# Patient Record
Sex: MALE | Race: WHITE | NOT HISPANIC OR LATINO | Employment: OTHER | URBAN - METROPOLITAN AREA
[De-identification: names, ages, dates, MRNs, and addresses within clinical notes are randomized per-mention and may not be internally consistent; named-entity substitution may affect disease eponyms.]

---

## 2017-10-17 ENCOUNTER — HOSPITAL ENCOUNTER (INPATIENT)
Facility: MEDICAL CENTER | Age: 67
LOS: 11 days | DRG: 854 | End: 2017-10-28
Attending: HOSPITALIST | Admitting: HOSPITALIST
Payer: MEDICARE

## 2017-10-17 ENCOUNTER — RESOLUTE PROFESSIONAL BILLING HOSPITAL PROF FEE (OUTPATIENT)
Dept: HOSPITALIST | Facility: MEDICAL CENTER | Age: 67
End: 2017-10-17
Payer: MEDICARE

## 2017-10-17 ENCOUNTER — HOSPITAL ENCOUNTER (OUTPATIENT)
Facility: MEDICAL CENTER | Age: 67
DRG: 854 | End: 2017-10-17
Payer: MEDICARE

## 2017-10-17 DIAGNOSIS — E11.621 DIABETIC ULCER OF TOE OF RIGHT FOOT ASSOCIATED WITH TYPE 2 DIABETES MELLITUS, WITH BONE INVOLVEMENT WITHOUT EVIDENCE OF NECROSIS (HCC): ICD-10-CM

## 2017-10-17 DIAGNOSIS — L97.516 DIABETIC ULCER OF TOE OF RIGHT FOOT ASSOCIATED WITH TYPE 2 DIABETES MELLITUS, WITH BONE INVOLVEMENT WITHOUT EVIDENCE OF NECROSIS (HCC): ICD-10-CM

## 2017-10-17 PROBLEM — J44.9 COPD (CHRONIC OBSTRUCTIVE PULMONARY DISEASE) (HCC): Status: ACTIVE | Noted: 2017-10-17

## 2017-10-17 PROBLEM — K50.90 CROHN'S DISEASE (HCC): Status: ACTIVE | Noted: 2017-10-17

## 2017-10-17 PROBLEM — N18.9 ACUTE ON CHRONIC KIDNEY FAILURE (HCC): Status: ACTIVE | Noted: 2017-10-17

## 2017-10-17 PROBLEM — E11.9 DIABETES (HCC): Status: ACTIVE | Noted: 2017-10-17

## 2017-10-17 PROBLEM — M19.90 OSTEOARTHRITIS: Status: ACTIVE | Noted: 2017-10-17

## 2017-10-17 PROBLEM — N17.9 ACUTE ON CHRONIC KIDNEY FAILURE (HCC): Status: ACTIVE | Noted: 2017-10-17

## 2017-10-17 PROBLEM — N13.9 OBSTRUCTIVE UROPATHY: Status: ACTIVE | Noted: 2017-10-17

## 2017-10-17 PROBLEM — N40.0 BPH (BENIGN PROSTATIC HYPERPLASIA): Status: ACTIVE | Noted: 2017-10-17

## 2017-10-17 PROBLEM — N19 RENAL FAILURE: Status: ACTIVE | Noted: 2017-10-17

## 2017-10-17 PROBLEM — L97.509 DIABETIC FOOT ULCER (HCC): Status: ACTIVE | Noted: 2017-10-17

## 2017-10-17 LAB
ANION GAP SERPL CALC-SCNC: 12 MMOL/L (ref 0–11.9)
BASOPHILS # BLD AUTO: 0.3 % (ref 0–1.8)
BASOPHILS # BLD: 0.04 K/UL (ref 0–0.12)
BUN SERPL-MCNC: 47 MG/DL (ref 8–22)
CALCIUM SERPL-MCNC: 9.1 MG/DL (ref 8.5–10.5)
CHLORIDE SERPL-SCNC: 108 MMOL/L (ref 96–112)
CO2 SERPL-SCNC: 14 MMOL/L (ref 20–33)
CREAT SERPL-MCNC: 3.41 MG/DL (ref 0.5–1.4)
EOSINOPHIL # BLD AUTO: 0.17 K/UL (ref 0–0.51)
EOSINOPHIL NFR BLD: 1.4 % (ref 0–6.9)
ERYTHROCYTE [DISTWIDTH] IN BLOOD BY AUTOMATED COUNT: 45.8 FL (ref 35.9–50)
EST. AVERAGE GLUCOSE BLD GHB EST-MCNC: 166 MG/DL
GFR SERPL CREATININE-BSD FRML MDRD: 18 ML/MIN/1.73 M 2
GLUCOSE SERPL-MCNC: 128 MG/DL (ref 65–99)
HBA1C MFR BLD: 7.4 % (ref 0–5.6)
HCT VFR BLD AUTO: 35.2 % (ref 42–52)
HGB BLD-MCNC: 11.7 G/DL (ref 14–18)
IMM GRANULOCYTES # BLD AUTO: 0.07 K/UL (ref 0–0.11)
IMM GRANULOCYTES NFR BLD AUTO: 0.6 % (ref 0–0.9)
LYMPHOCYTES # BLD AUTO: 2.4 K/UL (ref 1–4.8)
LYMPHOCYTES NFR BLD: 19.2 % (ref 22–41)
MCH RBC QN AUTO: 30.5 PG (ref 27–33)
MCHC RBC AUTO-ENTMCNC: 33.2 G/DL (ref 33.7–35.3)
MCV RBC AUTO: 91.7 FL (ref 81.4–97.8)
MONOCYTES # BLD AUTO: 0.79 K/UL (ref 0–0.85)
MONOCYTES NFR BLD AUTO: 6.3 % (ref 0–13.4)
NEUTROPHILS # BLD AUTO: 9.02 K/UL (ref 1.82–7.42)
NEUTROPHILS NFR BLD: 72.2 % (ref 44–72)
NRBC # BLD AUTO: 0 K/UL
NRBC BLD AUTO-RTO: 0 /100 WBC
PLATELET # BLD AUTO: 441 K/UL (ref 164–446)
PMV BLD AUTO: 9.5 FL (ref 9–12.9)
POTASSIUM SERPL-SCNC: 3.6 MMOL/L (ref 3.6–5.5)
RBC # BLD AUTO: 3.84 M/UL (ref 4.7–6.1)
SODIUM SERPL-SCNC: 134 MMOL/L (ref 135–145)
WBC # BLD AUTO: 12.5 K/UL (ref 4.8–10.8)

## 2017-10-17 PROCEDURE — 80048 BASIC METABOLIC PNL TOTAL CA: CPT

## 2017-10-17 PROCEDURE — 83036 HEMOGLOBIN GLYCOSYLATED A1C: CPT

## 2017-10-17 PROCEDURE — 36415 COLL VENOUS BLD VENIPUNCTURE: CPT

## 2017-10-17 PROCEDURE — 770006 HCHG ROOM/CARE - MED/SURG/GYN SEMI*

## 2017-10-17 PROCEDURE — 99223 1ST HOSP IP/OBS HIGH 75: CPT | Mod: AI | Performed by: HOSPITALIST

## 2017-10-17 PROCEDURE — 85025 COMPLETE CBC W/AUTO DIFF WBC: CPT

## 2017-10-17 PROCEDURE — 51798 US URINE CAPACITY MEASURE: CPT

## 2017-10-17 PROCEDURE — A9270 NON-COVERED ITEM OR SERVICE: HCPCS | Performed by: HOSPITALIST

## 2017-10-17 PROCEDURE — 94760 N-INVAS EAR/PLS OXIMETRY 1: CPT

## 2017-10-17 PROCEDURE — 700102 HCHG RX REV CODE 250 W/ 637 OVERRIDE(OP): Performed by: HOSPITALIST

## 2017-10-17 RX ORDER — POLYETHYLENE GLYCOL 3350 17 G/17G
1 POWDER, FOR SOLUTION ORAL
Status: DISCONTINUED | OUTPATIENT
Start: 2017-10-17 | End: 2017-10-28 | Stop reason: HOSPADM

## 2017-10-17 RX ORDER — BISACODYL 10 MG
10 SUPPOSITORY, RECTAL RECTAL
Status: DISCONTINUED | OUTPATIENT
Start: 2017-10-17 | End: 2017-10-28 | Stop reason: HOSPADM

## 2017-10-17 RX ORDER — BUDESONIDE AND FORMOTEROL FUMARATE DIHYDRATE 160; 4.5 UG/1; UG/1
2 AEROSOL RESPIRATORY (INHALATION)
Status: DISCONTINUED | OUTPATIENT
Start: 2017-10-17 | End: 2017-10-17

## 2017-10-17 RX ORDER — ONDANSETRON 4 MG/1
4 TABLET, ORALLY DISINTEGRATING ORAL EVERY 4 HOURS PRN
Status: DISCONTINUED | OUTPATIENT
Start: 2017-10-17 | End: 2017-10-28 | Stop reason: HOSPADM

## 2017-10-17 RX ORDER — BUDESONIDE AND FORMOTEROL FUMARATE DIHYDRATE 160; 4.5 UG/1; UG/1
2 AEROSOL RESPIRATORY (INHALATION) 2 TIMES DAILY
Status: DISCONTINUED | OUTPATIENT
Start: 2017-10-18 | End: 2017-10-28 | Stop reason: HOSPADM

## 2017-10-17 RX ORDER — HEPARIN SODIUM 5000 [USP'U]/ML
5000 INJECTION, SOLUTION INTRAVENOUS; SUBCUTANEOUS EVERY 8 HOURS
Status: DISCONTINUED | OUTPATIENT
Start: 2017-10-17 | End: 2017-10-17

## 2017-10-17 RX ORDER — ALBUTEROL SULFATE 90 UG/1
2 AEROSOL, METERED RESPIRATORY (INHALATION) EVERY 4 HOURS PRN
COMMUNITY
End: 2019-04-08

## 2017-10-17 RX ORDER — DEXTROSE MONOHYDRATE 25 G/50ML
25 INJECTION, SOLUTION INTRAVENOUS
Status: DISCONTINUED | OUTPATIENT
Start: 2017-10-17 | End: 2017-10-28 | Stop reason: HOSPADM

## 2017-10-17 RX ORDER — FUROSEMIDE 20 MG/1
20 TABLET ORAL DAILY
Status: ON HOLD | COMMUNITY
End: 2017-10-28

## 2017-10-17 RX ORDER — TAMSULOSIN HYDROCHLORIDE 0.4 MG/1
0.4 CAPSULE ORAL
COMMUNITY
End: 2019-04-08

## 2017-10-17 RX ORDER — ONDANSETRON 2 MG/ML
4 INJECTION INTRAMUSCULAR; INTRAVENOUS EVERY 4 HOURS PRN
Status: DISCONTINUED | OUTPATIENT
Start: 2017-10-17 | End: 2017-10-28 | Stop reason: HOSPADM

## 2017-10-17 RX ORDER — ALBUTEROL SULFATE 90 UG/1
2 AEROSOL, METERED RESPIRATORY (INHALATION) EVERY 4 HOURS PRN
Status: DISCONTINUED | OUTPATIENT
Start: 2017-10-17 | End: 2017-10-28 | Stop reason: HOSPADM

## 2017-10-17 RX ORDER — BUDESONIDE AND FORMOTEROL FUMARATE DIHYDRATE 160; 4.5 UG/1; UG/1
2 AEROSOL RESPIRATORY (INHALATION) 2 TIMES DAILY
COMMUNITY
End: 2019-04-08

## 2017-10-17 RX ORDER — AMOXICILLIN 250 MG
2 CAPSULE ORAL 2 TIMES DAILY
Status: DISCONTINUED | OUTPATIENT
Start: 2017-10-17 | End: 2017-10-28 | Stop reason: HOSPADM

## 2017-10-17 RX ORDER — MORPHINE SULFATE 15 MG/1
15 TABLET, FILM COATED, EXTENDED RELEASE ORAL EVERY 12 HOURS
Status: ON HOLD | COMMUNITY
End: 2019-04-03

## 2017-10-17 RX ORDER — LABETALOL HYDROCHLORIDE 5 MG/ML
10 INJECTION, SOLUTION INTRAVENOUS EVERY 4 HOURS PRN
Status: DISCONTINUED | OUTPATIENT
Start: 2017-10-17 | End: 2017-10-28 | Stop reason: HOSPADM

## 2017-10-17 RX ORDER — MORPHINE SULFATE 100 MG/5ML
15 SOLUTION ORAL EVERY 8 HOURS PRN
Status: DISCONTINUED | OUTPATIENT
Start: 2017-10-17 | End: 2017-10-26

## 2017-10-17 ASSESSMENT — ENCOUNTER SYMPTOMS
NAUSEA: 1
HEMOPTYSIS: 0
HEADACHES: 0
WHEEZING: 0
BLURRED VISION: 0
DEPRESSION: 0
PALPITATIONS: 0
BLOOD IN STOOL: 0
SPEECH CHANGE: 0
CHILLS: 0
FEVER: 0
FLANK PAIN: 1
DIZZINESS: 0
BRUISES/BLEEDS EASILY: 0
SENSORY CHANGE: 0
MYALGIAS: 0
COUGH: 0
ABDOMINAL PAIN: 1

## 2017-10-17 ASSESSMENT — PATIENT HEALTH QUESTIONNAIRE - PHQ9
1. LITTLE INTEREST OR PLEASURE IN DOING THINGS: NOT AT ALL
SUM OF ALL RESPONSES TO PHQ QUESTIONS 1-9: 0
SUM OF ALL RESPONSES TO PHQ9 QUESTIONS 1 AND 2: 0
2. FEELING DOWN, DEPRESSED, IRRITABLE, OR HOPELESS: NOT AT ALL

## 2017-10-17 ASSESSMENT — COPD QUESTIONNAIRES
DO YOU EVER COUGH UP ANY MUCUS OR PHLEGM?: YES, A FEW DAYS A WEEK OR MONTH
COPD SCREENING SCORE: 5
HAVE YOU SMOKED AT LEAST 100 CIGARETTES IN YOUR ENTIRE LIFE: NO/DON'T KNOW
DURING THE PAST 4 WEEKS HOW MUCH DID YOU FEEL SHORT OF BREATH: SOME OF THE TIME

## 2017-10-17 ASSESSMENT — LIFESTYLE VARIABLES
EVER_SMOKED: NEVER
EVER_SMOKED: NEVER
ALCOHOL_USE: NO

## 2017-10-17 ASSESSMENT — PAIN SCALES - GENERAL: PAINLEVEL_OUTOF10: 2

## 2017-10-18 ENCOUNTER — APPOINTMENT (OUTPATIENT)
Dept: RADIOLOGY | Facility: MEDICAL CENTER | Age: 67
DRG: 854 | End: 2017-10-18
Attending: HOSPITALIST
Payer: MEDICARE

## 2017-10-18 PROBLEM — R65.20 SEVERE SEPSIS (HCC): Status: ACTIVE | Noted: 2017-10-18

## 2017-10-18 PROBLEM — D64.9 ANEMIA: Status: ACTIVE | Noted: 2017-10-18

## 2017-10-18 PROBLEM — A41.9 SEVERE SEPSIS (HCC): Status: ACTIVE | Noted: 2017-10-18

## 2017-10-18 LAB
ANION GAP SERPL CALC-SCNC: 11 MMOL/L (ref 0–11.9)
APPEARANCE UR: ABNORMAL
APTT PPP: 33.3 SEC (ref 24.7–36)
BACTERIA #/AREA URNS HPF: NEGATIVE /HPF
BASOPHILS # BLD AUTO: 0.5 % (ref 0–1.8)
BASOPHILS # BLD: 0.06 K/UL (ref 0–0.12)
BILIRUB UR QL STRIP.AUTO: NEGATIVE
BUN SERPL-MCNC: 50 MG/DL (ref 8–22)
CALCIUM SERPL-MCNC: 9 MG/DL (ref 8.5–10.5)
CHLORIDE SERPL-SCNC: 108 MMOL/L (ref 96–112)
CO2 SERPL-SCNC: 16 MMOL/L (ref 20–33)
COLOR UR: YELLOW
CREAT SERPL-MCNC: 3.51 MG/DL (ref 0.5–1.4)
EOSINOPHIL # BLD AUTO: 0.15 K/UL (ref 0–0.51)
EOSINOPHIL NFR BLD: 1.2 % (ref 0–6.9)
EPI CELLS #/AREA URNS HPF: ABNORMAL /HPF
ERYTHROCYTE [DISTWIDTH] IN BLOOD BY AUTOMATED COUNT: 46.1 FL (ref 35.9–50)
GFR SERPL CREATININE-BSD FRML MDRD: 18 ML/MIN/1.73 M 2
GLUCOSE BLD-MCNC: 120 MG/DL (ref 65–99)
GLUCOSE BLD-MCNC: 134 MG/DL (ref 65–99)
GLUCOSE BLD-MCNC: 142 MG/DL (ref 65–99)
GLUCOSE SERPL-MCNC: 142 MG/DL (ref 65–99)
GLUCOSE UR STRIP.AUTO-MCNC: NEGATIVE MG/DL
GRAM STN SPEC: NORMAL
HCT VFR BLD AUTO: 34 % (ref 42–52)
HGB BLD-MCNC: 11.2 G/DL (ref 14–18)
HYALINE CASTS #/AREA URNS LPF: ABNORMAL /LPF
IMM GRANULOCYTES # BLD AUTO: 0.05 K/UL (ref 0–0.11)
IMM GRANULOCYTES NFR BLD AUTO: 0.4 % (ref 0–0.9)
INR PPP: 1.14 (ref 0.87–1.13)
INR PPP: 1.15 (ref 0.87–1.13)
KETONES UR STRIP.AUTO-MCNC: NEGATIVE MG/DL
LACTATE BLD-SCNC: 1.1 MMOL/L (ref 0.5–2)
LACTATE BLD-SCNC: 1.3 MMOL/L (ref 0.5–2)
LEUKOCYTE ESTERASE UR QL STRIP.AUTO: ABNORMAL
LYMPHOCYTES # BLD AUTO: 3.59 K/UL (ref 1–4.8)
LYMPHOCYTES NFR BLD: 29.5 % (ref 22–41)
MCH RBC QN AUTO: 30.4 PG (ref 27–33)
MCHC RBC AUTO-ENTMCNC: 32.9 G/DL (ref 33.7–35.3)
MCV RBC AUTO: 92.1 FL (ref 81.4–97.8)
MICRO URNS: ABNORMAL
MONOCYTES # BLD AUTO: 0.8 K/UL (ref 0–0.85)
MONOCYTES NFR BLD AUTO: 6.6 % (ref 0–13.4)
NEUTROPHILS # BLD AUTO: 7.54 K/UL (ref 1.82–7.42)
NEUTROPHILS NFR BLD: 61.8 % (ref 44–72)
NITRITE UR QL STRIP.AUTO: NEGATIVE
NRBC # BLD AUTO: 0 K/UL
NRBC BLD AUTO-RTO: 0 /100 WBC
PH UR STRIP.AUTO: 5 [PH]
PLATELET # BLD AUTO: 447 K/UL (ref 164–446)
PMV BLD AUTO: 9.6 FL (ref 9–12.9)
POTASSIUM SERPL-SCNC: 3.8 MMOL/L (ref 3.6–5.5)
PROT UR QL STRIP: 100 MG/DL
PROTHROMBIN TIME: 15 SEC (ref 12–14.6)
PROTHROMBIN TIME: 15.1 SEC (ref 12–14.6)
RBC # BLD AUTO: 3.69 M/UL (ref 4.7–6.1)
RBC # URNS HPF: >150 /HPF
RBC UR QL AUTO: ABNORMAL
SIGNIFICANT IND 70042: NORMAL
SITE SITE: NORMAL
SODIUM SERPL-SCNC: 135 MMOL/L (ref 135–145)
SOURCE SOURCE: NORMAL
SP GR UR STRIP.AUTO: 1.01
UROBILINOGEN UR STRIP.AUTO-MCNC: 0.2 MG/DL
WBC # BLD AUTO: 12.2 K/UL (ref 4.8–10.8)
WBC #/AREA URNS HPF: ABNORMAL /HPF

## 2017-10-18 PROCEDURE — 74176 CT ABD & PELVIS W/O CONTRAST: CPT

## 2017-10-18 PROCEDURE — 36415 COLL VENOUS BLD VENIPUNCTURE: CPT

## 2017-10-18 PROCEDURE — 700102 HCHG RX REV CODE 250 W/ 637 OVERRIDE(OP): Performed by: NURSE PRACTITIONER

## 2017-10-18 PROCEDURE — 770020 HCHG ROOM/CARE - TELE (206)

## 2017-10-18 PROCEDURE — 99233 SBSQ HOSP IP/OBS HIGH 50: CPT | Mod: 25 | Performed by: HOSPITALIST

## 2017-10-18 PROCEDURE — 87205 SMEAR GRAM STAIN: CPT

## 2017-10-18 PROCEDURE — 85025 COMPLETE CBC W/AUTO DIFF WBC: CPT

## 2017-10-18 PROCEDURE — 36556 INSERT NON-TUNNEL CV CATH: CPT | Performed by: HOSPITALIST

## 2017-10-18 PROCEDURE — 85730 THROMBOPLASTIN TIME PARTIAL: CPT

## 2017-10-18 PROCEDURE — 700102 HCHG RX REV CODE 250 W/ 637 OVERRIDE(OP): Performed by: HOSPITALIST

## 2017-10-18 PROCEDURE — 700105 HCHG RX REV CODE 258

## 2017-10-18 PROCEDURE — 85610 PROTHROMBIN TIME: CPT | Mod: 91

## 2017-10-18 PROCEDURE — 11055 PARING/CUTG B9 HYPRKER LES 1: CPT

## 2017-10-18 PROCEDURE — 306015 LOCK STAT FOLEY: Performed by: HOSPITALIST

## 2017-10-18 PROCEDURE — 87070 CULTURE OTHR SPECIMN AEROBIC: CPT

## 2017-10-18 PROCEDURE — A9270 NON-COVERED ITEM OR SERVICE: HCPCS | Performed by: HOSPITALIST

## 2017-10-18 PROCEDURE — 700105 HCHG RX REV CODE 258: Performed by: HOSPITALIST

## 2017-10-18 PROCEDURE — 80048 BASIC METABOLIC PNL TOTAL CA: CPT

## 2017-10-18 PROCEDURE — B543ZZA ULTRASONOGRAPHY OF RIGHT JUGULAR VEINS, GUIDANCE: ICD-10-PCS | Performed by: HOSPITALIST

## 2017-10-18 PROCEDURE — 71010 DX-CHEST-PORTABLE (1 VIEW): CPT

## 2017-10-18 PROCEDURE — 83605 ASSAY OF LACTIC ACID: CPT | Mod: 91

## 2017-10-18 PROCEDURE — 82962 GLUCOSE BLOOD TEST: CPT

## 2017-10-18 PROCEDURE — 81001 URINALYSIS AUTO W/SCOPE: CPT

## 2017-10-18 PROCEDURE — 700111 HCHG RX REV CODE 636 W/ 250 OVERRIDE (IP): Performed by: HOSPITALIST

## 2017-10-18 PROCEDURE — 02HV33Z INSERTION OF INFUSION DEVICE INTO SUPERIOR VENA CAVA, PERCUTANEOUS APPROACH: ICD-10-PCS | Performed by: HOSPITALIST

## 2017-10-18 PROCEDURE — A9270 NON-COVERED ITEM OR SERVICE: HCPCS | Performed by: NURSE PRACTITIONER

## 2017-10-18 RX ORDER — FINASTERIDE 5 MG/1
5 TABLET, FILM COATED ORAL DAILY
Status: DISCONTINUED | OUTPATIENT
Start: 2017-10-18 | End: 2017-10-28 | Stop reason: HOSPADM

## 2017-10-18 RX ORDER — SODIUM CHLORIDE 9 MG/ML
30 INJECTION, SOLUTION INTRAVENOUS ONCE
Status: COMPLETED | OUTPATIENT
Start: 2017-10-18 | End: 2017-10-18

## 2017-10-18 RX ORDER — TAMSULOSIN HYDROCHLORIDE 0.4 MG/1
0.4 CAPSULE ORAL
Status: DISCONTINUED | OUTPATIENT
Start: 2017-10-18 | End: 2017-10-28 | Stop reason: HOSPADM

## 2017-10-18 RX ORDER — SODIUM CHLORIDE 9 MG/ML
INJECTION, SOLUTION INTRAVENOUS
Status: COMPLETED
Start: 2017-10-18 | End: 2017-10-18

## 2017-10-18 RX ORDER — SODIUM CHLORIDE 9 MG/ML
30 INJECTION, SOLUTION INTRAVENOUS
Status: DISCONTINUED | OUTPATIENT
Start: 2017-10-18 | End: 2017-10-18

## 2017-10-18 RX ORDER — SODIUM CHLORIDE 9 MG/ML
500 INJECTION, SOLUTION INTRAVENOUS
Status: DISCONTINUED | OUTPATIENT
Start: 2017-10-18 | End: 2017-10-28 | Stop reason: HOSPADM

## 2017-10-18 RX ADMIN — PIPERACILLIN AND TAZOBACTAM 2.25 G: 2; .25 INJECTION, POWDER, LYOPHILIZED, FOR SOLUTION INTRAVENOUS; PARENTERAL at 22:24

## 2017-10-18 RX ADMIN — SODIUM CHLORIDE 3600 ML: 9 INJECTION, SOLUTION INTRAVENOUS at 15:10

## 2017-10-18 RX ADMIN — MORPHINE SULFATE 15 MG: 100 SOLUTION ORAL at 23:54

## 2017-10-18 RX ADMIN — BUDESONIDE AND FORMOTEROL FUMARATE DIHYDRATE 2 PUFF: 160; 4.5 AEROSOL RESPIRATORY (INHALATION) at 22:24

## 2017-10-18 RX ADMIN — TAMSULOSIN HYDROCHLORIDE 0.4 MG: 0.4 CAPSULE ORAL at 19:33

## 2017-10-18 RX ADMIN — MORPHINE SULFATE 15 MG: 100 SOLUTION ORAL at 15:41

## 2017-10-18 RX ADMIN — BUDESONIDE AND FORMOTEROL FUMARATE DIHYDRATE 2 PUFF: 160; 4.5 AEROSOL RESPIRATORY (INHALATION) at 09:21

## 2017-10-18 RX ADMIN — STANDARDIZED SENNA CONCENTRATE AND DOCUSATE SODIUM 2 TABLET: 8.6; 5 TABLET, FILM COATED ORAL at 09:32

## 2017-10-18 RX ADMIN — PIPERACILLIN AND TAZOBACTAM 2.25 G: 2; .25 INJECTION, POWDER, LYOPHILIZED, FOR SOLUTION INTRAVENOUS; PARENTERAL at 15:11

## 2017-10-18 RX ADMIN — FINASTERIDE 5 MG: 5 TABLET, FILM COATED ORAL at 15:40

## 2017-10-18 ASSESSMENT — PATIENT HEALTH QUESTIONNAIRE - PHQ9
1. LITTLE INTEREST OR PLEASURE IN DOING THINGS: NOT AT ALL
SUM OF ALL RESPONSES TO PHQ9 QUESTIONS 1 AND 2: 0
SUM OF ALL RESPONSES TO PHQ QUESTIONS 1-9: 0
2. FEELING DOWN, DEPRESSED, IRRITABLE, OR HOPELESS: NOT AT ALL

## 2017-10-18 ASSESSMENT — ENCOUNTER SYMPTOMS
SHORTNESS OF BREATH: 0
NECK PAIN: 0
FEVER: 0
BLURRED VISION: 0
HEADACHES: 0
BACK PAIN: 0
CHILLS: 0
DIZZINESS: 0
EYE PAIN: 0
PALPITATIONS: 0
COUGH: 0
TINGLING: 0
ABDOMINAL PAIN: 1
NAUSEA: 1
INSOMNIA: 0
SORE THROAT: 0
VOMITING: 0
DEPRESSION: 0

## 2017-10-18 ASSESSMENT — PAIN SCALES - GENERAL
PAINLEVEL_OUTOF10: 3
PAINLEVEL_OUTOF10: 5
PAINLEVEL_OUTOF10: 10
PAINLEVEL_OUTOF10: 2
PAINLEVEL_OUTOF10: 9

## 2017-10-18 ASSESSMENT — LIFESTYLE VARIABLES
DO YOU DRINK ALCOHOL: NO
DO YOU DRINK ALCOHOL: NO

## 2017-10-18 NOTE — H&P
Hospital Medicine History and Physical    Date of Service  10/17/2017    Chief Complaint  Patient admitted to outside hospital with abd pain, foot ulcer and oliguria noted to have obstructive uropathy and acute on chronic kidney injury.     History of Presenting Illness  67 y.o. male who presented 10/17/2017 with obstructive uropathy, acute on chronic kidney injury. Patient states he presented to outside hospital with worsening foot ulcer and abd pain. He was found to have left first toe ulcer w/ slight erythema. Evaluvated for osteomylitis, had XR and MRI done w/ out signs of osteo, some soft tissue swelling. Also noted to have abd pain and oliguria. Patient was then evaulated w/ ultrasound found to have bilateral hydronephrosis suggest lower bladder outlet obstruction. Pain is unchanged after harrington insertion.     Primary Care Physician  Nicolette Yeager M.D. (Inactive)    Consultants  None    Code Status  Full    Review of Systems  Review of Systems   Constitutional: Negative for chills and fever.   HENT: Negative for congestion.    Eyes: Negative for blurred vision.   Respiratory: Negative for cough, hemoptysis and wheezing.    Cardiovascular: Negative for chest pain and palpitations.   Gastrointestinal: Positive for abdominal pain and nausea. Negative for blood in stool.   Genitourinary: Positive for dysuria, flank pain and hematuria.   Musculoskeletal: Negative for myalgias.   Skin: Negative for rash.   Neurological: Negative for dizziness, sensory change, speech change and headaches.   Endo/Heme/Allergies: Does not bruise/bleed easily.   Psychiatric/Behavioral: Negative for depression and suicidal ideas.        Past Medical History  Osteoarthrits, chrons, renal insuf, BPH, Type 2 Dm, COPD  Surgical History  Colostomy   Medications  Symbicort  Morphine  Albuterol  Humaria   Lasix  FLomax    Family History  No family hx per patient    Social History  Smoking: denies currently previous smoker quit 4 years  ago  Denies alcohol use   Denies drug use    Allergies  Allergies   Allergen Reactions   • Ceftriaxone    • Ezetimibe    • Fentanyl    • Flagyl [Metronidazole]    • Infliximab    • Lovastatin    • Simvastatin    • Vancomycin         Physical Exam  Laboratory   Hemodynamics  No data recorded.      No Data Recorded           Respiratory                    Physical Exam   Constitutional: He is oriented to person, place, and time. He appears well-developed and well-nourished.   HENT:   Head: Normocephalic and atraumatic.   Eyes: EOM are normal. Pupils are equal, round, and reactive to light.   Neck: Normal range of motion. Neck supple. No JVD present. No tracheal deviation present. No thyromegaly present.   Cardiovascular: Normal rate, regular rhythm, normal heart sounds and intact distal pulses.    No murmur heard.  Pulmonary/Chest: Effort normal. No respiratory distress. He has wheezes.   Abdominal: Soft. Bowel sounds are normal. There is tenderness. There is no rebound and no guarding.   Genitourinary: Penis normal.   Musculoskeletal: Normal range of motion.   Left first toe with ulcer 1 cm, depth unknown.   Neurological: He is alert and oriented to person, place, and time. No cranial nerve deficit.   Skin: Skin is warm and dry.   Psychiatric: He has a normal mood and affect. His behavior is normal. Judgment and thought content normal.               No results for input(s): ALTSGPT, ASTSGOT, ALKPHOSPHAT, TBILIRUBIN, DBILIRUBIN, GAMMAGT, AMYLASE, LIPASE, ALB, PREALBUMIN, GLUCOSE in the last 72 hours.              No results found for: TROPONINI  Urinalysis:  No results found for: SPECGRAVITY, GLUCOSEUR, KETONES, NITRITE, WBCURINE, RBCURINE, BACTERIA, EPITHELCELL     Imaging  Please see outside reccords for imaging   Assessment/Plan     I anticipate this patient will require at least two midnights for appropriate medical management, necessitating inpatient admission.    * Acute on chronic kidney failure (CMS-HCC)    Assessment & Plan    Acute on chronic Likely 2/2 to obstructive uropathy  Ultrasound w/ bilateral hydronephrosis noted on outside hospital records likely 2/2 to distal bladder outlet obstruction  nephro consulted appreciate recs           Obstructive uropathy   Assessment & Plan    Humphrey in place, bladder scan as needed   Outside ultrasound w/ bilateral hydronephrosis likely 2/2 to distal bladder outlet obstruction  Will need uro consult in am         Diabetic foot ulcer (CMS-HCC)   Assessment & Plan    No signs of acute cellulitis  MRI done outside hospital w/ out signs of osteo, some soft tissue swelling noted  Will get cx   Wound care consult   Can consider abx         COPD (chronic obstructive pulmonary disease) (CMS-HCC)   Assessment & Plan    No exacerbation at the moment  Known copd will resume symbicort  Respiratory care while inpatient         BPH (benign prostatic hyperplasia)   Assessment & Plan    chronic        Osteoarthritis   Assessment & Plan    Chronic, on morphine at home will continue        Crohn's disease (CMS-HCC)   Assessment & Plan    On patrick, injection bimonthly last injection 1 week ago        Diabetes (CMS-HCC)   Assessment & Plan    SSI  Recheck A1c            VTE prophylaxis: scd's given hematuria.

## 2017-10-18 NOTE — ASSESSMENT & PLAN NOTE
Acute on chronic Likely 2/2 to obstructive uropathy  Ultrasound w/ bilateral hydronephrosis noted on outside hospital records likely 2/2 to distal bladder outlet obstruction  -uop remains ok, harrington remains in place and functioning, down-trending Cr, continue to follow  -continue current BPH meds, tolerating well at this time, no changes planned today, monitor Cr level very closely

## 2017-10-18 NOTE — ASSESSMENT & PLAN NOTE
This is severe sepsis with the following associated acute organ dysfunction(s): acute kidney failure.   -is improving, see above

## 2017-10-18 NOTE — ASSESSMENT & PLAN NOTE
-well tolerated, continue below medications without changes at this time:  ON FINASTRIDE  ON FLOMAX

## 2017-10-18 NOTE — PROGRESS NOTES
Patient peripheral IV infiltrated and removed at 1015. Primary RN attempted peripheral IV insertion x2 but unsuccessful. Charge RN notified. Charge RN attempted insertion of ultrasound guided peripheral IV but unsuccessful. Dr. Alexander notified.

## 2017-10-18 NOTE — RESPIRATORY CARE
COPD EDUCATION by COPD CLINICAL EDUCATOR  10/18/2017 at 4:15 PM by Zaira Gaines     Patient interviewed by COPD education team. Patient refused COPD program at this time.

## 2017-10-18 NOTE — ASSESSMENT & PLAN NOTE
-MRI of the foot done here shows osteomyelitis, LPS following  -ID following, s/p amputation POD#2, pain is much better controlled today, changed to PO zyvox, 3 more days until finished 14 day course  -Remains afebrile, WBC has  Down-trended and nearly normalized now  -Skin culture, shows mixed skin radha, as expected

## 2017-10-18 NOTE — ASSESSMENT & PLAN NOTE
No exacerbation at the moment  Known copd will resume symbicort, continue BD's PRN, no changes planned currently  Rt protocol.

## 2017-10-18 NOTE — PROGRESS NOTES
Report given to PREET Jensen, at 1435. Patient transferred at 1455 to [place] [id number]. All belongings and medications sent with patient.

## 2017-10-18 NOTE — CONSULTS
UROLOGY Consult Note:    Nathaly VILLA/ Dr Jay Jay Rodriguez  Date & Time note created:    10/18/2017   10:03 AM     Referring MD:  Dr. Alexander    Patient ID:   Name:             Shola Hoyt   YOB: 1950  Age:                 67 y.o.  male   MRN:               [id number]                                                             Reason for Consult:      Bilateral hydronephrosis   History of Present Illness:    68 yo male admitted from outside hospital with bilateral hydronephrosis and abdominal pain. Harrington catheter was placed before transfer to [facility]. Nocturia 4 times per evening for the last 4 months. Stream has been slowing. Daytime frequency. Some nausea. Hematuria after harrington catheter was placed that has since started to clear. No SOB.    Review of Systems:      All other systems were reviewed and are negative (AMA/CMS criteria)                Past Medical History:   Past Medical History:   Diagnosis Date   • Arthritis    • Benign prostate hyperplasia    • Colostomy in place (CMS-HCC)    • COPD (chronic obstructive pulmonary disease) (CMS-HCC)    • Diabetes (CMS-HCC)    • Renal disorder      Active Hospital Problems    Diagnosis   • COPD (chronic obstructive pulmonary disease) (CMS-[facility]) [J44.9]     Priority: High   • Diabetic foot ulcer (CMS-HCC) [E11.621, L97.509]     Priority: High   • Obstructive uropathy [N13.9]     Priority: High   • Severe sepsis (CMS-HCC) [A41.9, R65.20]   • Anemia [D64.9]   • Acute on chronic kidney failure (CMS-HCC) [N17.9, N18.9]   • Diabetes (CMS-HCC) [E11.9]   • Crohn's disease (CMS-HCC) [K50.90]   • Osteoarthritis [M19.90]   • BPH (benign prostatic hyperplasia) [N40.0]   • Renal failure [N19]       Past Surgical History:  No past surgical history on file.    Hospital Medications:    Current Facility-Administered Medications:   •  NS (BOLUS) infusion 500 mL, 500 mL, Intravenous, Once PRN, Gunnar Alexander M.D.  •  NS infusion 3,600 mL, 30 mL/kg,  Intravenous, Once, Gunnar Alexander M.D.  •  SODIUM CHLORIDE 0.9 % IV SOLN, , , ,   •  piperacillin-tazobactam (ZOSYN) 2.25 g in  mL IVPB, 2.25 g, Intravenous, Q6HRS, Gunnar Alexander M.D.  •  senna-docusate (PERICOLACE or SENOKOT S) 8.6-50 MG per tablet 2 Tab, 2 Tab, Oral, BID, 2 Tab at 10/18/17 0932 **AND** polyethylene glycol/lytes (MIRALAX) PACKET 1 Packet, 1 Packet, Oral, QDAY PRN **AND** magnesium hydroxide (MILK OF MAGNESIA) suspension 30 mL, 30 mL, Oral, QDAY PRN **AND** bisacodyl (DULCOLAX) suppository 10 mg, 10 mg, Rectal, QDAY PRN, Mao Dunn M.D.  •  Respiratory Care per Protocol, , Nebulization, Continuous RT, Mao Dunn M.D.  •  labetalol (NORMODYNE,TRANDATE) injection 10 mg, 10 mg, Intravenous, Q4HRS PRN, Mao Dunn M.D.  •  ondansetron (ZOFRAN) syringe/vial injection 4 mg, 4 mg, Intravenous, Q4HRS PRN, Mao Dunn M.D.  •  ondansetron (ZOFRAN ODT) dispertab 4 mg, 4 mg, Oral, Q4HRS PRN, Mao Dunn M.D.  •  insulin lispro (HUMALOG) injection 2-9 Units, 2-9 Units, Subcutaneous, 4X/DAY ACHS, Mao Dunn M.D., Stopped at 10/17/17 2033  •  Action is required: Protocol 1073 Hypoglycemia has been implemented, , , Once **AND** Protocol 1073 Inclusion Criteria, , , CONTINUOUS **AND** Protocol 1073 NOTIFY, , , Once **AND** Protocol 1073 Initiate protocol immediately if FSBG is less than or equal to 70 mg/dL, , , CONTINUOUS **AND** glucose 4 g chewable tablet 16 g, 16 g, Oral, Q15 MIN PRN **AND** dextrose 50% (D50W) injection 25 mL, 25 mL, Intravenous, Q15 MIN PRN, Mao Dunn M.D.  •  morphine (ROXANOL) 20 MG/ML oral conc 15 mg, 15 mg, Oral, Q8HRS PRN, Mao Dunn M.D.  •  Influenza Vaccine High-Dose pf injection 0.5 mL, 0.5 mL, Intramuscular, Once PRN, Mao Dunn M.D.  •  budesonide-formoterol (SYMBICORT) 160-4.5 MCG/ACT inhaler 2 Puff, 2 Puff, Inhalation, BID, Mao Dunn M.D., 2 Puff at 10/18/17 0921  •  albuterol inhaler 2 Puff, 2 Puff,  Inhalation, Q4HRS POPEYEN, Luis Eduardo Lake M.D.    Current Outpatient Medications:  Prescriptions Prior to Admission   Medication Sig Dispense Refill Last Dose   • insulin regular (HUMULIN R) 100 Unit/mL Solution Inject 30 Units as instructed 2 Times a Day.      • furosemide (LASIX) 20 MG Tab Take 20 mg by mouth every day.      • MORPHINE SULFATE, CONCENTRATE, PO Take 15 mg by mouth every 8 hours as needed.      • budesonide-formoterol (SYMBICORT) 160-4.5 MCG/ACT Aerosol Inhale 2 Puffs by mouth 2 Times a Day.      • tamsulosin (FLOMAX) 0.4 MG capsule Take 0.4 mg by mouth ONE-HALF HOUR AFTER BREAKFAST.      • albuterol 108 (90 Base) MCG/ACT Aero Soln inhalation aerosol Inhale 2 Puffs by mouth every four hours as needed for Shortness of Breath.      • morphine ER (MS CONTIN) 15 MG Tab CR tablet Take 15 mg by mouth every 12 hours.   10/16/2017       Medication Allergy:  Allergies   Allergen Reactions   • Bloodless    • Ceftriaxone    • Ezetimibe    • Fentanyl    • Flagyl [Metronidazole]    • Infliximab    • Lovastatin    • Simvastatin    • Vancomycin        Family History:  No family history on file.    Social History:  Social History     Social History   • Marital status:      Spouse name: N/A   • Number of children: N/A   • Years of education: N/A     Occupational History   • Not on file.     Social History Main Topics   • Smoking status: Not on file   • Smokeless tobacco: Not on file   • Alcohol use Not on file   • Drug use: Unknown   • Sexual activity: Not on file     Other Topics Concern   • Not on file     Social History Narrative   • No narrative on file         Physical Exam:  Vitals/ General Appearance:   Weight/BMI: There is no height or weight on file to calculate BMI.  Blood pressure 140/76, pulse (!) 103, temperature 36.8 °C (98.3 °F), resp. rate 18, weight 120 kg (264 lb 8.8 oz), SpO2 98 %.  Vitals:    10/17/17 2050 10/17/17 2119 10/18/17 0500 10/18/17 0737   BP: 150/73  160/71 140/76   Pulse: 85 88 87  (!) 103   Resp: 20 18 18 18   Temp: 36.7 °C (98.1 °F)  36.7 °C (98.1 °F) 36.8 °C (98.3 °F)   SpO2: 97% 96% 97% 98%   Weight:         Oxygen Therapy:  Pulse Oximetry: 98 %, O2 (LPM): 0, O2 Delivery: None (Room Air)    Constitutional:   Well developed, Well nourished, No acute distress  HENMT:  Normocephalic, Atraumatic, Oropharynx moist mucous membranes, No oral exudates, Nose normal.  No thyromegaly.  Eyes:  EOMI, Conjunctiva normal, No discharge.  Neck:  Normal range of motion, No cervical tenderness,  no JVD.  Cardiovascular:  Normal heart rate, Normal rhythm, No murmurs, No rubs, No gallops.   Extremitites with intact distal pulses, no cyanosis, or edema.  Lungs:  some wheezing  Abdomen: Bowel sounds normal. Minimal supra pubic tenderness.  : harrington catheter patent  Neurologic: Alert & oriented x 3, No focal deficits noted, cranial nerves II through X are grossly intact.  Psychiatric: Affect normal, Judgment normal, Mood normal.      MDM (Data Review):     Records reviewed and summarized in current documentation    Lab Data Review:  Recent Results (from the past 24 hour(s))   CBC with Differential    Collection Time: 10/17/17  7:18 PM   Result Value Ref Range    WBC 12.5 (H) 4.8 - 10.8 K/uL    RBC 3.84 (L) 4.70 - 6.10 M/uL    Hemoglobin 11.7 (L) 14.0 - 18.0 g/dL    Hematocrit 35.2 (L) 42.0 - 52.0 %    MCV 91.7 81.4 - 97.8 fL    MCH 30.5 27.0 - 33.0 pg    MCHC 33.2 (L) 33.7 - 35.3 g/dL    RDW 45.8 35.9 - 50.0 fL    Platelet Count 441 164 - 446 K/uL    MPV 9.5 9.0 - 12.9 fL    Neutrophils-Polys 72.20 (H) 44.00 - 72.00 %    Lymphocytes 19.20 (L) 22.00 - 41.00 %    Monocytes 6.30 0.00 - 13.40 %    Eosinophils 1.40 0.00 - 6.90 %    Basophils 0.30 0.00 - 1.80 %    Immature Granulocytes 0.60 0.00 - 0.90 %    Nucleated RBC 0.00 /100 WBC    Neutrophils (Absolute) 9.02 (H) 1.82 - 7.42 K/uL    Lymphs (Absolute) 2.40 1.00 - 4.80 K/uL    Monos (Absolute) 0.79 0.00 - 0.85 K/uL    Eos (Absolute) 0.17 0.00 - 0.51 K/uL    Baso  (Absolute) 0.04 0.00 - 0.12 K/uL    Immature Granulocytes (abs) 0.07 0.00 - 0.11 K/uL    NRBC (Absolute) 0.00 K/uL   BASIC METABOLIC PANEL    Collection Time: 10/17/17  7:18 PM   Result Value Ref Range    Sodium 134 (L) 135 - 145 mmol/L    Potassium 3.6 3.6 - 5.5 mmol/L    Chloride 108 96 - 112 mmol/L    Co2 14 (L) 20 - 33 mmol/L    Glucose 128 (H) 65 - 99 mg/dL    Bun 47 (H) 8 - 22 mg/dL    Creatinine 3.41 (H) 0.50 - 1.40 mg/dL    Calcium 9.1 8.5 - 10.5 mg/dL    Anion Gap 12.0 (H) 0.0 - 11.9   Hemoglobin A1c    Collection Time: 10/17/17  7:18 PM   Result Value Ref Range    Glycohemoglobin 7.4 (H) 0.0 - 5.6 %    Est Avg Glucose 166 mg/dL   ESTIMATED GFR    Collection Time: 10/17/17  7:18 PM   Result Value Ref Range    GFR If  22 (A) >60 mL/min/1.73 m 2    GFR If Non  18 (A) >60 mL/min/1.73 m 2   CBC with Differential    Collection Time: 10/18/17  5:05 AM   Result Value Ref Range    WBC 12.2 (H) 4.8 - 10.8 K/uL    RBC 3.69 (L) 4.70 - 6.10 M/uL    Hemoglobin 11.2 (L) 14.0 - 18.0 g/dL    Hematocrit 34.0 (L) 42.0 - 52.0 %    MCV 92.1 81.4 - 97.8 fL    MCH 30.4 27.0 - 33.0 pg    MCHC 32.9 (L) 33.7 - 35.3 g/dL    RDW 46.1 35.9 - 50.0 fL    Platelet Count 447 (H) 164 - 446 K/uL    MPV 9.6 9.0 - 12.9 fL    Neutrophils-Polys 61.80 44.00 - 72.00 %    Lymphocytes 29.50 22.00 - 41.00 %    Monocytes 6.60 0.00 - 13.40 %    Eosinophils 1.20 0.00 - 6.90 %    Basophils 0.50 0.00 - 1.80 %    Immature Granulocytes 0.40 0.00 - 0.90 %    Nucleated RBC 0.00 /100 WBC    Neutrophils (Absolute) 7.54 (H) 1.82 - 7.42 K/uL    Lymphs (Absolute) 3.59 1.00 - 4.80 K/uL    Monos (Absolute) 0.80 0.00 - 0.85 K/uL    Eos (Absolute) 0.15 0.00 - 0.51 K/uL    Baso (Absolute) 0.06 0.00 - 0.12 K/uL    Immature Granulocytes (abs) 0.05 0.00 - 0.11 K/uL    NRBC (Absolute) 0.00 K/uL   Basic Metabolic Panel (BMP)    Collection Time: 10/18/17  5:05 AM   Result Value Ref Range    Sodium 135 135 - 145 mmol/L    Potassium 3.8 3.6 -  5.5 mmol/L    Chloride 108 96 - 112 mmol/L    Co2 16 (L) 20 - 33 mmol/L    Glucose 142 (H) 65 - 99 mg/dL    Bun 50 (H) 8 - 22 mg/dL    Creatinine 3.51 (H) 0.50 - 1.40 mg/dL    Calcium 9.0 8.5 - 10.5 mg/dL    Anion Gap 11.0 0.0 - 11.9   PROTHROMBIN TIME    Collection Time: 10/18/17  5:05 AM   Result Value Ref Range    PT 15.1 (H) 12.0 - 14.6 sec    INR 1.15 (H) 0.87 - 1.13   ESTIMATED GFR    Collection Time: 10/18/17  5:05 AM   Result Value Ref Range    GFR If  21 (A) >60 mL/min/1.73 m 2    GFR If Non  18 (A) >60 mL/min/1.73 m 2   Prothrombin time (INR)    Collection Time: 10/18/17  9:14 AM   Result Value Ref Range    PT 15.0 (H) 12.0 - 14.6 sec    INR 1.14 (H) 0.87 - 1.13   APTT    Collection Time: 10/18/17  9:14 AM   Result Value Ref Range    APTT 33.3 24.7 - 36.0 sec   Lactic Acid -STAT Once    Collection Time: 10/18/17  9:14 AM   Result Value Ref Range    Lactic Acid 1.1 0.5 - 2.0 mmol/L       Imaging/Procedures Review:    Reviewed    MDM (Assessment and Plan):     Active Hospital Problems    Diagnosis   • COPD (chronic obstructive pulmonary disease) (CMS-HCC) [J44.9]     Priority: High   • Diabetic foot ulcer (CMS-HCC) [E11.621, L97.509]     Priority: High   • Obstructive uropathy [N13.9]     Priority: High   • Severe sepsis (CMS-HCC) [A41.9, R65.20]   • Anemia [D64.9]   • Acute on chronic kidney failure (CMS-HCC) [N17.9, N18.9]   • Diabetes (CMS-HCC) [E11.9]   • Crohn's disease (CMS-HCC) [K50.90]   • Osteoarthritis [M19.90]   • BPH (benign prostatic hyperplasia) [N40.0]   • Renal failure [N19]     Bilateral hydronephrosis with possible bladder outlet obstruction  CT scan is pending  Continue harrington catheter  Monitor creatinine level  Continue IV antibiotics  Cultures pending  Urology will follow

## 2017-10-18 NOTE — PROGRESS NOTES
[facility] Hospitalist Progress Note    Date of Service: 10/18/2017    Chief Complaint  67 y.o. male admitted 10/17/2017 with admitted to outside hospital with abd pain, foot ulcer and oliguria noted to have obstructive uropathy and acute on chronic kidney injury. left first toe ulcer, found to have urosepsis.     Interval Problem Update  Feeling tired. abd pain persists.     Stat ct  Stat sepsis protocol  Stat urology consult    Consultants/Specialty  urology    Disposition  Guarded.         Review of Systems   Constitutional: Negative for chills and fever.   HENT: Negative for sore throat.    Eyes: Negative for blurred vision and pain.   Respiratory: Negative for cough and shortness of breath.    Cardiovascular: Negative for chest pain and palpitations.   Gastrointestinal: Positive for abdominal pain and nausea. Negative for vomiting.   Genitourinary: Negative for dysuria and urgency.   Musculoskeletal: Negative for back pain and neck pain.   Skin: Negative for itching and rash.   Neurological: Negative for dizziness, tingling and headaches.   Psychiatric/Behavioral: Negative for depression. The patient does not have insomnia.    All other systems reviewed and are negative.     Physical Exam  Laboratory/Imaging   Hemodynamics  Temp (24hrs), Av.8 °C (98.2 °F), Min:36.7 °C (98.1 °F), Max:36.8 °C (98.3 °F)   Temperature: 36.8 °C (98.3 °F)  Pulse  Av.8  Min: 85  Max: 103    Blood Pressure : 140/76      Respiratory      Respiration: 18, Pulse Oximetry: 98 %, O2 Daily Delivery Respiratory : Room Air with O2 Available             Fluids  No intake or output data in the 24 hours ending 10/18/17 0816    Nutrition  Orders Placed This Encounter   Procedures   • Diet Order     Standing Status:   Standing     Number of Occurrences:   1     Order Specific Question:   Diet:     Answer:   Diabetic [3]     Physical Exam   Constitutional: He is oriented to person, place, and time. He appears well-developed and well-nourished.  No distress.   HENT:   Right Ear: External ear normal.   Left Ear: External ear normal.   Nose: Nose normal.   Eyes: Right eye exhibits no discharge. Left eye exhibits no discharge. No scleral icterus.   Neck: No JVD present. No tracheal deviation present.   Cardiovascular: Normal rate, normal heart sounds and intact distal pulses.    No murmur heard.  Pulmonary/Chest: Effort normal and breath sounds normal. No respiratory distress. He has no wheezes. He has no rales.   Abdominal: Soft. Bowel sounds are normal. He exhibits no distension. There is tenderness. There is no guarding.   Suprapubic ttp   Musculoskeletal: He exhibits no edema or tenderness.   Right great toe ulceration.    Neurological: He is alert and oriented to person, place, and time.   Skin: Skin is warm and dry. He is not diaphoretic. No erythema.   Psychiatric: He has a normal mood and affect. His behavior is normal.   Nursing note and vitals reviewed.      Recent Labs      10/17/17   1918  10/18/17   0505   WBC  12.5*  12.2*   RBC  3.84*  3.69*   HEMOGLOBIN  11.7*  11.2*   HEMATOCRIT  35.2*  34.0*   MCV  91.7  92.1   MCH  30.5  30.4   MCHC  33.2*  32.9*   RDW  45.8  46.1   PLATELETCT  441  447*   MPV  9.5  9.6     Recent Labs      10/17/17   1918  10/18/17   0505   SODIUM  134*  135   POTASSIUM  3.6  3.8   CHLORIDE  108  108   CO2  14*  16*   GLUCOSE  128*  142*   BUN  47*  50*   CREATININE  3.41*  3.51*   CALCIUM  9.1  9.0     Recent Labs      10/18/17   0505   INR  1.15*                  Assessment/Plan     * Acute on chronic kidney failure (CMS-[facility])   Assessment & Plan    Acute on chronic Likely 2/2 to obstructive uropathy  Ultrasound w/ bilateral hydronephrosis noted on outside hospital records likely 2/2 to distal bladder outlet obstruction  Humphrey and urology consult  Trend  Add iv fluids  Stat ct to eval ureters here.           Obstructive uropathy   Assessment & Plan    Humphrey in place- clotting and not working. Change to 3 way today and  start CBI          Diabetic foot ulcer (CMS-HCC)   Assessment & Plan    No signs of acute cellulitis  MRI done outside hospital w/ out signs of osteo, some soft tissue swelling noted  culture pending   Wound care consult   Start abx empirically but avoid vanco for now given renal issues. I    ID consult        COPD (chronic obstructive pulmonary disease) (CMS-HCC)   Assessment & Plan    No exacerbation at the moment  Known copd will resume symbicort  Rt protocol.         Anemia   Assessment & Plan    Check fe studies.         Severe sepsis (CMS-HCC)   Assessment & Plan    This is severe sepsis with the following associated acute organ dysfunction(s): acute kidney failure. start stat sepsis protocol, transfer to [facility], iv fluids started, antibiotics started. Trend lytes and correct. F.u on cultures. Send statu UA, trend lactic acid            BPH (benign prostatic hyperplasia)   Assessment & Plan    Chronic  Consider flomax and proscar given obstructive uropathy.         Osteoarthritis   Assessment & Plan    Chronic, on morphine at home will continue        Crohn's disease (CMS-HCC)   Assessment & Plan    On patrick, injection bimonthly last injection 1 week ago  Hold this now given infection.           Diabetes (CMS-HCC)   Assessment & Plan    SSI  Recheck A1c        Renal failure   Assessment & Plan    acute- add iv fluids. Trend. Consider nephrology consult without improvement. Urology consult for obstruction. Humphrey.           Quality-Core Measures

## 2017-10-18 NOTE — PROGRESS NOTES
Pt arrived via [facility] from [facility]. AA&O x4. Direct admitting hospitalist and ed admitting notified of pt's arrival.

## 2017-10-18 NOTE — PROGRESS NOTES
Consent to treat signed Mona Chavez assessed pt at bedside. Home medication inputed as well as medical history and allergies. Pt is bloodless. All needs are met. Safety measures in place. VSS, call light and personal belongings in reach. Hourly rounding in place.    2 RN skin check complete with Regla OVIEDO, pt's right great toe has ulcer, picture taken and uploaded into Epic, Scar horizontally on anterior abdomen, Colostomy on RLQ, Humphrey in place due to retention. Skin is intact otherwise.

## 2017-10-19 LAB
GLUCOSE BLD-MCNC: 141 MG/DL (ref 65–99)
GLUCOSE BLD-MCNC: 152 MG/DL (ref 65–99)
GLUCOSE BLD-MCNC: 162 MG/DL (ref 65–99)

## 2017-10-19 PROCEDURE — 82962 GLUCOSE BLOOD TEST: CPT

## 2017-10-19 PROCEDURE — 306591 TRAY SUTURE REMOVAL DISP: Performed by: HOSPITALIST

## 2017-10-19 PROCEDURE — A9270 NON-COVERED ITEM OR SERVICE: HCPCS | Performed by: HOSPITALIST

## 2017-10-19 PROCEDURE — 83540 ASSAY OF IRON: CPT

## 2017-10-19 PROCEDURE — 700102 HCHG RX REV CODE 250 W/ 637 OVERRIDE(OP): Performed by: NURSE PRACTITIONER

## 2017-10-19 PROCEDURE — 700111 HCHG RX REV CODE 636 W/ 250 OVERRIDE (IP): Performed by: FAMILY MEDICINE

## 2017-10-19 PROCEDURE — 94760 N-INVAS EAR/PLS OXIMETRY 1: CPT

## 2017-10-19 PROCEDURE — 83550 IRON BINDING TEST: CPT

## 2017-10-19 PROCEDURE — A9270 NON-COVERED ITEM OR SERVICE: HCPCS | Performed by: NURSE PRACTITIONER

## 2017-10-19 PROCEDURE — 83605 ASSAY OF LACTIC ACID: CPT

## 2017-10-19 PROCEDURE — 87040 BLOOD CULTURE FOR BACTERIA: CPT

## 2017-10-19 PROCEDURE — 36415 COLL VENOUS BLD VENIPUNCTURE: CPT

## 2017-10-19 PROCEDURE — 770020 HCHG ROOM/CARE - TELE (206)

## 2017-10-19 PROCEDURE — 700102 HCHG RX REV CODE 250 W/ 637 OVERRIDE(OP): Performed by: HOSPITALIST

## 2017-10-19 PROCEDURE — 700105 HCHG RX REV CODE 258: Performed by: HOSPITALIST

## 2017-10-19 PROCEDURE — 83735 ASSAY OF MAGNESIUM: CPT

## 2017-10-19 PROCEDURE — 80053 COMPREHEN METABOLIC PANEL: CPT

## 2017-10-19 PROCEDURE — 84100 ASSAY OF PHOSPHORUS: CPT

## 2017-10-19 PROCEDURE — 99232 SBSQ HOSP IP/OBS MODERATE 35: CPT | Performed by: FAMILY MEDICINE

## 2017-10-19 PROCEDURE — 11055 PARING/CUTG B9 HYPRKER LES 1: CPT

## 2017-10-19 PROCEDURE — 700111 HCHG RX REV CODE 636 W/ 250 OVERRIDE (IP): Performed by: HOSPITALIST

## 2017-10-19 RX ORDER — HEPARIN SODIUM 5000 [USP'U]/ML
5000 INJECTION, SOLUTION INTRAVENOUS; SUBCUTANEOUS EVERY 12 HOURS
Status: DISCONTINUED | OUTPATIENT
Start: 2017-10-19 | End: 2017-10-28 | Stop reason: HOSPADM

## 2017-10-19 RX ADMIN — PIPERACILLIN AND TAZOBACTAM 2.25 G: 2; .25 INJECTION, POWDER, LYOPHILIZED, FOR SOLUTION INTRAVENOUS; PARENTERAL at 04:27

## 2017-10-19 RX ADMIN — INSULIN LISPRO 2 UNITS: 100 INJECTION, SOLUTION INTRAVENOUS; SUBCUTANEOUS at 21:13

## 2017-10-19 RX ADMIN — PIPERACILLIN AND TAZOBACTAM 2.25 G: 2; .25 INJECTION, POWDER, LYOPHILIZED, FOR SOLUTION INTRAVENOUS; PARENTERAL at 09:37

## 2017-10-19 RX ADMIN — PIPERACILLIN AND TAZOBACTAM 2.25 G: 2; .25 INJECTION, POWDER, LYOPHILIZED, FOR SOLUTION INTRAVENOUS; PARENTERAL at 16:40

## 2017-10-19 RX ADMIN — BUDESONIDE AND FORMOTEROL FUMARATE DIHYDRATE 2 PUFF: 160; 4.5 AEROSOL RESPIRATORY (INHALATION) at 21:13

## 2017-10-19 RX ADMIN — BUDESONIDE AND FORMOTEROL FUMARATE DIHYDRATE 2 PUFF: 160; 4.5 AEROSOL RESPIRATORY (INHALATION) at 09:37

## 2017-10-19 RX ADMIN — TAMSULOSIN HYDROCHLORIDE 0.4 MG: 0.4 CAPSULE ORAL at 21:14

## 2017-10-19 RX ADMIN — HEPARIN SODIUM 5000 UNITS: 5000 INJECTION, SOLUTION INTRAVENOUS; SUBCUTANEOUS at 21:13

## 2017-10-19 RX ADMIN — PIPERACILLIN AND TAZOBACTAM 2.25 G: 2; .25 INJECTION, POWDER, LYOPHILIZED, FOR SOLUTION INTRAVENOUS; PARENTERAL at 21:12

## 2017-10-19 RX ADMIN — FINASTERIDE 5 MG: 5 TABLET, FILM COATED ORAL at 09:37

## 2017-10-19 RX ADMIN — MORPHINE SULFATE 15 MG: 100 SOLUTION ORAL at 19:20

## 2017-10-19 RX ADMIN — HEPARIN SODIUM 5000 UNITS: 5000 INJECTION, SOLUTION INTRAVENOUS; SUBCUTANEOUS at 13:37

## 2017-10-19 RX ADMIN — INSULIN LISPRO 2 UNITS: 100 INJECTION, SOLUTION INTRAVENOUS; SUBCUTANEOUS at 06:36

## 2017-10-19 RX ADMIN — INSULIN LISPRO 2 UNITS: 100 INJECTION, SOLUTION INTRAVENOUS; SUBCUTANEOUS at 13:38

## 2017-10-19 ASSESSMENT — PAIN SCALES - GENERAL
PAINLEVEL_OUTOF10: 4
PAINLEVEL_OUTOF10: 0
PAINLEVEL_OUTOF10: 6
PAINLEVEL_OUTOF10: 8
PAINLEVEL_OUTOF10: 0
PAINLEVEL_OUTOF10: 0

## 2017-10-19 ASSESSMENT — ENCOUNTER SYMPTOMS
BACK PAIN: 0
DEPRESSION: 0
NAUSEA: 0
TINGLING: 0
DIZZINESS: 0
CHILLS: 0
SHORTNESS OF BREATH: 0
VOMITING: 0
EYE PAIN: 0
ABDOMINAL PAIN: 0
HEADACHES: 0
COUGH: 0
BLURRED VISION: 0
NECK PAIN: 0
SORE THROAT: 0
PALPITATIONS: 0
INSOMNIA: 0
FEVER: 0

## 2017-10-19 ASSESSMENT — PATIENT HEALTH QUESTIONNAIRE - PHQ9
SUM OF ALL RESPONSES TO PHQ QUESTIONS 1-9: 0
2. FEELING DOWN, DEPRESSED, IRRITABLE, OR HOPELESS: NOT AT ALL
SUM OF ALL RESPONSES TO PHQ9 QUESTIONS 1 AND 2: 0
1. LITTLE INTEREST OR PLEASURE IN DOING THINGS: NOT AT ALL

## 2017-10-19 ASSESSMENT — LIFESTYLE VARIABLES: DO YOU DRINK ALCOHOL: NO

## 2017-10-19 NOTE — PROCEDURES
Procedure: central venous catheter placement  Location: right IJ  indication- sepsis- no access  Patient consented prior to procedure, prepped and draped in sterile fashion.  A triple lumen catheter was placed via ultrasound guidence and seldinger technique  Estimated blood loss less than 2cc  Patient tolerated procedure well  No complications noted  Stat cxr pending.

## 2017-10-19 NOTE — WOUND TEAM
"[facility]   Inpatient Services   Established Ostomy Management/ troubleshooting  HPI: Reviewed  PMH: Reviewed   SH: Reviewed   Reason for Ostomy nurse consult:  assessement of established colostomy    Subjective: \"I have all my supplies\"    Objective:   Ostomy type:  Transverse colostomy  Stoma location:  RUQ  Stoma assessment:    Appearance:  Red per pouch   Size: unable to assess   Protrusion:  See above   Output:  Moderate loose brown   MC jxn:  Appliance intact   Peristomal skin:  \"     \"    Ostomy Appliance (type and size):  1 3/4\" two piece     Interventions and Education:  Patient reports independent with care and brought own supplies.  Two large ostomy belts given at patients request.       Evaluation: established ostomy with no needs    Plan: assist PRN patient request    Anticipated discharge needs:  None    Changed patient's right toe dressing due to drainage.  Increased frequency of dressing change due to drainage.  Using scalpel pared down callous right medial foot without incident.    "

## 2017-10-19 NOTE — CARE PLAN
Problem: Communication  Goal: The ability to communicate needs accurately and effectively will improve  Outcome: PROGRESSING AS EXPECTED    Intervention: [place] patient and significant other/support system to call light to alert staff of needs  Patient oriented to surroundings and unit policies/routines.  Educated and understands the use of the call button.  Patient calls appropriately.      Problem: Infection  Goal: Will remain free from infection  Outcome: PROGRESSING AS EXPECTED    Intervention: Implement standard precautions and perform hand washing before and after patient contact  Patient educated and understands the importance of proper hand hygiene for himself, his visitors, and his care team.

## 2017-10-19 NOTE — PROGRESS NOTES
Urology Progress Note      Overnight Events: None    S: No fevers, chills, nausea or vomiting.  No further abdominal pain. Imaging shows possible bladder outlet obstruction. Making good urine. No new labs for review.    O:   Blood pressure 160/74, pulse 74, temperature 36.7 °C (98.1 °F), resp. rate 18, weight 114.2 kg (251 lb 12.3 oz), SpO2 95 %.  Recent Labs      10/17/17   1918  10/18/17   0505   SODIUM  134*  135   POTASSIUM  3.6  3.8   CHLORIDE  108  108   CO2  14*  16*   GLUCOSE  128*  142*   BUN  47*  50*   CREATININE  3.41*  3.51*   CALCIUM  9.1  9.0     Recent Labs      10/17/17   1918  10/18/17   0505   WBC  12.5*  12.2*   RBC  3.84*  3.69*   HEMOGLOBIN  11.7*  11.2*   HEMATOCRIT  35.2*  34.0*   MCV  91.7  92.1   MCH  30.5  30.4   MCHC  33.2*  32.9*   RDW  45.8  46.1   PLATELETCT  441  447*   MPV  9.5  9.6         Intake/Output Summary (Last 24 hours) at 10/19/17 1359  Last data filed at 10/19/17 1327   Gross per 24 hour   Intake             4620 ml   Output            67306 ml   Net            -6130 ml       Exam:  Abdomen soft, benign.     Urine: clear      A/P:    Active Hospital Problems    Diagnosis   • COPD (chronic obstructive pulmonary disease) (CMS-HCC) [J44.9]     Priority: High   • Diabetic foot ulcer (CMS-HCC) [E11.621, L97.509]     Priority: High   • Obstructive uropathy [N13.9]     Priority: High   • Severe sepsis (CMS-HCC) [A41.9, R65.20]   • Anemia [D64.9]   • Acute on chronic kidney failure (CMS-HCC) [N17.9, N18.9]   • Diabetes (CMS-HCC) [E11.9]   • Crohn's disease (CMS-HCC) [K50.90]   • Osteoarthritis [M19.90]   • BPH (benign prostatic hyperplasia) [N40.0]   • Renal failure [N19]       Stable.   Check creatinine in AM  Renal US tomorrow to reassess hydronephrosis

## 2017-10-19 NOTE — PROGRESS NOTES
Bedside report completed.  Assumed pt care. Patient sitting at edge of bed, in no apparent distress.  Safety precautions in place. Call light & personal belongings within reach.  Plan of care discussed.  Patient ambulated to restroom.  Reports 9/10 pain at this time, declines intervention other than rest.  Patient is on room air.  IV is TKO @ 10 mL/hour.  Milagro with CBI in place, will monitor output closely.  No needs expressed at this time.  Will continue to monitor.

## 2017-10-19 NOTE — WOUND TEAM
"[facility]ound [facility] Ostomy [facility]  Inpatient Services  Initial Wound and Skin Care Evaluation    Admission Date:  10/17/17  HPI, PMH, SH: Reviewed  Unit where seen by Wound Team: [id number]    WOUND CONSULT RELATED TO: evaluation right plantar toe wound/callous    SUBJECTIVE:  \"[facility] a [facility]\"    Self Report / Pain Level: denies    OBJECTIVE:   Patient verbalized understanding of obtaining new off loading shoes annually and hasn't seen podiatrist \"since he nicked me\"     WOUND TYPE, LOCATION, CHARACTERISTICS (Pressure ulcers: location, stage, POA or date identified)    Wound Type/Location: DM wound lateral/plantar right great toe   Periwound:  callous    Drainage:   Minimal bloody post debridement    Tissue Type and %:   Red 100%   Wound Edges:   attached   Odor:   none    Exposed structure(s):  none   S&S of Infection:  none   Measurements: (taken 10/18/17)    Length:  1.2cm   Width:  1cm   Depth:  1cm   Tracts/undermining: none      INTERVENTIONS BY WOUND TEAM:  Using scalpel and forceps, removed 100% julia wound callous and remaining slough in wound bed revealing red wound bed.  Measurements and photo taken.  Filled wound with honey alginate and covered with piece of non adhesive foam dressing securing with tape.  Unable to debride left foot calluses which are not severe.    Interdisciplinary consultation: staff RN, patient    EVALUATION:   Clean DM ulceration due to callous formation    Factors affecting wound healing: DM, pressure from callous  Goals: wound to decrease in size by 2% weekly    NURSING PLAN OF CARE ORDERS (X):    Dressing changes: See Dressing Maintenance orders:  X  Skin care: See Skin Care orders:   Rectal tube care: See Rectal Tube Care orders:    Other orders:    RSKIN: CURRENT (X) ORDERED (O)  Q shift Malik:  X  Q shift pressure point assessments:  X  Atmosair      X   FARNAZ       Bariatric FARNAZ       Bariatric foam         Heel float boots        Heels floated on " pillows   moves independently  Barrier wipes       Barrier Cream       Barrier paste    Sacral silicone dressing      Padded O2 tubing       Anchorfast       Trach with Optifoam split foam        Waffle cushion       Rectal tube or BMS       Antifungal tx    Turn q 2 hours  see above  Up to chair      Ambulate    PT/OT      Dietician      PO X    TF   TPN     PVN    NPO   # days    Other       WOUND TEAM PLAN OF CARE (X):    NPWT change 3 x week:         Dressing changes by wound team:       Follow up as needed:    X    Other (explain):    Anticipated discharge plans (X):  SNF:           Home Care:           Outpatient Wound Center:            Self Care:            Other:    TBD

## 2017-10-19 NOTE — CARE PLAN
Problem: Safety  Goal: Will remain free from injury  Outcome: PROGRESSING AS EXPECTED  Pt free from falls or injuries this shift. Calls appropriately with call light for assistance. Demonstrates safety awareness. Fall education given.    Problem: Discharge Barriers/Planning  Goal: Patient's continuum of care needs will be met  Outcome: PROGRESSING AS EXPECTED  POC reviewed with patient and pt verbalizes understanding.    Problem: Pain Management  Goal: Pain level will decrease to patient's comfort goal  Outcome: PROGRESSING AS EXPECTED  Pt medicated with prn morphine for complaints of chronic pain- per home regimen. Pain managed effectively with medication.

## 2017-10-19 NOTE — PROGRESS NOTES
[facility] Hospitalist Progress Note    Date of Service: 10/19/2017    Chief Complaint  67 y.o. male admitted 10/17/2017 with admitted to outside hospital with abd pain, foot ulcer and oliguria noted to have obstructive uropathy and acute on chronic kidney injury. left first toe ulcer, found to have urosepsis.     Interval Problem Update  Feeling tired. abd pain persists.     Stat ct  Stat sepsis protocol  Stat urology consult    Consultants/Specialty  urology    Disposition  Guarded.         Review of Systems   Constitutional: Negative for chills and fever.   HENT: Negative for sore throat.    Eyes: Negative for blurred vision and pain.   Respiratory: Negative for cough and shortness of breath.    Cardiovascular: Negative for chest pain and palpitations.   Gastrointestinal: Negative for abdominal pain, nausea and vomiting.   Genitourinary: Negative for dysuria and urgency.   Musculoskeletal: Negative for back pain and neck pain.   Skin: Negative for itching and rash.   Neurological: Negative for dizziness, tingling and headaches.   Psychiatric/Behavioral: Negative for depression. The patient does not have insomnia.    All other systems reviewed and are negative.     Physical Exam  Laboratory/Imaging   Hemodynamics  Temp (24hrs), Av.7 °C (98 °F), Min:36.3 °C (97.3 °F), Max:37 °C (98.6 °F)   Temperature: 36.6 °C (97.8 °F)  Pulse  Av.3  Min: 67  Max: 103    Blood Pressure : 158/69      Respiratory      Respiration: 17, Pulse Oximetry: 96 %, O2 Daily Delivery Respiratory : Silicone Nasal Cannula        RUL Breath Sounds: Clear;Diminished, RML Breath Sounds: Clear;Diminished, RLL Breath Sounds: Clear;Diminished, KITTY Breath Sounds: Clear;Diminished, LLL Breath Sounds: Clear;Diminished    Fluids    Intake/Output Summary (Last 24 hours) at 10/19/17 1039  Last data filed at 10/19/17 0943   Gross per 24 hour   Intake             4520 ml   Output             9450 ml   Net            -4930 ml       Nutrition  Orders  Placed This Encounter   Procedures   • Diet Order     Standing Status:   Standing     Number of Occurrences:   1     Order Specific Question:   Diet:     Answer:   Diabetic [3]     Physical Exam   Constitutional: He is oriented to person, place, and time. He appears well-developed and well-nourished. No distress.   HENT:   Right Ear: External ear normal.   Left Ear: External ear normal.   Nose: Nose normal.   Eyes: Right eye exhibits no discharge. Left eye exhibits no discharge. No scleral icterus.   Neck: No JVD present. No tracheal deviation present.   Cardiovascular: Normal rate, normal heart sounds and intact distal pulses.    No murmur heard.  Pulmonary/Chest: Effort normal and breath sounds normal. No respiratory distress. He has no wheezes. He has no rales.   Abdominal: Soft. Bowel sounds are normal. He exhibits no distension. There is no tenderness. There is no guarding.   Suprapubic ttp   Musculoskeletal: He exhibits no edema or tenderness.   Right great toe SUPERFICIAL ulceration.    Neurological: He is alert and oriented to person, place, and time.   Skin: Skin is warm and dry. He is not diaphoretic. No erythema.   Psychiatric: He has a normal mood and affect. His behavior is normal.   Nursing note and vitals reviewed.      Recent Labs      10/17/17   1918  10/18/17   0505   WBC  12.5*  12.2*   RBC  3.84*  3.69*   HEMOGLOBIN  11.7*  11.2*   HEMATOCRIT  35.2*  34.0*   MCV  91.7  92.1   MCH  30.5  30.4   MCHC  33.2*  32.9*   RDW  45.8  46.1   PLATELETCT  441  447*   MPV  9.5  9.6     Recent Labs      10/17/17   1918  10/18/17   0505   SODIUM  134*  135   POTASSIUM  3.6  3.8   CHLORIDE  108  108   CO2  14*  16*   GLUCOSE  128*  142*   BUN  47*  50*   CREATININE  3.41*  3.51*   CALCIUM  9.1  9.0     Recent Labs      10/18/17   0505  10/18/17   0914   APTT   --   33.3   INR  1.15*  1.14*                  Assessment/Plan     * Acute on chronic kidney failure (CMS-HCC)   Assessment & Plan    Acute on chronic  Likely 2/2 to obstructive uropathy  Ultrasound w/ bilateral hydronephrosis noted on outside hospital records likely 2/2 to distal bladder outlet obstruction  Humphrey   UROLOGY INPUT IS NOTED   iv fluids            Obstructive uropathy   Assessment & Plan    Humphrey in place  UROLOGY INPUT IS NOTED        Diabetic foot ulcer (CMS-HCC)   Assessment & Plan    No signs of acute cellulitis  MRI done outside hospital w/ out signs of osteo, some soft tissue swelling noted  IT APPEARS TO BE SUPERFICIAL  CONSULTED LIMB PRESERVATION SERVICES        COPD (chronic obstructive pulmonary disease) (CMS-HCC)   Assessment & Plan    No exacerbation at the moment  Known copd will resume symbicort  Rt protocol.         Anemia   Assessment & Plan    Check fe studies.         Severe sepsis (CMS-HCC)   Assessment & Plan    This is severe sepsis with the following associated acute organ dysfunction(s): acute kidney failure.   ON ZOSYN  DOING BETTER          BPH (benign prostatic hyperplasia)   Assessment & Plan    Chronic  AS PER UROLOGY        Osteoarthritis   Assessment & Plan    Chronic, on morphine at home will continue        Crohn's disease (CMS-HCC)   Assessment & Plan    On patrick, injection bimonthly last injection 1 week ago  Hold this now given infection.           Diabetes (CMS-HCC)   Assessment & Plan    SSI  HEM A1c OF 7.4        Renal failure   Assessment & Plan    IV FLUID  WILL CHECK BMP  MOST LIKLEY 2ND TO OBSTRUCTIVE UROPATHY  IF KIDNEY FUNCTION NOT BETTER TOMARROW THEN WILL CONSULT NEPHROLOGY            DVT prophylaxis pharmacological::  Heparin

## 2017-10-20 ENCOUNTER — APPOINTMENT (OUTPATIENT)
Dept: RADIOLOGY | Facility: MEDICAL CENTER | Age: 67
DRG: 854 | End: 2017-10-20
Attending: NURSE PRACTITIONER
Payer: MEDICARE

## 2017-10-20 PROBLEM — I10 HTN (HYPERTENSION): Status: ACTIVE | Noted: 2017-10-20

## 2017-10-20 LAB
ALBUMIN SERPL BCP-MCNC: 3.4 G/DL (ref 3.2–4.9)
ALBUMIN/GLOB SERPL: 0.8 G/DL
ALP SERPL-CCNC: 85 U/L (ref 30–99)
ALT SERPL-CCNC: 15 U/L (ref 2–50)
ANION GAP SERPL CALC-SCNC: 11 MMOL/L (ref 0–11.9)
AST SERPL-CCNC: 17 U/L (ref 12–45)
BACTERIA WND AEROBE CULT: ABNORMAL
BACTERIA WND AEROBE CULT: ABNORMAL
BASOPHILS # BLD AUTO: 0.4 % (ref 0–1.8)
BASOPHILS # BLD: 0.06 K/UL (ref 0–0.12)
BILIRUB SERPL-MCNC: 0.3 MG/DL (ref 0.1–1.5)
BUN SERPL-MCNC: 34 MG/DL (ref 8–22)
CALCIUM SERPL-MCNC: 8.5 MG/DL (ref 8.5–10.5)
CHLORIDE SERPL-SCNC: 109 MMOL/L (ref 96–112)
CO2 SERPL-SCNC: 17 MMOL/L (ref 20–33)
CREAT SERPL-MCNC: 2.32 MG/DL (ref 0.5–1.4)
EOSINOPHIL # BLD AUTO: 0.66 K/UL (ref 0–0.51)
EOSINOPHIL NFR BLD: 4.9 % (ref 0–6.9)
ERYTHROCYTE [DISTWIDTH] IN BLOOD BY AUTOMATED COUNT: 44.1 FL (ref 35.9–50)
GFR SERPL CREATININE-BSD FRML MDRD: 28 ML/MIN/1.73 M 2
GLOBULIN SER CALC-MCNC: 4.3 G/DL (ref 1.9–3.5)
GLUCOSE BLD-MCNC: 127 MG/DL (ref 65–99)
GLUCOSE BLD-MCNC: 145 MG/DL (ref 65–99)
GLUCOSE BLD-MCNC: 194 MG/DL (ref 65–99)
GLUCOSE BLD-MCNC: 209 MG/DL (ref 65–99)
GLUCOSE BLD-MCNC: 239 MG/DL (ref 65–99)
GLUCOSE SERPL-MCNC: 116 MG/DL (ref 65–99)
GRAM STN SPEC: ABNORMAL
HCT VFR BLD AUTO: 30.8 % (ref 42–52)
HGB BLD-MCNC: 10.2 G/DL (ref 14–18)
IMM GRANULOCYTES # BLD AUTO: 0.05 K/UL (ref 0–0.11)
IMM GRANULOCYTES NFR BLD AUTO: 0.4 % (ref 0–0.9)
IRON SATN MFR SERPL: 19 % (ref 15–55)
IRON SERPL-MCNC: 50 UG/DL (ref 50–180)
LACTATE BLD-SCNC: 0.8 MMOL/L (ref 0.5–2)
LYMPHOCYTES # BLD AUTO: 4.36 K/UL (ref 1–4.8)
LYMPHOCYTES NFR BLD: 32.6 % (ref 22–41)
MAGNESIUM SERPL-MCNC: 1.5 MG/DL (ref 1.5–2.5)
MCH RBC QN AUTO: 29.7 PG (ref 27–33)
MCHC RBC AUTO-ENTMCNC: 33.1 G/DL (ref 33.7–35.3)
MCV RBC AUTO: 89.8 FL (ref 81.4–97.8)
MONOCYTES # BLD AUTO: 0.85 K/UL (ref 0–0.85)
MONOCYTES NFR BLD AUTO: 6.4 % (ref 0–13.4)
NEUTROPHILS # BLD AUTO: 7.4 K/UL (ref 1.82–7.42)
NEUTROPHILS NFR BLD: 55.3 % (ref 44–72)
NRBC # BLD AUTO: 0 K/UL
NRBC BLD AUTO-RTO: 0 /100 WBC
PHOSPHATE SERPL-MCNC: 2.6 MG/DL (ref 2.5–4.5)
PLATELET # BLD AUTO: 419 K/UL (ref 164–446)
PMV BLD AUTO: 9.4 FL (ref 9–12.9)
POTASSIUM SERPL-SCNC: 2.9 MMOL/L (ref 3.6–5.5)
PROT SERPL-MCNC: 7.7 G/DL (ref 6–8.2)
RBC # BLD AUTO: 3.43 M/UL (ref 4.7–6.1)
SIGNIFICANT IND 70042: ABNORMAL
SITE SITE: ABNORMAL
SODIUM SERPL-SCNC: 137 MMOL/L (ref 135–145)
SOURCE SOURCE: ABNORMAL
TIBC SERPL-MCNC: 259 UG/DL (ref 250–450)
WBC # BLD AUTO: 13.4 K/UL (ref 4.8–10.8)

## 2017-10-20 PROCEDURE — 770020 HCHG ROOM/CARE - TELE (206)

## 2017-10-20 PROCEDURE — 700105 HCHG RX REV CODE 258: Performed by: HOSPITALIST

## 2017-10-20 PROCEDURE — A9270 NON-COVERED ITEM OR SERVICE: HCPCS | Performed by: HOSPITALIST

## 2017-10-20 PROCEDURE — 82962 GLUCOSE BLOOD TEST: CPT | Mod: 91

## 2017-10-20 PROCEDURE — A9270 NON-COVERED ITEM OR SERVICE: HCPCS | Performed by: NURSE PRACTITIONER

## 2017-10-20 PROCEDURE — 99232 SBSQ HOSP IP/OBS MODERATE 35: CPT | Performed by: FAMILY MEDICINE

## 2017-10-20 PROCEDURE — 700102 HCHG RX REV CODE 250 W/ 637 OVERRIDE(OP): Performed by: NURSE PRACTITIONER

## 2017-10-20 PROCEDURE — 76775 US EXAM ABDO BACK WALL LIM: CPT

## 2017-10-20 PROCEDURE — 700111 HCHG RX REV CODE 636 W/ 250 OVERRIDE (IP): Performed by: FAMILY MEDICINE

## 2017-10-20 PROCEDURE — 700102 HCHG RX REV CODE 250 W/ 637 OVERRIDE(OP): Performed by: FAMILY MEDICINE

## 2017-10-20 PROCEDURE — 700111 HCHG RX REV CODE 636 W/ 250 OVERRIDE (IP): Performed by: HOSPITALIST

## 2017-10-20 PROCEDURE — 700102 HCHG RX REV CODE 250 W/ 637 OVERRIDE(OP): Performed by: HOSPITALIST

## 2017-10-20 PROCEDURE — A9270 NON-COVERED ITEM OR SERVICE: HCPCS | Performed by: FAMILY MEDICINE

## 2017-10-20 PROCEDURE — 85025 COMPLETE CBC W/AUTO DIFF WBC: CPT

## 2017-10-20 RX ORDER — HYDRALAZINE HYDROCHLORIDE 25 MG/1
25 TABLET, FILM COATED ORAL EVERY 8 HOURS
Status: DISCONTINUED | OUTPATIENT
Start: 2017-10-20 | End: 2017-10-23

## 2017-10-20 RX ADMIN — FINASTERIDE 5 MG: 5 TABLET, FILM COATED ORAL at 08:30

## 2017-10-20 RX ADMIN — MORPHINE SULFATE 15 MG: 100 SOLUTION ORAL at 17:44

## 2017-10-20 RX ADMIN — BUDESONIDE AND FORMOTEROL FUMARATE DIHYDRATE 2 PUFF: 160; 4.5 AEROSOL RESPIRATORY (INHALATION) at 08:29

## 2017-10-20 RX ADMIN — TAMSULOSIN HYDROCHLORIDE 0.4 MG: 0.4 CAPSULE ORAL at 17:44

## 2017-10-20 RX ADMIN — PIPERACILLIN AND TAZOBACTAM 2.25 G: 2; .25 INJECTION, POWDER, LYOPHILIZED, FOR SOLUTION INTRAVENOUS; PARENTERAL at 17:06

## 2017-10-20 RX ADMIN — HEPARIN SODIUM 5000 UNITS: 5000 INJECTION, SOLUTION INTRAVENOUS; SUBCUTANEOUS at 21:09

## 2017-10-20 RX ADMIN — PIPERACILLIN AND TAZOBACTAM 2.25 G: 2; .25 INJECTION, POWDER, LYOPHILIZED, FOR SOLUTION INTRAVENOUS; PARENTERAL at 21:09

## 2017-10-20 RX ADMIN — HYDRALAZINE HYDROCHLORIDE 25 MG: 25 TABLET, FILM COATED ORAL at 13:54

## 2017-10-20 RX ADMIN — PIPERACILLIN AND TAZOBACTAM 2.25 G: 2; .25 INJECTION, POWDER, LYOPHILIZED, FOR SOLUTION INTRAVENOUS; PARENTERAL at 03:34

## 2017-10-20 RX ADMIN — HYDRALAZINE HYDROCHLORIDE 25 MG: 25 TABLET, FILM COATED ORAL at 21:09

## 2017-10-20 RX ADMIN — HEPARIN SODIUM 5000 UNITS: 5000 INJECTION, SOLUTION INTRAVENOUS; SUBCUTANEOUS at 08:31

## 2017-10-20 RX ADMIN — MORPHINE SULFATE 15 MG: 100 SOLUTION ORAL at 03:40

## 2017-10-20 RX ADMIN — BUDESONIDE AND FORMOTEROL FUMARATE DIHYDRATE 2 PUFF: 160; 4.5 AEROSOL RESPIRATORY (INHALATION) at 21:09

## 2017-10-20 RX ADMIN — INSULIN LISPRO 3 UNITS: 100 INJECTION, SOLUTION INTRAVENOUS; SUBCUTANEOUS at 12:10

## 2017-10-20 RX ADMIN — PIPERACILLIN AND TAZOBACTAM 2.25 G: 2; .25 INJECTION, POWDER, LYOPHILIZED, FOR SOLUTION INTRAVENOUS; PARENTERAL at 10:27

## 2017-10-20 RX ADMIN — INSULIN LISPRO 3 UNITS: 100 INJECTION, SOLUTION INTRAVENOUS; SUBCUTANEOUS at 21:17

## 2017-10-20 ASSESSMENT — ENCOUNTER SYMPTOMS
CHILLS: 0
SHORTNESS OF BREATH: 0
INSOMNIA: 0
DEPRESSION: 0
BLURRED VISION: 0
NECK PAIN: 0
SORE THROAT: 0
EYE PAIN: 0
VOMITING: 0
HEADACHES: 0
FEVER: 0
TINGLING: 0
NAUSEA: 0
ABDOMINAL PAIN: 0
PALPITATIONS: 0
DIZZINESS: 0
COUGH: 0
BACK PAIN: 0

## 2017-10-20 ASSESSMENT — PAIN SCALES - GENERAL
PAINLEVEL_OUTOF10: 9
PAINLEVEL_OUTOF10: 0
PAINLEVEL_OUTOF10: 7
PAINLEVEL_OUTOF10: 0
PAINLEVEL_OUTOF10: 6
PAINLEVEL_OUTOF10: 0

## 2017-10-20 NOTE — CARE PLAN
Problem: Safety  Goal: Will remain free from injury  Outcome: PROGRESSING AS EXPECTED  Call light within reach, hourly rounding in practice.    Problem: Knowledge Deficit  Goal: Knowledge of disease process/condition, treatment plan, diagnostic tests, and medications will improve  Outcome: PROGRESSING AS EXPECTED  Pt educated on POC, all questions answered in regards to disease process, treatment and diet. Pt verbalize understanding and voice no further questions at this time.

## 2017-10-20 NOTE — CARE PLAN
Problem: Safety  Goal: Will remain free from falls  Outcome: PROGRESSING AS EXPECTED  Pt remained free from falls this shift. Pt uses call light for assist.

## 2017-10-20 NOTE — PROGRESS NOTES
Bedside report completed.  Assumed pt care. Patient sitting at edge of bed, eating dinner, in no apparent distress.  Safety precautions in place. Call light & personal belongings within reach.  Plan of care discussed.  Patient reports 8/10 pain, will medicate per MAR.  Patient is on room air, IV saline locked.  Patient is in good spirits and joking despite his pain. No needs expressed.  Will continue to monitor.

## 2017-10-20 NOTE — CARE PLAN
Problem: Safety  Goal: Will remain free from falls  Outcome: PROGRESSING AS EXPECTED    Intervention: Implement fall precautions  Patient educated and understands the fall precautions in place to prevent falls.  Refusing alarm, IV pole closest to bathroom, treaded slipper socks on, and bedrails closest to bathroom down.  Patient also educated and understands the use of the call button for any assistance with mobility.      Problem: Venous Thromboembolism (VTW)/Deep Vein Thrombosis (DVT) Prevention:  Goal: Patient will participate in Venous Thrombosis (VTE)/Deep Vein Thrombosis (DVT)Prevention Measures  Outcome: PROGRESSING AS EXPECTED  Patient is wearing SCDs.  Started receiving unfractionated heparin 10/19 as pharmacological prophylaxis.

## 2017-10-20 NOTE — PROGRESS NOTES
"Urology Progress Note      Overnight Events: None    S: No fevers, chills, nausea or vomiting.  Creatinine improving. Does not want to go home with harrington catheter. Making good urine. Clear.     O:   Blood pressure 154/84, pulse 68, temperature 36.3 °C (97.4 °F), resp. rate 17, height 1.803 m (5' 11\"), weight 113.9 kg (251 lb 1.7 oz), SpO2 94 %.  Recent Labs      10/17/17   1918  10/18/17   0505  10/19/17   0320   SODIUM  134*  135  137   POTASSIUM  3.6  3.8  2.9*   CHLORIDE  108  108  109   CO2  14*  16*  17*   GLUCOSE  128*  142*  116*   BUN  47*  50*  34*   CREATININE  3.41*  3.51*  2.32*   CALCIUM  9.1  9.0  8.5     Recent Labs      10/17/17   1918  10/18/17   0505  10/20/17   0310   WBC  12.5*  12.2*  13.4*   RBC  3.84*  3.69*  3.43*   HEMOGLOBIN  11.7*  11.2*  10.2*   HEMATOCRIT  35.2*  34.0*  30.8*   MCV  91.7  92.1  89.8   MCH  30.5  30.4  29.7   MCHC  33.2*  32.9*  33.1*   RDW  45.8  46.1  44.1   PLATELETCT  441  447*  419   MPV  9.5  9.6  9.4         Intake/Output Summary (Last 24 hours) at 10/20/17 1416  Last data filed at 10/20/17 1300   Gross per 24 hour   Intake              560 ml   Output            91837 ml   Net           -43574 ml       Exam:  Abdomen soft, benign.    Urine: clear      A/P:    Active Hospital Problems    Diagnosis   • COPD (chronic obstructive pulmonary disease) (CMS-HCC) [J44.9]     Priority: High   • Diabetic foot ulcer (CMS-HCC) [E11.621, L97.509]     Priority: High   • Obstructive uropathy [N13.9]     Priority: High   • HTN (hypertension) [I10]     STARTED HYDRALAZINE  NOT WELL CONTROLLED     • Severe sepsis (CMS-HCC) [A41.9, R65.20]   • Anemia [D64.9]   • Acute on chronic kidney failure (CMS-HCC) [N17.9, N18.9]   • Diabetes (CMS-HCC) [E11.9]   • Crohn's disease (CMS-HCC) [K50.90]   • Osteoarthritis [M19.90]   • BPH (benign prostatic hyperplasia) [N40.0]   • Renal failure [N19]       Prefer that harrington catheter stays at discharge and f/u in office however patient is insisting he " does not want to leave with catheter  Voiding trial tomorrow morning. If he is unable to urinate will need to discharge with harrington catheter  Stay on flomax and finasteride

## 2017-10-20 NOTE — PROGRESS NOTES
[facility] Hospitalist Progress Note    Date of Service: 10/20/2017    Chief Complaint  67 y.o. male admitted 10/17/2017 with admitted to outside hospital with abd pain, foot ulcer and oliguria noted to have obstructive uropathy and acute on chronic kidney injury. left first toe ulcer, found to have urosepsis.     Interval Problem Update  Feeling tired. abd pain persists.     Stat ct  Stat sepsis protocol  Stat urology consult    Consultants/Specialty  urology    Disposition  Guarded.         Review of Systems   Constitutional: Negative for chills and fever.   HENT: Negative for sore throat.    Eyes: Negative for blurred vision and pain.   Respiratory: Negative for cough and shortness of breath.    Cardiovascular: Negative for chest pain and palpitations.   Gastrointestinal: Negative for abdominal pain, nausea and vomiting.   Genitourinary: Negative for dysuria and urgency.   Musculoskeletal: Negative for back pain and neck pain.   Skin: Negative for itching and rash.   Neurological: Negative for dizziness, tingling and headaches.   Psychiatric/Behavioral: Negative for depression. The patient does not have insomnia.    All other systems reviewed and are negative.     Physical Exam  Laboratory/Imaging   Hemodynamics  Temp (24hrs), Av.3 °C (97.4 °F), Min:35.9 °C (96.6 °F), Max:36.6 °C (97.9 °F)   Temperature: 35.9 °C (96.6 °F)  Pulse  Av.2  Min: 65  Max: 103    Blood Pressure : (!) 167/73 (Rn notified)      Respiratory      Respiration: 19, Pulse Oximetry: 94 %        RUL Breath Sounds: Diminished, RML Breath Sounds: Diminished, RLL Breath Sounds: Diminished, KITTY Breath Sounds: Diminished, LLL Breath Sounds: Diminished    Fluids    Intake/Output Summary (Last 24 hours) at 10/20/17 1217  Last data filed at 10/20/17 0800   Gross per 24 hour   Intake              560 ml   Output            17190 ml   Net           -68771 ml       Nutrition  Orders Placed This Encounter   Procedures   • Diet Order     Standing  Status:   Standing     Number of Occurrences:   1     Order Specific Question:   Diet:     Answer:   Diabetic [3]     Physical Exam   Constitutional: He is oriented to person, place, and time. He appears well-developed and well-nourished. No distress.   HENT:   Right Ear: External ear normal.   Left Ear: External ear normal.   Nose: Nose normal.   Eyes: Right eye exhibits no discharge. Left eye exhibits no discharge. No scleral icterus.   Neck: No JVD present. No tracheal deviation present.   Cardiovascular: Normal rate, normal heart sounds and intact distal pulses.    No murmur heard.  Pulmonary/Chest: Effort normal and breath sounds normal. No respiratory distress. He has no wheezes. He has no rales.   Abdominal: Soft. Bowel sounds are normal. He exhibits no distension. There is no tenderness. There is no guarding.   Suprapubic ttp   Musculoskeletal: He exhibits no edema or tenderness.   Right great toe SUPERFICIAL ulceration.    Neurological: He is alert and oriented to person, place, and time.   Skin: Skin is warm and dry. He is not diaphoretic. No erythema.   Psychiatric: He has a normal mood and affect. His behavior is normal.   Nursing note and vitals reviewed.      Recent Labs      10/17/17   1918  10/18/17   0505  10/20/17   0310   WBC  12.5*  12.2*  13.4*   RBC  3.84*  3.69*  3.43*   HEMOGLOBIN  11.7*  11.2*  10.2*   HEMATOCRIT  35.2*  34.0*  30.8*   MCV  91.7  92.1  89.8   MCH  30.5  30.4  29.7   MCHC  33.2*  32.9*  33.1*   RDW  45.8  46.1  44.1   PLATELETCT  441  447*  419   MPV  9.5  9.6  9.4     Recent Labs      10/17/17   1918  10/18/17   0505  10/19/17   0320   SODIUM  134*  135  137   POTASSIUM  3.6  3.8  2.9*   CHLORIDE  108  108  109   CO2  14*  16*  17*   GLUCOSE  128*  142*  116*   BUN  47*  50*  34*   CREATININE  3.41*  3.51*  2.32*   CALCIUM  9.1  9.0  8.5     Recent Labs      10/18/17   0505  10/18/17   0914   APTT   --   33.3   INR  1.15*  1.14*                  Assessment/Plan     * Acute  on chronic kidney failure (CMS-HCC)   Assessment & Plan    Acute on chronic Likely 2/2 to obstructive uropathy  Ultrasound w/ bilateral hydronephrosis noted on outside hospital records likely 2/2 to distal bladder outlet obstruction  Humphrey   UROLOGY INPUT IS NOTED   iv fluids            Obstructive uropathy   Assessment & Plan    Humphrey in place  UROLOGY INPUT IS NOTED        Diabetic foot ulcer (CMS-HCC)   Assessment & Plan    No signs of acute cellulitis  MRI done outside hospital w/ out signs of osteo, some soft tissue swelling noted  IT APPEARS TO BE SUPERFICIAL  CONSULTED LIMB PRESERVATION SERVICES        COPD (chronic obstructive pulmonary disease) (CMS-HCC)   Assessment & Plan    No exacerbation at the moment  Known copd will resume symbicort  Rt protocol.         HTN (hypertension)   Assessment & Plan    STARTED HYDRALAZINE  NOT WELL CONTROLLED        Anemia   Assessment & Plan    Check fe studies.         Severe sepsis (CMS-HCC)   Assessment & Plan    This is severe sepsis with the following associated acute organ dysfunction(s): acute kidney failure.   ON ZOSYN  WILL OBSERVE          BPH (benign prostatic hyperplasia)   Assessment & Plan    Chronic  AS PER UROLOGY        Osteoarthritis   Assessment & Plan    Chronic, on morphine at home will continue        Crohn's disease (CMS-HCC)   Assessment & Plan    On patrick, injection bimonthly last injection 1 week ago  Hold this now given infection.           Diabetes (CMS-HCC)   Assessment & Plan    SSI  HEM A1c OF 7.4        Renal failure   Assessment & Plan    IV FLUID  WILL CHECK BMP  MOST LIKLEY 2ND TO OBSTRUCTIVE UROPATHY  WILL CHECK BMP IN AM  UROLOGY INPUT IS NOTED            DVT prophylaxis pharmacological::  Heparin

## 2017-10-21 ENCOUNTER — APPOINTMENT (OUTPATIENT)
Dept: RADIOLOGY | Facility: MEDICAL CENTER | Age: 67
DRG: 854 | End: 2017-10-21
Attending: FAMILY MEDICINE
Payer: MEDICARE

## 2017-10-21 LAB
ANION GAP SERPL CALC-SCNC: 7 MMOL/L (ref 0–11.9)
BASOPHILS # BLD AUTO: 0.5 % (ref 0–1.8)
BASOPHILS # BLD: 0.08 K/UL (ref 0–0.12)
BUN SERPL-MCNC: 30 MG/DL (ref 8–22)
CALCIUM SERPL-MCNC: 8.6 MG/DL (ref 8.5–10.5)
CHLORIDE SERPL-SCNC: 108 MMOL/L (ref 96–112)
CO2 SERPL-SCNC: 20 MMOL/L (ref 20–33)
CREAT SERPL-MCNC: 2.44 MG/DL (ref 0.5–1.4)
EOSINOPHIL # BLD AUTO: 0.83 K/UL (ref 0–0.51)
EOSINOPHIL NFR BLD: 5.4 % (ref 0–6.9)
ERYTHROCYTE [DISTWIDTH] IN BLOOD BY AUTOMATED COUNT: 43.9 FL (ref 35.9–50)
GFR SERPL CREATININE-BSD FRML MDRD: 27 ML/MIN/1.73 M 2
GLUCOSE BLD-MCNC: 156 MG/DL (ref 65–99)
GLUCOSE BLD-MCNC: 193 MG/DL (ref 65–99)
GLUCOSE BLD-MCNC: 207 MG/DL (ref 65–99)
GLUCOSE SERPL-MCNC: 125 MG/DL (ref 65–99)
HCT VFR BLD AUTO: 31.3 % (ref 42–52)
HGB BLD-MCNC: 10.6 G/DL (ref 14–18)
IMM GRANULOCYTES # BLD AUTO: 0.07 K/UL (ref 0–0.11)
IMM GRANULOCYTES NFR BLD AUTO: 0.5 % (ref 0–0.9)
LYMPHOCYTES # BLD AUTO: 4.91 K/UL (ref 1–4.8)
LYMPHOCYTES NFR BLD: 31.7 % (ref 22–41)
MCH RBC QN AUTO: 30.1 PG (ref 27–33)
MCHC RBC AUTO-ENTMCNC: 33.9 G/DL (ref 33.7–35.3)
MCV RBC AUTO: 88.9 FL (ref 81.4–97.8)
MONOCYTES # BLD AUTO: 0.95 K/UL (ref 0–0.85)
MONOCYTES NFR BLD AUTO: 6.1 % (ref 0–13.4)
NEUTROPHILS # BLD AUTO: 8.67 K/UL (ref 1.82–7.42)
NEUTROPHILS NFR BLD: 55.8 % (ref 44–72)
NRBC # BLD AUTO: 0 K/UL
NRBC BLD AUTO-RTO: 0 /100 WBC
PLATELET # BLD AUTO: 457 K/UL (ref 164–446)
PMV BLD AUTO: 9.4 FL (ref 9–12.9)
POTASSIUM SERPL-SCNC: 3.1 MMOL/L (ref 3.6–5.5)
RBC # BLD AUTO: 3.52 M/UL (ref 4.7–6.1)
SODIUM SERPL-SCNC: 135 MMOL/L (ref 135–145)
WBC # BLD AUTO: 15.5 K/UL (ref 4.8–10.8)

## 2017-10-21 PROCEDURE — 700105 HCHG RX REV CODE 258: Performed by: HOSPITALIST

## 2017-10-21 PROCEDURE — 73620 X-RAY EXAM OF FOOT: CPT | Mod: RT

## 2017-10-21 PROCEDURE — 99232 SBSQ HOSP IP/OBS MODERATE 35: CPT | Performed by: FAMILY MEDICINE

## 2017-10-21 PROCEDURE — 302108 TAPE SECURITY OSTOMY (PINK): Performed by: FAMILY MEDICINE

## 2017-10-21 PROCEDURE — 700102 HCHG RX REV CODE 250 W/ 637 OVERRIDE(OP): Performed by: HOSPITALIST

## 2017-10-21 PROCEDURE — A9270 NON-COVERED ITEM OR SERVICE: HCPCS | Performed by: FAMILY MEDICINE

## 2017-10-21 PROCEDURE — A9270 NON-COVERED ITEM OR SERVICE: HCPCS | Performed by: NURSE PRACTITIONER

## 2017-10-21 PROCEDURE — 700102 HCHG RX REV CODE 250 W/ 637 OVERRIDE(OP): Performed by: FAMILY MEDICINE

## 2017-10-21 PROCEDURE — 82962 GLUCOSE BLOOD TEST: CPT

## 2017-10-21 PROCEDURE — A9270 NON-COVERED ITEM OR SERVICE: HCPCS | Performed by: HOSPITALIST

## 2017-10-21 PROCEDURE — 51798 US URINE CAPACITY MEASURE: CPT

## 2017-10-21 PROCEDURE — 700102 HCHG RX REV CODE 250 W/ 637 OVERRIDE(OP): Performed by: NURSE PRACTITIONER

## 2017-10-21 PROCEDURE — 80048 BASIC METABOLIC PNL TOTAL CA: CPT

## 2017-10-21 PROCEDURE — 700111 HCHG RX REV CODE 636 W/ 250 OVERRIDE (IP): Performed by: HOSPITALIST

## 2017-10-21 PROCEDURE — 700105 HCHG RX REV CODE 258

## 2017-10-21 PROCEDURE — 700111 HCHG RX REV CODE 636 W/ 250 OVERRIDE (IP): Performed by: FAMILY MEDICINE

## 2017-10-21 PROCEDURE — 85025 COMPLETE CBC W/AUTO DIFF WBC: CPT

## 2017-10-21 PROCEDURE — 770020 HCHG ROOM/CARE - TELE (206)

## 2017-10-21 RX ORDER — SODIUM CHLORIDE 9 MG/ML
INJECTION, SOLUTION INTRAVENOUS
Status: COMPLETED
Start: 2017-10-21 | End: 2017-10-21

## 2017-10-21 RX ORDER — POTASSIUM CHLORIDE 20 MEQ/1
40 TABLET, EXTENDED RELEASE ORAL ONCE
Status: COMPLETED | OUTPATIENT
Start: 2017-10-21 | End: 2017-10-21

## 2017-10-21 RX ADMIN — BUDESONIDE AND FORMOTEROL FUMARATE DIHYDRATE 2 PUFF: 160; 4.5 AEROSOL RESPIRATORY (INHALATION) at 09:03

## 2017-10-21 RX ADMIN — HYDRALAZINE HYDROCHLORIDE 25 MG: 25 TABLET, FILM COATED ORAL at 06:03

## 2017-10-21 RX ADMIN — MORPHINE SULFATE 15 MG: 100 SOLUTION ORAL at 04:47

## 2017-10-21 RX ADMIN — PIPERACILLIN AND TAZOBACTAM 2.25 G: 2; .25 INJECTION, POWDER, LYOPHILIZED, FOR SOLUTION INTRAVENOUS; PARENTERAL at 18:47

## 2017-10-21 RX ADMIN — MORPHINE SULFATE 15 MG: 100 SOLUTION ORAL at 20:52

## 2017-10-21 RX ADMIN — INSULIN LISPRO 3 UNITS: 100 INJECTION, SOLUTION INTRAVENOUS; SUBCUTANEOUS at 06:03

## 2017-10-21 RX ADMIN — HEPARIN SODIUM 5000 UNITS: 5000 INJECTION, SOLUTION INTRAVENOUS; SUBCUTANEOUS at 08:59

## 2017-10-21 RX ADMIN — SODIUM CHLORIDE 20 ML: 9 INJECTION, SOLUTION INTRAVENOUS at 20:56

## 2017-10-21 RX ADMIN — INSULIN LISPRO 2 UNITS: 100 INJECTION, SOLUTION INTRAVENOUS; SUBCUTANEOUS at 20:41

## 2017-10-21 RX ADMIN — HYDRALAZINE HYDROCHLORIDE 25 MG: 25 TABLET, FILM COATED ORAL at 13:09

## 2017-10-21 RX ADMIN — HYDRALAZINE HYDROCHLORIDE 25 MG: 25 TABLET, FILM COATED ORAL at 20:45

## 2017-10-21 RX ADMIN — POTASSIUM CHLORIDE 40 MEQ: 1500 TABLET, EXTENDED RELEASE ORAL at 09:00

## 2017-10-21 RX ADMIN — BUDESONIDE AND FORMOTEROL FUMARATE DIHYDRATE 2 PUFF: 160; 4.5 AEROSOL RESPIRATORY (INHALATION) at 20:45

## 2017-10-21 RX ADMIN — TAMSULOSIN HYDROCHLORIDE 0.4 MG: 0.4 CAPSULE ORAL at 18:52

## 2017-10-21 RX ADMIN — HEPARIN SODIUM 5000 UNITS: 5000 INJECTION, SOLUTION INTRAVENOUS; SUBCUTANEOUS at 20:45

## 2017-10-21 RX ADMIN — PIPERACILLIN AND TAZOBACTAM 2.25 G: 2; .25 INJECTION, POWDER, LYOPHILIZED, FOR SOLUTION INTRAVENOUS; PARENTERAL at 09:05

## 2017-10-21 RX ADMIN — FINASTERIDE 5 MG: 5 TABLET, FILM COATED ORAL at 08:59

## 2017-10-21 RX ADMIN — PIPERACILLIN AND TAZOBACTAM 2.25 G: 2; .25 INJECTION, POWDER, LYOPHILIZED, FOR SOLUTION INTRAVENOUS; PARENTERAL at 03:20

## 2017-10-21 RX ADMIN — INSULIN LISPRO 2 UNITS: 100 INJECTION, SOLUTION INTRAVENOUS; SUBCUTANEOUS at 13:11

## 2017-10-21 ASSESSMENT — COPD QUESTIONNAIRES
COPD SCREENING SCORE: 5
HAVE YOU SMOKED AT LEAST 100 CIGARETTES IN YOUR ENTIRE LIFE: NO/DON'T KNOW
DURING THE PAST 4 WEEKS HOW MUCH DID YOU FEEL SHORT OF BREATH: SOME OF THE TIME
DO YOU EVER COUGH UP ANY MUCUS OR PHLEGM?: YES, A FEW DAYS A WEEK OR MONTH

## 2017-10-21 ASSESSMENT — ENCOUNTER SYMPTOMS
CHILLS: 0
SORE THROAT: 0
BACK PAIN: 0
HEADACHES: 0
COUGH: 0
NAUSEA: 0
SHORTNESS OF BREATH: 0
DEPRESSION: 0
VOMITING: 0
DIZZINESS: 0
FEVER: 0
NECK PAIN: 0
INSOMNIA: 0
ABDOMINAL PAIN: 0
PALPITATIONS: 0
EYE PAIN: 0
BLURRED VISION: 0
TINGLING: 0

## 2017-10-21 ASSESSMENT — PAIN SCALES - GENERAL
PAINLEVEL_OUTOF10: 8
PAINLEVEL_OUTOF10: 7
PAINLEVEL_OUTOF10: 0
PAINLEVEL_OUTOF10: 6
PAINLEVEL_OUTOF10: 0

## 2017-10-21 ASSESSMENT — LIFESTYLE VARIABLES: DO YOU DRINK ALCOHOL: NO

## 2017-10-21 NOTE — CARE PLAN
Problem: Communication  Goal: The ability to communicate needs accurately and effectively will improve    Intervention: Educate patient and significant other/support system about the plan of care, procedures, treatments, medications and allow for questions  Pt educated on scheduled medications as well as plan of care, treatment plan r/t bladder obstruction. Understanding voiced.      Problem: Safety  Goal: Will remain free from falls    Intervention: Implement fall precautions  Side rails up x2, bed in lowest/locked position and call light within reach. Non-skids in place to BLE. Pt compliant with calling nursing staff for assistance.

## 2017-10-21 NOTE — PROGRESS NOTES
Bedside report completed.  Assumed pt care. Patient sitting up at edge of bed, eating dinner, sleeping, in no apparent distress.  Safety precautions in place. Call light & personal belongings within reach.  Plan of care discussed.  Patient is on room air, IV saline locked.  Reports 6/10 pain at this time, rest/repositioning provided.  No other needs expressed.  Will continue to monitor.

## 2017-10-21 NOTE — CARE PLAN
Problem: Knowledge Deficit  Goal: Knowledge of disease process/condition, treatment plan, diagnostic tests, and medications will improve  Outcome: PROGRESSING AS EXPECTED    Intervention: Assess knowledge level of disease process/condition, treatment plan, diagnostic tests, and medications  Patient educated and understands his disease process, treatments, and medications up to this point in his care.

## 2017-10-21 NOTE — PROGRESS NOTES
(6309) Assumed care of pt at this time. Rec'd lying in bed with HOB elevated. AAO X4. Resp even and nonlabored on room air. Pt denies any SOB or chest pain at this time. Skin w/d to touch. Tele box intact. RT triple lumen IJ intact and WNL. Lung sounds decreased TAB. Abdomen soft, round and obese. RLE ostomy intact with moderate amount of green, loose fecal matter contents. BS present x4. Edema noted to BLE. RT toe gauze dressing c/d/i. Nonskid socks in place to BLE. Pt denies any needs or complaints at this time. Side rails up x2, bed in lowest/locked position and call light within reach. Will continue to monitor this shift.     (1150) Pt urinated a total of 300 ml arnold, odorless urine via urinal. PVR results 256 ml. Dr. Prince phoned and informed. MD states that pt to be straight cath'd if q shift bladder scan is greater than or equal to 350 ml. No intervention required at this time.

## 2017-10-21 NOTE — PROGRESS NOTES
Started urinary trial.  Filled patient bladder with 250mL, pulled harrington.  Patient tolerated well.  Feels as though he needs to void, has not yet.  Will pass on info to dayshift to monitor to void, and call for post void bladder scan.

## 2017-10-21 NOTE — PROGRESS NOTES
[facility] Hospitalist Progress Note    Date of Service: 10/21/2017    Chief Complaint  67 y.o. male admitted 10/17/2017 with admitted to outside hospital with abd pain, foot ulcer and oliguria noted to have obstructive uropathy and acute on chronic kidney injury. left first toe ulcer, found to have urosepsis.     Interval Problem Update  Feeling tired. abd pain persists.     Stat ct  Stat sepsis protocol  Stat urology consult    Consultants/Specialty  urology    Disposition  Guarded.         Review of Systems   Constitutional: Negative for chills and fever.   HENT: Negative for sore throat.    Eyes: Negative for blurred vision and pain.   Respiratory: Negative for cough and shortness of breath.    Cardiovascular: Negative for chest pain and palpitations.   Gastrointestinal: Negative for abdominal pain, nausea and vomiting.   Genitourinary: Negative for dysuria and urgency.   Musculoskeletal: Negative for back pain and neck pain.   Skin: Negative for itching and rash.   Neurological: Negative for dizziness, tingling and headaches.   Psychiatric/Behavioral: Negative for depression. The patient does not have insomnia.    All other systems reviewed and are negative.     Physical Exam  Laboratory/Imaging   Hemodynamics  Temp (24hrs), Av.4 °C (97.5 °F), Min:35.9 °C (96.6 °F), Max:36.6 °C (97.9 °F)   Temperature: 36.5 °C (97.7 °F)  Pulse  Av.5  Min: 65  Max: 103    Blood Pressure : 121/55      Respiratory      Respiration: 18, Pulse Oximetry: 95 %        RUL Breath Sounds: Diminished, RML Breath Sounds: Diminished, RLL Breath Sounds: Diminished, KITTY Breath Sounds: Diminished, LLL Breath Sounds: Diminished    Fluids    Intake/Output Summary (Last 24 hours) at 10/21/17 0834  Last data filed at 10/21/17 0626   Gross per 24 hour   Intake              840 ml   Output             8950 ml   Net            -8110 ml       Nutrition  Orders Placed This Encounter   Procedures   • Diet Order     Standing Status:   Standing      Number of Occurrences:   1     Order Specific Question:   Diet:     Answer:   Diabetic [3]     Physical Exam   Constitutional: He is oriented to person, place, and time. He appears well-developed and well-nourished. No distress.   HENT:   Right Ear: External ear normal.   Left Ear: External ear normal.   Nose: Nose normal.   Eyes: Right eye exhibits no discharge. Left eye exhibits no discharge. No scleral icterus.   Neck: No JVD present. No tracheal deviation present.   Cardiovascular: Normal rate, normal heart sounds and intact distal pulses.    No murmur heard.  Pulmonary/Chest: Effort normal and breath sounds normal. No respiratory distress. He has no wheezes. He has no rales.   Abdominal: Soft. Bowel sounds are normal. He exhibits no distension. There is no tenderness. There is no guarding.   OSTOMY BAG IS IN PLACE   Musculoskeletal: He exhibits no edema or tenderness.   Right great toe SUPERFICIAL ulceration.    Neurological: He is alert and oriented to person, place, and time.   Skin: Skin is warm and dry. He is not diaphoretic. No erythema.   Psychiatric: He has a normal mood and affect. His behavior is normal.   Nursing note and vitals reviewed.      Recent Labs      10/20/17   0310  10/21/17   0022   WBC  13.4*  15.5*   RBC  3.43*  3.52*   HEMOGLOBIN  10.2*  10.6*   HEMATOCRIT  30.8*  31.3*   MCV  89.8  88.9   MCH  29.7  30.1   MCHC  33.1*  33.9   RDW  44.1  43.9   PLATELETCT  419  457*   MPV  9.4  9.4     Recent Labs      10/19/17   0320  10/21/17   0022   SODIUM  137  135   POTASSIUM  2.9*  3.1*   CHLORIDE  109  108   CO2  17*  20   GLUCOSE  116*  125*   BUN  34*  30*   CREATININE  2.32*  2.44*   CALCIUM  8.5  8.6     Recent Labs      10/18/17   0914   APTT  33.3   INR  1.14*                  Assessment/Plan     * Acute on chronic kidney failure (CMS-HCC)   Assessment & Plan    Acute on chronic Likely 2/2 to obstructive uropathy  Ultrasound w/ bilateral hydronephrosis noted on outside hospital records  likely 2/2 to distal bladder outlet obstruction  REFUSED Rubalcava   UROLOGY INPUT IS NOTED   iv fluids            Obstructive uropathy   Assessment & Plan    Rubalcava in place  UROLOGY INPUT IS NOTED        Diabetic foot ulcer (CMS-HCC)   Assessment & Plan    No signs of acute cellulitis  MRI done outside hospital w/ out signs of osteo, some soft tissue swelling noted  IT APPEARS TO BE SUPERFICIAL  WOUND CARE INPUT IS NOTED        COPD (chronic obstructive pulmonary disease) (CMS-HCC)   Assessment & Plan    No exacerbation at the moment  Known copd will resume symbicort  Rt protocol.         HTN (hypertension)   Assessment & Plan    STARTED HYDRALAZINE  NOT WELL CONTROLLED        Anemia   Assessment & Plan    Check fe studies.         Severe sepsis (CMS-HCC)   Assessment & Plan    This is severe sepsis with the following associated acute organ dysfunction(s): acute kidney failure.   ON ZOSYN  WILL OBSERVE          BPH (benign prostatic hyperplasia)   Assessment & Plan    Chronic  AS PER UROLOGY  ON FINASTRIDE  ON FLOMAX        Osteoarthritis   Assessment & Plan    Chronic, on morphine at home will continue        Crohn's disease (CMS-HCC)   Assessment & Plan    On patrick, injection bimonthly last injection 1 week ago  Hold this now given infection.           Diabetes (CMS-HCC)   Assessment & Plan    SSI  HEM A1c OF 7.4        Renal failure   Assessment & Plan    MOST LIKELY ACUTE ON CHRONICIV FLUID  WILL CHECK BMP  MOST LIKLEY 2ND TO OBSTRUCTIVE UROPATHY  WILL CHECK BMP IN AM  UROLOGY INPUT IS NOTED  RENAL ULTRASOUND SHOWS BILATERAL HYDRONEPHROSIS  PT IS REFUSING RUBALCAVA  BLADDER SCAN QSHIFT            Rubalcava catheter::  No Rubalcava  DVT prophylaxis pharmacological::  Heparin

## 2017-10-22 ENCOUNTER — APPOINTMENT (OUTPATIENT)
Dept: RADIOLOGY | Facility: MEDICAL CENTER | Age: 67
DRG: 854 | End: 2017-10-22
Attending: FAMILY MEDICINE
Payer: MEDICARE

## 2017-10-22 LAB
ANION GAP SERPL CALC-SCNC: 9 MMOL/L (ref 0–11.9)
BASOPHILS # BLD AUTO: 0.4 % (ref 0–1.8)
BASOPHILS # BLD: 0.07 K/UL (ref 0–0.12)
BUN SERPL-MCNC: 30 MG/DL (ref 8–22)
CALCIUM SERPL-MCNC: 9.1 MG/DL (ref 8.5–10.5)
CHLORIDE SERPL-SCNC: 107 MMOL/L (ref 96–112)
CO2 SERPL-SCNC: 18 MMOL/L (ref 20–33)
CREAT SERPL-MCNC: 2.65 MG/DL (ref 0.5–1.4)
EOSINOPHIL # BLD AUTO: 0.94 K/UL (ref 0–0.51)
EOSINOPHIL NFR BLD: 5.8 % (ref 0–6.9)
ERYTHROCYTE [DISTWIDTH] IN BLOOD BY AUTOMATED COUNT: 44.5 FL (ref 35.9–50)
GFR SERPL CREATININE-BSD FRML MDRD: 24 ML/MIN/1.73 M 2
GLUCOSE BLD-MCNC: 133 MG/DL (ref 65–99)
GLUCOSE BLD-MCNC: 157 MG/DL (ref 65–99)
GLUCOSE BLD-MCNC: 157 MG/DL (ref 65–99)
GLUCOSE BLD-MCNC: 205 MG/DL (ref 65–99)
GLUCOSE SERPL-MCNC: 127 MG/DL (ref 65–99)
HCT VFR BLD AUTO: 33.7 % (ref 42–52)
HGB BLD-MCNC: 11.1 G/DL (ref 14–18)
IMM GRANULOCYTES # BLD AUTO: 0.07 K/UL (ref 0–0.11)
IMM GRANULOCYTES NFR BLD AUTO: 0.4 % (ref 0–0.9)
LYMPHOCYTES # BLD AUTO: 4.84 K/UL (ref 1–4.8)
LYMPHOCYTES NFR BLD: 30.1 % (ref 22–41)
MCH RBC QN AUTO: 29.7 PG (ref 27–33)
MCHC RBC AUTO-ENTMCNC: 32.9 G/DL (ref 33.7–35.3)
MCV RBC AUTO: 90.1 FL (ref 81.4–97.8)
MONOCYTES # BLD AUTO: 0.97 K/UL (ref 0–0.85)
MONOCYTES NFR BLD AUTO: 6 % (ref 0–13.4)
NEUTROPHILS # BLD AUTO: 9.2 K/UL (ref 1.82–7.42)
NEUTROPHILS NFR BLD: 57.3 % (ref 44–72)
NRBC # BLD AUTO: 0 K/UL
NRBC BLD AUTO-RTO: 0 /100 WBC
PLATELET # BLD AUTO: 424 K/UL (ref 164–446)
PMV BLD AUTO: 9.4 FL (ref 9–12.9)
POTASSIUM SERPL-SCNC: 3.1 MMOL/L (ref 3.6–5.5)
RBC # BLD AUTO: 3.74 M/UL (ref 4.7–6.1)
SODIUM SERPL-SCNC: 134 MMOL/L (ref 135–145)
WBC # BLD AUTO: 16.1 K/UL (ref 4.8–10.8)

## 2017-10-22 PROCEDURE — 700105 HCHG RX REV CODE 258: Performed by: FAMILY MEDICINE

## 2017-10-22 PROCEDURE — 99232 SBSQ HOSP IP/OBS MODERATE 35: CPT | Performed by: FAMILY MEDICINE

## 2017-10-22 PROCEDURE — A9270 NON-COVERED ITEM OR SERVICE: HCPCS | Performed by: FAMILY MEDICINE

## 2017-10-22 PROCEDURE — 700102 HCHG RX REV CODE 250 W/ 637 OVERRIDE(OP): Performed by: NURSE PRACTITIONER

## 2017-10-22 PROCEDURE — 700102 HCHG RX REV CODE 250 W/ 637 OVERRIDE(OP): Performed by: FAMILY MEDICINE

## 2017-10-22 PROCEDURE — 85025 COMPLETE CBC W/AUTO DIFF WBC: CPT

## 2017-10-22 PROCEDURE — 82962 GLUCOSE BLOOD TEST: CPT | Mod: 91

## 2017-10-22 PROCEDURE — 700102 HCHG RX REV CODE 250 W/ 637 OVERRIDE(OP): Performed by: HOSPITALIST

## 2017-10-22 PROCEDURE — 700105 HCHG RX REV CODE 258: Performed by: HOSPITALIST

## 2017-10-22 PROCEDURE — 700111 HCHG RX REV CODE 636 W/ 250 OVERRIDE (IP): Performed by: HOSPITALIST

## 2017-10-22 PROCEDURE — A9270 NON-COVERED ITEM OR SERVICE: HCPCS | Performed by: HOSPITALIST

## 2017-10-22 PROCEDURE — A9270 NON-COVERED ITEM OR SERVICE: HCPCS | Performed by: NURSE PRACTITIONER

## 2017-10-22 PROCEDURE — 73718 MRI LOWER EXTREMITY W/O DYE: CPT | Mod: RT

## 2017-10-22 PROCEDURE — 80048 BASIC METABOLIC PNL TOTAL CA: CPT

## 2017-10-22 PROCEDURE — 770020 HCHG ROOM/CARE - TELE (206)

## 2017-10-22 PROCEDURE — 700111 HCHG RX REV CODE 636 W/ 250 OVERRIDE (IP): Performed by: FAMILY MEDICINE

## 2017-10-22 RX ORDER — POTASSIUM CHLORIDE 20 MEQ/1
40 TABLET, EXTENDED RELEASE ORAL ONCE
Status: COMPLETED | OUTPATIENT
Start: 2017-10-22 | End: 2017-10-22

## 2017-10-22 RX ADMIN — PIPERACILLIN AND TAZOBACTAM 2.25 G: 2; .25 INJECTION, POWDER, LYOPHILIZED, FOR SOLUTION INTRAVENOUS; PARENTERAL at 00:14

## 2017-10-22 RX ADMIN — MORPHINE SULFATE 15 MG: 100 SOLUTION ORAL at 19:38

## 2017-10-22 RX ADMIN — POTASSIUM CHLORIDE 40 MEQ: 1500 TABLET, EXTENDED RELEASE ORAL at 12:03

## 2017-10-22 RX ADMIN — PIPERACILLIN AND TAZOBACTAM 2.25 G: 2; .25 INJECTION, POWDER, LYOPHILIZED, FOR SOLUTION INTRAVENOUS; PARENTERAL at 05:58

## 2017-10-22 RX ADMIN — MORPHINE SULFATE 15 MG: 100 SOLUTION ORAL at 05:57

## 2017-10-22 RX ADMIN — HYDRALAZINE HYDROCHLORIDE 25 MG: 25 TABLET, FILM COATED ORAL at 16:06

## 2017-10-22 RX ADMIN — FINASTERIDE 5 MG: 5 TABLET, FILM COATED ORAL at 08:26

## 2017-10-22 RX ADMIN — AMPICILLIN SODIUM AND SULBACTAM SODIUM 3 G: 2; 1 INJECTION, POWDER, FOR SOLUTION INTRAMUSCULAR; INTRAVENOUS at 19:20

## 2017-10-22 RX ADMIN — INSULIN LISPRO 3 UNITS: 100 INJECTION, SOLUTION INTRAVENOUS; SUBCUTANEOUS at 16:33

## 2017-10-22 RX ADMIN — HYDRALAZINE HYDROCHLORIDE 25 MG: 25 TABLET, FILM COATED ORAL at 05:58

## 2017-10-22 RX ADMIN — INSULIN LISPRO 2 UNITS: 100 INJECTION, SOLUTION INTRAVENOUS; SUBCUTANEOUS at 12:10

## 2017-10-22 RX ADMIN — AMPICILLIN SODIUM AND SULBACTAM SODIUM 3 G: 2; 1 INJECTION, POWDER, FOR SOLUTION INTRAMUSCULAR; INTRAVENOUS at 23:55

## 2017-10-22 RX ADMIN — HYDRALAZINE HYDROCHLORIDE 25 MG: 25 TABLET, FILM COATED ORAL at 21:39

## 2017-10-22 RX ADMIN — HEPARIN SODIUM 5000 UNITS: 5000 INJECTION, SOLUTION INTRAVENOUS; SUBCUTANEOUS at 08:26

## 2017-10-22 RX ADMIN — BUDESONIDE AND FORMOTEROL FUMARATE DIHYDRATE 2 PUFF: 160; 4.5 AEROSOL RESPIRATORY (INHALATION) at 21:39

## 2017-10-22 RX ADMIN — AMPICILLIN SODIUM AND SULBACTAM SODIUM 3 G: 2; 1 INJECTION, POWDER, FOR SOLUTION INTRAMUSCULAR; INTRAVENOUS at 12:13

## 2017-10-22 RX ADMIN — BUDESONIDE AND FORMOTEROL FUMARATE DIHYDRATE 2 PUFF: 160; 4.5 AEROSOL RESPIRATORY (INHALATION) at 08:26

## 2017-10-22 RX ADMIN — TAMSULOSIN HYDROCHLORIDE 0.4 MG: 0.4 CAPSULE ORAL at 19:26

## 2017-10-22 RX ADMIN — HEPARIN SODIUM 5000 UNITS: 5000 INJECTION, SOLUTION INTRAVENOUS; SUBCUTANEOUS at 21:38

## 2017-10-22 ASSESSMENT — LIFESTYLE VARIABLES: DO YOU DRINK ALCOHOL: NO

## 2017-10-22 ASSESSMENT — ENCOUNTER SYMPTOMS
EYE PAIN: 0
VOMITING: 0
DEPRESSION: 0
NAUSEA: 0
SHORTNESS OF BREATH: 0
PALPITATIONS: 0
SORE THROAT: 0
CHILLS: 0
INSOMNIA: 0
HEADACHES: 0
DIZZINESS: 0
COUGH: 0
NECK PAIN: 0
ABDOMINAL PAIN: 0
TINGLING: 0
FEVER: 0
BACK PAIN: 0
BLURRED VISION: 0

## 2017-10-22 ASSESSMENT — COPD QUESTIONNAIRES
COPD SCREENING SCORE: 5
DO YOU EVER COUGH UP ANY MUCUS OR PHLEGM?: YES, A FEW DAYS A WEEK OR MONTH
DURING THE PAST 4 WEEKS HOW MUCH DID YOU FEEL SHORT OF BREATH: SOME OF THE TIME
HAVE YOU SMOKED AT LEAST 100 CIGARETTES IN YOUR ENTIRE LIFE: NO/DON'T KNOW

## 2017-10-22 ASSESSMENT — PATIENT HEALTH QUESTIONNAIRE - PHQ9
SUM OF ALL RESPONSES TO PHQ QUESTIONS 1-9: 0
1. LITTLE INTEREST OR PLEASURE IN DOING THINGS: NOT AT ALL
2. FEELING DOWN, DEPRESSED, IRRITABLE, OR HOPELESS: NOT AT ALL
SUM OF ALL RESPONSES TO PHQ9 QUESTIONS 1 AND 2: 0

## 2017-10-22 ASSESSMENT — PAIN SCALES - GENERAL
PAINLEVEL_OUTOF10: 8
PAINLEVEL_OUTOF10: 0
PAINLEVEL_OUTOF10: 8

## 2017-10-22 NOTE — PROGRESS NOTES
LIMB PRESERVATION SERVICE     Recd consult for R great toe     68 y/o male with history of DM2, colostomy, OA, BPH, renal insufficiency, COPD. Transfer from outside hopsital for obstructive uropathy and acute on chronic renal injury. Also presents with R great toe ulcer. Xray and MRI completed at outside facility, negative for OM. Wound team has seen R great toe ulcer and wound care orders in place.  R great toe ulcer started about Jan 2017 after seeing podiatrist at [facility]. Pt stated he would clean his foot with soap and water daily, apply vaseline to wound bed,and cover it with gauze dressing and tape. He denies seeing any purulent drainage. Denies fevers or chills. Pt walks barefoot in home or wears slippers. Wears diabetic shoes when outside less than year old.   DM for 40 years. a1c 7.4% this admission. He stopped checking his blood sugars about 10 months ago for no reason. He takes oral diabetic medication. He c/o neuropathy to both feet relating it to needing bilateral knee replacements.      Xray R foot: -OM  MRI R foot:   First distal phalanx subarticular fatty marrow replacement is at the deep margin of skin ulceration indicating this likely represents osteomyelitis  No septic arthropathy is detected  Great toe cellulitis and ulceration  Mcrae's foot configuration with second metatarsal head flattening and mild edema, compatible with Freiberg's infraction  Moderate first metatarsal-phalangeal osteoarthritis with probable mild hallux valgus deformity. Metatarsal head erosions could reflect gout            Pulses: +2 pedal pulses bilaterally  Sensation: insensate in stocking fashion to BLE    R great toe ulcer:   Thick periwound callus, rolled wound edges, wound bed pale, red, chronic appearing, unable to probe to bone  No erythema or edema noted    Using scalpel and forceps, debrided periwound callus and non viable tissue total 4cm2. Scant bleeding encountered, subsided with pressure. Wound measures ~  1.2x1x0.6cm.   Wound cleaned with NS, applied aquacel ag to wound bed, non adhesive foam, hypafix tape  Dimethicone lotion applied to both feet. Pt has callus to 1st MTH bilaterally and L planter IP joint area of great toe.      On unasyn per IM  Wound cx diptheroids      PLAN  wound care orders updated  DM educator to see pt  Consult ID  offloading shoe ordered  RLE HWB with offloading shoe on while OOB  Will discuss case with foot and ankle ortho MD     D/C plan: recommend HH if going home with LPS f/u appt at wound care clinic in about 3-4 wk from discharge

## 2017-10-22 NOTE — PROGRESS NOTES
[facility] Hospitalist Progress Note    Date of Service: 10/22/2017    Chief Complaint  67 y.o. male admitted 10/17/2017 with admitted to outside hospital with abd pain, foot ulcer and oliguria noted to have obstructive uropathy and acute on chronic kidney injury. left first toe ulcer, found to have urosepsis.     Interval Problem Update  Feeling tired. abd pain persists.     Stat ct  Stat sepsis protocol  Stat urology consult    Consultants/Specialty  urology    Disposition  Guarded.         Review of Systems   Constitutional: Negative for chills and fever.   HENT: Negative for sore throat.    Eyes: Negative for blurred vision and pain.   Respiratory: Negative for cough and shortness of breath.    Cardiovascular: Negative for chest pain and palpitations.   Gastrointestinal: Negative for abdominal pain, nausea and vomiting.   Genitourinary: Negative for dysuria and urgency.   Musculoskeletal: Negative for back pain and neck pain.   Skin: Negative for itching and rash.   Neurological: Negative for dizziness, tingling and headaches.   Psychiatric/Behavioral: Negative for depression. The patient does not have insomnia.    All other systems reviewed and are negative.     Physical Exam  Laboratory/Imaging   Hemodynamics  Temp (24hrs), Av.4 °C (97.5 °F), Min:36.1 °C (97 °F), Max:36.6 °C (97.8 °F)   Temperature: 36.4 °C (97.5 °F)  Pulse  Av.8  Min: 63  Max: 103    Blood Pressure : 133/78      Respiratory      Respiration: 18, Pulse Oximetry: 97 %        RUL Breath Sounds: Clear, RML Breath Sounds: Diminished, RLL Breath Sounds: Diminished, KITTY Breath Sounds: Clear, LLL Breath Sounds: Diminished    Fluids    Intake/Output Summary (Last 24 hours) at 10/22/17 1055  Last data filed at 10/22/17 0800   Gross per 24 hour   Intake              840 ml   Output             2595 ml   Net            -1755 ml       Nutrition  Orders Placed This Encounter   Procedures   • Diet Order     Standing Status:   Standing     Number of  Occurrences:   1     Order Specific Question:   Diet:     Answer:   Diabetic [3]     Physical Exam   Constitutional: He is oriented to person, place, and time. He appears well-developed and well-nourished. No distress.   HENT:   Right Ear: External ear normal.   Left Ear: External ear normal.   Nose: Nose normal.   Eyes: Right eye exhibits no discharge. Left eye exhibits no discharge. No scleral icterus.   Neck: No JVD present. No tracheal deviation present.   Cardiovascular: Normal rate, normal heart sounds and intact distal pulses.    No murmur heard.  Pulmonary/Chest: Effort normal and breath sounds normal. No respiratory distress. He has no wheezes. He has no rales.   Abdominal: Soft. Bowel sounds are normal. He exhibits no distension. There is no tenderness. There is no guarding.   OSTOMY BAG IS IN PLACE   Musculoskeletal: He exhibits no edema or tenderness.   Right great toe ULCERATION IS NOTED   Neurological: He is alert and oriented to person, place, and time.   Skin: Skin is warm and dry. He is not diaphoretic. No erythema.   Psychiatric: He has a normal mood and affect. His behavior is normal.   Nursing note and vitals reviewed.      Recent Labs      10/20/17   0310  10/21/17   0022  10/22/17   0100   WBC  13.4*  15.5*  16.1*   RBC  3.43*  3.52*  3.74*   HEMOGLOBIN  10.2*  10.6*  11.1*   HEMATOCRIT  30.8*  31.3*  33.7*   MCV  89.8  88.9  90.1   MCH  29.7  30.1  29.7   MCHC  33.1*  33.9  32.9*   RDW  44.1  43.9  44.5   PLATELETCT  419  457*  424   MPV  9.4  9.4  9.4     Recent Labs      10/21/17   0022  10/22/17   0100   SODIUM  135  134*   POTASSIUM  3.1*  3.1*   CHLORIDE  108  107   CO2  20  18*   GLUCOSE  125*  127*   BUN  30*  30*   CREATININE  2.44*  2.65*   CALCIUM  8.6  9.1                      Assessment/Plan     * Acute on chronic kidney failure (CMS-HCC)   Assessment & Plan    Acute on chronic Likely 2/2 to obstructive uropathy  Ultrasound w/ bilateral hydronephrosis noted on outside hospital  records likely 2/2 to distal bladder outlet obstruction  REFUSED Rubalcava   UROLOGY INPUT IS NOTED   iv fluids  BLADDER SCAN Q SHIFT  AS PER NURSE'S REPORT HE IS PASSING URINE            Obstructive uropathy   Assessment & Plan    Rubalcava in place  UROLOGY INPUT IS NOTED        Diabetic foot ulcer (CMS-HCC)   Assessment & Plan    No signs of acute cellulitis  MRI done outside hospital w/ out signs of osteo, some soft tissue swelling noted  IT APPEARS TO BE SUPERFICIAL  WOUND CARE INPUT IS NOTED        COPD (chronic obstructive pulmonary disease) (CMS-HCC)   Assessment & Plan    No exacerbation at the moment  Known copd will resume symbicort  Rt protocol.         HTN (hypertension)   Assessment & Plan    STARTED HYDRALAZINE   CONTROLLED        Anemia   Assessment & Plan    Check fe studies.         Severe sepsis (CMS-HCC)   Assessment & Plan    This is severe sepsis with the following associated acute organ dysfunction(s): acute kidney failure.   WBC IS ELEVATED  1ST TOE WOUND CULTURE RESULT IS NOTED  UNASYN  DC  ZOSYN  WILL OBSERVE          BPH (benign prostatic hyperplasia)   Assessment & Plan    Chronic  AS PER UROLOGY  ON FINASTRIDE  ON FLOMAX        Osteoarthritis   Assessment & Plan    Chronic, on morphine at home will continue        Crohn's disease (CMS-HCC)   Assessment & Plan    On patrick, injection bimonthly last injection 1 week ago  Hold this now given infection.           Diabetes (CMS-HCC)   Assessment & Plan    SSI  HEM A1c OF 7.4        Renal failure   Assessment & Plan    MOST LIKELY ACUTE ON CHRONICIV FLUID  WILL CHECK BMP  MOST JAVY 2ND TO OBSTRUCTIVE UROPATHY  WILL CHECK BMP IN AM  UROLOGY INPUT IS NOTED  RENAL ULTRASOUND SHOWS BILATERAL HYDRONEPHROSIS  PT IS REFUSING RUBALCAVA  BLADDER SCAN QSHIFT            Rubalcava catheter::  No Rubalcava  DVT prophylaxis pharmacological::  Heparin

## 2017-10-22 NOTE — PROGRESS NOTES
(0700) Assumed care of pt at this time. Rec'd sitting up on side of bed. AAO X4. Ambulatory. Resp even and nonlabored on room air. Pt denies any SOB or chest pain at this time. Skin w/d to touch. Tele box intact. RT triple lumen IJ intact and remains WNL. Lung sound CTAB. Abdomen soft and obese. RLE colostomy intact; stoma pink and moist. BS present x4. Edema noted to BLE. RT toe gauze dressing c/d/i; changed last on yesterday per this RN. Nonskid socks in place to BLE. Pt denies any needs or complaints at this time. Side rails up x2, bed in lowest/locked position and call light within reach. Will continue to monitor this shift.      (1700) Dr. Prince phoned and informed that MRI-foot has been cancelled. MD states the he did not cancel MRI and would still like for imaging to be done. Order replaced per this RN. MRI screening completed and faxed.

## 2017-10-22 NOTE — PROGRESS NOTES
"Bedside report completed, assumed pt care. Assessment complete. Pt resting in bed, A&O x 4. Pt had a \"blow out\" from colostomy, bag changed. CHG and linen complete. Pt medicated for generalized pain, see MAR. Pt up to void in bathroom, bladder scan ordered to check post void residual. Discussed POC with pt. Call light within reach, pt ambulates independently with a steady gait. All needs met at this time.   "

## 2017-10-23 LAB
ANION GAP SERPL CALC-SCNC: 9 MMOL/L (ref 0–11.9)
BASOPHILS # BLD AUTO: 0.5 % (ref 0–1.8)
BASOPHILS # BLD: 0.07 K/UL (ref 0–0.12)
BUN SERPL-MCNC: 29 MG/DL (ref 8–22)
CALCIUM SERPL-MCNC: 9.3 MG/DL (ref 8.5–10.5)
CHLORIDE SERPL-SCNC: 108 MMOL/L (ref 96–112)
CO2 SERPL-SCNC: 19 MMOL/L (ref 20–33)
CREAT SERPL-MCNC: 2.37 MG/DL (ref 0.5–1.4)
EOSINOPHIL # BLD AUTO: 0.93 K/UL (ref 0–0.51)
EOSINOPHIL NFR BLD: 6 % (ref 0–6.9)
ERYTHROCYTE [DISTWIDTH] IN BLOOD BY AUTOMATED COUNT: 45.6 FL (ref 35.9–50)
GFR SERPL CREATININE-BSD FRML MDRD: 28 ML/MIN/1.73 M 2
GLUCOSE BLD-MCNC: 130 MG/DL (ref 65–99)
GLUCOSE BLD-MCNC: 132 MG/DL (ref 65–99)
GLUCOSE BLD-MCNC: 141 MG/DL (ref 65–99)
GLUCOSE BLD-MCNC: 187 MG/DL (ref 65–99)
GLUCOSE SERPL-MCNC: 119 MG/DL (ref 65–99)
HCT VFR BLD AUTO: 33.9 % (ref 42–52)
HGB BLD-MCNC: 11.1 G/DL (ref 14–18)
IMM GRANULOCYTES # BLD AUTO: 0.11 K/UL (ref 0–0.11)
IMM GRANULOCYTES NFR BLD AUTO: 0.7 % (ref 0–0.9)
LYMPHOCYTES # BLD AUTO: 4.84 K/UL (ref 1–4.8)
LYMPHOCYTES NFR BLD: 31.2 % (ref 22–41)
MCH RBC QN AUTO: 30 PG (ref 27–33)
MCHC RBC AUTO-ENTMCNC: 32.7 G/DL (ref 33.7–35.3)
MCV RBC AUTO: 91.6 FL (ref 81.4–97.8)
MONOCYTES # BLD AUTO: 0.97 K/UL (ref 0–0.85)
MONOCYTES NFR BLD AUTO: 6.2 % (ref 0–13.4)
NEUTROPHILS # BLD AUTO: 8.61 K/UL (ref 1.82–7.42)
NEUTROPHILS NFR BLD: 55.4 % (ref 44–72)
NRBC # BLD AUTO: 0 K/UL
NRBC BLD AUTO-RTO: 0 /100 WBC
PLATELET # BLD AUTO: 452 K/UL (ref 164–446)
PMV BLD AUTO: 9.9 FL (ref 9–12.9)
POTASSIUM SERPL-SCNC: 3.5 MMOL/L (ref 3.6–5.5)
RBC # BLD AUTO: 3.7 M/UL (ref 4.7–6.1)
SODIUM SERPL-SCNC: 136 MMOL/L (ref 135–145)
WBC # BLD AUTO: 15.5 K/UL (ref 4.8–10.8)

## 2017-10-23 PROCEDURE — 80048 BASIC METABOLIC PNL TOTAL CA: CPT

## 2017-10-23 PROCEDURE — 99232 SBSQ HOSP IP/OBS MODERATE 35: CPT | Performed by: FAMILY MEDICINE

## 2017-10-23 PROCEDURE — 770020 HCHG ROOM/CARE - TELE (206)

## 2017-10-23 PROCEDURE — A9270 NON-COVERED ITEM OR SERVICE: HCPCS | Performed by: HOSPITALIST

## 2017-10-23 PROCEDURE — 700102 HCHG RX REV CODE 250 W/ 637 OVERRIDE(OP): Performed by: NURSE PRACTITIONER

## 2017-10-23 PROCEDURE — A9270 NON-COVERED ITEM OR SERVICE: HCPCS | Performed by: NURSE PRACTITIONER

## 2017-10-23 PROCEDURE — 700111 HCHG RX REV CODE 636 W/ 250 OVERRIDE (IP): Performed by: FAMILY MEDICINE

## 2017-10-23 PROCEDURE — A9270 NON-COVERED ITEM OR SERVICE: HCPCS | Performed by: FAMILY MEDICINE

## 2017-10-23 PROCEDURE — 97165 OT EVAL LOW COMPLEX 30 MIN: CPT

## 2017-10-23 PROCEDURE — 700105 HCHG RX REV CODE 258: Performed by: FAMILY MEDICINE

## 2017-10-23 PROCEDURE — 700102 HCHG RX REV CODE 250 W/ 637 OVERRIDE(OP): Performed by: HOSPITALIST

## 2017-10-23 PROCEDURE — 700102 HCHG RX REV CODE 250 W/ 637 OVERRIDE(OP): Performed by: FAMILY MEDICINE

## 2017-10-23 PROCEDURE — 82962 GLUCOSE BLOOD TEST: CPT | Mod: 91

## 2017-10-23 PROCEDURE — 85025 COMPLETE CBC W/AUTO DIFF WBC: CPT

## 2017-10-23 RX ORDER — HYDRALAZINE HYDROCHLORIDE 50 MG/1
50 TABLET, FILM COATED ORAL 2 TIMES DAILY
Status: DISCONTINUED | OUTPATIENT
Start: 2017-10-23 | End: 2017-10-28 | Stop reason: HOSPADM

## 2017-10-23 RX ADMIN — MORPHINE SULFATE 15 MG: 100 SOLUTION ORAL at 21:20

## 2017-10-23 RX ADMIN — AMPICILLIN SODIUM AND SULBACTAM SODIUM 3 G: 2; 1 INJECTION, POWDER, FOR SOLUTION INTRAMUSCULAR; INTRAVENOUS at 11:24

## 2017-10-23 RX ADMIN — AMPICILLIN SODIUM AND SULBACTAM SODIUM 3 G: 2; 1 INJECTION, POWDER, FOR SOLUTION INTRAMUSCULAR; INTRAVENOUS at 06:55

## 2017-10-23 RX ADMIN — TAMSULOSIN HYDROCHLORIDE 0.4 MG: 0.4 CAPSULE ORAL at 21:20

## 2017-10-23 RX ADMIN — AMPICILLIN SODIUM AND SULBACTAM SODIUM 3 G: 2; 1 INJECTION, POWDER, FOR SOLUTION INTRAMUSCULAR; INTRAVENOUS at 17:37

## 2017-10-23 RX ADMIN — FINASTERIDE 5 MG: 5 TABLET, FILM COATED ORAL at 08:43

## 2017-10-23 RX ADMIN — HYDRALAZINE HYDROCHLORIDE 25 MG: 25 TABLET, FILM COATED ORAL at 06:49

## 2017-10-23 RX ADMIN — ALBUTEROL SULFATE 2 PUFF: 90 AEROSOL, METERED RESPIRATORY (INHALATION) at 16:00

## 2017-10-23 RX ADMIN — INSULIN LISPRO 2 UNITS: 100 INJECTION, SOLUTION INTRAVENOUS; SUBCUTANEOUS at 11:42

## 2017-10-23 RX ADMIN — BUDESONIDE AND FORMOTEROL FUMARATE DIHYDRATE 2 PUFF: 160; 4.5 AEROSOL RESPIRATORY (INHALATION) at 21:21

## 2017-10-23 RX ADMIN — HEPARIN SODIUM 5000 UNITS: 5000 INJECTION, SOLUTION INTRAVENOUS; SUBCUTANEOUS at 21:21

## 2017-10-23 RX ADMIN — BUDESONIDE AND FORMOTEROL FUMARATE DIHYDRATE 2 PUFF: 160; 4.5 AEROSOL RESPIRATORY (INHALATION) at 08:44

## 2017-10-23 RX ADMIN — HEPARIN SODIUM 5000 UNITS: 5000 INJECTION, SOLUTION INTRAVENOUS; SUBCUTANEOUS at 08:43

## 2017-10-23 RX ADMIN — HYDRALAZINE HYDROCHLORIDE 50 MG: 50 TABLET, FILM COATED ORAL at 21:21

## 2017-10-23 ASSESSMENT — ENCOUNTER SYMPTOMS
ABDOMINAL PAIN: 0
DIZZINESS: 0
DEPRESSION: 0
COUGH: 0
PALPITATIONS: 0
CHILLS: 0
HEADACHES: 0
TINGLING: 0
BACK PAIN: 0
INSOMNIA: 0
BLURRED VISION: 0
EYE PAIN: 0
SHORTNESS OF BREATH: 0
NECK PAIN: 0
FEVER: 0
NAUSEA: 0
VOMITING: 0
SORE THROAT: 0

## 2017-10-23 ASSESSMENT — COGNITIVE AND FUNCTIONAL STATUS - GENERAL
DAILY ACTIVITIY SCORE: 24
SUGGESTED CMS G CODE MODIFIER DAILY ACTIVITY: CH

## 2017-10-23 ASSESSMENT — PAIN SCALES - GENERAL
PAINLEVEL_OUTOF10: 9
PAINLEVEL_OUTOF10: 8
PAINLEVEL_OUTOF10: 0

## 2017-10-23 ASSESSMENT — ACTIVITIES OF DAILY LIVING (ADL): TOILETING: INDEPENDENT

## 2017-10-23 NOTE — PROGRESS NOTES
[facility] Hospitalist Progress Note    Date of Service: 10/23/2017    Chief Complaint  67 y.o. male admitted 10/17/2017 with admitted to outside hospital with abd pain, foot ulcer and oliguria noted to have obstructive uropathy and acute on chronic kidney injury. left first toe ulcer, found to have urosepsis.     Interval Problem Update  Feeling tired. abd pain persists.     Stat ct  Stat sepsis protocol  Stat urology consult    Consultants/Specialty  urology    Disposition  Guarded.         Review of Systems   Constitutional: Negative for chills and fever.   HENT: Negative for sore throat.    Eyes: Negative for blurred vision and pain.   Respiratory: Negative for cough and shortness of breath.    Cardiovascular: Negative for chest pain and palpitations.   Gastrointestinal: Negative for abdominal pain, nausea and vomiting.   Genitourinary: Negative for dysuria and urgency.   Musculoskeletal: Negative for back pain and neck pain.   Skin: Negative for itching and rash.   Neurological: Negative for dizziness, tingling and headaches.   Psychiatric/Behavioral: Negative for depression. The patient does not have insomnia.    All other systems reviewed and are negative.     Physical Exam  Laboratory/Imaging   Hemodynamics  Temp (24hrs), Av.6 °C (97.8 °F), Min:36.2 °C (97.2 °F), Max:36.7 °C (98.1 °F)   Temperature: 36.7 °C (98.1 °F)  Pulse  Av.5  Min: 63  Max: 103    Blood Pressure : 144/81      Respiratory      Respiration: 16, Pulse Oximetry: 93 %        RUL Breath Sounds: Clear, RML Breath Sounds: Diminished, RLL Breath Sounds: Diminished, KITTY Breath Sounds: Clear, LLL Breath Sounds: Diminished    Fluids    Intake/Output Summary (Last 24 hours) at 10/23/17 0919  Last data filed at 10/23/17 0000   Gross per 24 hour   Intake              120 ml   Output              900 ml   Net             -780 ml       Nutrition  Orders Placed This Encounter   Procedures   • Diet Order     Standing Status:   Standing     Number of  Occurrences:   1     Order Specific Question:   Diet:     Answer:   Diabetic [3]     Physical Exam   Constitutional: He is oriented to person, place, and time. He appears well-developed and well-nourished. No distress.   HENT:   Right Ear: External ear normal.   Left Ear: External ear normal.   Nose: Nose normal.   Eyes: Right eye exhibits no discharge. Left eye exhibits no discharge. No scleral icterus.   Neck: No JVD present. No tracheal deviation present.   Cardiovascular: Normal rate, normal heart sounds and intact distal pulses.    No murmur heard.  Pulmonary/Chest: Effort normal and breath sounds normal. No respiratory distress. He has no wheezes. He has no rales.   Abdominal: Soft. Bowel sounds are normal. He exhibits no distension. There is no tenderness. There is no guarding.   OSTOMY BAG IS IN PLACE   Musculoskeletal: He exhibits no edema or tenderness.   Right great toe ULCERATION IS NOTED   Neurological: He is alert and oriented to person, place, and time.   Skin: Skin is warm and dry. He is not diaphoretic. No erythema.   ULCER ON THE TIP OF THE 1ST TOE IS NOTED   Psychiatric: He has a normal mood and affect. His behavior is normal.   Nursing note and vitals reviewed.      Recent Labs      10/21/17   0022  10/22/17   0100  10/23/17   0441   WBC  15.5*  16.1*  15.5*   RBC  3.52*  3.74*  3.70*   HEMOGLOBIN  10.6*  11.1*  11.1*   HEMATOCRIT  31.3*  33.7*  33.9*   MCV  88.9  90.1  91.6   MCH  30.1  29.7  30.0   MCHC  33.9  32.9*  32.7*   RDW  43.9  44.5  45.6   PLATELETCT  457*  424  452*   MPV  9.4  9.4  9.9     Recent Labs      10/21/17   0022  10/22/17   0100  10/23/17   0441   SODIUM  135  134*  136   POTASSIUM  3.1*  3.1*  3.5*   CHLORIDE  108  107  108   CO2  20  18*  19*   GLUCOSE  125*  127*  119*   BUN  30*  30*  29*   CREATININE  2.44*  2.65*  2.37*   CALCIUM  8.6  9.1  9.3                      Assessment/Plan     * Acute on chronic kidney failure (CMS-HCC)   Assessment & Plan    Acute on chronic  Likely 2/2 to obstructive uropathy  Ultrasound w/ bilateral hydronephrosis noted on outside hospital records likely 2/2 to distal bladder outlet obstruction  REFUSED Rubalcava   UROLOGY INPUT IS NOTED   iv fluids  BLADDER SCAN Q SHIFT  AS PER NURSE'S REPORT HE IS PASSING URINE            Obstructive uropathy   Assessment & Plan    Rubalcava in place  UROLOGY INPUT IS NOTED        Diabetic foot ulcer (CMS-HCC)   Assessment & Plan    AS PER H&P MRI done outside hospital w/ out signs of osteo, some soft tissue swelling noted  IT APPEARS TO BE SUPERFICIAL  WOUND CARE INPUT IS NOTED  WILL CONTINUE WITH UNASYN  IT IS IMPROVING        COPD (chronic obstructive pulmonary disease) (CMS-HCC)   Assessment & Plan    No exacerbation at the moment  Known copd will resume symbicort  Rt protocol.         HTN (hypertension)   Assessment & Plan    STARTED HYDRALAZINE   CONTROLLED        Anemia   Assessment & Plan    Check fe studies.         Severe sepsis (CMS-HCC)   Assessment & Plan    This is severe sepsis with the following associated acute organ dysfunction(s): acute kidney failure.   WBC IS ELEVATED  1ST TOE WOUND CULTURE RESULT IS NOTED  UNASYN  Has improved  WILL OBSERVE          BPH (benign prostatic hyperplasia)   Assessment & Plan    Chronic  AS PER UROLOGY  ON FINASTRIDE  ON FLOMAX        Osteoarthritis   Assessment & Plan    Chronic, on morphine at home will continue        Crohn's disease (CMS-HCC)   Assessment & Plan    On patrick, injection bimonthly last injection 1 week ago  Hold this now given infection.           Diabetes (CMS-HCC)   Assessment & Plan    SSI  HEM A1c OF 7.4        Renal failure   Assessment & Plan    MOST LIKELY ACUTE ON CHRONICIV FLUID  WILL CHECK BMP  MOST LIKLEY 2ND TO OBSTRUCTIVE UROPATHY  WILL CHECK BMP IN AM  UROLOGY INPUT IS NOTED  RENAL ULTRASOUND SHOWS BILATERAL HYDRONEPHROSIS  PT IS REFUSING RUBALCAVA  BLADDER SCAN QSHIFT          SEVERE SEPSIS WITH UTI    Rubalcava catheter::  No Rubalcava  DVT prophylaxis  pharmacological::  Heparin      CRF WITH STAGE III KIDNEY DISEASE

## 2017-10-23 NOTE — PROGRESS NOTES
Monitor Summary    LA 0.18  QRS 0.10  QT 0.38    HR 55-84  Sinus Michele - Sinus Rhyhtm  With Occasional sinus pauses

## 2017-10-23 NOTE — DOCUMENTATION QUERY
DOCUMENTATION QUERY    PROVIDERS: Please select “Cosign w/ note”to reply to query.    To better represent the severity of illness of your patient, please review the following information and exercise your independent professional judgment in responding to this query.     Urosepsis and severe sepsis are documented in the Progress Notes. Based upon the clinical findings, risk factors, and treatment, can these diagnoses be further specified?    • Severe sepsis with UTI   • Severe sepsis with Cystitis  • Severe sepsis with Kidney Infection  • Findings of no clinical significance  • Other explanation of Clinical Findings  • Unable to determine    The medical record reflects the following:   Clinical Findings Per Progress Notes:   admitted to outside hospital with abd pain, foot ulcer and oliguria noted to have obstructive uropathy and acute on chronic kidney injury. left first toe ulcer, found to have urosepsis.      Severe sepsis (CMS-HCC)  This is severe sepsis with the following associated acute organ dysfunction(s): acute kidney failure.   ON ZOSYN  WILL OBSERVE    Results Review:   UA: cloudy, Occult Blood-Large, Protein-100, Leukocyte Esterase-Small, WBC 20-50, RBC >150, bacteria-negative    Treatment IV NS bolus, Zosyn, Unasyn, Proscar, Humphrey catheter, & Urology Consultation   Risk Factors Age, bilateral hydronephrosis, obstructive uropathy, BPH, HTN, acute kidney injury, DM2, & severe sepsis   Location within medical record History & Physical, Progress Notes, & Lab Results      Thank you,   Ny Deras RN  Clinical   Phone [phone]

## 2017-10-23 NOTE — DOCUMENTATION QUERY
DOCUMENTATION QUERY    PROVIDERS: Please select “Cosign w/ note”to reply to query.    To better represent the severity of illness of your patient, please review the following information and exercise your independent professional judgment in responding to this query. Please reference the information at the bottom of this query for clinical criteria to support CKD staging per [employer].     Acute on chronic kidney failure is documented in the Progress Notes. Based upon the clinical findings, risk factors, and treatment, can this diagnosis be further specified?     • Chronic Kidney Disease Stage I      • Chronic Kidney Disease Stage II     • Chronic Kidney Disease Stage III    • Chronic Kidney Disease Stage IV    • Chronic Kidney Disease Stage V     • End Stage Renal Disease  • Other explanation of clinical findings  • Unable to determine     The medical record reflects the following:   Clinical Findings Per Progress Notes:   * Acute on chronic kidney failure (CMS-HCC)  Acute on chronic Likely 2/2 to obstructive uropathy  Ultrasound w/ bilateral hydronephrosis noted on outside hospital records likely 2/2 to distal bladder outlet obstruction    Results Review  BUN 47, 50, 34, 30, 30, 28  Cr 3.41, 3.51, 2.32, 2.44, 2.65, 2.37  GFR 18, 18, 28, 27, 24, 28   Treatment IV NS bolus, Zosyn, Unasyn, Urology Consultation,  Humphrey catheter, & monitor labs   Risk Factors Age, bilateral hydronephrosis, obstructive uropathy, BPH, HTN, acute kidney injury, DM2, & severe sepsis   Location within medical record History & Physical, Progress Notes, & Lab Results      Thank you,   Ny Deras RN  Clinical   Phone [phone]

## 2017-10-23 NOTE — PROGRESS NOTES
This RN called to verify that all AM labs due this AM were being processed at this time.  confirmed.

## 2017-10-23 NOTE — DOCUMENTATION QUERY
DOCUMENTATION QUERY    PROVIDERS: Please select “Cosign w/ note”to reply to query.    To better represent the severity of illness of your patient, please review the following information and exercise your independent professional judgment in responding to this query.     Sepsis is documented in the Progress Notes. Based upon the clinical findings, risk factors, and treatment, please specify if this condition was present on admission or developed after admission?    • Sepsis was present on admission  • Sepsis developed after admission  • Unable to determine    The medical record reflects the following:   Clinical Findings Per H&P:   Chief Complaint  Patient admitted to outside hospital with abd pain, foot ulcer and oliguria noted to have obstructive uropathy and acute on chronic kidney injury.   Admit Vitals: T98.1, P88, RR 18, 150/73, 96% on RA     Per Progress Notes 10/18:  Severe sepsis (CMS-[facility])  This is severe sepsis with the following associated acute organ dysfunction(s): acute kidney failure. start stat sepsis protocol, transfer to [facility], iv fluids started, antibiotics started. Trend lytes and correct. F.u on cultures. Send statu UA, trend lactic acid    Results Review:   WBC 12.5, 12.2, 13.4/15.5/16.1/15.5  BUN 47  Cr 3.41  GFR 18  UA: Cloudy, Occult Blood-Large, Protein-100, Leukocyte Esterase-Small, WBC 20-50, RBC >150, bacteria-negative  Right Foot Wound Cx: Diphtheroids Heavy growth   Treatment Sepsis protocol, IV NS bolus, central line placement, transfer to telemetry, & IV Zosyn & Unasyn   Risk Factors Age, diabetic foot ulcer, bilateral hydronephrosis, obstructive uropathy, BPH, HTN, acute kidney injury, DM2, & severe sepsis   Location within medical record History & Physical, Progress Notes, & Lab Results      Thank you,   Ny Deras RN  Clinical   Phone [phone]

## 2017-10-24 PROBLEM — M86.272 SUBACUTE OSTEOMYELITIS OF LEFT FOOT (HCC): Status: ACTIVE | Noted: 2017-10-24

## 2017-10-24 LAB
ANION GAP SERPL CALC-SCNC: 11 MMOL/L (ref 0–11.9)
BACTERIA BLD CULT: NORMAL
BACTERIA BLD CULT: NORMAL
BASOPHILS # BLD AUTO: 0.5 % (ref 0–1.8)
BASOPHILS # BLD: 0.08 K/UL (ref 0–0.12)
BUN SERPL-MCNC: 34 MG/DL (ref 8–22)
CALCIUM SERPL-MCNC: 9.5 MG/DL (ref 8.5–10.5)
CHLORIDE SERPL-SCNC: 107 MMOL/L (ref 96–112)
CO2 SERPL-SCNC: 19 MMOL/L (ref 20–33)
CREAT SERPL-MCNC: 2.45 MG/DL (ref 0.5–1.4)
EOSINOPHIL # BLD AUTO: 0.63 K/UL (ref 0–0.51)
EOSINOPHIL NFR BLD: 3.8 % (ref 0–6.9)
ERYTHROCYTE [DISTWIDTH] IN BLOOD BY AUTOMATED COUNT: 44.8 FL (ref 35.9–50)
GFR SERPL CREATININE-BSD FRML MDRD: 27 ML/MIN/1.73 M 2
GLUCOSE BLD-MCNC: 123 MG/DL (ref 65–99)
GLUCOSE BLD-MCNC: 147 MG/DL (ref 65–99)
GLUCOSE BLD-MCNC: 149 MG/DL (ref 65–99)
GLUCOSE BLD-MCNC: 159 MG/DL (ref 65–99)
GLUCOSE SERPL-MCNC: 120 MG/DL (ref 65–99)
HCT VFR BLD AUTO: 33.7 % (ref 42–52)
HGB BLD-MCNC: 11.4 G/DL (ref 14–18)
IMM GRANULOCYTES # BLD AUTO: 0.09 K/UL (ref 0–0.11)
IMM GRANULOCYTES NFR BLD AUTO: 0.5 % (ref 0–0.9)
LYMPHOCYTES # BLD AUTO: 4.61 K/UL (ref 1–4.8)
LYMPHOCYTES NFR BLD: 27.7 % (ref 22–41)
MCH RBC QN AUTO: 30.8 PG (ref 27–33)
MCHC RBC AUTO-ENTMCNC: 33.8 G/DL (ref 33.7–35.3)
MCV RBC AUTO: 91.1 FL (ref 81.4–97.8)
MONOCYTES # BLD AUTO: 1 K/UL (ref 0–0.85)
MONOCYTES NFR BLD AUTO: 6 % (ref 0–13.4)
NEUTROPHILS # BLD AUTO: 10.25 K/UL (ref 1.82–7.42)
NEUTROPHILS NFR BLD: 61.5 % (ref 44–72)
NRBC # BLD AUTO: 0 K/UL
NRBC BLD AUTO-RTO: 0 /100 WBC
PLATELET # BLD AUTO: 409 K/UL (ref 164–446)
PMV BLD AUTO: 9.6 FL (ref 9–12.9)
POTASSIUM SERPL-SCNC: 3.5 MMOL/L (ref 3.6–5.5)
RBC # BLD AUTO: 3.7 M/UL (ref 4.7–6.1)
SIGNIFICANT IND 70042: NORMAL
SIGNIFICANT IND 70042: NORMAL
SITE SITE: NORMAL
SITE SITE: NORMAL
SODIUM SERPL-SCNC: 137 MMOL/L (ref 135–145)
SOURCE SOURCE: NORMAL
SOURCE SOURCE: NORMAL
WBC # BLD AUTO: 16.7 K/UL (ref 4.8–10.8)

## 2017-10-24 PROCEDURE — 700102 HCHG RX REV CODE 250 W/ 637 OVERRIDE(OP): Performed by: NURSE PRACTITIONER

## 2017-10-24 PROCEDURE — A9270 NON-COVERED ITEM OR SERVICE: HCPCS | Performed by: FAMILY MEDICINE

## 2017-10-24 PROCEDURE — 700105 HCHG RX REV CODE 258: Performed by: FAMILY MEDICINE

## 2017-10-24 PROCEDURE — 85025 COMPLETE CBC W/AUTO DIFF WBC: CPT

## 2017-10-24 PROCEDURE — 80048 BASIC METABOLIC PNL TOTAL CA: CPT

## 2017-10-24 PROCEDURE — G8978 MOBILITY CURRENT STATUS: HCPCS | Mod: CI

## 2017-10-24 PROCEDURE — 99233 SBSQ HOSP IP/OBS HIGH 50: CPT | Performed by: INTERNAL MEDICINE

## 2017-10-24 PROCEDURE — G8980 MOBILITY D/C STATUS: HCPCS | Mod: CI

## 2017-10-24 PROCEDURE — A9270 NON-COVERED ITEM OR SERVICE: HCPCS | Performed by: NURSE PRACTITIONER

## 2017-10-24 PROCEDURE — 700102 HCHG RX REV CODE 250 W/ 637 OVERRIDE(OP): Performed by: FAMILY MEDICINE

## 2017-10-24 PROCEDURE — 700111 HCHG RX REV CODE 636 W/ 250 OVERRIDE (IP): Performed by: FAMILY MEDICINE

## 2017-10-24 PROCEDURE — G8979 MOBILITY GOAL STATUS: HCPCS | Mod: CI

## 2017-10-24 PROCEDURE — 770020 HCHG ROOM/CARE - TELE (206)

## 2017-10-24 PROCEDURE — 82962 GLUCOSE BLOOD TEST: CPT | Mod: 91

## 2017-10-24 PROCEDURE — 97161 PT EVAL LOW COMPLEX 20 MIN: CPT

## 2017-10-24 PROCEDURE — 700102 HCHG RX REV CODE 250 W/ 637 OVERRIDE(OP): Performed by: HOSPITALIST

## 2017-10-24 PROCEDURE — A9270 NON-COVERED ITEM OR SERVICE: HCPCS | Performed by: HOSPITALIST

## 2017-10-24 RX ADMIN — MORPHINE SULFATE 15 MG: 100 SOLUTION ORAL at 17:57

## 2017-10-24 RX ADMIN — HEPARIN SODIUM 5000 UNITS: 5000 INJECTION, SOLUTION INTRAVENOUS; SUBCUTANEOUS at 08:51

## 2017-10-24 RX ADMIN — AMPICILLIN SODIUM AND SULBACTAM SODIUM 3 G: 2; 1 INJECTION, POWDER, FOR SOLUTION INTRAMUSCULAR; INTRAVENOUS at 05:37

## 2017-10-24 RX ADMIN — TAMSULOSIN HYDROCHLORIDE 0.4 MG: 0.4 CAPSULE ORAL at 17:52

## 2017-10-24 RX ADMIN — FINASTERIDE 5 MG: 5 TABLET, FILM COATED ORAL at 08:50

## 2017-10-24 RX ADMIN — HYDRALAZINE HYDROCHLORIDE 50 MG: 50 TABLET, FILM COATED ORAL at 21:35

## 2017-10-24 RX ADMIN — INSULIN LISPRO 2 UNITS: 100 INJECTION, SOLUTION INTRAVENOUS; SUBCUTANEOUS at 17:43

## 2017-10-24 RX ADMIN — AMPICILLIN SODIUM AND SULBACTAM SODIUM 3 G: 2; 1 INJECTION, POWDER, FOR SOLUTION INTRAMUSCULAR; INTRAVENOUS at 00:43

## 2017-10-24 RX ADMIN — BUDESONIDE AND FORMOTEROL FUMARATE DIHYDRATE 2 PUFF: 160; 4.5 AEROSOL RESPIRATORY (INHALATION) at 21:35

## 2017-10-24 RX ADMIN — AMPICILLIN SODIUM AND SULBACTAM SODIUM 3 G: 2; 1 INJECTION, POWDER, FOR SOLUTION INTRAMUSCULAR; INTRAVENOUS at 17:48

## 2017-10-24 RX ADMIN — HYDRALAZINE HYDROCHLORIDE 50 MG: 50 TABLET, FILM COATED ORAL at 08:50

## 2017-10-24 RX ADMIN — MORPHINE SULFATE 15 MG: 100 SOLUTION ORAL at 05:52

## 2017-10-24 RX ADMIN — HEPARIN SODIUM 5000 UNITS: 5000 INJECTION, SOLUTION INTRAVENOUS; SUBCUTANEOUS at 21:35

## 2017-10-24 RX ADMIN — BUDESONIDE AND FORMOTEROL FUMARATE DIHYDRATE 2 PUFF: 160; 4.5 AEROSOL RESPIRATORY (INHALATION) at 08:50

## 2017-10-24 RX ADMIN — AMPICILLIN SODIUM AND SULBACTAM SODIUM 3 G: 2; 1 INJECTION, POWDER, FOR SOLUTION INTRAMUSCULAR; INTRAVENOUS at 13:29

## 2017-10-24 ASSESSMENT — ENCOUNTER SYMPTOMS
TINGLING: 0
BLURRED VISION: 0
DIZZINESS: 0
FEVER: 0
SHORTNESS OF BREATH: 0
NECK PAIN: 0
BACK PAIN: 0
ABDOMINAL PAIN: 0
HEADACHES: 0
WEAKNESS: 1
DEPRESSION: 0

## 2017-10-24 ASSESSMENT — PAIN SCALES - GENERAL
PAINLEVEL_OUTOF10: 9
PAINLEVEL_OUTOF10: 0

## 2017-10-24 ASSESSMENT — COGNITIVE AND FUNCTIONAL STATUS - GENERAL
MOBILITY SCORE: 23
CLIMB 3 TO 5 STEPS WITH RAILING: A LITTLE
SUGGESTED CMS G CODE MODIFIER MOBILITY: CI

## 2017-10-24 ASSESSMENT — GAIT ASSESSMENTS
DEVIATION: ANTALGIC
ASSISTIVE DEVICE: HAND HELD ASSIST;NONE
DISTANCE (FEET): 300
GAIT LEVEL OF ASSIST: STAND BY ASSIST

## 2017-10-24 NOTE — PROGRESS NOTES
"Pt has concerns regarding his care. Patient states, \"Susanne been here three days and havent seen a doctor.\" This RN states that the doctors make rounds on the patients everyday, and we have notes describing their assessment and plan of care which is formulated daily. Pt adamantly refuses that an MD saw him today or days prior and discussed his care.     Pt states Susanne only talked to nurses.This RN asked patient if there was a possibility that he may have mistaken the doctor for a nurse. Patient states that he looks at the badge of everyone that speaks with him and an \"MD\" has not spoken with him.     This RN used bedside computer to relay notes regarding the various elements of his plan of care, although much had already been discussed prior to this conversation. Pt states, \"I just want the MD to tell me whats going on\".     This RN apologizes to the patient for the miscommunication and reassures him that tomorrow he will be made especially aware of the MDs presence at bedside and assure the patient is updated on POC. Patient agreed. This RN to notify day shift RN of this conversation and to pass along to day MD patients requests.   "

## 2017-10-24 NOTE — DISCHARGE PLANNING
Pt provided completed DPOA and AD paperwork and gave copy to bedside RN.     DPOA is Dagoberto Hoyt, pt's son.     SW will provide to ayse Ramires, for scanning.

## 2017-10-24 NOTE — THERAPY
"Occupational Therapy Evaluation completed.   Functional Status:  Pt up in room upon OT's arrival. Pt completed toilet transfer, toileting, and washed his hands at the sink independently.  Pt walked hallway without AD.  Pt able to dress self.  Pt reported SOB at and of session requesting inhaler, O2 sats at 98%, RN notified.  Plan of Care: Patient with no further skilled OT needs in the acute care setting at this time  Discharge Recommendations:  Equipment: No Equipment Needed. Post-acute therapy Currently anticipate no further skilled therapy needs once patient is discharged from the inpatient setting.    See \"Rehab Therapy-Acute\" Patient Summary Report for complete documentation.    "

## 2017-10-24 NOTE — PROGRESS NOTES
Diabetes education: Met with patient this afternoon. Please see consult note.  Plan:  CDE will follow up with patient and pharmacy tomorrow. Please call [phone] if needs change.  Pt states he forgets medications and dosages that is why he has a caretaker at home to help him.

## 2017-10-24 NOTE — PROGRESS NOTES
This RN assumed care of patient. During bedside report from day shift RN, patient is alert, oriented and resting with no signs of distress. Respirations are equal and unlabored. This RN is introduced to patient and patient is notified of the duration of this RN's care. Patient is educated that this RN will be back to assess and medicate patient but to inform us of any needs in the meantime via use of the call bell. Pt verbalized understanding. Patients bell is in reach. Pt has no further needs at this time. Continue to monitor.

## 2017-10-24 NOTE — DISCHARGE PLANNING
Medical Social Work    SW met with pt at bedside and provided IMM. Initialed copy placed in chart. Next IMM due 10/25/17.

## 2017-10-24 NOTE — CONSULTS
Diabetes education: Met with Shola, who has a hx of diabetes on insulin at home. Pt states he was not taking his fast acting insulin for 10 months as he did not know the dosage. He does have unopened insulin in his refrigerator at home. After much discussion ( and not as per note) it was established he was testing his blood sugars once to twice a day and taking his long acting insulin (40 units in am and 30 units in pm). He [facility] [place]. CDE will call the pharmacy tomorrow to clarify doses and names of medications.  Pt is currently on Humalog sliding scale only ac and hs. Current blood sugars are 149, 123, and 130. Reviewed foot care, what effects blood sugars and blood sugar goals.  Plan:  CDE will follow up with patient and pharmacy tomorrow. Please call [phone] if needs change.  Pt states he forgets medications and dosages that is why he has a caretaker at home to help him.

## 2017-10-24 NOTE — CARE PLAN
Problem: Nutritional:  Goal: Patient to verbalize or demonstrate understanding of diet  Outcome: NOT MET  Patient refused diabetic diet education teaching, handouts and further follow-up from dietary staff regarding diet education.  RD is available PRN.

## 2017-10-24 NOTE — THERAPY
"Pt is a 68 y/o male presenting to physical therapy at what appears to be PLOF baseline. Pt reports being a home ambulator w/o AD; however, has SPC and FWW available as needed. Encouarged pt to use SPC to assist with maintaining heel WBing upon D/C home to optimize wound healing. Pt reports using scooter for any community mobility. Has support from friend/caregiver upon D/C home and all necessary DME. No further acute skilled PT needs at this time. Continue to mobilize/ambulate with nursing staff and remind of heel WBing.     Physical Therapy Treatment completed.   Bed Mobility:  Supine to Sit: Independent  Transfers: Sit to Stand: Modified Independent  Gait: Level Of Assist: Stand by Assist with No Equipment Needed, occassional HHA for energy conservation and maintenance of Heel WBing     Plan of Care: Patient with no further skilled PT needs in the acute care setting at this time  Discharge Recommendations: Equipment: No Equipment Needed. Post-acute therapy Currently anticipate no further skilled therapy needs once patient is discharged from the inpatient setting, pt declined any need for HH therapy follow up upon D/C     See \"Rehab Therapy-Acute\" Patient Summary Report for complete documentation.       "

## 2017-10-24 NOTE — PROGRESS NOTES
[facility] Hospitalist Progress Note    Date of Service: 10/24/2017    Chief Complaint  67 y.o. male admitted 10/17/2017 with admitted to outside hospital with abd pain, foot ulcer and oliguria noted to have obstructive uropathy and acute on chronic kidney injury. left first toe ulcer, found to have urosepsis.     Interval Problem Update  Diabetic foot ulcer-doing ok, afebrile, tolerating IV abx's without issue. Worked with PT and did ok.    Urine issues-uop remains ok, and denies any current pain with urination at this moment.    Consultants/Specialty  Urology  ID    Disposition  Guarded.         Review of Systems   Constitutional: Negative for fever.   Eyes: Negative for blurred vision.   Respiratory: Negative for shortness of breath.    Cardiovascular: Negative for chest pain.   Gastrointestinal: Negative for abdominal pain.   Genitourinary: Negative for dysuria.   Musculoskeletal: Positive for joint pain (very mild around the L foot). Negative for back pain and neck pain.   Skin: Negative for itching and rash.   Neurological: Positive for weakness (mild). Negative for dizziness, tingling and headaches.   Psychiatric/Behavioral: Negative for depression.   All other systems reviewed and are negative.     Physical Exam  Laboratory/Imaging   Hemodynamics  Temp (24hrs), Av.6 °C (97.8 °F), Min:36.3 °C (97.3 °F), Max:36.9 °C (98.4 °F)   Temperature: 36.4 °C (97.5 °F)  Pulse  Av.4  Min: 63  Max: 103    Blood Pressure : 141/70      Respiratory      Respiration: 18, Pulse Oximetry: 95 %        RUL Breath Sounds: Clear, RML Breath Sounds: Diminished, RLL Breath Sounds: Diminished, KITTY Breath Sounds: Clear, LLL Breath Sounds: Diminished    Fluids    Intake/Output Summary (Last 24 hours) at 10/24/17 1613  Last data filed at 10/23/17 2200   Gross per 24 hour   Intake                0 ml   Output             1300 ml   Net            -1300 ml       Nutrition  Orders Placed This Encounter   Procedures   • Diet Order      Standing Status:   Standing     Number of Occurrences:   1     Order Specific Question:   Diet:     Answer:   Diabetic [3]     Physical Exam   Constitutional: He is oriented to person, place, and time. He appears well-developed and well-nourished. No distress.   HENT:   Right Ear: External ear normal.   Left Ear: External ear normal.   Nose: Nose normal.   Eyes: Right eye exhibits no discharge. Left eye exhibits no discharge.   Cardiovascular: Normal rate, normal heart sounds and intact distal pulses.    No murmur heard.  Pulmonary/Chest: Effort normal and breath sounds normal. No stridor. No respiratory distress. He has no wheezes. He has no rales.   Abdominal: Soft. Bowel sounds are normal. There is no tenderness.   Ostomy bag in place, normal appearing stool in collecting bag  Obese   Musculoskeletal: He exhibits tenderness. He exhibits no edema.   Right great toe ULCERATION IS NOTED   Neurological: He is alert and oriented to person, place, and time.   Skin: Skin is warm and dry. He is not diaphoretic. No erythema.   Ulcer on the lateral tip/plantar surface of the L big toe, bandages in place, no e/o secretions at this time, mild TTP   Psychiatric: He has a normal mood and affect. His behavior is normal.   Nursing note and vitals reviewed.        Recent Labs      10/22/17   0100  10/23/17   0441  10/24/17   0400   WBC  16.1*  15.5*  16.7*   RBC  3.74*  3.70*  3.70*   HEMOGLOBIN  11.1*  11.1*  11.4*   HEMATOCRIT  33.7*  33.9*  33.7*   MCV  90.1  91.6  91.1   MCH  29.7  30.0  30.8   MCHC  32.9*  32.7*  33.8   RDW  44.5  45.6  44.8   PLATELETCT  424  452*  409   MPV  9.4  9.9  9.6     Recent Labs      10/22/17   0100  10/23/17   0441  10/24/17   0400   SODIUM  134*  136  137   POTASSIUM  3.1*  3.5*  3.5*   CHLORIDE  107  108  107   CO2  18*  19*  19*   GLUCOSE  127*  119*  120*   BUN  30*  29*  34*   CREATININE  2.65*  2.37*  2.45*   CALCIUM  9.1  9.3  9.5                      Assessment/Plan     * Acute on chronic  kidney failure (CMS-HCC)- (present on admission)   Assessment & Plan    Acute on chronic Likely 2/2 to obstructive uropathy  Ultrasound w/ bilateral hydronephrosis noted on outside hospital records likely 2/2 to distal bladder outlet obstruction  -uop remains ok, refuses harrington, urology is following  -continue current BPH meds, tolerating well at this time          Obstructive uropathy- (present on admission)   Assessment & Plan    -refuses harrington, uop remains adequate at this time        Diabetic foot ulcer (CMS-HCC)- (present on admission)   Assessment & Plan    -MRI of the foot done here shows osteomyelitis, LPS following  -consulted ID today  -ON iv unasyn, however, WBC continues to up-trend slowly, afebrile  -Skin culture, shows mixed skin radha, might need an additional agent added        COPD (chronic obstructive pulmonary disease) (CMS-HCC)- (present on admission)   Assessment & Plan    No exacerbation at the moment  Known copd will resume symbicort, continue BD's PRN, no changes planned currently  Rt protocol.         HTN (hypertension)- (present on admission)   Assessment & Plan    -continue hydralazine, well controlled currently        Anemia- (present on admission)   Assessment & Plan    -Fe studies are ok, monitor closely        Severe sepsis (CMS-HCC)   Assessment & Plan    This is severe sepsis with the following associated acute organ dysfunction(s): acute kidney failure.   -is improving, see above          BPH (benign prostatic hyperplasia)- (present on admission)   Assessment & Plan    -well tolerated, continue below medications without changes at this time:  ON FINASTRIDE  ON FLOMAX        Osteoarthritis- (present on admission)   Assessment & Plan    Chronic, on morphine at home will continue        Crohn's disease (CMS-HCC)- (present on admission)   Assessment & Plan    On humira, injection bimonthly last injection 1 week ago  -hold this currently          Diabetes (CMS-HCC)- (present on admission)    Assessment & Plan    SSI, sugars remain well controlled, monitor closely, adjust PRN  HEM A1c OF 7.4        Renal failure   Assessment & Plan    -2/2 obstructive uropathy, as outlined above              Humphrey catheter::  No Humphrey  DVT prophylaxis pharmacological::  Heparin

## 2017-10-24 NOTE — THERAPY
PT orders received, eval attempted. pt declined PT eval at this time, requested to rest. Pt reports mobilizing and ambulating well with nursing. Reports he has used off loading shoe and is able to maintain heel WBing R LE. PT will reattempt eval as able. Thanks    Jacinda Bell, PT, DPT Pager: [phone]

## 2017-10-25 LAB
ANION GAP SERPL CALC-SCNC: 10 MMOL/L (ref 0–11.9)
BASOPHILS # BLD AUTO: 0.4 % (ref 0–1.8)
BASOPHILS # BLD: 0.06 K/UL (ref 0–0.12)
BUN SERPL-MCNC: 35 MG/DL (ref 8–22)
CALCIUM SERPL-MCNC: 9.3 MG/DL (ref 8.5–10.5)
CHLORIDE SERPL-SCNC: 107 MMOL/L (ref 96–112)
CO2 SERPL-SCNC: 18 MMOL/L (ref 20–33)
CREAT SERPL-MCNC: 2.71 MG/DL (ref 0.5–1.4)
EOSINOPHIL # BLD AUTO: 0.65 K/UL (ref 0–0.51)
EOSINOPHIL NFR BLD: 4.1 % (ref 0–6.9)
ERYTHROCYTE [DISTWIDTH] IN BLOOD BY AUTOMATED COUNT: 45.4 FL (ref 35.9–50)
GFR SERPL CREATININE-BSD FRML MDRD: 24 ML/MIN/1.73 M 2
GLUCOSE BLD-MCNC: 114 MG/DL (ref 65–99)
GLUCOSE BLD-MCNC: 124 MG/DL (ref 65–99)
GLUCOSE BLD-MCNC: 137 MG/DL (ref 65–99)
GLUCOSE BLD-MCNC: 175 MG/DL (ref 65–99)
GLUCOSE SERPL-MCNC: 143 MG/DL (ref 65–99)
HCT VFR BLD AUTO: 33.5 % (ref 42–52)
HGB BLD-MCNC: 10.9 G/DL (ref 14–18)
IMM GRANULOCYTES # BLD AUTO: 0.09 K/UL (ref 0–0.11)
IMM GRANULOCYTES NFR BLD AUTO: 0.6 % (ref 0–0.9)
LYMPHOCYTES # BLD AUTO: 4.04 K/UL (ref 1–4.8)
LYMPHOCYTES NFR BLD: 25.4 % (ref 22–41)
MCH RBC QN AUTO: 29.6 PG (ref 27–33)
MCHC RBC AUTO-ENTMCNC: 32.5 G/DL (ref 33.7–35.3)
MCV RBC AUTO: 91 FL (ref 81.4–97.8)
MONOCYTES # BLD AUTO: 1.09 K/UL (ref 0–0.85)
MONOCYTES NFR BLD AUTO: 6.9 % (ref 0–13.4)
NEUTROPHILS # BLD AUTO: 9.97 K/UL (ref 1.82–7.42)
NEUTROPHILS NFR BLD: 62.6 % (ref 44–72)
NRBC # BLD AUTO: 0 K/UL
NRBC BLD AUTO-RTO: 0 /100 WBC
PLATELET # BLD AUTO: 383 K/UL (ref 164–446)
PMV BLD AUTO: 9.6 FL (ref 9–12.9)
POTASSIUM SERPL-SCNC: 3.5 MMOL/L (ref 3.6–5.5)
RBC # BLD AUTO: 3.68 M/UL (ref 4.7–6.1)
SODIUM SERPL-SCNC: 135 MMOL/L (ref 135–145)
WBC # BLD AUTO: 15.9 K/UL (ref 4.8–10.8)

## 2017-10-25 PROCEDURE — 500881 HCHG PACK, EXTREMITY: Performed by: ORTHOPAEDIC SURGERY

## 2017-10-25 PROCEDURE — 87186 SC STD MICRODIL/AGAR DIL: CPT | Mod: 91

## 2017-10-25 PROCEDURE — 87015 SPECIMEN INFECT AGNT CONCNTJ: CPT

## 2017-10-25 PROCEDURE — 160036 HCHG PACU - EA ADDL 30 MINS PHASE I: Performed by: ORTHOPAEDIC SURGERY

## 2017-10-25 PROCEDURE — 160035 HCHG PACU - 1ST 60 MINS PHASE I: Performed by: ORTHOPAEDIC SURGERY

## 2017-10-25 PROCEDURE — 700102 HCHG RX REV CODE 250 W/ 637 OVERRIDE(OP): Performed by: NURSE PRACTITIONER

## 2017-10-25 PROCEDURE — 700102 HCHG RX REV CODE 250 W/ 637 OVERRIDE(OP): Performed by: HOSPITALIST

## 2017-10-25 PROCEDURE — 0Y6P0Z1 DETACHMENT AT RIGHT 1ST TOE, HIGH, OPEN APPROACH: ICD-10-PCS | Performed by: ORTHOPAEDIC SURGERY

## 2017-10-25 PROCEDURE — 80048 BASIC METABOLIC PNL TOTAL CA: CPT

## 2017-10-25 PROCEDURE — A9270 NON-COVERED ITEM OR SERVICE: HCPCS | Performed by: FAMILY MEDICINE

## 2017-10-25 PROCEDURE — A9270 NON-COVERED ITEM OR SERVICE: HCPCS | Performed by: HOSPITALIST

## 2017-10-25 PROCEDURE — 700111 HCHG RX REV CODE 636 W/ 250 OVERRIDE (IP)

## 2017-10-25 PROCEDURE — 700102 HCHG RX REV CODE 250 W/ 637 OVERRIDE(OP): Performed by: FAMILY MEDICINE

## 2017-10-25 PROCEDURE — 700111 HCHG RX REV CODE 636 W/ 250 OVERRIDE (IP): Performed by: INTERNAL MEDICINE

## 2017-10-25 PROCEDURE — 501838 HCHG SUTURE GENERAL: Performed by: ORTHOPAEDIC SURGERY

## 2017-10-25 PROCEDURE — 82962 GLUCOSE BLOOD TEST: CPT

## 2017-10-25 PROCEDURE — 160002 HCHG RECOVERY MINUTES (STAT): Performed by: ORTHOPAEDIC SURGERY

## 2017-10-25 PROCEDURE — 160048 HCHG OR STATISTICAL LEVEL 1-5: Performed by: ORTHOPAEDIC SURGERY

## 2017-10-25 PROCEDURE — 87205 SMEAR GRAM STAIN: CPT

## 2017-10-25 PROCEDURE — 160009 HCHG ANES TIME/MIN: Performed by: ORTHOPAEDIC SURGERY

## 2017-10-25 PROCEDURE — 51798 US URINE CAPACITY MEASURE: CPT

## 2017-10-25 PROCEDURE — A9270 NON-COVERED ITEM OR SERVICE: HCPCS | Performed by: NURSE PRACTITIONER

## 2017-10-25 PROCEDURE — 700105 HCHG RX REV CODE 258: Performed by: INTERNAL MEDICINE

## 2017-10-25 PROCEDURE — 700105 HCHG RX REV CODE 258: Performed by: FAMILY MEDICINE

## 2017-10-25 PROCEDURE — 700101 HCHG RX REV CODE 250: Mod: JW

## 2017-10-25 PROCEDURE — 87075 CULTR BACTERIA EXCEPT BLOOD: CPT

## 2017-10-25 PROCEDURE — 160028 HCHG SURGERY MINUTES - 1ST 30 MINS LEVEL 3: Performed by: ORTHOPAEDIC SURGERY

## 2017-10-25 PROCEDURE — 85025 COMPLETE CBC W/AUTO DIFF WBC: CPT

## 2017-10-25 PROCEDURE — 770020 HCHG ROOM/CARE - TELE (206)

## 2017-10-25 PROCEDURE — 87077 CULTURE AEROBIC IDENTIFY: CPT | Mod: 91

## 2017-10-25 PROCEDURE — 99232 SBSQ HOSP IP/OBS MODERATE 35: CPT | Performed by: INTERNAL MEDICINE

## 2017-10-25 PROCEDURE — 87070 CULTURE OTHR SPECIMN AEROBIC: CPT

## 2017-10-25 PROCEDURE — 700111 HCHG RX REV CODE 636 W/ 250 OVERRIDE (IP): Performed by: FAMILY MEDICINE

## 2017-10-25 RX ORDER — BUPIVACAINE HYDROCHLORIDE 5 MG/ML
INJECTION, SOLUTION EPIDURAL; INTRACAUDAL
Status: DISCONTINUED | OUTPATIENT
Start: 2017-10-25 | End: 2017-10-25 | Stop reason: HOSPADM

## 2017-10-25 RX ORDER — MORPHINE SULFATE 4 MG/ML
2 INJECTION, SOLUTION INTRAMUSCULAR; INTRAVENOUS ONCE
Status: DISCONTINUED | OUTPATIENT
Start: 2017-10-25 | End: 2017-10-25

## 2017-10-25 RX ORDER — MORPHINE SULFATE 4 MG/ML
2 INJECTION, SOLUTION INTRAMUSCULAR; INTRAVENOUS ONCE
Status: COMPLETED | OUTPATIENT
Start: 2017-10-25 | End: 2017-10-25

## 2017-10-25 RX ORDER — SODIUM CHLORIDE 9 MG/ML
INJECTION, SOLUTION INTRAVENOUS
Status: ACTIVE
Start: 2017-10-25 | End: 2017-10-25

## 2017-10-25 RX ORDER — NYSTATIN 100000 [USP'U]/G
POWDER TOPICAL 3 TIMES DAILY
Status: DISCONTINUED | OUTPATIENT
Start: 2017-10-25 | End: 2017-10-28 | Stop reason: HOSPADM

## 2017-10-25 RX ADMIN — MORPHINE SULFATE 2 MG: 4 INJECTION INTRAVENOUS at 22:53

## 2017-10-25 RX ADMIN — TAMSULOSIN HYDROCHLORIDE 0.4 MG: 0.4 CAPSULE ORAL at 18:04

## 2017-10-25 RX ADMIN — MORPHINE SULFATE 15 MG: 100 SOLUTION ORAL at 02:03

## 2017-10-25 RX ADMIN — AMPICILLIN SODIUM AND SULBACTAM SODIUM 1.5 G: 1; .5 INJECTION, POWDER, FOR SOLUTION INTRAMUSCULAR; INTRAVENOUS at 18:04

## 2017-10-25 RX ADMIN — AMPICILLIN SODIUM AND SULBACTAM SODIUM 3 G: 2; 1 INJECTION, POWDER, FOR SOLUTION INTRAMUSCULAR; INTRAVENOUS at 06:48

## 2017-10-25 RX ADMIN — BUDESONIDE AND FORMOTEROL FUMARATE DIHYDRATE 2 PUFF: 160; 4.5 AEROSOL RESPIRATORY (INHALATION) at 21:24

## 2017-10-25 RX ADMIN — MORPHINE SULFATE 15 MG: 100 SOLUTION ORAL at 21:08

## 2017-10-25 RX ADMIN — FINASTERIDE 5 MG: 5 TABLET, FILM COATED ORAL at 09:59

## 2017-10-25 RX ADMIN — AMPICILLIN SODIUM AND SULBACTAM SODIUM 1.5 G: 1; .5 INJECTION, POWDER, FOR SOLUTION INTRAMUSCULAR; INTRAVENOUS at 23:00

## 2017-10-25 RX ADMIN — AMPICILLIN SODIUM AND SULBACTAM SODIUM 3 G: 2; 1 INJECTION, POWDER, FOR SOLUTION INTRAMUSCULAR; INTRAVENOUS at 00:35

## 2017-10-25 RX ADMIN — HEPARIN SODIUM 5000 UNITS: 5000 INJECTION, SOLUTION INTRAVENOUS; SUBCUTANEOUS at 21:09

## 2017-10-25 RX ADMIN — HYDRALAZINE HYDROCHLORIDE 50 MG: 50 TABLET, FILM COATED ORAL at 09:59

## 2017-10-25 RX ADMIN — HYDRALAZINE HYDROCHLORIDE 50 MG: 50 TABLET, FILM COATED ORAL at 21:20

## 2017-10-25 RX ADMIN — BUDESONIDE AND FORMOTEROL FUMARATE DIHYDRATE 2 PUFF: 160; 4.5 AEROSOL RESPIRATORY (INHALATION) at 09:59

## 2017-10-25 RX ADMIN — INSULIN LISPRO 2 UNITS: 100 INJECTION, SOLUTION INTRAVENOUS; SUBCUTANEOUS at 18:04

## 2017-10-25 ASSESSMENT — PAIN SCALES - GENERAL
PAINLEVEL_OUTOF10: 4
PAINLEVEL_OUTOF10: 6
PAINLEVEL_OUTOF10: 0
PAINLEVEL_OUTOF10: 0
PAINLEVEL_OUTOF10: 10
PAINLEVEL_OUTOF10: 9

## 2017-10-25 ASSESSMENT — ENCOUNTER SYMPTOMS
FEVER: 0
SHORTNESS OF BREATH: 0
PALPITATIONS: 0
HEADACHES: 0
BACK PAIN: 0
DEPRESSION: 0
VOMITING: 0
BLURRED VISION: 0
DIZZINESS: 0
NECK PAIN: 0
NAUSEA: 0
WEAKNESS: 1

## 2017-10-25 ASSESSMENT — PAIN SCALES - WONG BAKER: WONGBAKER_NUMERICALRESPONSE: DOESN'T HURT AT ALL

## 2017-10-25 NOTE — PROGRESS NOTES
Patient ambulated to restroom, steady gait. Pain 4/10 knee pain, declined intervention. No further needs at this time.

## 2017-10-25 NOTE — OP REPORT
DATE OF SERVICE:  10/25/2017    PREOPERATIVE DIAGNOSIS:  Right great toe osteomyelitis.    POSTOPERATIVE DIAGNOSIS:  Right great toe osteomyelitis.    PROCEDURE:  Right great toe amputation.    SURGEON:  Bobby Dunlap MD    ASSISTANT:  Gavino Kimble PA-C    ESTIMATED BLOOD LOSS:  Minimal.    INDICATIONS:  This is a 67-year-old gentleman who is presented with a necrotic   right great toe and radiographs consistent with osteomyelitis.  Risks and   benefits of great toe amputation were discussed, which include but not limited   to bleeding, infection, neurovascular damage, pain, stiffness, and need for   further surgery.  He understands all these risks and wished to proceed.    DESCRIPTION OF PROCEDURE:  Patient was sedated with LMA anesthesia,   administered preoperative antibiotics.  Right lower extremity was prepped and   draped in usual sterile fashion and his right great toe was amputated through   the proximal phalanx.  Good margins were achieved.  Deep tissue was sent for   culture and sensitivity.  Wounds were irrigated, closed with nylon suture.    Sterile dressings were applied.  Patient tolerated the procedure well.    POSTOPERATIVE PLAN:  The patient to be admitted under medicine service for   perioperative antibiotics, DVT prophylaxis, and pain control.       ____________________________________     BOBBY DUNLAP MD    JENNIFER / NTS    DD:  10/25/2017 11:06:32  DT:  10/25/2017 11:30:57    D#:  [id number]  Job#:  [id number]

## 2017-10-25 NOTE — PROGRESS NOTES
Report received. Patient oriented x4. Pain level 0 out of 10. Denies SOB. Central line patent, patient states that it is in place because they were unable to place an iv multiple times and with ultrasound without success. Educated on importance of trying to take out because of risk for infection. Asked to try and place iv, pt refusing.  Fall risk interventions in place, bed in low position, and pt up self. Colostomy patent. Assessment completed.

## 2017-10-25 NOTE — CONSULTS
"DATE OF SERVICE:  10/25/2017    INFECTIOUS DISEASE CONSULT    REASON FOR CONSULT:  Right great toe osteomyelitis associated with diabetic   foot ulceration.    CONSULTING PHYSICIAN:  Dr. Alejo and Dr. Cook.    HISTORY OF PRESENT ILLNESS:  This is a 67-year-old white male, who was   originally admitted to hospital on 10/17/2017 due to obstructive uropathy and   acute-on-chronic renal failure.  He was ultimately found also to have a right   great toe plantar ulceration.  He is somewhat vague historian but does state   the ulcer has been there for \"months\" but was unable to give any additional   details about care or prior antimicrobial therapy.  He is currently having no   fever, chills, nausea, vomiting, or diarrhea.  He is receiving Unasyn and   infectious disease is consulted for antibiotic recommendations and management.    He is planned for surgery today.  He continues to have acute kidney injury   with creatinines running about 2.3-2.7.  He denies any current fever, chills,   nausea, vomiting, diarrhea, or other complaints.    REVIEW OF SYSTEMS:  Negative except for stated in the HPI.    ALLERGIES:  HE HAS ALLERGY TO CEFTRIAXONE, FLAGYL, AND VANCOMYCIN WITH UNKNOWN   REACTIONS.  HE IS INTOLERANT TO MULTIPLE OTHER MEDS.    PAST MEDICAL HISTORY:  Osteoarthritis, Crohn's disease, chronic kidney injury,   BPH, diabetes, COPD.    FAMILY HISTORY:  Denied.    SOCIAL HISTORY:  He is a smoker, but he quit 4 years ago.  Denies alcohol or   illicit drug use.  HEENT:  Normocephalic, atraumatic.  Pupils are equal, round, and reactive to   light.  Extraocular movements intact.  Oropharynx clear.  NECK:  Supple.  CARDIOVASCULAR:  Regular rate and rhythm.  CHEST:  Grossly clear to auscultation bilaterally, unlabored.  ABDOMEN:  Obese, soft, nontender.  There is an ostomy in place.  EXTREMITIES:  Show no cyanosis or clubbing.  He does have bilateral lower   extremity edema.  Limbs are warm.  He has a plantar ulceration " of the right   great toe.  There is minimal surrounding erythema.  There is small amount of   serous drainage.  NEUROLOGIC:  He is alert, oriented.  Speech is fluent without dysarthria.    Cranial nerves are intact.    LABORATORY DATA:  Current labs show white blood cell count of 15.9, H and H of   10.9 and 33.5, platelets of 383.  Sodium 135, potassium 3.5, chloride 107,   bicarbonate 18, glucose 143, BUN 35, creatinine 2.71.  Blood cultures x2 were   negative.  Culture from the foot wound is growing diphtheroids and skin radha.    MRI showed osteomyelitis of the first distal phalanx with associated   cellulitis and ulceration from the 22nd.    ASSESSMENT AND PLAN:  A 67-year-old male with history of Crohn's disease,   originally admitted for acute-on-chronic renal failure and continues to have   decreased renal function.  Because of his poor renal function, we will need to   dose adjust Unasyn.  He does have obstructive uropathy.  He has been refusing   Humphrey catheter.  Urology is following.  He is getting BPH meds.  His diabetic   foot ulcer does have associated osteomyelitis.  He is planned for amputation   today.  Depending on extent of surgery, we will determine duration of   antibiotics.  We will continue the Unasyn for now.  Bone culture has been   requested.  His cellulitis has improved significantly.  He does have Crohn's   disease.  He is receiving immunomodulatory therapy, so he is immunocompromised   and is at risk for delayed wound healing as well as wound infection.  Further   recommendations per culture results and clinical course.  Discussed with limb   preservation service and internal medicine.    Thank you and we will follow with you.       ____________________________________     MD SINCERE JOHNSON / OLIMPIA    DD:  10/25/2017 13:09:07  DT:  10/25/2017 16:08:54    D#:  [id number]  Job#:  [id number]

## 2017-10-25 NOTE — CARE PLAN
Problem: Communication  Goal: The ability to communicate needs accurately and effectively will improve    Intervention: Educate patient and significant other/support system about the plan of care, procedures, treatments, medications and allow for questions   10/24/17 8447   OTHER   Pt & Family Have Been Educated on Methods Available to Report Concerns Related to Care, Treatment, Services, and Patient Safety Issues Yes   Toe amputaion in AM       Problem: Pain Management  Goal: Pain level will decrease to patient's comfort goal  Prn medication provided as needed

## 2017-10-25 NOTE — PROGRESS NOTES
LIMB PRESERVATION SERVICE     68 y/o male with history of DM2, colostomy, OA, BPH, renal insufficiency, COPD. Transfer from outside hopsital for obstructive uropathy and acute on chronic renal injury. Also presented with R great toe ulcer    Xray R foot: -OM  MRI R foot:   First distal phalanx subarticular fatty marrow replacement is at the deep margin of skin ulceration indicating this likely represents osteomyelitis    MRI results discussed with Dr. Rangel.  Options include wound care + abx, or amputation of toe.    Discussed options with patient.  He would like to proceed with amputation    Will try to arrange for surgery tomorrow    NPO at MN

## 2017-10-25 NOTE — CONSULTS
"10/25/2017    Shola Hoyt is a 67 y.o. male who presents with great toe infection and necrosis and is here for operative management. Patient denies numbness, parasthesias, loss of concousness or other trauma    Past Medical History:   Diagnosis Date   • Arthritis    • Benign prostate hyperplasia    • Colostomy in place (CMS-HCC)    • COPD (chronic obstructive pulmonary disease) (CMS-HCC)    • Diabetes (CMS-HCC)    • Renal disorder        No past surgical history on file.    Medications  No current facility-administered medications on file prior to encounter.      No current outpatient prescriptions on file prior to encounter.       Allergies  Ceftriaxone; Ezetimibe; Fentanyl; Flagyl [metronidazole]; Infliximab; Lovastatin; Simvastatin; and Vancomycin    ROS  Right great to infection. All other systems were reviewed and found to be negative    No family history on file.    Social History     Social History   • Marital status:      Spouse name: N/A   • Number of children: N/A   • Years of education: N/A     Social History Main Topics   • Smoking status: Not on file   • Smokeless tobacco: Not on file   • Alcohol use Not on file   • Drug use: Unknown   • Sexual activity: Not on file     Other Topics Concern   • Not on file     Social History Narrative   • No narrative on file       Physical Exam  Vitals  Blood pressure 150/77, pulse 63, temperature 36.1 °C (97 °F), resp. rate 17, height 1.803 m (5' 11\"), weight 113.8 kg (250 lb 14.1 oz), SpO2 97 %.  General: Well Developed, Well Nourished, no acute distress  HEENT: Normocephalic, atraumatic  Eyes: Anicteric, PERRLA, EOMI  Neck: Supple, nontender, no masses  Lungs: CTA, no wheezes or crackles  Heart: RRR, no murmurs, rubs or gallops  Abdomen: Soft, NT, ND  Pelvis: Stable to AP and Lateral Compression  Skin: Intact, no open wounds  Extremities: Right great toe infection and necrosis  Neuro: NVI  Vascular: 2+DP/PT, Capillary refill <2 " seconds    Radiographs:  MR-FOOT-W/O RIGHT   Final Result      First distal phalanx subarticular fatty marrow replacement is at the deep margin of skin ulceration indicating this likely represents osteomyelitis      No septic arthropathy is detected      Great toe cellulitis and ulceration      Mcrae's foot configuration with second metatarsal head flattening and mild edema, compatible with Freiberg's infraction      Moderate first metatarsal-phalangeal osteoarthritis with probable mild hallux valgus deformity. Metatarsal head erosions could reflect gout      DX-FOOT-2- RIGHT   Final Result      1.  No evidence of fracture or dislocation.      2.  Soft tissue swelling could indicate cellulitis.      3.  No bone erosions identified.      US-RENAL   Final Result      Nonspecific bilateral hydronephrosis, similar in degree to that seen on CT of 10/18.      DX-CHEST-PORTABLE (1 VIEW)   Final Result         Right IJ catheter with tip in the right brachiocephalic vein.      CT-ABDOMEN-PELVIS W/O   Final Result         1. Moderate dilatation of the bilateral renal collecting systems. No urinary tract calculus..      2. Diffuse thickening of the urinary bladder wall. The urinary bladder is decompressed by Humphrey catheter.      3. Cholelithiasis.          Laboratory Values  Recent Labs      10/23/17   0441  10/24/17   0400  10/25/17   0043   WBC  15.5*  16.7*  15.9*   RBC  3.70*  3.70*  3.68*   HEMOGLOBIN  11.1*  11.4*  10.9*   HEMATOCRIT  33.9*  33.7*  33.5*   MCV  91.6  91.1  91.0   MCH  30.0  30.8  29.6   MCHC  32.7*  33.8  32.5*   RDW  45.6  44.8  45.4   PLATELETCT  452*  409  383   MPV  9.9  9.6  9.6     Recent Labs      10/23/17   0441  10/24/17   0400  10/25/17   0043   SODIUM  136  137  135   POTASSIUM  3.5*  3.5*  3.5*   CHLORIDE  108  107  107   CO2  19*  19*  18*   GLUCOSE  119*  120*  143*   BUN  29*  34*  35*             Impression: Necrotic right great toe    Plan:Operative intervention. Risks and benefits of  surgery were discussed which include but are not limited to bleeding, infection, neurovascular damage, malunion, nonunion, instability, limb length discrepancy, DVT, PE, MI, Stroke and death. They understand these risks and wish to proceed.

## 2017-10-25 NOTE — PROGRESS NOTES
LIMB PRESERVATION SERVICE       Surgery later today with Dr. Alejo for a right great toe amputation    Pt has been NPO since MN

## 2017-10-25 NOTE — PROGRESS NOTES
[facility] Hospitalist Progress Note    Date of Service: 10/25/2017    Chief Complaint  67 y.o. male admitted 10/17/2017 with admitted to outside hospital with abd pain, foot ulcer and oliguria noted to have obstructive uropathy and acute on chronic kidney injury. left first toe ulcer, found to have urosepsis.     Interval Problem Update  Diabetic foot ulcer-going for amputation today. Remains afebrile and tolerating IV abx's without issue. Denies any new pain or secretions.    Urine issues-uop remains ok, and denies any current pain with urination at this moment.No changes noted again.     Consultants/Specialty  Urology  ID    Disposition  Guarded.         Review of Systems   Constitutional: Negative for fever.   Eyes: Negative for blurred vision.   Respiratory: Negative for shortness of breath.    Cardiovascular: Negative for palpitations.   Gastrointestinal: Negative for nausea and vomiting.   Genitourinary: Negative for dysuria, frequency and urgency.   Musculoskeletal: Positive for joint pain (very mild around the L foot). Negative for back pain and neck pain.        No apparent change     Skin: Negative for rash.   Neurological: Positive for weakness (mild, about the same today). Negative for dizziness and headaches.   Psychiatric/Behavioral: Negative for depression.   All other systems reviewed and are negative.     Physical Exam  Laboratory/Imaging   Hemodynamics  Temp (24hrs), Av.6 °C (97.8 °F), Min:36.1 °C (97 °F), Max:36.8 °C (98.3 °F)   Temperature: 36.4 °C (97.5 °F)  Pulse  Av.8  Min: 63  Max: 103 Heart Rate (Monitored): 65  Blood Pressure : 150/70, NIBP: 122/60      Respiratory      Respiration: 13, Pulse Oximetry: 97 %        RUL Breath Sounds: Clear, RML Breath Sounds: Diminished, RLL Breath Sounds: Diminished, KITTY Breath Sounds: Clear, LLL Breath Sounds: Diminished    Fluids    Intake/Output Summary (Last 24 hours) at 10/25/17 1616  Last data filed at 10/25/17 1110   Gross per 24 hour   Intake               500 ml   Output             1400 ml   Net             -900 ml       Nutrition  Orders Placed This Encounter   Procedures   • DIET NPO     Standing Status:   Standing     Number of Occurrences:   8     Order Specific Question:   Restrict to:     Answer:   Sips with Medications [3]     Physical Exam   Constitutional: He is oriented to person, place, and time. He appears well-developed and well-nourished. No distress.   HENT:   Right Ear: External ear normal.   Left Ear: External ear normal.   Nose: Nose normal.   Eyes: No scleral icterus.   Cardiovascular: Normal rate, normal heart sounds and intact distal pulses.    No murmur heard.  Pulmonary/Chest: Effort normal and breath sounds normal. No stridor. No respiratory distress. He has no wheezes.   Abdominal: Soft. Bowel sounds are normal. He exhibits no distension.   Ostomy bag in place, normal appearing stool in collecting bag  Obese   Musculoskeletal: He exhibits tenderness. He exhibits no edema.   Right great toe ulceration is noted, wrapped in bandage right now, no appreciation of moving proximally   Neurological: He is alert and oriented to person, place, and time.   Skin: Skin is warm and dry. He is not diaphoretic. No erythema.   Ulcer on the lateral tip/plantar surface of the L big toe, bandages in place, no e/o secretions at this time, mild TTP, unchanged again today   Psychiatric: He has a normal mood and affect. His behavior is normal.   Nursing note and vitals reviewed.        Recent Labs      10/23/17   0441  10/24/17   0400  10/25/17   0043   WBC  15.5*  16.7*  15.9*   RBC  3.70*  3.70*  3.68*   HEMOGLOBIN  11.1*  11.4*  10.9*   HEMATOCRIT  33.9*  33.7*  33.5*   MCV  91.6  91.1  91.0   MCH  30.0  30.8  29.6   MCHC  32.7*  33.8  32.5*   RDW  45.6  44.8  45.4   PLATELETCT  452*  409  383   MPV  9.9  9.6  9.6     Recent Labs      10/23/17   0441  10/24/17   0400  10/25/17   0043   SODIUM  136  137  135   POTASSIUM  3.5*  3.5*  3.5*   CHLORIDE   108  107  107   CO2  19*  19*  18*   GLUCOSE  119*  120*  143*   BUN  29*  34*  35*   CREATININE  2.37*  2.45*  2.71*   CALCIUM  9.3  9.5  9.3                      Assessment/Plan     * Acute on chronic kidney failure (CMS-HCC)- (present on admission)   Assessment & Plan    Acute on chronic Likely 2/2 to obstructive uropathy  Ultrasound w/ bilateral hydronephrosis noted on outside hospital records likely 2/2 to distal bladder outlet obstruction  -uop remains ok, refuses harrington, urology is following  -continue current BPH meds, tolerating well at this time, no changes planned today, monitor Cr level very closely          Obstructive uropathy- (present on admission)   Assessment & Plan    -refuses harrington, uop remains adequate at this time        Diabetic foot ulcer (CMS-HCC)- (present on admission)   Assessment & Plan    -MRI of the foot done here shows osteomyelitis, LPS following  -ID following, going for amputation today  -ON iv unasyn, however, WBC continues to up-trend slowly, afebrile  -Skin culture, shows mixed skin radha, as expected        COPD (chronic obstructive pulmonary disease) (CMS-HCC)- (present on admission)   Assessment & Plan    No exacerbation at the moment  Known copd will resume symbicort, continue BD's PRN, no changes planned currently  Rt protocol.         Subacute osteomyelitis of left foot (CMS-HCC)- (present on admission)   Assessment & Plan    -undergoing amputation today, ID consulted, continue current IV abx's as outlined above  -depending on surgical margins will determine course of abx's        HTN (hypertension)- (present on admission)   Assessment & Plan    -continue hydralazine, well controlled currently        Anemia- (present on admission)   Assessment & Plan    -Fe studies are ok, monitor closely        Severe sepsis (CMS-HCC)- (present on admission)   Assessment & Plan    This is severe sepsis with the following associated acute organ dysfunction(s): acute kidney failure.   -is  improving, see above          BPH (benign prostatic hyperplasia)- (present on admission)   Assessment & Plan    -well tolerated, continue below medications without changes at this time:  ON FINASTRIDE  ON FLOMAX        Osteoarthritis- (present on admission)   Assessment & Plan    Chronic, on morphine at home will continue        Crohn's disease (CMS-HCC)- (present on admission)   Assessment & Plan    On humira, injection bimonthly last injection 1 week ago  -hold this currently          Diabetes (CMS-HCC)- (present on admission)   Assessment & Plan    SSI, sugars remain well controlled, monitor closely, adjust PRN  HEM A1c OF 7.4        Renal failure- (present on admission)   Assessment & Plan    -2/2 obstructive uropathy, as outlined above              Humphrey catheter::  No Humphrey  DVT prophylaxis pharmacological::  Heparin

## 2017-10-25 NOTE — DOCUMENTATION QUERY
DOCUMENTATION QUERY    PROVIDERS: Please select “Cosign w/ note”to reply to query.    Dear Dr. Dunn,     Please review this documentation query and reply with an addendum/note. You previously only co-signed the query, which does not address the needed clarification.     To better represent the severity of illness of your patient, please review the following information and exercise your independent professional judgment in responding to this query.     Sepsis is documented in the Progress Notes. Based upon the clinical findings, risk factors, and treatment, please specify if this condition was present on admission or developed after admission?    • Sepsis was present on admission  • Sepsis developed after admission  • Unable to determine    The medical record reflects the following:   Clinical Findings Per H&P:   Chief Complaint  Patient admitted to outside hospital with abd pain, foot ulcer and oliguria noted to have obstructive uropathy and acute on chronic kidney injury.   Admit Vitals: T98.1, P88, RR 18, 150/73, 96% on RA     Per Progress Notes 10/18:  Severe sepsis (CMS-[facility])  This is severe sepsis with the following associated acute organ dysfunction(s): acute kidney failure. start stat sepsis protocol, transfer to [facility], iv fluids started, antibiotics started. Trend lytes and correct. F.u on cultures. Send statu UA, trend lactic acid    Results Review:   WBC 12.5, 12.2, 13.4/15.5/16.1/15.5  BUN 47  Cr 3.41  GFR 18  UA: Cloudy, Occult Blood-Large, Protein-100, Leukocyte Esterase-Small, WBC 20-50, RBC >150, bacteria-negative  Right Foot Wound Cx: Diphtheroids Heavy growth   Treatment Sepsis protocol, IV NS bolus, central line placement, transfer to telemetry, & IV Zosyn & Unasyn   Risk Factors Age, diabetic foot ulcer, bilateral hydronephrosis, obstructive uropathy, BPH, HTN, acute kidney injury, DM2, & severe sepsis   Location within medical record History & Physical, Progress Notes, & Lab Results      Thank  you,   Ny Deras RN  Clinical   Phone [phone]

## 2017-10-25 NOTE — CARE PLAN
Problem: Safety  Goal: Will remain free from injury  Outcome: PROGRESSING AS EXPECTED  Patient remained free from falls or injury during shift. Universal fall precautions maintained.    Problem: Infection  Goal: Will remain free from infection  Outcome: PROGRESSING AS EXPECTED  Patient remained free of new signs or symptoms of infection during shift. ABX administered per MD order.

## 2017-10-26 ENCOUNTER — PATIENT OUTREACH (OUTPATIENT)
Dept: HEALTH INFORMATION MANAGEMENT | Facility: OTHER | Age: 67
End: 2017-10-26

## 2017-10-26 LAB
BASOPHILS # BLD AUTO: 0.3 % (ref 0–1.8)
BASOPHILS # BLD: 0.06 K/UL (ref 0–0.12)
EOSINOPHIL # BLD AUTO: 0.42 K/UL (ref 0–0.51)
EOSINOPHIL NFR BLD: 2.3 % (ref 0–6.9)
ERYTHROCYTE [DISTWIDTH] IN BLOOD BY AUTOMATED COUNT: 46 FL (ref 35.9–50)
GLUCOSE BLD-MCNC: 116 MG/DL (ref 65–99)
GLUCOSE BLD-MCNC: 143 MG/DL (ref 65–99)
GRAM STN SPEC: ABNORMAL
HCT VFR BLD AUTO: 33.2 % (ref 42–52)
HGB BLD-MCNC: 11 G/DL (ref 14–18)
IMM GRANULOCYTES # BLD AUTO: 0.09 K/UL (ref 0–0.11)
IMM GRANULOCYTES NFR BLD AUTO: 0.5 % (ref 0–0.9)
LYMPHOCYTES # BLD AUTO: 3.31 K/UL (ref 1–4.8)
LYMPHOCYTES NFR BLD: 18.4 % (ref 22–41)
MCH RBC QN AUTO: 30.1 PG (ref 27–33)
MCHC RBC AUTO-ENTMCNC: 33.1 G/DL (ref 33.7–35.3)
MCV RBC AUTO: 91 FL (ref 81.4–97.8)
MONOCYTES # BLD AUTO: 1.07 K/UL (ref 0–0.85)
MONOCYTES NFR BLD AUTO: 5.9 % (ref 0–13.4)
NEUTROPHILS # BLD AUTO: 13.05 K/UL (ref 1.82–7.42)
NEUTROPHILS NFR BLD: 72.6 % (ref 44–72)
NRBC # BLD AUTO: 0 K/UL
NRBC BLD AUTO-RTO: 0 /100 WBC
PLATELET # BLD AUTO: 353 K/UL (ref 164–446)
PMV BLD AUTO: 9.7 FL (ref 9–12.9)
RBC # BLD AUTO: 3.65 M/UL (ref 4.7–6.1)
SIGNIFICANT IND 70042: ABNORMAL
SITE SITE: ABNORMAL
SOURCE SOURCE: ABNORMAL
WBC # BLD AUTO: 18 K/UL (ref 4.8–10.8)

## 2017-10-26 PROCEDURE — A9270 NON-COVERED ITEM OR SERVICE: HCPCS | Performed by: INTERNAL MEDICINE

## 2017-10-26 PROCEDURE — 700102 HCHG RX REV CODE 250 W/ 637 OVERRIDE(OP): Performed by: NURSE PRACTITIONER

## 2017-10-26 PROCEDURE — 700105 HCHG RX REV CODE 258: Performed by: INTERNAL MEDICINE

## 2017-10-26 PROCEDURE — 85025 COMPLETE CBC W/AUTO DIFF WBC: CPT

## 2017-10-26 PROCEDURE — 700111 HCHG RX REV CODE 636 W/ 250 OVERRIDE (IP): Performed by: INTERNAL MEDICINE

## 2017-10-26 PROCEDURE — 700111 HCHG RX REV CODE 636 W/ 250 OVERRIDE (IP): Performed by: FAMILY MEDICINE

## 2017-10-26 PROCEDURE — 51798 US URINE CAPACITY MEASURE: CPT

## 2017-10-26 PROCEDURE — 770006 HCHG ROOM/CARE - MED/SURG/GYN SEMI*

## 2017-10-26 PROCEDURE — A9270 NON-COVERED ITEM OR SERVICE: HCPCS | Performed by: NURSE PRACTITIONER

## 2017-10-26 PROCEDURE — A9270 NON-COVERED ITEM OR SERVICE: HCPCS | Performed by: FAMILY MEDICINE

## 2017-10-26 PROCEDURE — 82962 GLUCOSE BLOOD TEST: CPT | Mod: 91

## 2017-10-26 PROCEDURE — 700102 HCHG RX REV CODE 250 W/ 637 OVERRIDE(OP): Performed by: FAMILY MEDICINE

## 2017-10-26 PROCEDURE — 700102 HCHG RX REV CODE 250 W/ 637 OVERRIDE(OP): Performed by: INTERNAL MEDICINE

## 2017-10-26 PROCEDURE — 99232 SBSQ HOSP IP/OBS MODERATE 35: CPT | Performed by: INTERNAL MEDICINE

## 2017-10-26 PROCEDURE — 302255 BARRIER CREAM MOISTURE BAZA PROTECT (ZINC) 5OZ: Performed by: INTERNAL MEDICINE

## 2017-10-26 RX ORDER — OXYCODONE HYDROCHLORIDE 5 MG/1
5 TABLET ORAL EVERY 4 HOURS PRN
Status: DISCONTINUED | OUTPATIENT
Start: 2017-10-26 | End: 2017-10-28 | Stop reason: HOSPADM

## 2017-10-26 RX ORDER — ACETAMINOPHEN 500 MG
500 TABLET ORAL EVERY 6 HOURS
Status: DISCONTINUED | OUTPATIENT
Start: 2017-10-26 | End: 2017-10-28 | Stop reason: HOSPADM

## 2017-10-26 RX ORDER — MORPHINE SULFATE 100 MG/5ML
15 SOLUTION ORAL EVERY 6 HOURS PRN
Status: DISCONTINUED | OUTPATIENT
Start: 2017-10-26 | End: 2017-10-28 | Stop reason: HOSPADM

## 2017-10-26 RX ORDER — GABAPENTIN 100 MG/1
200 CAPSULE ORAL EVERY EVENING
Status: DISCONTINUED | OUTPATIENT
Start: 2017-10-26 | End: 2017-10-28 | Stop reason: HOSPADM

## 2017-10-26 RX ADMIN — MORPHINE SULFATE 15 MG: 100 SOLUTION ORAL at 21:28

## 2017-10-26 RX ADMIN — GABAPENTIN 200 MG: 100 CAPSULE ORAL at 21:28

## 2017-10-26 RX ADMIN — MORPHINE SULFATE 15 MG: 100 SOLUTION ORAL at 02:01

## 2017-10-26 RX ADMIN — MORPHINE SULFATE 15 MG: 100 SOLUTION ORAL at 09:08

## 2017-10-26 RX ADMIN — HEPARIN SODIUM 5000 UNITS: 5000 INJECTION, SOLUTION INTRAVENOUS; SUBCUTANEOUS at 09:07

## 2017-10-26 RX ADMIN — INSULIN LISPRO 2 UNITS: 100 INJECTION, SOLUTION INTRAVENOUS; SUBCUTANEOUS at 21:32

## 2017-10-26 RX ADMIN — BUDESONIDE AND FORMOTEROL FUMARATE DIHYDRATE 2 PUFF: 160; 4.5 AEROSOL RESPIRATORY (INHALATION) at 09:08

## 2017-10-26 RX ADMIN — HYDRALAZINE HYDROCHLORIDE 50 MG: 50 TABLET, FILM COATED ORAL at 09:06

## 2017-10-26 RX ADMIN — BUDESONIDE AND FORMOTEROL FUMARATE DIHYDRATE 2 PUFF: 160; 4.5 AEROSOL RESPIRATORY (INHALATION) at 21:28

## 2017-10-26 RX ADMIN — MORPHINE SULFATE 15 MG: 100 SOLUTION ORAL at 15:09

## 2017-10-26 RX ADMIN — NYSTATIN: 100000 POWDER TOPICAL at 15:00

## 2017-10-26 RX ADMIN — NYSTATIN 1500000 UNITS: 100000 POWDER TOPICAL at 21:28

## 2017-10-26 RX ADMIN — OXYCODONE HYDROCHLORIDE 5 MG: 5 TABLET ORAL at 12:29

## 2017-10-26 RX ADMIN — AMPICILLIN SODIUM AND SULBACTAM SODIUM 3 G: 2; 1 INJECTION, POWDER, FOR SOLUTION INTRAMUSCULAR; INTRAVENOUS at 15:07

## 2017-10-26 RX ADMIN — HEPARIN SODIUM 5000 UNITS: 5000 INJECTION, SOLUTION INTRAVENOUS; SUBCUTANEOUS at 21:28

## 2017-10-26 RX ADMIN — TAMSULOSIN HYDROCHLORIDE 0.4 MG: 0.4 CAPSULE ORAL at 19:14

## 2017-10-26 RX ADMIN — ACETAMINOPHEN 500 MG: 500 TABLET ORAL at 12:29

## 2017-10-26 RX ADMIN — HYDRALAZINE HYDROCHLORIDE 50 MG: 50 TABLET, FILM COATED ORAL at 21:28

## 2017-10-26 RX ADMIN — AMPICILLIN SODIUM AND SULBACTAM SODIUM 1.5 G: 1; .5 INJECTION, POWDER, FOR SOLUTION INTRAMUSCULAR; INTRAVENOUS at 12:22

## 2017-10-26 RX ADMIN — FINASTERIDE 5 MG: 5 TABLET, FILM COATED ORAL at 09:10

## 2017-10-26 RX ADMIN — OXYCODONE HYDROCHLORIDE 5 MG: 5 TABLET ORAL at 19:14

## 2017-10-26 RX ADMIN — AMPICILLIN SODIUM AND SULBACTAM SODIUM 1.5 G: 1; .5 INJECTION, POWDER, FOR SOLUTION INTRAMUSCULAR; INTRAVENOUS at 05:50

## 2017-10-26 RX ADMIN — NYSTATIN 1500000 UNITS: 100000 POWDER TOPICAL at 09:07

## 2017-10-26 RX ADMIN — NYSTATIN 1500000 UNITS: 100000 POWDER TOPICAL at 01:30

## 2017-10-26 RX ADMIN — ACETAMINOPHEN 500 MG: 500 TABLET ORAL at 19:14

## 2017-10-26 ASSESSMENT — ENCOUNTER SYMPTOMS
SHORTNESS OF BREATH: 0
DIARRHEA: 0
DEPRESSION: 0
NAUSEA: 0
VOMITING: 0
FEVER: 0
WEAKNESS: 1
ABDOMINAL PAIN: 0
BACK PAIN: 0
NECK PAIN: 0
HEADACHES: 0
BLURRED VISION: 0
DIZZINESS: 0
CHILLS: 0

## 2017-10-26 ASSESSMENT — PAIN SCALES - GENERAL
PAINLEVEL_OUTOF10: 7
PAINLEVEL_OUTOF10: 7
PAINLEVEL_OUTOF10: 4
PAINLEVEL_OUTOF10: 7
PAINLEVEL_OUTOF10: 10
PAINLEVEL_OUTOF10: 9
PAINLEVEL_OUTOF10: 7
PAINLEVEL_OUTOF10: 7

## 2017-10-26 NOTE — CARE PLAN
Problem: Safety  Goal: Will remain free from injury  Outcome: PROGRESSING AS EXPECTED  Bed is in lowest position and locked. Pt educated on fall risk, but still refuses bed alarm. Call bell within reach. Pt encouraged to call for assistance.    Problem: Venous Thromboembolism (VTW)/Deep Vein Thrombosis (DVT) Prevention:  Goal: Patient will participate in Venous Thrombosis (VTE)/Deep Vein Thrombosis (DVT)Prevention Measures    Intervention: Assess and monitor for anticoagulation complications  Pt is receiving subq heparin injections as VTE prophylaxis

## 2017-10-26 NOTE — PROGRESS NOTES
"Patient seen and examined    Blood pressure 120/74, pulse 75, temperature 36.6 °C (97.9 °F), resp. rate 20, height 1.803 m (5' 11\"), weight 113.8 kg (250 lb 14.1 oz), SpO2 98 %.    Recent Labs      10/24/17   0400  10/25/17   0043  10/26/17   0350   WBC  16.7*  15.9*  18.0*   RBC  3.70*  3.68*  3.65*   HEMOGLOBIN  11.4*  10.9*  11.0*   HEMATOCRIT  33.7*  33.5*  33.2*   MCV  91.1  91.0  91.0   MCH  30.8  29.6  30.1   MCHC  33.8  32.5*  33.1*   RDW  44.8  45.4  46.0   PLATELETCT  409  383  353   MPV  9.6  9.6  9.7       No acute distress  Dressing clean dry and intact  Neurovascularly intact    POD#1    Plan:  DVT Prophylaxis- TEDS/SCDs  Weight Bearing Status-wbat  PT/OT  Antibiotics: 24 hrs post op  Case Coordination          "

## 2017-10-26 NOTE — PROGRESS NOTES
Dr Prince notified regarding bladder scan amount. Pt has also agreed (after previously refusing) to have harrington catheter inserted. Order to insert harrington catheter was received from Dr Prince and verified.

## 2017-10-26 NOTE — PROGRESS NOTES
Pt was educated regarding fall risk and need for bed alarm . Pt understands and continues to refuse bed alarm and also refuses to call for assistance when he stands to use the urinal.

## 2017-10-26 NOTE — PROGRESS NOTES
Infectious Disease Progress Note    Author: Rose Barriga M.D. Date & Time of service: 10/26/2017  4:00 PM    Chief Complaint:  DFU/osteomyelitis    Interval History:  67 y.o. male admitted 10/17/2017 with abd pain, foot ulcer and oliguria noted to have obstructive uropathy, acute on chronic kidney injury, and osteomyelitis  10/26 AF WBC 18 s/p amp somnolent  Labs Reviewed, Medications Reviewed, Radiology Reviewed and Wound Reviewed.    Review of Systems:  Review of Systems   Constitutional: Negative for chills and fever.   Gastrointestinal: Negative for abdominal pain, diarrhea, nausea and vomiting.   All other systems reviewed and are negative.      Hemodynamics:  Temp (24hrs), Av.6 °C (97.9 °F), Min:36.2 °C (97.2 °F), Max:37 °C (98.6 °F)  Temperature: 36.3 °C (97.4 °F)  Pulse  Av.3  Min: 61  Max: 103   Blood Pressure : 143/70       Physical Exam:  Physical Exam   Constitutional: He appears well-developed. No distress.   HENT:   Head: Atraumatic.   Neck: Neck supple.   Cardiovascular: Normal rate.    Pulmonary/Chest: Effort normal. No respiratory distress.   Abdominal: Soft. He exhibits no distension. There is no tenderness.   Musculoskeletal:   Right foot dressed   Neurological:   somnolent   Skin: No rash noted.   Nursing note and vitals reviewed.      Meds:    Current Facility-Administered Medications:   •  morphine  •  Influenza Vaccine High-Dose pf  •  acetaminophen  •  oxycodone immediate-release  •  gabapentin  •  ampicillin-sulbactam (UNASYN) IV  •  nystatin  •  hydrALAZINE  •  heparin  •  NS  •  tamsulosin  •  finasteride  •  senna-docusate **AND** polyethylene glycol/lytes **AND** magnesium hydroxide **AND** bisacodyl  •  Respiratory Care per Protocol  •  labetalol  •  ondansetron  •  ondansetron  •  insulin lispro  •  Action is required: Protocol 1073 Hypoglycemia has been implemented **AND** Protocol 1073 Inclusion Criteria **AND** Protocol 1073 NOTIFY **AND** Protocol 1073 Initiate  protocol immediately if FSBG is less than or equal to 70 mg/dL **AND** glucose 4 g **AND** dextrose 50%  •  budesonide-formoterol  •  albuterol    Labs:  Recent Labs      10/24/17   0400  10/25/17   0043  10/26/17   0350   WBC  16.7*  15.9*  18.0*   RBC  3.70*  3.68*  3.65*   HEMOGLOBIN  11.4*  10.9*  11.0*   HEMATOCRIT  33.7*  33.5*  33.2*   MCV  91.1  91.0  91.0   MCH  30.8  29.6  30.1   RDW  44.8  45.4  46.0   PLATELETCT  409  383  353   MPV  9.6  9.6  9.7   NEUTSPOLYS  61.50  62.60  72.60*   LYMPHOCYTES  27.70  25.40  18.40*   MONOCYTES  6.00  6.90  5.90   EOSINOPHILS  3.80  4.10  2.30   BASOPHILS  0.50  0.40  0.30     Recent Labs      10/24/17   0400  10/25/17   0043   SODIUM  137  135   POTASSIUM  3.5*  3.5*   CHLORIDE  107  107   CO2  19*  18*   GLUCOSE  120*  143*   BUN  34*  35*     Recent Labs      10/24/17   0400  10/25/17   0043   CREATININE  2.45*  2.71*       Imaging:  Ct-abdomen-pelvis W/o    Result Date: 10/18/2017  10/18/2017 9:45 AM HISTORY/REASON FOR EXAM:  Nausea and vomiting. TECHNIQUE/EXAM DESCRIPTION: CT scan of the abdomen and pelvis without contrast. Noncontrast helical scanning was obtained from the diaphragmatic domes through the pubic symphysis. Low dose optimization technique was utilized for this CT exam including automated exposure control and adjustment of the mA and/or kV according to patient size. COMPARISON: None. FINDINGS: Lung Bases: No pulmonary nodules at the lung bases. No pleural or pericardial fluid. Abdomen: Within the limits of noncontrast technique, the liver, spleen, pancreas, and adrenal glands are unremarkable in appearance. Moderate dilatation of the bilateral renal collecting systems. Atrophic bilateral kidneys. There is small calculus in the gallbladder.. There is no bowel wall thickening or abnormal dilatation. Right lower quadrant ostomy. Postsurgical change from total colectomy. Moderate atherosclerotic disease of the abdominal aorta and its branches. Pelvis:  Decompressed urinary bladder via Humphrey catheter. Diffuse wall thickening of the urinary bladder. Enlarged prostate. No lymph node enlargement. No free fluid, or free air in the abdomen or pelvis. No aggressive bone lesions are seen. ________________________________    1. Moderate dilatation of the bilateral renal collecting systems. No urinary tract calculus.. 2. Diffuse thickening of the urinary bladder wall. The urinary bladder is decompressed by Humphrey catheter. 3. Cholelithiasis.    Dx-chest-portable (1 View)    Result Date: 10/18/2017  10/18/2017 1:54 PM HISTORY/REASON FOR EXAM:  Right IJ central line placement TECHNIQUE/EXAM DESCRIPTION AND NUMBER OF VIEWS: Single portable view of the chest. COMPARISON: None FINDINGS: Right IJ catheter with tip in the right brachiocephalic vein. The bilateral lung bases are outside the field-of-view. No pulmonary infiltrates or consolidations are noted. No pleural effusion. No pneumothorax. Normal cardiopericardial silhouette.     Right IJ catheter with tip in the right brachiocephalic vein.    Dx-foot-2- Right    Result Date: 10/21/2017  10/21/2017 9:43 AM HISTORY/REASON FOR EXAM:  Swelling of first digit TECHNIQUE/EXAM DESCRIPTION AND NUMBER OF VIEWS: 2 nonweightbearing views of the RIGHT foot. COMPARISON:  No comparison available FINDINGS:  Bone mineralization is normal.  There is no evidence of fracture or dislocation. No focal bone erosion or periosteal reaction is identified. There is mild joint space narrowing and periarticular sclerosis. Soft tissue swelling is noted inferiorly in the first digit.     1.  No evidence of fracture or dislocation. 2.  Soft tissue swelling could indicate cellulitis. 3.  No bone erosions identified.    Mr-foot-w/o Right    Result Date: 10/23/2017  10/22/2017 7:21 PM HISTORY/REASON FOR EXAM:  Great toe infection, GFR 24 TECHNIQUE/EXAM DESCRIPTION: MRI of the RIGHT foot without contrast. Using a Acquaintable 1.5 Rach MRI scanner, T1 axial,  sagittal, and coronal, fast spin-echo T2 fat-suppressed axial and coronal, and fast inversion recovery sagittal images were obtained. COMPARISON: Radiographs October 21 FINDINGS: Due to low GFR, contrast is not used to help with the study Over the plantar medial great toe there is moderate depth skin ulceration with adjacent subcutaneous fatty replacement. The underlying distal phalanx displays decreased T1 and increased T2 signal over a 10 mm region. There is no abnormal joint effusion. There is no marginal erosion. The lateral aspect of the interphalangeal joints subarticular margins are normal in appearance. No other marrow edema to suggest septic arthropathy. The proximal phalanx has normal signal and morphology. There is mild first metatarsal-phalangeal centered spurring and there is a large medial first metatarsal head erosion proximal to the articular surface. There are is also some dorsal erosive change Review of the second metatarsal head shows slight flattening and edema along the plantar distal margin. Mcrae's foot configuration Otherwise normal marrow signal Ligaments/tendons: No evidence of ligament or tendon injury.     First distal phalanx subarticular fatty marrow replacement is at the deep margin of skin ulceration indicating this likely represents osteomyelitis No septic arthropathy is detected Great toe cellulitis and ulceration Mcrae's foot configuration with second metatarsal head flattening and mild edema, compatible with Freiberg's infraction Moderate first metatarsal-phalangeal osteoarthritis with probable mild hallux valgus deformity. Metatarsal head erosions could reflect gout    Us-renal    Result Date: 10/20/2017  10/20/2017 4:18 AM HISTORY/REASON FOR EXAM:  Follow-up hydronephrosis noted on CT of 10/18/2017 TECHNIQUE/EXAM DESCRIPTION: Renal ultrasound. COMPARISON:  As above. FINDINGS: The right kidney measures 10.78 cm. The left kidney measures 11.13 cm. There is moderate bilateral  "hydronephrosis, consistent with the CT appearance. There are no intrarenal calcifications. The bladder is decompressed around a Humphrey catheter.     Nonspecific bilateral hydronephrosis, similar in degree to that seen on CT of 10/18.      Micro:  Results     Procedure Component Value Units Date/Time    CULTURE TISSUE W/ GRM STAIN [[id number]]  (Abnormal) Collected:  10/25/17 1100    Order Status:  Completed Specimen:  Tissue Updated:  10/26/17 1459     Significant Indicator NEG     Source TISS     Site Right Great Toe     Tissue Culture Culture in progress.     Gram Stain Result Rare Gram positive cocci. (A)    ANAEROBIC CULTURE [[id number]] Collected:  10/25/17 1100    Order Status:  Completed Specimen:  Tissue Updated:  10/26/17 1459     Significant Indicator NEG     Source TISS     Site Right Great Toe     Anaerobic Culture, Culture Res Culture in progress.    GRAM STAIN [[id number]] Collected:  10/25/17 1100    Order Status:  Completed Specimen:  Tissue Updated:  10/26/17 0826     Significant Indicator .     Source TISS     Site Right Great Toe     Gram Stain Result Rare Gram positive cocci.    Blood Culture [[id number]] Collected:  10/19/17 0204    Order Status:  Completed Specimen:  Blood from Peripheral Updated:  10/24/17 0300     Significant Indicator NEG     Source BLD     Site PERIPHERAL     Blood Culture No growth after 5 days of incubation.    Narrative:       Per Hospital Policy: Only change Specimen Src: to \"Line\" if  specified by physician order.    Blood Culture [[id number]] Collected:  10/19/17 0204    Order Status:  Completed Specimen:  Blood from Peripheral Updated:  10/24/17 0300     Significant Indicator NEG     Source BLD     Site PERIPHERAL     Blood Culture No growth after 5 days of incubation.    Narrative:       Per Hospital Policy: Only change Specimen Src: to \"Line\" if  specified by physician order.    CULTURE WOUND W/ GRAM STAIN [[id number]]  (Abnormal) Collected:  10/18/17 1300    Order " Status:  Completed Specimen:  Wound from Abscess Updated:  10/20/17 1036     Gram Stain Result --     Rare WBCs.  Rare Gram positive cocci.       Significant Indicator POS (POS)     Source WND     Site Foot     Culture Result Wound Rare growth Mixed skin radha. (A)     Culture Result Wound -- (A)     Diphtheroids  Heavy growth      Narrative:       Collected By:[id number] ADAN BROOKS  Foot          Assessment:  Active Hospital Problems    Diagnosis   • *Acute on chronic kidney failure (CMS-HCC) [N17.9, N18.9]   • COPD (chronic obstructive pulmonary disease) (CMS-HCC) [J44.9]   • Diabetic foot ulcer (CMS-HCC) [E11.621, L97.509]   • Obstructive uropathy [N13.9]   • Subacute osteomyelitis of left foot (CMS-HCC) [M86.272]   • HTN (hypertension) [I10]   • Severe sepsis (CMS-HCC) [A41.9, R65.20]   • Anemia [D64.9]   • Diabetes (CMS-HCC) [E11.9]   • Crohn's disease (CMS-HCC) [K50.90]   • Osteoarthritis [M19.90]   • BPH (benign prostatic hyperplasia) [N40.0]   • Renal failure [N19]       Plan:  Osteomyelitis  Afebrile  +leukocytosis  MRI of the foot done here showed osteomyelitis  S/p amp at prox  Likely curative  FU bone/margins  Continue abx for now     Diabetic foot ulcer (CMS-HCC)- (present on admission)  As above    Acute on chronic kidney failure (CMS-HCC)- (present on admission)  2/2 to obstructive uropathy  Ultrasound w/ bilateral hydronephrosis noted on outside hospital records likely 2/2 to distal bladder outlet obstruction  Refused Humphrey  Dose adjust abx as needed     Crohn's disease (CMS-HCC)- (present on admission)  On humira, injection bimonthly last injection 1 week ago  On hold  May be slow to heal    DUANE IM DR Cook

## 2017-10-26 NOTE — PROGRESS NOTES
[facility] Hospitalist Progress Note    Date of Service: 10/26/2017    Chief Complaint  67 y.o. male admitted 10/17/2017 with admitted to outside hospital with abd pain, foot ulcer and oliguria noted to have obstructive uropathy and acute on chronic kidney injury. left first toe ulcer, found to have urosepsis.     Interval Problem Update  Diabetic foot ulcer-s/p amputation, doing well, mild throbbing pain around the surgical site.    Urine issues-harrington replaced last night, very painful, but good uop, no blood in the urine.     Consultants/Specialty  Urology  ID    Disposition  Guarded.         Review of Systems   Constitutional: Negative for fever.   HENT: Negative for hearing loss.    Eyes: Negative for blurred vision.   Respiratory: Negative for shortness of breath.    Cardiovascular: Negative for chest pain.   Gastrointestinal: Negative for abdominal pain.   Genitourinary: Negative for dysuria and hematuria.        Penile pain with harrington catheter   Musculoskeletal: Positive for joint pain (very mild around the L foot). Negative for back pain and neck pain.        Increased throbbing pain today     Skin: Negative for itching.   Neurological: Positive for weakness (mild, a little better today). Negative for dizziness and headaches.   Psychiatric/Behavioral: Negative for depression.   All other systems reviewed and are negative.     Physical Exam  Laboratory/Imaging   Hemodynamics  Temp (24hrs), Av.6 °C (97.9 °F), Min:36.2 °C (97.2 °F), Max:37 °C (98.6 °F)   Temperature: 36.3 °C (97.4 °F)  Pulse  Av.3  Min: 61  Max: 103    Blood Pressure : 143/70      Respiratory      Respiration: 19, Pulse Oximetry: 95 %        RUL Breath Sounds: Clear, RML Breath Sounds: Crackles, RLL Breath Sounds: Diminished, KITTY Breath Sounds: Clear, LLL Breath Sounds: Diminished    Fluids    Intake/Output Summary (Last 24 hours) at 10/26/17 1450  Last data filed at 10/26/17 1200   Gross per 24 hour   Intake              440 ml   Output              3000 ml   Net            -2560 ml       Nutrition  Orders Placed This Encounter   Procedures   • DIET ORDER     Standing Status:   Standing     Number of Occurrences:   1     Order Specific Question:   Diet:     Answer:   Diabetic [3]     Physical Exam   Constitutional: He is oriented to person, place, and time. He appears well-developed and well-nourished. No distress.   HENT:   Right Ear: External ear normal.   Left Ear: External ear normal.   Nose: Nose normal.   Eyes: Right eye exhibits no discharge. Left eye exhibits no discharge.   Cardiovascular: Normal rate, normal heart sounds and intact distal pulses.  Exam reveals no gallop.    Pulmonary/Chest: Effort normal and breath sounds normal. No stridor. No respiratory distress.   Abdominal: Soft. Bowel sounds are normal. There is no tenderness.   Ostomy bag in place, normal appearing stool in collecting bag  Obese, no changes today   Genitourinary:   Genitourinary Comments: Humphrey catheter in place, yellow urine in bag, 0.5 cm separation of the urethral meatus on the ventral shaft of the penis, chronic   Musculoskeletal: He exhibits tenderness. He exhibits no edema.   Right great toe ulceration is noted, wrapped in bandage right now, no appreciation of moving proximally, moderate TTP   Neurological: He is alert and oriented to person, place, and time.   Skin: Skin is warm and dry. Rash (candida rash inguinal/scrotal area) noted. He is not diaphoretic. There is erythema.   Ulcer on the lateral tip/plantar surface of the L big toe, bandages in place, no e/o secretions at this time, mild TTP, unchanged again today   Psychiatric: He has a normal mood and affect. His behavior is normal.   Nursing note and vitals reviewed.        Recent Labs      10/24/17   0400  10/25/17   0043  10/26/17   0350   WBC  16.7*  15.9*  18.0*   RBC  3.70*  3.68*  3.65*   HEMOGLOBIN  11.4*  10.9*  11.0*   HEMATOCRIT  33.7*  33.5*  33.2*   MCV  91.1  91.0  91.0   MCH  30.8  29.6   30.1   MCHC  33.8  32.5*  33.1*   RDW  44.8  45.4  46.0   PLATELETCT  409  383  353   MPV  9.6  9.6  9.7     Recent Labs      10/24/17   0400  10/25/17   0043   SODIUM  137  135   POTASSIUM  3.5*  3.5*   CHLORIDE  107  107   CO2  19*  18*   GLUCOSE  120*  143*   BUN  34*  35*   CREATININE  2.45*  2.71*   CALCIUM  9.5  9.3                      Assessment/Plan     * Acute on chronic kidney failure (CMS-HCC)- (present on admission)   Assessment & Plan    Acute on chronic Likely 2/2 to obstructive uropathy  Ultrasound w/ bilateral hydronephrosis noted on outside hospital records likely 2/2 to distal bladder outlet obstruction  -uop remains ok, harrington is back in now, mild pain, but appears to be functioning well, told patient this needs to remain until f/u with urology outpatient, repeat Cr tomorrow AM  -continue current BPH meds, tolerating well at this time, no changes planned today, monitor Cr level very closely          Obstructive uropathy- (present on admission)   Assessment & Plan    -refuses harrington, uop remains adequate at this time        Diabetic foot ulcer (CMS-HCC)- (present on admission)   Assessment & Plan    -MRI of the foot done here shows osteomyelitis, LPS following  -ID following, s/p amputation POD#1, mild pain, as expected, will check for clear surgical margins  -ON iv unasyn, however, WBC continues to up-trend slowly, afebrile  -Skin culture, shows mixed skin radha, as expected        COPD (chronic obstructive pulmonary disease) (CMS-HCC)- (present on admission)   Assessment & Plan    No exacerbation at the moment  Known copd will resume symbicort, continue BD's PRN, no changes planned currently  Rt protocol.         Subacute osteomyelitis of left foot (CMS-HCC)- (present on admission)   Assessment & Plan    -undergoing amputation POD#1, ID consulted, continue current IV abx's as outlined above  -depending on surgical margins will determine course of abx's        HTN (hypertension)- (present on  admission)   Assessment & Plan    -continue hydralazine, well controlled currently        Anemia- (present on admission)   Assessment & Plan    -Fe studies are ok, monitor closely        Severe sepsis (CMS-HCC)- (present on admission)   Assessment & Plan    This is severe sepsis with the following associated acute organ dysfunction(s): acute kidney failure.   -is improving, see above          BPH (benign prostatic hyperplasia)- (present on admission)   Assessment & Plan    -well tolerated, continue below medications without changes at this time:  ON FINASTRIDE  ON FLOMAX        Osteoarthritis- (present on admission)   Assessment & Plan    Chronic, on morphine at home will continue        Crohn's disease (CMS-HCC)- (present on admission)   Assessment & Plan    On humira, injection bimonthly last injection 1 week ago  -hold this currently          Diabetes (CMS-HCC)- (present on admission)   Assessment & Plan    SSI, sugars remain well controlled, monitor closely, adjust PRN  HEM A1c OF 7.4        Renal failure- (present on admission)   Assessment & Plan    -2/2 obstructive uropathy, as outlined above              Humphrey catheter::  No Humphrey  DVT prophylaxis pharmacological::  Heparin

## 2017-10-26 NOTE — PROGRESS NOTES
"LIMB PRESERVATION SERVICE      66 y/o male with history of DM2, colostomy, OA, BPH, renal insufficiency, COPD. Transfer from outside [facility]""tal for obstructive uropathy and acute on chronic renal injury. Also presented with R great toe ulcer     Xray R foot: -OM  MRI R foot:   First distal phalanx subarticular fatty marrow replacement is at the deep margin of skin ulceration indicating this likely represents osteomyelitis    POD #1 s/p R great toe amputation  /70   Pulse 72   Temp 36.3 °C (97.4 °F)   Resp 19   Ht 1.803 m (5' 11\") Comment: Per pt  Wt 116.9 kg (257 lb 11.5 oz)   SpO2 95%   BMI 35.94 kg/m²  Blood glucose 143  Pt denies fevers, chills, n/v    Recent Labs      10/24/17   0400  10/25/17   0043  10/26/17   0350   WBC  16.7*  15.9*  18.0*   RBC  3.70*  3.68*  3.65*   HEMOGLOBIN  11.4*  10.9*  11.0*   HEMATOCRIT  33.7*  33.5*  33.2*   MCV  91.1  91.0  91.0   MCH  30.8  29.6  30.1   RDW  44.8  45.4  46.0   PLATELETCT  409  383  353   MPV  9.6  9.6  9.7   NEUTSPOLYS  61.50  62.60  72.60*   LYMPHOCYTES  27.70  25.40  18.40*   MONOCYTES  6.00  6.90  5.90   EOSINOPHILS  3.80  4.10  2.30   BASOPHILS  0.50  0.40  0.30       Surgical dressing changed.  Sutures intact, incision well approximated.  Dried blood along suture line.  Erythema radiating ~ 2.5 cm from incision.    Infectious diseases managing abx  Op cultures pending    Offloading boot vs offloading shoe discussed.  Pt has chronic back and knee pain which would likely be exacerbated by boot.  Will use shoe    PLAN  -daily dressing changes by nursing  - Offloading shoe when ambulating.  HWB  -abx per ID recs  -discharge planning  -pt to f/u in LPS rounds at outpatient wound clinic in 3 weeks  "

## 2017-10-26 NOTE — PROGRESS NOTES
Dr Prince notified of patient's 10/10 pain level in his right foot and redness and burning in patient's groin area. Telephone orders received and read back.

## 2017-10-27 LAB
ANION GAP SERPL CALC-SCNC: 8 MMOL/L (ref 0–11.9)
BASOPHILS # BLD AUTO: 0.5 % (ref 0–1.8)
BASOPHILS # BLD: 0.06 K/UL (ref 0–0.12)
BUN SERPL-MCNC: 27 MG/DL (ref 8–22)
CALCIUM SERPL-MCNC: 8.8 MG/DL (ref 8.5–10.5)
CHLORIDE SERPL-SCNC: 105 MMOL/L (ref 96–112)
CO2 SERPL-SCNC: 21 MMOL/L (ref 20–33)
CREAT SERPL-MCNC: 2.22 MG/DL (ref 0.5–1.4)
EOSINOPHIL # BLD AUTO: 0.55 K/UL (ref 0–0.51)
EOSINOPHIL NFR BLD: 4.1 % (ref 0–6.9)
ERYTHROCYTE [DISTWIDTH] IN BLOOD BY AUTOMATED COUNT: 47.1 FL (ref 35.9–50)
GFR SERPL CREATININE-BSD FRML MDRD: 30 ML/MIN/1.73 M 2
GLUCOSE BLD-MCNC: 134 MG/DL (ref 65–99)
GLUCOSE BLD-MCNC: 149 MG/DL (ref 65–99)
GLUCOSE BLD-MCNC: 167 MG/DL (ref 65–99)
GLUCOSE BLD-MCNC: 180 MG/DL (ref 65–99)
GLUCOSE BLD-MCNC: 194 MG/DL (ref 65–99)
GLUCOSE SERPL-MCNC: 153 MG/DL (ref 65–99)
HCT VFR BLD AUTO: 32.1 % (ref 42–52)
HGB BLD-MCNC: 10.8 G/DL (ref 14–18)
IMM GRANULOCYTES # BLD AUTO: 0.07 K/UL (ref 0–0.11)
IMM GRANULOCYTES NFR BLD AUTO: 0.5 % (ref 0–0.9)
LYMPHOCYTES # BLD AUTO: 3.37 K/UL (ref 1–4.8)
LYMPHOCYTES NFR BLD: 25.3 % (ref 22–41)
MAGNESIUM SERPL-MCNC: 1.7 MG/DL (ref 1.5–2.5)
MCH RBC QN AUTO: 31 PG (ref 27–33)
MCHC RBC AUTO-ENTMCNC: 33.6 G/DL (ref 33.7–35.3)
MCV RBC AUTO: 92.2 FL (ref 81.4–97.8)
MONOCYTES # BLD AUTO: 0.92 K/UL (ref 0–0.85)
MONOCYTES NFR BLD AUTO: 6.9 % (ref 0–13.4)
NEUTROPHILS # BLD AUTO: 8.34 K/UL (ref 1.82–7.42)
NEUTROPHILS NFR BLD: 62.7 % (ref 44–72)
NRBC # BLD AUTO: 0 K/UL
NRBC BLD AUTO-RTO: 0 /100 WBC
PLATELET # BLD AUTO: 312 K/UL (ref 164–446)
PMV BLD AUTO: 9.6 FL (ref 9–12.9)
POTASSIUM SERPL-SCNC: 3.6 MMOL/L (ref 3.6–5.5)
RBC # BLD AUTO: 3.48 M/UL (ref 4.7–6.1)
SODIUM SERPL-SCNC: 134 MMOL/L (ref 135–145)
WBC # BLD AUTO: 13.3 K/UL (ref 4.8–10.8)

## 2017-10-27 PROCEDURE — G8980 MOBILITY D/C STATUS: HCPCS | Mod: CI

## 2017-10-27 PROCEDURE — 97164 PT RE-EVAL EST PLAN CARE: CPT

## 2017-10-27 PROCEDURE — 97530 THERAPEUTIC ACTIVITIES: CPT

## 2017-10-27 PROCEDURE — 82962 GLUCOSE BLOOD TEST: CPT

## 2017-10-27 PROCEDURE — 770006 HCHG ROOM/CARE - MED/SURG/GYN SEMI*

## 2017-10-27 PROCEDURE — 80048 BASIC METABOLIC PNL TOTAL CA: CPT

## 2017-10-27 PROCEDURE — A9270 NON-COVERED ITEM OR SERVICE: HCPCS | Performed by: HOSPITALIST

## 2017-10-27 PROCEDURE — G8979 MOBILITY GOAL STATUS: HCPCS | Mod: CI

## 2017-10-27 PROCEDURE — 700102 HCHG RX REV CODE 250 W/ 637 OVERRIDE(OP): Performed by: FAMILY MEDICINE

## 2017-10-27 PROCEDURE — G8988 SELF CARE GOAL STATUS: HCPCS | Mod: CI

## 2017-10-27 PROCEDURE — 85025 COMPLETE CBC W/AUTO DIFF WBC: CPT

## 2017-10-27 PROCEDURE — 99232 SBSQ HOSP IP/OBS MODERATE 35: CPT | Performed by: INTERNAL MEDICINE

## 2017-10-27 PROCEDURE — 700111 HCHG RX REV CODE 636 W/ 250 OVERRIDE (IP): Performed by: INTERNAL MEDICINE

## 2017-10-27 PROCEDURE — 700102 HCHG RX REV CODE 250 W/ 637 OVERRIDE(OP): Performed by: NURSE PRACTITIONER

## 2017-10-27 PROCEDURE — 700102 HCHG RX REV CODE 250 W/ 637 OVERRIDE(OP): Performed by: INTERNAL MEDICINE

## 2017-10-27 PROCEDURE — A9270 NON-COVERED ITEM OR SERVICE: HCPCS | Performed by: FAMILY MEDICINE

## 2017-10-27 PROCEDURE — 97535 SELF CARE MNGMENT TRAINING: CPT

## 2017-10-27 PROCEDURE — G8978 MOBILITY CURRENT STATUS: HCPCS | Mod: CI

## 2017-10-27 PROCEDURE — G8987 SELF CARE CURRENT STATUS: HCPCS | Mod: CI

## 2017-10-27 PROCEDURE — 83735 ASSAY OF MAGNESIUM: CPT

## 2017-10-27 PROCEDURE — 700102 HCHG RX REV CODE 250 W/ 637 OVERRIDE(OP): Performed by: HOSPITALIST

## 2017-10-27 PROCEDURE — A9270 NON-COVERED ITEM OR SERVICE: HCPCS | Performed by: NURSE PRACTITIONER

## 2017-10-27 PROCEDURE — 700105 HCHG RX REV CODE 258: Performed by: INTERNAL MEDICINE

## 2017-10-27 PROCEDURE — A9270 NON-COVERED ITEM OR SERVICE: HCPCS | Performed by: INTERNAL MEDICINE

## 2017-10-27 PROCEDURE — G8989 SELF CARE D/C STATUS: HCPCS | Mod: CI

## 2017-10-27 PROCEDURE — 700111 HCHG RX REV CODE 636 W/ 250 OVERRIDE (IP): Performed by: FAMILY MEDICINE

## 2017-10-27 RX ORDER — LINEZOLID 600 MG/1
600 TABLET, FILM COATED ORAL EVERY 12 HOURS
Status: DISCONTINUED | OUTPATIENT
Start: 2017-10-27 | End: 2017-10-28

## 2017-10-27 RX ADMIN — HYDRALAZINE HYDROCHLORIDE 50 MG: 50 TABLET, FILM COATED ORAL at 09:13

## 2017-10-27 RX ADMIN — NYSTATIN 1500000 UNITS: 100000 POWDER TOPICAL at 14:18

## 2017-10-27 RX ADMIN — MORPHINE SULFATE 15 MG: 100 SOLUTION ORAL at 12:25

## 2017-10-27 RX ADMIN — MORPHINE SULFATE 15 MG: 100 SOLUTION ORAL at 05:40

## 2017-10-27 RX ADMIN — OXYCODONE HYDROCHLORIDE 5 MG: 5 TABLET ORAL at 02:41

## 2017-10-27 RX ADMIN — GABAPENTIN 200 MG: 100 CAPSULE ORAL at 20:07

## 2017-10-27 RX ADMIN — TAMSULOSIN HYDROCHLORIDE 0.4 MG: 0.4 CAPSULE ORAL at 17:47

## 2017-10-27 RX ADMIN — LINEZOLID 600 MG: 600 TABLET, FILM COATED ORAL at 14:18

## 2017-10-27 RX ADMIN — ACETAMINOPHEN 500 MG: 500 TABLET ORAL at 01:09

## 2017-10-27 RX ADMIN — NYSTATIN 1500000 UNITS: 100000 POWDER TOPICAL at 20:07

## 2017-10-27 RX ADMIN — BUDESONIDE AND FORMOTEROL FUMARATE DIHYDRATE 2 PUFF: 160; 4.5 AEROSOL RESPIRATORY (INHALATION) at 09:12

## 2017-10-27 RX ADMIN — HEPARIN SODIUM 5000 UNITS: 5000 INJECTION, SOLUTION INTRAVENOUS; SUBCUTANEOUS at 20:05

## 2017-10-27 RX ADMIN — MORPHINE SULFATE 15 MG: 100 SOLUTION ORAL at 18:30

## 2017-10-27 RX ADMIN — LINEZOLID 600 MG: 600 TABLET, FILM COATED ORAL at 23:18

## 2017-10-27 RX ADMIN — ACETAMINOPHEN 500 MG: 500 TABLET ORAL at 17:47

## 2017-10-27 RX ADMIN — INSULIN LISPRO 2 UNITS: 100 INJECTION, SOLUTION INTRAVENOUS; SUBCUTANEOUS at 12:28

## 2017-10-27 RX ADMIN — OXYCODONE HYDROCHLORIDE 5 MG: 5 TABLET ORAL at 09:12

## 2017-10-27 RX ADMIN — AMPICILLIN SODIUM AND SULBACTAM SODIUM 3 G: 2; 1 INJECTION, POWDER, FOR SOLUTION INTRAMUSCULAR; INTRAVENOUS at 01:09

## 2017-10-27 RX ADMIN — FINASTERIDE 5 MG: 5 TABLET, FILM COATED ORAL at 09:12

## 2017-10-27 RX ADMIN — OXYCODONE HYDROCHLORIDE 5 MG: 5 TABLET ORAL at 20:20

## 2017-10-27 RX ADMIN — BUDESONIDE AND FORMOTEROL FUMARATE DIHYDRATE 2 PUFF: 160; 4.5 AEROSOL RESPIRATORY (INHALATION) at 20:07

## 2017-10-27 RX ADMIN — HEPARIN SODIUM 5000 UNITS: 5000 INJECTION, SOLUTION INTRAVENOUS; SUBCUTANEOUS at 09:12

## 2017-10-27 RX ADMIN — ACETAMINOPHEN 500 MG: 500 TABLET ORAL at 12:25

## 2017-10-27 RX ADMIN — NYSTATIN 1500000 UNITS: 100000 POWDER TOPICAL at 09:12

## 2017-10-27 RX ADMIN — INSULIN LISPRO 2 UNITS: 100 INJECTION, SOLUTION INTRAVENOUS; SUBCUTANEOUS at 20:02

## 2017-10-27 RX ADMIN — HYDRALAZINE HYDROCHLORIDE 50 MG: 50 TABLET, FILM COATED ORAL at 20:07

## 2017-10-27 RX ADMIN — OXYCODONE HYDROCHLORIDE 5 MG: 5 TABLET ORAL at 14:18

## 2017-10-27 RX ADMIN — ACETAMINOPHEN 500 MG: 500 TABLET ORAL at 05:39

## 2017-10-27 ASSESSMENT — COGNITIVE AND FUNCTIONAL STATUS - GENERAL
DAILY ACTIVITIY SCORE: 23
MOBILITY SCORE: 24
SUGGESTED CMS G CODE MODIFIER MOBILITY: CH
SUGGESTED CMS G CODE MODIFIER DAILY ACTIVITY: CI
HELP NEEDED FOR BATHING: A LITTLE

## 2017-10-27 ASSESSMENT — ENCOUNTER SYMPTOMS
NECK PAIN: 0
HEADACHES: 0
NAUSEA: 0
DIZZINESS: 0
DIARRHEA: 0
DEPRESSION: 0
WEAKNESS: 1
FEVER: 0
BACK PAIN: 0
SHORTNESS OF BREATH: 0
ABDOMINAL PAIN: 0
VOMITING: 0
CHILLS: 0
PHOTOPHOBIA: 0
DOUBLE VISION: 0

## 2017-10-27 ASSESSMENT — PAIN SCALES - GENERAL
PAINLEVEL_OUTOF10: 7
PAINLEVEL_OUTOF10: 8
PAINLEVEL_OUTOF10: 7
PAINLEVEL_OUTOF10: 8
PAINLEVEL_OUTOF10: 7

## 2017-10-27 ASSESSMENT — PATIENT HEALTH QUESTIONNAIRE - PHQ9
SUM OF ALL RESPONSES TO PHQ9 QUESTIONS 1 AND 2: 0
2. FEELING DOWN, DEPRESSED, IRRITABLE, OR HOPELESS: NOT AT ALL
SUM OF ALL RESPONSES TO PHQ QUESTIONS 1-9: 0
1. LITTLE INTEREST OR PLEASURE IN DOING THINGS: NOT AT ALL

## 2017-10-27 ASSESSMENT — GAIT ASSESSMENTS
ASSISTIVE DEVICE: FRONT WHEEL WALKER
DISTANCE (FEET): 150
GAIT LEVEL OF ASSIST: SUPERVISED

## 2017-10-27 NOTE — PROGRESS NOTES
Monitor summary before pt was turned to medical status:  SR-ST    Blocked PACs, rare PVCs  .20/.10/.34

## 2017-10-27 NOTE — FACE TO FACE
Face to Face Note  -  Durable Medical Equipment    Abhilash Cook M.D. - NPI: [id number]  I certify that this patient is under my care and that they had a durable medical equipment(DME)face to face encounter by myself that meets the physician DME face-to-face encounter requirements with this patient on:    Date of encounter:   Patient:                    MRN:                       YOB: 2016  Shola Betts [place]  [id number]  1950     The encounter with the patient was in whole, or in part, for the following medical condition, which is the primary reason for durable medical equipment:  Post-Op Surgery    I certify that, based on my findings, the following durable medical equipment is medically necessary:  Walkers.    HOME O2 Saturation Measurements:(Values must be present for Home Oxygen orders)         ,     ,         My Clinical findings support the need for the above equipment due to:  Abnormal Gait    Supporting Symptoms: weakness

## 2017-10-27 NOTE — PROGRESS NOTES
Infectious Disease Progress Note    Author: Rose Barriga M.D. Date & Time of service: 10/27/2017  12:46 PM    Chief Complaint:  DFU/osteomyelitis    Interval History:  67 y.o. male admitted 10/17/2017 with abd pain, foot ulcer and oliguria noted to have obstructive uropathy, acute on chronic kidney injury, and osteomyelitis  10/26 AF WBC 18 s/p amp somnolent  10/27 AF WBC 13.3 alert today-denies pain or SE abx  Labs Reviewed, Medications Reviewed, Radiology Reviewed and Wound Reviewed.    Review of Systems:  Review of Systems   Constitutional: Negative for chills and fever.   Gastrointestinal: Negative for abdominal pain, diarrhea, nausea and vomiting.   All other systems reviewed and are negative.      Hemodynamics:  Temp (24hrs), Av.3 °C (97.3 °F), Min:35.9 °C (96.7 °F), Max:36.4 °C (97.6 °F)  Temperature: 36.2 °C (97.1 °F)  Pulse  Av.9  Min: 61  Max: 103   Blood Pressure : 135/71       Physical Exam:  Physical Exam   Constitutional: He is oriented to person, place, and time. He appears well-developed. No distress.   Obese   HENT:   Head: Normocephalic and atraumatic.   Eyes: EOM are normal. Pupils are equal, round, and reactive to light.   Neck: Neck supple.   Cardiovascular: Normal rate.    Pulmonary/Chest: Effort normal. No respiratory distress.   Abdominal: Soft. He exhibits no distension. There is no tenderness.   Musculoskeletal: He exhibits no edema.   Right foot dressed-surgical site well=approx with sutures, dried blood  No erythema or swelling   Neurological: He is alert and oriented to person, place, and time.   Skin: No rash noted.   Nursing note and vitals reviewed.      Meds:    Current Facility-Administered Medications:   •  morphine  •  Influenza Vaccine High-Dose pf  •  acetaminophen  •  oxycodone immediate-release  •  gabapentin  •  ampicillin-sulbactam (UNASYN) IV  •  nystatin  •  hydrALAZINE  •  heparin  •  NS  •  tamsulosin  •  finasteride  •  senna-docusate **AND**  polyethylene glycol/lytes **AND** magnesium hydroxide **AND** bisacodyl  •  Respiratory Care per Protocol  •  labetalol  •  ondansetron  •  ondansetron  •  insulin lispro  •  Action is required: Protocol 1073 Hypoglycemia has been implemented **AND** Protocol 1073 Inclusion Criteria **AND** Protocol 1073 NOTIFY **AND** Protocol 1073 Initiate protocol immediately if FSBG is less than or equal to 70 mg/dL **AND** glucose 4 g **AND** dextrose 50%  •  budesonide-formoterol  •  albuterol    Labs:  Recent Labs      10/25/17   0043  10/26/17   0350  10/27/17   0310   WBC  15.9*  18.0*  13.3*   RBC  3.68*  3.65*  3.48*   HEMOGLOBIN  10.9*  11.0*  10.8*   HEMATOCRIT  33.5*  33.2*  32.1*   MCV  91.0  91.0  92.2   MCH  29.6  30.1  31.0   RDW  45.4  46.0  47.1   PLATELETCT  383  353  312   MPV  9.6  9.7  9.6   NEUTSPOLYS  62.60  72.60*  62.70   LYMPHOCYTES  25.40  18.40*  25.30   MONOCYTES  6.90  5.90  6.90   EOSINOPHILS  4.10  2.30  4.10   BASOPHILS  0.40  0.30  0.50     Recent Labs      10/25/17   0043  10/27/17   0310   SODIUM  135  134*   POTASSIUM  3.5*  3.6   CHLORIDE  107  105   CO2  18*  21   GLUCOSE  143*  153*   BUN  35*  27*     Recent Labs      10/25/17   0043  10/27/17   0310   CREATININE  2.71*  2.22*       Imaging:  Ct-abdomen-pelvis W/o    Result Date: 10/18/2017  10/18/2017 9:45 AM HISTORY/REASON FOR EXAM:  Nausea and vomiting. TECHNIQUE/EXAM DESCRIPTION: CT scan of the abdomen and pelvis without contrast. Noncontrast helical scanning was obtained from the diaphragmatic domes through the pubic symphysis. Low dose optimization technique was utilized for this CT exam including automated exposure control and adjustment of the mA and/or kV according to patient size. COMPARISON: None. FINDINGS: Lung Bases: No pulmonary nodules at the lung bases. No pleural or pericardial fluid. Abdomen: Within the limits of noncontrast technique, the liver, spleen, pancreas, and adrenal glands are unremarkable in appearance.  Moderate dilatation of the bilateral renal collecting systems. Atrophic bilateral kidneys. There is small calculus in the gallbladder.. There is no bowel wall thickening or abnormal dilatation. Right lower quadrant ostomy. Postsurgical change from total colectomy. Moderate atherosclerotic disease of the abdominal aorta and its branches. Pelvis: Decompressed urinary bladder via Humphrey catheter. Diffuse wall thickening of the urinary bladder. Enlarged prostate. No lymph node enlargement. No free fluid, or free air in the abdomen or pelvis. No aggressive bone lesions are seen. ________________________________    1. Moderate dilatation of the bilateral renal collecting systems. No urinary tract calculus.. 2. Diffuse thickening of the urinary bladder wall. The urinary bladder is decompressed by Humphrey catheter. 3. Cholelithiasis.    Dx-chest-portable (1 View)    Result Date: 10/18/2017  10/18/2017 1:54 PM HISTORY/REASON FOR EXAM:  Right IJ central line placement TECHNIQUE/EXAM DESCRIPTION AND NUMBER OF VIEWS: Single portable view of the chest. COMPARISON: None FINDINGS: Right IJ catheter with tip in the right brachiocephalic vein. The bilateral lung bases are outside the field-of-view. No pulmonary infiltrates or consolidations are noted. No pleural effusion. No pneumothorax. Normal cardiopericardial silhouette.     Right IJ catheter with tip in the right brachiocephalic vein.    Dx-foot-2- Right    Result Date: 10/21/2017  10/21/2017 9:43 AM HISTORY/REASON FOR EXAM:  Swelling of first digit TECHNIQUE/EXAM DESCRIPTION AND NUMBER OF VIEWS: 2 nonweightbearing views of the RIGHT foot. COMPARISON:  No comparison available FINDINGS:  Bone mineralization is normal.  There is no evidence of fracture or dislocation. No focal bone erosion or periosteal reaction is identified. There is mild joint space narrowing and periarticular sclerosis. Soft tissue swelling is noted inferiorly in the first digit.     1.  No evidence of fracture  or dislocation. 2.  Soft tissue swelling could indicate cellulitis. 3.  No bone erosions identified.    Mr-foot-w/o Right    Result Date: 10/23/2017  10/22/2017 7:21 PM HISTORY/REASON FOR EXAM:  Great toe infection, GFR 24 TECHNIQUE/EXAM DESCRIPTION: MRI of the RIGHT foot without contrast. Using a [employer] Signa 1.5 Rach MRI scanner, T1 axial, sagittal, and coronal, fast spin-echo T2 fat-suppressed axial and coronal, and fast inversion recovery sagittal images were obtained. COMPARISON: Radiographs October 21 FINDINGS: Due to low GFR, contrast is not used to help with the study Over the plantar medial great toe there is moderate depth skin ulceration with adjacent subcutaneous fatty replacement. The underlying distal phalanx displays decreased T1 and increased T2 signal over a 10 mm region. There is no abnormal joint effusion. There is no marginal erosion. The lateral aspect of the interphalangeal joints subarticular margins are normal in appearance. No other marrow edema to suggest septic arthropathy. The proximal phalanx has normal signal and morphology. There is mild first metatarsal-phalangeal centered spurring and there is a large medial first metatarsal head erosion proximal to the articular surface. There are is also some dorsal erosive change Review of the second metatarsal head shows slight flattening and edema along the plantar distal margin. Mcrae's foot configuration Otherwise normal marrow signal Ligaments/tendons: No evidence of ligament or tendon injury.     First distal phalanx subarticular fatty marrow replacement is at the deep margin of skin ulceration indicating this likely represents osteomyelitis No septic arthropathy is detected Great toe cellulitis and ulceration Mcrae's foot configuration with second metatarsal head flattening and mild edema, compatible with Freiberg's infraction Moderate first metatarsal-phalangeal osteoarthritis with probable mild hallux valgus deformity. Metatarsal head  "erosions could reflect gout    Us-renal    Result Date: 10/20/2017  10/20/2017 4:18 AM HISTORY/REASON FOR EXAM:  Follow-up hydronephrosis noted on CT of 10/18/2017 TECHNIQUE/EXAM DESCRIPTION: Renal ultrasound. COMPARISON:  As above. FINDINGS: The right kidney measures 10.78 cm. The left kidney measures 11.13 cm. There is moderate bilateral hydronephrosis, consistent with the CT appearance. There are no intrarenal calcifications. The bladder is decompressed around a Humphrey catheter.     Nonspecific bilateral hydronephrosis, similar in degree to that seen on CT of 10/18.      Micro:  Results     Procedure Component Value Units Date/Time    CULTURE TISSUE W/ GRM STAIN [[id number]]  (Abnormal) Collected:  10/25/17 1100    Order Status:  Completed Specimen:  Tissue Updated:  10/27/17 1035     Significant Indicator POS (POS)     Source TISS     Site Right Great Toe     Tissue Culture -- (A)     Growth noted after further incubation,see below for  organism identification.       Gram Stain Result Rare Gram positive cocci.     Tissue Culture -- (A)     Coagulase-negative Staphylococcus species  Heavy growth      ANAEROBIC CULTURE [[id number]] Collected:  10/25/17 1100    Order Status:  Completed Specimen:  Tissue Updated:  10/27/17 1035     Significant Indicator NEG     Source TISS     Site Right Great Toe     Anaerobic Culture, Culture Res Culture in progress.    GRAM STAIN [[id number]] Collected:  10/25/17 1100    Order Status:  Completed Specimen:  Tissue Updated:  10/26/17 0826     Significant Indicator .     Source TISS     Site Right Great Toe     Gram Stain Result Rare Gram positive cocci.    Blood Culture [[id number]] Collected:  10/19/17 0204    Order Status:  Completed Specimen:  Blood from Peripheral Updated:  10/24/17 0300     Significant Indicator NEG     Source BLD     Site PERIPHERAL     Blood Culture No growth after 5 days of incubation.    Narrative:       Per Hospital Policy: Only change Specimen Src: to \"Line\" " "if  specified by physician order.    Blood Culture [[id number]] Collected:  10/19/17 0204    Order Status:  Completed Specimen:  Blood from Peripheral Updated:  10/24/17 0300     Significant Indicator NEG     Source BLD     Site PERIPHERAL     Blood Culture No growth after 5 days of incubation.    Narrative:       Per Hospital Policy: Only change Specimen Src: to \"Line\" if  specified by physician order.          Assessment:  Active Hospital Problems    Diagnosis   • *Acute on chronic kidney failure (CMS-HCC) [N17.9, N18.9]   • COPD (chronic obstructive pulmonary disease) (CMS-HCC) [J44.9]   • Diabetic foot ulcer (CMS-HCC) [E11.621, L97.509]   • Obstructive uropathy [N13.9]   • Subacute osteomyelitis of left foot (CMS-HCC) [M86.272]   • HTN (hypertension) [I10]   • Severe sepsis (CMS-HCC) [A41.9, R65.20]   • Anemia [D64.9]   • Diabetes (CMS-HCC) [E11.9]   • Crohn's disease (CMS-HCC) [K50.90]   • Osteoarthritis [M19.90]   • BPH (benign prostatic hyperplasia) [N40.0]   • Renal failure [N19]       Plan:  Osteomyelitis  Afebrile  Decreased leukocytosis  MRI of the foot done here showed osteomyelitis  S/p amp at prox  Likely curative  FU bone/margins-CNS from tissue  DC IV abx  Start oral Zyvox for 3 days     Diabetic foot ulcer (CMS-HCC)- (present on admission)  As above    Acute on chronic kidney failure (CMS-HCC)- (present on admission)  2/2 to obstructive uropathy  Ultrasound w/ bilateral hydronephrosis noted on outside hospital records likely 2/2 to distal bladder outlet obstruction  Refused Humphrey  Dose adjust abx as needed  -no dose adjustment needed for Zyvox    Crohn's disease (CMS-HCC)- (present on admission)  On humira, injection bimonthly last injection 1 week ago  On hold  May be slow to heal    DW LPS    "

## 2017-10-27 NOTE — THERAPY
"Occupational Therapy Treatment completed with focus on ADLs, ADL transfers and patient education.  Functional Status: Mod I supine > < EOB, supv transfers with FWW, supv standing grooming, supv LB dressing   Plan of Care: Patient with no further skilled OT needs in the acute care setting at this time  Discharge Recommendations:  Equipment Front-Wheel Walker. Post-acute therapy Discharge to home with outpatient or home health for additional skilled therapy services.    See \"Rehab Therapy-Acute\" Patient Summary Report for complete documentation.     Pt now s/p R great toe amputation on 10/25. Seen for OT re-assessment. Pt mobilized with FWW, stood at sink for grooming, completed LB dressing. Requires no more than supv. Recommend use of shower chair at home when cleared to bathe (pt states he has shower chair in place). Educated on compensatory strategies for carrying items (such as laundry to/from laundry room) using backpack. Pt reports he has a friend who can assist with transportation until he is cleared to drive. Pt has no further acute OT needs at this time, but may benefit from  OT to address I-ADL, compensatory techniques.   "

## 2017-10-27 NOTE — CARE PLAN
Problem: Safety  Goal: Will remain free from injury  Outcome: PROGRESSING AS EXPECTED  Call bell within reach. Bed is in lowest position and locked.     Problem: Venous Thromboembolism (VTW)/Deep Vein Thrombosis (DVT) Prevention:  Goal: Patient will participate in Venous Thrombosis (VTE)/Deep Vein Thrombosis (DVT)Prevention Measures    Intervention: Assess and monitor for anticoagulation complications  Pt is receiving heparin injection subcut for DVT prophylaxis.

## 2017-10-27 NOTE — PROGRESS NOTES
Report received by NOC RN. Assumed care of pt. Assessment complete. Personal items at bedside. Pt A&O x 4. Pt has harrington with clear yellow urine in collection bag, has dressing on right foot-CDI, and has colostomy present R. Pt in no apparent signs of distress. Plan of care discussed. Call light in reach,  bed in lowest position, and pt has no further questions at this time.

## 2017-10-27 NOTE — CARE PLAN
Problem: Infection  Goal: Will remain free from infection    Intervention: Assess for removal of potential routes of infection, such as IV, central line, intra-arterial or urinary catheters  Worked with interdisciplinary team today on potential removal for central line. Unsure of length of the antibiotic therapy, team reassessing tomorrow.   Assessing closely for s/s of worsening infection. Vitals q4, and additionally as needed. Encouraged pt on using alcohol hand wipes before eating meals, and after using urinal.   Used scrupulous hand hygiene, and sterile technique when administering IV medications and accessing central line. Scrubbed the hub for 30 seconds each access per policy.         Problem: Skin Integrity  Goal: Risk for impaired skin integrity will decrease  Outcome: NOT MET  Cleaned skin, applied antifungal therapy, assessed skin and educated pt on need to keep dry and which cremes to use and which not to use. Pt turned a lot in bed on his own and was up to edge of bed x3day, refused to walk the unit but will retry tomorrow.education provided.

## 2017-10-27 NOTE — DISCHARGE PLANNING
Medical Social Worker    Pt stated that he has a caretaker named Nathaly that is his friend but does not remember her phone number. He stated that Nathaly was here when his Advanced Directive was signed.     SW reviewed pt's chart, [facility] only [facility] with no MPOA listed. There is not a nathaly listed in pt's chart, and all 3 contact phone number's, including pt's own home phone do not work.

## 2017-10-27 NOTE — PROGRESS NOTES
Bedside report completed with noc RN. Pt resting, c/o throbbing toe pain 9/10. Bed locked in low position, call light within reach. Bed alarm off-refusing. Education provided, pt understands risks verbally and says he will call to get up. Reviewed plan of care with noc RN and pt. Patient has no questions at this time.

## 2017-10-27 NOTE — DISCHARGE PLANNING
Medical Social Worker    MADY received a Zyvox Rx. MADY spoke with pt, he stated that he [facility] and will not be open again until Monday 10/30/17.     MADY faxed Rx to [facility] located in [facility] for prior auth and copay.     Add:    MADY provided pt with coupon that will allow him to have his Zyvox filled at any pharmacy for $1.00.

## 2017-10-27 NOTE — PROGRESS NOTES
[facility] Hospitalist Progress Note    Date of Service: 10/27/2017    Chief Complaint  67 y.o. male admitted 10/17/2017 with admitted to outside hospital with abd pain, foot ulcer and oliguria noted to have obstructive uropathy and acute on chronic kidney injury. left first toe ulcer, found to have urosepsis.     Interval Problem Update  Diabetic foot ulcer-s/p amputation, doing well, mild throbbing pain around the surgical site. Changed to zyvox today as surgical margins appear to be clean.     Urine issues-harrington remains in place, mild discomfort, excellent uop, Cr has down-trended.    Consultants/Specialty  Urology  ID    Disposition  Guarded.         Review of Systems   Constitutional: Negative for fever.   Eyes: Negative for double vision and photophobia.   Respiratory: Negative for shortness of breath.    Cardiovascular: Negative for chest pain.   Gastrointestinal: Negative for nausea and vomiting.   Genitourinary: Negative for dysuria and urgency.        Penile pain with harrington catheter   Musculoskeletal: Positive for joint pain (very mild around the L foot). Negative for back pain and neck pain.        Much less throbbing today around the toe  -mild discomfort of the scrotal skin     Skin: Negative for rash.   Neurological: Positive for weakness (very little improvement noted today). Negative for dizziness and headaches.   Psychiatric/Behavioral: Negative for depression.   All other systems reviewed and are negative.     Physical Exam  Laboratory/Imaging   Hemodynamics  Temp (24hrs), Av.3 °C (97.3 °F), Min:35.9 °C (96.7 °F), Max:36.4 °C (97.6 °F)   Temperature: 36.2 °C (97.1 °F)  Pulse  Av.9  Min: 61  Max: 103    Blood Pressure : 135/71      Respiratory      Respiration: 18, Pulse Oximetry: 91 %        RUL Breath Sounds: Diminished, RML Breath Sounds: Diminished, RLL Breath Sounds: Diminished, KITTY Breath Sounds: Diminished, LLL Breath Sounds: Diminished    Fluids    Intake/Output Summary (Last 24 hours)  at 10/27/17 1552  Last data filed at 10/27/17 0500   Gross per 24 hour   Intake              540 ml   Output             3100 ml   Net            -2560 ml       Nutrition  Orders Placed This Encounter   Procedures   • DIET ORDER     Standing Status:   Standing     Number of Occurrences:   1     Order Specific Question:   Diet:     Answer:   Diabetic [3]     Physical Exam   Constitutional: He is oriented to person, place, and time. He appears well-developed and well-nourished. No distress.   HENT:   Right Ear: External ear normal.   Left Ear: External ear normal.   Nose: Nose normal.   Eyes: No scleral icterus.   Neck: Normal range of motion. Neck supple.   Cardiovascular: Normal rate, normal heart sounds and intact distal pulses.    No murmur heard.  Pulmonary/Chest: Effort normal and breath sounds normal. He has no wheezes. He has no rales.   Abdominal: Soft. Bowel sounds are normal. He exhibits no distension.   Ostomy bag in place, normal appearing stool in collecting bag, no changes today appreciated  Obese   Genitourinary:   Genitourinary Comments: Humphrey catheter in place, yellow urine in bag, 0.5 cm separation of the urethral meatus on the ventral shaft of the penis, chronic   Musculoskeletal: He exhibits tenderness. He exhibits no edema.   Neurological: He is alert and oriented to person, place, and time.   Skin: Skin is warm and dry. No rash (candida rash inguinal/scrotal area) noted. He is not diaphoretic. There is erythema.   Amputation of the R big toe, surgical incision site appears C/D/I, sutures in place, very mild rim erythema, moderately tender   Psychiatric: He has a normal mood and affect. His behavior is normal.   Nursing note and vitals reviewed.        Recent Labs      10/25/17   0043  10/26/17   0350  10/27/17   0310   WBC  15.9*  18.0*  13.3*   RBC  3.68*  3.65*  3.48*   HEMOGLOBIN  10.9*  11.0*  10.8*   HEMATOCRIT  33.5*  33.2*  32.1*   MCV  91.0  91.0  92.2   MCH  29.6  30.1  31.0   MCHC   32.5*  33.1*  33.6*   RDW  45.4  46.0  47.1   PLATELETCT  383  353  312   MPV  9.6  9.7  9.6     Recent Labs      10/25/17   0043  10/27/17   0310   SODIUM  135  134*   POTASSIUM  3.5*  3.6   CHLORIDE  107  105   CO2  18*  21   GLUCOSE  143*  153*   BUN  35*  27*   CREATININE  2.71*  2.22*   CALCIUM  9.3  8.8                      Assessment/Plan     * Acute on chronic kidney failure (CMS-HCC)- (present on admission)   Assessment & Plan    Acute on chronic Likely 2/2 to obstructive uropathy  Ultrasound w/ bilateral hydronephrosis noted on outside hospital records likely 2/2 to distal bladder outlet obstruction  -uop remains ok, harrington remains in place and functioning, down-trending Cr, continue to follow  -continue current BPH meds, tolerating well at this time, no changes planned today, monitor Cr level very closely          Obstructive uropathy- (present on admission)   Assessment & Plan    -refuses harrington, uop remains adequate at this time        Diabetic foot ulcer (CMS-HCC)- (present on admission)   Assessment & Plan    -MRI of the foot done here shows osteomyelitis, LPS following  -ID following, s/p amputation POD#2, pain is much better controlled today, changed to PO zyvox, 3 more days until finished 14 day course  -Remains afebrile, WBC has  Down-trended and nearly normalized now  -Skin culture, shows mixed skin radha, as expected        COPD (chronic obstructive pulmonary disease) (CMS-HCC)- (present on admission)   Assessment & Plan    No exacerbation at the moment  Known copd will resume symbicort, continue BD's PRN, no changes planned currently  Rt protocol.         Subacute osteomyelitis of left foot (CMS-HCC)- (present on admission)   Assessment & Plan    -undergoing amputation POD#2, ID consulted, see above        HTN (hypertension)- (present on admission)   Assessment & Plan    -continue hydralazine, well controlled currently        Anemia- (present on admission)   Assessment & Plan    -Fe studies are  ok, monitor closely        Severe sepsis (CMS-HCC)- (present on admission)   Assessment & Plan    This is severe sepsis with the following associated acute organ dysfunction(s): acute kidney failure.   -is improving, see above          BPH (benign prostatic hyperplasia)- (present on admission)   Assessment & Plan    -well tolerated, continue below medications without changes at this time:  ON FINASTRIDE  ON FLOMAX        Osteoarthritis- (present on admission)   Assessment & Plan    Chronic, on morphine at home will continue        Crohn's disease (CMS-HCC)- (present on admission)   Assessment & Plan    On humira, injection bimonthly last injection 1 week ago  -hold this currently          Diabetes (CMS-HCC)- (present on admission)   Assessment & Plan    SSI, sugars remain well controlled, monitor closely, adjust PRN  HEM A1c OF 7.4        Renal failure- (present on admission)   Assessment & Plan    -2/2 obstructive uropathy, as outlined above              Humphrey catheter::  No Humphrey  DVT prophylaxis pharmacological::  Heparin

## 2017-10-27 NOTE — THERAPY
"Physical Therapy Evaluation completed.   Bed Mobility:  Supine to Sit: Supervised  Transfers: Sit to Stand: Supervised  Gait: Level Of Assist: Supervised with Front-Wheel Walker   Heel WB    Plan of Care: Patient with no further skilled PT needs in the acute care setting at this time  Discharge Recommendations: Equipment: No Equipment Needed.    See \"Rehab Therapy-Acute\" Patient Summary Report for complete documentation.     "

## 2017-10-27 NOTE — CARE PLAN
Problem: Infection  Goal: Will remain free from infection    Intervention: Assess signs and symptoms of infection  Pt lab values monitored, VS monitored. No new s/s infection.         Problem: Pain Management  Goal: Pain level will decrease to patient's comfort goal    Intervention: Follow pain managment plan developed in collaboration with patient and Interdisciplinary Team  Passed roxicodone for pain prn

## 2017-10-27 NOTE — PROGRESS NOTES
Report called to Irena OVIEDO as pt will be transferred to  [facility].  Pt updated on move and POC. All personal belongings gathered with pt, chart and meds, and ready for transfer.

## 2017-10-28 VITALS
BODY MASS INDEX: 36.08 KG/M2 | RESPIRATION RATE: 17 BRPM | TEMPERATURE: 97.7 F | HEIGHT: 71 IN | OXYGEN SATURATION: 95 % | HEART RATE: 71 BPM | DIASTOLIC BLOOD PRESSURE: 63 MMHG | SYSTOLIC BLOOD PRESSURE: 127 MMHG | WEIGHT: 257.72 LBS

## 2017-10-28 LAB
ANION GAP SERPL CALC-SCNC: 10 MMOL/L (ref 0–11.9)
BACTERIA SPEC ANAEROBE CULT: NORMAL
BASOPHILS # BLD AUTO: 0.7 % (ref 0–1.8)
BASOPHILS # BLD: 0.1 K/UL (ref 0–0.12)
BUN SERPL-MCNC: 30 MG/DL (ref 8–22)
CALCIUM SERPL-MCNC: 9.1 MG/DL (ref 8.5–10.5)
CHLORIDE SERPL-SCNC: 106 MMOL/L (ref 96–112)
CO2 SERPL-SCNC: 19 MMOL/L (ref 20–33)
CREAT SERPL-MCNC: 2.57 MG/DL (ref 0.5–1.4)
EOSINOPHIL # BLD AUTO: 0.54 K/UL (ref 0–0.51)
EOSINOPHIL NFR BLD: 3.9 % (ref 0–6.9)
ERYTHROCYTE [DISTWIDTH] IN BLOOD BY AUTOMATED COUNT: 46.5 FL (ref 35.9–50)
GFR SERPL CREATININE-BSD FRML MDRD: 25 ML/MIN/1.73 M 2
GLUCOSE BLD-MCNC: 131 MG/DL (ref 65–99)
GLUCOSE BLD-MCNC: 209 MG/DL (ref 65–99)
GLUCOSE SERPL-MCNC: 137 MG/DL (ref 65–99)
HCT VFR BLD AUTO: 33.6 % (ref 42–52)
HGB BLD-MCNC: 11 G/DL (ref 14–18)
IMM GRANULOCYTES # BLD AUTO: 0.07 K/UL (ref 0–0.11)
IMM GRANULOCYTES NFR BLD AUTO: 0.5 % (ref 0–0.9)
LYMPHOCYTES # BLD AUTO: 4.07 K/UL (ref 1–4.8)
LYMPHOCYTES NFR BLD: 29.6 % (ref 22–41)
MAGNESIUM SERPL-MCNC: 1.6 MG/DL (ref 1.5–2.5)
MCH RBC QN AUTO: 29.9 PG (ref 27–33)
MCHC RBC AUTO-ENTMCNC: 32.7 G/DL (ref 33.7–35.3)
MCV RBC AUTO: 91.3 FL (ref 81.4–97.8)
MONOCYTES # BLD AUTO: 0.79 K/UL (ref 0–0.85)
MONOCYTES NFR BLD AUTO: 5.7 % (ref 0–13.4)
NEUTROPHILS # BLD AUTO: 8.18 K/UL (ref 1.82–7.42)
NEUTROPHILS NFR BLD: 59.6 % (ref 44–72)
NRBC # BLD AUTO: 0 K/UL
NRBC BLD AUTO-RTO: 0 /100 WBC
PLATELET # BLD AUTO: 339 K/UL (ref 164–446)
PMV BLD AUTO: 9.8 FL (ref 9–12.9)
POTASSIUM SERPL-SCNC: 3.4 MMOL/L (ref 3.6–5.5)
RBC # BLD AUTO: 3.68 M/UL (ref 4.7–6.1)
SIGNIFICANT IND 70042: NORMAL
SITE SITE: NORMAL
SODIUM SERPL-SCNC: 135 MMOL/L (ref 135–145)
SOURCE SOURCE: NORMAL
WBC # BLD AUTO: 13.8 K/UL (ref 4.8–10.8)

## 2017-10-28 PROCEDURE — 82962 GLUCOSE BLOOD TEST: CPT | Mod: 91

## 2017-10-28 PROCEDURE — 85025 COMPLETE CBC W/AUTO DIFF WBC: CPT

## 2017-10-28 PROCEDURE — A9270 NON-COVERED ITEM OR SERVICE: HCPCS | Performed by: NURSE PRACTITIONER

## 2017-10-28 PROCEDURE — 700111 HCHG RX REV CODE 636 W/ 250 OVERRIDE (IP): Performed by: FAMILY MEDICINE

## 2017-10-28 PROCEDURE — 99239 HOSP IP/OBS DSCHRG MGMT >30: CPT | Performed by: HOSPITALIST

## 2017-10-28 PROCEDURE — 700102 HCHG RX REV CODE 250 W/ 637 OVERRIDE(OP): Performed by: FAMILY MEDICINE

## 2017-10-28 PROCEDURE — 80048 BASIC METABOLIC PNL TOTAL CA: CPT

## 2017-10-28 PROCEDURE — 700102 HCHG RX REV CODE 250 W/ 637 OVERRIDE(OP): Performed by: HOSPITALIST

## 2017-10-28 PROCEDURE — 700111 HCHG RX REV CODE 636 W/ 250 OVERRIDE (IP): Performed by: HOSPITALIST

## 2017-10-28 PROCEDURE — 83735 ASSAY OF MAGNESIUM: CPT

## 2017-10-28 PROCEDURE — A9270 NON-COVERED ITEM OR SERVICE: HCPCS | Performed by: HOSPITALIST

## 2017-10-28 PROCEDURE — A9270 NON-COVERED ITEM OR SERVICE: HCPCS | Performed by: FAMILY MEDICINE

## 2017-10-28 PROCEDURE — A9270 NON-COVERED ITEM OR SERVICE: HCPCS | Performed by: INTERNAL MEDICINE

## 2017-10-28 PROCEDURE — 700102 HCHG RX REV CODE 250 W/ 637 OVERRIDE(OP): Performed by: INTERNAL MEDICINE

## 2017-10-28 PROCEDURE — 700102 HCHG RX REV CODE 250 W/ 637 OVERRIDE(OP): Performed by: NURSE PRACTITIONER

## 2017-10-28 RX ORDER — POLYETHYLENE GLYCOL 3350 17 G/17G
17 POWDER, FOR SOLUTION ORAL
Qty: 30 EACH | Refills: 3 | Status: SHIPPED | OUTPATIENT
Start: 2017-10-28 | End: 2018-02-23

## 2017-10-28 RX ORDER — LINEZOLID 600 MG/1
600 TABLET, FILM COATED ORAL EVERY 12 HOURS
Status: DISCONTINUED | OUTPATIENT
Start: 2017-10-28 | End: 2017-10-28

## 2017-10-28 RX ORDER — GABAPENTIN 100 MG/1
200 CAPSULE ORAL EVERY EVENING
Qty: 60 CAP | Refills: 0 | Status: SHIPPED | OUTPATIENT
Start: 2017-10-28 | End: 2017-10-28

## 2017-10-28 RX ORDER — OXYCODONE HYDROCHLORIDE 5 MG/1
5 TABLET ORAL EVERY 4 HOURS PRN
Qty: 30 TAB | Refills: 0 | Status: SHIPPED | OUTPATIENT
Start: 2017-10-28 | End: 2018-02-23

## 2017-10-28 RX ORDER — POLYETHYLENE GLYCOL 3350 17 G/17G
17 POWDER, FOR SOLUTION ORAL
Qty: 30 EACH | Refills: 3 | Status: SHIPPED | OUTPATIENT
Start: 2017-10-28 | End: 2017-10-28

## 2017-10-28 RX ORDER — NYSTATIN 100000 [USP'U]/G
1 POWDER TOPICAL 4 TIMES DAILY
Qty: 40 G | Refills: 0 | Status: SHIPPED | OUTPATIENT
Start: 2017-10-28 | End: 2017-10-28

## 2017-10-28 RX ORDER — DIPHENHYDRAMINE HCL 25 MG
25 TABLET ORAL ONCE
Status: COMPLETED | OUTPATIENT
Start: 2017-10-28 | End: 2017-10-28

## 2017-10-28 RX ORDER — HYDRALAZINE HYDROCHLORIDE 50 MG/1
50 TABLET, FILM COATED ORAL 2 TIMES DAILY
Qty: 90 TAB | Refills: 3 | Status: SHIPPED | OUTPATIENT
Start: 2017-10-28 | End: 2017-10-28

## 2017-10-28 RX ORDER — HYDRALAZINE HYDROCHLORIDE 50 MG/1
50 TABLET, FILM COATED ORAL 2 TIMES DAILY
Qty: 90 TAB | Refills: 3 | Status: SHIPPED | OUTPATIENT
Start: 2017-10-28 | End: 2018-02-23

## 2017-10-28 RX ORDER — MAGNESIUM SULFATE HEPTAHYDRATE 40 MG/ML
2 INJECTION, SOLUTION INTRAVENOUS ONCE
Status: COMPLETED | OUTPATIENT
Start: 2017-10-28 | End: 2017-10-28

## 2017-10-28 RX ORDER — FINASTERIDE 5 MG/1
5 TABLET, FILM COATED ORAL DAILY
Qty: 30 TAB | Refills: 3 | Status: SHIPPED | OUTPATIENT
Start: 2017-10-28 | End: 2017-10-28

## 2017-10-28 RX ORDER — FINASTERIDE 5 MG/1
5 TABLET, FILM COATED ORAL DAILY
Qty: 30 TAB | Refills: 3 | Status: SHIPPED | OUTPATIENT
Start: 2017-10-28 | End: 2018-02-23

## 2017-10-28 RX ORDER — NYSTATIN 100000 [USP'U]/G
1 POWDER TOPICAL 4 TIMES DAILY
Qty: 40 G | Refills: 0 | Status: SHIPPED | OUTPATIENT
Start: 2017-10-28 | End: 2018-02-23

## 2017-10-28 RX ORDER — GABAPENTIN 100 MG/1
200 CAPSULE ORAL EVERY EVENING
Qty: 60 CAP | Refills: 0 | Status: SHIPPED | OUTPATIENT
Start: 2017-10-28 | End: 2017-11-27

## 2017-10-28 RX ADMIN — HEPARIN SODIUM 5000 UNITS: 5000 INJECTION, SOLUTION INTRAVENOUS; SUBCUTANEOUS at 09:03

## 2017-10-28 RX ADMIN — HYDRALAZINE HYDROCHLORIDE 50 MG: 50 TABLET, FILM COATED ORAL at 09:03

## 2017-10-28 RX ADMIN — MORPHINE SULFATE 15 MG: 100 SOLUTION ORAL at 06:09

## 2017-10-28 RX ADMIN — MORPHINE SULFATE 15 MG: 100 SOLUTION ORAL at 00:15

## 2017-10-28 RX ADMIN — BUDESONIDE AND FORMOTEROL FUMARATE DIHYDRATE 2 PUFF: 160; 4.5 AEROSOL RESPIRATORY (INHALATION) at 09:04

## 2017-10-28 RX ADMIN — MAGNESIUM SULFATE IN WATER 2 G: 40 INJECTION, SOLUTION INTRAVENOUS at 10:13

## 2017-10-28 RX ADMIN — MORPHINE SULFATE 15 MG: 100 SOLUTION ORAL at 12:47

## 2017-10-28 RX ADMIN — NYSTATIN: 100000 POWDER TOPICAL at 09:04

## 2017-10-28 RX ADMIN — DIPHENHYDRAMINE HCL 25 MG: 25 TABLET ORAL at 03:05

## 2017-10-28 RX ADMIN — STANDARDIZED SENNA CONCENTRATE AND DOCUSATE SODIUM 2 TABLET: 8.6; 5 TABLET, FILM COATED ORAL at 09:03

## 2017-10-28 RX ADMIN — OXYCODONE HYDROCHLORIDE 5 MG: 5 TABLET ORAL at 00:55

## 2017-10-28 RX ADMIN — FINASTERIDE 5 MG: 5 TABLET, FILM COATED ORAL at 09:03

## 2017-10-28 RX ADMIN — INSULIN LISPRO 3 UNITS: 100 INJECTION, SOLUTION INTRAVENOUS; SUBCUTANEOUS at 11:58

## 2017-10-28 ASSESSMENT — ENCOUNTER SYMPTOMS
CHILLS: 0
FEVER: 0
VOMITING: 0
DIARRHEA: 0
NAUSEA: 0
ABDOMINAL PAIN: 0

## 2017-10-28 ASSESSMENT — PAIN SCALES - GENERAL: PAINLEVEL_OUTOF10: 4

## 2017-10-28 NOTE — DISCHARGE INSTRUCTIONS
Discharge Instructions    Discharged to home by car with friend. Discharged via wheelchair, hospital escort: Yes.  Special equipment needed: Catheter Leg Bag    Be sure to schedule a follow-up appointment with your primary care doctor or any specialists as instructed.     Discharge Plan:   Influenza Vaccine Indication: Indicated: 65 years and older    I understand that a diet low in cholesterol, fat, and sodium is recommended for good health. Unless I have been given specific instructions below for another diet, I accept this instruction as my diet prescription.   Other diet: Diabetic    Special Instructions: None    · Is patient discharged on Warfarin / Coumadin?   No     · Is patient Post Blood Transfusion?  No    Depression / Suicide Risk    As you are discharged from this [facility] facility, it is important to learn how to keep safe from harming yourself.    Recognize the warning signs:  · Abrupt changes in personality, positive or negative- including increase in energy   · Giving away possessions  · Change in eating patterns- significant weight changes-  positive or negative  · Change in sleeping patterns- unable to sleep or sleeping all the time   · Unwillingness or inability to communicate  · Depression  · Unusual sadness, discouragement and loneliness  · Talk of wanting to die  · Neglect of personal appearance   · Rebelliousness- reckless behavior  · Withdrawal from people/activities they love  · Confusion- inability to concentrate     If you or a loved one observes any of these behaviors or has concerns about self-harm, here's what you can do:  · Talk about it- your feelings and reasons for harming yourself  · Remove any means that you might use to hurt yourself (examples: pills, rope, extension cords, firearm)  · Get professional help from the community (Mental Health, Substance Abuse, psychological counseling)  · Do not be alone:Call your Safe Contact- someone whom you trust who will be there for  you.  · Call your local CRISIS HOTLINE 665-2396 or 454-376-7845  · Call your local Children's Mobile Crisis Response Team [place] (077) 686-0633 or www.[employer]  · Call the toll free National Suicide Prevention Hotlines   · National Suicide Prevention Lifeline 591-856-EVVC (5203)  · National Hope Line Network 800-SUICIDE (317-8148)    Nystatin topical powder  What is this medicine?  NYSTATIN (flor STAT in) is an antifungal medicine. It is used to treat certain kinds of fungal or yeast infections of the skin.  This medicine may be used for other purposes; ask your health care provider or pharmacist if you have questions.  COMMON BRAND NAME(S): Mycostatin, Nyamyc, Nystop, Pedi-Dri  What should I tell my health care provider before I take this medicine?  They need to know if you have any of these conditions:  -an unusual or allergic reaction to nystatin, other foods, dyes or preservatives  -pregnant or trying to get pregnant  -breast-feeding  How should I use this medicine?  This medicine is for external use on the skin only. Follow the directions on the prescription label. Dust the powder on the affected area (or into socks and shoes). If you are treating diaper rash, do not use tight-fitting diapers or plastic pants. Do not get the medicine in your eyes. If you do, rinse out with plenty of cool tap water. Do not breathe in the powder. Do not use your medicine more often than directed. Use your doses at regular intervals. Finish the full course prescribed by your doctor or health care professional even if you think your condition is better. Do not stop using except on the advice of your doctor or health care professional.  Talk to your pediatrician regarding the use of this medicine in children. Special care may be needed.  Overdosage: If you think you have taken too much of this medicine contact a poison control center or emergency room at once.  NOTE: This medicine is only for you. Do not share this  medicine with others.  What if I miss a dose?  If you miss a dose, use it as soon as you can. If it is almost time for your next dose, use only that dose. Do not use double or extra doses.  What may interact with this medicine?  Interactions are not expected. Do not use any other skin products on the affected area without telling your doctor or health care professional.  This list may not describe all possible interactions. Give your health care provider a list of all the medicines, herbs, non-prescription drugs, or dietary supplements you use. Also tell them if you smoke, drink alcohol, or use illegal drugs. Some items may interact with your medicine.  What should I watch for while using this medicine?  Tell your doctor or health care professional if your symptoms do not improve after 3 days.  After bathing make sure that your skin is very dry. Fungal infections like moist conditions. Do not walk around barefoot.  To help prevent reinfection, wear freshly washed cotton, not synthetic, clothing.  What side effects may I notice from receiving this medicine?  Side effects that usually do not require medical attention (report to your doctor or health care professional if they continue or are bothersome):  -skin irritation  This list may not describe all possible side effects. Call your doctor for medical advice about side effects. You may report side effects to FDA at 6-267-FDA-7075.  Where should I keep my medicine?  Keep out of the reach of children.  Store at room temperature between 15 and 30 degrees C (59 and 86 degrees F). Throw away any unused medicine after the expiration date.  NOTE: This sheet is a summary. It may not cover all possible information. If you have questions about this medicine, talk to your doctor, pharmacist, or health care provider.  © 2014, Elsevier/Gold Standard. (7/13/2009 2:28:12 PM)  Oxycodone tablets or capsules  What is this medicine?  OXYCODONE (ox i KOE done) is a pain reliever. It is  used to treat moderate to severe pain.  This medicine may be used for other purposes; ask your health care provider or pharmacist if you have questions.  COMMON BRAND NAME(S): Dazidox , Endocodone , OXECTA, OxyIR, Percolone, Roxicodone  What should I tell my health care provider before I take this medicine?  They need to know if you have any of these conditions:  -[place]'s disease  -brain tumor  -drug abuse or addiction  -head injury  -heart disease  -if you frequently drink alcohol containing drinks  -kidney disease or problems going to the bathroom  -liver disease  -lung disease, asthma, or breathing problems  -mental problems  -an unusual or allergic reaction to oxycodone, codeine, hydrocodone, morphine, other medicines, foods, dyes, or preservatives  -pregnant or trying to get pregnant  -breast-feeding  How should I use this medicine?  Take this medicine by mouth with a glass of water. Follow the directions on the prescription label. You can take it with or without food. If it upsets your stomach, take it with food. Take your medicine at regular intervals. Do not take it more often than directed. Do not stop taking except on your doctor's advice.  Some brands of this medicine, like Oxecta, have special instructions. Ask your doctor or pharmacist if these directions are for you: Do not cut, crush or chew this medicine. Swallow only one tablet at a time. Do not wet, soak, or lick the tablet before you take it.  Talk to your pediatrician regarding the use of this medicine in children. Special care may be needed.  Overdosage: If you think you have taken too much of this medicine contact a poison control center or emergency room at once.  NOTE: This medicine is only for you. Do not share this medicine with others.  What if I miss a dose?  If you miss a dose, take it as soon as you can. If it is almost time for your next dose, take only that dose. Do not take double or extra doses.  What may interact with this  medicine?  -alcohol  -antihistamines  -certain medicines used for nausea like chlorpromazine, droperidol  -erythromycin  -ketoconazole  -medicines for depression, anxiety, or psychotic disturbances  -medicines for sleep  -muscle relaxants  -naloxone  -naltrexone  -narcotic medicines (opiates) for pain  -nilotinib  -phenobarbital  -phenytoin  -rifampin  -ritonavir  -voriconazole  This list may not describe all possible interactions. Give your health care provider a list of all the medicines, herbs, non-prescription drugs, or dietary supplements you use. Also tell them if you smoke, drink alcohol, or use illegal drugs. Some items may interact with your medicine.  What should I watch for while using this medicine?  Tell your doctor or health care professional if your pain does not go away, if it gets worse, or if you have new or a different type of pain. You may develop tolerance to the medicine. Tolerance means that you will need a higher dose of the medicine for pain relief. Tolerance is normal and is expected if you take this medicine for a long time.  Do not suddenly stop taking your medicine because you may develop a severe reaction. Your body becomes used to the medicine. This does NOT mean you are addicted. Addiction is a behavior related to getting and using a drug for a non-medical reason. If you have pain, you have a medical reason to take pain medicine. Your doctor will tell you how much medicine to take. If your doctor wants you to stop the medicine, the dose will be slowly lowered over time to avoid any side effects.  You may get drowsy or dizzy when you first start taking this medicine or change doses. Do not drive, use machinery, or do anything that may be dangerous until you know how the medicine affects you. Stand or sit up slowly.  There are different types of narcotic medicines (opiates) for pain. If you take more than one type at the same time, you may have more side effects. Give your health care  provider a list of all medicines you use. Your doctor will tell you how much medicine to take. Do not take more medicine than directed. Call emergency for help if you have problems breathing.  This medicine will cause constipation. Try to have a bowel movement at least every 2 to 3 days. If you do not have a bowel movement for 3 days, call your doctor or health care professional.  Your mouth may get dry. Drinking water, chewing sugarless gum, or sucking on hard candy may help. See your dentist every 6 months.  What side effects may I notice from receiving this medicine?  Side effects that you should report to your doctor or health care professional as soon as possible:  -allergic reactions like skin rash, itching or hives, swelling of the face, lips, or tongue  -breathing problems  -confusion  -feeling faint or lightheaded, falls  -trouble passing urine or change in the amount of urine  -unusually weak or tired  Side effects that usually do not require medical attention (report to your doctor or health care professional if they continue or are bothersome):  -constipation  -dry mouth  -itching  -nausea, vomiting  -upset stomach  This list may not describe all possible side effects. Call your doctor for medical advice about side effects. You may report side effects to FDA at 0-491-FDA-2369.  Where should I keep my medicine?  Keep out of the reach of children. This medicine can be abused. Keep your medicine in a safe place to protect it from theft. Do not share this medicine with anyone. Selling or giving away this medicine is dangerous and against the law.  Store at room temperature between 15 and 30 degrees C (59 and 86 degrees F). Protect from light. Keep container tightly closed.   Discard unused medicine and used packaging carefully. Pets and children can be harmed if they find used or lost packages. Flush any unused medicines down the toilet. Do not use the medicine after the expiration date.  NOTE: This sheet is  a summary. It may not cover all possible information. If you have questions about this medicine, talk to your doctor, pharmacist, or health care provider.  © 2014, Elsevier/Gold Standard. (11/15/2012 8:33:37 AM)  Gabapentin capsules or tablets  What is this medicine?  GABAPENTIN (GA ba pen tin) is used to control partial seizures in adults with epilepsy. It is also used to treat certain types of nerve pain.  This medicine may be used for other purposes; ask your health care provider or pharmacist if you have questions.  COMMON BRAND NAME(S): Gabarone , Neurontin  What should I tell my health care provider before I take this medicine?  They need to know if you have any of these conditions:  -kidney disease  -suicidal thoughts, plans, or attempt; a previous suicide attempt by you or a family member  -an unusual or allergic reaction to gabapentin, other medicines, foods, dyes, or preservatives  -pregnant or trying to get pregnant  -breast-feeding  How should I use this medicine?  Take this medicine by mouth. Swallow it with a drink of water. Follow the directions on the prescription label. If this medicine upsets your stomach, take it with food or milk. Take your medicine at regular intervals. Do not take it more often than directed.  If you are directed to break the 600 or 800 mg tablets in half as part of your dose, the extra half tablet should be used for the next dose. If you have not used the extra half tablet within 3 days, it should be thrown away.  A special MedGuide will be given to you by the pharmacist with each prescription and refill. Be sure to read this information carefully each time.  Talk to your pediatrician regarding the use of this medicine in children. Special care may be needed.  Overdosage: If you think you have taken too much of this medicine contact a poison control center or emergency room at once.  NOTE: This medicine is only for you. Do not share this medicine with others.  What if I miss a  dose?  If you miss a dose, take it as soon as you can. If it is almost time for your next dose, take only that dose. Do not take double or extra doses.  What may interact with this medicine?  Do not take this medicine with any of the following medications:  -other gabapentin products  This medicine may also interact with the following medications:  -alcohol  -antacids  -antihistamines for allergy, cough and cold  -certain medicines for anxiety or sleep  -certain medicines for depression or psychotic disturbances  -homatropine; hydrocodone  -naproxen  -narcotic medicines (opiates) for pain  -phenothiazines like chlorpromazine, mesoridazine, prochlorperazine, thioridazine  This list may not describe all possible interactions. Give your health care provider a list of all the medicines, herbs, non-prescription drugs, or dietary supplements you use. Also tell them if you smoke, drink alcohol, or use illegal drugs. Some items may interact with your medicine.  What should I watch for while using this medicine?  Visit your doctor or health care professional for regular checks on your progress. You may want to keep a record at home of how you feel your condition is responding to treatment. You may want to share this information with your doctor or health care professional at each visit. You should contact your doctor or health care professional if your seizures get worse or if you have any new types of seizures. Do not stop taking this medicine or any of your seizure medicines unless instructed by your doctor or health care professional. Stopping your medicine suddenly can increase your seizures or their severity.  Wear a medical identification bracelet or chain if you are taking this medicine for seizures, and carry a card that lists all your medications.  You may get drowsy, dizzy, or have blurred vision. Do not drive, use machinery, or do anything that needs mental alertness until you know how this medicine affects you.  To reduce dizzy or fainting spells, do not sit or stand up quickly, especially if you are an older patient. Alcohol can increase drowsiness and dizziness. Avoid alcoholic drinks.  Your mouth may get dry. Chewing sugarless gum or sucking hard candy, and drinking plenty of water will help.  The use of this medicine may increase the chance of suicidal thoughts or actions. Pay special attention to how you are responding while on this medicine. Any worsening of mood, or thoughts of suicide or dying should be reported to your health care professional right away.  Women who become pregnant while using this medicine may enroll in the [place] Antiepileptic Drug Pregnancy Registry by calling 1-799.864.9760. This registry collects information about the safety of antiepileptic drug use during pregnancy.  What side effects may I notice from receiving this medicine?  Side effects that you should report to your doctor or health care professional as soon as possible:  -allergic reactions like skin rash, itching or hives, swelling of the face, lips, or tongue  -worsening of mood, thoughts or actions of suicide or dying  Side effects that usually do not require medical attention (report to your doctor or health care professional if they continue or are bothersome):  -constipation  -difficulty walking or controlling muscle movements  -dizziness  -nausea  -slurred speech  -tiredness  -tremors  -weight gain  This list may not describe all possible side effects. Call your doctor for medical advice about side effects. You may report side effects to FDA at 9-796-FDA-2200.  Where should I keep my medicine?  Keep out of reach of children.  Store at room temperature between 15 and 30 degrees C (59 and 86 degrees F). Throw away any unused medicine after the expiration date.  NOTE: This sheet is a summary. It may not cover all possible information. If you have questions about this medicine, talk to your doctor, pharmacist, or health care  provider.  © 2014, Elsevier/Gold Standard. (1/9/2013 11:43:23 AM)  Acute Kidney Injury  Acute kidney injury is any condition in which there is sudden (acute) damage to the kidneys. Acute kidney injury was previously known as acute kidney failure or acute renal failure. The kidneys are two organs that lie on either side of the spine between the middle of the back and the front of the abdomen. The kidneys:  · Remove wastes and extra water from the blood.    · Produce important hormones. These help keep bones strong, regulate blood pressure, and help create red blood cells.    · Balance the fluids and chemicals in the blood and tissues.  A small amount of kidney damage may not cause problems, but a large amount of damage may make it difficult or impossible for the kidneys to work the way they should. Acute kidney injury may develop into long-lasting (chronic) kidney disease. It may also develop into a life-threatening disease called end-stage kidney disease. Acute kidney injury can get worse very quickly, so it should be treated right away. Early treatment may prevent other kidney diseases from developing.  CAUSES   · A problem with blood flow to the kidneys. This may be caused by:    ¨ Blood loss.    ¨ Heart disease.    ¨ Severe burns.    ¨ Liver disease.  · Direct damage to the kidneys. This may be caused by:  ¨ Some medicines.    ¨ A kidney infection.    ¨ Poisoning or consuming toxic substances.    ¨ A surgical wound.    ¨ A blow to the kidney area.    · A problem with urine flow. This may be caused by:    ¨ Cancer.    ¨ Kidney stones.    ¨ An enlarged prostate.  SIGNS AND SYMPTOMS   · Swelling (edema) of the legs, ankles, or feet.    · Tiredness (lethargy).    · Nausea or vomiting.    · Confusion.    · Problems with urination, such as:    ¨ Painful or burning feeling during urination.    ¨ Decreased urine production.    ¨ Frequent accidents in children who are potty trained.    ¨ Bloody urine.    · Muscle twitches  and cramps.    · Shortness of breath.    · Seizures.    · Chest pain or pressure.  Sometimes, no symptoms are present.   DIAGNOSIS  Acute kidney injury may be detected and diagnosed by tests, including blood, urine, imaging, or kidney biopsy tests.   TREATMENT  Treatment of acute kidney injury varies depending on the cause and severity of the kidney damage. In mild cases, no treatment may be needed. The kidneys may heal on their own. If acute kidney injury is more severe, your health care provider will treat the cause of the kidney damage, help the kidneys heal, and prevent complications from occurring. Severe cases may require a procedure to remove toxic wastes from the body (dialysis) or surgery to repair kidney damage. Surgery may involve:   · Repair of a torn kidney.    · Removal of an obstruction.  HOME CARE INSTRUCTIONS  · Follow your prescribed diet.  · Take medicines only as directed by your health care provider.   · Do not take any new medicines (prescription, over-the-counter, or nutritional supplements) unless approved by your health care provider. Many medicines can worsen your kidney damage or may need to have the dose adjusted.    · Keep all follow-up visits as directed by your health care provider. This is important.  · Observe your condition to make sure you are healing as expected.  SEEK IMMEDIATE MEDICAL CARE IF:  · You are feeling ill or have severe pain in the back or side.    · Your symptoms return or you have new symptoms.  · You have any symptoms of end-stage kidney disease. These include:    ¨ Persistent itchiness.    ¨ Loss of appetite.    ¨ Headaches.    ¨ Abnormally dark or light skin.  ¨ Numbness in the hands or feet.    ¨ Easy bruising.    ¨ Frequent hiccups.    ¨ Menstruation stops.    · You have a fever.  · You have increased urine production.  · You have pain or bleeding when urinating.  MAKE SURE YOU:   · Understand these instructions.  · Will watch your condition.  · Will get help  right away if you are not doing well or get worse.     This information is not intended to replace advice given to you by your health care provider. Make sure you discuss any questions you have with your health care provider.     Document Released: 07/02/2012 Document Revised: 01/08/2016 Document Reviewed: 08/16/2013  Elsevier Interactive Patient Education ©2016 [employer]

## 2017-10-28 NOTE — PROGRESS NOTES
2330  Patient reports itching to BUE. Dry, flakey skin, redness, and rash noted to BUE.  First dose of Zyvox given today at 1430 and patient just noticed rash to BUE at 2030. [facility] pharmacist notified and does not think correlation between rash and Zyvox since rash formed 6 hours later. Zyvox next dose can be given at 2330. Zyvox dose given. Lotion applied to BUE and patient relieved from itching and feels better.   0240  Patient reports rash and itching to BLE. Rash and redness noted to BL thighs.  Dr. Prince notified and Benadryl 25 mg one time dose ordered to be given now. Dr. Prince ordered for Zyvox to be held until hospital medicine and ID see patient and determine if new antibiotic needed.

## 2017-10-28 NOTE — PROGRESS NOTES
Infectious Disease Progress Note    Author: Rose Barriga M.D. Date & Time of service: 10/28/2017  4:05 PM    Chief Complaint:  DFU/osteomyelitis    Interval History:  67 y.o. male admitted 10/17/2017 with abd pain, foot ulcer and oliguria noted to have obstructive uropathy, acute on chronic kidney injury, and osteomyelitis  10/26 AF WBC 18 s/p amp somnolent  10/27 AF WBC 13.3 alert today-denies pain or SE abx  10/28 AF WBC 13.8 has rash on BUE  Labs Reviewed, Medications Reviewed, Radiology Reviewed and Wound Reviewed.    Review of Systems:  Review of Systems   Constitutional: Negative for chills and fever.   Gastrointestinal: Negative for abdominal pain, diarrhea, nausea and vomiting.   Skin: Positive for rash. Negative for itching.   All other systems reviewed and are negative.      Hemodynamics:  Temp (24hrs), Av.4 °C (97.5 °F), Min:35.9 °C (96.7 °F), Max:36.6 °C (97.9 °F)  Temperature: 36.5 °C (97.7 °F)  Pulse  Av.7  Min: 61  Max: 103   Blood Pressure : 127/63       Physical Exam:  Physical Exam   Constitutional: He is oriented to person, place, and time. He appears well-developed. No distress.   Obese   HENT:   Head: Normocephalic and atraumatic.   Eyes: EOM are normal. Pupils are equal, round, and reactive to light.   Neck: Neck supple.   Cardiovascular: Normal rate.    Pulmonary/Chest: Effort normal. No respiratory distress.   Abdominal: Soft. He exhibits no distension. There is no tenderness.   Musculoskeletal: He exhibits no edema.   Right foot dressed-surgical site well=approx with sutures, dried blood  No erythema or swelling   Neurological: He is alert and oriented to person, place, and time.   Skin: Rash noted.   Erythematous macular rash BUE   Nursing note and vitals reviewed.      Meds:    Current Facility-Administered Medications:   •  morphine  •  Influenza Vaccine High-Dose pf  •  acetaminophen  •  oxycodone immediate-release  •  gabapentin  •  nystatin  •  hydrALAZINE  •   heparin  •  NS  •  tamsulosin  •  finasteride  •  senna-docusate **AND** polyethylene glycol/lytes **AND** magnesium hydroxide **AND** bisacodyl  •  Respiratory Care per Protocol  •  labetalol  •  ondansetron  •  ondansetron  •  insulin lispro  •  Action is required: Protocol 1073 Hypoglycemia has been implemented **AND** Protocol 1073 Inclusion Criteria **AND** Protocol 1073 NOTIFY **AND** Protocol 1073 Initiate protocol immediately if FSBG is less than or equal to 70 mg/dL **AND** glucose 4 g **AND** dextrose 50%  •  budesonide-formoterol  •  albuterol    Labs:  Recent Labs      10/26/17   0350  10/27/17   0310  10/28/17   0310   WBC  18.0*  13.3*  13.8*   RBC  3.65*  3.48*  3.68*   HEMOGLOBIN  11.0*  10.8*  11.0*   HEMATOCRIT  33.2*  32.1*  33.6*   MCV  91.0  92.2  91.3   MCH  30.1  31.0  29.9   RDW  46.0  47.1  46.5   PLATELETCT  353  312  339   MPV  9.7  9.6  9.8   NEUTSPOLYS  72.60*  62.70  59.60   LYMPHOCYTES  18.40*  25.30  29.60   MONOCYTES  5.90  6.90  5.70   EOSINOPHILS  2.30  4.10  3.90   BASOPHILS  0.30  0.50  0.70     Recent Labs      10/27/17   0310  10/28/17   0310   SODIUM  134*  135   POTASSIUM  3.6  3.4*   CHLORIDE  105  106   CO2  21  19*   GLUCOSE  153*  137*   BUN  27*  30*     Recent Labs      10/27/17   0310  10/28/17   0310   CREATININE  2.22*  2.57*       Imaging:  Ct-abdomen-pelvis W/o    Result Date: 10/18/2017  10/18/2017 9:45 AM HISTORY/REASON FOR EXAM:  Nausea and vomiting. TECHNIQUE/EXAM DESCRIPTION: CT scan of the abdomen and pelvis without contrast. Noncontrast helical scanning was obtained from the diaphragmatic domes through the pubic symphysis. Low dose optimization technique was utilized for this CT exam including automated exposure control and adjustment of the mA and/or kV according to patient size. COMPARISON: None. FINDINGS: Lung Bases: No pulmonary nodules at the lung bases. No pleural or pericardial fluid. Abdomen: Within the limits of noncontrast technique, the liver,  spleen, pancreas, and adrenal glands are unremarkable in appearance. Moderate dilatation of the bilateral renal collecting systems. Atrophic bilateral kidneys. There is small calculus in the gallbladder.. There is no bowel wall thickening or abnormal dilatation. Right lower quadrant ostomy. Postsurgical change from total colectomy. Moderate atherosclerotic disease of the abdominal aorta and its branches. Pelvis: Decompressed urinary bladder via Humphrey catheter. Diffuse wall thickening of the urinary bladder. Enlarged prostate. No lymph node enlargement. No free fluid, or free air in the abdomen or pelvis. No aggressive bone lesions are seen. ________________________________    1. Moderate dilatation of the bilateral renal collecting systems. No urinary tract calculus.. 2. Diffuse thickening of the urinary bladder wall. The urinary bladder is decompressed by Humphrey catheter. 3. Cholelithiasis.    Dx-chest-portable (1 View)    Result Date: 10/18/2017  10/18/2017 1:54 PM HISTORY/REASON FOR EXAM:  Right IJ central line placement TECHNIQUE/EXAM DESCRIPTION AND NUMBER OF VIEWS: Single portable view of the chest. COMPARISON: None FINDINGS: Right IJ catheter with tip in the right brachiocephalic vein. The bilateral lung bases are outside the field-of-view. No pulmonary infiltrates or consolidations are noted. No pleural effusion. No pneumothorax. Normal cardiopericardial silhouette.     Right IJ catheter with tip in the right brachiocephalic vein.    Dx-foot-2- Right    Result Date: 10/21/2017  10/21/2017 9:43 AM HISTORY/REASON FOR EXAM:  Swelling of first digit TECHNIQUE/EXAM DESCRIPTION AND NUMBER OF VIEWS: 2 nonweightbearing views of the RIGHT foot. COMPARISON:  No comparison available FINDINGS:  Bone mineralization is normal.  There is no evidence of fracture or dislocation. No focal bone erosion or periosteal reaction is identified. There is mild joint space narrowing and periarticular sclerosis. Soft tissue swelling is  noted inferiorly in the first digit.     1.  No evidence of fracture or dislocation. 2.  Soft tissue swelling could indicate cellulitis. 3.  No bone erosions identified.    Mr-foot-w/o Right    Result Date: 10/23/2017  10/22/2017 7:21 PM HISTORY/REASON FOR EXAM:  Great toe infection, GFR 24 TECHNIQUE/EXAM DESCRIPTION: MRI of the RIGHT foot without contrast. Using a [employer] Signa 1.5 Rach MRI scanner, T1 axial, sagittal, and coronal, fast spin-echo T2 fat-suppressed axial and coronal, and fast inversion recovery sagittal images were obtained. COMPARISON: Radiographs October 21 FINDINGS: Due to low GFR, contrast is not used to help with the study Over the plantar medial great toe there is moderate depth skin ulceration with adjacent subcutaneous fatty replacement. The underlying distal phalanx displays decreased T1 and increased T2 signal over a 10 mm region. There is no abnormal joint effusion. There is no marginal erosion. The lateral aspect of the interphalangeal joints subarticular margins are normal in appearance. No other marrow edema to suggest septic arthropathy. The proximal phalanx has normal signal and morphology. There is mild first metatarsal-phalangeal centered spurring and there is a large medial first metatarsal head erosion proximal to the articular surface. There are is also some dorsal erosive change Review of the second metatarsal head shows slight flattening and edema along the plantar distal margin. Mcrae's foot configuration Otherwise normal marrow signal Ligaments/tendons: No evidence of ligament or tendon injury.     First distal phalanx subarticular fatty marrow replacement is at the deep margin of skin ulceration indicating this likely represents osteomyelitis No septic arthropathy is detected Great toe cellulitis and ulceration Mcrae's foot configuration with second metatarsal head flattening and mild edema, compatible with Freiberg's infraction Moderate first metatarsal-phalangeal  osteoarthritis with probable mild hallux valgus deformity. Metatarsal head erosions could reflect gout    Us-renal    Result Date: 10/20/2017  10/20/2017 4:18 AM HISTORY/REASON FOR EXAM:  Follow-up hydronephrosis noted on CT of 10/18/2017 TECHNIQUE/EXAM DESCRIPTION: Renal ultrasound. COMPARISON:  As above. FINDINGS: The right kidney measures 10.78 cm. The left kidney measures 11.13 cm. There is moderate bilateral hydronephrosis, consistent with the CT appearance. There are no intrarenal calcifications. The bladder is decompressed around a Humphrey catheter.     Nonspecific bilateral hydronephrosis, similar in degree to that seen on CT of 10/18.      Micro:  Results     Procedure Component Value Units Date/Time    CULTURE TISSUE W/ GRM STAIN [[id number]]  (Abnormal)  (Susceptibility) Collected:  10/25/17 1100    Order Status:  Completed Specimen:  Tissue Updated:  10/28/17 5826     Significant Indicator POS (POS)     Source TISS     Site Right Great Toe     Tissue Culture -- (A)     Growth noted after further incubation,see below for  organism identification.       Gram Stain Result Rare Gram positive cocci.     Tissue Culture -- (A)     Staphylococcus epidermidis  Heavy growth      Culture & Susceptibility     STAPHYLOCOCCUS EPIDERMIDIS     Antibiotic Sensitivity Microscan Unit Status    Ampicillin/sulbactam Resistant <=8/4 mcg/mL Preliminary    Clindamycin Sensitive <=0.5 mcg/mL Preliminary    Daptomycin Sensitive <=0.5 mcg/mL Preliminary    Erythromycin Resistant >4 mcg/mL Preliminary    Moxifloxacin Resistant >4 mcg/mL Preliminary    Oxacillin Resistant >2 mcg/mL Preliminary    Penicillin Resistant >8 mcg/mL Preliminary    Tetracycline Sensitive <=4 mcg/mL Preliminary    Trimeth/Sulfa Resistant >2/38 mcg/mL Preliminary    Vancomycin Sensitive 2 mcg/mL Preliminary                       ANAEROBIC CULTURE [[id number]] Collected:  10/25/17 1100    Order Status:  Completed Specimen:  Tissue Updated:  10/28/17 5876      "Significant Indicator NEG     Source TISS     Site Right Great Toe     Anaerobic Culture, Culture Res No Anaerobes isolated.    GRAM STAIN [[id number]] Collected:  10/25/17 1100    Order Status:  Completed Specimen:  Tissue Updated:  10/26/17 0826     Significant Indicator .     Source TISS     Site Right Great Toe     Gram Stain Result Rare Gram positive cocci.    Blood Culture [[id number]] Collected:  10/19/17 0204    Order Status:  Completed Specimen:  Blood from Peripheral Updated:  10/24/17 0300     Significant Indicator NEG     Source BLD     Site PERIPHERAL     Blood Culture No growth after 5 days of incubation.    Narrative:       Per Hospital Policy: Only change Specimen Src: to \"Line\" if  specified by physician order.    Blood Culture [[id number]] Collected:  10/19/17 0204    Order Status:  Completed Specimen:  Blood from Peripheral Updated:  10/24/17 0300     Significant Indicator NEG     Source BLD     Site PERIPHERAL     Blood Culture No growth after 5 days of incubation.    Narrative:       Per Hospital Policy: Only change Specimen Src: to \"Line\" if  specified by physician order.          Assessment:  Active Hospital Problems    Diagnosis   • *Acute on chronic kidney failure (CMS-[facility]) [N17.9, N18.9]   • COPD (chronic obstructive pulmonary disease) (CMS-HCC) [J44.9]   • Diabetic foot ulcer (CMS-HCC) [E11.621, L97.509]   • Obstructive uropathy [N13.9]   • Subacute osteomyelitis of left foot (CMS-HCC) [M86.272]   • HTN (hypertension) [I10]   • Severe sepsis (CMS-HCC) [A41.9, R65.20]   • Anemia [D64.9]   • Diabetes (CMS-HCC) [E11.9]   • Crohn's disease (CMS-HCC) [K50.90]   • Osteoarthritis [M19.90]   • BPH (benign prostatic hyperplasia) [N40.0]   • Renal failure [N19]       Plan:  Osteomyelitis  Afebrile  Decreased leukocytosis  MRI of the foot done here showed osteomyelitis  S/p amp at prox  Likely curative  FU bone/margins-CNS from tissue     Diabetic foot ulcer (CMS-HCC)- (present on admission)  As " above    Rash, new  ?secondary to Zyvox  DC Zyvox  Local care    Acute on chronic kidney failure (CMS-HCC)- (present on admission)  2/2 to obstructive uropathy  Ultrasound w/ bilateral hydronephrosis noted on outside hospital records likely 2/2 to distal bladder outlet obstruction  Refused Humphrey  Dose adjust abx as needed  -no dose adjustment needed for Zyvox    Crohn's disease (CMS-HCC)- (present on admission)  On humira, injection bimonthly last injection 1 week ago  On hold  May be slow to heal    DUANE Kruse

## 2017-10-28 NOTE — DISCHARGE SUMMARY
CHIEF COMPLAINT ON ADMISSION  No chief complaint on file.      CODE STATUS  Full Code    HPI & HOSPITAL COURSE  This is a 67 y.o. male here with a combination of symptoms #1 the patient had acute renal failure with lower extremity swelling. #2 the patient had acute osteomyelitis of the right great toe. The patient completed 12 days of antibiotics IV for this. Over the past 24 hours C which were switched over to Zyvox for antibiotic management however he developed a rash. After discussing with ID the patient at this point does not need antibiotics any longer. Cultures initially grew out diphtheroids and then also skin staff. Not at the toe was resected the infection is completely resolved. His renal failure at this point is improving the patient's renal failure was secondary to postobstructive uropathy a Humphrey catheter was placed. At this point the patient will be discharged home with a Humphrey catheter. Patient's pain control at this point has been achieved he is on a combination of gabapentin and oxycodone. Patient does have a primary caretaker at home and the patient at this point will need home health as well. Patient at this point will need to be on a diabetic diet he will need to control his diabetes adequately patient is otherwise stable for discharge she is ambulating independently.    Therefore, he is discharged in good and stable condition with close outpatient follow-up.    SPECIFIC OUTPATIENT FOLLOW-UP  Patient is to follow-up with the primary care physician in 7-10 days    DISCHARGE PROBLEM LIST  Principal Problem:    Acute on chronic kidney failure (CMS-HCC) POA: Yes  Active Problems:    COPD (chronic obstructive pulmonary disease) (CMS-HCC) POA: Yes    Diabetic foot ulcer (CMS-HCC) POA: Yes    Obstructive uropathy POA: Yes    Renal failure POA: Yes    Diabetes (CMS-[facility]) POA: Yes    Crohn's disease (CMS-HCC) POA: Yes    Osteoarthritis POA: Yes    BPH (benign prostatic hyperplasia) POA: Yes    Severe  sepsis (CMS-HCC) POA: Yes    Anemia POA: Yes    HTN (hypertension) POA: Yes      Overview: STARTED HYDRALAZINE      NOT WELL CONTROLLED    Subacute osteomyelitis of left foot (CMS-HCC) POA: Yes  Resolved Problems:    * No resolved hospital problems. *      FOLLOW UP  Future Appointments  Date Time Provider Department Center   11/10/2017 9:00 AM Deepak Grant M.D. [address]     Nicolette Yeager M.D.  [address]  [address]  [facility] [place] [address]  [phone]    Schedule an appointment as soon as possible for a visit in 1 week  Follow up appointment      MEDICATIONS ON DISCHARGE   Shola Hoyt   Home Medication Instructions BEBE:[id number]    Printed on:10/28/17 6122   Medication Information                      albuterol 108 (90 Base) MCG/ACT Aero Soln inhalation aerosol  Inhale 2 Puffs by mouth every four hours as needed for Shortness of Breath.             budesonide-formoterol (SYMBICORT) 160-4.5 MCG/ACT Aerosol  Inhale 2 Puffs by mouth 2 Times a Day.             finasteride (PROSCAR) 5 MG Tab  Take 1 Tab by mouth every day.             gabapentin (NEURONTIN) 100 MG Cap  Take 2 Caps by mouth every evening for 30 days.             hydrALAZINE (APRESOLINE) 50 MG Tab  Take 1 Tab by mouth 2 Times a Day.             insulin regular (HUMULIN R) 100 Unit/mL Solution  Inject 30 Units as instructed 2 Times a Day.             morphine ER (MS CONTIN) 15 MG Tab CR tablet  Take 15 mg by mouth every 12 hours.             nystatin (MYCOSTATIN) powder  Apply 1 g to affected area(s) 4 times a day. Leg area             oxycodone immediate-release (ROXICODONE) 5 MG Tab  Take 1 Tab by mouth every four hours as needed.             polyethylene glycol/lytes (MIRALAX) Pack  Take 1 Packet by mouth 1 time daily as needed (if sennosides and docusate ineffective after 24 hours).             tamsulosin (FLOMAX) 0.4 MG capsule  Take 0.4 mg by mouth ONE-HALF HOUR AFTER BREAKFAST.                 DIET  Orders Placed This Encounter    Procedures   • DIET ORDER     Standing Status:   Standing     Number of Occurrences:   1     Order Specific Question:   Diet:     Answer:   Diabetic [3]       ACTIVITY  As tolerated.  Weight bearing as tolerated      CONSULTATIONS  Orthopedics    PROCEDURES  Amputation of the right great toe for osteomyelitis performed on 10/25/2017    LABORATORY  Lab Results   Component Value Date/Time    SODIUM 135 10/28/2017 03:10 AM    POTASSIUM 3.4 (L) 10/28/2017 03:10 AM    CHLORIDE 106 10/28/2017 03:10 AM    CO2 19 (L) 10/28/2017 03:10 AM    GLUCOSE 137 (H) 10/28/2017 03:10 AM    BUN 30 (H) 10/28/2017 03:10 AM    CREATININE 2.57 (H) 10/28/2017 03:10 AM        Lab Results   Component Value Date/Time    WBC 13.8 (H) 10/28/2017 03:10 AM    HEMOGLOBIN 11.0 (L) 10/28/2017 03:10 AM    HEMATOCRIT 33.6 (L) 10/28/2017 03:10 AM    PLATELETCT 339 10/28/2017 03:10 AM        Total time of the discharge process exceeds 45 minutes

## 2017-10-28 NOTE — CARE PLAN
Problem: Safety  Goal: Will remain free from injury  Outcome: PROGRESSING AS EXPECTED  Fall precautions maintained. Patient educated on importance of calling for assistance when OOB. Patient verbalized understanding and will call when OOB. Call light and bedside table within reach of patient.

## 2017-10-28 NOTE — CARE PLAN
Problem: Skin Integrity  Goal: Risk for impaired skin integrity will decrease  Outcome: PROGRESSING AS EXPECTED  Patient has dry, flaky, and red skin- lotion applied. Patient has rash to BUE and BL thighs; Dr. Prince notified and Zyvox will be held until hospital medicine and ID evaluate if cause of rash. Redness and exoration to groin- area cleaned with soap and water and nystatin powder applied.

## 2017-10-28 NOTE — PROGRESS NOTES
"LIMB PRESERVATION SERVICE      66 y/o male with history of DM2, colostomy, OA, BPH, renal insufficiency, COPD. [facility] and acute on chronic renal injury. Also presented with R great toe ulcer     Xray R foot: -OM  MRI R foot:   First distal phalanx subarticular fatty marrow replacement is at the deep margin of skin ulceration indicating this likely represents osteomyelitis    POD #2 s/p R great toe amputation  /62   Pulse 76   Temp 36.6 °C (97.9 °F)   Resp 16   Ht 1.803 m (5' 11\") Comment: Per pt  Wt 116.9 kg (257 lb 11.5 oz)   SpO2 92%   BMI 35.94 kg/m²    Blood glucose 1180  Pt denies fevers, chills, n/v    Recent Labs      10/25/17   0043  10/26/17   0350  10/27/17   0310   WBC  15.9*  18.0*  13.3*   RBC  3.68*  3.65*  3.48*   HEMOGLOBIN  10.9*  11.0*  10.8*   HEMATOCRIT  33.5*  33.2*  32.1*   MCV  91.0  91.0  92.2   MCH  29.6  30.1  31.0   RDW  45.4  46.0  47.1   PLATELETCT  383  353  312   MPV  9.6  9.7  9.6   NEUTSPOLYS  62.60  72.60*  62.70   LYMPHOCYTES  25.40  18.40*  25.30   MONOCYTES  6.90  5.90  6.90   EOSINOPHILS  4.10  2.30  4.10   BASOPHILS  0.40  0.30  0.50       Surgical dressing changed.  Sutures intact, incision well approximated.  Dried blood along suture line.  Erythema radiating ~ 5 cm from incision.    Infectious diseases managing abx  IV abx dc'd  Oral Zyvox    offloading shoe at bedside, pt to wear when ambulating      PLAN  -daily dressing changes by nursing  - Offloading shoe when ambulating.  HWB  -abx per ID recs  -discharge planning  -Ok for discharge per LPS standpoint  -pt to f/u in LPS rounds at outpatient wound clinic in 3 weeks  "

## 2017-10-28 NOTE — PROGRESS NOTES
2 RN skin check done with PREET Steen . Perineum excoriated with cream on it. Scrotum dry and flaky. Right great toe surgical wound sutured shut with periwound redness. WOCN at bedside to change dressing to right foot.

## 2017-10-28 NOTE — PROGRESS NOTES
Pt discharged home per MD orders, instructions given on follow up apts, medications, wound care/monitoring, and leg bag, pt verbalizes understanding. Social work will follow up on Monday to get home health approved and set up. Pt caregiver here now to drive pt home.

## 2017-10-28 NOTE — PROGRESS NOTES
Pt being transported off floor with Transport.  All personal possessions with pt as well as chart and meds. No signs of distress.

## 2017-10-29 LAB
BACTERIA TISS AEROBE CULT: ABNORMAL
GRAM STN SPEC: ABNORMAL
SIGNIFICANT IND 70042: ABNORMAL
SITE SITE: ABNORMAL
SOURCE SOURCE: ABNORMAL

## 2017-11-09 NOTE — DOCUMENTATION QUERY
"DOCUMENTATION QUERY    PROVIDERS: Please select “Cosign w/ note” to reply to query.    To better represent the severity of illness of your patient, please review the following information and exercise your independent professional judgment in responding to this query.     Conflicting documentation for Sepsis POA status: Severe Sepsis POA is documented in the Discharge Summary. On a progress note dated 10/18/17, it is noted \"This is severe sepsis with the following associated acute organ dysfunction(s): acute kidney failure\".  Also, Acute on chronic kidney failure is documented as POA. \"Sepsis not present on admission\" is noted as well.      Based upon the clinical findings, risk factors, and treatment, Please provide further clarification by selecting one of the following options:    Please select all that apply:   • Sepsis present on admission   • Sepsis developed after admission  • Other explanation of clinical findings (please document)  • Unable to determine        The medical record reflects the following:   Clinical Findings  BRI   Treatment  Sepsis protocol   IV Fluids   Antiobiotics   Risk Factors     Location within medical record  Progress Notes   H&P   DC Summary     Thank you,   Elizabeth Sawallisch        "

## 2017-11-09 NOTE — ADDENDUM NOTE
Encounter addended by: Elizabeth L. Sawallisch on: 11/9/2017  2:50 PM<BR>    Actions taken: Pend clinical note

## 2017-11-10 NOTE — ADDENDUM NOTE
Encounter addended by: Elizabeth L. Sawallisch on: 11/10/2017  7:38 AM<BR>    Actions taken: Sign clinical note

## 2018-02-23 ENCOUNTER — RESOLUTE PROFESSIONAL BILLING HOSPITAL PROF FEE (OUTPATIENT)
Dept: HOSPITALIST | Facility: MEDICAL CENTER | Age: 68
End: 2018-02-23
Payer: MEDICARE

## 2018-02-23 ENCOUNTER — HOSPITAL ENCOUNTER (INPATIENT)
Facility: MEDICAL CENTER | Age: 68
LOS: 4 days | DRG: 683 | End: 2018-02-27
Attending: EMERGENCY MEDICINE | Admitting: INTERNAL MEDICINE
Payer: MEDICARE

## 2018-02-23 ENCOUNTER — APPOINTMENT (OUTPATIENT)
Dept: RADIOLOGY | Facility: MEDICAL CENTER | Age: 68
DRG: 683 | End: 2018-02-23
Attending: INTERNAL MEDICINE
Payer: MEDICARE

## 2018-02-23 DIAGNOSIS — N17.9 ACUTE RENAL FAILURE, UNSPECIFIED ACUTE RENAL FAILURE TYPE (HCC): ICD-10-CM

## 2018-02-23 DIAGNOSIS — R33.9 URINARY RETENTION: ICD-10-CM

## 2018-02-23 LAB
ALBUMIN SERPL BCP-MCNC: 3.5 G/DL (ref 3.2–4.9)
ALBUMIN/GLOB SERPL: 0.7 G/DL
ALP SERPL-CCNC: 79 U/L (ref 30–99)
ALT SERPL-CCNC: 14 U/L (ref 2–50)
ANION GAP SERPL CALC-SCNC: 9 MMOL/L (ref 0–11.9)
APPEARANCE UR: ABNORMAL
APTT PPP: 38.1 SEC (ref 24.7–36)
AST SERPL-CCNC: 13 U/L (ref 12–45)
BACTERIA #/AREA URNS HPF: ABNORMAL /HPF
BASOPHILS # BLD AUTO: 0.3 % (ref 0–1.8)
BASOPHILS # BLD: 0.04 K/UL (ref 0–0.12)
BILIRUB SERPL-MCNC: 0.3 MG/DL (ref 0.1–1.5)
BILIRUB UR QL STRIP.AUTO: NEGATIVE
BUN SERPL-MCNC: 41 MG/DL (ref 8–22)
CALCIUM SERPL-MCNC: 8.8 MG/DL (ref 8.5–10.5)
CHLORIDE SERPL-SCNC: 109 MMOL/L (ref 96–112)
CO2 SERPL-SCNC: 17 MMOL/L (ref 20–33)
COLOR UR: YELLOW
CREAT SERPL-MCNC: 3.74 MG/DL (ref 0.5–1.4)
CULTURE IF INDICATED INDCX: YES UA CULTURE
EOSINOPHIL # BLD AUTO: 0.09 K/UL (ref 0–0.51)
EOSINOPHIL NFR BLD: 0.8 % (ref 0–6.9)
EPI CELLS #/AREA URNS HPF: NEGATIVE /HPF
ERYTHROCYTE [DISTWIDTH] IN BLOOD BY AUTOMATED COUNT: 48.2 FL (ref 35.9–50)
EST. AVERAGE GLUCOSE BLD GHB EST-MCNC: 137 MG/DL
GLOBULIN SER CALC-MCNC: 4.8 G/DL (ref 1.9–3.5)
GLUCOSE SERPL-MCNC: 102 MG/DL (ref 65–99)
GLUCOSE UR STRIP.AUTO-MCNC: NEGATIVE MG/DL
HBA1C MFR BLD: 6.4 % (ref 0–5.6)
HCT VFR BLD AUTO: 29.1 % (ref 42–52)
HGB BLD-MCNC: 9.1 G/DL (ref 14–18)
HYALINE CASTS #/AREA URNS LPF: ABNORMAL /LPF
IMM GRANULOCYTES # BLD AUTO: 0.04 K/UL (ref 0–0.11)
IMM GRANULOCYTES NFR BLD AUTO: 0.3 % (ref 0–0.9)
INR PPP: 1.32 (ref 0.87–1.13)
KETONES UR STRIP.AUTO-MCNC: NEGATIVE MG/DL
LEUKOCYTE ESTERASE UR QL STRIP.AUTO: ABNORMAL
LYMPHOCYTES # BLD AUTO: 3.23 K/UL (ref 1–4.8)
LYMPHOCYTES NFR BLD: 28.1 % (ref 22–41)
MAGNESIUM SERPL-MCNC: 1.7 MG/DL (ref 1.5–2.5)
MCH RBC QN AUTO: 27.2 PG (ref 27–33)
MCHC RBC AUTO-ENTMCNC: 31.3 G/DL (ref 33.7–35.3)
MCV RBC AUTO: 86.9 FL (ref 81.4–97.8)
MICRO URNS: ABNORMAL
MONOCYTES # BLD AUTO: 0.91 K/UL (ref 0–0.85)
MONOCYTES NFR BLD AUTO: 7.9 % (ref 0–13.4)
NEUTROPHILS # BLD AUTO: 7.17 K/UL (ref 1.82–7.42)
NEUTROPHILS NFR BLD: 62.6 % (ref 44–72)
NITRITE UR QL STRIP.AUTO: NEGATIVE
NRBC # BLD AUTO: 0 K/UL
NRBC BLD-RTO: 0 /100 WBC
PH UR STRIP.AUTO: 5.5 [PH]
PHOSPHATE SERPL-MCNC: 4.1 MG/DL (ref 2.5–4.5)
PLATELET # BLD AUTO: 425 K/UL (ref 164–446)
PMV BLD AUTO: 9.2 FL (ref 9–12.9)
POTASSIUM SERPL-SCNC: 4.5 MMOL/L (ref 3.6–5.5)
PROT SERPL-MCNC: 8.3 G/DL (ref 6–8.2)
PROT UR QL STRIP: 30 MG/DL
PROTHROMBIN TIME: 16.1 SEC (ref 12–14.6)
RBC # BLD AUTO: 3.35 M/UL (ref 4.7–6.1)
RBC # URNS HPF: ABNORMAL /HPF
RBC UR QL AUTO: ABNORMAL
SODIUM SERPL-SCNC: 135 MMOL/L (ref 135–145)
SP GR UR STRIP.AUTO: 1.01
TSH SERPL DL<=0.005 MIU/L-ACNC: 0.94 UIU/ML (ref 0.38–5.33)
UROBILINOGEN UR STRIP.AUTO-MCNC: 0.2 MG/DL
WBC # BLD AUTO: 11.5 K/UL (ref 4.8–10.8)
WBC #/AREA URNS HPF: ABNORMAL /HPF

## 2018-02-23 PROCEDURE — 99223 1ST HOSP IP/OBS HIGH 75: CPT | Mod: AI | Performed by: INTERNAL MEDICINE

## 2018-02-23 PROCEDURE — 90662 IIV NO PRSV INCREASED AG IM: CPT | Performed by: INTERNAL MEDICINE

## 2018-02-23 PROCEDURE — 99285 EMERGENCY DEPT VISIT HI MDM: CPT

## 2018-02-23 PROCEDURE — 83036 HEMOGLOBIN GLYCOSYLATED A1C: CPT

## 2018-02-23 PROCEDURE — 85025 COMPLETE CBC W/AUTO DIFF WBC: CPT

## 2018-02-23 PROCEDURE — 770006 HCHG ROOM/CARE - MED/SURG/GYN SEMI*

## 2018-02-23 PROCEDURE — 96374 THER/PROPH/DIAG INJ IV PUSH: CPT

## 2018-02-23 PROCEDURE — 82962 GLUCOSE BLOOD TEST: CPT

## 2018-02-23 PROCEDURE — 84100 ASSAY OF PHOSPHORUS: CPT

## 2018-02-23 PROCEDURE — 83735 ASSAY OF MAGNESIUM: CPT

## 2018-02-23 PROCEDURE — 85730 THROMBOPLASTIN TIME PARTIAL: CPT

## 2018-02-23 PROCEDURE — 700102 HCHG RX REV CODE 250 W/ 637 OVERRIDE(OP): Performed by: INTERNAL MEDICINE

## 2018-02-23 PROCEDURE — 700105 HCHG RX REV CODE 258: Performed by: EMERGENCY MEDICINE

## 2018-02-23 PROCEDURE — 81001 URINALYSIS AUTO W/SCOPE: CPT

## 2018-02-23 PROCEDURE — A9270 NON-COVERED ITEM OR SERVICE: HCPCS | Performed by: EMERGENCY MEDICINE

## 2018-02-23 PROCEDURE — 700111 HCHG RX REV CODE 636 W/ 250 OVERRIDE (IP): Performed by: EMERGENCY MEDICINE

## 2018-02-23 PROCEDURE — 3E0234Z INTRODUCTION OF SERUM, TOXOID AND VACCINE INTO MUSCLE, PERCUTANEOUS APPROACH: ICD-10-PCS | Performed by: INTERNAL MEDICINE

## 2018-02-23 PROCEDURE — 51702 INSERT TEMP BLADDER CATH: CPT

## 2018-02-23 PROCEDURE — 87086 URINE CULTURE/COLONY COUNT: CPT

## 2018-02-23 PROCEDURE — 700101 HCHG RX REV CODE 250: Performed by: EMERGENCY MEDICINE

## 2018-02-23 PROCEDURE — 71045 X-RAY EXAM CHEST 1 VIEW: CPT

## 2018-02-23 PROCEDURE — A9270 NON-COVERED ITEM OR SERVICE: HCPCS | Performed by: INTERNAL MEDICINE

## 2018-02-23 PROCEDURE — 303105 HCHG CATHETER EXTRA

## 2018-02-23 PROCEDURE — 85610 PROTHROMBIN TIME: CPT

## 2018-02-23 PROCEDURE — 96361 HYDRATE IV INFUSION ADD-ON: CPT

## 2018-02-23 PROCEDURE — 84443 ASSAY THYROID STIM HORMONE: CPT

## 2018-02-23 PROCEDURE — 90471 IMMUNIZATION ADMIN: CPT

## 2018-02-23 PROCEDURE — 700102 HCHG RX REV CODE 250 W/ 637 OVERRIDE(OP): Performed by: EMERGENCY MEDICINE

## 2018-02-23 PROCEDURE — 700111 HCHG RX REV CODE 636 W/ 250 OVERRIDE (IP): Performed by: INTERNAL MEDICINE

## 2018-02-23 PROCEDURE — 80053 COMPREHEN METABOLIC PANEL: CPT

## 2018-02-23 PROCEDURE — 700105 HCHG RX REV CODE 258: Performed by: INTERNAL MEDICINE

## 2018-02-23 RX ORDER — LORATADINE 10 MG/1
10 TABLET ORAL DAILY
COMMUNITY
End: 2019-04-08

## 2018-02-23 RX ORDER — FERROUS GLUCONATE 324(38)MG
324 TABLET ORAL
COMMUNITY
End: 2019-04-08

## 2018-02-23 RX ORDER — TAMSULOSIN HYDROCHLORIDE 0.4 MG/1
0.8 CAPSULE ORAL
Status: DISCONTINUED | OUTPATIENT
Start: 2018-02-23 | End: 2018-02-27 | Stop reason: HOSPADM

## 2018-02-23 RX ORDER — ONDANSETRON 4 MG/1
4 TABLET, ORALLY DISINTEGRATING ORAL EVERY 4 HOURS PRN
Status: DISCONTINUED | OUTPATIENT
Start: 2018-02-23 | End: 2018-02-27 | Stop reason: HOSPADM

## 2018-02-23 RX ORDER — FINASTERIDE 5 MG/1
5 TABLET, FILM COATED ORAL DAILY
Status: DISCONTINUED | OUTPATIENT
Start: 2018-02-24 | End: 2018-02-27 | Stop reason: HOSPADM

## 2018-02-23 RX ORDER — ALBUTEROL SULFATE 90 UG/1
2 AEROSOL, METERED RESPIRATORY (INHALATION) EVERY 4 HOURS PRN
Status: DISCONTINUED | OUTPATIENT
Start: 2018-02-23 | End: 2018-02-27 | Stop reason: HOSPADM

## 2018-02-23 RX ORDER — LORATADINE 10 MG/1
10 TABLET ORAL DAILY
Status: DISCONTINUED | OUTPATIENT
Start: 2018-02-24 | End: 2018-02-27 | Stop reason: HOSPADM

## 2018-02-23 RX ORDER — LORAZEPAM 2 MG/ML
0.5 INJECTION INTRAMUSCULAR EVERY 6 HOURS PRN
Status: DISCONTINUED | OUTPATIENT
Start: 2018-02-23 | End: 2018-02-27 | Stop reason: HOSPADM

## 2018-02-23 RX ORDER — GRANULES FOR ORAL 3 G/1
3 POWDER ORAL
Status: ON HOLD | COMMUNITY
Start: 2018-02-21 | End: 2018-02-27

## 2018-02-23 RX ORDER — ONDANSETRON 2 MG/ML
4 INJECTION INTRAMUSCULAR; INTRAVENOUS EVERY 4 HOURS PRN
Status: DISCONTINUED | OUTPATIENT
Start: 2018-02-23 | End: 2018-02-27 | Stop reason: HOSPADM

## 2018-02-23 RX ORDER — ALBUTEROL SULFATE 2.5 MG/3ML
2.5 SOLUTION RESPIRATORY (INHALATION) EVERY 4 HOURS PRN
COMMUNITY
End: 2019-04-08

## 2018-02-23 RX ORDER — HEPARIN SODIUM 5000 [USP'U]/ML
5000 INJECTION, SOLUTION INTRAVENOUS; SUBCUTANEOUS EVERY 8 HOURS
Status: DISCONTINUED | OUTPATIENT
Start: 2018-02-23 | End: 2018-02-24

## 2018-02-23 RX ORDER — FERROUS GLUCONATE 324(38)MG
324 TABLET ORAL
Status: DISCONTINUED | OUTPATIENT
Start: 2018-02-24 | End: 2018-02-27 | Stop reason: HOSPADM

## 2018-02-23 RX ORDER — LORAZEPAM 0.5 MG/1
0.5 TABLET ORAL EVERY 6 HOURS PRN
Status: DISCONTINUED | OUTPATIENT
Start: 2018-02-23 | End: 2018-02-27 | Stop reason: HOSPADM

## 2018-02-23 RX ORDER — MORPHINE SULFATE 15 MG/1
15 TABLET ORAL 3 TIMES DAILY PRN
COMMUNITY
End: 2019-04-08

## 2018-02-23 RX ORDER — DEXTROSE MONOHYDRATE 25 G/50ML
25 INJECTION, SOLUTION INTRAVENOUS
Status: DISCONTINUED | OUTPATIENT
Start: 2018-02-23 | End: 2018-02-27 | Stop reason: HOSPADM

## 2018-02-23 RX ORDER — LORAZEPAM 2 MG/ML
0.5 INJECTION INTRAMUSCULAR ONCE
Status: COMPLETED | OUTPATIENT
Start: 2018-02-23 | End: 2018-02-23

## 2018-02-23 RX ORDER — MORPHINE SULFATE 15 MG/1
15 TABLET, FILM COATED, EXTENDED RELEASE ORAL EVERY 12 HOURS
Status: DISCONTINUED | OUTPATIENT
Start: 2018-02-23 | End: 2018-02-24

## 2018-02-23 RX ORDER — SODIUM CHLORIDE 9 MG/ML
INJECTION, SOLUTION INTRAVENOUS CONTINUOUS
Status: DISCONTINUED | OUTPATIENT
Start: 2018-02-23 | End: 2018-02-23

## 2018-02-23 RX ORDER — LABETALOL HYDROCHLORIDE 5 MG/ML
10 INJECTION, SOLUTION INTRAVENOUS EVERY 4 HOURS PRN
Status: DISCONTINUED | OUTPATIENT
Start: 2018-02-23 | End: 2018-02-27 | Stop reason: HOSPADM

## 2018-02-23 RX ORDER — ACETAMINOPHEN 325 MG/1
650 TABLET ORAL EVERY 6 HOURS PRN
Status: DISCONTINUED | OUTPATIENT
Start: 2018-02-23 | End: 2018-02-27 | Stop reason: HOSPADM

## 2018-02-23 RX ORDER — CIPROFLOXACIN 2 MG/ML
400 INJECTION, SOLUTION INTRAVENOUS ONCE
Status: COMPLETED | OUTPATIENT
Start: 2018-02-23 | End: 2018-02-23

## 2018-02-23 RX ORDER — PHENAZOPYRIDINE HYDROCHLORIDE 200 MG/1
200 TABLET, FILM COATED ORAL ONCE
Status: COMPLETED | OUTPATIENT
Start: 2018-02-23 | End: 2018-02-23

## 2018-02-23 RX ORDER — SODIUM CHLORIDE 9 MG/ML
INJECTION, SOLUTION INTRAVENOUS CONTINUOUS
Status: DISCONTINUED | OUTPATIENT
Start: 2018-02-23 | End: 2018-02-27 | Stop reason: HOSPADM

## 2018-02-23 RX ORDER — MORPHINE SULFATE 15 MG/1
15 TABLET ORAL 2 TIMES DAILY PRN
Status: DISCONTINUED | OUTPATIENT
Start: 2018-02-23 | End: 2018-02-24

## 2018-02-23 RX ORDER — FUROSEMIDE 20 MG/1
20 TABLET ORAL DAILY
COMMUNITY
End: 2019-04-08

## 2018-02-23 RX ORDER — BUDESONIDE AND FORMOTEROL FUMARATE DIHYDRATE 160; 4.5 UG/1; UG/1
2 AEROSOL RESPIRATORY (INHALATION) 2 TIMES DAILY
Status: DISCONTINUED | OUTPATIENT
Start: 2018-02-23 | End: 2018-02-27 | Stop reason: HOSPADM

## 2018-02-23 RX ORDER — PHENAZOPYRIDINE HYDROCHLORIDE 200 MG/1
100 TABLET, FILM COATED ORAL
Status: DISCONTINUED | OUTPATIENT
Start: 2018-02-23 | End: 2018-02-23

## 2018-02-23 RX ADMIN — PHENAZOPYRIDINE HYDROCHLORIDE 200 MG: 200 TABLET ORAL at 17:04

## 2018-02-23 RX ADMIN — MORPHINE SULFATE 15 MG: 15 TABLET, EXTENDED RELEASE ORAL at 19:46

## 2018-02-23 RX ADMIN — INSULIN HUMAN 2 UNITS: 100 INJECTION, SOLUTION PARENTERAL at 21:56

## 2018-02-23 RX ADMIN — LORAZEPAM 0.5 MG: 2 INJECTION INTRAMUSCULAR; INTRAVENOUS at 17:07

## 2018-02-23 RX ADMIN — LIDOCAINE HYDROCHLORIDE 5 ML: 20 JELLY TOPICAL at 16:30

## 2018-02-23 RX ADMIN — INFLUENZA A VIRUSA/MICHIGAN/45/2015 X-275 (H1N1) ANTIGEN (FORMALDEHYDE INACTIVATED), INFLUENZA A VIRUS A/HONG KONG/4801/2014 X-263B (H3N2) ANTIGEN (FORMALDEHYDE INACTIVATED), AND INFLUENZA B VIRUS B/BRISBANE/60/2008 ANTIGEN (FORMALDEHYDE INACTIVATED) 0.5 ML: 60; 60; 60 INJECTION, SUSPENSION INTRAMUSCULAR at 21:59

## 2018-02-23 RX ADMIN — MORPHINE SULFATE 15 MG: 15 TABLET ORAL at 21:17

## 2018-02-23 RX ADMIN — HEPARIN SODIUM 5000 UNITS: 5000 INJECTION, SOLUTION INTRAVENOUS; SUBCUTANEOUS at 21:17

## 2018-02-23 RX ADMIN — INSULIN HUMAN 15 UNITS: 100 INJECTION, SUSPENSION SUBCUTANEOUS at 21:25

## 2018-02-23 RX ADMIN — SODIUM CHLORIDE: 9 INJECTION, SOLUTION INTRAVENOUS at 19:48

## 2018-02-23 RX ADMIN — TAMSULOSIN HYDROCHLORIDE 0.8 MG: 0.4 CAPSULE ORAL at 19:46

## 2018-02-23 RX ADMIN — BUDESONIDE AND FORMOTEROL FUMARATE DIHYDRATE 2 PUFF: 160; 4.5 AEROSOL RESPIRATORY (INHALATION) at 19:53

## 2018-02-23 RX ADMIN — SODIUM CHLORIDE: 9 INJECTION, SOLUTION INTRAVENOUS at 17:05

## 2018-02-23 RX ADMIN — CIPROFLOXACIN 400 MG: 2 INJECTION, SOLUTION INTRAVENOUS at 19:48

## 2018-02-23 ASSESSMENT — PATIENT HEALTH QUESTIONNAIRE - PHQ9
9. THOUGHTS THAT YOU WOULD BE BETTER OFF DEAD, OR OF HURTING YOURSELF: NOT AT ALL
SUM OF ALL RESPONSES TO PHQ9 QUESTIONS 1 AND 2: 5
2. FEELING DOWN, DEPRESSED, IRRITABLE, OR HOPELESS: MORE THAN HALF THE DAYS
8. MOVING OR SPEAKING SO SLOWLY THAT OTHER PEOPLE COULD HAVE NOTICED. OR THE OPPOSITE, BEING SO FIGETY OR RESTLESS THAT YOU HAVE BEEN MOVING AROUND A LOT MORE THAN USUAL: NOT AT ALL
5. POOR APPETITE OR OVEREATING: SEVERAL DAYS
1. LITTLE INTEREST OR PLEASURE IN DOING THINGS: NEARLY EVERY DAY
4. FEELING TIRED OR HAVING LITTLE ENERGY: MORE THAN HALF THE DAYS
3. TROUBLE FALLING OR STAYING ASLEEP OR SLEEPING TOO MUCH: SEVERAL DAYS
6. FEELING BAD ABOUT YOURSELF - OR THAT YOU ARE A FAILURE OR HAVE LET YOURSELF OR YOUR FAMILY DOWN: SEVERAL DAYS
7. TROUBLE CONCENTRATING ON THINGS, SUCH AS READING THE NEWSPAPER OR WATCHING TELEVISION: NOT AT ALL
SUM OF ALL RESPONSES TO PHQ QUESTIONS 1-9: 10

## 2018-02-23 ASSESSMENT — PAIN SCALES - GENERAL
PAINLEVEL_OUTOF10: 10
PAINLEVEL_OUTOF10: 10
PAINLEVEL_OUTOF10: 8

## 2018-02-23 ASSESSMENT — LIFESTYLE VARIABLES
ALCOHOL_USE: NO
EVER_SMOKED: YES

## 2018-02-23 NOTE — ED TRIAGE NOTES
.  Chief Complaint   Patient presents with   • Urinary Retention   • Blood in Urine     with clots x 2 weeks   • Urinary Frequency   transfer from [facility] above c/c. Pt reports urinary catheter placed prior to arrival however requested staff to remove d/t increased pain and discomfort. Pt arrived with 400 ml pink urine with clots. Voided additional 100 ml urine on arrival.

## 2018-02-24 ENCOUNTER — APPOINTMENT (OUTPATIENT)
Dept: RADIOLOGY | Facility: MEDICAL CENTER | Age: 68
DRG: 683 | End: 2018-02-24
Attending: INTERNAL MEDICINE
Payer: MEDICARE

## 2018-02-24 PROBLEM — D72.829 LEUKOCYTOSIS: Status: RESOLVED | Noted: 2018-02-24 | Resolved: 2018-02-24

## 2018-02-24 PROBLEM — D72.829 LEUKOCYTOSIS: Status: ACTIVE | Noted: 2018-02-24

## 2018-02-24 PROBLEM — N39.0 UTI (URINARY TRACT INFECTION): Status: ACTIVE | Noted: 2018-02-24

## 2018-02-24 PROBLEM — G89.29 CHRONIC PAIN: Status: ACTIVE | Noted: 2018-02-24

## 2018-02-24 LAB
ALBUMIN SERPL BCP-MCNC: 3.4 G/DL (ref 3.2–4.9)
ALBUMIN/GLOB SERPL: 0.7 G/DL
ALP SERPL-CCNC: 71 U/L (ref 30–99)
ALT SERPL-CCNC: 13 U/L (ref 2–50)
ANION GAP SERPL CALC-SCNC: 7 MMOL/L (ref 0–11.9)
AST SERPL-CCNC: 15 U/L (ref 12–45)
BASOPHILS # BLD AUTO: 0.4 % (ref 0–1.8)
BASOPHILS # BLD: 0.04 K/UL (ref 0–0.12)
BILIRUB SERPL-MCNC: 0.3 MG/DL (ref 0.1–1.5)
BUN SERPL-MCNC: 40 MG/DL (ref 8–22)
CALCIUM SERPL-MCNC: 9.1 MG/DL (ref 8.5–10.5)
CHLORIDE SERPL-SCNC: 112 MMOL/L (ref 96–112)
CHOLEST SERPL-MCNC: 80 MG/DL (ref 100–199)
CO2 SERPL-SCNC: 18 MMOL/L (ref 20–33)
CREAT SERPL-MCNC: 3.53 MG/DL (ref 0.5–1.4)
EOSINOPHIL # BLD AUTO: 0.23 K/UL (ref 0–0.51)
EOSINOPHIL NFR BLD: 2.5 % (ref 0–6.9)
ERYTHROCYTE [DISTWIDTH] IN BLOOD BY AUTOMATED COUNT: 48.4 FL (ref 35.9–50)
GLOBULIN SER CALC-MCNC: 5 G/DL (ref 1.9–3.5)
GLUCOSE BLD-MCNC: 121 MG/DL (ref 65–99)
GLUCOSE BLD-MCNC: 124 MG/DL (ref 65–99)
GLUCOSE BLD-MCNC: 157 MG/DL (ref 65–99)
GLUCOSE BLD-MCNC: 158 MG/DL (ref 65–99)
GLUCOSE SERPL-MCNC: 104 MG/DL (ref 65–99)
HCT VFR BLD AUTO: 29.8 % (ref 42–52)
HDLC SERPL-MCNC: 23 MG/DL
HGB BLD-MCNC: 9.2 G/DL (ref 14–18)
IMM GRANULOCYTES # BLD AUTO: 0.03 K/UL (ref 0–0.11)
IMM GRANULOCYTES NFR BLD AUTO: 0.3 % (ref 0–0.9)
LDLC SERPL CALC-MCNC: 36 MG/DL
LYMPHOCYTES # BLD AUTO: 3 K/UL (ref 1–4.8)
LYMPHOCYTES NFR BLD: 32.3 % (ref 22–41)
MAGNESIUM SERPL-MCNC: 1.8 MG/DL (ref 1.5–2.5)
MCH RBC QN AUTO: 27 PG (ref 27–33)
MCHC RBC AUTO-ENTMCNC: 30.9 G/DL (ref 33.7–35.3)
MCV RBC AUTO: 87.4 FL (ref 81.4–97.8)
MONOCYTES # BLD AUTO: 0.97 K/UL (ref 0–0.85)
MONOCYTES NFR BLD AUTO: 10.4 % (ref 0–13.4)
NEUTROPHILS # BLD AUTO: 5.02 K/UL (ref 1.82–7.42)
NEUTROPHILS NFR BLD: 54.1 % (ref 44–72)
NRBC # BLD AUTO: 0 K/UL
NRBC BLD-RTO: 0 /100 WBC
PHOSPHATE SERPL-MCNC: 4.1 MG/DL (ref 2.5–4.5)
PLATELET # BLD AUTO: 411 K/UL (ref 164–446)
PMV BLD AUTO: 9.2 FL (ref 9–12.9)
POTASSIUM SERPL-SCNC: 4.7 MMOL/L (ref 3.6–5.5)
PROT SERPL-MCNC: 8.4 G/DL (ref 6–8.2)
RBC # BLD AUTO: 3.41 M/UL (ref 4.7–6.1)
SODIUM SERPL-SCNC: 137 MMOL/L (ref 135–145)
TRIGL SERPL-MCNC: 103 MG/DL (ref 0–149)
WBC # BLD AUTO: 9.3 K/UL (ref 4.8–10.8)

## 2018-02-24 PROCEDURE — 700101 HCHG RX REV CODE 250: Performed by: INTERNAL MEDICINE

## 2018-02-24 PROCEDURE — 700102 HCHG RX REV CODE 250 W/ 637 OVERRIDE(OP): Performed by: HOSPITALIST

## 2018-02-24 PROCEDURE — 700111 HCHG RX REV CODE 636 W/ 250 OVERRIDE (IP): Performed by: INTERNAL MEDICINE

## 2018-02-24 PROCEDURE — 85025 COMPLETE CBC W/AUTO DIFF WBC: CPT

## 2018-02-24 PROCEDURE — 700102 HCHG RX REV CODE 250 W/ 637 OVERRIDE(OP): Performed by: INTERNAL MEDICINE

## 2018-02-24 PROCEDURE — 302094 BELT OSTOMY (HOLLISTER): Performed by: INTERNAL MEDICINE

## 2018-02-24 PROCEDURE — 82962 GLUCOSE BLOOD TEST: CPT | Mod: 91

## 2018-02-24 PROCEDURE — 99232 SBSQ HOSP IP/OBS MODERATE 35: CPT | Performed by: INTERNAL MEDICINE

## 2018-02-24 PROCEDURE — 76775 US EXAM ABDO BACK WALL LIM: CPT

## 2018-02-24 PROCEDURE — 80053 COMPREHEN METABOLIC PANEL: CPT

## 2018-02-24 PROCEDURE — 84100 ASSAY OF PHOSPHORUS: CPT

## 2018-02-24 PROCEDURE — 83735 ASSAY OF MAGNESIUM: CPT

## 2018-02-24 PROCEDURE — 36415 COLL VENOUS BLD VENIPUNCTURE: CPT

## 2018-02-24 PROCEDURE — A9270 NON-COVERED ITEM OR SERVICE: HCPCS | Performed by: HOSPITALIST

## 2018-02-24 PROCEDURE — A9270 NON-COVERED ITEM OR SERVICE: HCPCS | Performed by: INTERNAL MEDICINE

## 2018-02-24 PROCEDURE — 80061 LIPID PANEL: CPT

## 2018-02-24 PROCEDURE — 770006 HCHG ROOM/CARE - MED/SURG/GYN SEMI*

## 2018-02-24 RX ORDER — HYDROMORPHONE HYDROCHLORIDE 2 MG/ML
.25-.5 INJECTION, SOLUTION INTRAMUSCULAR; INTRAVENOUS; SUBCUTANEOUS EVERY 4 HOURS PRN
Status: DISCONTINUED | OUTPATIENT
Start: 2018-02-24 | End: 2018-02-24

## 2018-02-24 RX ORDER — MORPHINE SULFATE 15 MG/1
30 TABLET ORAL 3 TIMES DAILY PRN
Status: DISCONTINUED | OUTPATIENT
Start: 2018-02-24 | End: 2018-02-27 | Stop reason: HOSPADM

## 2018-02-24 RX ORDER — HYDROMORPHONE HYDROCHLORIDE 2 MG/ML
0.5 INJECTION, SOLUTION INTRAMUSCULAR; INTRAVENOUS; SUBCUTANEOUS EVERY 4 HOURS PRN
Status: DISCONTINUED | OUTPATIENT
Start: 2018-02-24 | End: 2018-02-24

## 2018-02-24 RX ORDER — CIPROFLOXACIN 2 MG/ML
400 INJECTION, SOLUTION INTRAVENOUS
Status: DISCONTINUED | OUTPATIENT
Start: 2018-02-24 | End: 2018-02-26

## 2018-02-24 RX ORDER — MORPHINE SULFATE 30 MG/1
30 TABLET, FILM COATED, EXTENDED RELEASE ORAL EVERY 12 HOURS
Status: DISCONTINUED | OUTPATIENT
Start: 2018-02-24 | End: 2018-02-27 | Stop reason: HOSPADM

## 2018-02-24 RX ORDER — HYDROMORPHONE HYDROCHLORIDE 2 MG/ML
0.25 INJECTION, SOLUTION INTRAMUSCULAR; INTRAVENOUS; SUBCUTANEOUS EVERY 4 HOURS PRN
Status: DISCONTINUED | OUTPATIENT
Start: 2018-02-24 | End: 2018-02-24

## 2018-02-24 RX ORDER — HEPARIN SODIUM 5000 [USP'U]/ML
5000 INJECTION, SOLUTION INTRAVENOUS; SUBCUTANEOUS EVERY 12 HOURS
Status: DISCONTINUED | OUTPATIENT
Start: 2018-02-24 | End: 2018-02-27 | Stop reason: HOSPADM

## 2018-02-24 RX ADMIN — CIPROFLOXACIN 400 MG: 2 INJECTION, SOLUTION INTRAVENOUS at 13:43

## 2018-02-24 RX ADMIN — HEPARIN SODIUM 5000 UNITS: 5000 INJECTION, SOLUTION INTRAVENOUS; SUBCUTANEOUS at 06:03

## 2018-02-24 RX ADMIN — MORPHINE SULFATE 15 MG: 15 TABLET, EXTENDED RELEASE ORAL at 08:48

## 2018-02-24 RX ADMIN — LIDOCAINE HYDROCHLORIDE 1 APPLICATION: 20 JELLY TOPICAL at 08:52

## 2018-02-24 RX ADMIN — LIDOCAINE HYDROCHLORIDE 1 APPLICATION: 20 JELLY TOPICAL at 20:16

## 2018-02-24 RX ADMIN — LORATADINE 10 MG: 10 TABLET ORAL at 08:47

## 2018-02-24 RX ADMIN — TAMSULOSIN HYDROCHLORIDE 0.8 MG: 0.4 CAPSULE ORAL at 08:47

## 2018-02-24 RX ADMIN — FERROUS GLUCONATE 324 MG: 324 TABLET ORAL at 08:47

## 2018-02-24 RX ADMIN — INSULIN HUMAN 2 UNITS: 100 INJECTION, SOLUTION PARENTERAL at 13:49

## 2018-02-24 RX ADMIN — HEPARIN SODIUM 5000 UNITS: 5000 INJECTION, SOLUTION INTRAVENOUS; SUBCUTANEOUS at 20:03

## 2018-02-24 RX ADMIN — BUDESONIDE AND FORMOTEROL FUMARATE DIHYDRATE 2 PUFF: 160; 4.5 AEROSOL RESPIRATORY (INHALATION) at 09:00

## 2018-02-24 RX ADMIN — INSULIN HUMAN 15 UNITS: 100 INJECTION, SUSPENSION SUBCUTANEOUS at 08:48

## 2018-02-24 RX ADMIN — HYDROMORPHONE HYDROCHLORIDE 0.5 MG: 2 INJECTION INTRAMUSCULAR; INTRAVENOUS; SUBCUTANEOUS at 09:20

## 2018-02-24 RX ADMIN — BUDESONIDE AND FORMOTEROL FUMARATE DIHYDRATE 2 PUFF: 160; 4.5 AEROSOL RESPIRATORY (INHALATION) at 20:03

## 2018-02-24 RX ADMIN — MORPHINE SULFATE 30 MG: 30 TABLET, EXTENDED RELEASE ORAL at 20:03

## 2018-02-24 RX ADMIN — INSULIN HUMAN 15 UNITS: 100 INJECTION, SUSPENSION SUBCUTANEOUS at 18:01

## 2018-02-24 RX ADMIN — HYDROMORPHONE HYDROCHLORIDE 0.25 MG: 2 INJECTION INTRAMUSCULAR; INTRAVENOUS; SUBCUTANEOUS at 03:50

## 2018-02-24 RX ADMIN — MORPHINE SULFATE 30 MG: 15 TABLET ORAL at 13:52

## 2018-02-24 RX ADMIN — FINASTERIDE 5 MG: 5 TABLET, FILM COATED ORAL at 08:47

## 2018-02-24 RX ADMIN — INSULIN HUMAN 2 UNITS: 100 INJECTION, SOLUTION PARENTERAL at 20:12

## 2018-02-24 ASSESSMENT — ENCOUNTER SYMPTOMS
PND: 0
ABDOMINAL PAIN: 0
FEVER: 1
DIARRHEA: 0
VOMITING: 0
FEVER: 0
NAUSEA: 1
FALLS: 0
PALPITATIONS: 0
COUGH: 0
CONSTIPATION: 0
BLOOD IN STOOL: 0
HEARTBURN: 0
SPUTUM PRODUCTION: 0
DOUBLE VISION: 0
WHEEZING: 0
NECK PAIN: 0
DIZZINESS: 0
HEMOPTYSIS: 0
BACK PAIN: 0
HEADACHES: 0
DEPRESSION: 0
WEAKNESS: 1
CHILLS: 1
DIZZINESS: 1
MYALGIAS: 0
BLURRED VISION: 0
SHORTNESS OF BREATH: 1

## 2018-02-24 ASSESSMENT — PAIN SCALES - GENERAL
PAINLEVEL_OUTOF10: 5
PAINLEVEL_OUTOF10: 5
PAINLEVEL_OUTOF10: 10
PAINLEVEL_OUTOF10: 8
PAINLEVEL_OUTOF10: 10
PAINLEVEL_OUTOF10: 8
PAINLEVEL_OUTOF10: 8

## 2018-02-24 ASSESSMENT — COPD QUESTIONNAIRES
DO YOU EVER COUGH UP ANY MUCUS OR PHLEGM?: NO/ONLY WITH OCCASIONAL COLDS OR INFECTIONS
DURING THE PAST 4 WEEKS HOW MUCH DID YOU FEEL SHORT OF BREATH: SOME OF THE TIME
HAVE YOU SMOKED AT LEAST 100 CIGARETTES IN YOUR ENTIRE LIFE: YES
COPD SCREENING SCORE: 6

## 2018-02-24 ASSESSMENT — LIFESTYLE VARIABLES: EVER_SMOKED: YES

## 2018-02-24 NOTE — H&P
Hospital Medicine History and Physical    Date of Service  02/23/2018    Chief Complaint  Chief Complaint   Patient presents with   • Urinary Retention   • Blood in Urine     with clots x 2 weeks   • Urinary Frequency       History of Presenting Illness  67 y.o. male who presented 2/23/2018 with urinary retention. Patient is established at the [facility]. Underlying multiple medical comorbidities. He has underlying history of known obstructive uropathy. He has been intermittently self cathing himself over the last several weeks. He presented to [facility] with inability to urinate or cath himself with penile pain and was found to have urinary retention with urinary tract infection and renal dysfunction and patient was subsequently transferred to our emergency room for further evaluation. Upon presentation to the outlying facility patient had a Humprhey catheter placed him on he developed severe pain with this and has requested removal of the Humphrey catheter there. Upon arrival here patient had a three-way Humphrey catheter placed. Otherwise patient has been having fevers and chills and nausea at home. No vomiting. Reports intermittent headaches. No chest pain. Reports chronic shortness of breath secondary to underlying COPD. no other complaints per patient at this point in time.      Primary Care Physician  Nicolette Yeager M.D. (Inactive)    Consultants  None    Code Status  FULL CODE    Review of Systems  Review of Systems   Constitutional: Positive for chills, fever and malaise/fatigue.   HENT: Negative for hearing loss and tinnitus.    Eyes: Negative for blurred vision and double vision.   Respiratory: Positive for shortness of breath. Negative for cough, hemoptysis, sputum production and wheezing.    Cardiovascular: Negative for chest pain, palpitations and PND.   Gastrointestinal: Positive for nausea. Negative for abdominal pain, blood in stool, constipation, diarrhea, heartburn, melena and vomiting.    Genitourinary: Positive for dysuria.        Penile pain, urinary retention   Musculoskeletal: Negative for back pain, falls, joint pain, myalgias and neck pain.   Skin: Negative for itching and rash.   Neurological: Positive for dizziness and weakness. Negative for headaches.   Psychiatric/Behavioral: Negative for depression and suicidal ideas.        Past Medical History  Past Medical History:   Diagnosis Date   • Arthritis    • Benign prostate hyperplasia    • Colostomy in place (CMS-HCC)    • COPD (chronic obstructive pulmonary disease) (CMS-HCC)    • Diabetes (CMS-HCC)    • Renal disorder    Reports he was critically ill at the [facility] for 7 months    Surgical History  Past Surgical History:   Procedure Laterality Date   • TOE AMPUTATION Right 10/25/2017    Procedure: TOE AMPUTATION-GREAT TOE;  Surgeon: Bobby Alejo M.D.;  Location: SURGERY [facility];  Service: Orthopedics   Colostomy    Medications  No current facility-administered medications on file prior to encounter.      Current Outpatient Prescriptions on File Prior to Encounter   Medication Sig Dispense Refill   • budesonide-formoterol (SYMBICORT) 160-4.5 MCG/ACT Aerosol Inhale 2 Puffs by mouth 2 Times a Day.     • tamsulosin (FLOMAX) 0.4 MG capsule Take 0.4 mg by mouth ONE-HALF HOUR AFTER BREAKFAST.     • albuterol 108 (90 Base) MCG/ACT Aero Soln inhalation aerosol Inhale 2 Puffs by mouth every four hours as needed for Shortness of Breath.     • morphine ER (MS CONTIN) 15 MG Tab CR tablet Take 15 mg by mouth every 12 hours.         Family History  Reviewed and unremarkable per patient    Social History  Ex-smoker, denies alcohol use or illicit drugs of abuse    Allergies  Allergies   Allergen Reactions   • Ceftriaxone    • Ezetimibe    • Fentanyl    • Flagyl [Metronidazole]    • Infliximab    • Lovastatin    • Simvastatin    • Vancomycin         Physical Exam  Laboratory   Hemodynamics  Temp (24hrs), Av.8 °C (98.3 °F), Min:36.4 °C  (97.5 °F), Max:37.2 °C (99 °F)   Temperature: 37.2 °C (99 °F)  Pulse  Av.7  Min: 66  Max: 78    Blood Pressure : 148/63      Respiratory      Respiration: 17, Pulse Oximetry: 97 %             Physical Exam   Constitutional: He is oriented to person, place, and time. He appears well-developed and well-nourished. No distress.   HENT:   Head: Normocephalic.   Mouth/Throat: Oropharynx is clear and moist. No oropharyngeal exudate.   Eyes: Conjunctivae and EOM are normal. Pupils are equal, round, and reactive to light. No scleral icterus.   Neck: No JVD present.   Cardiovascular: Normal rate, regular rhythm and normal heart sounds.  Exam reveals no gallop and no friction rub.    No murmur heard.  Pulmonary/Chest: Effort normal and breath sounds normal. No stridor. No respiratory distress. He has no wheezes. He has no rales.   Abdominal: Soft. Bowel sounds are normal. He exhibits no distension. There is no tenderness. There is no rebound.   Colostomy in place   Musculoskeletal: He exhibits no edema, tenderness or deformity.   Neurological: He is alert and oriented to person, place, and time. He has normal reflexes. No cranial nerve deficit.   Skin: Skin is dry. He is not diaphoretic.   Psychiatric: He has a normal mood and affect. His behavior is normal. Judgment and thought content normal.       Recent Labs      18   1558   WBC  11.5*   RBC  3.35*   HEMOGLOBIN  9.1*   HEMATOCRIT  29.1*   MCV  86.9   MCH  27.2   MCHC  31.3*   RDW  48.2   PLATELETCT  425   MPV  9.2     Recent Labs      18   1558   SODIUM  135   POTASSIUM  4.5   CHLORIDE  109   CO2  17*   GLUCOSE  102*   BUN  41*   CREATININE  3.74*   CALCIUM  8.8     Recent Labs      18   1558   ALTSGPT  14   ASTSGOT  13   ALKPHOSPHAT  79   TBILIRUBIN  0.3   GLUCOSE  102*     Recent Labs      18   1558   APTT  38.1*   INR  1.32*             No results found for: TROPONINI  Urinalysis:    Lab Results  Component Value Date/Time   SPECGRAVITY 1.006  02/23/2018 1709   GLUCOSEUR Negative 02/23/2018 1709   KETONES Negative 02/23/2018 1709   NITRITE Negative 02/23/2018 1709   WBCURINE Packed (A) 02/23/2018 1709   RBCURINE 10-20 (A) 02/23/2018 1709   BACTERIA Many (A) 02/23/2018 1709   EPITHELCELL Negative 02/23/2018 1709        Imaging  Reviewed   Assessment/Plan     I anticipate this patient will require at least two midnights for appropriate medical management, necessitating inpatient admission.    Obstructive uropathy- (present on admission)   Assessment & Plan    Continue Humphrey catheter at this point in time  Flomax and finasteride  Management of urinary tract infection  Will need close outpatient urology follow-up        Leukocytosis- (present on admission)   Assessment & Plan    Urinary tract infection related  No underlying associated sepsis        UTI (urinary tract infection)- (present on admission)   Assessment & Plan    Related to urinary retention, straight cath at home  Continue IV ciprofloxacin  De-escalate as clinically appropriate        Acute on chronic kidney failure (CMS-HCC)- (present on admission)   Assessment & Plan    Acute kidney injury is related to obstructive uropathy  Underlying known chronic kidney disease stage IV  IV fluid hydration  Monitor renal function / Avoid nephrotoxins and dose medications renally        Chronic pain- (present on admission)   Assessment & Plan    Continue at-home regimen with supplementation for acute issues        HTN (hypertension)- (present on admission)   Assessment & Plan    If persistently hypertensive start regimen        Anemia- (present on admission)   Assessment & Plan    Likely anemia of renal disease  We'll obtain further evaluation with thyroid studies, B12, folate, reticulocyte site count and TSH  Monitor Hb / Restrictive transfusion strategy        Crohn's disease (CMS-HCC)- (present on admission)   Assessment & Plan    History of continue outpatient follow-up        Diabetes (CMS-HCC)- (present  on admission)   Assessment & Plan    Controlled  Manifestation neuropathy, nephropathy  Decrease NPH  Sliding scale insulin therapy  Titrate is currently appropriate        COPD (chronic obstructive pulmonary disease) (CMS-HCC)- (present on admission)   Assessment & Plan    Without acute exacerbation            VTE prophylaxis: SCD and SC heparin.

## 2018-02-24 NOTE — ED NOTES
Urinary catheter placed. Pt having severe pain after placement catheter insertion went with out difficulty. Urine yellow and cloudy.   erp to bedside to evaluate.

## 2018-02-24 NOTE — ASSESSMENT & PLAN NOTE
Continue Humphrey catheter at this point in time; patient will go home with it  Flomax and finasteride  Management of urinary tract infection  Will need close outpatient urology follow-up; [facility]

## 2018-02-24 NOTE — PROGRESS NOTES
[facility] Hospitalist Progress Note    Date of Service: 2018    Chief Complaint  67 y.o. male admitted 2018 with urine retention not able to get good urine return with self cath for 2 weeks    Interval Problem Update  Feels better; harrington in place draining urine; still with mild suprapubic pain; no n/v and eating ok now; denies resp sx's    Consultants/Specialty  none    Disposition  home        Review of Systems   Constitutional: Negative for fever.   HENT: Negative for hearing loss.    Eyes: Negative for blurred vision.   Respiratory: Negative for cough.    Cardiovascular: Negative for chest pain.   Gastrointestinal: Negative for heartburn.   Genitourinary: Negative for dysuria.   Musculoskeletal: Negative for myalgias.   Skin: Negative for rash.   Neurological: Negative for dizziness.   Psychiatric/Behavioral: Negative for depression.      Physical Exam  Laboratory/Imaging   Hemodynamics  Temp (24hrs), Av.8 °C (98.3 °F), Min:36.4 °C (97.5 °F), Max:37.2 °C (99 °F)   Temperature: 37.1 °C (98.7 °F)  Pulse  Av.3  Min: 66  Max: 91 Heart Rate (Monitored): 78  Blood Pressure : 156/65      Respiratory      Respiration: 16, Pulse Oximetry: 95 %     Work Of Breathing / Effort: Mild  RUL Breath Sounds: Diminished, RML Breath Sounds: Diminished, RLL Breath Sounds: Diminished, KITTY Breath Sounds: Diminished, LLL Breath Sounds: Diminished    Fluids    Intake/Output Summary (Last 24 hours) at 18 1234  Last data filed at 18 0600   Gross per 24 hour   Intake              800 ml   Output                0 ml   Net              800 ml       Nutrition  Orders Placed This Encounter   Procedures   • Diet Order     Standing Status:   Standing     Number of Occurrences:   1     Order Specific Question:   Diet:     Answer:   Diabetic [3]     Order Specific Question:   Diet:     Answer:   Renal [8]     Physical Exam   Constitutional: He is oriented to person, place, and time. No distress.   HENT:   Head:  Normocephalic.   Eyes: EOM are normal.   Neck: Neck supple.   Cardiovascular: Normal rate and regular rhythm.    Pulmonary/Chest: Effort normal and breath sounds normal.   Abdominal: Soft. Bowel sounds are normal. There is tenderness.   Mild suprapubic tenderness; harrington inserted   Musculoskeletal: He exhibits no edema.   Neurological: He is alert and oriented to person, place, and time.   Skin: Skin is warm.   Psychiatric: His behavior is normal.       Recent Labs      02/23/18   1558  02/24/18   0240   WBC  11.5*  9.3   RBC  3.35*  3.41*   HEMOGLOBIN  9.1*  9.2*   HEMATOCRIT  29.1*  29.8*   MCV  86.9  87.4   MCH  27.2  27.0   MCHC  31.3*  30.9*   RDW  48.2  48.4   PLATELETCT  425  411   MPV  9.2  9.2     Recent Labs      02/23/18   1558  02/24/18   0240   SODIUM  135  137   POTASSIUM  4.5  4.7   CHLORIDE  109  112   CO2  17*  18*   GLUCOSE  102*  104*   BUN  41*  40*   CREATININE  3.74*  3.53*   CALCIUM  8.8  9.1     Recent Labs      02/23/18   1558   APTT  38.1*   INR  1.32*         Recent Labs      02/24/18   0240   TRIGLYCERIDE  103   HDL  23*   LDL  36          Assessment/Plan     * Obstructive uropathy- (present on admission)   Assessment & Plan    Continue Harrington catheter at this point in time  Flomax and finasteride  Management of urinary tract infection  Will need close outpatient urology follow-up; patient [facility]        UTI (urinary tract infection)- (present on admission)   Assessment & Plan    Related to urinary retention, straight cath at home  Continue IV ciprofloxacin  F/u culture        Acute on chronic kidney failure (CMS-HCC)- (present on admission)   Assessment & Plan    Acute kidney injury is related to obstructive uropathy  Underlying known chronic kidney disease stage IV  IV fluid hydration   Avoid nephrotoxins and dose medications renally  Some improvement from yesterday; f/u bmp        Chronic pain- (present on admission)   Assessment & Plan    Continue  at-home regimen with supplementation for acute issues        HTN (hypertension)- (present on admission)   Assessment & Plan    If persistently hypertensive start regimen        Anemia- (present on admission)   Assessment & Plan    Likely anemia of renal disease  We'll obtain further evaluation with thyroid studies, B12, folate, reticulocyte site count and TSH  Stable; f/u labs        Crohn's disease (CMS-HCC)- (present on admission)   Assessment & Plan    History of continue outpatient follow-up        Diabetes (CMS-HCC)- (present on admission)   Assessment & Plan    Controlled  Manifestation neuropathy, nephropathy  Monitor sugars and adjust meds as indicated        COPD (chronic obstructive pulmonary disease) (CMS-HCC)- (present on admission)   Assessment & Plan    Without acute exacerbation          Quality-Core Measures

## 2018-02-24 NOTE — ED NOTES
Med rec complete per pt and a call to the [facility] @ [phone]  Allergies reviewed  Pt was given 3 doses of Fosfomycin on 2/21/18, to be taken 1 packet Q3 days   Pt states he takes both his morphine IR 15mg and morphine ER 30mg TID. Pt is only prescribed them as BID, per the [facility]

## 2018-02-24 NOTE — ED PROVIDER NOTES
ED Provider Note    CHIEF COMPLAINT  Chief Complaint   Patient presents with   • Urinary Retention   • Blood in Urine     with clots x 2 weeks   • Urinary Frequency       HPI  Shola Hoyt is a 67 y.o. male who presents to the emergency department transferred from another hospital due to urinary retention and renal failure. The patient is a very poor historian and it took a lot of effort and several visits to get the history from him. Apparently he has a history of urinary retention going on for more than a year now she has seen a physician about this and has been doing straight cath at home in order to empty his bladder. Today he became dizzy he notes that he is having minimal urine output when he does the straight cath procedure at home and today he went to his local emergency department. Initially a Humphrey catheter was placed to drain the bladder but reportedly the patient found this extremely uncomfortable and the catheter was discontinued and the patient was transferred here for further evaluation and treatment. The patient says he has an appointment to see a urologist at the [facility] but has not yet seen any urologist     REVIEW OF SYSTEMS no fever no vomiting no chest pain. All other systems negative    PAST MEDICAL HISTORY  Past Medical History:   Diagnosis Date   • Arthritis    • Benign prostate hyperplasia    • Colostomy in place (CMS-[facility])    • COPD (chronic obstructive pulmonary disease) (CMS-[facility])    • Diabetes (CMS-HCC)    • Renal disorder        FAMILY HISTORY  No family history on file.    Social history  Patient denies smoking alcohol or drug abuse    SURGICAL HISTORY  Past Surgical History:   Procedure Laterality Date   • TOE AMPUTATION Right 10/25/2017    Procedure: TOE AMPUTATION-GREAT TOE;  Surgeon: Bobby Alejo M.D.;  Location: SURGERY [facility];  Service: Orthopedics       CURRENT MEDICATIONS  Home Medications     Reviewed by Keyla Malloy (Pharmacy Tech) on  "02/23/18 at 1620  Med List Status: Complete   Medication Last Dose Status   Adalimumab 40 MG/0.8ML Pen-injector Kit >week Active   albuterol (PROVENTIL) 2.5mg/3ml Nebu Soln solution for nebulization 2/23/2018 Active   albuterol 108 (90 Base) MCG/ACT Aero Soln inhalation aerosol >2 days Active   budesonide-formoterol (SYMBICORT) 160-4.5 MCG/ACT Aerosol >2 days Active   ferrous gluconate (FERGON) 324 (38 Fe) MG Tab >2 days Active   fosfomycin (MONUROL) 3 GM Pack 2/21/2018 Active   furosemide (LASIX) 20 MG Tab >2 days Active   insulin NPH (HUMULIN,NOVOLIN) 100 UNIT/ML Suspension >2 days Active   lidocaine (XYLOCAINE) 2 % Gel >2 days Active   loratadine (CLARITIN) 10 MG Tab >2 days Active   morphine (MS IR) 15 MG tablet >2 days Active   morphine ER (MS CONTIN) 15 MG Tab CR tablet >2 days Active   tamsulosin (FLOMAX) 0.4 MG capsule >2 days Active                ALLERGIES  Allergies   Allergen Reactions   • Ceftriaxone    • Ezetimibe    • Fentanyl    • Flagyl [Metronidazole]    • Infliximab    • Lovastatin    • Simvastatin    • Vancomycin        PHYSICAL EXAM  VITAL SIGNS: /46   Pulse 68   Temp 36.4 °C (97.5 °F)   Resp 16   Ht 1.803 m (5' 11\")   Wt 106.1 kg (234 lb)   SpO2 98%   BMI 32.64 kg/m²    Oxygen saturation is interpreted as adequate  Constitutional: Awake verbal nontoxic appearing  HENT: Mucous membranes are dry  Eyes: No erythema or discharge or jaundice  Neck: Trachea midline no JVD  Cardiovascular: Regular rate and rhythm  Lungs: Clear and equal bilaterally  Abdomen/Back: Soft nondistended nontender no peritoneal findings the patient has a hypospadias  Skin: Warm and dry  Musculoskeletal: No acute bony deformity  Neurologic: Awake verbal moving all extremities    CHART REVIEW  I reviewed the chart materials were sent with the patient and this included a CBC with an elevated white blood cell count of 13.1 and hemoglobin of 9 patient metabolic panel showed an elevated BUN of 43 and creatinine of " 3.91 LFTs unremarkable urinalysis showed 2+ blood and many bacteria lactic acid level 0.8    Laboratory  Additional laboratory testing was done CBC shows white blood count of 11.5 hemoglobin 9.1, complete metabolic panel shows an elevated BUN of 41 with creatinine of 3.74 the bicarb is low at 17. INR is 1.32, TSH is 0.94 urinalysis obtained by replacing the catheter showed large leukocyte Estrace positive with a field packed with white blood cells and many bacteria.    Radiology  US-RENAL    (Results Pending)   DX-CHEST-PORTABLE (1 VIEW)    (Results Pending)         MEDICAL DECISION MAKING and DISPOSITION  In the emergency department the patient had the catheter replaced the urine was cloudy he complained that this was very very uncomfortable for him and he was given intravenous Dilaudid and started on oral Pyridium. I've ordered intravenous Cipro. I reviewed the case with the hospitalist and the patient is admitted for further evaluation and treatment    IMPRESSION  1. Urinary retention  2. Acute renal failure  3. Urinary tract infection         Electronically signed by: Ruben Allen, 2/23/2018 6:53 PM

## 2018-02-24 NOTE — ASSESSMENT & PLAN NOTE
Related to urinary retention, straight cath at home  Clinically improved; change to po abx; urine cx negative so far  Can go home tomorrow if stable

## 2018-02-24 NOTE — ASSESSMENT & PLAN NOTE
Acute kidney injury is related to obstructive uropathy  Underlying known chronic kidney disease stage IV  IV fluid hydration   Avoid nephrotoxins and dose medications renally  Gradually improved with current rx; cont to follow

## 2018-02-24 NOTE — PROGRESS NOTES
Report received. Pt to unit from ER Assumed care, assessment complete. Pt is A & O x 4.  Pt medicated for chronic pain per MAR. Fall precautions and appropriate signs in place. Pt oriented to unit routine, call light/phone system and RN extension number provided. Pt educated regarding fall precautions. Bed alarm refused with education. Pt denies any additional needs at this time. Call light within reach.

## 2018-02-24 NOTE — CARE PLAN
Problem: Knowledge Deficit  Goal: Knowledge of disease process/condition, treatment plan, diagnostic tests, and medications will improve    Intervention: Assess knowledge level of disease process/condition, treatment plan, diagnostic tests, and medications  Pt updated on POC, all questions answered       Problem: Pain Management  Goal: Pain level will decrease to patient's comfort goal    Intervention: Follow pain managment plan developed in collaboration with patient and Interdisciplinary Team  Chronic pain managed with scheduled MS contn and prn morphine IR

## 2018-02-25 LAB
ANION GAP SERPL CALC-SCNC: 8 MMOL/L (ref 0–11.9)
ANISOCYTOSIS BLD QL SMEAR: ABNORMAL
BACTERIA UR CULT: NORMAL
BASOPHILS # BLD AUTO: 0 % (ref 0–1.8)
BASOPHILS # BLD: 0 K/UL (ref 0–0.12)
BUN SERPL-MCNC: 36 MG/DL (ref 8–22)
CALCIUM SERPL-MCNC: 9.5 MG/DL (ref 8.5–10.5)
CHLORIDE SERPL-SCNC: 110 MMOL/L (ref 96–112)
CO2 SERPL-SCNC: 19 MMOL/L (ref 20–33)
CREAT SERPL-MCNC: 3.33 MG/DL (ref 0.5–1.4)
EOSINOPHIL # BLD AUTO: 0.1 K/UL (ref 0–0.51)
EOSINOPHIL NFR BLD: 0.9 % (ref 0–6.9)
ERYTHROCYTE [DISTWIDTH] IN BLOOD BY AUTOMATED COUNT: 50.1 FL (ref 35.9–50)
GLUCOSE BLD-MCNC: 104 MG/DL (ref 65–99)
GLUCOSE BLD-MCNC: 107 MG/DL (ref 65–99)
GLUCOSE BLD-MCNC: 139 MG/DL (ref 65–99)
GLUCOSE BLD-MCNC: 159 MG/DL (ref 65–99)
GLUCOSE BLD-MCNC: 173 MG/DL (ref 65–99)
GLUCOSE SERPL-MCNC: 119 MG/DL (ref 65–99)
HCT VFR BLD AUTO: 32.4 % (ref 42–52)
HGB BLD-MCNC: 9.7 G/DL (ref 14–18)
LYMPHOCYTES # BLD AUTO: 2.3 K/UL (ref 1–4.8)
LYMPHOCYTES NFR BLD: 20.9 % (ref 22–41)
MANUAL DIFF BLD: NORMAL
MCH RBC QN AUTO: 26.6 PG (ref 27–33)
MCHC RBC AUTO-ENTMCNC: 29.9 G/DL (ref 33.7–35.3)
MCV RBC AUTO: 89 FL (ref 81.4–97.8)
MICROCYTES BLD QL SMEAR: ABNORMAL
MONOCYTES # BLD AUTO: 0.96 K/UL (ref 0–0.85)
MONOCYTES NFR BLD AUTO: 8.7 % (ref 0–13.4)
MORPHOLOGY BLD-IMP: NORMAL
NEUTROPHILS # BLD AUTO: 7.65 K/UL (ref 1.82–7.42)
NEUTROPHILS NFR BLD: 69.5 % (ref 44–72)
NRBC # BLD AUTO: 0 K/UL
NRBC BLD-RTO: 0 /100 WBC
PLATELET # BLD AUTO: 435 K/UL (ref 164–446)
PLATELET BLD QL SMEAR: NORMAL
PMV BLD AUTO: 9 FL (ref 9–12.9)
POTASSIUM SERPL-SCNC: 5.2 MMOL/L (ref 3.6–5.5)
RBC # BLD AUTO: 3.64 M/UL (ref 4.7–6.1)
RBC BLD AUTO: PRESENT
SIGNIFICANT IND 70042: NORMAL
SITE SITE: NORMAL
SODIUM SERPL-SCNC: 137 MMOL/L (ref 135–145)
SOURCE SOURCE: NORMAL
WBC # BLD AUTO: 11 K/UL (ref 4.8–10.8)

## 2018-02-25 PROCEDURE — 770006 HCHG ROOM/CARE - MED/SURG/GYN SEMI*

## 2018-02-25 PROCEDURE — 700111 HCHG RX REV CODE 636 W/ 250 OVERRIDE (IP): Performed by: INTERNAL MEDICINE

## 2018-02-25 PROCEDURE — 80048 BASIC METABOLIC PNL TOTAL CA: CPT

## 2018-02-25 PROCEDURE — 99232 SBSQ HOSP IP/OBS MODERATE 35: CPT | Performed by: INTERNAL MEDICINE

## 2018-02-25 PROCEDURE — 700105 HCHG RX REV CODE 258: Performed by: INTERNAL MEDICINE

## 2018-02-25 PROCEDURE — 700102 HCHG RX REV CODE 250 W/ 637 OVERRIDE(OP): Performed by: INTERNAL MEDICINE

## 2018-02-25 PROCEDURE — 85027 COMPLETE CBC AUTOMATED: CPT

## 2018-02-25 PROCEDURE — A9270 NON-COVERED ITEM OR SERVICE: HCPCS | Performed by: INTERNAL MEDICINE

## 2018-02-25 PROCEDURE — 82962 GLUCOSE BLOOD TEST: CPT | Mod: 91

## 2018-02-25 PROCEDURE — 700102 HCHG RX REV CODE 250 W/ 637 OVERRIDE(OP): Performed by: HOSPITALIST

## 2018-02-25 PROCEDURE — 85007 BL SMEAR W/DIFF WBC COUNT: CPT

## 2018-02-25 PROCEDURE — 36415 COLL VENOUS BLD VENIPUNCTURE: CPT

## 2018-02-25 PROCEDURE — A9270 NON-COVERED ITEM OR SERVICE: HCPCS | Performed by: HOSPITALIST

## 2018-02-25 RX ADMIN — BUDESONIDE AND FORMOTEROL FUMARATE DIHYDRATE 2 PUFF: 160; 4.5 AEROSOL RESPIRATORY (INHALATION) at 20:19

## 2018-02-25 RX ADMIN — BUDESONIDE AND FORMOTEROL FUMARATE DIHYDRATE 2 PUFF: 160; 4.5 AEROSOL RESPIRATORY (INHALATION) at 08:07

## 2018-02-25 RX ADMIN — INSULIN HUMAN 2 UNITS: 100 INJECTION, SOLUTION PARENTERAL at 20:22

## 2018-02-25 RX ADMIN — MORPHINE SULFATE 30 MG: 30 TABLET, EXTENDED RELEASE ORAL at 08:07

## 2018-02-25 RX ADMIN — FERROUS GLUCONATE 324 MG: 324 TABLET ORAL at 08:06

## 2018-02-25 RX ADMIN — FINASTERIDE 5 MG: 5 TABLET, FILM COATED ORAL at 08:06

## 2018-02-25 RX ADMIN — MORPHINE SULFATE 30 MG: 30 TABLET, EXTENDED RELEASE ORAL at 20:19

## 2018-02-25 RX ADMIN — SODIUM CHLORIDE: 9 INJECTION, SOLUTION INTRAVENOUS at 18:03

## 2018-02-25 RX ADMIN — MORPHINE SULFATE 30 MG: 15 TABLET ORAL at 06:03

## 2018-02-25 RX ADMIN — CIPROFLOXACIN 400 MG: 2 INJECTION, SOLUTION INTRAVENOUS at 08:06

## 2018-02-25 RX ADMIN — INSULIN HUMAN 15 UNITS: 100 INJECTION, SUSPENSION SUBCUTANEOUS at 08:22

## 2018-02-25 RX ADMIN — HEPARIN SODIUM 5000 UNITS: 5000 INJECTION, SOLUTION INTRAVENOUS; SUBCUTANEOUS at 20:19

## 2018-02-25 RX ADMIN — TAMSULOSIN HYDROCHLORIDE 0.8 MG: 0.4 CAPSULE ORAL at 08:07

## 2018-02-25 RX ADMIN — INSULIN HUMAN 15 UNITS: 100 INJECTION, SUSPENSION SUBCUTANEOUS at 17:45

## 2018-02-25 RX ADMIN — LORATADINE 10 MG: 10 TABLET ORAL at 08:07

## 2018-02-25 RX ADMIN — HEPARIN SODIUM 5000 UNITS: 5000 INJECTION, SOLUTION INTRAVENOUS; SUBCUTANEOUS at 08:07

## 2018-02-25 ASSESSMENT — ENCOUNTER SYMPTOMS
FEVER: 0
HEARTBURN: 0
BLURRED VISION: 0
COUGH: 0
DIZZINESS: 0
DEPRESSION: 0
MYALGIAS: 0

## 2018-02-25 ASSESSMENT — PAIN SCALES - GENERAL
PAINLEVEL_OUTOF10: 5
PAINLEVEL_OUTOF10: 6
PAINLEVEL_OUTOF10: 8
PAINLEVEL_OUTOF10: 5
PAINLEVEL_OUTOF10: 6

## 2018-02-25 NOTE — CARE PLAN
Problem: Knowledge Deficit  Goal: Knowledge of disease process/condition, treatment plan, diagnostic tests, and medications will improve  Outcome: PROGRESSING AS EXPECTED  Tolerating diet. Fall precautions/hourly rounding maintained, call light within reach and functioning, all items within reach.  Patient encouraged to call for assistance, poc reviewed with patient, ?'s/concerns answered.

## 2018-02-25 NOTE — CARE PLAN
Problem: Infection  Goal: Will remain free from infection  Outcome: PROGRESSING AS EXPECTED  Iv abx admin a/o, vss, afebrile,drainage noted at tip of penis -    harrington cathter care provided, harrington patent and draining clear yellow urine. Iv fluids a/o.  Intervention: Give CDC handouts for infection prevention (infection prevention/hand washing, disease specific, and device specific) and document in Education  Supplies given to change ostomy to ruq - patient provided self care.

## 2018-02-25 NOTE — RESPIRATORY CARE
COPD EDUCATION by COPD CLINICAL EDUCATOR  2/24/2018 at 3:20 PM by Anuja Osuna     Patient reviewed by COPD education team. Patient does not qualify for COPD program.

## 2018-02-25 NOTE — CARE PLAN
Problem: Bowel/Gastric:  Goal: Normal bowel function is maintained or improved  Outcome: PROGRESSING AS EXPECTED  Patient has an ostomy and provides self care.     Problem: Pain Management  Goal: Pain level will decrease to patient's comfort goal  Outcome: PROGRESSING AS EXPECTED  Patient reports adequate pain relief with oral morphine tablets.

## 2018-02-25 NOTE — CARE PLAN
Problem: Pain Management  Goal: Pain level will decrease to patient's comfort goal  Outcome: PROGRESSING AS EXPECTED  Pt c/o pain 8/10 to bilateral knees- chronic in nature, MS contin and MS IR admin a/o with good effect. Patient also c/o pain at tip of penis, burning in nature 2/2 harrington catheter insertion (per patient) - lidocaine gel applied a/o with good effect.

## 2018-02-25 NOTE — PROGRESS NOTES
[facility] Hospitalist Progress Note    Date of Service: 2018    Chief Complaint  67 y.o. male admitted 2018 with urine retention not able to get good urine return with self cath for 2 weeks    Interval Problem Update  Feels better; harrington in place draining urine; still with mild suprapubic pain; no n/v and eating ok now; denies resp sx's    Consultants/Specialty  none    Disposition  home        Review of Systems   Constitutional: Negative for fever.   HENT: Negative for hearing loss.    Eyes: Negative for blurred vision.   Respiratory: Negative for cough.    Cardiovascular: Negative for chest pain.   Gastrointestinal: Negative for heartburn.   Genitourinary: Negative for dysuria.   Musculoskeletal: Negative for myalgias.   Skin: Negative for rash.   Neurological: Negative for dizziness.   Psychiatric/Behavioral: Negative for depression.      Physical Exam  Laboratory/Imaging   Hemodynamics  Temp (24hrs), Av.1 °C (98.7 °F), Min:36.6 °C (97.8 °F), Max:37.3 °C (99.2 °F)   Temperature: 37.1 °C (98.7 °F)  Pulse  Av.4  Min: 66  Max: 100    Blood Pressure : 155/71      Respiratory      Respiration: 16, Pulse Oximetry: 97 %        RUL Breath Sounds: Diminished, RML Breath Sounds: Diminished, RLL Breath Sounds: Diminished, KITTY Breath Sounds: Diminished, LLL Breath Sounds: Diminished    Fluids    Intake/Output Summary (Last 24 hours) at 18 1132  Last data filed at 18 1600   Gross per 24 hour   Intake                0 ml   Output              250 ml   Net             -250 ml       Nutrition  Orders Placed This Encounter   Procedures   • Diet Order     Standing Status:   Standing     Number of Occurrences:   1     Order Specific Question:   Diet:     Answer:   Diabetic [3]     Order Specific Question:   Diet:     Answer:   Renal [8]     Physical Exam   Constitutional: He is oriented to person, place, and time. No distress.   HENT:   Head: Normocephalic.   Eyes: EOM are normal.   Neck: Neck supple.    Cardiovascular: Normal rate and regular rhythm.    Pulmonary/Chest: Effort normal and breath sounds normal.   Abdominal: Soft. Bowel sounds are normal. There is no tenderness.   Mild suprapubic tenderness; harrington inserted   Musculoskeletal: He exhibits no edema.   Neurological: He is alert and oriented to person, place, and time.   Skin: Skin is warm.   Psychiatric: His behavior is normal.       Recent Labs      02/23/18   1558  02/24/18   0240  02/25/18   0242   WBC  11.5*  9.3  11.0*   RBC  3.35*  3.41*  3.64*   HEMOGLOBIN  9.1*  9.2*  9.7*   HEMATOCRIT  29.1*  29.8*  32.4*   MCV  86.9  87.4  89.0   MCH  27.2  27.0  26.6*   MCHC  31.3*  30.9*  29.9*   RDW  48.2  48.4  50.1*   PLATELETCT  425  411  435   MPV  9.2  9.2  9.0     Recent Labs      02/23/18   1558  02/24/18   0240  02/25/18   0242   SODIUM  135  137  137   POTASSIUM  4.5  4.7  5.2   CHLORIDE  109  112  110   CO2  17*  18*  19*   GLUCOSE  102*  104*  119*   BUN  41*  40*  36*   CREATININE  3.74*  3.53*  3.33*   CALCIUM  8.8  9.1  9.5     Recent Labs      02/23/18   1558   APTT  38.1*   INR  1.32*         Recent Labs      02/24/18   0240   TRIGLYCERIDE  103   HDL  23*   LDL  36          Assessment/Plan     * Obstructive uropathy- (present on admission)   Assessment & Plan    Continue Harrington catheter at this point in time; patient will go home with it  Flomax and finasteride  Management of urinary tract infection  Will need close outpatient urology follow-up; [facility]        UTI (urinary tract infection)- (present on admission)   Assessment & Plan    Related to urinary retention, straight cath at home  Continue IV ciprofloxacin; clinically improved  F/u culture        Acute on chronic kidney failure (CMS-HCC)- (present on admission)   Assessment & Plan    Acute kidney injury is related to obstructive uropathy  Underlying known chronic kidney disease stage IV  IV fluid hydration   Avoid nephrotoxins and dose  medications renally  Gradually improved with current rx; cont to follow        Chronic pain- (present on admission)   Assessment & Plan    Continue at-home regimen with supplementation for acute issues        HTN (hypertension)- (present on admission)   Assessment & Plan    If persistently hypertensive start regimen        Anemia- (present on admission)   Assessment & Plan    Likely anemia of renal disease  We'll obtain further evaluation with thyroid studies, B12, folate, reticulocyte site count and TSH  Stable; f/u labs        Crohn's disease (CMS-HCC)- (present on admission)   Assessment & Plan    History of continue outpatient follow-up        Diabetes (CMS-HCC)- (present on admission)   Assessment & Plan    Controlled  Manifestation neuropathy, nephropathy  Monitor sugars and adjust meds as indicated        COPD (chronic obstructive pulmonary disease) (CMS-HCC)- (present on admission)   Assessment & Plan    Without acute exacerbation          Quality-Core Measures

## 2018-02-25 NOTE — PROGRESS NOTES
Assumed care of patient this AM. Report received from night RN.  Assessment completed. Patient is awake, alert, and oriented x 4.  C/o bilateral knee pain, but declines intervention as he was given PRN medication at 0603 and just received his scheduled morphine tablet (see MAR).  Patient ambulates independently with a steady gait.  All needs met. Bed in lowest, locked position; tread socks on. Call light within reach.

## 2018-02-25 NOTE — CARE PLAN
Problem: Safety  Goal: Will remain free from injury  Outcome: PROGRESSING AS EXPECTED  Fall precautions/hourly rounding maintained, call light within reach and functioning, all items within reach.  Patient encouraged to call for assistance, poc reviewed with patient, ?'s/concerns answered. Min assist oob, steady gait.   Goal: Will remain free from falls  Outcome: PROGRESSING AS EXPECTED

## 2018-02-25 NOTE — PROGRESS NOTES
Assumed care of pt after receiving report from dayshift RN. Bedside rounding completed w/ dayshift RN. Pt resting in bed A&OX4, applied lidocaine on tip of penis per mar, pt has harrington in place, pt ate snacks that this RN offered, no other needs at the moment, Fall measures in place, call light within reach, personal belongings nearby, bed in lowest position, and hourly rounding in place. Will continue to monitor pt.

## 2018-02-26 LAB
ANION GAP SERPL CALC-SCNC: 9 MMOL/L (ref 0–11.9)
BUN SERPL-MCNC: 33 MG/DL (ref 8–22)
CALCIUM SERPL-MCNC: 8.8 MG/DL (ref 8.5–10.5)
CHLORIDE SERPL-SCNC: 107 MMOL/L (ref 96–112)
CO2 SERPL-SCNC: 21 MMOL/L (ref 20–33)
CREAT SERPL-MCNC: 3.18 MG/DL (ref 0.5–1.4)
GLUCOSE BLD-MCNC: 125 MG/DL (ref 65–99)
GLUCOSE BLD-MCNC: 126 MG/DL (ref 65–99)
GLUCOSE BLD-MCNC: 96 MG/DL (ref 65–99)
GLUCOSE SERPL-MCNC: 119 MG/DL (ref 65–99)
POTASSIUM SERPL-SCNC: 4.1 MMOL/L (ref 3.6–5.5)
SODIUM SERPL-SCNC: 137 MMOL/L (ref 135–145)

## 2018-02-26 PROCEDURE — 700102 HCHG RX REV CODE 250 W/ 637 OVERRIDE(OP): Performed by: INTERNAL MEDICINE

## 2018-02-26 PROCEDURE — A9270 NON-COVERED ITEM OR SERVICE: HCPCS | Performed by: INTERNAL MEDICINE

## 2018-02-26 PROCEDURE — 770006 HCHG ROOM/CARE - MED/SURG/GYN SEMI*

## 2018-02-26 PROCEDURE — 99232 SBSQ HOSP IP/OBS MODERATE 35: CPT | Performed by: INTERNAL MEDICINE

## 2018-02-26 PROCEDURE — 700111 HCHG RX REV CODE 636 W/ 250 OVERRIDE (IP): Performed by: INTERNAL MEDICINE

## 2018-02-26 PROCEDURE — 700105 HCHG RX REV CODE 258: Performed by: INTERNAL MEDICINE

## 2018-02-26 PROCEDURE — 80048 BASIC METABOLIC PNL TOTAL CA: CPT

## 2018-02-26 PROCEDURE — A9270 NON-COVERED ITEM OR SERVICE: HCPCS | Performed by: HOSPITALIST

## 2018-02-26 PROCEDURE — 36415 COLL VENOUS BLD VENIPUNCTURE: CPT

## 2018-02-26 PROCEDURE — 700102 HCHG RX REV CODE 250 W/ 637 OVERRIDE(OP): Performed by: HOSPITALIST

## 2018-02-26 PROCEDURE — 82962 GLUCOSE BLOOD TEST: CPT

## 2018-02-26 RX ORDER — CIPROFLOXACIN 2 MG/ML
400 INJECTION, SOLUTION INTRAVENOUS DAILY
Status: DISCONTINUED | OUTPATIENT
Start: 2018-02-27 | End: 2018-02-26

## 2018-02-26 RX ORDER — CIPROFLOXACIN 500 MG/1
500 TABLET, FILM COATED ORAL EVERY 24 HOURS
Status: DISCONTINUED | OUTPATIENT
Start: 2018-02-27 | End: 2018-02-27 | Stop reason: HOSPADM

## 2018-02-26 RX ADMIN — INSULIN HUMAN 15 UNITS: 100 INJECTION, SUSPENSION SUBCUTANEOUS at 17:29

## 2018-02-26 RX ADMIN — MORPHINE SULFATE 30 MG: 15 TABLET ORAL at 03:10

## 2018-02-26 RX ADMIN — LORATADINE 10 MG: 10 TABLET ORAL at 08:04

## 2018-02-26 RX ADMIN — INSULIN HUMAN 15 UNITS: 100 INJECTION, SUSPENSION SUBCUTANEOUS at 08:15

## 2018-02-26 RX ADMIN — MORPHINE SULFATE 30 MG: 15 TABLET ORAL at 12:39

## 2018-02-26 RX ADMIN — BUDESONIDE AND FORMOTEROL FUMARATE DIHYDRATE 2 PUFF: 160; 4.5 AEROSOL RESPIRATORY (INHALATION) at 08:03

## 2018-02-26 RX ADMIN — FERROUS GLUCONATE 324 MG: 324 TABLET ORAL at 08:05

## 2018-02-26 RX ADMIN — MORPHINE SULFATE 30 MG: 30 TABLET, EXTENDED RELEASE ORAL at 20:42

## 2018-02-26 RX ADMIN — MORPHINE SULFATE 30 MG: 30 TABLET, EXTENDED RELEASE ORAL at 08:04

## 2018-02-26 RX ADMIN — HEPARIN SODIUM 5000 UNITS: 5000 INJECTION, SOLUTION INTRAVENOUS; SUBCUTANEOUS at 20:42

## 2018-02-26 RX ADMIN — CIPROFLOXACIN 400 MG: 2 INJECTION, SOLUTION INTRAVENOUS at 03:08

## 2018-02-26 RX ADMIN — SODIUM CHLORIDE: 9 INJECTION, SOLUTION INTRAVENOUS at 05:36

## 2018-02-26 RX ADMIN — FINASTERIDE 5 MG: 5 TABLET, FILM COATED ORAL at 08:04

## 2018-02-26 RX ADMIN — HEPARIN SODIUM 5000 UNITS: 5000 INJECTION, SOLUTION INTRAVENOUS; SUBCUTANEOUS at 08:05

## 2018-02-26 RX ADMIN — TAMSULOSIN HYDROCHLORIDE 0.8 MG: 0.4 CAPSULE ORAL at 08:05

## 2018-02-26 ASSESSMENT — PAIN SCALES - GENERAL
PAINLEVEL_OUTOF10: 6
PAINLEVEL_OUTOF10: 8
PAINLEVEL_OUTOF10: 6
PAINLEVEL_OUTOF10: 7
PAINLEVEL_OUTOF10: 6
PAINLEVEL_OUTOF10: 5
PAINLEVEL_OUTOF10: 7

## 2018-02-26 ASSESSMENT — COPD QUESTIONNAIRES
DO YOU EVER COUGH UP ANY MUCUS OR PHLEGM?: NO/ONLY WITH OCCASIONAL COLDS OR INFECTIONS
DURING THE PAST 4 WEEKS HOW MUCH DID YOU FEEL SHORT OF BREATH: SOME OF THE TIME
COPD SCREENING SCORE: 6
HAVE YOU SMOKED AT LEAST 100 CIGARETTES IN YOUR ENTIRE LIFE: YES

## 2018-02-26 ASSESSMENT — ENCOUNTER SYMPTOMS
BLURRED VISION: 0
DEPRESSION: 0
COUGH: 0
MYALGIAS: 0
DIZZINESS: 0
HEARTBURN: 0
FEVER: 0

## 2018-02-26 ASSESSMENT — LIFESTYLE VARIABLES: DO YOU DRINK ALCOHOL: NO

## 2018-02-26 NOTE — CARE PLAN
Problem: Communication  Goal: The ability to communicate needs accurately and effectively will improve  Outcome: PROGRESSING AS EXPECTED  Pt updated on POC. Pt encouraged to voice feelings. Pt questions answered this AM.     Problem: Discharge Barriers/Planning  Goal: Patient's continuum of care needs will be met  Outcome: PROGRESSING AS EXPECTED  Pt signed off to be discharge, but told MD that he can't get a ride today d/t the weather. MD to hold off discharge until tomorrow.

## 2018-02-26 NOTE — PROGRESS NOTES
[facility] Hospitalist Progress Note    Date of Service: 2018    Chief Complaint  67 y.o. male admitted 2018 with urine retention not able to get good urine return with self cath for 2 weeks    Interval Problem Update  Feels fine; no new c/o;     Consultants/Specialty  none    Disposition  home        Review of Systems   Constitutional: Negative for fever.   HENT: Negative for hearing loss.    Eyes: Negative for blurred vision.   Respiratory: Negative for cough.    Cardiovascular: Negative for chest pain.   Gastrointestinal: Negative for heartburn.   Genitourinary: Negative for dysuria.   Musculoskeletal: Negative for myalgias.   Skin: Negative for rash.   Neurological: Negative for dizziness.   Psychiatric/Behavioral: Negative for depression.      Physical Exam  Laboratory/Imaging   Hemodynamics  Temp (24hrs), Av.9 °C (98.4 °F), Min:36.7 °C (98.1 °F), Max:37.2 °C (99 °F)   Temperature: 36.7 °C (98.1 °F)  Pulse  Av.9  Min: 66  Max: 100    Blood Pressure : 148/69      Respiratory      Respiration: 17, Pulse Oximetry: 91 %     Work Of Breathing / Effort: Mild  RUL Breath Sounds: Diminished, RML Breath Sounds: Diminished, RLL Breath Sounds: Diminished, KITTY Breath Sounds: Diminished, LLL Breath Sounds: Diminished    Fluids    Intake/Output Summary (Last 24 hours) at 18 1108  Last data filed at 18 1000   Gross per 24 hour   Intake              300 ml   Output             2500 ml   Net            -2200 ml       Nutrition  Orders Placed This Encounter   Procedures   • Diet Order     Standing Status:   Standing     Number of Occurrences:   1     Order Specific Question:   Diet:     Answer:   Diabetic [3]     Order Specific Question:   Diet:     Answer:   Renal [8]     Physical Exam   Constitutional: He is oriented to person, place, and time. No distress.   HENT:   Head: Normocephalic.   Eyes: EOM are normal.   Neck: Neck supple.   Cardiovascular: Normal rate and regular rhythm.    Pulmonary/Chest:  Effort normal and breath sounds normal.   Abdominal: Soft. Bowel sounds are normal. There is no tenderness.   Mild suprapubic tenderness; harrington inserted   Musculoskeletal: He exhibits no edema.   Neurological: He is alert and oriented to person, place, and time.   Skin: Skin is warm.   Psychiatric: His behavior is normal.       Recent Labs      02/23/18   1558  02/24/18   0240  02/25/18   0242   WBC  11.5*  9.3  11.0*   RBC  3.35*  3.41*  3.64*   HEMOGLOBIN  9.1*  9.2*  9.7*   HEMATOCRIT  29.1*  29.8*  32.4*   MCV  86.9  87.4  89.0   MCH  27.2  27.0  26.6*   MCHC  31.3*  30.9*  29.9*   RDW  48.2  48.4  50.1*   PLATELETCT  425  411  435   MPV  9.2  9.2  9.0     Recent Labs      02/23/18   1558  02/24/18   0240  02/25/18   0242   SODIUM  135  137  137   POTASSIUM  4.5  4.7  5.2   CHLORIDE  109  112  110   CO2  17*  18*  19*   GLUCOSE  102*  104*  119*   BUN  41*  40*  36*   CREATININE  3.74*  3.53*  3.33*   CALCIUM  8.8  9.1  9.5     Recent Labs      02/23/18   1558   APTT  38.1*   INR  1.32*         Recent Labs      02/24/18   0240   TRIGLYCERIDE  103   HDL  23*   LDL  36          Assessment/Plan     * Obstructive uropathy- (present on admission)   Assessment & Plan    Continue Harrington catheter at this point in time; patient will go home with it  Flomax and finasteride  Management of urinary tract infection  Will need close outpatient urology follow-up; [facility]        UTI (urinary tract infection)- (present on admission)   Assessment & Plan    Related to urinary retention, straight cath at home  Clinically improved; change to po abx; urine cx negative so far  Can go home tomorrow if stable        Acute on chronic kidney failure (CMS-HCC)- (present on admission)   Assessment & Plan    Acute kidney injury is related to obstructive uropathy  Underlying known chronic kidney disease stage IV  IV fluid hydration   Avoid nephrotoxins and dose medications renally  Gradually improved  POD 1 s/p ex lap, lysis of adhesions, small bowel resection, sigmoidectomy with end colostomy. Stable but needs more fluids. Hyponatremia    Change fluids to NS  Additional bolus this morning  Continue lujan and NGT  Bowel rest, candy and ice chips okay  Ambulate and IS  VTE prophylaxis  Cardiac and home meds continues with current rx; cont to follow        Chronic pain- (present on admission)   Assessment & Plan    Continue at-home regimen with supplementation for acute issues        HTN (hypertension)- (present on admission)   Assessment & Plan    If persistently hypertensive start regimen        Anemia- (present on admission)   Assessment & Plan    Likely anemia of renal disease  Stable; f/u labs        Crohn's disease (CMS-HCC)- (present on admission)   Assessment & Plan    History of continue outpatient follow-up        Diabetes (CMS-HCC)- (present on admission)   Assessment & Plan    Controlled  Manifestation neuropathy, nephropathy  Monitor sugars and adjust meds as indicated        COPD (chronic obstructive pulmonary disease) (CMS-HCC)- (present on admission)   Assessment & Plan    Without acute exacerbation          Quality-Core Measures

## 2018-02-26 NOTE — CARE PLAN
Problem: Safety  Goal: Will remain free from falls    Intervention: Assess risk factors for falls  Fall measures in place. Bed alarm is on, call light within reach, personal belongings close-by, bed in lowest position, IV pole on same side of bathroom, upper bed rails up, hourly rounding in place. Will continue to monitor pt for safety.       Problem: Pain Management  Goal: Pain level will decrease to patient's comfort goal    Intervention: Follow pain managment plan developed in collaboration with patient and Interdisciplinary Team  Pt medicated for pain per MAR.

## 2018-02-26 NOTE — PROGRESS NOTES
Assumed care of pt after receiving report from dayshift RN. Bedside rounding completed w/ kenzie RN. Pt resting in bed A&OX4, pt medicated for pain per MAR, pt has a 3-way harrington in place, harrington care provided. Fall measures in place, call light within reach, personal belongings nearby, bed in lowest position, and hourly rounding in place. Will continue to monitor pt.

## 2018-02-26 NOTE — PROGRESS NOTES
Assumed care of pt at 0700. Received report from RN. Pt A&Ox4. Assessment complete. Labs reviewed. Pt c/o of bilateral knee pain, medicated for pain per scheduled Morphine. Pt up self ands stead on feet. Urinary catheter in place, RUQ colostomy in place, pt self care. Pt and RN discussed plan of care. Pt questions answered. Pt needs are met at this time. Bed in lowest and locked position. Call light within reach. Upper bed rails up. Hourly rounding in place.

## 2018-02-27 VITALS
RESPIRATION RATE: 18 BRPM | BODY MASS INDEX: 35.62 KG/M2 | HEART RATE: 95 BPM | DIASTOLIC BLOOD PRESSURE: 70 MMHG | OXYGEN SATURATION: 96 % | TEMPERATURE: 98.6 F | WEIGHT: 254.41 LBS | SYSTOLIC BLOOD PRESSURE: 159 MMHG | HEIGHT: 71 IN

## 2018-02-27 LAB
GLUCOSE BLD-MCNC: 110 MG/DL (ref 65–99)
GLUCOSE BLD-MCNC: 190 MG/DL (ref 65–99)

## 2018-02-27 PROCEDURE — 700102 HCHG RX REV CODE 250 W/ 637 OVERRIDE(OP): Performed by: HOSPITALIST

## 2018-02-27 PROCEDURE — 700102 HCHG RX REV CODE 250 W/ 637 OVERRIDE(OP): Performed by: INTERNAL MEDICINE

## 2018-02-27 PROCEDURE — G8997 SWALLOW GOAL STATUS: HCPCS | Mod: CH

## 2018-02-27 PROCEDURE — A9270 NON-COVERED ITEM OR SERVICE: HCPCS | Performed by: INTERNAL MEDICINE

## 2018-02-27 PROCEDURE — 99239 HOSP IP/OBS DSCHRG MGMT >30: CPT | Performed by: INTERNAL MEDICINE

## 2018-02-27 PROCEDURE — 700111 HCHG RX REV CODE 636 W/ 250 OVERRIDE (IP): Performed by: INTERNAL MEDICINE

## 2018-02-27 PROCEDURE — A9270 NON-COVERED ITEM OR SERVICE: HCPCS | Performed by: HOSPITALIST

## 2018-02-27 PROCEDURE — 92610 EVALUATE SWALLOWING FUNCTION: CPT

## 2018-02-27 PROCEDURE — 82962 GLUCOSE BLOOD TEST: CPT

## 2018-02-27 PROCEDURE — G8996 SWALLOW CURRENT STATUS: HCPCS | Mod: CI

## 2018-02-27 RX ORDER — CIPROFLOXACIN 500 MG/1
500 TABLET, FILM COATED ORAL EVERY 24 HOURS
Qty: 3 TAB | Refills: 0 | Status: ON HOLD | OUTPATIENT
Start: 2018-02-28 | End: 2019-04-03

## 2018-02-27 RX ADMIN — MORPHINE SULFATE 30 MG: 30 TABLET, EXTENDED RELEASE ORAL at 09:16

## 2018-02-27 RX ADMIN — LORATADINE 10 MG: 10 TABLET ORAL at 09:17

## 2018-02-27 RX ADMIN — INSULIN HUMAN 2 UNITS: 100 INJECTION, SOLUTION PARENTERAL at 09:44

## 2018-02-27 RX ADMIN — INSULIN HUMAN 15 UNITS: 100 INJECTION, SUSPENSION SUBCUTANEOUS at 09:44

## 2018-02-27 RX ADMIN — TAMSULOSIN HYDROCHLORIDE 0.8 MG: 0.4 CAPSULE ORAL at 09:17

## 2018-02-27 RX ADMIN — FERROUS GLUCONATE 324 MG: 324 TABLET ORAL at 09:16

## 2018-02-27 RX ADMIN — FINASTERIDE 5 MG: 5 TABLET, FILM COATED ORAL at 09:16

## 2018-02-27 RX ADMIN — CIPROFLOXACIN HYDROCHLORIDE 500 MG: 500 TABLET, FILM COATED ORAL at 09:17

## 2018-02-27 RX ADMIN — HEPARIN SODIUM 5000 UNITS: 5000 INJECTION, SOLUTION INTRAVENOUS; SUBCUTANEOUS at 09:16

## 2018-02-27 RX ADMIN — BUDESONIDE AND FORMOTEROL FUMARATE DIHYDRATE 2 PUFF: 160; 4.5 AEROSOL RESPIRATORY (INHALATION) at 09:00

## 2018-02-27 ASSESSMENT — LIFESTYLE VARIABLES: DO YOU DRINK ALCOHOL: NO

## 2018-02-27 NOTE — THERAPY
"Speech Language Therapy Clinical Swallow Evaluation completed.    Functional Status: Pt was seen for a clinical swallow evaluation on this date. He was AAOx4 and agreeable to CSE. Pt complains of difficulty swallowing meat. He reported that he was trached in 2007, and feels as though he has \"too much tissue left over\" in his throat which is causing dysphagia. Pt reports having to \"push the food over to the right side\" of his throat when swallowing. [facility] with no gross motor deficits noted. Pt consumed PO trials of ice chips, thins, puree, soft solids, and solids with no overt s/sx of aspiration. Single and sequential sips of thins were consumed via side of cup and straw without difficulty. Upon palpation, pt's initiation of swallow was timely and laryngeal elevation was weak, however complete. He exhibited 2-4 swallows to clear each bolus of puree, soft solids, and solids. Pt also took several large bites of fruit, which may have contributed to the pt needing multiple swallows. Pt described globus sensation when consuming these textures, pointing to the area of the UES. Pt instructed to take single bite, followed by single sip, which appeared to have decreased globus sensation. However, question esophageal dysmotility. Recommend that the pt complete an esophagram, as well as follow-up as an outpatient with an ENT once discharged. Recommend that the pt be downgraded to a Dysphagia III w/ Thin Liquids diet to assist pt in utilizing swallow precautions. Pt educated on importance of swallow precautions: small bites/sips, single bite followed by single sip, HOB at 90 degrees with all PO intake, no talking while eating/drinking, and utilizing multiple swallows. Results and recommendations were discussed with the pt, RN, and MD. SLP to f/u x1-2 for pt education on swallow strategies.    Recommendations - Diet: Diet / Liquid Recommendation: Dysphagia III, Thin Liquid                          " "Strategies: Head of Bed at 90 Degrees, Small Bites/Sips, Single Bite Followed by Single Sip                          Medication Administration: Medication Administration : Whole with Liquid Wash    Plan of Care: Will benefit from Speech Therapy 2 times per week  Post-Acute Therapy: Currently anticipate no further skilled SLP needs once patient is discharged from the inpatient setting.    See \"Rehab Therapy-Acute\" Patient Summary Report for complete documentation.   "

## 2018-02-27 NOTE — DISCHARGE INSTRUCTIONS
Discharge Instructions    Discharged to home by car with relative. Discharged via walking, hospital escort: Refused.  Special equipment needed: Not Applicable    Be sure to schedule a follow-up appointment with your primary care doctor or any specialists as instructed.     Discharge Plan:   Influenza Vaccine Indication: Indicated: 9 to 64 years of age  Influenza Vaccine Given - only chart on this line when given: Influenza Vaccine Given (See MAR)    I understand that a diet low in cholesterol, fat, and sodium is recommended for good health. Unless I have been given specific instructions below for another diet, I accept this instruction as my diet prescription.     Special Instructions: None    · Is patient discharged on Warfarin / Coumadin?   No     Depression / Suicide Risk    As you are discharged from this [facility] facility, it is important to learn how to keep safe from harming yourself.    Recognize the warning signs:  · Abrupt changes in personality, positive or negative- including increase in energy   · Giving away possessions  · Change in eating patterns- significant weight changes-  positive or negative  · Change in sleeping patterns- unable to sleep or sleeping all the time   · Unwillingness or inability to communicate  · Depression  · Unusual sadness, discouragement and loneliness  · Talk of wanting to die  · Neglect of personal appearance   · Rebelliousness- reckless behavior  · Withdrawal from people/activities they love  · Confusion- inability to concentrate     If you or a loved one observes any of these behaviors or has concerns about self-harm, here's what you can do:  · Talk about it- your feelings and reasons for harming yourself  · Remove any means that you might use to hurt yourself (examples: pills, rope, extension cords, firearm)  · Get professional help from the community (Mental Health, Substance Abuse, psychological counseling)  · Do not be alone:Call your Safe Contact- someone whom  you trust who will be there for you.  · Call your local CRISIS HOTLINE 562-5175 or 474-091-4665  · Call your local Children's Mobile Crisis Response Team [place] (230) 633-8327 or www.[employer]  · Call the toll free National Suicide Prevention Hotlines   · National Suicide Prevention Lifeline 902-593-HFVY (8613)  · National [employer] Line Network 800-SUICIDE (227-4258)

## 2018-02-27 NOTE — CARE PLAN
Problem: Safety  Goal: Will remain free from injury  Outcome: PROGRESSING AS EXPECTED      Problem: Pain Management  Goal: Pain level will decrease to patient's comfort goal  Outcome: PROGRESSING AS EXPECTED      Problem: Respiratory:  Goal: Respiratory status will improve  Outcome: PROGRESSING AS EXPECTED

## 2018-02-27 NOTE — PROGRESS NOTES
Pt to D/C tomorrow, but this RN felt necessary for speech to see patient prior to discharge to evaluate swallowing. Pt was having a hard time getting a small piece of meat to go down. Pt up talking and no signs of distressed breathing. SLP swallow evaluation placed.

## 2018-02-28 ENCOUNTER — PATIENT OUTREACH (OUTPATIENT)
Dept: HEALTH INFORMATION MANAGEMENT | Facility: OTHER | Age: 68
End: 2018-02-28

## 2018-02-28 NOTE — LETTER
Shola Hoyt  [address]  [place] [address]    February 28, 2018      Dear Shola Hoyt,    [facility] wants to ensure your discharge home is safe and you or your loved ones have had all of your questions answered regarding your care after you leave the hospital.    Our discharge team was unsuccessful in our attempts to contact you telephonically and we wanted to be sure that you had a list of resources and contact information should you have any questions regarding your hospital stay, follow-up instructions, or active medical symptoms.    Questions or Concerns Regarding… Contact   Medical Questions Related to Your Discharge  (7 days a week, 8am-5pm) Contact a Nurse Care Coordinator   [phone]   Medical Questions Not Related to Your Discharge  (24 hours a day / 7 days a week)  Contact the Nurse Health Line   [phone]    Medications or Discharge Instructions Refer to your discharge packet   or contact your -[phone]   Follow-up Appointment(s) Schedule your appointment via [employer]   or contact Scheduling [phone]   Billing Review your statement via [employer]  or contact Billing [phone]   Medical Records Review your records via [employer]   or contact Medical Records [phone]     You can also easily access your medical information, test results and upcoming appointments via the [employer] free online health management tool. You can learn more and sign up at [email or web address]. For assistance setting up your [employer] account, please call [phone].    Once again, we want to ensure your discharge home is safe and that you have a clear understanding of any next steps in your care. If you have any questions or concerns, please do not hesitate to contact us, we are here for you. Thank you for choosing [facility] for your healthcare needs.    Sincerely,      Your [facility] Healthcare Team

## 2019-03-25 ENCOUNTER — HOSPITAL ENCOUNTER (OUTPATIENT)
Facility: MEDICAL CENTER | Age: 69
End: 2019-03-25
Admitting: INTERNAL MEDICINE
Payer: MEDICARE

## 2019-03-25 ENCOUNTER — HOSPITAL ENCOUNTER (INPATIENT)
Facility: MEDICAL CENTER | Age: 69
LOS: 9 days | DRG: 414 | End: 2019-04-03
Attending: INTERNAL MEDICINE | Admitting: INTERNAL MEDICINE
Payer: MEDICARE

## 2019-03-25 DIAGNOSIS — K50.919 CROHN'S DISEASE WITH COMPLICATION, UNSPECIFIED GASTROINTESTINAL TRACT LOCATION (HCC): ICD-10-CM

## 2019-03-25 DIAGNOSIS — K80.50 CHOLEDOCHOLITHIASIS: ICD-10-CM

## 2019-03-25 DIAGNOSIS — N13.9 OBSTRUCTIVE UROPATHY: ICD-10-CM

## 2019-03-25 PROBLEM — E11.65 DIABETES MELLITUS WITH HYPERGLYCEMIA (HCC): Status: ACTIVE | Noted: 2017-10-17

## 2019-03-25 PROBLEM — E87.1 HYPONATREMIA: Status: ACTIVE | Noted: 2019-03-25

## 2019-03-25 PROBLEM — N18.9 CHRONIC KIDNEY DISEASE: Status: ACTIVE | Noted: 2019-03-25

## 2019-03-25 PROCEDURE — 700102 HCHG RX REV CODE 250 W/ 637 OVERRIDE(OP): Performed by: INTERNAL MEDICINE

## 2019-03-25 PROCEDURE — A9270 NON-COVERED ITEM OR SERVICE: HCPCS | Performed by: INTERNAL MEDICINE

## 2019-03-25 PROCEDURE — 770006 HCHG ROOM/CARE - MED/SURG/GYN SEMI*

## 2019-03-25 PROCEDURE — 99222 1ST HOSP IP/OBS MODERATE 55: CPT | Mod: AI | Performed by: INTERNAL MEDICINE

## 2019-03-25 RX ORDER — ACETAMINOPHEN 325 MG/1
650 TABLET ORAL EVERY 6 HOURS PRN
Status: DISCONTINUED | OUTPATIENT
Start: 2019-03-25 | End: 2019-04-03 | Stop reason: HOSPADM

## 2019-03-25 RX ORDER — ONDANSETRON 2 MG/ML
4 INJECTION INTRAMUSCULAR; INTRAVENOUS EVERY 4 HOURS PRN
Status: DISCONTINUED | OUTPATIENT
Start: 2019-03-25 | End: 2019-04-03 | Stop reason: HOSPADM

## 2019-03-25 RX ORDER — MORPHINE SULFATE 15 MG/1
15 TABLET, FILM COATED, EXTENDED RELEASE ORAL EVERY 12 HOURS
Status: DISCONTINUED | OUTPATIENT
Start: 2019-03-25 | End: 2019-04-03 | Stop reason: HOSPADM

## 2019-03-25 RX ORDER — TAMSULOSIN HYDROCHLORIDE 0.4 MG/1
0.4 CAPSULE ORAL
Status: DISCONTINUED | OUTPATIENT
Start: 2019-03-26 | End: 2019-04-03 | Stop reason: HOSPADM

## 2019-03-25 RX ORDER — BUDESONIDE AND FORMOTEROL FUMARATE DIHYDRATE 160; 4.5 UG/1; UG/1
2 AEROSOL RESPIRATORY (INHALATION) 2 TIMES DAILY
Status: DISCONTINUED | OUTPATIENT
Start: 2019-03-25 | End: 2019-04-03 | Stop reason: HOSPADM

## 2019-03-25 RX ORDER — ONDANSETRON 4 MG/1
4 TABLET, ORALLY DISINTEGRATING ORAL EVERY 4 HOURS PRN
Status: DISCONTINUED | OUTPATIENT
Start: 2019-03-25 | End: 2019-04-03 | Stop reason: HOSPADM

## 2019-03-25 RX ORDER — BISACODYL 10 MG
10 SUPPOSITORY, RECTAL RECTAL
Status: DISCONTINUED | OUTPATIENT
Start: 2019-03-25 | End: 2019-04-03 | Stop reason: HOSPADM

## 2019-03-25 RX ORDER — MORPHINE SULFATE 4 MG/ML
1-4 INJECTION, SOLUTION INTRAMUSCULAR; INTRAVENOUS EVERY 4 HOURS PRN
Status: DISCONTINUED | OUTPATIENT
Start: 2019-03-25 | End: 2019-04-03 | Stop reason: HOSPADM

## 2019-03-25 RX ORDER — POLYETHYLENE GLYCOL 3350 17 G/17G
1 POWDER, FOR SOLUTION ORAL
Status: DISCONTINUED | OUTPATIENT
Start: 2019-03-25 | End: 2019-04-03 | Stop reason: HOSPADM

## 2019-03-25 RX ORDER — HYDRALAZINE HYDROCHLORIDE 20 MG/ML
10 INJECTION INTRAMUSCULAR; INTRAVENOUS EVERY 4 HOURS PRN
Status: DISCONTINUED | OUTPATIENT
Start: 2019-03-25 | End: 2019-04-03 | Stop reason: HOSPADM

## 2019-03-25 RX ORDER — AMOXICILLIN 250 MG
2 CAPSULE ORAL 2 TIMES DAILY
Status: DISCONTINUED | OUTPATIENT
Start: 2019-03-25 | End: 2019-04-03 | Stop reason: HOSPADM

## 2019-03-25 RX ORDER — MORPHINE SULFATE 15 MG/1
15 TABLET ORAL 3 TIMES DAILY PRN
Status: DISCONTINUED | OUTPATIENT
Start: 2019-03-25 | End: 2019-04-03 | Stop reason: HOSPADM

## 2019-03-25 RX ADMIN — BUDESONIDE AND FORMOTEROL FUMARATE DIHYDRATE 2 PUFF: 160; 4.5 AEROSOL RESPIRATORY (INHALATION) at 22:29

## 2019-03-25 RX ADMIN — MORPHINE SULFATE 15 MG: 15 TABLET, EXTENDED RELEASE ORAL at 22:28

## 2019-03-25 ASSESSMENT — LIFESTYLE VARIABLES
TOTAL SCORE: 0
EVER HAD A DRINK FIRST THING IN THE MORNING TO STEADY YOUR NERVES TO GET RID OF A HANGOVER: NO
TOTAL SCORE: 0
ON A TYPICAL DAY WHEN YOU DRINK ALCOHOL HOW MANY DRINKS DO YOU HAVE: 1
AVERAGE NUMBER OF DAYS PER WEEK YOU HAVE A DRINK CONTAINING ALCOHOL: 1
TOTAL SCORE: 0
HOW MANY TIMES IN THE PAST YEAR HAVE YOU HAD 5 OR MORE DRINKS IN A DAY: 0
HAVE YOU EVER FELT YOU SHOULD CUT DOWN ON YOUR DRINKING: NO
CONSUMPTION TOTAL: NEGATIVE
EVER_SMOKED: YES
EVER FELT BAD OR GUILTY ABOUT YOUR DRINKING: NO
HAVE PEOPLE ANNOYED YOU BY CRITICIZING YOUR DRINKING: NO
ALCOHOL_USE: YES

## 2019-03-25 ASSESSMENT — PATIENT HEALTH QUESTIONNAIRE - PHQ9
2. FEELING DOWN, DEPRESSED, IRRITABLE, OR HOPELESS: NOT AT ALL
SUM OF ALL RESPONSES TO PHQ9 QUESTIONS 1 AND 2: 0
1. LITTLE INTEREST OR PLEASURE IN DOING THINGS: NOT AT ALL

## 2019-03-25 ASSESSMENT — ENCOUNTER SYMPTOMS
DIZZINESS: 0
CHILLS: 1
ABDOMINAL PAIN: 1
MYALGIAS: 0
TINGLING: 0
SPUTUM PRODUCTION: 0
LOSS OF CONSCIOUSNESS: 0
PALPITATIONS: 0
DIARRHEA: 0
FALLS: 0
STRIDOR: 0
HEADACHES: 0
VOMITING: 0
CONSTIPATION: 0
DEPRESSION: 0
COUGH: 0
WEAKNESS: 0
NAUSEA: 0
FEVER: 0
SHORTNESS OF BREATH: 0

## 2019-03-25 NOTE — PROGRESS NOTES
Direct admit from [facility], referred by Dr. Bustillo. For Coledocolithiasis. Admitting MD is Dr. Zapata. GI has agreed to consult, Dr. Clement spoke to the referring MD, GI consultants. Upon patient's arrival release signed and held orders, page admit hospitalist on call for orders. Notify GI of patient's arrival.

## 2019-03-26 PROBLEM — E08.29: Status: ACTIVE | Noted: 2019-03-26

## 2019-03-26 PROBLEM — F11.20 OPIATE DEPENDENCE (HCC): Status: ACTIVE | Noted: 2018-02-24

## 2019-03-26 LAB
ALBUMIN SERPL BCP-MCNC: 2.9 G/DL (ref 3.2–4.9)
ALBUMIN/GLOB SERPL: 0.7 G/DL
ALP SERPL-CCNC: 65 U/L (ref 30–99)
ALT SERPL-CCNC: 16 U/L (ref 2–50)
ANION GAP SERPL CALC-SCNC: 8 MMOL/L (ref 0–11.9)
AST SERPL-CCNC: 18 U/L (ref 12–45)
BILIRUB SERPL-MCNC: 0.7 MG/DL (ref 0.1–1.5)
BUN SERPL-MCNC: 25 MG/DL (ref 8–22)
CALCIUM SERPL-MCNC: 8.4 MG/DL (ref 8.4–10.2)
CHLORIDE SERPL-SCNC: 99 MMOL/L (ref 96–112)
CO2 SERPL-SCNC: 24 MMOL/L (ref 20–33)
CREAT SERPL-MCNC: 2.67 MG/DL (ref 0.5–1.4)
ERYTHROCYTE [DISTWIDTH] IN BLOOD BY AUTOMATED COUNT: 46.5 FL (ref 35.9–50)
GLOBULIN SER CALC-MCNC: 4.3 G/DL (ref 1.9–3.5)
GLUCOSE BLD-MCNC: 116 MG/DL (ref 65–99)
GLUCOSE BLD-MCNC: 127 MG/DL (ref 65–99)
GLUCOSE SERPL-MCNC: 149 MG/DL (ref 65–99)
HCT VFR BLD AUTO: 39.3 % (ref 42–52)
HGB BLD-MCNC: 12.7 G/DL (ref 14–18)
MCH RBC QN AUTO: 28 PG (ref 27–33)
MCHC RBC AUTO-ENTMCNC: 32.3 G/DL (ref 33.7–35.3)
MCV RBC AUTO: 86.8 FL (ref 81.4–97.8)
PLATELET # BLD AUTO: 392 K/UL (ref 164–446)
PMV BLD AUTO: 8.8 FL (ref 9–12.9)
POTASSIUM SERPL-SCNC: 4.2 MMOL/L (ref 3.6–5.5)
PROT SERPL-MCNC: 7.2 G/DL (ref 6–8.2)
RBC # BLD AUTO: 4.53 M/UL (ref 4.7–6.1)
SODIUM SERPL-SCNC: 131 MMOL/L (ref 135–145)
WBC # BLD AUTO: 9.1 K/UL (ref 4.8–10.8)

## 2019-03-26 PROCEDURE — 80053 COMPREHEN METABOLIC PANEL: CPT

## 2019-03-26 PROCEDURE — 700102 HCHG RX REV CODE 250 W/ 637 OVERRIDE(OP): Performed by: INTERNAL MEDICINE

## 2019-03-26 PROCEDURE — 85027 COMPLETE CBC AUTOMATED: CPT

## 2019-03-26 PROCEDURE — 99232 SBSQ HOSP IP/OBS MODERATE 35: CPT | Performed by: INTERNAL MEDICINE

## 2019-03-26 PROCEDURE — 770006 HCHG ROOM/CARE - MED/SURG/GYN SEMI*

## 2019-03-26 PROCEDURE — A9270 NON-COVERED ITEM OR SERVICE: HCPCS | Performed by: INTERNAL MEDICINE

## 2019-03-26 PROCEDURE — 36415 COLL VENOUS BLD VENIPUNCTURE: CPT

## 2019-03-26 PROCEDURE — 700105 HCHG RX REV CODE 258: Performed by: INTERNAL MEDICINE

## 2019-03-26 PROCEDURE — 82962 GLUCOSE BLOOD TEST: CPT

## 2019-03-26 PROCEDURE — 700111 HCHG RX REV CODE 636 W/ 250 OVERRIDE (IP): Performed by: INTERNAL MEDICINE

## 2019-03-26 PROCEDURE — 94664 DEMO&/EVAL PT USE INHALER: CPT

## 2019-03-26 RX ORDER — DEXTROSE MONOHYDRATE 25 G/50ML
25 INJECTION, SOLUTION INTRAVENOUS
Status: DISCONTINUED | OUTPATIENT
Start: 2019-03-26 | End: 2019-03-31

## 2019-03-26 RX ORDER — INSULIN GLARGINE 100 [IU]/ML
0.2 INJECTION, SOLUTION SUBCUTANEOUS EVERY EVENING
Status: DISCONTINUED | OUTPATIENT
Start: 2019-03-26 | End: 2019-03-31

## 2019-03-26 RX ADMIN — MORPHINE SULFATE 4 MG: 4 INJECTION INTRAVENOUS at 00:39

## 2019-03-26 RX ADMIN — MEROPENEM 500 MG: 500 INJECTION, POWDER, FOR SOLUTION INTRAVENOUS at 06:43

## 2019-03-26 RX ADMIN — STANDARDIZED SENNA CONCENTRATE AND DOCUSATE SODIUM 2 TABLET: 8.6; 5 TABLET, FILM COATED ORAL at 18:49

## 2019-03-26 RX ADMIN — MORPHINE SULFATE 15 MG: 15 TABLET, EXTENDED RELEASE ORAL at 11:21

## 2019-03-26 RX ADMIN — MEROPENEM 500 MG: 500 INJECTION, POWDER, FOR SOLUTION INTRAVENOUS at 12:43

## 2019-03-26 RX ADMIN — MEROPENEM 500 MG: 500 INJECTION, POWDER, FOR SOLUTION INTRAVENOUS at 00:52

## 2019-03-26 RX ADMIN — INSULIN GLARGINE 25 UNITS: 100 INJECTION, SOLUTION SUBCUTANEOUS at 18:51

## 2019-03-26 RX ADMIN — MORPHINE SULFATE 4 MG: 4 INJECTION INTRAVENOUS at 11:21

## 2019-03-26 RX ADMIN — MEROPENEM 500 MG: 500 INJECTION, POWDER, FOR SOLUTION INTRAVENOUS at 19:58

## 2019-03-26 RX ADMIN — MORPHINE SULFATE 15 MG: 15 TABLET, EXTENDED RELEASE ORAL at 18:49

## 2019-03-26 RX ADMIN — BUDESONIDE AND FORMOTEROL FUMARATE DIHYDRATE 2 PUFF: 160; 4.5 AEROSOL RESPIRATORY (INHALATION) at 18:49

## 2019-03-26 RX ADMIN — MORPHINE SULFATE 2 MG: 4 INJECTION INTRAVENOUS at 19:59

## 2019-03-26 ASSESSMENT — ENCOUNTER SYMPTOMS
SORE THROAT: 0
NERVOUS/ANXIOUS: 0
CHILLS: 0
ABDOMINAL PAIN: 1
DIARRHEA: 0
COUGH: 0
FEVER: 0
CONSTIPATION: 0
DIZZINESS: 0
HEADACHES: 0
NAUSEA: 0
EYE REDNESS: 0
SHORTNESS OF BREATH: 0
VOMITING: 0

## 2019-03-26 ASSESSMENT — COGNITIVE AND FUNCTIONAL STATUS - GENERAL
HELP NEEDED FOR BATHING: A LITTLE
DRESSING REGULAR UPPER BODY CLOTHING: A LITTLE
MOBILITY SCORE: 21
SUGGESTED CMS G CODE MODIFIER DAILY ACTIVITY: CK
DRESSING REGULAR LOWER BODY CLOTHING: A LITTLE
SUGGESTED CMS G CODE MODIFIER MOBILITY: CJ
DAILY ACTIVITIY SCORE: 19
TURNING FROM BACK TO SIDE WHILE IN FLAT BAD: A LITTLE
PERSONAL GROOMING: A LITTLE
EATING MEALS: A LITTLE
MOVING FROM LYING ON BACK TO SITTING ON SIDE OF FLAT BED: A LITTLE
CLIMB 3 TO 5 STEPS WITH RAILING: A LITTLE

## 2019-03-26 NOTE — ASSESSMENT & PLAN NOTE
Continue oral ms contin  IV acute pain medication post operatively as needed, had to have open procedure.

## 2019-03-26 NOTE — ASSESSMENT & PLAN NOTE
-Mild hyperglycemia, patient will be n.p.o. at midnight, for now will avoid insulin sliding scale but will likely need this once he is taking p.o. again

## 2019-03-26 NOTE — PROGRESS NOTES
Bedside report received from night RN. Assumed care of patient. Daily plan of care discussed. VSS. Hourly rounding in place.

## 2019-03-26 NOTE — ASSESSMENT & PLAN NOTE
-Significant, has had multiple surgeries and now has an ostomy, on [facility] inf[facility] per [facility]  -No acute flare

## 2019-03-26 NOTE — H&P
Hospital Medicine History & Physical Note    Date of Service  3/25/2019    Primary Care Physician  Nicolette Yeager M.D. (Inactive)    Consultants  Gastroenterology-Dr. Clement    Code Status  Full    Chief Complaint  Abdominal pain    History of Presenting Illness  68 y.o. male who presented 3/25/2019 with abdominal pain.  Patient initially presented to [facility] due to abdominal pain.  He was also noted to be mildly confused at that time.  Because of his complaints, MRCP was obtained and it showed choledocholithiasis.  Because of this, staff wanted to transfer the patient here for ERCP, GI was called and agreed with transfer.  Patient at this time is still little bit confused, his memory is a little off, otherwise does complain of right upper quadrant pain which is present on exam.  He also complains of chills.  Otherwise he has no significant complaints but is requesting to be fed.    Review of Systems  Review of Systems   Constitutional: Positive for chills and malaise/fatigue. Negative for fever.   HENT: Negative for congestion.    Respiratory: Negative for cough, sputum production, shortness of breath and stridor.    Cardiovascular: Negative for chest pain, palpitations and leg swelling.   Gastrointestinal: Positive for abdominal pain. Negative for constipation, diarrhea, nausea and vomiting.   Genitourinary: Negative for dysuria and urgency.   Musculoskeletal: Negative for falls and myalgias.   Neurological: Negative for dizziness, tingling, loss of consciousness, weakness and headaches.   Psychiatric/Behavioral: Negative for depression and suicidal ideas.   All other systems reviewed and are negative.      Past Medical History   has a past medical history of Arthritis; Benign prostate hyperplasia; Colostomy in place (CMS-HCC) (Formerly [facility]); COPD (chronic obstructive pulmonary disease) (CMS-HCC) (Formerly [facility]); Diabetes (CMS-HCC) (Formerly [facility]); and Renal disorder.    Surgical History   has a past surgical history that  includes toe amputation (Right, 10/25/2017).     Family History  Reviewed, noncontributory    Social History   Patient denied any tobacco, alcohol or illicit drug use    Allergies  Allergies   Allergen Reactions   • Ceftriaxone    • Ezetimibe    • Fentanyl    • Flagyl [Metronidazole]    • Infliximab    • Lovastatin    • Simvastatin    • Vancomycin        Medications  Prior to Admission Medications   Prescriptions Last Dose Informant Patient Reported? Taking?   Adalimumab 40 MG/0.8ML Pen-injector Kit  Patient's Home Pharmacy Yes No   Sig: Inject 40 mg as instructed every 14 days.   albuterol (PROVENTIL) 2.5mg/3ml Nebu Soln solution for nebulization  Patient's Home Pharmacy Yes No   Si.5 mg by Nebulization route every four hours as needed for Shortness of Breath.   albuterol 108 (90 Base) MCG/ACT Aero Soln inhalation aerosol  Patient's Home Pharmacy Yes No   Sig: Inhale 2 Puffs by mouth every four hours as needed for Shortness of Breath.   budesonide-formoterol (SYMBICORT) 160-4.5 MCG/ACT Aerosol  Patient's Home Pharmacy Yes No   Sig: Inhale 2 Puffs by mouth 2 Times a Day.   ciprofloxacin (CIPRO) 500 MG Tab   No No   Sig: Take 1 Tab by mouth every 24 hours.   ferrous gluconate (FERGON) 324 (38 Fe) MG Tab  Patient's Home Pharmacy Yes No   Sig: Take 324 mg by mouth every morning with breakfast.   furosemide (LASIX) 20 MG Tab  Patient's Home Pharmacy Yes No   Sig: Take 20 mg by mouth every day.   insulin NPH (HUMULIN,NOVOLIN) 100 UNIT/ML Suspension  Patient's Home Pharmacy Yes No   Sig: Inject 30-40 Units as instructed 2 Times a Day. 30 units in AM, 40 units in PM   lidocaine (XYLOCAINE) 2 % Gel  Patient's Home Pharmacy Yes No   Sig: Apply 1 Application to affected area(s) 2 times a day as needed.   loratadine (CLARITIN) 10 MG Tab  Patient's Home Pharmacy Yes No   Sig: Take 10 mg by mouth every day.   morphine (MS IR) 15 MG tablet  Patient Yes No   Sig: Take 15 mg by mouth 3 times a day as needed for Severe Pain.    morphine ER (MS CONTIN) 15 MG Tab CR tablet  Patient's Home Pharmacy Yes No   Sig: Take 15 mg by mouth every 12 hours.   tamsulosin (FLOMAX) 0.4 MG capsule  Patient's Home Pharmacy Yes No   Sig: Take 0.4 mg by mouth ONE-HALF HOUR AFTER BREAKFAST.      Facility-Administered Medications: None       Physical Exam  Temp:  [36.8 °C (98.2 °F)] 36.8 °C (98.2 °F)  Pulse:  [75] 75  Resp:  [18] 18  BP: (139)/(64) 139/64  SpO2:  [98 %] 98 %    Physical Exam   Constitutional: He is oriented to person, place, and time. He appears well-developed and well-nourished.  Non-toxic appearance. No distress.   HENT:   Head: Normocephalic and atraumatic. Not macrocephalic and not microcephalic. Head is without raccoon's eyes and without Licea's sign.   Right Ear: External ear normal.   Left Ear: External ear normal.   Mouth/Throat: Oropharynx is clear and moist. No oropharyngeal exudate.   Eyes: Conjunctivae are normal. Right eye exhibits no discharge. Left eye exhibits no discharge. No scleral icterus.   Neck: Normal range of motion. Neck supple. No tracheal deviation, no edema and no erythema present.   Cardiovascular: Normal rate, regular rhythm, normal heart sounds and intact distal pulses.  Exam reveals no gallop, no friction rub and no decreased pulses.    No murmur heard.  Pulmonary/Chest: Effort normal and breath sounds normal. No stridor. No respiratory distress. He has no decreased breath sounds. He has no wheezes. He has no rhonchi. He has no rales. He exhibits no tenderness.   Abdominal: Soft. Bowel sounds are normal. He exhibits no distension. There is no splenomegaly or hepatomegaly. There is tenderness in the right upper quadrant. There is no rebound and no guarding.   Musculoskeletal: Normal range of motion. He exhibits no edema, tenderness or deformity.   Lymphadenopathy:     He has no cervical adenopathy.   Neurological: He is alert and oriented to person, place, and time. No cranial nerve deficit. Coordination  normal.   Skin: Skin is warm and dry. No rash noted. He is not diaphoretic. No cyanosis or erythema. No pallor. Nails show no clubbing.   Psychiatric: He has a normal mood and affect. Judgment and thought content normal. His speech is delayed. He is slowed. Cognition and memory are impaired.   Nursing note and vitals reviewed.      Laboratory:    Labs from outside facility show a sodium of 1 potassium 4.4 chloride 99 CO2 23 BUN 21 creatinine 2.22 glucose 164 white blood cell count 8.8 hemoglobin 13.4 hematocrit 41.4 platelets 375      No results for input(s): ALTSGPT, ASTSGOT, ALKPHOSPHAT, TBILIRUBIN, DBILIRUBIN, GAMMAGT, AMYLASE, LIPASE, ALB, PREALBUMIN, GLUCOSE in the last 72 hours.              No results for input(s): TROPONINI in the last 72 hours.    Urinalysis:    No results found     Imaging:  No orders to display         Assessment/Plan:  I anticipate this patient will require at least two midnights for appropriate medical management, necessitating inpatient admission.    Choledocholithiasis- (present on admission)   Assessment & Plan    -Noted on MRCP  -Patient transferred here for ERCP  -I will make him n.p.o. at midnight  -GI did agree to the transfer     UTI (urinary tract infection)- (present on admission)   Assessment & Plan    -Pseudomonas per the sign out I received, I cannot find culture results in the chart  -Continue Merrem for 7 days as long as this can be clarified  -I think this infection is causing an acute metabolic encephalopathy while mild  -He does have a chronic indwelling catheter which has been replaced     Chronic kidney disease- (present on admission)   Assessment & Plan    -Currently seems at baseline  -No additional IV fluids needed  -Repeat BMP in the morning     Hyponatremia   Assessment & Plan    -Mild, repeat BMP in the morning     BPH (benign prostatic hyperplasia)- (present on admission)   Assessment & Plan    -Continue home Flomax     Chronic pain- (present on admission)    Assessment & Plan    -Continue home meds, place IV morphine while he is n.p.o.  -Adjust meds as needed     Crohn's disease (HCC)- (present on admission)   Assessment & Plan    -Significant, has had multiple surgeries and now has an ostomy  -No acute flare     Diabetes mellitus with hyperglycemia (HCC)- (present on admission)   Assessment & Plan    -Mild hyperglycemia, patient will be n.p.o. at midnight, for now will avoid insulin sliding scale but will likely need this once he is taking p.o. again     COPD (chronic obstructive pulmonary disease) (HCC)- (present on admission)   Assessment & Plan    -No acute exacerbation         VTE prophylaxis: SCDs

## 2019-03-26 NOTE — CONSULTS
DATE OF SERVICE:  03/26/2019    REFERRING PHYSICIAN:  Felix Zapata DO    CHIEF COMPLAINT:  Common bile duct stone.    HISTORY OF PRESENT ILLNESS:  This patient is a 68-year-old male who has had   intermittent episodes of bilateral upper quadrant abdominal pain, which can be   cramping in nature.  It is not related to eating and it can last from a few   minutes to up to an hour and then he gets relieved with time.  He denies   associated nausea, vomiting, heartburn, dysphagia.  He has a long history of   Crohn's disease and has been on biologics in the past and has been on Humira   for the past 5 years.  He evidently had some type of bowel surgery for his   Crohn's disease.  The exact type is unknown.  He was hospitalized at an   outlying hospital and to evaluate his abdominal pain, they did an MRCP showing   a 1.2 cm common bile duct stone and a dilated common bile duct with a   diameter of 18 mm.  He was referred in here for further evaluation and   treatment.    ALLERGIES:  1.  CEFTRIAXONE  2.  EZETIMIBE.  3.  FENTANYL.  4.  FLAGYL.  5.  REMICADE.  6.  STATINS, VANCOMYCIN.    PAST MEDICAL HISTORY:  1.  Benign prostatic hypertrophy with obstructive symptoms and recurrent drug   resistant UTI, thought to now have a pseudomonas urinary tract infection.  2.  COPD.  3.  Crohn's disease, currently on Humira for which he has had GI surgeries in   the past, exact type unknown.  4.  Insulin-dependent diabetes mellitus.  5.  Chronic kidney disease.    SOCIAL HISTORY:  The patient does not smoke or drink alcohol.    FAMILY HISTORY:  Noncontributory.    REVIEW OF SYSTEMS:  GENERAL:  Fatigue and malaise.  PULMONARY:  Shortness of breath with exertion.    Otherwise, a 13-point review of systems is negative except for GI symptoms.    PHYSICAL EXAMINATION:  VITAL SIGNS:  Blood pressure 130/82, pulse is 90, respiratory rate is 20,   temperature is afebrile.  SKIN:  No stigmata of chronic liver disease.  HEENT:  Head is  normocephalic.  Eyes are nonicteric.  NECK:  Supple.  LYMPH NODES:  Negative supraclavicular, cervical and axillary.  HEART:  Regular rate and rhythm.  LUNGS:  Moderate air movement, but clear.  ABDOMEN:  Normoactive bowel sounds.  No masses, tenderness, or organomegaly.  EXTREMITIES:  Moderate degenerative changes with prior amputation of his right   toe.    LABORATORY DATA:  An MRCP done at an outlying hospital reportedly shows a 1.2   cm common bile duct stone with a common bile duct diameter of 18 mm.    Hematocrit 39.3, white blood cell count 9.1, platelet count 392,000.  Sodium   131, potassium 4.2, creatinine 2.67.  Liver function tests are normal with the   exception of reduced albumin of 2.9.    IMPRESSION AND RECOMMENDATIONS:  1.  Upper abdominal pain.  This could be compatible with his bile duct stone.    The pain is not classically biliary in origin; however, there are some   components that make a distinct possibility that removing his common bile duct   stone may help alleviate some component of his discomfort.  Other etiology   such as upper gastrointestinal inflammation need to be entertained.  2.  Common bile duct stone.  The patient is agreeable to proceeding with an   ERCP with common bile duct stone removal.  At that time, we will evaluate his   upper gastrointestinal tract to make sure there is no significant upper   gastrointestinal inflammation.  3.  Drug resistant urinary tract infection with pseudomonas, currently on   antibiotics.  4.  Chronic kidney disease.  5.  Benign prostatic hypertrophy with recurrent obstruction, thought to be the   cause of his repeated urinary tract infections.  The patient currently has a   Humphrey in place.  6.  Chronic pain for which he has been on opiates.  7.  Chronic obstructive pulmonary disease.  8.  Insulin-dependent diabetes mellitus.    Overall, an ERCP has been set up for Friday, 03/29 at 11:00.       ___________________________________Cristobal MOTTA  MD KIMBERLY Clement / [place]    DD:  03/26/2019 14:47:00  DT:  03/26/2019 15:39:36    D#:  [id number]  Job#:  [id number]

## 2019-03-26 NOTE — ASSESSMENT & PLAN NOTE
Complicated 2/2 chronic harrington  -Pseudomonas per the sign out admitting MD received- -completed Merrem for a total of 7 days.  He has hypospadias and a chronic harrington. UTi likely related to the harrington.

## 2019-03-26 NOTE — PROGRESS NOTES
2 RN skin assessment done; skin not WDL. See Wound flowsheet.    Pt has dry, flaky skin.  Pt has skin tear to left lower forearm, drsg applied.  Pt has hx of right great big toe amputation.

## 2019-03-26 NOTE — ASSESSMENT & PLAN NOTE
Remains about at baseline 2.5-3 is what he typically runs  Holding fluids at this time, trend cmp

## 2019-03-26 NOTE — RESPIRATORY CARE
COPD Education by COPD Clinical Educator  3/26/2019 at 11:45 AM by Danielle Browne    Patient interviewed by COPD education team.  Patient refused full COPD program at this time, but agreed to short intervention.  A comprehensive packet including information about COPD and treatments, given.  Provided spacer with instruction for use, care, and cleaning.

## 2019-03-27 LAB
GLUCOSE BLD-MCNC: 121 MG/DL (ref 65–99)
GLUCOSE BLD-MCNC: 134 MG/DL (ref 65–99)
GLUCOSE BLD-MCNC: 144 MG/DL (ref 65–99)
GLUCOSE BLD-MCNC: 160 MG/DL (ref 65–99)

## 2019-03-27 PROCEDURE — 700102 HCHG RX REV CODE 250 W/ 637 OVERRIDE(OP): Performed by: INTERNAL MEDICINE

## 2019-03-27 PROCEDURE — 700105 HCHG RX REV CODE 258: Performed by: INTERNAL MEDICINE

## 2019-03-27 PROCEDURE — 99232 SBSQ HOSP IP/OBS MODERATE 35: CPT | Performed by: INTERNAL MEDICINE

## 2019-03-27 PROCEDURE — A9270 NON-COVERED ITEM OR SERVICE: HCPCS | Performed by: INTERNAL MEDICINE

## 2019-03-27 PROCEDURE — 770006 HCHG ROOM/CARE - MED/SURG/GYN SEMI*

## 2019-03-27 PROCEDURE — 82962 GLUCOSE BLOOD TEST: CPT | Mod: 91

## 2019-03-27 PROCEDURE — 700111 HCHG RX REV CODE 636 W/ 250 OVERRIDE (IP): Performed by: INTERNAL MEDICINE

## 2019-03-27 RX ADMIN — MORPHINE SULFATE 15 MG: 15 TABLET, EXTENDED RELEASE ORAL at 05:43

## 2019-03-27 RX ADMIN — MEROPENEM 500 MG: 500 INJECTION, POWDER, FOR SOLUTION INTRAVENOUS at 14:24

## 2019-03-27 RX ADMIN — MORPHINE SULFATE 15 MG: 15 TABLET, EXTENDED RELEASE ORAL at 17:26

## 2019-03-27 RX ADMIN — STANDARDIZED SENNA CONCENTRATE AND DOCUSATE SODIUM 2 TABLET: 8.6; 5 TABLET, FILM COATED ORAL at 05:43

## 2019-03-27 RX ADMIN — INSULIN LISPRO 1 UNITS: 100 INJECTION, SOLUTION INTRAVENOUS; SUBCUTANEOUS at 17:34

## 2019-03-27 RX ADMIN — TAMSULOSIN HYDROCHLORIDE 0.4 MG: 0.4 CAPSULE ORAL at 09:41

## 2019-03-27 RX ADMIN — MEROPENEM 500 MG: 500 INJECTION, POWDER, FOR SOLUTION INTRAVENOUS at 17:29

## 2019-03-27 RX ADMIN — MEROPENEM 500 MG: 500 INJECTION, POWDER, FOR SOLUTION INTRAVENOUS at 02:15

## 2019-03-27 RX ADMIN — BUDESONIDE AND FORMOTEROL FUMARATE DIHYDRATE 2 PUFF: 160; 4.5 AEROSOL RESPIRATORY (INHALATION) at 17:28

## 2019-03-27 RX ADMIN — BUDESONIDE AND FORMOTEROL FUMARATE DIHYDRATE 2 PUFF: 160; 4.5 AEROSOL RESPIRATORY (INHALATION) at 05:44

## 2019-03-27 RX ADMIN — INSULIN GLARGINE 25 UNITS: 100 INJECTION, SOLUTION SUBCUTANEOUS at 17:35

## 2019-03-27 ASSESSMENT — ENCOUNTER SYMPTOMS
HEADACHES: 0
ABDOMINAL PAIN: 1
DIARRHEA: 0
DIZZINESS: 0
SORE THROAT: 0
VOMITING: 0
CONSTIPATION: 0
SHORTNESS OF BREATH: 0
EYE REDNESS: 0
NAUSEA: 0
FEVER: 0
CHILLS: 0
NERVOUS/ANXIOUS: 0
COUGH: 0

## 2019-03-27 NOTE — PROGRESS NOTES
US PIV placed to RFA per request of Will/Nathaly, good blood return, flushed easily, secured, patient tolerated well.  Report to Will and Nathaly.

## 2019-03-27 NOTE — PROGRESS NOTES
Hospital Medicine Daily Progress Note    Date of Service  3/26/2019    Chief Complaint  CBD stone    Hospital Course    *68-year-old male with history of chronic opiate dependence insulin-dependent diabetes with chronic kidney disease Crohn's disease status post ileostomy BPH with chronic Humphrey was being treated at outlying facility for UTI due to Pseudomonas complained of right upper quadrant pain were MRCP apparently showed common bile duct stone transferred here for ERCP*      Interval Problem Update  Discussed with GI  Seen with RN  Reviewed plan of care with patient  Still has abdominal pain    Consultants/Specialty  GI consultants    Code Status  Full    Disposition  Anticipate home    Review of Systems  Review of Systems   Constitutional: Negative for chills, fever and malaise/fatigue.   HENT: Negative for congestion and sore throat.    Eyes: Negative for redness.   Respiratory: Negative for cough and shortness of breath.    Cardiovascular: Negative for chest pain and leg swelling.   Gastrointestinal: Positive for abdominal pain. Negative for constipation, diarrhea, nausea and vomiting.   Genitourinary:        Humphrey   Musculoskeletal:        Chronic pain   Skin: Negative for itching and rash.   Neurological: Negative for dizziness and headaches.   Psychiatric/Behavioral: The patient is not nervous/anxious.         Physical Exam  Temp:  [36.2 °C (97.2 °F)-36.8 °C (98.2 °F)] 36.4 °C (97.5 °F)  Pulse:  [72-89] 89  Resp:  [18] 18  BP: (127-139)/(53-77) 137/77  SpO2:  [91 %-98 %] 95 %    Physical Exam   Constitutional: He is oriented to person, place, and time. He appears well-developed and well-nourished. No distress.   Obese, disheveled non toxic   HENT:   Head: Normocephalic and atraumatic.   Eyes: Pupils are equal, round, and reactive to light. Conjunctivae and EOM are normal. Right eye exhibits no discharge. Left eye exhibits no discharge. No scleral icterus.   Neck: Neck supple.   Cardiovascular: Normal rate  and regular rhythm.    Pulmonary/Chest: Effort normal and breath sounds normal.   Abdominal: Soft. Bowel sounds are normal. He exhibits no distension and no mass. There is tenderness (epig, RUQ). There is no rebound and no guarding.   Solid green stool at ostomy   Genitourinary:   Genitourinary Comments: Harrington clear yellow   Musculoskeletal: He exhibits no edema or tenderness.   Neurological: He is alert and oriented to person, place, and time. No cranial nerve deficit.   Skin: Skin is warm and dry. He is not diaphoretic. No erythema.   Dry and scaley   Psychiatric: He has a normal mood and affect.   Nursing note and vitals reviewed.      Fluids    Intake/Output Summary (Last 24 hours) at 03/26/19 1729  Last data filed at 03/26/19 1600   Gross per 24 hour   Intake              720 ml   Output              900 ml   Net             -180 ml       Laboratory  Recent Labs      03/26/19   0505   WBC  9.1   RBC  4.53*   HEMOGLOBIN  12.7*   HEMATOCRIT  39.3*   MCV  86.8   MCH  28.0   MCHC  32.3*   RDW  46.5   PLATELETCT  392   MPV  8.8*     Recent Labs      03/26/19   0505   SODIUM  131*   POTASSIUM  4.2   CHLORIDE  99   CO2  24   GLUCOSE  149*   BUN  25*   CREATININE  2.67*   CALCIUM  8.4                   Imaging  No orders to display        Assessment/Plan  Choledocholithiasis- (present on admission)   Assessment & Plan    -Noted on MRCP  -Patient transferred here for ERCP  Discussed w/ GI- curiously LFT OK  ERCP tomorrow     UTI (urinary tract infection)- (present on admission)   Assessment & Plan    Complicated 2/2 chronic harrington  -Pseudomonas per the sign out I received- -Continue Merrem for 7 days        Diabetes due to undrl condition w oth diabetic kidney comp (HCC)   Assessment & Plan    Basal and low correctional SSI (minimal PO)     Chronic kidney disease- (present on admission)   Assessment & Plan    -Currently seems at/ better than baseline  -No additional IV fluids needed       Hyponatremia   Assessment & Plan     -Mild, repeat BMP in the morning     BPH (benign prostatic hyperplasia)- (present on admission)   Assessment & Plan    -Continue home Flomax, although of unclear benefit given chronic harrington     Opiate dependence (HCC)- (present on admission)   Assessment & Plan    -Continue home meds,IV morphine while he is n.p.o.  Difficult to assess pain as patient states is 10/10 when in no distress       Crohn's disease ([facility])- (present on admission)   Assessment & Plan    -Significant, has had multiple surgeries and now has an ostomy, [facility]  -No acute flare     Diabetes mellitus with hyperglycemia ([facility])- (present on admission)   Assessment & Plan    -Mild hyperglycemia, patient will be n.p.o. at midnight, for now will avoid insulin sliding scale but will likely need this once he is taking p.o. again     COPD (chronic obstructive pulmonary disease) ([facility])- (present on admission)   Assessment & Plan    -No acute exacerbation          VTE prophylaxis: SCD's

## 2019-03-27 NOTE — PROGRESS NOTES
Bedside report received from night RN. Assumed care of patient. Daily plan of care discussed. Pt emptied colostomy and Humphrey bag. Output recorded. No other concerns to address at this time. Hourly rounding in place.

## 2019-03-27 NOTE — PROGRESS NOTES
GI Progress Note:    Dario Clement  Date & Time note created:    3/27/2019   11:56 AM       Patient ID:   Name:             Shola Hoyt   YOB: 1950  Age:                 68 y.o.  male   MRN:               [id number]                                                             CC: CBD stone    Subjective:   No recurrent abd pain, tolerating liquid diet        Past Medical History:   Past Medical History:   Diagnosis Date   • Arthritis    • Benign prostate hyperplasia    • Colostomy in place ([facility]) ([facility])    • COPD (chronic obstructive pulmonary disease) ([facility]) ([facility])    • Diabetes ([facility]) ([facility])    • Renal disorder        Past Surgical History:  Past Surgical History:   Procedure Laterality Date   • TOE AMPUTATION Right 10/25/2017    Procedure: TOE AMPUTATION-GREAT TOE;  Surgeon: Bobby Alejo M.D.;  Location: SURGERY [facility];  Service: Orthopedics       Hospital Medications:    Current Facility-Administered Medications:   •  insulin glargine (LANTUS) injection 25 Units, 0.2 Units/kg/day, Subcutaneous, Q EVENING, 25 Units at 03/26/19 1851 **AND** insulin lispro (HUMALOG) injection 1-6 Units, 1-6 Units, Subcutaneous, Q6HRS, Stopped at 03/26/19 1800 **AND** Accu-Chek Q6 if NPO, , , Q6H **AND** NOTIFY MD and PharmD, , , Once **AND** glucose 4 g chewable tablet 16 g, 16 g, Oral, Q15 MIN PRN **AND** dextrose 50% (D50W) injection 25 mL, 25 mL, Intravenous, Q15 MIN PRN, Carissa Banda M.D.  •  budesonide-formoterol (SYMBICORT) 160-4.5 MCG/ACT inhaler 2 Puff, 2 Puff, Inhalation, BID, LINDSEY BurrO., 2 Puff at 03/27/19 0544  •  morphine (MS IR) tablet 15 mg, 15 mg, Oral, TID PRN, LINDSEY BurrO.  •  morphine ER (MS CONTIN) tablet 15 mg, 15 mg, Oral, Q12HRS, HUMBLE Burr.O., 15 mg at 03/27/19 0543  •  tamsulosin (FLOMAX) capsule 0.4 mg, 0.4 mg, Oral, AFTER BREAKFAST, Felix Zapata D.O., 0.4 mg at 03/27/19 0941  •  senna-docusate (PERICOLACE or SENOKOT S)  "8.6-50 MG per tablet 2 Tab, 2 Tab, Oral, BID, 2 Tab at 03/27/19 0543 **AND** polyethylene glycol/lytes (MIRALAX) PACKET 1 Packet, 1 Packet, Oral, QDAY PRN **AND** magnesium hydroxide (MILK OF MAGNESIA) suspension 30 mL, 30 mL, Oral, QDAY PRN **AND** bisacodyl (DULCOLAX) suppository 10 mg, 10 mg, Rectal, QDAY PRN, HUMBLE Burr.O.  •  acetaminophen (TYLENOL) tablet 650 mg, 650 mg, Oral, Q6HRS PRN, Felix Zapata D.O.  •  hydrALAZINE (APRESOLINE) injection 10 mg, 10 mg, Intravenous, Q4HRS PRN, HUMBLE Burr.O.  •  ondansetron (ZOFRAN) syringe/vial injection 4 mg, 4 mg, Intravenous, Q4HRS PRN, HUMBLE Burr.O.  •  ondansetron (ZOFRAN ODT) dispertab 4 mg, 4 mg, Oral, Q4HRS PRN, Felix Zapata D.O.  •  morphine (pf) 4 mg/ml injection 1-4 mg, 1-4 mg, Intravenous, Q4HRS PRN, Felix Zapata D.O., 2 mg at 03/26/19 1959  •  meropenem (MERREM) 500 mg in  mL IVPB, 500 mg, Intravenous, Q6HRS, Felix Zapata D.O., Stopped at 03/27/19 0245    Medication Allergy:  Allergies   Allergen Reactions   • Ceftriaxone    • Ezetimibe    • Fentanyl    • Flagyl [Metronidazole]    • Infliximab    • Lovastatin    • Simvastatin    • Vancomycin      ROS: 12 point review unchanged    Physical Exam:  Vitals/ General Appearance:   Weight/BMI: Body mass index is 38.56 kg/m².  Blood pressure 134/66, pulse 93, temperature 36.5 °C (97.7 °F), temperature source Temporal, resp. rate 18, height 1.803 m (5' 11\"), weight (!) 125.4 kg (276 lb 7.3 oz), SpO2 93 %.  Vitals:    03/26/19 1500 03/26/19 2000 03/27/19 0300 03/27/19 0821   BP: 137/77 156/63 145/76 134/66   Pulse: 89 80 79 93   Resp: 18 18 18 18   Temp: 36.4 °C (97.5 °F) 36.6 °C (97.8 °F) 36.6 °C (97.8 °F) 36.5 °C (97.7 °F)   TempSrc: Axillary Axillary Axillary Temporal   SpO2: 95% 89% 100% 93%   Weight:       Height:         HEENT: WNL  Heart: RRR  Lungs: moderate air movement  Abdomen: +BS, non tender      Lab Data Review:  Recent Labs      03/26/19   0505   WBC  9.1 "   RBC  4.53*   HEMOGLOBIN  12.7*   HEMATOCRIT  39.3*   MCV  86.8   MCH  28.0   MCHC  32.3*   RDW  46.5   PLATELETCT  392   MPV  8.8*     Recent Labs      03/26/19   0505   SODIUM  131*   POTASSIUM  4.2   CHLORIDE  99   CO2  24   GLUCOSE  149*   BUN  25*   CREATININE  2.67*   CALCIUM  8.4                 Recent Labs      03/26/19   0505   SODIUM  131*   POTASSIUM  4.2   CHLORIDE  99   CO2  24   GLUCOSE  149*   BUN  25*     Recent Labs      03/26/19   0505   SODIUM  131*   POTASSIUM  4.2   CHLORIDE  99   CO2  24   BUN  25*   CREATININE  2.67*   CALCIUM  8.4              Imaging/Procedures Review:        Assessment and plan:  1. Upper abd pain- in part related to his CBD stone. No recurrent pain and tolerating liquid diet well. ERCP set up for Friday 3-29 at 11:00 AM. Will check coagulation profile  2. CBD stone- noted on MRCP at [facility] hospital  3. [facility]'s disease- [facility] and sx are quiescent, pt on Humira  4. Pseudomonas UTI  5. CKD  6. BPH  7.COPD  8. IDDM  9. Chronic pain- on opiates

## 2019-03-27 NOTE — PROGRESS NOTES
Hospital Medicine Daily Progress Note    Date of Service  3/27/2019    Chief Complaint  CBD stone    Hospital Course    *68-year-old male with history of chronic opiate dependence insulin-dependent diabetes with chronic kidney disease Crohn's disease status post ileostomy BPH with chronic Humphrey was being treated at outlying facility for UTI due to Pseudomonas complained of right upper quadrant pain were MRCP apparently showed common bile duct stone transferred here for ERCP*      Interval Problem Update  Foraging for food, seen ambulating  In ward  Reviewed POC    Consultants/Specialty  GI consultants    Code Status  Full    Disposition  Anticipate home    Review of Systems  Review of Systems   Constitutional: Negative for chills, fever and malaise/fatigue.   HENT: Negative for congestion and sore throat.    Eyes: Negative for redness.   Respiratory: Negative for cough and shortness of breath.    Cardiovascular: Negative for chest pain and leg swelling.   Gastrointestinal: Positive for abdominal pain (same). Negative for constipation, diarrhea, nausea and vomiting.   Genitourinary:        Humphrey   Musculoskeletal:        Chronic pain   Skin: Negative for itching and rash.   Neurological: Negative for dizziness and headaches.   Psychiatric/Behavioral: The patient is not nervous/anxious.         Physical Exam  Temp:  [36.5 °C (97.7 °F)-36.6 °C (97.8 °F)] 36.5 °C (97.7 °F)  Pulse:  [79-93] 93  Resp:  [18] 18  BP: (134-156)/(63-76) 134/66  SpO2:  [89 %-100 %] 93 %    Physical Exam   Constitutional: He is oriented to person, place, and time. He appears well-developed and well-nourished. No distress.   Obese, disheveled non toxic   HENT:   Head: Normocephalic and atraumatic.   Eyes: Pupils are equal, round, and reactive to light. Conjunctivae and EOM are normal. Right eye exhibits no discharge. Left eye exhibits no discharge. No scleral icterus.   Neck: Neck supple.   Cardiovascular: Normal rate and regular rhythm.     Pulmonary/Chest: Effort normal and breath sounds normal.   Abdominal: Soft. Bowel sounds are normal. He exhibits no distension and no mass. There is tenderness (more diffuse over upper abd). There is no rebound and no guarding.   Genitourinary:   Genitourinary Comments: Harrington clear yellow   Musculoskeletal: He exhibits no edema or tenderness.   Neurological: He is alert and oriented to person, place, and time. No cranial nerve deficit.   Skin: Skin is warm and dry. He is not diaphoretic. No erythema.   Psychiatric: He has a normal mood and affect.   Nursing note and vitals reviewed.      Fluids    Intake/Output Summary (Last 24 hours) at 03/27/19 1602  Last data filed at 03/27/19 0800   Gross per 24 hour   Intake              480 ml   Output              800 ml   Net             -320 ml       Laboratory  Recent Labs      03/26/19   0505   WBC  9.1   RBC  4.53*   HEMOGLOBIN  12.7*   HEMATOCRIT  39.3*   MCV  86.8   MCH  28.0   MCHC  32.3*   RDW  46.5   PLATELETCT  392   MPV  8.8*     Recent Labs      03/26/19   0505   SODIUM  131*   POTASSIUM  4.2   CHLORIDE  99   CO2  24   GLUCOSE  149*   BUN  25*   CREATININE  2.67*   CALCIUM  8.4                   Imaging  No orders to display        Assessment/Plan  Choledocholithiasis- (present on admission)   Assessment & Plan    -Noted on MRCP  -Patient transferred here for ERCP  Discussed w/ GI- curiously LFT OK  ERCP 11 AM Friday  Labs tomorrow     UTI (urinary tract infection)- (present on admission)   Assessment & Plan    Complicated 2/2 chronic harrington  -Pseudomonas per the sign out admitting MD received- -Continue Merrem for 7 days        Diabetes due to undrl condition w oth diabetic kidney comp (HCC)   Assessment & Plan    Basal and low correctional SSI - @ goal     Chronic kidney disease- (present on admission)   Assessment & Plan    -Currently seems at/ better than baseline  -No additional IV fluids needed       Hyponatremia   Assessment & Plan    -Mild, refused AM  labs     BPH (benign prostatic hyperplasia)- (present on admission)   Assessment & Plan    -Continue home Flomax, although of unclear benefit given chronic harrington     Opiate dependence (HCC)- (present on admission)   Assessment & Plan    -Continue home meds,IV morphine while he is n.p.o.  Difficult to assess pain as patient states is 10/10 when in no distress       Crohn's disease (HCC)- (present on admission)   Assessment & Plan    -Significant, has had multiple surgeries and now has an ostomy, [facility]  -No acute flare     COPD (chronic obstructive pulmonary disease) (HCC)- (present on admission)   Assessment & Plan    -No acute exacerbation          VTE prophylaxis: SCD's

## 2019-03-27 NOTE — CARE PLAN
Problem: Safety  Goal: Will remain free from injury  Outcome: PROGRESSING AS EXPECTED    Intervention: Provide assistance with mobility  Pt ambulated with standby assist. Steady gait. Tolerated well.      Problem: Knowledge Deficit  Goal: Knowledge of disease process/condition, treatment plan, diagnostic tests, and medications will improve  Outcome: PROGRESSING AS EXPECTED    Intervention: Explain information regarding disease process/condition, treatment plan, diagnostic tests, and medications and document in education  Patient education given re: of IV antibiotics and UTI. Pt verbalized understanding.

## 2019-03-28 PROBLEM — N18.30 CRF (CHRONIC RENAL FAILURE), STAGE 3 (MODERATE): Status: ACTIVE | Noted: 2019-03-25

## 2019-03-28 PROBLEM — G93.40 ENCEPHALOPATHY ACUTE: Status: ACTIVE | Noted: 2019-03-28

## 2019-03-28 LAB
ALBUMIN SERPL BCP-MCNC: 3.6 G/DL (ref 3.2–4.9)
ALBUMIN/GLOB SERPL: 0.8 G/DL
ALP SERPL-CCNC: 96 U/L (ref 30–99)
ALT SERPL-CCNC: 24 U/L (ref 2–50)
ANION GAP SERPL CALC-SCNC: 9 MMOL/L (ref 0–11.9)
APTT PPP: 33.6 SEC (ref 24.7–36)
AST SERPL-CCNC: 23 U/L (ref 12–45)
BASOPHILS # BLD AUTO: 0.7 % (ref 0–1.8)
BASOPHILS # BLD: 0.06 K/UL (ref 0–0.12)
BILIRUB SERPL-MCNC: 0.6 MG/DL (ref 0.1–1.5)
BUN SERPL-MCNC: 19 MG/DL (ref 8–22)
CALCIUM SERPL-MCNC: 8.7 MG/DL (ref 8.4–10.2)
CHLORIDE SERPL-SCNC: 100 MMOL/L (ref 96–112)
CO2 SERPL-SCNC: 24 MMOL/L (ref 20–33)
CREAT SERPL-MCNC: 2.16 MG/DL (ref 0.5–1.4)
EOSINOPHIL # BLD AUTO: 0.33 K/UL (ref 0–0.51)
EOSINOPHIL NFR BLD: 3.7 % (ref 0–6.9)
ERYTHROCYTE [DISTWIDTH] IN BLOOD BY AUTOMATED COUNT: 46.3 FL (ref 35.9–50)
GLOBULIN SER CALC-MCNC: 4.8 G/DL (ref 1.9–3.5)
GLUCOSE BLD-MCNC: 119 MG/DL (ref 65–99)
GLUCOSE BLD-MCNC: 133 MG/DL (ref 65–99)
GLUCOSE SERPL-MCNC: 136 MG/DL (ref 65–99)
HCT VFR BLD AUTO: 41.4 % (ref 42–52)
HGB BLD-MCNC: 13.3 G/DL (ref 14–18)
IMM GRANULOCYTES # BLD AUTO: 0.02 K/UL (ref 0–0.11)
IMM GRANULOCYTES NFR BLD AUTO: 0.2 % (ref 0–0.9)
INR PPP: 1.15 (ref 0.87–1.13)
LYMPHOCYTES # BLD AUTO: 3.68 K/UL (ref 1–4.8)
LYMPHOCYTES NFR BLD: 41.6 % (ref 22–41)
MCH RBC QN AUTO: 27.8 PG (ref 27–33)
MCHC RBC AUTO-ENTMCNC: 32.1 G/DL (ref 33.7–35.3)
MCV RBC AUTO: 86.4 FL (ref 81.4–97.8)
MONOCYTES # BLD AUTO: 0.78 K/UL (ref 0–0.85)
MONOCYTES NFR BLD AUTO: 8.8 % (ref 0–13.4)
NEUTROPHILS # BLD AUTO: 3.97 K/UL (ref 1.82–7.42)
NEUTROPHILS NFR BLD: 45 % (ref 44–72)
NRBC # BLD AUTO: 0 K/UL
NRBC BLD-RTO: 0 /100 WBC
PLATELET # BLD AUTO: 404 K/UL (ref 164–446)
PMV BLD AUTO: 8.6 FL (ref 9–12.9)
POTASSIUM SERPL-SCNC: 4.2 MMOL/L (ref 3.6–5.5)
PROT SERPL-MCNC: 8.4 G/DL (ref 6–8.2)
PROTHROMBIN TIME: 14.6 SEC (ref 12–14.6)
RBC # BLD AUTO: 4.79 M/UL (ref 4.7–6.1)
SODIUM SERPL-SCNC: 133 MMOL/L (ref 135–145)
WBC # BLD AUTO: 8.8 K/UL (ref 4.8–10.8)

## 2019-03-28 PROCEDURE — A9270 NON-COVERED ITEM OR SERVICE: HCPCS | Performed by: INTERNAL MEDICINE

## 2019-03-28 PROCEDURE — 99232 SBSQ HOSP IP/OBS MODERATE 35: CPT | Performed by: INTERNAL MEDICINE

## 2019-03-28 PROCEDURE — 82962 GLUCOSE BLOOD TEST: CPT | Mod: 91

## 2019-03-28 PROCEDURE — 85730 THROMBOPLASTIN TIME PARTIAL: CPT

## 2019-03-28 PROCEDURE — 36415 COLL VENOUS BLD VENIPUNCTURE: CPT

## 2019-03-28 PROCEDURE — 700111 HCHG RX REV CODE 636 W/ 250 OVERRIDE (IP): Performed by: INTERNAL MEDICINE

## 2019-03-28 PROCEDURE — 85610 PROTHROMBIN TIME: CPT

## 2019-03-28 PROCEDURE — 85025 COMPLETE CBC W/AUTO DIFF WBC: CPT

## 2019-03-28 PROCEDURE — 770006 HCHG ROOM/CARE - MED/SURG/GYN SEMI*

## 2019-03-28 PROCEDURE — 700102 HCHG RX REV CODE 250 W/ 637 OVERRIDE(OP): Performed by: INTERNAL MEDICINE

## 2019-03-28 PROCEDURE — 700105 HCHG RX REV CODE 258: Performed by: INTERNAL MEDICINE

## 2019-03-28 PROCEDURE — 80053 COMPREHEN METABOLIC PANEL: CPT

## 2019-03-28 RX ADMIN — MORPHINE SULFATE 15 MG: 15 TABLET ORAL at 14:45

## 2019-03-28 RX ADMIN — TAMSULOSIN HYDROCHLORIDE 0.4 MG: 0.4 CAPSULE ORAL at 14:45

## 2019-03-28 RX ADMIN — MORPHINE SULFATE 15 MG: 15 TABLET, EXTENDED RELEASE ORAL at 05:26

## 2019-03-28 RX ADMIN — MEROPENEM 500 MG: 500 INJECTION, POWDER, FOR SOLUTION INTRAVENOUS at 20:00

## 2019-03-28 RX ADMIN — MEROPENEM 500 MG: 500 INJECTION, POWDER, FOR SOLUTION INTRAVENOUS at 14:43

## 2019-03-28 RX ADMIN — BUDESONIDE AND FORMOTEROL FUMARATE DIHYDRATE 2 PUFF: 160; 4.5 AEROSOL RESPIRATORY (INHALATION) at 17:41

## 2019-03-28 RX ADMIN — BUDESONIDE AND FORMOTEROL FUMARATE DIHYDRATE 2 PUFF: 160; 4.5 AEROSOL RESPIRATORY (INHALATION) at 05:25

## 2019-03-28 RX ADMIN — STANDARDIZED SENNA CONCENTRATE AND DOCUSATE SODIUM 2 TABLET: 8.6; 5 TABLET, FILM COATED ORAL at 05:26

## 2019-03-28 ASSESSMENT — ENCOUNTER SYMPTOMS
COUGH: 0
NAUSEA: 0
HEADACHES: 0
MEMORY LOSS: 1
DIZZINESS: 0
NERVOUS/ANXIOUS: 0
VOMITING: 0
DIARRHEA: 0
FEVER: 0
EYE REDNESS: 0
SORE THROAT: 0
SHORTNESS OF BREATH: 0
CONSTIPATION: 0
ABDOMINAL PAIN: 1
CHILLS: 0

## 2019-03-28 NOTE — PROGRESS NOTES
"Hospital Medicine Daily Progress Note    Date of Service  3/28/2019    Chief Complaint  CBD stone    Hospital Course    *68-year-old male with history of chronic opiate dependence insulin-dependent diabetes with chronic kidney disease Crohn's disease status post ileostomy BPH with chronic Humphrey was being treated at outlying facility for UTI due to Pseudomonas complained of right upper quadrant pain were MRCP apparently showed common bile duct stone transferred here for ERCP*      Interval Problem Update  Patient is more confused today  Asking \"why am I here?\"  Reviewed records that accompanied the patient from [place]  There are no culture reports, no medication to indicate when Meropenem  Started- RN to try and obtain more complete records    Consultants/Specialty  GI consultants    Code Status  Full    Disposition  Anticipate home    Review of Systems  Review of Systems   Constitutional: Negative for chills, fever and malaise/fatigue.   HENT: Negative for congestion and sore throat.    Eyes: Negative for redness.   Respiratory: Negative for cough and shortness of breath.    Cardiovascular: Negative for chest pain and leg swelling.   Gastrointestinal: Positive for abdominal pain (same). Negative for constipation, diarrhea, nausea and vomiting.   Genitourinary:        Humphrey   Musculoskeletal:        Chronic pain   Skin: Negative for itching and rash.   Neurological: Negative for dizziness and headaches.   Psychiatric/Behavioral: Positive for memory loss. The patient is not nervous/anxious.         Physical Exam  Temp:  [36.3 °C (97.3 °F)-36.6 °C (97.9 °F)] 36.4 °C (97.6 °F)  Pulse:  [66-96] 82  Resp:  [18-20] 18  BP: (128-147)/(58-72) 139/69  SpO2:  [90 %-98 %] 90 %    Physical Exam   Constitutional: He appears well-developed and well-nourished. No distress.   Obese, disheveled non toxic   HENT:   Head: Normocephalic and atraumatic.   Eyes: Pupils are equal, round, and reactive to light. Conjunctivae and EOM are " normal. Right eye exhibits no discharge. Left eye exhibits no discharge. No scleral icterus.   Neck: Neck supple.   Cardiovascular: Normal rate and regular rhythm.    Pulmonary/Chest: Effort normal and breath sounds normal.   Abdominal: Soft. Bowel sounds are normal. He exhibits no distension and no mass. There is tenderness (Unchanged). There is no rebound and no guarding.   Dark green liquid stool in ostomy   Genitourinary:   Genitourinary Comments: Harrington clear yellow   Musculoskeletal: He exhibits no edema or tenderness.   Neurological: No cranial nerve deficit.   Slightly drowsy now confused and disoriented but no other focal deficits   Skin: Skin is warm and dry. He is not diaphoretic. No erythema.   Psychiatric:   Unable to determine   Nursing note and vitals reviewed.      Fluids    Intake/Output Summary (Last 24 hours) at 03/28/19 1657  Last data filed at 03/28/19 1600   Gross per 24 hour   Intake             1120 ml   Output              575 ml   Net              545 ml       Laboratory  Recent Labs      03/26/19   0505  03/28/19   0525   WBC  9.1  8.8   RBC  4.53*  4.79   HEMOGLOBIN  12.7*  13.3*   HEMATOCRIT  39.3*  41.4*   MCV  86.8  86.4   MCH  28.0  27.8   MCHC  32.3*  32.1*   RDW  46.5  46.3   PLATELETCT  392  404   MPV  8.8*  8.6*     Recent Labs      03/26/19   0505  03/28/19   0525   SODIUM  131*  133*   POTASSIUM  4.2  4.2   CHLORIDE  99  100   CO2  24  24   GLUCOSE  149*  136*   BUN  25*  19   CREATININE  2.67*  2.16*   CALCIUM  8.4  8.7     Recent Labs      03/28/19   0525   APTT  33.6   INR  1.15*               Imaging  No orders to display        Assessment/Plan  Choledocholithiasis- (present on admission)   Assessment & Plan    -Noted on MRCP  -Patient transferred here for ERCP  Discussed w/ GI- curiously LFT OK  ERCP 11 AM Friday       UTI (urinary tract infection)- (present on admission)   Assessment & Plan    Complicated 2/2 chronic harrington  -Pseudomonas per the sign out admitting MD  received- -Continue Merrem for 7 days   Trying to get records from [place] [facility]       Encephalopathy acute   Assessment & Plan    More confused today  No focal findings  Check Ammonia in AM     Diabetes due to undrl condition w oth diabetic kidney comp (HCC)   Assessment & Plan    Basal and low correctional SSI - @ goal     Chronic kidney disease- (present on admission)   Assessment & Plan    -Currently seems at/ better than baseline  -No additional IV fluids needed       Hyponatremia   Assessment & Plan    -Mild, trending up     BPH (benign prostatic hyperplasia)- (present on admission)   Assessment & Plan    -Continue home Flomax, although of unclear benefit given chronic harrington     Opiate dependence ([facility])- (present on admission)   Assessment & Plan    -Continue home meds,IV morphine while he is n.p.o.  Difficult to assess pain as patient states is 10/10 when in no distress       Crohn's disease ([facility])- (present on admission)   Assessment & Plan    -Significant, has had multiple surgeries and now has an ostomy, [facility]  -No acute flare     COPD (chronic obstructive pulmonary disease) ([facility])- (present on admission)   Assessment & Plan    -No acute exacerbation          VTE prophylaxis: SCD's

## 2019-03-28 NOTE — PROGRESS NOTES
GI Progress Note:    Dario Clement  Date & Time note created:    3/28/2019   11:21 AM       Patient ID:   Name:             Shola Hoyt   YOB: 1950  Age:                 68 y.o.  male   MRN:               [id number]                                                             CC: CBD stone    Subjective:   No recurrent abd pain        Past Medical History:   Past Medical History:   Diagnosis Date   • Arthritis    • Benign prostate hyperplasia    • Colostomy in place ([facility]) ([facility])    • COPD (chronic obstructive pulmonary disease) ([facility]) ([facility])    • Diabetes ([facility]) ([facility])    • Renal disorder        Past Surgical History:  Past Surgical History:   Procedure Laterality Date   • TOE AMPUTATION Right 10/25/2017    Procedure: TOE AMPUTATION-GREAT TOE;  Surgeon: Bobby Alejo M.D.;  Location: SURGERY [facility];  Service: Orthopedics       Hospital Medications:    Current Facility-Administered Medications:   •  insulin glargine (LANTUS) injection 25 Units, 0.2 Units/kg/day, Subcutaneous, Q EVENING, 25 Units at 03/27/19 1735 **AND** insulin lispro (HUMALOG) injection 1-6 Units, 1-6 Units, Subcutaneous, Q6HRS, Stopped at 03/28/19 0600 **AND** Accu-Chek Q6 if NPO, , , Q6H **AND** NOTIFY MD and PharmD, , , Once **AND** glucose 4 g chewable tablet 16 g, 16 g, Oral, Q15 MIN PRN **AND** dextrose 50% (D50W) injection 25 mL, 25 mL, Intravenous, Q15 MIN PRN, Carissa Banda M.D.  •  budesonide-formoterol (SYMBICORT) 160-4.5 MCG/ACT inhaler 2 Puff, 2 Puff, Inhalation, BID, Felix Zapata D.O., 2 Puff at 03/28/19 0525  •  morphine (MS IR) tablet 15 mg, 15 mg, Oral, TID PRN, Felix Zapata D.O.  •  morphine ER (MS CONTIN) tablet 15 mg, 15 mg, Oral, Q12HRS, LINDSEY BurrO., 15 mg at 03/28/19 0526  •  tamsulosin (FLOMAX) capsule 0.4 mg, 0.4 mg, Oral, AFTER BREAKFAST, Felix Zapata D.O., 0.4 mg at 03/27/19 0941  •  senna-docusate (PERICOLACE or SENOKOT S) 8.6-50 MG per tablet 2  "Tab, 2 Tab, Oral, BID, 2 Tab at 03/28/19 0526 **AND** polyethylene glycol/lytes (MIRALAX) PACKET 1 Packet, 1 Packet, Oral, QDAY PRN **AND** magnesium hydroxide (MILK OF MAGNESIA) suspension 30 mL, 30 mL, Oral, QDAY PRN **AND** bisacodyl (DULCOLAX) suppository 10 mg, 10 mg, Rectal, QDAY PRN, Felix Zapata D.O.  •  acetaminophen (TYLENOL) tablet 650 mg, 650 mg, Oral, Q6HRS PRN, HUMBLE Burr.O.  •  hydrALAZINE (APRESOLINE) injection 10 mg, 10 mg, Intravenous, Q4HRS PRN, HUMBLE Burr.O.  •  ondansetron (ZOFRAN) syringe/vial injection 4 mg, 4 mg, Intravenous, Q4HRS PRN, HUMBLE Burr.O.  •  ondansetron (ZOFRAN ODT) dispertab 4 mg, 4 mg, Oral, Q4HRS PRN, Felix Zapata D.O.  •  morphine (pf) 4 mg/ml injection 1-4 mg, 1-4 mg, Intravenous, Q4HRS PRN, Felix Zapata D.O., 2 mg at 03/26/19 1959  •  meropenem (MERREM) 500 mg in  mL IVPB, 500 mg, Intravenous, Q6HRS, Felix Zapata D.O., Stopped at 03/27/19 1759    Medication Allergy:  Allergies   Allergen Reactions   • Ceftriaxone    • Ezetimibe    • Fentanyl    • Flagyl [Metronidazole]    • Infliximab    • Lovastatin    • Simvastatin    • Vancomycin      ROS: 12 point review    Physical Exam:  Vitals/ General Appearance:   Weight/BMI: Body mass index is 38.56 kg/m².  Blood pressure 147/60, pulse 72, temperature 36.3 °C (97.3 °F), temperature source Oral, resp. rate 18, height 1.803 m (5' 11\"), weight (!) 125.4 kg (276 lb 7.3 oz), SpO2 92 %.  Vitals:    03/27/19 2000 03/28/19 0033 03/28/19 0200 03/28/19 0700   BP: 138/72 135/66 128/58 147/60   Pulse: 96 66 70 72   Resp: 20 18 18 18   Temp: 36.6 °C (97.9 °F) 36.4 °C (97.6 °F) 36.4 °C (97.5 °F) 36.3 °C (97.3 °F)   TempSrc: Temporal Temporal Oral Oral   SpO2: 94% 95% 98% 92%   Weight:       Height:         HEENT: normal  Heart: RRR  Lungs: moderate air movement  Abdomen: +BS, non tender, colostomy RUQ      Lab Data Review:  Recent Labs      03/26/19   0505  03/28/19   0525   WBC  9.1  8.8   RBC  " 4.53*  4.79   HEMOGLOBIN  12.7*  13.3*   HEMATOCRIT  39.3*  41.4*   MCV  86.8  86.4   MCH  28.0  27.8   MCHC  32.3*  32.1*   RDW  46.5  46.3   PLATELETCT  392  404   MPV  8.8*  8.6*     Recent Labs      03/26/19   0505  03/28/19   0525   SODIUM  131*  133*   POTASSIUM  4.2  4.2   CHLORIDE  99  100   CO2  24  24   GLUCOSE  149*  136*   BUN  25*  19   CREATININE  2.67*  2.16*   CALCIUM  8.4  8.7     Recent Labs      03/28/19   0525   APTT  33.6   INR  1.15*             Recent Labs      03/26/19   0505  03/28/19   0525   SODIUM  131*  133*   POTASSIUM  4.2  4.2   CHLORIDE  99  100   CO2  24  24   GLUCOSE  149*  136*   BUN  25*  19     Recent Labs      03/26/19   0505  03/28/19   0525   SODIUM  131*  133*   POTASSIUM  4.2  4.2   CHLORIDE  99  100   CO2  24  24   BUN  25*  19   CREATININE  2.67*  2.16*   CALCIUM  8.4  8.7     Recent Labs      03/28/19   0525   APTT  33.6   INR  1.15*          Imaging/Procedures Review:        Assessment and plan:  1. Upper abd pain- in part related to CBD stone. Set up for ERCP in AM. Patient aware of risks and wants to proceed. Coags are essentially normal  2. CBD stone- has 1.2 cm stone noted on MRCP at [facility] hospital  3. Crohn's- stable, [facility] and on Humira  4. Pseudomonas UTI  5. CKD  6. BPH  7. COPD  8. IDDM  9. Chronic pain- on opiates

## 2019-03-28 NOTE — CARE PLAN
Problem: Safety  Goal: Will remain free from falls  Outcome: PROGRESSING AS EXPECTED  Pt uses FWW to ambulate. Steady gait. Able to get out of bed and back with standby assist. Pt instructed to call for assistance before getting up, verbalized understanding.    Problem: Knowledge Deficit  Goal: Knowledge of the prescribed therapeutic regimen will improve  Outcome: PROGRESSING SLOWER THAN EXPECTED  Medication regimen reviewed with pt. Pt is forgetful, occasionally requires reinforcement. Patient education given re: IV antibiotics.

## 2019-03-29 ENCOUNTER — ANESTHESIA (OUTPATIENT)
Dept: SURGERY | Facility: MEDICAL CENTER | Age: 69
DRG: 414 | End: 2019-03-29
Payer: MEDICARE

## 2019-03-29 ENCOUNTER — APPOINTMENT (OUTPATIENT)
Dept: RADIOLOGY | Facility: MEDICAL CENTER | Age: 69
DRG: 414 | End: 2019-03-29
Attending: ANESTHESIOLOGY
Payer: MEDICARE

## 2019-03-29 ENCOUNTER — ANESTHESIA EVENT (OUTPATIENT)
Dept: SURGERY | Facility: MEDICAL CENTER | Age: 69
DRG: 414 | End: 2019-03-29
Payer: MEDICARE

## 2019-03-29 LAB
AMMONIA PLAS-SCNC: <10 UMOL/L (ref 11–45)
GLUCOSE BLD-MCNC: 119 MG/DL (ref 65–99)
GLUCOSE BLD-MCNC: 137 MG/DL (ref 65–99)
PATHOLOGY CONSULT NOTE: NORMAL

## 2019-03-29 PROCEDURE — 74328 X-RAY BILE DUCT ENDOSCOPY: CPT

## 2019-03-29 PROCEDURE — 700102 HCHG RX REV CODE 250 W/ 637 OVERRIDE(OP): Performed by: INTERNAL MEDICINE

## 2019-03-29 PROCEDURE — 160208 HCHG ENDO MINUTES - EA ADDL 1 MIN LEVEL 4: Performed by: INTERNAL MEDICINE

## 2019-03-29 PROCEDURE — 700101 HCHG RX REV CODE 250

## 2019-03-29 PROCEDURE — 700117 HCHG RX CONTRAST REV CODE 255

## 2019-03-29 PROCEDURE — 88305 TISSUE EXAM BY PATHOLOGIST: CPT | Mod: 59

## 2019-03-29 PROCEDURE — 0FC98ZZ EXTIRPATION OF MATTER FROM COMMON BILE DUCT, VIA NATURAL OR ARTIFICIAL OPENING ENDOSCOPIC: ICD-10-PCS | Performed by: INTERNAL MEDICINE

## 2019-03-29 PROCEDURE — 700111 HCHG RX REV CODE 636 W/ 250 OVERRIDE (IP): Performed by: INTERNAL MEDICINE

## 2019-03-29 PROCEDURE — 99232 SBSQ HOSP IP/OBS MODERATE 35: CPT | Performed by: HOSPITALIST

## 2019-03-29 PROCEDURE — 110371 HCHG SHELL REV 272: Performed by: INTERNAL MEDICINE

## 2019-03-29 PROCEDURE — A9270 NON-COVERED ITEM OR SERVICE: HCPCS | Performed by: INTERNAL MEDICINE

## 2019-03-29 PROCEDURE — 82962 GLUCOSE BLOOD TEST: CPT

## 2019-03-29 PROCEDURE — 0DB98ZX EXCISION OF DUODENUM, VIA NATURAL OR ARTIFICIAL OPENING ENDOSCOPIC, DIAGNOSTIC: ICD-10-PCS | Performed by: INTERNAL MEDICINE

## 2019-03-29 PROCEDURE — 502240 HCHG MISC OR SUPPLY RC 0272: Performed by: INTERNAL MEDICINE

## 2019-03-29 PROCEDURE — BF111ZZ FLUOROSCOPY OF BILIARY AND PANCREATIC DUCTS USING LOW OSMOLAR CONTRAST: ICD-10-PCS | Performed by: INTERNAL MEDICINE

## 2019-03-29 PROCEDURE — 0DB68ZX EXCISION OF STOMACH, VIA NATURAL OR ARTIFICIAL OPENING ENDOSCOPIC, DIAGNOSTIC: ICD-10-PCS | Performed by: INTERNAL MEDICINE

## 2019-03-29 PROCEDURE — 160002 HCHG RECOVERY MINUTES (STAT): Performed by: INTERNAL MEDICINE

## 2019-03-29 PROCEDURE — 700101 HCHG RX REV CODE 250: Performed by: ANESTHESIOLOGY

## 2019-03-29 PROCEDURE — 160009 HCHG ANES TIME/MIN: Performed by: INTERNAL MEDICINE

## 2019-03-29 PROCEDURE — 770006 HCHG ROOM/CARE - MED/SURG/GYN SEMI*

## 2019-03-29 PROCEDURE — 82140 ASSAY OF AMMONIA: CPT

## 2019-03-29 PROCEDURE — 700105 HCHG RX REV CODE 258: Performed by: ANESTHESIOLOGY

## 2019-03-29 PROCEDURE — 160035 HCHG PACU - 1ST 60 MINS PHASE I: Performed by: INTERNAL MEDICINE

## 2019-03-29 PROCEDURE — 160048 HCHG OR STATISTICAL LEVEL 1-5: Performed by: INTERNAL MEDICINE

## 2019-03-29 PROCEDURE — 700105 HCHG RX REV CODE 258: Performed by: INTERNAL MEDICINE

## 2019-03-29 PROCEDURE — 700111 HCHG RX REV CODE 636 W/ 250 OVERRIDE (IP): Performed by: ANESTHESIOLOGY

## 2019-03-29 PROCEDURE — 0F7C8ZZ DILATION OF AMPULLA OF VATER, VIA NATURAL OR ARTIFICIAL OPENING ENDOSCOPIC: ICD-10-PCS | Performed by: INTERNAL MEDICINE

## 2019-03-29 PROCEDURE — 503082 HCHG INFLATOR (ENDO): Performed by: INTERNAL MEDICINE

## 2019-03-29 PROCEDURE — C1726 CATH, BAL DIL, NON-VASCULAR: HCPCS | Performed by: INTERNAL MEDICINE

## 2019-03-29 PROCEDURE — 700111 HCHG RX REV CODE 636 W/ 250 OVERRIDE (IP)

## 2019-03-29 PROCEDURE — 88312 SPECIAL STAINS GROUP 1: CPT

## 2019-03-29 PROCEDURE — 36415 COLL VENOUS BLD VENIPUNCTURE: CPT

## 2019-03-29 PROCEDURE — 160203 HCHG ENDO MINUTES - 1ST 30 MINS LEVEL 4: Performed by: INTERNAL MEDICINE

## 2019-03-29 RX ORDER — OXYCODONE HCL 5 MG/5 ML
5 SOLUTION, ORAL ORAL
Status: DISCONTINUED | OUTPATIENT
Start: 2019-03-29 | End: 2019-03-29 | Stop reason: HOSPADM

## 2019-03-29 RX ORDER — SODIUM CHLORIDE, SODIUM LACTATE, POTASSIUM CHLORIDE, CALCIUM CHLORIDE 600; 310; 30; 20 MG/100ML; MG/100ML; MG/100ML; MG/100ML
INJECTION, SOLUTION INTRAVENOUS CONTINUOUS
Status: DISCONTINUED | OUTPATIENT
Start: 2019-03-29 | End: 2019-03-29 | Stop reason: HOSPADM

## 2019-03-29 RX ORDER — OXYCODONE HCL 5 MG/5 ML
10 SOLUTION, ORAL ORAL
Status: DISCONTINUED | OUTPATIENT
Start: 2019-03-29 | End: 2019-03-29 | Stop reason: HOSPADM

## 2019-03-29 RX ORDER — HALOPERIDOL 5 MG/ML
1 INJECTION INTRAMUSCULAR
Status: DISCONTINUED | OUTPATIENT
Start: 2019-03-29 | End: 2019-03-29 | Stop reason: HOSPADM

## 2019-03-29 RX ORDER — DIPHENHYDRAMINE HYDROCHLORIDE 50 MG/ML
12.5 INJECTION INTRAMUSCULAR; INTRAVENOUS
Status: DISCONTINUED | OUTPATIENT
Start: 2019-03-29 | End: 2019-03-29 | Stop reason: HOSPADM

## 2019-03-29 RX ORDER — ONDANSETRON 2 MG/ML
INJECTION INTRAMUSCULAR; INTRAVENOUS PRN
Status: DISCONTINUED | OUTPATIENT
Start: 2019-03-29 | End: 2019-03-29 | Stop reason: SURG

## 2019-03-29 RX ORDER — SODIUM CHLORIDE, SODIUM LACTATE, POTASSIUM CHLORIDE, CALCIUM CHLORIDE 600; 310; 30; 20 MG/100ML; MG/100ML; MG/100ML; MG/100ML
INJECTION, SOLUTION INTRAVENOUS
Status: DISCONTINUED | OUTPATIENT
Start: 2019-03-29 | End: 2019-03-29 | Stop reason: SURG

## 2019-03-29 RX ORDER — ONDANSETRON 2 MG/ML
4 INJECTION INTRAMUSCULAR; INTRAVENOUS
Status: DISCONTINUED | OUTPATIENT
Start: 2019-03-29 | End: 2019-03-29 | Stop reason: HOSPADM

## 2019-03-29 RX ADMIN — STANDARDIZED SENNA CONCENTRATE AND DOCUSATE SODIUM 2 TABLET: 8.6; 5 TABLET, FILM COATED ORAL at 18:07

## 2019-03-29 RX ADMIN — STANDARDIZED SENNA CONCENTRATE AND DOCUSATE SODIUM 2 TABLET: 8.6; 5 TABLET, FILM COATED ORAL at 05:34

## 2019-03-29 RX ADMIN — FENTANYL CITRATE 100 MCG: 50 INJECTION, SOLUTION INTRAMUSCULAR; INTRAVENOUS at 10:55

## 2019-03-29 RX ADMIN — MORPHINE SULFATE 15 MG: 15 TABLET, EXTENDED RELEASE ORAL at 18:07

## 2019-03-29 RX ADMIN — MORPHINE SULFATE 15 MG: 15 TABLET, EXTENDED RELEASE ORAL at 05:34

## 2019-03-29 RX ADMIN — MIDAZOLAM HYDROCHLORIDE 2 MG: 1 INJECTION, SOLUTION INTRAMUSCULAR; INTRAVENOUS at 10:55

## 2019-03-29 RX ADMIN — BUDESONIDE AND FORMOTEROL FUMARATE DIHYDRATE 2 PUFF: 160; 4.5 AEROSOL RESPIRATORY (INHALATION) at 18:13

## 2019-03-29 RX ADMIN — ONDANSETRON 4 MG: 2 INJECTION INTRAMUSCULAR; INTRAVENOUS at 11:13

## 2019-03-29 RX ADMIN — MEROPENEM 500 MG: 500 INJECTION, POWDER, FOR SOLUTION INTRAVENOUS at 06:00

## 2019-03-29 RX ADMIN — MEROPENEM 500 MG: 500 INJECTION, POWDER, FOR SOLUTION INTRAVENOUS at 23:38

## 2019-03-29 RX ADMIN — SODIUM CHLORIDE, POTASSIUM CHLORIDE, SODIUM LACTATE AND CALCIUM CHLORIDE: 600; 310; 30; 20 INJECTION, SOLUTION INTRAVENOUS at 10:40

## 2019-03-29 RX ADMIN — MEROPENEM 500 MG: 500 INJECTION, POWDER, FOR SOLUTION INTRAVENOUS at 18:05

## 2019-03-29 RX ADMIN — EPHEDRINE SULFATE 25 MG: 50 INJECTION INTRAMUSCULAR; INTRAVENOUS; SUBCUTANEOUS at 11:17

## 2019-03-29 RX ADMIN — INSULIN GLARGINE 25 UNITS: 100 INJECTION, SOLUTION SUBCUTANEOUS at 18:10

## 2019-03-29 RX ADMIN — MORPHINE SULFATE 15 MG: 15 TABLET ORAL at 23:41

## 2019-03-29 RX ADMIN — BUDESONIDE AND FORMOTEROL FUMARATE DIHYDRATE 2 PUFF: 160; 4.5 AEROSOL RESPIRATORY (INHALATION) at 05:35

## 2019-03-29 RX ADMIN — ONDANSETRON 4 MG: 2 INJECTION INTRAMUSCULAR; INTRAVENOUS at 10:55

## 2019-03-29 RX ADMIN — MEROPENEM 500 MG: 500 INJECTION, POWDER, FOR SOLUTION INTRAVENOUS at 01:54

## 2019-03-29 RX ADMIN — PROPOFOL 50 MG: 10 INJECTION, EMULSION INTRAVENOUS at 10:58

## 2019-03-29 RX ADMIN — PROPOFOL 120 MG: 10 INJECTION, EMULSION INTRAVENOUS at 10:56

## 2019-03-29 ASSESSMENT — ENCOUNTER SYMPTOMS
NEUROLOGICAL NEGATIVE: 1
DEPRESSION: 0
WEAKNESS: 0
COUGH: 0
BRUISES/BLEEDS EASILY: 0
PSYCHIATRIC NEGATIVE: 1
FLANK PAIN: 0
VOMITING: 0
BACK PAIN: 0
MYALGIAS: 0
DIZZINESS: 0
CARDIOVASCULAR NEGATIVE: 1
GASTROINTESTINAL NEGATIVE: 1
WEIGHT LOSS: 0
NERVOUS/ANXIOUS: 0
CHILLS: 0
NAUSEA: 0
MUSCULOSKELETAL NEGATIVE: 1
SHORTNESS OF BREATH: 0
RESPIRATORY NEGATIVE: 1
ABDOMINAL PAIN: 0
CONSTITUTIONAL NEGATIVE: 1
LOSS OF CONSCIOUSNESS: 0
FEVER: 0

## 2019-03-29 ASSESSMENT — PAIN SCALES - GENERAL: PAIN_LEVEL: 3

## 2019-03-29 NOTE — PROGRESS NOTES
Request from PREET Brewer for patient to place PIV.  US PIV placed in RUE just proximal to ac, good blood return, flushed easily with NS.  Secured well, patient tolerated well, report to RN.  Bed locked, and in low position side rails x 2 up.

## 2019-03-29 NOTE — ANESTHESIA PREPROCEDURE EVALUATION
Relevant Problems   (+) COPD (chronic obstructive pulmonary disease) (HCC)   (+) HTN (hypertension)       Physical Exam    Airway   Mallampati: II  TM distance: >3 FB  Neck ROM: full       Cardiovascular - normal exam  Rhythm: regular  Rate: normal  (-) murmur     Dental - normal exam         Pulmonary - normal exam  Breath sounds clear to auscultation     Abdominal    Neurological - normal exam                 Anesthesia Plan    ASA 3   ASA physical status 3 criteria: diabetes - poorly controlled and COPD - poorly controlled    Plan - general       Airway plan will be ETT        Induction: intravenous    Postoperative Plan: Postoperative administration of opioids is intended.    Pertinent diagnostic labs and testing reviewed    Informed Consent:    Anesthetic plan and risks discussed with patient.    Use of blood products discussed with: patient whom consented to blood products.

## 2019-03-29 NOTE — PROGRESS NOTES
Indication:Bile duct stone.   Risks, benefits, and alternatives were discussed with with consenting person(s). Consenting person(s) were given an opportunity to ask questions and discuss other options. Risks including but not limited to failed or incomplete ERCP, stent migration, ineffective therapy, radiation exposure, pancreatitis (with potential future complications), contrast reaction, perforation, infection, bleeding, missed lesion(s), cardiac and/or pulmonary event, aspiration, stroke, possible need for surgery, hospitalization possibly prolonged, discomfort, unsuccessful and/or incomplete procedure, possible need for repeat procedures and/or additional testings, damage to adjacent organs and/or vascular structures, medication reaction, disability, death, and other adverse events possibly life-threatening. Discussion was undertaken with Layman's terms. Consenting persons stated understanding and acceptance of these risks, and wished to proceed. Consent was given in clear state of mind.

## 2019-03-29 NOTE — PROGRESS NOTES
Received report, assumed pt care. Discussed POC. Reoriented pt to situation. Assessment done. VSS. Will cont to monitor.

## 2019-03-29 NOTE — PROGRESS NOTES
Pt complaining of tenderness around ostomy and requesting to change, skin around ostomy red and tender. MD was made aware durring morning rounds, and wound care consult was placed.  Per blu from wound care, pt will be seen today.

## 2019-03-29 NOTE — ANESTHESIA TIME REPORT
Anesthesia Start and Stop Event Times     Date Time Event    3/29/2019 1053 Anesthesia Start     1212 Anesthesia Stop        Responsible Staff  03/29/19    Name Role Begin End    Vikram Ledezma M.D. Anesth 1053 1212        Preop Diagnosis (Free Text):  Pre-op Diagnosis     choledocholithiasis        Preop Diagnosis (Codes):  Diagnosis Information     Diagnosis Code(s): Choledocholithiasis [K80.50]        Post op Diagnosis  Choledocholithiasis with acute cholecystitis with obstruction      Premium Reason  Non-Premium    Comments:

## 2019-03-29 NOTE — PROCEDURES
Esophagogastroduodenoscopy/ Endoscopic Retrograde Cholangiopancreatography    Date of Procedure:  3/29/2019  Attending Physician: Tao Saab MD  Indications: Choledocholithiasis        Instrument: Olympus Flexible Sideviewing Endoscope  Sedation:   Anesthesiologist/Type of Anesthesia:  Anesthesiologist: Vikram Ledezma M.D./* No anesthesia type entered *    Surgical Staff:  Circulator: Iggy Bautista R.N.  Endoscopy Technician: Lizbet Foster    Specimens removed if any:  ID Type Source Tests Collected by Time Destination   A :  Tissue Duodenum PATHOLOGY SPECIMEN Tao Saab M.D. 3/29/2019 11:09 AM    B :  Tissue Gastric PATHOLOGY SPECIMEN Tao Saab M.D. 3/29/2019 11:10 AM          Pre-Anesthesia Assessment:  Prior to the procedure, a History and Physical was performed, and patient medications and allergies were reviewed. The patient’s tolerance of previous anesthesia was also reviewed. The risks and benefits of the procedure and the sedation options and risks were discussed with the patient including but not limited to infection, bleeding, aspiration, perforation, adverse medication reaction, missed diagnosis, missed lesions, and pancreatitis. The patient verbalized understanding. All questions were answered, and informed consent was obtained    After I obtained informed consent from the patient, the patient was placed in the prone/swimmer position. Appropriate time-out protocol was followed: the correct patient, the correct procedure, and the correct equipment in the room were confirmed. Throughout the procedure, the patient’s blood pressure, pulse, and oxygen saturations were monitored continuously. The Olymus flexible forward viewing endoscope and sideviewing duodenoscope were inserted through the oropharynx, esophagus intubated, then advanced to the gastrointestinal tract to the major papilla. The duct(s) were cannulated and contrast was injected I personally interpreted the  ductal images.  Findings and interventions were performed and documented below. Air was then withdrawn and the duodenoscope was removed. The patient tolerated the procedure well. There were no immediate postoperative complications    Findings:   Esophagogastroduodenoscopy (EGD)  Esophagus normal appearing GE junction at 40 cm slightly irregular Z line noticed findings suggestive of Ibrahim's esophagus    Stomach normal appearing no gross varices noted.  Slight gastric erythema biopsy    Duodenum to second portion including the bulb appears normal    Endoscopic retrograde cholangiopancreatography (ERCP)   film was normal.  Ampulla appears normal.  Cannulation with a 0.025 wire was sphincterotome was pure.  Wire was advanced into the intrahepatic portion of the biliary tree.  Throughout the entire procedure the pancreatic duct was never cannulated or injected.  Cholangiogram demonstrated appropriate anatomy of the biliary system with a very dilated common bile duct.  There was filling defect consistent with sludge and stone in the distal common bile duct.  A traction biliary sphincterotomy was performed to the edge of the transverse fold accommodating a Humphrey bulb sphincterotome.  Utilizing a 12-15 mm extractor balloon multiple sweeps were performed with some sludge removed however stone was impacted in the distal common bile duct immediately above the ampulla.  Still estimated size to be approximately 1.2-1.3 cm in size.  Utilizing a Hurricaine 10 mm dilator balloon the ampulla was dilated and held for 1 minute to decrease risk of pancreatitis.  Despite dilation the stone was still unable to be extracted.  Utilizing a 2 cm basket multiple sweeps were performed with capturing the stone however unable to extract into the duodenum despite prolonged attempt.  Ultimately the the basket was compressed in order to break the stone without success.  The basket broke and it was extracted.  Finally the ampulla was dilated  with a CRE dilator balloon 12mm then 13.5 mm and held for 1 minute.  This is in hopes to decrease risk of pancreatitis.  Then utilizing a 12-15mm balloon stone was successfully extracted multiple additional sweeps were performed with removal of stone debris fragments and sludge.  A 15 mm - 18 mm extractor balloon was used to further dredged the system with occlusion cholangiogram demonstrating a dilated common bile duct at least 18 mm to 2 cm in size.  Additional sweeps without further filling defect or sludge.  The biliary system was then flushed with 20 cc of sterile saline.  All equipments were removed.  There was no excessive bleeding.  Necrotic duct was never cannulated or injected    Bilateral films under diaphragm was negative for free air  Due to the large stone, multiple equipment changes and large dilation technique was required for procedure with a traditional forward-viewing endoscope dilator balloon (CRE).  Code 22 modifier added.        Impressions:   1.  Large bile duct stone and sludge removed with biliary sphincterotomy large biliary orifice dilation (13.5mm CRE balloon) with combination of basket sweep, balloon sweep. Dilated common bile duct of at least 18mm in size.  2.  EGD with gastric erythema biopsy.  Duodenal, normal appearing, biopsied to ensure no inflammatory bowel disease    Recommendations:   1.  Monitor for postprocedure complication including perforation and pancreatitis. Higher risk for pancreatitis due to large balloon dilation.  2.  N.p.o. for 4 hours and if no worsening pain advance diet to clears.   3.  Continue IV hydration  4. Monitor LFT  5. Await pathology.        NOTE: Radiologic interpretation of dynamic and static fluoroscopic imaging by myself.  At no time was/were a Radiologist present.     This note was generated using voice recognition software which has a small chance of producing errors of grammar and possibly content. I have made every reasonable attempt to find and  correct any obvious errors, but expect that some may not be found prior to finalization of this note

## 2019-03-29 NOTE — ANESTHESIA QCDR
2019 Qualified Clinical Data Registry (for Quality Improvement)     Postoperative nausea/vomiting risk protocol (Adult = 18 yrs and Pediatric 3-17 yrs)- (430 and 463)  General inhalation anesthetic (NOT TIVA) with PONV risk factors: No  Provision of anti-emetic therapy with at least 2 different classes of agents: N/A  Patient DID NOT receive anti-emetic therapy and reason is documented in Medical Record: N/A    Multimodal Pain Management- (AQI59)  Patient undergoing Elective Surgery (i.e. Outpatient, or ASC, or Prescheduled Surgery prior to Hospital Admission): Yes  Use of Multimodal Pain Management, two or more drugs and/or interventions, NOT including systemic opioids: No   Exception: Documented allergy to multiple classes of analgesics: No        PACU assessment of acute postoperative pain prior to Anesthesia Care End- Applies to Patients Age = 18- (ABG7)  Initial PACU pain score is which of the following: < 7/10  Patient unable to report pain score: N/A    Post-anesthetic transfer of care checklist/protocol to PACU/ICU- (426 and 427)  Upon conclusion of case, patient transferred to which of the following locations: PACU/Non-ICU  Use of transfer checklist/protocol: Yes  Exclusion: Service Performed in Patient Hospital Room (and thus did not require transfer): N/A    PACU Reintubation- (AQI31)  General anesthesia requiring endotracheal intubation (ETT) along with subsequent extubation in OR or PACU: Yes  Required reintubation in the PACU: No   Extubation was a planned trial documented in the medical record prior to removal of the original airway device:  N/A    Unplanned admission to ICU related to anesthesia service up through end of PACU care- (MD51)  Unplanned admission to ICU (not initially anticipated at anesthesia start time): No

## 2019-03-29 NOTE — OR NURSING
"1205- Patient arrived from [facility]. Respirations spontaneous and unlabored. Drowsy, opening and closing eyes. Responding appropriately to verbal commands. VSS, see flowsheets. Abdomen soft. Denies any pain or nausea.   1212- Patient restless, sitting up in bed stating \"it's time to go.\" Easily redirected. Unable to answer orientation questions.   1226- Dr. Saab at bedside. NPO for 4 hours post procedure. Ice chips and medications OK. Then, RN to advance diet. Nursing communication order placed.   1230- Patient quickly repositioned self to edge of bed. More alert, answering questions. AOx2. Declines any pain or nausea.   1205- VSS, see flowsheets. Meet criteria to transfer to floor. Report to PREET Brewer.   "

## 2019-03-29 NOTE — DOCUMENTATION QUERY
[facility]                                                                         Query Response Note      PATIENT:               HANS MAYERS  ACCT #:                  [id number]  MRN:                       [id number]  :                       1950  ADMIT DATE:       3/25/2019 8:34 PM  DISCH DATE:          RESPONDING  PROVIDER #:        [id number]           RESPONSE TEXT:    Chronic kidney disease Stage 3    QUERY TEXT:    Kidney Disease, Chronic CKD Stage [id number]    Chronic Kidney Disease (CKD) is documented in the Medical Record.  Please specify the disease stage (includes probable or suspected)    Stages are defined by the [employer] as follows:  CKD Stage I  GFR >= 90 ml / min per 1.73 m2 and persistent albuminuria  CKD Stage 2 GFR between 60 and 89 with persistent albuminuria  CKD Stage 3 GFR between 30 and 59   CKD Stage 4 GFR between 15 and 29   CKD Stage 5 GFR between <15 or End Stage Renal Disease    The patient's Clinical Indicators include:  Progress Note 3/26/2019  -Chronic Kidney Disease     -Currently seems at/better than baseline     -No additional IV fluids needed    Labs 3/26/2019  -BUN 25  -Creatinine 2.67  -GFR 24  Query created by: Kamila Velazquez on 3/27/2019 12:59 PM        Electronically signed by:  COMPA PHILIPPE MD 3/28/2019 5:01 PM

## 2019-03-29 NOTE — ANESTHESIA PROCEDURE NOTES
Airway  Date/Time: 3/29/2019 11:02 AM  Performed by: KYLE OAKES  Authorized by: KYLE OAKES     Location:  OR  Urgency:  Elective  Difficult Airway: Yes    Indications for Airway Management:  Anesthesia  Spontaneous Ventilation: absent    Sedation Level:  Deep  Preoxygenated: Yes    Patient Position:  Sniffing  Final Airway Type:  Endotracheal airway  Final Endotracheal Airway:  ETT  Cuffed: Yes    Technique Used for Successful ETT Placement:  Video laryngoscopy  Insertion Site:  Oral  Blade Type:  Satnam  Laryngoscope Blade/Videolaryngoscope Blade Size:  4  ETT Size (mm):  8.5  Measured from:  Lips  ETT to Lips (cm):  26  Placement Verified by: auscultation and capnometry    Cormack-Lehane Classification:  Grade III - view of epiglottis only  Number of Attempts at Approach:  2  Ventilation Between Attempts:  2 hand mask  Number of Other Approaches Attempted:  0

## 2019-03-29 NOTE — PROGRESS NOTES
Hospital Medicine Daily Progress Note    Date of Service  3/29/2019    Chief Complaint  CBD stone    Hospital Course    *68-year-old male with history of chronic opiate dependence insulin-dependent diabetes with chronic kidney disease Crohn's disease status post ileostomy BPH with chronic Humphrey was being treated at outlying facility for UTI due to Pseudomonas complained of right upper quadrant pain were MRCP apparently showed common bile duct stone transferred here for ERCP*      Interval Problem Update  Continues to be confused but per RNs more alert than 3/28, knows he came from [facility] but doesn't remember why.  Denies pain and abdominal pain at this time.    Consultants/Specialty  GI consultants    Code Status  Full    Disposition  Anticipate home when medically cleared.    Review of Systems  Review of Systems   Constitutional: Negative.  Negative for chills, fever, malaise/fatigue and weight loss.   HENT: Negative.    Respiratory: Negative.  Negative for cough and shortness of breath.    Cardiovascular: Negative.  Negative for chest pain and leg swelling.   Gastrointestinal: Negative.  Negative for abdominal pain (denies pain), nausea and vomiting.   Genitourinary: Negative.  Negative for dysuria and flank pain.   Musculoskeletal: Negative.  Negative for back pain and myalgias.   Neurological: Negative.  Negative for dizziness, loss of consciousness and weakness.   Endo/Heme/Allergies: Negative.  Does not bruise/bleed easily.   Psychiatric/Behavioral: Negative.  Negative for depression. The patient is not nervous/anxious.    All other systems reviewed and are negative.       Physical Exam  Temp:  [36.2 °C (97.2 °F)-36.7 °C (98 °F)] 36.7 °C (98 °F)  Pulse:  [76-92] 92  Resp:  [18] 18  BP: (137-157)/(65-79) 151/77  SpO2:  [90 %-96 %] 96 %    Physical Exam   Constitutional: He appears well-developed and well-nourished. No distress.   Plan of care discussed with bedside RN.   HENT:   Nose: Nose normal.    Mouth/Throat: Oropharynx is clear and moist. No oropharyngeal exudate.   Eyes: Conjunctivae are normal. Right eye exhibits no discharge. Left eye exhibits no discharge. No scleral icterus.   Neck: No JVD present. No tracheal deviation present.   Cardiovascular: Normal rate, regular rhythm and normal heart sounds.    Pulmonary/Chest: Effort normal and breath sounds normal. No stridor. No respiratory distress. He has no wheezes. He has no rales. He exhibits no tenderness.   Abdominal: Soft. Bowel sounds are normal. He exhibits no distension and no mass. There is no tenderness. There is no rebound, no guarding and negative Ribeiro's sign.   Ostomy right abdomen draining greenish brown soft stool   Musculoskeletal: He exhibits no edema or tenderness.   Neurological: He is alert. No cranial nerve deficit. He exhibits normal muscle tone.   Oriented x 2   Skin: Skin is warm and dry. He is not diaphoretic. No pallor.   Psychiatric: He has a normal mood and affect. His behavior is normal.   Nursing note and vitals reviewed.      Fluids    Intake/Output Summary (Last 24 hours) at 03/29/19 1101  Last data filed at 03/29/19 0541   Gross per 24 hour   Intake              400 ml   Output              975 ml   Net             -575 ml       Laboratory  Recent Labs      03/28/19   0525   WBC  8.8   RBC  4.79   HEMOGLOBIN  13.3*   HEMATOCRIT  41.4*   MCV  86.4   MCH  27.8   MCHC  32.1*   RDW  46.3   PLATELETCT  404   MPV  8.6*     Recent Labs      03/28/19   0525   SODIUM  133*   POTASSIUM  4.2   CHLORIDE  100   CO2  24   GLUCOSE  136*   BUN  19   CREATININE  2.16*   CALCIUM  8.7     Recent Labs      03/28/19   0525   APTT  33.6   INR  1.15*               Imaging  No orders to display        Assessment/Plan  Choledocholithiasis- (present on admission)   Assessment & Plan    -Noted on MRCP  -Patient transferred here for ERCP  Discussed w/ GI- curiously LFT OK and no pain.  ERCP today 3/29, follow up results.       UTI (urinary tract  infection)- (present on admission)   Assessment & Plan    Complicated 2/2 chronic harrington  -Pseudomonas per the sign out admitting MD received- -Continue Merrem for 7 days   Discussed with charge RNCesia general       Encephalopathy acute   Assessment & Plan    Intermittently confused, baseline is not known  No focal findings  Ammonia less than 10     Diabetes due to undrl condition w oth diabetic kidney comp (HCC)   Assessment & Plan    Basal and low correctional SSI - @ goal     CRF (chronic renal failure), stage 3 (moderate) (HCC)- (present on admission)   Assessment & Plan    -Currently seems at/ better than baseline  -No additional IV fluids needed, repeat cmp tomorrow       Hyponatremia   Assessment & Plan    -Mild, trending up     BPH (benign prostatic hyperplasia)- (present on admission)   Assessment & Plan    -Continue home Flomax, although of unclear benefit given chronic harrington     Opiate dependence (formerly [facility])- (present on admission)   Assessment & Plan    -Continue home meds,IV morphine while he is n.p.o.  Currently denies pain but has claimed 10/10 pain frequently though appears calm and normal vitals.  Continue to monitor       Crohn's disease (HCC)- (present on admission)   Assessment & Plan    -Significant, has had multiple surgeries and now has an ostomy, on [facility]  -No acute flare     COPD (chronic obstructive pulmonary disease) (formerly [facility])- (present on admission)   Assessment & Plan    -No acute exacerbation          VTE prophylaxis: SCD's

## 2019-03-29 NOTE — ANESTHESIA POSTPROCEDURE EVALUATION
Patient: Shola Hoyt    Procedure Summary     Date:  03/29/19 Room / Location:   ENDOSCOPIC ULTRASOUND ROOM / SURGERY [facility]    Anesthesia Start:  1053 Anesthesia Stop:  1212    Procedure:  ERCP, DIAGNOSTIC Diagnosis:       Choledocholithiasis      (choledocholithiasis)    Surgeon:  Tao Saab M.D. Responsible Provider:  Vikram Ledezma M.D.    Anesthesia Type:  general ASA Status:  3          Final Anesthesia Type: general  Last vitals  BP   Blood Pressure : (!) 163/80    Temp   37.4 °C (99.3 °F)    Pulse   Pulse: 92, Heart Rate (Monitored): (!) 113   Resp   18    SpO2   91 %      Anesthesia Post Evaluation    Patient location during evaluation: PACU  Patient participation: complete - patient participated  Level of consciousness: sleepy but conscious  Pain score: 3    Airway patency: patent  Anesthetic complications: no  Cardiovascular status: hemodynamically stable  Respiratory status: acceptable  Hydration status: euvolemic    PONV: none           Nurse Pain Score: 0 (NPRS)

## 2019-03-29 NOTE — PROGRESS NOTES
Pt pleasantly confused throughout the shift requiring frequent reorientation to situation. No other new needs or c/o.

## 2019-03-30 LAB
ALBUMIN SERPL BCP-MCNC: 3.2 G/DL (ref 3.2–4.9)
ALBUMIN SERPL BCP-MCNC: 3.4 G/DL (ref 3.2–4.9)
ALBUMIN/GLOB SERPL: 0.6 G/DL
ALBUMIN/GLOB SERPL: 0.7 G/DL
ALP SERPL-CCNC: 102 U/L (ref 30–99)
ALP SERPL-CCNC: 140 U/L (ref 30–99)
ALT SERPL-CCNC: 22 U/L (ref 2–50)
ALT SERPL-CCNC: 38 U/L (ref 2–50)
ANION GAP SERPL CALC-SCNC: 10 MMOL/L (ref 0–11.9)
ANION GAP SERPL CALC-SCNC: 11 MMOL/L (ref 0–11.9)
AST SERPL-CCNC: 32 U/L (ref 12–45)
AST SERPL-CCNC: 81 U/L (ref 12–45)
BASOPHILS # BLD AUTO: 0.6 % (ref 0–1.8)
BASOPHILS # BLD: 0.07 K/UL (ref 0–0.12)
BILIRUB SERPL-MCNC: 0.9 MG/DL (ref 0.1–1.5)
BILIRUB SERPL-MCNC: 2.3 MG/DL (ref 0.1–1.5)
BUN SERPL-MCNC: 11 MG/DL (ref 8–22)
BUN SERPL-MCNC: 11 MG/DL (ref 8–22)
CALCIUM SERPL-MCNC: 8.8 MG/DL (ref 8.4–10.2)
CALCIUM SERPL-MCNC: 8.9 MG/DL (ref 8.4–10.2)
CHLORIDE SERPL-SCNC: 93 MMOL/L (ref 96–112)
CHLORIDE SERPL-SCNC: 98 MMOL/L (ref 96–112)
CO2 SERPL-SCNC: 24 MMOL/L (ref 20–33)
CO2 SERPL-SCNC: 24 MMOL/L (ref 20–33)
CREAT SERPL-MCNC: 2.21 MG/DL (ref 0.5–1.4)
CREAT SERPL-MCNC: 2.26 MG/DL (ref 0.5–1.4)
EOSINOPHIL # BLD AUTO: 0.34 K/UL (ref 0–0.51)
EOSINOPHIL NFR BLD: 2.9 % (ref 0–6.9)
ERYTHROCYTE [DISTWIDTH] IN BLOOD BY AUTOMATED COUNT: 47.4 FL (ref 35.9–50)
GLOBULIN SER CALC-MCNC: 5 G/DL (ref 1.9–3.5)
GLOBULIN SER CALC-MCNC: 5.1 G/DL (ref 1.9–3.5)
GLUCOSE BLD-MCNC: 109 MG/DL (ref 65–99)
GLUCOSE BLD-MCNC: 121 MG/DL (ref 65–99)
GLUCOSE BLD-MCNC: 145 MG/DL (ref 65–99)
GLUCOSE BLD-MCNC: 151 MG/DL (ref 65–99)
GLUCOSE BLD-MCNC: 170 MG/DL (ref 65–99)
GLUCOSE SERPL-MCNC: 112 MG/DL (ref 65–99)
GLUCOSE SERPL-MCNC: 136 MG/DL (ref 65–99)
HCT VFR BLD AUTO: 42.8 % (ref 42–52)
HGB BLD-MCNC: 13.5 G/DL (ref 14–18)
IMM GRANULOCYTES # BLD AUTO: 0.05 K/UL (ref 0–0.11)
IMM GRANULOCYTES NFR BLD AUTO: 0.4 % (ref 0–0.9)
LYMPHOCYTES # BLD AUTO: 4.22 K/UL (ref 1–4.8)
LYMPHOCYTES NFR BLD: 36.2 % (ref 22–41)
MCH RBC QN AUTO: 28 PG (ref 27–33)
MCHC RBC AUTO-ENTMCNC: 31.5 G/DL (ref 33.7–35.3)
MCV RBC AUTO: 88.6 FL (ref 81.4–97.8)
MONOCYTES # BLD AUTO: 0.84 K/UL (ref 0–0.85)
MONOCYTES NFR BLD AUTO: 7.2 % (ref 0–13.4)
NEUTROPHILS # BLD AUTO: 6.14 K/UL (ref 1.82–7.42)
NEUTROPHILS NFR BLD: 52.7 % (ref 44–72)
NRBC # BLD AUTO: 0 K/UL
NRBC BLD-RTO: 0 /100 WBC
PLATELET # BLD AUTO: 376 K/UL (ref 164–446)
PMV BLD AUTO: 8.6 FL (ref 9–12.9)
POTASSIUM SERPL-SCNC: 3.8 MMOL/L (ref 3.6–5.5)
POTASSIUM SERPL-SCNC: 4.4 MMOL/L (ref 3.6–5.5)
PROT SERPL-MCNC: 8.3 G/DL (ref 6–8.2)
PROT SERPL-MCNC: 8.4 G/DL (ref 6–8.2)
RBC # BLD AUTO: 4.83 M/UL (ref 4.7–6.1)
SODIUM SERPL-SCNC: 128 MMOL/L (ref 135–145)
SODIUM SERPL-SCNC: 132 MMOL/L (ref 135–145)
WBC # BLD AUTO: 11.7 K/UL (ref 4.8–10.8)

## 2019-03-30 PROCEDURE — 700102 HCHG RX REV CODE 250 W/ 637 OVERRIDE(OP): Performed by: INTERNAL MEDICINE

## 2019-03-30 PROCEDURE — 85025 COMPLETE CBC W/AUTO DIFF WBC: CPT

## 2019-03-30 PROCEDURE — 700105 HCHG RX REV CODE 258: Performed by: INTERNAL MEDICINE

## 2019-03-30 PROCEDURE — 770006 HCHG ROOM/CARE - MED/SURG/GYN SEMI*

## 2019-03-30 PROCEDURE — 700111 HCHG RX REV CODE 636 W/ 250 OVERRIDE (IP): Performed by: INTERNAL MEDICINE

## 2019-03-30 PROCEDURE — 82962 GLUCOSE BLOOD TEST: CPT

## 2019-03-30 PROCEDURE — 99233 SBSQ HOSP IP/OBS HIGH 50: CPT | Performed by: HOSPITALIST

## 2019-03-30 PROCEDURE — A9270 NON-COVERED ITEM OR SERVICE: HCPCS | Performed by: INTERNAL MEDICINE

## 2019-03-30 PROCEDURE — 36415 COLL VENOUS BLD VENIPUNCTURE: CPT

## 2019-03-30 PROCEDURE — 80053 COMPREHEN METABOLIC PANEL: CPT

## 2019-03-30 RX ADMIN — MEROPENEM 500 MG: 500 INJECTION, POWDER, FOR SOLUTION INTRAVENOUS at 05:52

## 2019-03-30 RX ADMIN — BUDESONIDE AND FORMOTEROL FUMARATE DIHYDRATE 2 PUFF: 160; 4.5 AEROSOL RESPIRATORY (INHALATION) at 18:00

## 2019-03-30 RX ADMIN — TAMSULOSIN HYDROCHLORIDE 0.4 MG: 0.4 CAPSULE ORAL at 07:39

## 2019-03-30 RX ADMIN — INSULIN GLARGINE 25 UNITS: 100 INJECTION, SOLUTION SUBCUTANEOUS at 18:00

## 2019-03-30 RX ADMIN — MEROPENEM 500 MG: 500 INJECTION, POWDER, FOR SOLUTION INTRAVENOUS at 11:23

## 2019-03-30 RX ADMIN — INSULIN LISPRO 1 UNITS: 100 INJECTION, SOLUTION INTRAVENOUS; SUBCUTANEOUS at 16:25

## 2019-03-30 RX ADMIN — BUDESONIDE AND FORMOTEROL FUMARATE DIHYDRATE 2 PUFF: 160; 4.5 AEROSOL RESPIRATORY (INHALATION) at 05:55

## 2019-03-30 RX ADMIN — MORPHINE SULFATE 15 MG: 15 TABLET, EXTENDED RELEASE ORAL at 17:05

## 2019-03-30 RX ADMIN — MEROPENEM 500 MG: 500 INJECTION, POWDER, FOR SOLUTION INTRAVENOUS at 17:05

## 2019-03-30 RX ADMIN — MEROPENEM 500 MG: 500 INJECTION, POWDER, FOR SOLUTION INTRAVENOUS at 23:35

## 2019-03-30 RX ADMIN — MORPHINE SULFATE 15 MG: 15 TABLET, EXTENDED RELEASE ORAL at 05:51

## 2019-03-30 ASSESSMENT — ENCOUNTER SYMPTOMS
ABDOMINAL PAIN: 0
NAUSEA: 0
VOMITING: 0

## 2019-03-30 NOTE — CARE PLAN
Problem: Pain Management  Goal: Pain level will decrease to patient's comfort goal  Will control pain with scheduled and PRN meds.    Problem: Skin Integrity  Goal: Risk for impaired skin integrity will decrease  Will ensure ostomy is CDI.

## 2019-03-30 NOTE — WOUND TEAM
"[facility]   Inpatient Services   Established Ostomy Management/ troubleshooting  HPI: Reviewed  PMH: Reviewed   SH: Reviewed   Reason for Ostomy nurse consult:  Red skin    Subjective: remains sedated from procedure    Objective:   Ostomy type:  ileostomy  Stoma location:  RLQ  Stoma assessment:    Appearance: red   Size:   1\" slightly oval   Protrusion: Slightly budded   Output: Small green bile   MC jxn:  intact   Peristomal skin:  severely red and denuded    Ostomy Appliance (type and size):  Patient had a 1 1/4\" convex [place] appliance on with a belt. Placed a  2 1/4\" two piece with a flat paste ring.     Interventions and Education:  Removed old appliance a all the tape he had placed around the denuded area. Cleaned skin with cool moist wash cloth. Patted dry. Applied No Sting Skin Prep, then sprinkled with stoma powder, and brushed off excess with dry 4x4. Sealed with No Sting Skin Prep. Made template of stoma, cut new barrier, placed a flat paste ring to back of barrier, placed barrier, the cut small worm of paste ring to interior edge of opening to fill in small space next to stoma. Placed new pouch.     Evaluation: Skin has been denuded for a long time, by the looks, extending far beyond the size of the barrier that he has been using. He may need to have a different appliance, and definitely needs to have better skin care. Photo to be uploaded for reference.    Plan: continue to follow by ostomy RN.    Anticipated discharge needs: recommend follow up at the outpatient ostomy/wound clinic on discharge.    "

## 2019-03-30 NOTE — CARE PLAN
Problem: Safety  Goal: Will remain free from injury  Outcome: PROGRESSING AS EXPECTED  Frequent rounding performed to prevent falls or injury. Walked with patient in hallways to assess mobility. Patient steady when walking.

## 2019-03-30 NOTE — PROGRESS NOTES
Hospital Medicine Daily Progress Note    Date of Service  3/30/2019    Chief Complaint  CBD stone    Hospital Course    *68-year-old male with history of chronic opiate dependence insulin-dependent diabetes with chronic kidney disease Crohn's disease status post ileostomy BPH with chronic Humphrey was being treated at outlying facility for UTI due to Pseudomonas complained of right upper quadrant pain were MRCP apparently showed common bile duct stone transferred here for ERCP*      Interval Problem Update  Alert and oriented, denies abdominal pain.    Consultants/Specialty  GI consultants  General surgery, Dr. Alonzo    Code Status  Full    Disposition  Anticipate home when medically cleared.    Review of Systems  Review of Systems   Gastrointestinal: Negative for abdominal pain, nausea and vomiting.   All other systems reviewed and are negative.       Physical Exam  Temp:  [36.3 °C (97.4 °F)-37 °C (98.6 °F)] 36.6 °C (97.8 °F)  Pulse:  [78-96] 78  Resp:  [18] 18  BP: (135-153)/(60-92) 136/61  SpO2:  [88 %-95 %] 94 %    Physical Exam   Constitutional: He is oriented to person, place, and time. He appears well-developed and well-nourished. No distress.   HENT:   Head: Normocephalic and atraumatic.   Nose: Nose normal.   Eyes: Conjunctivae are normal. Right eye exhibits no discharge. Left eye exhibits no discharge. No scleral icterus.   Neck: No JVD present.   Cardiovascular: Normal rate and regular rhythm.    Pulmonary/Chest: Effort normal and breath sounds normal. No stridor. No respiratory distress.   Abdominal: Soft. He exhibits no distension and no mass. There is no tenderness. There is no rebound and no guarding.   Ostomy bag present right lower quadrant   Genitourinary:   Genitourinary Comments: Humphrey in place     Musculoskeletal: He exhibits no edema or tenderness.   Neurological: He is alert and oriented to person, place, and time.   Skin: Skin is warm and dry. He is not diaphoretic. No pallor.   Psychiatric: He  has a normal mood and affect. His behavior is normal. Judgment and thought content normal.   Nursing note and vitals reviewed.      Fluids    Intake/Output Summary (Last 24 hours) at 03/30/19 1246  Last data filed at 03/30/19 1045   Gross per 24 hour   Intake             1200 ml   Output             1665 ml   Net             -465 ml       Laboratory  Recent Labs      03/28/19   0525  03/30/19   0513   WBC  8.8  11.7*   RBC  4.79  4.83   HEMOGLOBIN  13.3*  13.5*   HEMATOCRIT  41.4*  42.8   MCV  86.4  88.6   MCH  27.8  28.0   MCHC  32.1*  31.5*   RDW  46.3  47.4   PLATELETCT  404  376   MPV  8.6*  8.6*     Recent Labs      03/28/19   0525  03/30/19   0513  03/30/19   0920   SODIUM  133*  132*  128*   POTASSIUM  4.2  3.8  4.4   CHLORIDE  100  98  93*   CO2  24  24  24   GLUCOSE  136*  112*  136*   BUN  19  11  11   CREATININE  2.16*  2.26*  2.21*   CALCIUM  8.7  8.8  8.9     Recent Labs      03/28/19   0525   APTT  33.6   INR  1.15*               Imaging  [id number] STONE REMOVAL   Final Result      Limited intraoperative images showing apparent stone removal from the common bile duct, and placement of biliary stent.      DX-PORTABLE FLUOROSCOPY < 1 HOUR   Final Result         Portable fluoroscopy utilized for 4.4 minutes.              Assessment/Plan  Choledocholithiasis- (present on admission)   Assessment & Plan    -Noted on MRCP  -Patient transferred here for ERCP, stone retrieved from duct 3/29  Consulted with Dr. Alonzo today; bili went up to 2.3, trend bili, when decreasing he will schedule for surgery, most likely in a day or two.       UTI (urinary tract infection)- (present on admission)   Assessment & Plan    Complicated 2/2 chronic harrington  -Pseudomonas per the sign out admitting MD received- -Continue Merrem for 7 days   Discussed with charge [facility] still pending       Encephalopathy acute   Assessment & Plan    Intermittently confused, improving steadily, alert and oriented  today  No focal findings  Ammonia less than 10     Diabetes due to undrl condition w oth diabetic kidney comp (HCC)   Assessment & Plan    Basal and low correctional SSI - @ goal     CRF (chronic renal failure), stage 3 (moderate) (HCC)- (present on admission)   Assessment & Plan    Remains about at baseline 2.5-3 is what he typically runs  Holding fluids at this time, trend cmp       Hyponatremia   Assessment & Plan    -Mild, variable, stop iv fluids and observe, cmp in am     BPH (benign prostatic hyperplasia)- (present on admission)   Assessment & Plan    -Continue home Flomax, although of unclear benefit given chronic harrington     Opiate dependence (HCC)- (present on admission)   Assessment & Plan    Resume oral ms contin  Pain well controlled at this time no abdominal pain       Crohn's disease ([facility])- (present on admission)   Assessment & Plan    -Significant, has had multiple surgeries and now has an ostomy, on [facility]  -No acute flare     COPD (chronic obstructive pulmonary disease) ([facility])- (present on admission)   Assessment & Plan    -No acute exacerbation          VTE prophylaxis: SCD's

## 2019-03-30 NOTE — PROGRESS NOTES
GI Progress Note:    Dario Clement  Date & Time note created:    3/30/2019   6:33 AM       Patient ID:   Name:             Shola Hoyt   YOB: 1950  Age:                 68 y.o.  male   MRN:               [id number]                                                             CC: CBD stone    Subjective:   No abd pain, hungry        Past Medical History:   Past Medical History:   Diagnosis Date   • Arthritis    • Benign prostate hyperplasia    • Colostomy in place (HCC)    • COPD (chronic obstructive pulmonary disease) (HCC)    • Diabetes (HCC)    • Renal disorder        Past Surgical History:  Past Surgical History:   Procedure Laterality Date   • PB ERCP,DIAGNOSTIC  3/29/2019    Procedure: ERCP, DIAGNOSTIC;  Surgeon: Tao Saab M.D.;  Location: SURGERY [facility];  Service: Gastroenterology   • TOE AMPUTATION Right 10/25/2017    Procedure: TOE AMPUTATION-GREAT TOE;  Surgeon: Bobby Alejo M.D.;  Location: SURGERY [facility];  Service: Orthopedics       Hospital Medications:    Current Facility-Administered Medications:   •  insulin glargine (LANTUS) injection 25 Units, 0.2 Units/kg/day, Subcutaneous, Q EVENING, 25 Units at 03/29/19 1810 **AND** insulin lispro (HUMALOG) injection 1-6 Units, 1-6 Units, Subcutaneous, Q6HRS, Stopped at 03/28/19 0600 **AND** Accu-Chek Q6 if NPO, , , Q6H **AND** NOTIFY MD and PharmD, , , Once **AND** glucose 4 g chewable tablet 16 g, 16 g, Oral, Q15 MIN PRN **AND** dextrose 50% (D50W) injection 25 mL, 25 mL, Intravenous, Q15 MIN PRN, Carissa Banda M.D.  •  budesonide-formoterol (SYMBICORT) 160-4.5 MCG/ACT inhaler 2 Puff, 2 Puff, Inhalation, BID, Felix Zapata D.O., 2 Puff at 03/30/19 0555  •  morphine (MS IR) tablet 15 mg, 15 mg, Oral, TID PRN, Felix Zapata D.O., 15 mg at 03/29/19 2341  •  morphine ER (MS CONTIN) tablet 15 mg, 15 mg, Oral, Q12HRS, Felix Zapata D.O., 15 mg at 03/30/19 0551  •  tamsulosin (FLOMAX) capsule 0.4 mg,  "0.4 mg, Oral, AFTER BREAKFAST, LINDSEY BurrO., Stopped at 03/29/19 0830  •  senna-docusate (PERICOLACE or SENOKOT S) 8.6-50 MG per tablet 2 Tab, 2 Tab, Oral, BID, 2 Tab at 03/29/19 1807 **AND** polyethylene glycol/lytes (MIRALAX) PACKET 1 Packet, 1 Packet, Oral, QDAY PRN **AND** magnesium hydroxide (MILK OF MAGNESIA) suspension 30 mL, 30 mL, Oral, QDAY PRN **AND** bisacodyl (DULCOLAX) suppository 10 mg, 10 mg, Rectal, QDAY PRN, HUMBLE Burr.O.  •  acetaminophen (TYLENOL) tablet 650 mg, 650 mg, Oral, Q6HRS PRN, HUMBLE Burr.O.  •  hydrALAZINE (APRESOLINE) injection 10 mg, 10 mg, Intravenous, Q4HRS PRN, HUMBLE Burr.O.  •  ondansetron (ZOFRAN) syringe/vial injection 4 mg, 4 mg, Intravenous, Q4HRS PRN, HUMBLE Burr.O.  •  ondansetron (ZOFRAN ODT) dispertab 4 mg, 4 mg, Oral, Q4HRS PRN, HUMBLE Burr.O.  •  morphine (pf) 4 mg/ml injection 1-4 mg, 1-4 mg, Intravenous, Q4HRS PRN, HUMBLE Burr.O., 2 mg at 03/26/19 1959  •  meropenem (MERREM) 500 mg in  mL IVPB, 500 mg, Intravenous, Q6HRS, HUMBLE Burr.O., Last Rate: 200 mL/hr at 03/30/19 0552, 500 mg at 03/30/19 0552    Medication Allergy:  Allergies   Allergen Reactions   • Ceftriaxone    • Ezetimibe    • Fentanyl    • Flagyl [Metronidazole]    • Infliximab    • Lovastatin    • Simvastatin    • Vancomycin      ROS: 12 point review unchanged    Physical Exam:  Vitals/ General Appearance:   Weight/BMI: Body mass index is 38.56 kg/m².  Blood pressure 149/77, pulse 96, temperature 36.6 °C (97.9 °F), temperature source Oral, resp. rate 18, height 1.803 m (5' 11\"), weight (!) 125.4 kg (276 lb 7.3 oz), SpO2 94 %.  Vitals:    03/29/19 1430 03/29/19 1800 03/29/19 2027 03/30/19 0200   BP: 139/63 143/66 139/92 149/77   Pulse: 80 95 79 96   Resp:   18 18   Temp:  36.8 °C (98.2 °F) 36.4 °C (97.5 °F) 36.6 °C (97.9 °F)   TempSrc:   Oral Oral   SpO2:  90% 92% 94%   Weight:       Height:         HEENT: WNL  Heart: RRR  Lungs: " Clear  Abdomen: +BS, non tender, colostomy RUQ      Lab Data Review:  Recent Labs      03/28/19 0525 03/30/19   0513   WBC  8.8  11.7*   RBC  4.79  4.83   HEMOGLOBIN  13.3*  13.5*   HEMATOCRIT  41.4*  42.8   MCV  86.4  88.6   MCH  27.8  28.0   MCHC  32.1*  31.5*   RDW  46.3  47.4   PLATELETCT  404  376   MPV  8.6*  8.6*     Recent Labs      03/28/19 0525 03/30/19   0513   SODIUM  133*  132*   POTASSIUM  4.2  3.8   CHLORIDE  100  98   CO2  24  24   GLUCOSE  136*  112*   BUN  19  11   CREATININE  2.16*  2.26*   CALCIUM  8.7  8.8     Recent Labs      03/28/19 0525   APTT  33.6   INR  1.15*             Recent Labs      03/28/19 0525 03/30/19   0513   SODIUM  133*  132*   POTASSIUM  4.2  3.8   CHLORIDE  100  98   CO2  24  24   GLUCOSE  136*  112*   BUN  19  11     Recent Labs      03/28/19 0525  03/30/19   0513   SODIUM  133*  132*   POTASSIUM  4.2  3.8   CHLORIDE  100  98   CO2  24  24   BUN  19  11   CREATININE  2.16*  2.26*   CALCIUM  8.7  8.8     Recent Labs      03/28/19   0525   APTT  33.6   INR  1.15*          Imaging/Procedures Review:    ERCP- large CBD stone and biliary sludge removed    Assessment and plan:  Tolerated procedure well without evident complication. Will start diet and advance as tolerated. Consider referral to surgery for elective lap carlos. Other med problems stable and [facility]. Will sign off, call if can help

## 2019-03-30 NOTE — WOUND TEAM
In to see patient to check on ostomy.  Appliance applied last night with belt.  Is intact now with no leaking.  Patient agreed to leave in place for now.  Supplies left at bedside in ostomy bag.  RN updated and ostomy RN to check on patient tomorrow.

## 2019-03-30 NOTE — PROGRESS NOTES
Pt is AAO x4.  Pt reports a 4/10 generalized pain level.  Declines med intervention at this time.  VS WNL.  R PIV patent, saline locked.  RLQ ostomy in place with green output.  Humphrey in place, draining properly.  Pt up in chair for bfast.  POC discussed.  All needs met at this time.  Bed in low position.  Call light within reach.  Rounding in place.

## 2019-03-31 ENCOUNTER — ANESTHESIA (OUTPATIENT)
Dept: SURGERY | Facility: MEDICAL CENTER | Age: 69
DRG: 414 | End: 2019-03-31
Payer: MEDICARE

## 2019-03-31 ENCOUNTER — ANESTHESIA EVENT (OUTPATIENT)
Dept: SURGERY | Facility: MEDICAL CENTER | Age: 69
DRG: 414 | End: 2019-03-31
Payer: MEDICARE

## 2019-03-31 LAB
ALBUMIN SERPL BCP-MCNC: 3.1 G/DL (ref 3.2–4.9)
ALBUMIN/GLOB SERPL: 0.7 G/DL
ALP SERPL-CCNC: 125 U/L (ref 30–99)
ALT SERPL-CCNC: 30 U/L (ref 2–50)
ANION GAP SERPL CALC-SCNC: 5 MMOL/L (ref 0–11.9)
AST SERPL-CCNC: 31 U/L (ref 12–45)
BASOPHILS # BLD AUTO: 0.6 % (ref 0–1.8)
BASOPHILS # BLD: 0.06 K/UL (ref 0–0.12)
BILIRUB SERPL-MCNC: 0.7 MG/DL (ref 0.1–1.5)
BUN SERPL-MCNC: 10 MG/DL (ref 8–22)
CALCIUM SERPL-MCNC: 8.4 MG/DL (ref 8.4–10.2)
CHLORIDE SERPL-SCNC: 99 MMOL/L (ref 96–112)
CO2 SERPL-SCNC: 26 MMOL/L (ref 20–33)
CREAT SERPL-MCNC: 2.32 MG/DL (ref 0.5–1.4)
EOSINOPHIL # BLD AUTO: 0.36 K/UL (ref 0–0.51)
EOSINOPHIL NFR BLD: 3.9 % (ref 0–6.9)
ERYTHROCYTE [DISTWIDTH] IN BLOOD BY AUTOMATED COUNT: 46.9 FL (ref 35.9–50)
GLOBULIN SER CALC-MCNC: 4.6 G/DL (ref 1.9–3.5)
GLUCOSE BLD-MCNC: 143 MG/DL (ref 65–99)
GLUCOSE BLD-MCNC: 146 MG/DL (ref 65–99)
GLUCOSE BLD-MCNC: 186 MG/DL (ref 65–99)
GLUCOSE BLD-MCNC: 198 MG/DL (ref 65–99)
GLUCOSE SERPL-MCNC: 142 MG/DL (ref 65–99)
HCT VFR BLD AUTO: 40.2 % (ref 42–52)
HGB BLD-MCNC: 12.9 G/DL (ref 14–18)
IMM GRANULOCYTES # BLD AUTO: 0.03 K/UL (ref 0–0.11)
IMM GRANULOCYTES NFR BLD AUTO: 0.3 % (ref 0–0.9)
LYMPHOCYTES # BLD AUTO: 3.5 K/UL (ref 1–4.8)
LYMPHOCYTES NFR BLD: 37.8 % (ref 22–41)
MCH RBC QN AUTO: 28 PG (ref 27–33)
MCHC RBC AUTO-ENTMCNC: 32.1 G/DL (ref 33.7–35.3)
MCV RBC AUTO: 87.2 FL (ref 81.4–97.8)
MONOCYTES # BLD AUTO: 0.7 K/UL (ref 0–0.85)
MONOCYTES NFR BLD AUTO: 7.6 % (ref 0–13.4)
NEUTROPHILS # BLD AUTO: 4.62 K/UL (ref 1.82–7.42)
NEUTROPHILS NFR BLD: 49.8 % (ref 44–72)
NRBC # BLD AUTO: 0 K/UL
NRBC BLD-RTO: 0 /100 WBC
PATHOLOGY CONSULT NOTE: NORMAL
PLATELET # BLD AUTO: 356 K/UL (ref 164–446)
PMV BLD AUTO: 8.7 FL (ref 9–12.9)
POTASSIUM SERPL-SCNC: 4 MMOL/L (ref 3.6–5.5)
PROT SERPL-MCNC: 7.7 G/DL (ref 6–8.2)
RBC # BLD AUTO: 4.61 M/UL (ref 4.7–6.1)
SODIUM SERPL-SCNC: 130 MMOL/L (ref 135–145)
WBC # BLD AUTO: 9.3 K/UL (ref 4.8–10.8)

## 2019-03-31 PROCEDURE — 501399 HCHG SPECIMAN BAG, ENDO CATC: Performed by: SURGERY

## 2019-03-31 PROCEDURE — 99232 SBSQ HOSP IP/OBS MODERATE 35: CPT | Performed by: HOSPITALIST

## 2019-03-31 PROCEDURE — 80053 COMPREHEN METABOLIC PANEL: CPT

## 2019-03-31 PROCEDURE — 502240 HCHG MISC OR SUPPLY RC 0272: Performed by: SURGERY

## 2019-03-31 PROCEDURE — 160036 HCHG PACU - EA ADDL 30 MINS PHASE I: Performed by: SURGERY

## 2019-03-31 PROCEDURE — 0FJ44ZZ INSPECTION OF GALLBLADDER, PERCUTANEOUS ENDOSCOPIC APPROACH: ICD-10-PCS | Performed by: SURGERY

## 2019-03-31 PROCEDURE — 500389 HCHG DRAIN, RESERVOIR SUCT JP 100CC: Performed by: SURGERY

## 2019-03-31 PROCEDURE — 770006 HCHG ROOM/CARE - MED/SURG/GYN SEMI*

## 2019-03-31 PROCEDURE — 88304 TISSUE EXAM BY PATHOLOGIST: CPT

## 2019-03-31 PROCEDURE — 700111 HCHG RX REV CODE 636 W/ 250 OVERRIDE (IP): Performed by: INTERNAL MEDICINE

## 2019-03-31 PROCEDURE — 160002 HCHG RECOVERY MINUTES (STAT): Performed by: SURGERY

## 2019-03-31 PROCEDURE — 36415 COLL VENOUS BLD VENIPUNCTURE: CPT

## 2019-03-31 PROCEDURE — 160048 HCHG OR STATISTICAL LEVEL 1-5: Performed by: SURGERY

## 2019-03-31 PROCEDURE — 0FT40ZZ RESECTION OF GALLBLADDER, OPEN APPROACH: ICD-10-PCS | Performed by: SURGERY

## 2019-03-31 PROCEDURE — 160035 HCHG PACU - 1ST 60 MINS PHASE I: Performed by: SURGERY

## 2019-03-31 PROCEDURE — 700102 HCHG RX REV CODE 250 W/ 637 OVERRIDE(OP): Performed by: HOSPITALIST

## 2019-03-31 PROCEDURE — 502571 HCHG PACK, LAP CHOLE: Performed by: SURGERY

## 2019-03-31 PROCEDURE — 160009 HCHG ANES TIME/MIN: Performed by: SURGERY

## 2019-03-31 PROCEDURE — 700101 HCHG RX REV CODE 250

## 2019-03-31 PROCEDURE — 501571 HCHG TROCAR, SEPARATOR 12X100: Performed by: SURGERY

## 2019-03-31 PROCEDURE — 700111 HCHG RX REV CODE 636 W/ 250 OVERRIDE (IP): Performed by: ANESTHESIOLOGY

## 2019-03-31 PROCEDURE — 500371 HCHG DRAIN, BLAKE 10MM: Performed by: SURGERY

## 2019-03-31 PROCEDURE — A9270 NON-COVERED ITEM OR SERVICE: HCPCS | Performed by: SURGERY

## 2019-03-31 PROCEDURE — 160039 HCHG SURGERY MINUTES - EA ADDL 1 MIN LEVEL 3: Performed by: SURGERY

## 2019-03-31 PROCEDURE — 501570 HCHG TROCAR, SEPARATOR: Performed by: SURGERY

## 2019-03-31 PROCEDURE — 501583 HCHG TROCAR, THRD CAN&SEAL 5X100: Performed by: SURGERY

## 2019-03-31 PROCEDURE — 82962 GLUCOSE BLOOD TEST: CPT | Mod: 91

## 2019-03-31 PROCEDURE — 700102 HCHG RX REV CODE 250 W/ 637 OVERRIDE(OP): Performed by: INTERNAL MEDICINE

## 2019-03-31 PROCEDURE — 700105 HCHG RX REV CODE 258: Performed by: ANESTHESIOLOGY

## 2019-03-31 PROCEDURE — 700102 HCHG RX REV CODE 250 W/ 637 OVERRIDE(OP): Performed by: SURGERY

## 2019-03-31 PROCEDURE — A9270 NON-COVERED ITEM OR SERVICE: HCPCS | Performed by: INTERNAL MEDICINE

## 2019-03-31 PROCEDURE — 500854 HCHG NEEDLE, INSUFFLATION FOR STEP: Performed by: SURGERY

## 2019-03-31 PROCEDURE — 85025 COMPLETE CBC W/AUTO DIFF WBC: CPT

## 2019-03-31 PROCEDURE — 160028 HCHG SURGERY MINUTES - 1ST 30 MINS LEVEL 3: Performed by: SURGERY

## 2019-03-31 PROCEDURE — 501434 HCHG STAPLER, LINEAR 30: Performed by: SURGERY

## 2019-03-31 PROCEDURE — 700101 HCHG RX REV CODE 250: Performed by: ANESTHESIOLOGY

## 2019-03-31 PROCEDURE — 700111 HCHG RX REV CODE 636 W/ 250 OVERRIDE (IP)

## 2019-03-31 PROCEDURE — 501838 HCHG SUTURE GENERAL: Performed by: SURGERY

## 2019-03-31 PROCEDURE — 700105 HCHG RX REV CODE 258: Performed by: INTERNAL MEDICINE

## 2019-03-31 RX ORDER — CEFAZOLIN SODIUM 1 G/3ML
INJECTION, POWDER, FOR SOLUTION INTRAMUSCULAR; INTRAVENOUS PRN
Status: DISCONTINUED | OUTPATIENT
Start: 2019-03-31 | End: 2019-03-31 | Stop reason: SURG

## 2019-03-31 RX ORDER — OXYCODONE HYDROCHLORIDE 5 MG/1
5-10 TABLET ORAL
Status: DISCONTINUED | OUTPATIENT
Start: 2019-03-31 | End: 2019-04-03 | Stop reason: HOSPADM

## 2019-03-31 RX ORDER — HYDROMORPHONE HYDROCHLORIDE 1 MG/ML
0.2 INJECTION, SOLUTION INTRAMUSCULAR; INTRAVENOUS; SUBCUTANEOUS
Status: DISCONTINUED | OUTPATIENT
Start: 2019-03-31 | End: 2019-03-31 | Stop reason: HOSPADM

## 2019-03-31 RX ORDER — OXYCODONE HCL 5 MG/5 ML
5 SOLUTION, ORAL ORAL
Status: DISCONTINUED | OUTPATIENT
Start: 2019-03-31 | End: 2019-03-31 | Stop reason: HOSPADM

## 2019-03-31 RX ORDER — MEPERIDINE HYDROCHLORIDE 25 MG/ML
25 INJECTION INTRAMUSCULAR; INTRAVENOUS; SUBCUTANEOUS
Status: DISCONTINUED | OUTPATIENT
Start: 2019-03-31 | End: 2019-03-31 | Stop reason: HOSPADM

## 2019-03-31 RX ORDER — ONDANSETRON 2 MG/ML
4 INJECTION INTRAMUSCULAR; INTRAVENOUS
Status: DISCONTINUED | OUTPATIENT
Start: 2019-03-31 | End: 2019-03-31 | Stop reason: HOSPADM

## 2019-03-31 RX ORDER — DEXTROSE MONOHYDRATE 25 G/50ML
25 INJECTION, SOLUTION INTRAVENOUS
Status: DISCONTINUED | OUTPATIENT
Start: 2019-03-31 | End: 2019-04-03 | Stop reason: HOSPADM

## 2019-03-31 RX ORDER — DEXTROSE MONOHYDRATE 25 G/50ML
25 INJECTION, SOLUTION INTRAVENOUS
Status: DISCONTINUED | OUTPATIENT
Start: 2019-03-31 | End: 2019-03-31

## 2019-03-31 RX ORDER — DEXAMETHASONE SODIUM PHOSPHATE 4 MG/ML
INJECTION, SOLUTION INTRA-ARTICULAR; INTRALESIONAL; INTRAMUSCULAR; INTRAVENOUS; SOFT TISSUE PRN
Status: DISCONTINUED | OUTPATIENT
Start: 2019-03-31 | End: 2019-03-31 | Stop reason: SURG

## 2019-03-31 RX ORDER — SODIUM CHLORIDE, SODIUM LACTATE, POTASSIUM CHLORIDE, CALCIUM CHLORIDE 600; 310; 30; 20 MG/100ML; MG/100ML; MG/100ML; MG/100ML
INJECTION, SOLUTION INTRAVENOUS
Status: DISCONTINUED | OUTPATIENT
Start: 2019-03-31 | End: 2019-03-31 | Stop reason: SURG

## 2019-03-31 RX ORDER — HALOPERIDOL 5 MG/ML
1 INJECTION INTRAMUSCULAR
Status: DISCONTINUED | OUTPATIENT
Start: 2019-03-31 | End: 2019-03-31 | Stop reason: HOSPADM

## 2019-03-31 RX ORDER — OXYCODONE HCL 5 MG/5 ML
10 SOLUTION, ORAL ORAL
Status: DISCONTINUED | OUTPATIENT
Start: 2019-03-31 | End: 2019-03-31 | Stop reason: HOSPADM

## 2019-03-31 RX ORDER — HYDROMORPHONE HYDROCHLORIDE 1 MG/ML
0.1 INJECTION, SOLUTION INTRAMUSCULAR; INTRAVENOUS; SUBCUTANEOUS
Status: DISCONTINUED | OUTPATIENT
Start: 2019-03-31 | End: 2019-03-31 | Stop reason: HOSPADM

## 2019-03-31 RX ORDER — MIDAZOLAM HYDROCHLORIDE 1 MG/ML
0.5 INJECTION INTRAMUSCULAR; INTRAVENOUS
Status: DISCONTINUED | OUTPATIENT
Start: 2019-03-31 | End: 2019-03-31 | Stop reason: HOSPADM

## 2019-03-31 RX ORDER — HYDROMORPHONE HYDROCHLORIDE 1 MG/ML
0.4 INJECTION, SOLUTION INTRAMUSCULAR; INTRAVENOUS; SUBCUTANEOUS
Status: DISCONTINUED | OUTPATIENT
Start: 2019-03-31 | End: 2019-03-31 | Stop reason: HOSPADM

## 2019-03-31 RX ORDER — SODIUM CHLORIDE, SODIUM LACTATE, POTASSIUM CHLORIDE, CALCIUM CHLORIDE 600; 310; 30; 20 MG/100ML; MG/100ML; MG/100ML; MG/100ML
INJECTION, SOLUTION INTRAVENOUS CONTINUOUS
Status: DISCONTINUED | OUTPATIENT
Start: 2019-03-31 | End: 2019-03-31 | Stop reason: HOSPADM

## 2019-03-31 RX ORDER — INSULIN GLARGINE 100 [IU]/ML
0.2 INJECTION, SOLUTION SUBCUTANEOUS EVERY EVENING
Status: DISCONTINUED | OUTPATIENT
Start: 2019-04-01 | End: 2019-03-31

## 2019-03-31 RX ORDER — ACETAMINOPHEN 500 MG
1000 TABLET ORAL EVERY 6 HOURS
Status: DISCONTINUED | OUTPATIENT
Start: 2019-03-31 | End: 2019-04-03 | Stop reason: HOSPADM

## 2019-03-31 RX ORDER — BUPIVACAINE HYDROCHLORIDE AND EPINEPHRINE 5; 5 MG/ML; UG/ML
INJECTION, SOLUTION EPIDURAL; INTRACAUDAL; PERINEURAL
Status: DISCONTINUED | OUTPATIENT
Start: 2019-03-31 | End: 2019-03-31 | Stop reason: HOSPADM

## 2019-03-31 RX ORDER — INSULIN GLARGINE 100 [IU]/ML
0.2 INJECTION, SOLUTION SUBCUTANEOUS EVERY EVENING
Status: DISCONTINUED | OUTPATIENT
Start: 2019-04-01 | End: 2019-04-03 | Stop reason: HOSPADM

## 2019-03-31 RX ADMIN — FENTANYL CITRATE 25 MCG: 50 INJECTION, SOLUTION INTRAMUSCULAR; INTRAVENOUS at 09:25

## 2019-03-31 RX ADMIN — FENTANYL CITRATE 100 MCG: 50 INJECTION, SOLUTION INTRAMUSCULAR; INTRAVENOUS at 09:17

## 2019-03-31 RX ADMIN — MEROPENEM 500 MG: 500 INJECTION, POWDER, FOR SOLUTION INTRAVENOUS at 17:51

## 2019-03-31 RX ADMIN — BUDESONIDE AND FORMOTEROL FUMARATE DIHYDRATE 2 PUFF: 160; 4.5 AEROSOL RESPIRATORY (INHALATION) at 06:27

## 2019-03-31 RX ADMIN — SODIUM CHLORIDE, POTASSIUM CHLORIDE, SODIUM LACTATE AND CALCIUM CHLORIDE: 600; 310; 30; 20 INJECTION, SOLUTION INTRAVENOUS at 09:20

## 2019-03-31 RX ADMIN — PROPOFOL 200 MG: 10 INJECTION, EMULSION INTRAVENOUS at 09:20

## 2019-03-31 RX ADMIN — ROCURONIUM BROMIDE 15 MG: 10 INJECTION INTRAVENOUS at 10:15

## 2019-03-31 RX ADMIN — CEFAZOLIN 2 G: 1 INJECTION, POWDER, FOR SOLUTION INTRAVENOUS at 09:28

## 2019-03-31 RX ADMIN — MORPHINE SULFATE 15 MG: 15 TABLET ORAL at 14:54

## 2019-03-31 RX ADMIN — DEXAMETHASONE SODIUM PHOSPHATE 8 MG: 4 INJECTION, SOLUTION INTRAMUSCULAR; INTRAVENOUS at 09:20

## 2019-03-31 RX ADMIN — LIDOCAINE HYDROCHLORIDE 50 MG: 20 INJECTION, SOLUTION INFILTRATION; PERINEURAL at 09:20

## 2019-03-31 RX ADMIN — INSULIN LISPRO 1 UNITS: 100 INJECTION, SOLUTION INTRAVENOUS; SUBCUTANEOUS at 20:47

## 2019-03-31 RX ADMIN — ROCURONIUM BROMIDE 10 MG: 10 INJECTION INTRAVENOUS at 09:20

## 2019-03-31 RX ADMIN — OXYCODONE HYDROCHLORIDE 10 MG: 5 TABLET ORAL at 17:50

## 2019-03-31 RX ADMIN — INSULIN GLARGINE 25 UNITS: 100 INJECTION, SOLUTION SUBCUTANEOUS at 16:58

## 2019-03-31 RX ADMIN — ACETAMINOPHEN 1000 MG: 500 TABLET, FILM COATED ORAL at 16:49

## 2019-03-31 RX ADMIN — MEROPENEM 500 MG: 500 INJECTION, POWDER, FOR SOLUTION INTRAVENOUS at 06:25

## 2019-03-31 RX ADMIN — FENTANYL CITRATE 75 MCG: 50 INJECTION, SOLUTION INTRAMUSCULAR; INTRAVENOUS at 10:15

## 2019-03-31 RX ADMIN — INSULIN LISPRO 1 UNITS: 100 INJECTION, SOLUTION INTRAVENOUS; SUBCUTANEOUS at 16:57

## 2019-03-31 RX ADMIN — MORPHINE SULFATE 15 MG: 15 TABLET, EXTENDED RELEASE ORAL at 16:49

## 2019-03-31 RX ADMIN — SUCCINYLCHOLINE CHLORIDE 100 MG: 20 INJECTION, SOLUTION INTRAMUSCULAR; INTRAVENOUS at 09:20

## 2019-03-31 RX ADMIN — BUDESONIDE AND FORMOTEROL FUMARATE DIHYDRATE 2 PUFF: 160; 4.5 AEROSOL RESPIRATORY (INHALATION) at 16:49

## 2019-03-31 RX ADMIN — ONDANSETRON 4 MG: 2 INJECTION INTRAMUSCULAR; INTRAVENOUS at 09:20

## 2019-03-31 RX ADMIN — OXYCODONE HYDROCHLORIDE 10 MG: 5 TABLET ORAL at 21:46

## 2019-03-31 ASSESSMENT — ENCOUNTER SYMPTOMS
ABDOMINAL PAIN: 0
NAUSEA: 0
VOMITING: 0

## 2019-03-31 ASSESSMENT — PAIN SCALES - GENERAL: PAIN_LEVEL: 0

## 2019-03-31 NOTE — OR SURGEON
Immediate Post OP Note    PreOp Diagnosis:   1.  Choledocholithiasis  2.  Diabetes  3.  Chronic kidney disease  4.  COPD  5.  Hypertension  6.  Crohn's disease status post ileostomy    PostOp Diagnosis:   1.  Choledocholithiasis  2.  Acute cholecystitis   3.  Diabetes  4.  Chronic kidney disease  5.  COPD  6.  Hypertension  7.  Crohn's disease status post ileostomy    Procedure(s):  1.  Diagnostic Laparoscopy  2.  Open CHOLECYSTECTOMY - Wound Class: Clean Contaminated    Surgeon(s):  Gunnar Alonzo M.D.    Assistant:  Aminata Sue MD    Anesthesiologist/Type of Anesthesia:  Anesthesiologist: Renan Calderon M.D./General    Surgical Staff:  Circulator: Anuja Cowart R.N.  Scrub Person: Marly Kelley    Specimens removed if any:  1.  Gallbladder    Estimated Blood Loss: 100mL    Findings: Significant intraabdominal adhesions requiring open cholecystectomy    Complications: None noted    Outcome: Transferred to PACU in stable condition    3/31/2019 11:48 AM Gunnar Alonzo M.D.

## 2019-03-31 NOTE — OR NURSING
1154- Patient arrived from OR. Respirations spontaneous and unlabored. VSS, see flowsheets. Surgical dressing to abdomen x2. Wound vac to midline incision. GERBER drain to L side incision. Both C/D/I. Abdomen soft.   1305- Patient awake, without drifting back to sleep. Disoriented x4.   1330- No change in surgical assessment. Patient verbally declines or shakes head no to pain or nausea. Remains disoriented x4. VSS, see flowsheets. Report to PREET Koch.

## 2019-03-31 NOTE — ANESTHESIA POSTPROCEDURE EVALUATION
Patient: Shola Hoyt    Procedure Summary     Date:  03/31/19 Room / Location:  [place] [id number] / SURGERY [facility]    Anesthesia Start:  0915 Anesthesia Stop:      Procedure:  CHOLECYSTECTOMY, LAPAROSCOPIC (N/A Abdomen) Diagnosis:  (choledocholithiasis status post ERCP with sphincterotomy and stone removal)    Surgeon:  Gunnar Alonzo M.D. Responsible Provider:  Renan Calderon M.D.    Anesthesia Type:  general ASA Status:  4 - Emergent          Final Anesthesia Type: general  Last vitals  BP   Blood Pressure : 149/72    Temp   37.2 °C (99 °F)    Pulse   Pulse: 85, Heart Rate (Monitored): 86   Resp   18    SpO2   92 %      Anesthesia Post Evaluation    Patient location during evaluation: PACU  Patient participation: complete - patient participated  Level of consciousness: awake and alert  Pain score: 0    Airway patency: patent  Anesthetic complications: no  Cardiovascular status: hemodynamically stable  Respiratory status: acceptable  Hydration status: euvolemic    PONV: none           Nurse Pain Score: 0 (NPRS)

## 2019-03-31 NOTE — CARE PLAN
Problem: Safety  Goal: Will remain free from injury    Intervention: Provide assistance with mobility  Patient encouraged to call before getting out of bed or the chair to prevent falls.

## 2019-03-31 NOTE — CARE PLAN
Problem: Pain Management  Goal: Pain level will decrease to patient's comfort goal  Outcome: PROGRESSING AS EXPECTED  Fresh post op today, moves well and reports minimal pain at present.    Problem: Psychosocial Needs:  Goal: Level of anxiety will decrease  Outcome: PROGRESSING SLOWER THAN EXPECTED  Confused at times and needs reorienting.

## 2019-03-31 NOTE — ANESTHESIA TIME REPORT
Anesthesia Start and Stop Event Times     Date Time Event    3/31/2019 0915 Anesthesia Start        Responsible Staff  03/31/19    Name Role Begin End    Renan Calderon M.D. Anesth 0915         Preop Diagnosis (Free Text):  Pre-op Diagnosis     choledocholithiasis status post ERCP with sphincterotomy and stone removal        Preop Diagnosis (Codes):  Diagnosis Information     Diagnosis Code(s):         Post op Diagnosis  Acute cholecystitis      Premium Reason  E. Weekend    Comments:

## 2019-03-31 NOTE — ANESTHESIA PROCEDURE NOTES
Airway  Date/Time: 3/31/2019 9:21 AM  Performed by: STANLEY HILLIARD  Authorized by: STANLEY HILLIARD     Location:  OR  Urgency:  Elective  Indications for Airway Management:  Anesthesia  Spontaneous Ventilation: absent    Sedation Level:  Deep  Preoxygenated: Yes    Patient Position:  Sniffing  Mask Difficulty Assessment:  0 - not attempted  Final Airway Type:  Endotracheal airway  Final Endotracheal Airway:  ETT  Cuffed: Yes    Technique Used for Successful ETT Placement:  Direct laryngoscopy  Insertion Site:  Oral  Blade Type:  Satnam  Laryngoscope Blade/Videolaryngoscope Blade Size:  4  ETT Size (mm):  7.5  Measured from:  Lips  ETT to Lips (cm):  23  Placement Verified by: auscultation and capnometry    Cormack-Lehane Classification:  Grade IIb - view of arytenoids or posterior of glottis only  Number of Attempts at Approach:  1  Number of Other Approaches Attempted:  0

## 2019-03-31 NOTE — ANESTHESIA QCDR
2019 [facility] Clinical Data Registry (for Quality Improvement)     Postoperative nausea/vomiting risk protocol (Adult = 18 yrs and Pediatric 3-17 yrs)- (430 and 463)  General inhalation anesthetic (NOT TIVA) with PONV risk factors: Yes  Provision of anti-emetic therapy with at least 2 different classes of agents: Yes   Patient DID NOT receive anti-emetic therapy and reason is documented in Medical Record:  N/A    Multimodal Pain Management- (AQI59)  Patient undergoing Elective Surgery (i.e. Outpatient, or ASC, or Prescheduled Surgery prior to Hospital Admission): No  Use of Multimodal Pain Management, two or more drugs and/or interventions, NOT including systemic opioids: N/A  Exception: Documented allergy to multiple classes of analgesics: N/A    PACU assessment of acute postoperative pain prior to Anesthesia Care End- Applies to Patients Age = 18- (ABG7)  Initial PACU pain score is which of the following: < 7/10  Patient unable to report pain score: N/A    Post-anesthetic transfer of care checklist/protocol to PACU/ICU- (426 and 427)  Upon conclusion of case, patient transferred to which of the following locations: PACU/Non-ICU  Use of transfer checklist/protocol: Yes  Exclusion: Service Performed in Patient Hospital Room (and thus did not require transfer): N/A    PACU Reintubation- (AQI31)  General anesthesia requiring endotracheal intubation (ETT) along with subsequent extubation in OR or PACU: Yes  Required reintubation in the PACU: No   Extubation was a planned trial documented in the medical record prior to removal of the original airway device:  N/A    Unplanned admission to ICU related to anesthesia service up through end of PACU care- (MD51)  Unplanned admission to ICU (not initially anticipated at anesthesia start time): No

## 2019-03-31 NOTE — PROGRESS NOTES
Received report from Day RN and assumed care of patient.  He denied pain.  He is alert and oriented x3 but forgetful.  Vital signs stable.  Patient was to have surgery on Monday or Tuesday but at midnight found patient on surgical schedule for this morning.  Patient made NPO at 0010.  Preop check list filled out.  CHG bath given.  Patient made aware of plan.  He is in agreement.  Hourly rounding in place.  Humphrey patient and yellow urine draining.  Ileostomy patient and draining green liquid.

## 2019-03-31 NOTE — ANESTHESIA PREPROCEDURE EVALUATION
Relevant Problems   (+) COPD (chronic obstructive pulmonary disease) (HCC)   (+) HTN (hypertension)       Physical Exam    Airway   Mallampati: III  TM distance: <3 FB  Neck ROM: full       Cardiovascular - normal exam  Rhythm: regular  Rate: normal  (-) murmur     Dental - normal exam         Pulmonary - normal exam  Breath sounds clear to auscultation  (+) decreased breath sounds     Abdominal   (+) obese     Neurological - normal exam                 Anesthesia Plan    ASA 4 - emergent   ASA physical status 4 criteria: ESRD not undergoing regularly scheduled dialysis    Plan - general                       Informed Consent:

## 2019-03-31 NOTE — WOUND TEAM
"[facility]   Inpatient Services   Established Ostomy Management/ troubleshooting  HPI: Reviewed  PMH: Reviewed   SH: Reviewed   Reason for Ostomy nurse consult:  Red skin    Subjective: \"I put the tape on there because it lifts.\"    Objective:   Appliance intact  Ostomy type:  ileostomy  Stoma location:  RLQ  Stoma assessment:    Appearance: Red, moist, slight oval   Size:   1\"   Protrusion: Slightly budded <0.5\"   Output: Scant green    MC jxn:  intact   Peristomal skin:  severely red and denuded    Ostomy Appliance (type and size):  two piece  2 1/4\"  with a flat paste ring and belt     Interventions and Education:  Removed old appliance. Cleaned skin with warm moist wash cloth. Patted dry. Sprinkled with stoma powder, and brushed off excess with dry 4x4, blotted with No Sting Skin Prep repeated fro 2 layers. Traced template to barrier, cut  barrier, applied paste ring to barrier, applied barrier. Attacjed pouch and closed.     Evaluation: Skin is dry and denuded. Pt stated he has has problems with it \"since I got it.\" \"I put the tape on it because it doesn't stick.\"   Educated pt that it needs to be changed when leaking and wear a belt to help it stick. He had emptied pouch just before appliance change.     Plan: continue to follow by ostomy RN.    Anticipated discharge needs: recommend follow up at the outpatient ostomy/wound clinic on discharge.    "

## 2019-03-31 NOTE — CONSULTS
Surgical Consultation    Date: 3/31/2019    Requesting Physician: Dr. Jena Alexander  PCP: Nicolette Yeager M.D. (Inactive)  Attending Physician: Gunnar Alonzo M.D.    CC: Choledocholithiasis    HPI: This is a 68 y.o. male who is presented 3/25/2019 in transfer from a small [facility] hospital for abdominal pain.  He was diagnosed with choledocholithiasis and underwent EGD and ERCP on 3/29.  He had findings suggestive of Ibrahim's esophagus and biopsies were taken.  A large bile duct stone was removed with sphincterotomy and biliary duct sweep.  His bilirubin transiently trended up and then normalized.  I was consulted for cholecystectomy.  I met the patient he was sitting in his bedside.  He was a very poor historian and was unable to give me any details of his past medical history at all.  He has a vague understanding of why he has been admitted.  He currently denies any fever, chills, chest pain, shortness of breath, nausea, vomiting, and says his abdominal pain is significantly improved.  Of note, he has a ileostomy which is chronic related to a long-standing history of Crohn's disease.    Past medical history:  1.  Diabetes  2.  Chronic kidney disease  3.  COPD  4.  Hypertension  5.  Crohn's disease with permanent ileostomy  6.  Morbid obesity    Past Surgical History:   Procedure Laterality Date   • PB ERCP,DIAGNOSTIC  3/29/2019    Procedure: ERCP, DIAGNOSTIC;  Surgeon: Tao Saab M.D.;  Location: SURGERY [facility];  Service: Gastroenterology   • TOE AMPUTATION Right 10/25/2017    Procedure: TOE AMPUTATION-GREAT TOE;  Surgeon: Bobby Alejo M.D.;  Location: SURGERY [facility];  Service: Orthopedics       Current Facility-Administered Medications   Medication Dose Route Frequency Provider Last Rate Last Dose   • insulin glargine (LANTUS) injection 25 Units  0.2 Units/kg/day Subcutaneous Q EVENING Carissa Banda M.D.   25 Units at 03/30/19 1800    And   • insulin lispro (HUMALOG) injection 1-6  Units  1-6 Units Subcutaneous Q6HRS Carissa Banda M.D.   Stopped at 03/31/19 0000    And   • glucose 4 g chewable tablet 16 g  16 g Oral Q15 MIN PRN Carissa Banda M.D.        And   • dextrose 50% (D50W) injection 25 mL  25 mL Intravenous Q15 MIN PRN Carissa Banda M.D.       • budesonide-formoterol (SYMBICORT) 160-4.5 MCG/ACT inhaler 2 Puff  2 Puff Inhalation BID LINDSEY BurrO.   2 Puff at 03/31/19 0627   • morphine (MS IR) tablet 15 mg  15 mg Oral TID PRN LINDSEY BurrO.   15 mg at 03/29/19 2341   • morphine ER (MS CONTIN) tablet 15 mg  15 mg Oral Q12HRS LINDSEY BurrO.   15 mg at 03/30/19 1705   • tamsulosin (FLOMAX) capsule 0.4 mg  0.4 mg Oral AFTER BREAKFAST LINDSEY BurrORachael   Stopped at 03/31/19 0830   • senna-docusate (PERICOLACE or SENOKOT S) 8.6-50 MG per tablet 2 Tab  2 Tab Oral BID LINDSEY BurrORachael   Stopped at 03/30/19 1800    And   • polyethylene glycol/lytes (MIRALAX) PACKET 1 Packet  1 Packet Oral QDAY PRN Felix Zapata D.O.        And   • magnesium hydroxide (MILK OF MAGNESIA) suspension 30 mL  30 mL Oral QDAY PRN Felix Zapata D.O.        And   • bisacodyl (DULCOLAX) suppository 10 mg  10 mg Rectal QDAY PRN LINDSEY BurrO.       • acetaminophen (TYLENOL) tablet 650 mg  650 mg Oral Q6HRS PRN LINDSEY BurrO.       • hydrALAZINE (APRESOLINE) injection 10 mg  10 mg Intravenous Q4HRS PRN Felix Zapata D.O.       • ondansetron (ZOFRAN) syringe/vial injection 4 mg  4 mg Intravenous Q4HRS PRN LINDSEY BurrO.       • ondansetron (ZOFRAN ODT) dispertab 4 mg  4 mg Oral Q4HRS PRN Felix Zapata D.O.       • morphine (pf) 4 mg/ml injection 1-4 mg  1-4 mg Intravenous Q4HRS PRN Felix Zapata D.O.   2 mg at 03/26/19 1959   • meropenem (MERREM) 500 mg in  mL IVPB  500 mg Intravenous Q6HRS LINDSEY BurrO. 200 mL/hr at 03/31/19 0625 500 mg at 03/31/19 0625       Social History     Social History   • Marital status:   "    Spouse name: N/A   • Number of children: N/A   • Years of education: N/A     Occupational History   • Not on file.     Social History Main Topics   • Smoking status: Former Smoker   • Smokeless tobacco: Former User   • Alcohol use Not on file   • Drug use: Unknown   • Sexual activity: Not on file     Other Topics Concern   • Not on file     Social History Narrative   • No narrative on file     Family history: Significant for heart attack per the patient    Allergies:  Ceftriaxone; Ezetimibe; Fentanyl; Flagyl [metronidazole]; Infliximab; Lovastatin; Simvastatin; and Vancomycin    Review of Systems:  Negative except as noted above in the HPI on 10 point review    Physical Exam:  Blood pressure 143/59, pulse 69, temperature 36.3 °C (97.4 °F), temperature source Oral, resp. rate 20, height 1.803 m (5' 11\"), weight (!) 125.4 kg (276 lb 7.3 oz), SpO2 96 %.    Constitutional: he is oriented to person, place, and time.  he appears well-developed but disheveled.    Head: Normocephalic and atraumatic.   Neck: Normal range of motion. Neck supple. No JVD present. No tracheal deviation present.  Cardiovascular: Normal rate, regular rhythm, normal heart sounds and intact distal pulses.  Exam reveals no gallop and no friction rub.  No murmur heard.  Pulmonary/Chest: Breathing is nonlabored on room air. No stridor. No respiratory distress.   Abdominal: Soft, obese, nondistended.  Mild tenderness to palpation in the right upper quadrant without a Ribeiro sign.  Evidence of significant prior intra-abdominal surgery with well-healed midline laparotomy incision and right sided ileostomy.  Significant surrounding skin inflammatory change around the ostomy relating to Crohn's disease  Musculoskeletal: Normal range of motion. he exhibits no edema and no tenderness.   Neurological: he is alert and oriented to person, place, but not time.  He did not know who the president was.  No gross motor or sensory deficit  Skin: Skin is warm and " dry.  he is not diaphoretic  Psychiatric: Difficult to assess.    Labs:  Recent Labs      03/30/19   0513 03/31/19 0414   WBC  11.7*  9.3   RBC  4.83  4.61*   HEMOGLOBIN  13.5*  12.9*   HEMATOCRIT  42.8  40.2*   MCV  88.6  87.2   MCH  28.0  28.0   MCHC  31.5*  32.1*   RDW  47.4  46.9   PLATELETCT  376  356   MPV  8.6*  8.7*     Recent Labs      03/30/19   0513 03/30/19 0920 03/31/19 0416   SODIUM  132*  128*  130*   POTASSIUM  3.8  4.4  4.0   CHLORIDE  98  93*  99   CO2  24  24  26   GLUCOSE  112*  136*  142*   BUN  11  11  10   CREATININE  2.26*  2.21*  2.32*   CALCIUM  8.8  8.9  8.4         Recent Labs      03/29/19   0405  03/30/19 0513 03/30/19 0920 03/31/19 0416   ASTSGOT   --   32  81*  31   ALTSGPT   --   22  38  30   TBILIRUBIN   --   0.9  2.3*  0.7   ALKPHOSPHAT   --   102*  140*  125*   GLOBULIN   --   5.1*  5.0*  4.6*   AMMONIA  <10*   --    --    --        Radiology:  [id number] STONE REMOVAL   Final Result      Limited intraoperative images showing apparent stone removal from the common bile duct, and placement of biliary stent.      DX-PORTABLE FLUOROSCOPY < 1 HOUR   Final Result         Portable fluoroscopy utilized for 4.4 minutes.             Assessment: This is a 68 y.o. male presenting with abdominal pain and diagnosed with choledocholithiasis status post ERCP with sphincterotomy and stone removal.  I was consulted the day after ERCP for cholecystectomy.  The patient has multiple chronic comorbid conditions which increase the risk of surgery.  Poor historian and poor insight into his situation.      Recommendations:   -Agree with recommendation for cholecystectomy.  Laparoscopic cholecystectomy planned.  This was discussed in detail with the patient including the risk, benefits, and alternatives.  Risks include but are not limited to bleeding, infection, damage to surrounding structures, bile duct injury, bile duct leak, retained bile duct stone, need for further procedures,  failure to achieve goals of procedure, need for open procedure, death.  The patient seemed to understand this discussion and wished to proceed.      Gunnar Alonzo M.D.  [facility]  [phone]

## 2019-03-31 NOTE — PROGRESS NOTES
Remains impulsive and somewhat disoriented. Dangled at edge of bed.  Instructed in IS usage. Prevena dressing clean and dry.  Bloody drainage from GERBER drain. Ileostomy appliance intact with excoriated skin noted around it.

## 2019-03-31 NOTE — OP REPORT
Operative Report    Date: 3/31/2019    PreOp Diagnosis:   1.  Choledocholithiasis  2.  Diabetes  3.  Chronic kidney disease  4.  COPD  5.  Hypertension  6.  Crohn's disease status post ileostomy     PostOp Diagnosis:   1.  Choledocholithiasis  2.  Acute cholecystitis   3.  Diabetes  4.  Chronic kidney disease  5.  COPD  6.  Hypertension  7.  Crohn's disease status post ileostomy     Procedure(s):  1.  Diagnostic Laparoscopy  2.  Open CHOLECYSTECTOMY - Wound Class: Clean Contaminated     Surgeon(s):  Gunnar Alonzo M.D.     Assistant:  Aminata Sue MD     Anesthesiologist/Type of Anesthesia:  Anesthesiologist: Renan Calderon M.D./General     Surgical Staff:  Circulator: Anuja Cowart R.N.  Scrub Person: Marly Kelley     Specimens removed if any:  1.  Gallbladder     Estimated Blood Loss: 100mL     Findings: Significant intraabdominal adhesions requiring open cholecystectomy     Complications: None noted    Indications:  68-year-old male presenting with abdominal pain append of choledocholithiasis.  Underwent ERCP with stone extraction.  Cholecystectomy was recommended.  The procedure as well as the risks and benefits as well as alternatives discussed with the patient he wished to proceed.    Procedure in detail:   The patient was brought to the operating room and was placed in the supine position where general endotracheal anesthesia was induced. SCDs were in place and functioning. Preoperative antibiotics were given before incision time. The patient's abdomen was prepped and draped in the usual sterile fashion.  The patient's right sided ileostomy was suctioned off using 1010 drapes prior to prepping.    Given the patient's prior operative history, a left upper quadrant Optiview entry technique was selected.  A 5 mm incision in the left upper quadrant was performed and a 5 mm trocar was used to enter the peritoneal space with a 0 degree laparoscope.  The peritoneum was accessed, but given significant  dense adhesions, appropriate visualization was unable to be obtained and this was aborted and the decision was made to convert to open.    A right subcostal incision was made, and the peritoneal space was accessed.  Similar moderately dense adhesions were encountered.  The inferior edge of the liver was identified and exposure to the felipa hepatis created using a combination of careful blunt and sharp dissection.  The Omni retractor was placed to assist with exposure.  The gallbladder was identified and was found to be moderately inflamed.  A top down approach was performed by grasping the dome of the gallbladder and circumferentially dissecting it towards the gallbladder neck.  The cystic artery was identified and divided using clips.  To better identify the ductal anatomy, the dome of the gallbladder was open and digital palpation used to help identify the gallbladder neck.  The decision was made to divide the gallbladder at the neck, given significant adhesions and to help avoid injury to the critical ductal structures.  I asked my partner, Dr. Enid Sue to join me for this fairly complex portion of the case.  A TA 30 blue load CHAS stapler was used to accomplish this.  The gallbladder was then removed and passed off as permanent specimen.  Hemostasis was ensured.  A 10 British fluted GERBER drain was placed in the gallbladder fossa, and brought out through the 5 mm left upper quadrant incision.  This was sutured in the standard fashion with 3-0 nylon.  The wound was then irrigated and the abdominal wall closed with running #1 Vicryl suture.  Given prior scarring, it was difficult to clearly identified the anterior and posterior fascial layers so a single layer closure was performed incorporating both.  The subcutaneous tissue was irrigated using sterile saline and then the skin closed with skin staples.  A [facility] wound management system was placed and functioned appropriately.  An appropriate drain dressing was  applied to the GERBER.  This concluded the procedure and the patient was allowed to emerge from general anesthesia and was taken to the PACU in stable condition.  Sponge, instrument, needle counts are correct x2.    Gunnar Alonzo M.D.  [facility]  [phone]

## 2019-03-31 NOTE — PROGRESS NOTES
Hospital Medicine Daily Progress Note    Date of Service  3/31/2019    Chief Complaint  CBD stone    Hospital Course    *68-year-old male with history of chronic opiate dependence insulin-dependent diabetes with chronic kidney disease Crohn's disease status post ileostomy BPH with chronic Humphrey was being treated at outlying facility for UTI due to Pseudomonas complained of right upper quadrant pain were MRCP apparently showed common bile duct stone transferred here for ERCP*      Interval Problem Update  Alert, no pain. Agreeable to surgery.  Discussed with Dr. Alonzo, he took patient to surgery today, could not locate it laparoscopic so went to open procedure, lots of scarring but was able to locate it and get it out.    Consultants/Specialty  GI consultants  General surgery, Dr. Alonzo    Code Status  Full    Disposition  Anticipate home when medically cleared.    Review of Systems  Review of Systems   Gastrointestinal: Negative for abdominal pain, nausea and vomiting.   All other systems reviewed and are negative.       Physical Exam  Temp:  [36.3 °C (97.3 °F)-37.2 °C (99 °F)] 37.2 °C (99 °F)  Pulse:  [69-93] 85  Resp:  [18-20] 18  BP: (135-153)/(59-74) 149/72  SpO2:  [92 %-96 %] 92 %    Physical Exam   Constitutional: He is oriented to person, place, and time. He appears well-developed and well-nourished. No distress.   HENT:   Head: Normocephalic and atraumatic.   Nose: Nose normal.   Eyes: Conjunctivae are normal. Right eye exhibits no discharge. Left eye exhibits no discharge. No scleral icterus.   Neck: No JVD present.   Cardiovascular: Normal rate and regular rhythm.    Pulmonary/Chest: Effort normal and breath sounds normal. No stridor. No respiratory distress.   Abdominal: Soft. He exhibits no distension. There is no tenderness. There is no rebound and no guarding.   Ostomy bag present right lower quadrant  Negative murphys sign  Well healed scars from prior surgeries   Genitourinary:   Genitourinary  Comments: Harrington in place     Musculoskeletal: He exhibits no edema or tenderness.   Neurological: He is alert and oriented to person, place, and time.   Skin: Skin is warm and dry. He is not diaphoretic. No pallor.   Psychiatric: He has a normal mood and affect. His behavior is normal. Judgment and thought content normal.   Nursing note and vitals reviewed.      Fluids    Intake/Output Summary (Last 24 hours) at 03/31/19 1055  Last data filed at 03/31/19 0625   Gross per 24 hour   Intake             2902 ml   Output             3400 ml   Net             -498 ml       Laboratory  Recent Labs      03/30/19   0513  03/31/19   0414   WBC  11.7*  9.3   RBC  4.83  4.61*   HEMOGLOBIN  13.5*  12.9*   HEMATOCRIT  42.8  40.2*   MCV  88.6  87.2   MCH  28.0  28.0   MCHC  31.5*  32.1*   RDW  47.4  46.9   PLATELETCT  376  356   MPV  8.6*  8.7*     Recent Labs      03/30/19   0513  03/30/19   0920  03/31/19   0416   SODIUM  132*  128*  130*   POTASSIUM  3.8  4.4  4.0   CHLORIDE  98  93*  99   CO2  24  24  26   GLUCOSE  112*  136*  142*   BUN  11  11  10   CREATININE  2.26*  2.21*  2.32*   CALCIUM  8.8  8.9  8.4                   Imaging  [id number] STONE REMOVAL   Final Result      Limited intraoperative images showing apparent stone removal from the common bile duct, and placement of biliary stent.      DX-PORTABLE FLUOROSCOPY < 1 HOUR   Final Result         Portable fluoroscopy utilized for 4.4 minutes.              Assessment/Plan  Choledocholithiasis- (present on admission)   Assessment & Plan    -Noted on MRCP  -Patient transferred here for ERCP, stone retrieved from duct 3/29  Consulted with Dr. Alonzo took patient to OR, lots of scarring, had to do open procedure but gallbladder eventually located and removed.     UTI (urinary tract infection)- (present on admission)   Assessment & Plan    Complicated 2/2 chronic harrington  -Pseudomonas per the sign out admitting MD received- -Continue Merrem for a total of 7 days.        Encephalopathy acute   Assessment & Plan    Intermittently confused, improving steadily, alert and oriented today  No focal findings  Ammonia less than 10     Diabetes due to undrl condition w oth diabetic kidney comp (HCC)   Assessment & Plan    Basal and low correctional SSI - @ goal     CRF (chronic renal failure), stage 3 (moderate) (HCC)- (present on admission)   Assessment & Plan    Remains about at baseline 2.5-3 is what he typically runs  Holding fluids at this time, trend cmp       Hyponatremia   Assessment & Plan    -Mild, variable, stop iv fluids and observe, cmp in am     BPH (benign prostatic hyperplasia)- (present on admission)   Assessment & Plan    -Continue home Flomax, although of unclear benefit given chronic harrington     Opiate dependence (HCC)- (present on admission)   Assessment & Plan    Continue oral ms contin  IV acute pain medication post operatively as needed, had to have open procedure.       Crohn's disease ([facility])- (present on admission)   Assessment & Plan    -Significant, has had multiple surgeries and now has an ostomy, on [facility] inf[facility]  -No acute flare     COPD (chronic obstructive pulmonary disease) ([facility])- (present on admission)   Assessment & Plan    -No acute exacerbation          VTE prophylaxis: SCD's

## 2019-04-01 PROBLEM — K81.9 CHOLECYSTITIS: Status: ACTIVE | Noted: 2019-04-01

## 2019-04-01 LAB
ALBUMIN SERPL BCP-MCNC: 3 G/DL (ref 3.2–4.9)
ALBUMIN/GLOB SERPL: 0.7 G/DL
ALP SERPL-CCNC: 103 U/L (ref 30–99)
ALT SERPL-CCNC: 52 U/L (ref 2–50)
ANION GAP SERPL CALC-SCNC: 9 MMOL/L (ref 0–11.9)
AST SERPL-CCNC: 61 U/L (ref 12–45)
BILIRUB SERPL-MCNC: 0.9 MG/DL (ref 0.1–1.5)
BUN SERPL-MCNC: 13 MG/DL (ref 8–22)
CALCIUM SERPL-MCNC: 8.9 MG/DL (ref 8.4–10.2)
CHLORIDE SERPL-SCNC: 102 MMOL/L (ref 96–112)
CO2 SERPL-SCNC: 24 MMOL/L (ref 20–33)
CREAT SERPL-MCNC: 2.44 MG/DL (ref 0.5–1.4)
GLOBULIN SER CALC-MCNC: 4.5 G/DL (ref 1.9–3.5)
GLUCOSE BLD-MCNC: 101 MG/DL (ref 65–99)
GLUCOSE BLD-MCNC: 125 MG/DL (ref 65–99)
GLUCOSE BLD-MCNC: 220 MG/DL (ref 65–99)
GLUCOSE SERPL-MCNC: 126 MG/DL (ref 65–99)
POTASSIUM SERPL-SCNC: 4.4 MMOL/L (ref 3.6–5.5)
PROT SERPL-MCNC: 7.5 G/DL (ref 6–8.2)
SODIUM SERPL-SCNC: 135 MMOL/L (ref 135–145)

## 2019-04-01 PROCEDURE — 700102 HCHG RX REV CODE 250 W/ 637 OVERRIDE(OP): Performed by: HOSPITALIST

## 2019-04-01 PROCEDURE — 82962 GLUCOSE BLOOD TEST: CPT

## 2019-04-01 PROCEDURE — 700102 HCHG RX REV CODE 250 W/ 637 OVERRIDE(OP): Performed by: INTERNAL MEDICINE

## 2019-04-01 PROCEDURE — A9270 NON-COVERED ITEM OR SERVICE: HCPCS | Performed by: SURGERY

## 2019-04-01 PROCEDURE — 770006 HCHG ROOM/CARE - MED/SURG/GYN SEMI*

## 2019-04-01 PROCEDURE — 700102 HCHG RX REV CODE 250 W/ 637 OVERRIDE(OP): Performed by: SURGERY

## 2019-04-01 PROCEDURE — 700105 HCHG RX REV CODE 258: Performed by: HOSPITALIST

## 2019-04-01 PROCEDURE — 99232 SBSQ HOSP IP/OBS MODERATE 35: CPT | Performed by: HOSPITALIST

## 2019-04-01 PROCEDURE — A9270 NON-COVERED ITEM OR SERVICE: HCPCS | Performed by: INTERNAL MEDICINE

## 2019-04-01 PROCEDURE — 80053 COMPREHEN METABOLIC PANEL: CPT

## 2019-04-01 PROCEDURE — 700111 HCHG RX REV CODE 636 W/ 250 OVERRIDE (IP): Performed by: INTERNAL MEDICINE

## 2019-04-01 PROCEDURE — 36415 COLL VENOUS BLD VENIPUNCTURE: CPT

## 2019-04-01 PROCEDURE — 700105 HCHG RX REV CODE 258: Performed by: INTERNAL MEDICINE

## 2019-04-01 RX ORDER — SODIUM CHLORIDE, SODIUM LACTATE, POTASSIUM CHLORIDE, CALCIUM CHLORIDE 600; 310; 30; 20 MG/100ML; MG/100ML; MG/100ML; MG/100ML
INJECTION, SOLUTION INTRAVENOUS CONTINUOUS
Status: DISCONTINUED | OUTPATIENT
Start: 2019-04-01 | End: 2019-04-02

## 2019-04-01 RX ADMIN — MEROPENEM 500 MG: 500 INJECTION, POWDER, FOR SOLUTION INTRAVENOUS at 05:36

## 2019-04-01 RX ADMIN — INSULIN LISPRO 2 UNITS: 100 INJECTION, SOLUTION INTRAVENOUS; SUBCUTANEOUS at 20:09

## 2019-04-01 RX ADMIN — MORPHINE SULFATE 15 MG: 15 TABLET ORAL at 08:07

## 2019-04-01 RX ADMIN — ACETAMINOPHEN 1000 MG: 500 TABLET, FILM COATED ORAL at 11:48

## 2019-04-01 RX ADMIN — MEROPENEM 500 MG: 500 INJECTION, POWDER, FOR SOLUTION INTRAVENOUS at 00:12

## 2019-04-01 RX ADMIN — SODIUM CHLORIDE, POTASSIUM CHLORIDE, SODIUM LACTATE AND CALCIUM CHLORIDE: 600; 310; 30; 20 INJECTION, SOLUTION INTRAVENOUS at 08:00

## 2019-04-01 RX ADMIN — ACETAMINOPHEN 1000 MG: 500 TABLET, FILM COATED ORAL at 23:14

## 2019-04-01 RX ADMIN — MEROPENEM 500 MG: 500 INJECTION, POWDER, FOR SOLUTION INTRAVENOUS at 12:35

## 2019-04-01 RX ADMIN — TAMSULOSIN HYDROCHLORIDE 0.4 MG: 0.4 CAPSULE ORAL at 08:00

## 2019-04-01 RX ADMIN — STANDARDIZED SENNA CONCENTRATE AND DOCUSATE SODIUM 2 TABLET: 8.6; 5 TABLET, FILM COATED ORAL at 05:34

## 2019-04-01 RX ADMIN — OXYCODONE HYDROCHLORIDE 5 MG: 5 TABLET ORAL at 20:06

## 2019-04-01 RX ADMIN — MEROPENEM 500 MG: 500 INJECTION, POWDER, FOR SOLUTION INTRAVENOUS at 17:18

## 2019-04-01 RX ADMIN — INSULIN GLARGINE 25 UNITS: 100 INJECTION, SOLUTION SUBCUTANEOUS at 17:22

## 2019-04-01 RX ADMIN — ACETAMINOPHEN 1000 MG: 500 TABLET, FILM COATED ORAL at 17:17

## 2019-04-01 RX ADMIN — STANDARDIZED SENNA CONCENTRATE AND DOCUSATE SODIUM 2 TABLET: 8.6; 5 TABLET, FILM COATED ORAL at 17:17

## 2019-04-01 RX ADMIN — OXYCODONE HYDROCHLORIDE 5 MG: 5 TABLET ORAL at 23:14

## 2019-04-01 RX ADMIN — OXYCODONE HYDROCHLORIDE 5 MG: 5 TABLET ORAL at 13:46

## 2019-04-01 RX ADMIN — MORPHINE SULFATE 15 MG: 15 TABLET, EXTENDED RELEASE ORAL at 05:34

## 2019-04-01 RX ADMIN — ACETAMINOPHEN 1000 MG: 500 TABLET, FILM COATED ORAL at 00:11

## 2019-04-01 RX ADMIN — MORPHINE SULFATE 2 MG: 4 INJECTION INTRAVENOUS at 16:06

## 2019-04-01 RX ADMIN — BUDESONIDE AND FORMOTEROL FUMARATE DIHYDRATE 2 PUFF: 160; 4.5 AEROSOL RESPIRATORY (INHALATION) at 17:18

## 2019-04-01 RX ADMIN — MORPHINE SULFATE 15 MG: 15 TABLET, EXTENDED RELEASE ORAL at 17:17

## 2019-04-01 RX ADMIN — BUDESONIDE AND FORMOTEROL FUMARATE DIHYDRATE 2 PUFF: 160; 4.5 AEROSOL RESPIRATORY (INHALATION) at 05:42

## 2019-04-01 RX ADMIN — ACETAMINOPHEN 1000 MG: 500 TABLET, FILM COATED ORAL at 05:34

## 2019-04-01 ASSESSMENT — ENCOUNTER SYMPTOMS
SORE THROAT: 0
PALPITATIONS: 0
CHILLS: 0
TINGLING: 0
DIZZINESS: 0
DEPRESSION: 0
SHORTNESS OF BREATH: 0
BLURRED VISION: 0
NECK PAIN: 0
FEVER: 0
COUGH: 0
INSOMNIA: 0
EYE PAIN: 0
NAUSEA: 0
BACK PAIN: 0
ABDOMINAL PAIN: 1
VOMITING: 0

## 2019-04-01 NOTE — PROGRESS NOTES
"IV dislodged from site. Attempted new access, unable to find vein. PICC RN called. Pt states pain 20/10 in ABD. Awake, making odd sexual comments. Asked him not to make these statements and he says \"I'm just a dirty old man.\" Atmepting to assess orientation, however he cannot state year or date. In some regard he seems to be joking about answers but its hard to tell. GERBER intact, ileostomy with green liquid stool. Wound vac functioning and no drainage in line.   "

## 2019-04-01 NOTE — CARE PLAN
Problem: Safety  Goal: Will remain free from injury    Intervention: Provide assistance with mobility  Patient confused.  He is encouraged to call before getting up.  Patient noncompliant.  Bed/chair alarm in place and CNA/RN outside the door to help reorient patient.      Problem: Skin Integrity  Goal: Risk for impaired skin integrity will decrease  Outcome: PROGRESSING SLOWER THAN EXPECTED  Patient has redness and excoriation to abdomen where ileostomy is placed.  Patient scratching area.  Cream applied.

## 2019-04-01 NOTE — PROGRESS NOTES
Surgery    Pt seen, examined.  Progressing as expected.    Abd soft and benign, appropriate postop tenderness  GERBER with minimal serosanguinous output, nonbilious  T.bili normal  Prevena working appropriately  -Diet as tolerated  -Will eval for GERBER removal tomorrow pending output & character    Gunnar Alonzo M.D.  [facility]  [phone]

## 2019-04-01 NOTE — PROGRESS NOTES
"No change from earlier assessment.  Has tolerated clear liquids without noted problems.  Reporting poor pain relief however states \"nothing works for me.\"  GERBER remains patent with bloody drainage, Prevena intact to RUQ incision.  Urine yellow per catheter.   "

## 2019-04-01 NOTE — DISCHARGE PLANNING
Care Transition Team Assessment    SW met with this patient bedside to complete assessment and SNF choice.  Patient requested this SW call patient's friend/POA/Caregiver Nathaly. SW spoke with Nathaly who informed this SW that she is patient's POA and does assist him at home as needed.  Nathaly updated this SW that the patient does live at home alone and some comes by as needed.  Sw discussed SNF choice with nathaly, she will look into the facilities at call this SW back.     Information Source  Orientation : Disoriented to Event, Disoriented to Place, Disoriented to Time  Information Given By: Friend  Informant's Name: Nathaly  Who is responsible for making decisions for patient? : Patient    Elopement Risk  Legal Hold: No  Ambulatory or Self Mobile in Wheelchair: No-Not an Elopement Risk  Disoriented: No  Psychiatric Symptoms: None  History of Wandering: No  Elopement this Admit: No  Vocalizing Wanting to Leave: No  Displays Behaviors, Body Language Wanting to Leave: No-Not at Risk for Elopement  Elopement Risk: Not at Risk for Elopement    Interdisciplinary Discharge Planning  Lives with - Patient's Self Care Capacity: Alone and Able to Care For Self, Other (Comments) (has caregive come by 2-3 times a day)  Patient or legal guardian wants to designate a caregiver (see row info): No  Support Systems: Other (Comments) (caregiver)  Housing / Facility: 1 Story House  Do You Take your Prescribed Medications Regularly: Yes  Mobility Issues: No    Discharge Preparedness  What is your plan after discharge?: Skilled nursing facility  What are your discharge supports?: Other (comment) (POA/Friend nathaly)  Prior Functional Level: Independent with Activities of Daily Living    Functional Assesment  Prior Functional Level: Independent with Activities of Daily Living    Finances  Financial Barriers to Discharge: No  Prescription Coverage: Yes    Vision / Hearing Impairment  Vision Impairment : No  Hearing Impairment : Yes  Hearing  Impairment: Both Ears, Hearing Device Not Available    Advance Directive  Advance Directive?: DPOA for Health Care  Durable Power of  Name and Contact : Nathaly (SW asked if we could get a copy for the chart. )    Domestic Abuse  Have you ever been the victim of abuse or violence?: No  Physical Abuse or Sexual Abuse: No  Verbal Abuse or Emotional Abuse: No  Possible Abuse Reported to:: Not Applicable    Psychological Assessment  History of Substance Abuse: Alcohol    Discharge Risks or Barriers  Discharge risks or barriers?: No    Anticipated Discharge Information  Anticipated discharge disposition: SNF

## 2019-04-01 NOTE — PROGRESS NOTES
"Patient got self to EOB and wanted \"everything off\".  Reoriented again and returned to bed.  All tubes intact at present.  "

## 2019-04-01 NOTE — DISCHARGE PLANNING
Malgorzata received phone call back from Nathaly requesting SNF referral be sent to [facility].

## 2019-04-01 NOTE — PROGRESS NOTES
Hospital Medicine Daily Progress Note    Date of Service  4/1/2019    Chief Complaint  CBD stone    Hospital Course    *68-year-old male with history of chronic opiate dependence insulin-dependent diabetes with chronic kidney disease Crohn's disease status post ileostomy BPH with chronic Humphrey was being treated at outlying facility for UTI due to Pseudomonas complained of right upper quadrant pain were MRCP apparently showed common bile duct stone transferred here for ERCP. He underwent an ERCP on 3/29 with stone extraction.  He underwent an uncomplicated cholecystectomy on 3/31/19. *      Interval Problem Update  3.31- Alert, no pain. Agreeable to surgery.  Discussed with Dr. Alonzo, he took patient to surgery today, could not locate it laparoscopic so went to open procedure, lots of scarring but was able to locate it and get it out.  4.1- confused pleasantly today . No other issues. Complaints of pain are very inconsistent. I discussed plan with surgery today.         Consultants/Specialty  GI consultants  General surgery, Dr. Alonzo    Code Status  Full    Disposition  Home? Pt/ot ordered and may need snf    Review of Systems  Review of Systems   Constitutional: Negative for chills and fever.   HENT: Negative for sore throat.    Eyes: Negative for blurred vision and pain.   Respiratory: Negative for cough and shortness of breath.    Cardiovascular: Negative for chest pain and palpitations.   Gastrointestinal: Positive for abdominal pain. Negative for nausea and vomiting.   Genitourinary: Negative for dysuria and urgency.   Musculoskeletal: Negative for back pain and neck pain.   Skin: Negative for itching and rash.   Neurological: Negative for dizziness and tingling.   Psychiatric/Behavioral: Negative for depression. The patient does not have insomnia.    All other systems reviewed and are negative.       Physical Exam  Temp:  [36.4 °C (97.5 °F)-37.2 °C (99 °F)] 36.5 °C (97.7 °F)  Pulse:  [] 72  Resp:   [12-18] 18  BP: (136-174)/(60-87) 150/60  SpO2:  [90 %-97 %] 95 %    Physical Exam   Constitutional: He is oriented to person, place, and time. He appears well-developed and well-nourished. No distress.   Patient seen and examined  Plan discussed with PREET GEORGET:   Head: Normocephalic and atraumatic.   Right Ear: External ear normal.   Left Ear: External ear normal.   Nose: Nose normal.   Eyes: Conjunctivae are normal. Right eye exhibits no discharge. Left eye exhibits no discharge. No scleral icterus.   Neck: No JVD present. No tracheal deviation present.   Cardiovascular: Normal rate, regular rhythm, normal heart sounds and intact distal pulses.    No murmur heard.  Cap refill 2sec  Pulses 2+ throughout     Pulmonary/Chest: Effort normal and breath sounds normal. No stridor. No respiratory distress. He has no wheezes. He has no rales.   Abdominal: Soft. Bowel sounds are normal. He exhibits distension. There is tenderness. There is no rebound and no guarding.   Ostomy bag present right lower quadrant  Right GERBER in place with minimal drainage.      Genitourinary:   Genitourinary Comments: Humphrey in place     Musculoskeletal: He exhibits no edema or tenderness.   Neurological: He is alert and oriented to person, place, and time.   Skin: Skin is warm and dry. He is not diaphoretic. No erythema. No pallor.   Normal skin color   Psychiatric: His affect is labile. He is slowed. Cognition and memory are impaired. He expresses impulsivity and inappropriate judgment. He exhibits abnormal recent memory and abnormal remote memory.   Nursing note and vitals reviewed.      Fluids    Intake/Output Summary (Last 24 hours) at 04/01/19 0745  Last data filed at 04/01/19 0600   Gross per 24 hour   Intake             2300 ml   Output             1940 ml   Net              360 ml       Laboratory  Recent Labs      03/30/19   0513  03/31/19   0414   WBC  11.7*  9.3   RBC  4.83  4.61*   HEMOGLOBIN  13.5*  12.9*   HEMATOCRIT  42.8  40.2*    MCV  88.6  87.2   MCH  28.0  28.0   MCHC  31.5*  32.1*   RDW  47.4  46.9   PLATELETCT  376  356   MPV  8.6*  8.7*     Recent Labs      03/30/19   0920  03/31/19   0416  04/01/19   0519   SODIUM  128*  130*  135   POTASSIUM  4.4  4.0  4.4   CHLORIDE  93*  99  102   CO2  24  26  24   GLUCOSE  136*  142*  126*   BUN  11  10  13   CREATININE  2.21*  2.32*  2.44*   CALCIUM  8.9  8.4  8.9                   Imaging  [id number] STONE REMOVAL   Final Result      Limited intraoperative images showing apparent stone removal from the common bile duct, and placement of biliary stent.      DX-PORTABLE FLUOROSCOPY < 1 HOUR   Final Result         Portable fluoroscopy utilized for 4.4 minutes.              Assessment/Plan  Choledocholithiasis- (present on admission)   Assessment & Plan    -Noted on MRCP  -Patient transferred here for ERCP, stone retrieved from duct 3/29       UTI (urinary tract infection)- (present on admission)   Assessment & Plan    Complicated 2/2 chronic harrington  -Pseudomonas per the sign out admitting MD received- -Continue Merrem for a total of 7 days.  He has hypospadias and a chronic harrington. UTi likely related to the harrington.        Cholecystitis   Assessment & Plan    S/p cholecystectomy. Pain control. Pt/ot   Drain per surgery.      Encephalopathy acute   Assessment & Plan    Intermittently confused, improving steadily, alert and intermittently oriented today  No focal findings       Diabetes due to undrl condition w oth diabetic kidney comp (HCC)   Assessment & Plan    Basal and low correctional SSI - @ goal     CRF (chronic renal failure), stage 3 (moderate) (HCC)- (present on admission)   Assessment & Plan    Remains about at baseline 2.5-3 is what he typically runs  Holding fluids at this time, trend cmp       Hyponatremia   Assessment & Plan    resolving     BPH (benign prostatic hyperplasia)- (present on admission)   Assessment & Plan    -Continue home Flomax, although of unclear benefit given  chronic harrington     Opiate dependence (HCC)- (present on admission)   Assessment & Plan    Continue oral ms contin  IV acute pain medication post operatively as needed, had to have open procedure.       Crohn's disease (HCC)- (present on admission)   Assessment & Plan    -Significant, has had multiple surgeries and now has an ostomy, [facility]  -No acute flare     COPD (chronic obstructive pulmonary disease) (HCC)- (present on admission)   Assessment & Plan    -No acute exacerbation          VTE prophylaxis: SCD's

## 2019-04-01 NOTE — PROGRESS NOTES
US PIV to left forearm x 1 attempt, blood return noted, flushed easily with NS.  Report to primary RN

## 2019-04-01 NOTE — PROGRESS NOTES
At beginning of shift, patient found with wound vac disconnected from the machine.  The dressing intact, machine reattached to wound vac and explained to patient reasoning for the wound vac. No drainage noted from wound vac at this time. Patient's harrington patent with light yellow urine noted.  Bright red blood noted in GERBER  Drain.  So far it has drained 90ml.  Patient has continued to be very confused.  He is oriented to self only.  He has walked and is passing gas and stool in his ileostomy.  IV antibiotics are infusing and patient is drinking fluids.  He has complained of pain and has been given oxy 10mg x 2 so far.  Will continue to monitor and hourly round.

## 2019-04-02 LAB
ALBUMIN SERPL BCP-MCNC: 2.6 G/DL (ref 3.2–4.9)
ALBUMIN/GLOB SERPL: 0.6 G/DL
ALP SERPL-CCNC: 82 U/L (ref 30–99)
ALT SERPL-CCNC: 29 U/L (ref 2–50)
ANION GAP SERPL CALC-SCNC: 7 MMOL/L (ref 0–11.9)
AST SERPL-CCNC: 26 U/L (ref 12–45)
BASOPHILS # BLD AUTO: 0.8 % (ref 0–1.8)
BASOPHILS # BLD: 0.09 K/UL (ref 0–0.12)
BILIRUB SERPL-MCNC: 0.7 MG/DL (ref 0.1–1.5)
BUN SERPL-MCNC: 13 MG/DL (ref 8–22)
CALCIUM SERPL-MCNC: 8 MG/DL (ref 8.4–10.2)
CHLORIDE SERPL-SCNC: 104 MMOL/L (ref 96–112)
CO2 SERPL-SCNC: 23 MMOL/L (ref 20–33)
CREAT SERPL-MCNC: 2.29 MG/DL (ref 0.5–1.4)
EOSINOPHIL # BLD AUTO: 0.18 K/UL (ref 0–0.51)
EOSINOPHIL NFR BLD: 1.5 % (ref 0–6.9)
ERYTHROCYTE [DISTWIDTH] IN BLOOD BY AUTOMATED COUNT: 47 FL (ref 35.9–50)
GLOBULIN SER CALC-MCNC: 4.2 G/DL (ref 1.9–3.5)
GLUCOSE BLD-MCNC: 115 MG/DL (ref 65–99)
GLUCOSE BLD-MCNC: 126 MG/DL (ref 65–99)
GLUCOSE BLD-MCNC: 142 MG/DL (ref 65–99)
GLUCOSE BLD-MCNC: 161 MG/DL (ref 65–99)
GLUCOSE SERPL-MCNC: 133 MG/DL (ref 65–99)
HCT VFR BLD AUTO: 35.1 % (ref 42–52)
HGB BLD-MCNC: 11.4 G/DL (ref 14–18)
IMM GRANULOCYTES # BLD AUTO: 0.04 K/UL (ref 0–0.11)
IMM GRANULOCYTES NFR BLD AUTO: 0.3 % (ref 0–0.9)
LYMPHOCYTES # BLD AUTO: 3.05 K/UL (ref 1–4.8)
LYMPHOCYTES NFR BLD: 25.8 % (ref 22–41)
MCH RBC QN AUTO: 28.4 PG (ref 27–33)
MCHC RBC AUTO-ENTMCNC: 32.5 G/DL (ref 33.7–35.3)
MCV RBC AUTO: 87.3 FL (ref 81.4–97.8)
MONOCYTES # BLD AUTO: 1.08 K/UL (ref 0–0.85)
MONOCYTES NFR BLD AUTO: 9.1 % (ref 0–13.4)
NEUTROPHILS # BLD AUTO: 7.4 K/UL (ref 1.82–7.42)
NEUTROPHILS NFR BLD: 62.5 % (ref 44–72)
NRBC # BLD AUTO: 0 K/UL
NRBC BLD-RTO: 0 /100 WBC
PLATELET # BLD AUTO: 331 K/UL (ref 164–446)
PMV BLD AUTO: 8.8 FL (ref 9–12.9)
POTASSIUM SERPL-SCNC: 3.8 MMOL/L (ref 3.6–5.5)
PROT SERPL-MCNC: 6.8 G/DL (ref 6–8.2)
RBC # BLD AUTO: 4.02 M/UL (ref 4.7–6.1)
SODIUM SERPL-SCNC: 134 MMOL/L (ref 135–145)
WBC # BLD AUTO: 11.8 K/UL (ref 4.8–10.8)

## 2019-04-02 PROCEDURE — 85025 COMPLETE CBC W/AUTO DIFF WBC: CPT

## 2019-04-02 PROCEDURE — 36415 COLL VENOUS BLD VENIPUNCTURE: CPT

## 2019-04-02 PROCEDURE — 700102 HCHG RX REV CODE 250 W/ 637 OVERRIDE(OP): Performed by: HOSPITALIST

## 2019-04-02 PROCEDURE — A9270 NON-COVERED ITEM OR SERVICE: HCPCS | Performed by: SURGERY

## 2019-04-02 PROCEDURE — 700102 HCHG RX REV CODE 250 W/ 637 OVERRIDE(OP): Performed by: INTERNAL MEDICINE

## 2019-04-02 PROCEDURE — A9270 NON-COVERED ITEM OR SERVICE: HCPCS | Performed by: INTERNAL MEDICINE

## 2019-04-02 PROCEDURE — 700102 HCHG RX REV CODE 250 W/ 637 OVERRIDE(OP): Performed by: SURGERY

## 2019-04-02 PROCEDURE — 770006 HCHG ROOM/CARE - MED/SURG/GYN SEMI*

## 2019-04-02 PROCEDURE — 82962 GLUCOSE BLOOD TEST: CPT | Mod: 91

## 2019-04-02 PROCEDURE — 80053 COMPREHEN METABOLIC PANEL: CPT

## 2019-04-02 PROCEDURE — 700105 HCHG RX REV CODE 258: Performed by: HOSPITALIST

## 2019-04-02 PROCEDURE — 700111 HCHG RX REV CODE 636 W/ 250 OVERRIDE (IP): Performed by: INTERNAL MEDICINE

## 2019-04-02 PROCEDURE — 99232 SBSQ HOSP IP/OBS MODERATE 35: CPT | Performed by: HOSPITALIST

## 2019-04-02 PROCEDURE — 97161 PT EVAL LOW COMPLEX 20 MIN: CPT

## 2019-04-02 PROCEDURE — 97165 OT EVAL LOW COMPLEX 30 MIN: CPT

## 2019-04-02 RX ADMIN — BUDESONIDE AND FORMOTEROL FUMARATE DIHYDRATE 2 PUFF: 160; 4.5 AEROSOL RESPIRATORY (INHALATION) at 17:26

## 2019-04-02 RX ADMIN — ACETAMINOPHEN 1000 MG: 500 TABLET, FILM COATED ORAL at 12:52

## 2019-04-02 RX ADMIN — BUDESONIDE AND FORMOTEROL FUMARATE DIHYDRATE 2 PUFF: 160; 4.5 AEROSOL RESPIRATORY (INHALATION) at 05:49

## 2019-04-02 RX ADMIN — MORPHINE SULFATE 15 MG: 15 TABLET, EXTENDED RELEASE ORAL at 05:50

## 2019-04-02 RX ADMIN — MORPHINE SULFATE 15 MG: 15 TABLET, EXTENDED RELEASE ORAL at 17:26

## 2019-04-02 RX ADMIN — ACETAMINOPHEN 1000 MG: 500 TABLET, FILM COATED ORAL at 17:26

## 2019-04-02 RX ADMIN — INSULIN LISPRO 1 UNITS: 100 INJECTION, SOLUTION INTRAVENOUS; SUBCUTANEOUS at 20:36

## 2019-04-02 RX ADMIN — INSULIN GLARGINE 25 UNITS: 100 INJECTION, SOLUTION SUBCUTANEOUS at 17:27

## 2019-04-02 RX ADMIN — SODIUM CHLORIDE, POTASSIUM CHLORIDE, SODIUM LACTATE AND CALCIUM CHLORIDE: 600; 310; 30; 20 INJECTION, SOLUTION INTRAVENOUS at 08:32

## 2019-04-02 RX ADMIN — STANDARDIZED SENNA CONCENTRATE AND DOCUSATE SODIUM 2 TABLET: 8.6; 5 TABLET, FILM COATED ORAL at 17:26

## 2019-04-02 RX ADMIN — TAMSULOSIN HYDROCHLORIDE 0.4 MG: 0.4 CAPSULE ORAL at 08:31

## 2019-04-02 RX ADMIN — ONDANSETRON 4 MG: 2 INJECTION INTRAMUSCULAR; INTRAVENOUS at 06:14

## 2019-04-02 RX ADMIN — ACETAMINOPHEN 1000 MG: 500 TABLET, FILM COATED ORAL at 05:50

## 2019-04-02 RX ADMIN — MORPHINE SULFATE 2 MG: 4 INJECTION INTRAVENOUS at 08:35

## 2019-04-02 ASSESSMENT — COGNITIVE AND FUNCTIONAL STATUS - GENERAL
TOILETING: A LITTLE
SUGGESTED CMS G CODE MODIFIER DAILY ACTIVITY: CJ
DAILY ACTIVITIY SCORE: 21
DRESSING REGULAR LOWER BODY CLOTHING: A LITTLE
HELP NEEDED FOR BATHING: A LITTLE

## 2019-04-02 ASSESSMENT — ENCOUNTER SYMPTOMS
DEPRESSION: 0
NECK PAIN: 0
ABDOMINAL PAIN: 1
DIZZINESS: 0
TINGLING: 0
VOMITING: 0
EYE PAIN: 0
PALPITATIONS: 0
COUGH: 0
SHORTNESS OF BREATH: 0
BLURRED VISION: 0
INSOMNIA: 0
BACK PAIN: 0
SORE THROAT: 0
CHILLS: 0
FEVER: 0

## 2019-04-02 ASSESSMENT — ACTIVITIES OF DAILY LIVING (ADL): TOILETING: INDEPENDENT

## 2019-04-02 ASSESSMENT — GAIT ASSESSMENTS
GAIT LEVEL OF ASSIST: CONTACT GUARD ASSIST
DISTANCE (FEET): 200

## 2019-04-02 NOTE — PROGRESS NOTES
Pt is sitting up in bed. POC discussed. Assessment completed. Pt is A&Ox3 and is easily reoriented to time.  Pt c/o 7/10 pain in abdomen. Medicated per MAR. No n/v. Dressings on abdomen are clean, dry, and intact. GERBER drain has scant, serosanguineous drainage. No s/s of distress. Fall precautions in place.

## 2019-04-02 NOTE — CARE PLAN
Problem: Safety  Goal: Will remain free from falls  Outcome: PROGRESSING AS EXPECTED      Problem: Urinary Elimination:  Goal: Ability to reestablish a normal urinary elimination pattern will improve  Outcome: PROGRESSING SLOWER THAN EXPECTED   04/01/19 2000   OTHER   Urinary Elimination Catheter (Document on LDA)

## 2019-04-02 NOTE — WOUND TEAM
"[facility]   Inpatient Services   Established Ostomy Management/ troubleshooting  HPI: Reviewed  PMH: Reviewed   SH: Reviewed   Reason for Ostomy nurse consult:  Red skin    Subjective: \"I had my surgery.\"    Objective:   Appliance intact  Ostomy type:  ileostomy  Stoma location:  RLQ  Stoma assessment:    Appearance: Red, moist, slight oval   Size:   1\"   Protrusion: Slightly budded <0.5\"   Output: Scant green    MC jxn:  intact   Peristomal skin:  severely red and denuded    Ostomy Appliance (type and size):  two piece  2 1/4\" convex with a flat paste ring and belt     Interventions and Education:  Removed old appliance. Cleaned skin with warm moist wash cloth. Patted dry. Sprinkled with stoma powder, and brushed off excess with dry 4x4, blotted with No Sting Skin Prep repeated fro 2 layers. Traced template to barrier, cut  barrier, applied paste ring to barrier, applied appliance, closed. Applied belt. Applied moisturizer to peeling skin outside barrier.      Evaluation: Skin is dry and denuded. Pt stated he has has problems with it \"since I got it.\"   Educated pt that it needs to be changed when leaking and wear a belt to help it stick. Moisturizer to skin outside tape border since peeling and was openedsince belt was placed upside down.     Plan: continue to follow by ostomy RN.    Anticipated discharge needs: recommend follow up at the outpatient ostomy/wound clinic on discharge.    "

## 2019-04-02 NOTE — DISCHARGE PLANNING
Received Choice form at 5750  Agency[facility]Facility Name: [facility]  Referral sent per Choice form @ 0836

## 2019-04-02 NOTE — THERAPY
"Occupational Therapy Evaluation completed.   Functional Status:  Pt is a 70 y/o male, admit after developing increasing abdominal pain. Pt lives alone, has a friend that does shopping and drives Pt to appointments. Pt PLOF- I with AMB ADLS. Pt presents with confusion, decreased safety awareness and insight, and decreased activity tolerance- all of which are impacting his I with ADLS and functional mobility. Pt is reportedly not at baseline. Pt requires Supervision to SBA for self care , and SBA to CGA for functional mobility. Pt will benefit from Acute OT services to increase functional I and safety prior to d/c.  Plan of Care: Will benefit from Occupational Therapy 3 times per week  Discharge Recommendations:  Equipment: Will Continue to Assess for Equipment Needs. Post-acute therapy Discharge to a transitional care facility for continued skilled therapy services.    See \"Rehab Therapy-Acute\" Patient Summary Report for complete documentation.    "

## 2019-04-02 NOTE — PROGRESS NOTES
Pt awake, alert and oriented x 4. Pt pleasant this morning. Pt states pain has increased since after he ate this morning. Pt denies nausea. Pt medicated for pain, prevena vac in place and intact, abdomen soft, ileostomy has small out put. POC discussed, safety measures in place.

## 2019-04-02 NOTE — PROGRESS NOTES
Hospital Medicine Daily Progress Note    Date of Service  4/2/2019    Chief Complaint  CBD stone    Hospital Course    *68-year-old male with history of chronic opiate dependence insulin-dependent diabetes with chronic kidney disease Crohn's disease status post ileostomy BPH with chronic Humphrey was being treated at outlying facility for UTI due to Pseudomonas complained of right upper quadrant pain were MRCP apparently showed common bile duct stone transferred here for ERCP. He underwent an ERCP on 3/29 with stone extraction.  He underwent an uncomplicated cholecystectomy on 3/31/19. *      Interval Problem Update  3.31- Alert, no pain. Agreeable to surgery.  Discussed with Dr. Alonzo, he took patient to surgery today, could not locate it laparoscopic so went to open procedure, lots of scarring but was able to locate it and get it out.  4.1- confused pleasantly today . No other issues. Complaints of pain are very inconsistent. I discussed plan with surgery today.   4.2-= confused over night. A little better during the day and cooperative. No pain complaints.       Consultants/Specialty  GI consultants  General surgery, Dr. Alonzo    Code Status  Full    Disposition  Home? Pt/ot ordered and may need snf    Review of Systems  Review of Systems   Constitutional: Negative for chills and fever.   HENT: Negative for sore throat.    Eyes: Negative for blurred vision and pain.   Respiratory: Negative for cough and shortness of breath.    Cardiovascular: Negative for chest pain and palpitations.   Gastrointestinal: Positive for abdominal pain. Negative for vomiting.   Genitourinary: Negative for dysuria and urgency.   Musculoskeletal: Negative for back pain and neck pain.   Skin: Negative for itching and rash.   Neurological: Negative for dizziness and tingling.   Psychiatric/Behavioral: Negative for depression. The patient does not have insomnia.    All other systems reviewed and are negative.       Physical Exam  Temp:   [36.5 °C (97.7 °F)-36.9 °C (98.4 °F)] 36.9 °C (98.4 °F)  Pulse:  [] 92  Resp:  [18-20] 20  BP: (122-147)/(54-98) 145/55  SpO2:  [90 %-97 %] 90 %    Physical Exam   Constitutional: He is oriented to person, place, and time. He appears well-developed and well-nourished. No distress.   Patient seen and examined  Plan discussed with PREET GEORGET:   Head: Normocephalic and atraumatic.   Right Ear: External ear normal.   Left Ear: External ear normal.   Nose: Nose normal.   Eyes: Conjunctivae are normal. Right eye exhibits no discharge. Left eye exhibits no discharge.   Neck: No JVD present. No thyromegaly present.   Cardiovascular: Normal rate, regular rhythm, normal heart sounds and intact distal pulses.    No murmur heard.  Cap refill 2sec  Pulses 2+ throughout     Pulmonary/Chest: Effort normal and breath sounds normal. No respiratory distress. He has no wheezes.   Abdominal: Soft. Bowel sounds are normal. He exhibits distension. There is tenderness. There is no rebound and no guarding.   Ostomy bag present right lower quadrant  Right GERBER in place with minimal drainage.      Genitourinary:   Genitourinary Comments: Humphrey in place     Musculoskeletal: He exhibits no edema or tenderness.   Neurological: He is alert and oriented to person, place, and time.   Skin: Skin is warm and dry. He is not diaphoretic. No erythema. No pallor.   Normal skin color   Psychiatric: His affect is labile. He is slowed. Cognition and memory are impaired. He expresses impulsivity and inappropriate judgment. He exhibits abnormal recent memory and abnormal remote memory.   Nursing note and vitals reviewed.      Fluids    Intake/Output Summary (Last 24 hours) at 04/02/19 0824  Last data filed at 04/02/19 0500   Gross per 24 hour   Intake             1240 ml   Output             1655 ml   Net             -415 ml       Laboratory  Recent Labs      03/31/19   0414  04/02/19   0525   WBC  9.3  11.8*   RBC  4.61*  4.02*   HEMOGLOBIN  12.9*  11.4*    HEMATOCRIT  40.2*  35.1*   MCV  87.2  87.3   MCH  28.0  28.4   MCHC  32.1*  32.5*   RDW  46.9  47.0   PLATELETCT  356  331   MPV  8.7*  8.8*     Recent Labs      03/31/19   0416  04/01/19   0519  04/02/19   0525   SODIUM  130*  135  134*   POTASSIUM  4.0  4.4  3.8   CHLORIDE  99  102  104   CO2  26  24  23   GLUCOSE  142*  126*  133*   BUN  10  13  13   CREATININE  2.32*  2.44*  2.29*   CALCIUM  8.4  8.9  8.0*                   Imaging  [id number] STONE REMOVAL   Final Result      Limited intraoperative images showing apparent stone removal from the common bile duct, and placement of biliary stent.      DX-PORTABLE FLUOROSCOPY < 1 HOUR   Final Result         Portable fluoroscopy utilized for 4.4 minutes.              Assessment/Plan  Choledocholithiasis- (present on admission)   Assessment & Plan    -Noted on MRCP  -Patient transferred here for ERCP, stone retrieved from duct 3/29       UTI (urinary tract infection)- (present on admission)   Assessment & Plan    Complicated 2/2 chronic harrington  -Pseudomonas per the sign out admitting MD received- -completed Merrem for a total of 7 days.  He has hypospadias and a chronic harrington. UTi likely related to the harrington.        Cholecystitis   Assessment & Plan    S/p cholecystectomy. Pain control. Pt/ot   Drain per surgery.      Encephalopathy acute   Assessment & Plan    Intermittently confused, improving steadily  No focal findings       Diabetes due to undrl condition w oth diabetic kidney comp (HCC)   Assessment & Plan    Basal and low correctional SSI - @ goal     CRF (chronic renal failure), stage 3 (moderate) (HCC)- (present on admission)   Assessment & Plan    Remains about at baseline 2.5-3 is what he typically runs  Holding fluids at this time, trend cmp       Hyponatremia   Assessment & Plan    resolving     BPH (benign prostatic hyperplasia)- (present on admission)   Assessment & Plan    -Continue home Flomax, although of unclear benefit given chronic harrington      Opiate dependence (HCC)- (present on admission)   Assessment & Plan    Continue oral ms contin  IV acute pain medication post operatively as needed, had to have open procedure.       Crohn's disease (HCC)- (present on admission)   Assessment & Plan    -Significant, has had multiple surgeries and now has an ostomy, [facility]  -No acute flare     COPD (chronic obstructive pulmonary disease) (HCC)- (present on admission)   Assessment & Plan    -No acute exacerbation          VTE prophylaxis: SCD's

## 2019-04-02 NOTE — PROGRESS NOTES
"Pt remains confused. Saying \"this place is closing down.\" pt unable to state where he is or why. Pt reoriented as best possible, however he does not seem to understand.   "

## 2019-04-02 NOTE — THERAPY
"Physical Therapy Evaluation completed.   Bed Mobility:  Supine to Sit: Stand by Assist (use of bed rail to assist)  Transfers: Sit to Stand: Stand by Assist  Gait: Level Of Assist: Contact Guard Assist with Front-Wheel Walker       Plan of Care: Will benefit from Physical Therapy 3 times per week  Discharge Recommendations: Equipment: Will Continue to Assess for Equipment Needs. Post-acute therapy Discharge to a transitional care facility for continued skilled therapy services.    Pt is a 70 yo male s/p open cholecystectomy presenting with generalized weakness and impaired endurance. Pt also with some confusion and since lives alone needs to be fully indep to DC home safely. Given pt is not at baseline, recommend continued acute PT to improve strength and mobility, pt most likely will benefit from continued therapy at SNF prior to DC home.    See \"Rehab Therapy-Acute\" Patient Summary Report for complete documentation.     "

## 2019-04-03 VITALS
HEART RATE: 74 BPM | RESPIRATION RATE: 18 BRPM | TEMPERATURE: 98 F | OXYGEN SATURATION: 90 % | HEIGHT: 71 IN | SYSTOLIC BLOOD PRESSURE: 147 MMHG | DIASTOLIC BLOOD PRESSURE: 62 MMHG | BODY MASS INDEX: 38.7 KG/M2 | WEIGHT: 276.46 LBS

## 2019-04-03 LAB
ALBUMIN SERPL BCP-MCNC: 2.5 G/DL (ref 3.2–4.9)
ALBUMIN/GLOB SERPL: 0.6 G/DL
ALP SERPL-CCNC: 77 U/L (ref 30–99)
ALT SERPL-CCNC: 24 U/L (ref 2–50)
ANION GAP SERPL CALC-SCNC: 7 MMOL/L (ref 0–11.9)
AST SERPL-CCNC: 19 U/L (ref 12–45)
BASOPHILS # BLD AUTO: 0.5 % (ref 0–1.8)
BASOPHILS # BLD: 0.06 K/UL (ref 0–0.12)
BILIRUB SERPL-MCNC: 0.5 MG/DL (ref 0.1–1.5)
BUN SERPL-MCNC: 15 MG/DL (ref 8–22)
CALCIUM SERPL-MCNC: 8.2 MG/DL (ref 8.4–10.2)
CHLORIDE SERPL-SCNC: 105 MMOL/L (ref 96–112)
CO2 SERPL-SCNC: 23 MMOL/L (ref 20–33)
CREAT SERPL-MCNC: 2.21 MG/DL (ref 0.5–1.4)
EOSINOPHIL # BLD AUTO: 0.42 K/UL (ref 0–0.51)
EOSINOPHIL NFR BLD: 3.4 % (ref 0–6.9)
ERYTHROCYTE [DISTWIDTH] IN BLOOD BY AUTOMATED COUNT: 48.4 FL (ref 35.9–50)
GLOBULIN SER CALC-MCNC: 4.3 G/DL (ref 1.9–3.5)
GLUCOSE BLD-MCNC: 108 MG/DL (ref 65–99)
GLUCOSE BLD-MCNC: 154 MG/DL (ref 65–99)
GLUCOSE SERPL-MCNC: 123 MG/DL (ref 65–99)
HCT VFR BLD AUTO: 36.2 % (ref 42–52)
HGB BLD-MCNC: 11.2 G/DL (ref 14–18)
IMM GRANULOCYTES # BLD AUTO: 0.06 K/UL (ref 0–0.11)
IMM GRANULOCYTES NFR BLD AUTO: 0.5 % (ref 0–0.9)
LYMPHOCYTES # BLD AUTO: 4.12 K/UL (ref 1–4.8)
LYMPHOCYTES NFR BLD: 33.3 % (ref 22–41)
MCH RBC QN AUTO: 27.9 PG (ref 27–33)
MCHC RBC AUTO-ENTMCNC: 30.9 G/DL (ref 33.7–35.3)
MCV RBC AUTO: 90 FL (ref 81.4–97.8)
MONOCYTES # BLD AUTO: 0.89 K/UL (ref 0–0.85)
MONOCYTES NFR BLD AUTO: 7.2 % (ref 0–13.4)
NEUTROPHILS # BLD AUTO: 6.84 K/UL (ref 1.82–7.42)
NEUTROPHILS NFR BLD: 55.1 % (ref 44–72)
NRBC # BLD AUTO: 0 K/UL
NRBC BLD-RTO: 0 /100 WBC
PLATELET # BLD AUTO: 330 K/UL (ref 164–446)
PMV BLD AUTO: 8.8 FL (ref 9–12.9)
POTASSIUM SERPL-SCNC: 3.8 MMOL/L (ref 3.6–5.5)
PROT SERPL-MCNC: 6.8 G/DL (ref 6–8.2)
RBC # BLD AUTO: 4.02 M/UL (ref 4.7–6.1)
SODIUM SERPL-SCNC: 135 MMOL/L (ref 135–145)
WBC # BLD AUTO: 12.4 K/UL (ref 4.8–10.8)

## 2019-04-03 PROCEDURE — 700102 HCHG RX REV CODE 250 W/ 637 OVERRIDE(OP): Performed by: INTERNAL MEDICINE

## 2019-04-03 PROCEDURE — A9270 NON-COVERED ITEM OR SERVICE: HCPCS | Performed by: HOSPITALIST

## 2019-04-03 PROCEDURE — 99239 HOSP IP/OBS DSCHRG MGMT >30: CPT | Performed by: HOSPITALIST

## 2019-04-03 PROCEDURE — A9270 NON-COVERED ITEM OR SERVICE: HCPCS | Performed by: INTERNAL MEDICINE

## 2019-04-03 PROCEDURE — 700102 HCHG RX REV CODE 250 W/ 637 OVERRIDE(OP): Performed by: SURGERY

## 2019-04-03 PROCEDURE — 700102 HCHG RX REV CODE 250 W/ 637 OVERRIDE(OP): Performed by: HOSPITALIST

## 2019-04-03 PROCEDURE — 82962 GLUCOSE BLOOD TEST: CPT

## 2019-04-03 PROCEDURE — A9270 NON-COVERED ITEM OR SERVICE: HCPCS | Performed by: SURGERY

## 2019-04-03 PROCEDURE — 36415 COLL VENOUS BLD VENIPUNCTURE: CPT

## 2019-04-03 PROCEDURE — 85025 COMPLETE CBC W/AUTO DIFF WBC: CPT

## 2019-04-03 PROCEDURE — 80053 COMPREHEN METABOLIC PANEL: CPT

## 2019-04-03 RX ORDER — MORPHINE SULFATE 15 MG/1
15 TABLET, FILM COATED, EXTENDED RELEASE ORAL EVERY 12 HOURS
Qty: 6 TAB | Refills: 0 | Status: SHIPPED | OUTPATIENT
Start: 2019-04-03 | End: 2019-04-06

## 2019-04-03 RX ORDER — NYSTATIN 100000 U/G
1 CREAM TOPICAL 2 TIMES DAILY
Qty: 1 TUBE
Start: 2019-04-03 | End: 2019-04-08

## 2019-04-03 RX ORDER — NYSTATIN 100000 U/G
CREAM TOPICAL 2 TIMES DAILY
Status: DISCONTINUED | OUTPATIENT
Start: 2019-04-03 | End: 2019-04-03 | Stop reason: HOSPADM

## 2019-04-03 RX ADMIN — ACETAMINOPHEN 1000 MG: 500 TABLET, FILM COATED ORAL at 05:23

## 2019-04-03 RX ADMIN — MORPHINE SULFATE 15 MG: 15 TABLET, EXTENDED RELEASE ORAL at 05:23

## 2019-04-03 RX ADMIN — BUDESONIDE AND FORMOTEROL FUMARATE DIHYDRATE 2 PUFF: 160; 4.5 AEROSOL RESPIRATORY (INHALATION) at 05:23

## 2019-04-03 RX ADMIN — NYSTATIN: 100000 CREAM TOPICAL at 11:38

## 2019-04-03 RX ADMIN — INSULIN LISPRO 1 UNITS: 100 INJECTION, SOLUTION INTRAVENOUS; SUBCUTANEOUS at 11:51

## 2019-04-03 RX ADMIN — ACETAMINOPHEN 1000 MG: 500 TABLET, FILM COATED ORAL at 00:46

## 2019-04-03 RX ADMIN — STANDARDIZED SENNA CONCENTRATE AND DOCUSATE SODIUM 2 TABLET: 8.6; 5 TABLET, FILM COATED ORAL at 05:22

## 2019-04-03 RX ADMIN — TAMSULOSIN HYDROCHLORIDE 0.4 MG: 0.4 CAPSULE ORAL at 11:38

## 2019-04-03 NOTE — PROGRESS NOTES
Surgery    Pt seen.  Abd benign.  GERBER nonbilious and low serosanguinous output  -OK to remove drain  -Continue Prevena until d/c, then remove.  Suction pump may need to be returned to OR  -Diet as tolerated  -Ambulate QID  -May shower, no tubs  -OK for d/c from surgery standpoint  -F/u 2 weeks    Gunnar Alonzo M.D.  [facility]  [phone]

## 2019-04-03 NOTE — DISCHARGE PLANNING
Agency/Facility Name: [facility]  Spoke To: Mayda  Outcome: They only received half of the referral, resent.

## 2019-04-03 NOTE — PROGRESS NOTES
20 ml of seroussanguinous output from GERBER drain.     1709 Dr. Olivier ( on call for Dr. Alonzo)  called for updates and possible drain removal.

## 2019-04-03 NOTE — DISCHARGE PLANNING
MADY completed transportation request form and faxed to [facility], [facility].  COBRA was completed and transportation packet prepared.  MADY met with this patient bedside and complete COBRA.  Patient agreeable.  SW called patient's KIRBYA Nathaly and updated her as well to discharge plan.

## 2019-04-03 NOTE — PROGRESS NOTES
Received report from Gunnar OVIEDO. Assumed care. This pt is AOx4, lethargic reports pain, will medicate per MAR. Patient and RN discussed plan of care including pain management, : questions answered. Chart reviewed. Call light in place, fall precautions in place, patient educated on importance of calling for assistance. No additional needs at this time.

## 2019-04-03 NOTE — DISCHARGE PLANNING
Received Transport Form @ 1012  Spoke to Mayda @ [facility]    Transport is scheduled for 4/3 @1230 going to [facility].

## 2019-04-03 NOTE — DISCHARGE SUMMARY
Discharge Summary    CHIEF COMPLAINT ON ADMISSION  No chief complaint on file.      Reason for Admission  Choledocolithiasis     CODE STATUS  Full Code    HPI & HOSPITAL COURSE  This is a 69 y.o. male here with abdominal pain.      *68-year-old male with history of chronic opiate dependence insulin-dependent diabetes with chronic kidney disease Crohn's disease status post ileostomy BPH with chronic Humphrey [facility] Pseud[facility] complained of right upper quadrant pain were MRCP apparently showed common bile duct stone transferred here for ERCP. He underwent an ERCP on 3/29 with stone extraction.  He underwent an uncomplicated cholecystectomy on 3/31/19. * He improved through his hospital course without complications. He will require ongoing pt/ot and iwll be transferred to a SNF for ongoing care.     Therefore, he is discharged in good and stable condition to skilled nursing facility.    The patient met 2-midnight criteria for an inpatient stay at the time of discharge.      FOLLOW UP ITEMS POST DISCHARGE  none    DISCHARGE DIAGNOSES  Active Problems:    Choledocholithiasis POA: Yes    UTI (urinary tract infection) POA: Yes    COPD (chronic obstructive pulmonary disease) ([facility]) POA: Yes    Crohn's disease ([facility]) POA: Yes    Opiate dependence ([facility]) POA: Yes    BPH (benign prostatic hyperplasia) POA: Yes    Hyponatremia POA: Unknown    CRF (chronic renal failure), stage 3 (moderate) ([facility]) POA: Yes    Diabetes due to undrl condition w oth diabetic kidney comp ([facility]) POA: Unknown    Encephalopathy acute POA: Unknown    Cholecystitis POA: Unknown  Resolved Problems:    * No resolved hospital problems. *      FOLLOW UP  No future appointments.  No follow-up provider specified.    MEDICATIONS ON DISCHARGE     Medication List      CONTINUE taking these medications      Instructions   Adalimumab 40 MG/0.8ML Pnkt   Inject 40 mg as instructed every 14 days.  Dose:  40 mg     * albuterol  108 (90 Base) MCG/ACT Aers inhalation aerosol   Inhale 2 Puffs by mouth every four hours as needed for Shortness of Breath.  Dose:  2 Puff     * albuterol 2.5mg/3ml Nebu solution for nebulization  Commonly known as:  PROVENTIL   2.5 mg by Nebulization route every four hours as needed for Shortness of Breath.  Dose:  2.5 mg     budesonide-formoterol 160-4.5 MCG/ACT Aero  Commonly known as:  SYMBICORT   Inhale 2 Puffs by mouth 2 Times a Day.  Dose:  2 Puff     ferrous gluconate 324 (38 Fe) MG Tabs  Commonly known as:  FERGON   Take 324 mg by mouth every morning with breakfast.  Dose:  324 mg     furosemide 20 MG Tabs  Commonly known as:  LASIX   Take 20 mg by mouth every day.  Dose:  20 mg     insulin  UNIT/ML Susp  Commonly known as:  HUMULIN,NOVOLIN   Inject 30-40 Units as instructed 2 Times a Day. 30 units in AM, 40 units in PM  Dose:  30-40 Units     lidocaine 2 % Gel  Commonly known as:  XYLOCAINE   Apply 1 Application to affected area(s) 2 times a day as needed.  Dose:  1 Application     loratadine 10 MG Tabs  Commonly known as:  CLARITIN   Take 10 mg by mouth every day.  Dose:  10 mg     * morphine 15 MG tablet  Commonly known as:  MS IR   Take 15 mg by mouth 3 times a day as needed for Severe Pain.  Dose:  15 mg     * morphine ER 15 MG Tbcr tablet  Commonly known as:  MS CONTIN   Take 1 Tab by mouth every 12 hours for 3 days.  Dose:  15 mg     tamsulosin 0.4 MG capsule  Commonly known as:  FLOMAX   Take 0.4 mg by mouth ONE-HALF HOUR AFTER BREAKFAST.  Dose:  0.4 mg        * This list has 4 medication(s) that are the same as other medications prescribed for you. Read the directions carefully, and ask your doctor or other care provider to review them with you.            STOP taking these medications    ciprofloxacin 500 MG Tabs  Commonly known as:  CIPRO            Allergies  Allergies   Allergen Reactions   • Ceftriaxone    • Ezetimibe    • Flagyl [Metronidazole]    • Infliximab    • Lovastatin    •  Simvastatin    • Vancomycin        DIET  Orders Placed This Encounter   Procedures   • Diet Order Low Fiber(GI Soft) (advance to previous diet as tolerated)     Standing Status:   Standing     Number of Occurrences:   1     Order Specific Question:   Diet:     Answer:   Low Fiber(GI Soft) [2]     Comments:   advance to previous diet as tolerated       ACTIVITY  As tolerated.  Weight bearing as tolerated    LINES, DRAINS, AND WOUNDS  This is an automated list. Peripheral IVs will be removed prior to discharge.  Peripheral IV 04/01/19 20 G Left Forearm (Active)   Site Assessment Clean;Dry;Intact 4/2/2019  8:00 PM   Dressing Type Skin barrier;Transparent 4/2/2019  8:00 PM   Line Status Saline locked 4/2/2019  8:00 PM   Dressing Status Clean;Dry;Intact 4/2/2019  8:00 PM   Dressing Intervention N/A 4/2/2019  8:00 PM   Infiltration Grading ([facility]) 0 4/2/2019  8:00 PM   Phlebitis Scale ([facility] Only) 0 4/2/2019  8:00 PM     Ileostomy Standard (Racquel, nasrin) RLQ (Active)   Stomal Appliance Intact 4/2/2019  8:00 PM   Site Assessment Red 4/2/2019  8:00 PM   Peristomal Assessment Red 4/2/2019  8:00 PM   Treatment Placement checked 4/2/2019  8:00 PM   Output (mL) 200 mL 4/3/2019  4:00 AM       Fecal Ostomy Group RUQ Colostomy (Active)       Urinary Catheter Indwelling Catheter (Active)       Urinary Catheter Indwelling Catheter 18 (Active)       Urethral Catheter (Active)   Site Assessment Skin intact;Clean 4/2/2019  8:00 PM   Collection Container Standard drainage bag 4/2/2019  8:00 PM   Urinary Catheter Care Tamper Evident Seal in Place 4/2/2019  8:00 PM   Securement Method Securing device (Describe) 4/2/2019  8:00 PM   Output (mL) 250 mL 4/3/2019  4:00 AM      Wound 03/25/19 Skin Tear Arm (Active)   Site Assessment Clean;Dry;Intact 4/2/2019  8:00 PM   Nisa-wound Assessment Clean;Intact;Dry 4/2/2019  8:00 PM   Closure CESARIO 4/1/2019  9:40 AM   Drainage Amount Scant 4/2/2019  8:00 PM   Drainage Description Serosanguineous  4/2/2019  8:00 PM   Dressing Options Adhesive Foam 4/2/2019  8:00 PM   Dressing Cleansing/Solutions Normal Saline 4/2/2019  8:00 PM   Dressing Changed Changed 4/2/2019  8:00 PM   Dressing Status Intact;Dry;Clean 4/2/2019  8:00 PM   Dressing Change Frequency As Needed 4/2/2019  8:00 PM   WOUND NURSE ONLY - Time Spent with Patient (mins) 45 3/29/2019  5:00 PM       Surgical Incision  Incision Right Toe(s) (Active)       Incontinence Associated Dermatitis Perineum (Active)       Peripheral IV 04/01/19 20 G Left Forearm (Active)   Site Assessment Clean;Dry;Intact 4/2/2019  8:00 PM   Dressing Type Skin barrier;Transparent 4/2/2019  8:00 PM   Line Status Saline locked 4/2/2019  8:00 PM   Dressing Status Clean;Dry;Intact 4/2/2019  8:00 PM   Dressing Intervention N/A 4/2/2019  8:00 PM   Infiltration Grading ([facility]) 0 4/2/2019  8:00 PM   Phlebitis Scale ([facility] Only) 0 4/2/2019  8:00 PM           Urinary Catheter Indwelling Catheter (Active)       Urinary Catheter Indwelling Catheter 18 (Active)       Urethral Catheter (Active)   Site Assessment Skin intact;Clean 4/2/2019  8:00 PM   Collection Container Standard drainage bag 4/2/2019  8:00 PM   Urinary Catheter Care Tamper Evident Seal in Place 4/2/2019  8:00 PM   Securement Method Securing device (Describe) 4/2/2019  8:00 PM   Output (mL) 250 mL 4/3/2019  4:00 AM        MENTAL STATUS ON TRANSFER  Level of Consciousness: Alert  Orientation : Disoriented to Event, Disoriented to Place  Speech: Speech Clear    CONSULTATIONS  Surgery Dr Alonzo    PROCEDURES  Operative Report     Date: 3/31/2019     PreOp Diagnosis:   1.  Choledocholithiasis  2.  Diabetes  3.  Chronic kidney disease  4.  COPD  5.  Hypertension  6.  Crohn's disease status post ileostomy     PostOp Diagnosis:   1.  Choledocholithiasis  2.  Acute cholecystitis   3.  Diabetes  4.  Chronic kidney disease  5.  COPD  6.  Hypertension  7.  Crohn's disease status post ileostomy     Procedure(s):  1.  Diagnostic  Laparoscopy  2.  Open CHOLECYSTECTOMY - Wound Class: Clean Contaminated     Surgeon(s):  Gunnar Alonzo M.D.     Assistant:  Aminata Sue MD     Anesthesiologist/Type of Anesthesia:  Anesthesiologist: Renan Calderon M.D./General     Surgical Staff:  Circulator: Anuja Cowart R.N.  Scrub Person: Marly Kelley     Specimens removed if any:  1.  Gallbladder     Estimated Blood Loss: 100mL     Findings: Significant intraabdominal adhesions requiring open cholecystectomy     Complications: None noted     Indications:  68-year-old male presenting with abdominal pain append of choledocholithiasis.  Underwent ERCP with stone extraction.  Cholecystectomy was recommended.  The procedure as well as the risks and benefits as well as alternatives discussed with the patient he wished to proceed.     Procedure in detail:   The patient was brought to the operating room and was placed in the supine position where general endotracheal anesthesia was induced. SCDs were in place and functioning. Preoperative antibiotics were given before incision time. The patient's abdomen was prepped and draped in the usual sterile fashion.  The patient's right sided ileostomy was suctioned off using 1010 drapes prior to prepping.     Given the patient's prior operative history, a left upper quadrant Optiview entry technique was selected.  A 5 mm incision in the left upper quadrant was performed and a 5 mm trocar was used to enter the peritoneal space with a 0 degree laparoscope.  The peritoneum was accessed, but given significant dense adhesions, appropriate visualization was unable to be obtained and this was aborted and the decision was made to convert to open.     A right subcostal incision was made, and the peritoneal space was accessed.  Similar moderately dense adhesions were encountered.  The inferior edge of the liver was identified and exposure to the felipa hepatis created using a combination of careful blunt and sharp  dissection.  The Omni retractor was placed to assist with exposure.  The gallbladder was identified and was found to be moderately inflamed.  A top down approach was performed by grasping the dome of the gallbladder and circumferentially dissecting it towards the gallbladder neck.  The cystic artery was identified and divided using clips.  To better identify the ductal anatomy, the dome of the gallbladder was open and digital palpation used to help identify the gallbladder neck.  The decision was made to divide the gallbladder at the neck, given significant adhesions and to help avoid injury to the critical ductal structures.  I asked my partner, Dr. Enid Sue to join me for this fairly complex portion of the case.  A TA 30 blue load CHAS stapler was used to accomplish this.  The gallbladder was then removed and passed off as permanent specimen.  Hemostasis was ensured.  A 10 Guatemalan fluted GERBER drain was placed in the gallbladder fossa, and brought out through the 5 mm left upper quadrant incision.  This was sutured in the standard fashion with 3-0 nylon.  The wound was then irrigated and the abdominal wall closed with running #1 Vicryl suture.  Given prior scarring, it was difficult to clearly identified the anterior and posterior fascial layers so a single layer closure was performed incorporating both.  The subcutaneous tissue was irrigated using sterile saline and then the skin closed with skin staples.  A Paige wound management system was placed and functioned appropriately.  An appropriate drain dressing was applied to the GERBER.  This concluded the procedure and the patient was allowed to emerge from general anesthesia and was taken to the PACU in stable condition.  Sponge, instrument, needle counts are correct x2.     Gunnar Alonzo M.D.  [facility]  [phone]    LABORATORY  Lab Results   Component Value Date    SODIUM 135 04/03/2019    POTASSIUM 3.8 04/03/2019    CHLORIDE 105 04/03/2019    CO2  23 04/03/2019    GLUCOSE 123 (H) 04/03/2019    BUN 15 04/03/2019    CREATININE 2.21 (H) 04/03/2019        Lab Results   Component Value Date    WBC 12.4 (H) 04/03/2019    HEMOGLOBIN 11.2 (L) 04/03/2019    HEMATOCRIT 36.2 (L) 04/03/2019    PLATELETCT 330 04/03/2019        Total time of the discharge process exceeds 34 minutes.

## 2019-04-03 NOTE — DISCHARGE INSTRUCTIONS
Discharge Instructions    Discharged to home by car with relative. Discharged via wheelchair, hospital escort: Yes.  Special equipment needed: Walker    Be sure to schedule a follow-up appointment with your primary care doctor or any specialists as instructed.     Discharge Plan:   Pneumococcal Vaccine Administered/Refused: Not given - Patient refused pneumococcal vaccine (pt states he got them in [place])  Influenza Vaccine Indication: Not indicated: Previously immunized this influenza season and > 8 years of age    I understand that a diet low in cholesterol, fat, and sodium is recommended for good health. Unless I have been given specific instructions below for another diet, I accept this instruction as my diet prescription.   Other diet: Gi soft    Special Instructions: None    · Is patient discharged on Warfarin / Coumadin?   No     Depression / Suicide Risk    As you are discharged from this [facility] facility, it is important to learn how to keep safe from harming yourself.    Recognize the warning signs:  · Abrupt changes in personality, positive or negative- including increase in energy   · Giving away possessions  · Change in eating patterns- significant weight changes-  positive or negative  · Change in sleeping patterns- unable to sleep or sleeping all the time   · Unwillingness or inability to communicate  · Depression  · Unusual sadness, discouragement and loneliness  · Talk of wanting to die  · Neglect of personal appearance   · Rebelliousness- reckless behavior  · Withdrawal from people/activities they love  · Confusion- inability to concentrate     If you or a loved one observes any of these behaviors or has concerns about self-harm, here's what you can do:  · Talk about it- your feelings and reasons for harming yourself  · Remove any means that you might use to hurt yourself (examples: pills, rope, extension cords, firearm)  · Get professional help from the community (Mental Health,  Substance Abuse, psychological counseling)  · Do not be alone:Call your Safe Contact- someone whom you trust who will be there for you.  · Call your local CRISIS HOTLINE 572-2017 or 182-725-5498  · Call your local Children's Mobile Crisis Response Team [place] (472) 097-6330 or www.[employer]  · Call the toll free National Suicide Prevention Hotlines   · National Suicide Prevention Lifeline 795-024-HNGT (9541)  · [place] [facility] 800-SUICIDE (200-7410)    ENDOSCOPY HOME CARE INSTRUCTIONS    GASTROSCOPY OR ERCP  1. Don't eat or drink anything for about an hour after the test. You can then resume your regular diet.  2. Don't drive or drink alcohol for 24 hours. The medication you received will make you too drowsy.  3. Don't take any coffee, tea, or aspirin products until after you see your doctor. These can harm the lining of your stomach.  4. If you begin to vomit bloody material, or develop black or bloody stools, call your doctor as soon as possible.  5. If you have any neck, chest, abdominal pain or temp of 100 degrees, call your doctor.  6. See your doctor   7. Additional instructions:  8. Prescriptions:       You should call [phone] if you develop problems with breathing or chest pain.  If any questions arise, call your doctor. If your doctor is not available, please feel free to call [phone]. You can also call the HEALTH HOTLINE open 24 hours/day, 7 days/week and speak to a nurse at [phone], or toll free [phone].    Depression / Suicide Risk    As you are discharged from this [facility] facility, it is important to learn how to keep safe from harming yourself.    Recognize the warning signs:  · Abrupt changes in personality, positive or negative- including increase in energy   · Giving away possessions  · Change in eating patterns- significant weight changes-  positive or negative  · Change in sleeping patterns- unable to sleep or sleeping all the time   · Unwillingness  or inability to communicate  · Depression  · Unusual sadness, discouragement and loneliness  · Talk of wanting to die  · Neglect of personal appearance   · Rebelliousness- reckless behavior  · Withdrawal from people/activities they love  · Confusion- inability to concentrate     If you or a loved one observes any of these behaviors or has concerns about self-harm, here's what you can do:  · Talk about it- your feelings and reasons for harming yourself  · Remove any means that you might use to hurt yourself (examples: pills, rope, extension cords, firearm)  · Get professional help from the community (Mental Health, Substance Abuse, psychological counseling)  · Do not be alone:Call your Safe Contact- someone whom you trust who will be there for you.  · Call your local CRISIS HOTLINE 871-9667 or 188-010-4645  · Call your local Children's Mobile Crisis Response Team [place] (645) 046-4670 or www.[employer]  · Call the toll free National Suicide Prevention Hotlines   · National Suicide Prevention Lifeline 094-932-TXDK (3648)  · [place] [facility] 800-SUICIDE (199-3352)    I acknowledge receipt and understanding of these Home Care Instructions.    Discharge Education for patients on JOYCE (Obstructive Sleep Apnea) Protocol    Prior to receiving sedation or anesthesia, we screen all patients for Obstructive Sleep Apnea.  During your screening, you were identified as having suspected, but not confirmed Obstructive Sleep Apnea(JOYCE).    What is Obstructive Sleep Apnea?  Sleep apnea (AP-ne-ah) is a common disorder which involves breathing pauses that occur during sleep.  These can last from 10 seconds to a minute or longer.  Normal breathing resumes often with a loud snort or choking sound.    Sleep apnea occurs in all age groups and both genders but is more common in men and people over 40 years of age.  It has been estimated that as many as 18 million Americans have sleep apnea.  Most people who have sleep  apnea don’t know they have it because it only occurs during sleep.  A family member and/or bed partner may first notice the signs of sleep apnea.  Sleep apnea is a chronic (ongoing) condition that disrupts the quality and quantity of your sleep repeatedly throughout the night.  This often results in excessive daytime sleepiness or fatigue during the day.  It may also contribute to high blood pressure, heart problems, and complications following medications used for surgery and procedures.    To establish a definitive diagnosis, further testing from a specialist would be needed.  We recommend that you follow up with your primary care physician.    We recommend that you should be with an adult observer for at least 24 hours after your sedation/anesthesia.  If you have a CPAP machine, you should wear it during any sleep period (day or night) for the week following your procedure.  We encourage you to sleep on your side or in a sitting position, even with napping.  Lying flat on your back increases the risk of apnea and airway obstruction during your post procedure recovery period.    It is important to prevent over-sedation that could increase your risk for apnea.  Please take all pain medication as directed by your physician.  If you are not getting pain relief, please contact your physician to discuss possible approaches to relieving pain while minimizing medications that can affect your breathing and oxygen levels.

## 2019-04-03 NOTE — PROGRESS NOTES
Pt resting in bed. easy to arouse. Pt is A&Ox2. VSS. Pt c/o pain 4/10 in abdomen. No n/v at this time. Pt is on room air. Dressings are clean, dry, and intact. preveena in place. Humphrey is draining hazy, yellow urine. Cleaned with soap and water. Pt has some blanchable redness around perineal area. Ileostomy is draining green-brown, loose stool. There is some excoriation around ileostomy. Pt stated he did not want it to be touched this evening. Education provided on importance of good ostomy care. Positioned pt for comfort. Fall precautions in place.

## 2019-04-03 NOTE — PROGRESS NOTES
Pt sitting up to get dressed, RN assessed sacrum, pressure ulcer noticed. Hopsitalist Dr. Alexander notified, picture taken, LDA opened, mepilex placed per MD. Charge RN notified. Will notify RN at [facility]

## 2019-04-08 ENCOUNTER — HOSPITAL ENCOUNTER (INPATIENT)
Facility: MEDICAL CENTER | Age: 69
LOS: 5 days | DRG: 862 | End: 2019-04-13
Attending: EMERGENCY MEDICINE | Admitting: INTERNAL MEDICINE
Payer: MEDICARE

## 2019-04-08 ENCOUNTER — APPOINTMENT (OUTPATIENT)
Dept: RADIOLOGY | Facility: MEDICAL CENTER | Age: 69
DRG: 862 | End: 2019-04-08
Attending: EMERGENCY MEDICINE
Payer: MEDICARE

## 2019-04-08 DIAGNOSIS — R10.11 RIGHT UPPER QUADRANT ABDOMINAL PAIN: ICD-10-CM

## 2019-04-08 DIAGNOSIS — T14.8XXA WOUND INFECTION: ICD-10-CM

## 2019-04-08 DIAGNOSIS — L08.9 WOUND INFECTION: ICD-10-CM

## 2019-04-08 LAB
ALBUMIN SERPL BCP-MCNC: 3.5 G/DL (ref 3.2–4.9)
ALBUMIN/GLOB SERPL: 0.7 G/DL
ALP SERPL-CCNC: 79 U/L (ref 30–99)
ALT SERPL-CCNC: 15 U/L (ref 2–50)
ANION GAP SERPL CALC-SCNC: 10 MMOL/L (ref 0–11.9)
APTT PPP: 36.9 SEC (ref 24.7–36)
AST SERPL-CCNC: 16 U/L (ref 12–45)
BASOPHILS # BLD AUTO: 0.5 % (ref 0–1.8)
BASOPHILS # BLD: 0.06 K/UL (ref 0–0.12)
BILIRUB SERPL-MCNC: 0.5 MG/DL (ref 0.1–1.5)
BUN SERPL-MCNC: 43 MG/DL (ref 8–22)
CALCIUM SERPL-MCNC: 8.8 MG/DL (ref 8.5–10.5)
CHLORIDE SERPL-SCNC: 100 MMOL/L (ref 96–112)
CO2 SERPL-SCNC: 23 MMOL/L (ref 20–33)
CREAT SERPL-MCNC: 2.95 MG/DL (ref 0.5–1.4)
EKG IMPRESSION: NORMAL
EOSINOPHIL # BLD AUTO: 0.21 K/UL (ref 0–0.51)
EOSINOPHIL NFR BLD: 1.7 % (ref 0–6.9)
ERYTHROCYTE [DISTWIDTH] IN BLOOD BY AUTOMATED COUNT: 46.2 FL (ref 35.9–50)
GLOBULIN SER CALC-MCNC: 5.2 G/DL (ref 1.9–3.5)
GLUCOSE SERPL-MCNC: 138 MG/DL (ref 65–99)
HCT VFR BLD AUTO: 37.5 % (ref 42–52)
HGB BLD-MCNC: 12.2 G/DL (ref 14–18)
IMM GRANULOCYTES # BLD AUTO: 0.04 K/UL (ref 0–0.11)
IMM GRANULOCYTES NFR BLD AUTO: 0.3 % (ref 0–0.9)
INR PPP: 1.24 (ref 0.87–1.13)
LIPASE SERPL-CCNC: 59 U/L (ref 11–82)
LYMPHOCYTES # BLD AUTO: 2.56 K/UL (ref 1–4.8)
LYMPHOCYTES NFR BLD: 20.8 % (ref 22–41)
MCH RBC QN AUTO: 28.4 PG (ref 27–33)
MCHC RBC AUTO-ENTMCNC: 32.5 G/DL (ref 33.7–35.3)
MCV RBC AUTO: 87.2 FL (ref 81.4–97.8)
MONOCYTES # BLD AUTO: 0.97 K/UL (ref 0–0.85)
MONOCYTES NFR BLD AUTO: 7.9 % (ref 0–13.4)
NEUTROPHILS # BLD AUTO: 8.47 K/UL (ref 1.82–7.42)
NEUTROPHILS NFR BLD: 68.8 % (ref 44–72)
NRBC # BLD AUTO: 0 K/UL
NRBC BLD-RTO: 0 /100 WBC
PLATELET # BLD AUTO: 439 K/UL (ref 164–446)
PMV BLD AUTO: 9.3 FL (ref 9–12.9)
POTASSIUM SERPL-SCNC: 4.2 MMOL/L (ref 3.6–5.5)
PROT SERPL-MCNC: 8.7 G/DL (ref 6–8.2)
PROTHROMBIN TIME: 15.7 SEC (ref 12–14.6)
RBC # BLD AUTO: 4.3 M/UL (ref 4.7–6.1)
SODIUM SERPL-SCNC: 133 MMOL/L (ref 135–145)
WBC # BLD AUTO: 12.3 K/UL (ref 4.8–10.8)

## 2019-04-08 PROCEDURE — 770006 HCHG ROOM/CARE - MED/SURG/GYN SEMI*

## 2019-04-08 PROCEDURE — 85730 THROMBOPLASTIN TIME PARTIAL: CPT

## 2019-04-08 PROCEDURE — 700102 HCHG RX REV CODE 250 W/ 637 OVERRIDE(OP): Performed by: INTERNAL MEDICINE

## 2019-04-08 PROCEDURE — 99285 EMERGENCY DEPT VISIT HI MDM: CPT

## 2019-04-08 PROCEDURE — 85025 COMPLETE CBC W/AUTO DIFF WBC: CPT

## 2019-04-08 PROCEDURE — 87040 BLOOD CULTURE FOR BACTERIA: CPT

## 2019-04-08 PROCEDURE — 93005 ELECTROCARDIOGRAM TRACING: CPT | Performed by: EMERGENCY MEDICINE

## 2019-04-08 PROCEDURE — 700111 HCHG RX REV CODE 636 W/ 250 OVERRIDE (IP): Performed by: EMERGENCY MEDICINE

## 2019-04-08 PROCEDURE — 700105 HCHG RX REV CODE 258: Performed by: EMERGENCY MEDICINE

## 2019-04-08 PROCEDURE — 83690 ASSAY OF LIPASE: CPT

## 2019-04-08 PROCEDURE — 85610 PROTHROMBIN TIME: CPT

## 2019-04-08 PROCEDURE — 99223 1ST HOSP IP/OBS HIGH 75: CPT | Performed by: INTERNAL MEDICINE

## 2019-04-08 PROCEDURE — 96365 THER/PROPH/DIAG IV INF INIT: CPT

## 2019-04-08 PROCEDURE — 74176 CT ABD & PELVIS W/O CONTRAST: CPT

## 2019-04-08 PROCEDURE — 80053 COMPREHEN METABOLIC PANEL: CPT

## 2019-04-08 PROCEDURE — 83605 ASSAY OF LACTIC ACID: CPT

## 2019-04-08 PROCEDURE — A9270 NON-COVERED ITEM OR SERVICE: HCPCS | Performed by: INTERNAL MEDICINE

## 2019-04-08 RX ORDER — MORPHINE SULFATE 15 MG/1
15 TABLET, FILM COATED, EXTENDED RELEASE ORAL EVERY 12 HOURS
Status: DISCONTINUED | OUTPATIENT
Start: 2019-04-08 | End: 2019-04-09

## 2019-04-08 RX ORDER — ONDANSETRON 4 MG/1
4 TABLET, ORALLY DISINTEGRATING ORAL EVERY 4 HOURS PRN
Status: DISCONTINUED | OUTPATIENT
Start: 2019-04-08 | End: 2019-04-13 | Stop reason: HOSPADM

## 2019-04-08 RX ORDER — ACETAMINOPHEN 500 MG
1000 TABLET ORAL EVERY 6 HOURS PRN
COMMUNITY
End: 2019-05-29

## 2019-04-08 RX ORDER — MORPHINE SULFATE 15 MG/1
15 TABLET ORAL 3 TIMES DAILY PRN
Status: ON HOLD | COMMUNITY
End: 2019-04-13

## 2019-04-08 RX ORDER — BUDESONIDE AND FORMOTEROL FUMARATE DIHYDRATE 160; 4.5 UG/1; UG/1
2 AEROSOL RESPIRATORY (INHALATION) 2 TIMES DAILY
Status: DISCONTINUED | OUTPATIENT
Start: 2019-04-08 | End: 2019-04-13 | Stop reason: HOSPADM

## 2019-04-08 RX ORDER — ACETAMINOPHEN 325 MG/1
650 TABLET ORAL EVERY 6 HOURS PRN
Status: DISCONTINUED | OUTPATIENT
Start: 2019-04-08 | End: 2019-04-13 | Stop reason: HOSPADM

## 2019-04-08 RX ORDER — MORPHINE SULFATE 4 MG/ML
4 INJECTION, SOLUTION INTRAMUSCULAR; INTRAVENOUS
Status: DISCONTINUED | OUTPATIENT
Start: 2019-04-08 | End: 2019-04-13 | Stop reason: HOSPADM

## 2019-04-08 RX ORDER — FERROUS GLUCONATE 324(38)MG
324 TABLET ORAL
COMMUNITY
End: 2019-05-29

## 2019-04-08 RX ORDER — POLYETHYLENE GLYCOL 3350 17 G/17G
1 POWDER, FOR SOLUTION ORAL
Status: DISCONTINUED | OUTPATIENT
Start: 2019-04-08 | End: 2019-04-13 | Stop reason: HOSPADM

## 2019-04-08 RX ORDER — LORATADINE 10 MG/1
10 TABLET ORAL DAILY
COMMUNITY
End: 2019-05-29

## 2019-04-08 RX ORDER — NYSTATIN 100000 U/G
1 CREAM TOPICAL 2 TIMES DAILY
COMMUNITY
End: 2019-05-29

## 2019-04-08 RX ORDER — AMOXICILLIN 250 MG
2 CAPSULE ORAL 2 TIMES DAILY
Status: DISCONTINUED | OUTPATIENT
Start: 2019-04-09 | End: 2019-04-13 | Stop reason: HOSPADM

## 2019-04-08 RX ORDER — OXYCODONE HYDROCHLORIDE 10 MG/1
10 TABLET ORAL
Status: DISCONTINUED | OUTPATIENT
Start: 2019-04-08 | End: 2019-04-13 | Stop reason: HOSPADM

## 2019-04-08 RX ORDER — SODIUM CHLORIDE 9 MG/ML
INJECTION, SOLUTION INTRAVENOUS CONTINUOUS
Status: DISCONTINUED | OUTPATIENT
Start: 2019-04-08 | End: 2019-04-13

## 2019-04-08 RX ORDER — ONDANSETRON 2 MG/ML
4 INJECTION INTRAMUSCULAR; INTRAVENOUS EVERY 4 HOURS PRN
Status: DISCONTINUED | OUTPATIENT
Start: 2019-04-08 | End: 2019-04-13 | Stop reason: HOSPADM

## 2019-04-08 RX ORDER — OXYCODONE HYDROCHLORIDE 5 MG/1
5 TABLET ORAL
Status: DISCONTINUED | OUTPATIENT
Start: 2019-04-08 | End: 2019-04-13 | Stop reason: HOSPADM

## 2019-04-08 RX ORDER — BUDESONIDE AND FORMOTEROL FUMARATE DIHYDRATE 160; 4.5 UG/1; UG/1
2 AEROSOL RESPIRATORY (INHALATION) 2 TIMES DAILY
COMMUNITY
End: 2019-05-29

## 2019-04-08 RX ORDER — FUROSEMIDE 20 MG/1
20 TABLET ORAL 2 TIMES DAILY
Status: ON HOLD | COMMUNITY
End: 2019-04-13

## 2019-04-08 RX ORDER — TAMSULOSIN HYDROCHLORIDE 0.4 MG/1
0.4 CAPSULE ORAL
COMMUNITY
End: 2019-05-29

## 2019-04-08 RX ORDER — FERROUS GLUCONATE 324(38)MG
324 TABLET ORAL
Status: DISCONTINUED | OUTPATIENT
Start: 2019-04-09 | End: 2019-04-13 | Stop reason: HOSPADM

## 2019-04-08 RX ORDER — BISACODYL 10 MG
10 SUPPOSITORY, RECTAL RECTAL
Status: DISCONTINUED | OUTPATIENT
Start: 2019-04-08 | End: 2019-04-13 | Stop reason: HOSPADM

## 2019-04-08 RX ORDER — MORPHINE SULFATE 15 MG/1
15 TABLET, FILM COATED, EXTENDED RELEASE ORAL EVERY 12 HOURS
Status: ON HOLD | COMMUNITY
End: 2019-04-13

## 2019-04-08 RX ORDER — TAMSULOSIN HYDROCHLORIDE 0.4 MG/1
0.4 CAPSULE ORAL
Status: DISCONTINUED | OUTPATIENT
Start: 2019-04-09 | End: 2019-04-13 | Stop reason: HOSPADM

## 2019-04-08 RX ORDER — SODIUM CHLORIDE 9 MG/ML
1000 INJECTION, SOLUTION INTRAVENOUS
Status: ACTIVE | OUTPATIENT
Start: 2019-04-08 | End: 2019-04-09

## 2019-04-08 RX ADMIN — PIPERACILLIN AND TAZOBACTAM 4.5 G: 4; .5 INJECTION, POWDER, LYOPHILIZED, FOR SOLUTION INTRAVENOUS; PARENTERAL at 22:14

## 2019-04-09 ENCOUNTER — APPOINTMENT (OUTPATIENT)
Dept: RADIOLOGY | Facility: MEDICAL CENTER | Age: 69
DRG: 862 | End: 2019-04-09
Attending: HOSPITALIST
Payer: MEDICARE

## 2019-04-09 PROBLEM — N18.30 ACUTE RENAL FAILURE SUPERIMPOSED ON STAGE 3 CHRONIC KIDNEY DISEASE (HCC): Status: ACTIVE | Noted: 2017-10-17

## 2019-04-09 PROBLEM — T81.49XA SURGICAL WOUND INFECTION: Status: ACTIVE | Noted: 2019-04-09

## 2019-04-09 LAB
GLUCOSE BLD-MCNC: 106 MG/DL (ref 65–99)
GLUCOSE BLD-MCNC: 114 MG/DL (ref 65–99)
GLUCOSE BLD-MCNC: 121 MG/DL (ref 65–99)
LACTATE BLD-SCNC: 0.9 MMOL/L (ref 0.5–2)
MAGNESIUM SERPL-MCNC: 2.2 MG/DL (ref 1.5–2.5)

## 2019-04-09 PROCEDURE — 83735 ASSAY OF MAGNESIUM: CPT

## 2019-04-09 PROCEDURE — 700102 HCHG RX REV CODE 250 W/ 637 OVERRIDE(OP): Performed by: HOSPITALIST

## 2019-04-09 PROCEDURE — 82962 GLUCOSE BLOOD TEST: CPT

## 2019-04-09 PROCEDURE — 306263 VAC CANNISTER W/GEL 500ML: Performed by: SURGERY

## 2019-04-09 PROCEDURE — 306373 DRESSING,VAC SIMPLACE SMALL: Performed by: SURGERY

## 2019-04-09 PROCEDURE — 700102 HCHG RX REV CODE 250 W/ 637 OVERRIDE(OP): Performed by: INTERNAL MEDICINE

## 2019-04-09 PROCEDURE — 700111 HCHG RX REV CODE 636 W/ 250 OVERRIDE (IP): Performed by: INTERNAL MEDICINE

## 2019-04-09 PROCEDURE — 94760 N-INVAS EAR/PLS OXIMETRY 1: CPT

## 2019-04-09 PROCEDURE — 770006 HCHG ROOM/CARE - MED/SURG/GYN SEMI*

## 2019-04-09 PROCEDURE — 700105 HCHG RX REV CODE 258: Performed by: INTERNAL MEDICINE

## 2019-04-09 PROCEDURE — A9270 NON-COVERED ITEM OR SERVICE: HCPCS | Performed by: INTERNAL MEDICINE

## 2019-04-09 PROCEDURE — 99232 SBSQ HOSP IP/OBS MODERATE 35: CPT | Performed by: HOSPITALIST

## 2019-04-09 PROCEDURE — A6250 SKIN SEAL PROTECT MOISTURIZR: HCPCS | Performed by: HOSPITALIST

## 2019-04-09 PROCEDURE — 36415 COLL VENOUS BLD VENIPUNCTURE: CPT

## 2019-04-09 PROCEDURE — 94664 DEMO&/EVAL PT USE INHALER: CPT

## 2019-04-09 PROCEDURE — A9270 NON-COVERED ITEM OR SERVICE: HCPCS | Performed by: HOSPITALIST

## 2019-04-09 PROCEDURE — 700102 HCHG RX REV CODE 250 W/ 637 OVERRIDE(OP): Performed by: SURGERY

## 2019-04-09 PROCEDURE — 76775 US EXAM ABDO BACK WALL LIM: CPT

## 2019-04-09 RX ORDER — MORPHINE SULFATE 15 MG/1
15 TABLET, FILM COATED, EXTENDED RELEASE ORAL EVERY 12 HOURS
Status: DISCONTINUED | OUTPATIENT
Start: 2019-04-09 | End: 2019-04-13 | Stop reason: HOSPADM

## 2019-04-09 RX ORDER — MICONAZOLE NITRATE 20 MG/G
CREAM TOPICAL 2 TIMES DAILY
Status: DISCONTINUED | OUTPATIENT
Start: 2019-04-09 | End: 2019-04-13 | Stop reason: HOSPADM

## 2019-04-09 RX ORDER — DOXYCYCLINE 100 MG/1
100 TABLET ORAL EVERY 12 HOURS
Status: DISCONTINUED | OUTPATIENT
Start: 2019-04-09 | End: 2019-04-13 | Stop reason: HOSPADM

## 2019-04-09 RX ORDER — DEXTROSE MONOHYDRATE 25 G/50ML
25 INJECTION, SOLUTION INTRAVENOUS
Status: DISCONTINUED | OUTPATIENT
Start: 2019-04-09 | End: 2019-04-12

## 2019-04-09 RX ADMIN — SENNOSIDES AND DOCUSATE SODIUM 2 TABLET: 8.6; 5 TABLET ORAL at 16:50

## 2019-04-09 RX ADMIN — PIPERACILLIN SODIUM AND TAZOBACTAM SODIUM 3.38 G: 3; .375 INJECTION, POWDER, LYOPHILIZED, FOR SOLUTION INTRAVENOUS at 08:52

## 2019-04-09 RX ADMIN — BUDESONIDE AND FORMOTEROL FUMARATE DIHYDRATE 2 PUFF: 160; 4.5 AEROSOL RESPIRATORY (INHALATION) at 17:33

## 2019-04-09 RX ADMIN — MORPHINE SULFATE 4 MG: 4 INJECTION INTRAVENOUS at 08:52

## 2019-04-09 RX ADMIN — BUDESONIDE AND FORMOTEROL FUMARATE DIHYDRATE 2 PUFF: 160; 4.5 AEROSOL RESPIRATORY (INHALATION) at 06:24

## 2019-04-09 RX ADMIN — OXYCODONE HYDROCHLORIDE 10 MG: 10 TABLET ORAL at 06:23

## 2019-04-09 RX ADMIN — MICONAZOLE NITRATE: 20 CREAM TOPICAL at 17:33

## 2019-04-09 RX ADMIN — SODIUM CHLORIDE: 9 INJECTION, SOLUTION INTRAVENOUS at 18:12

## 2019-04-09 RX ADMIN — TAMSULOSIN HYDROCHLORIDE 0.4 MG: 0.4 CAPSULE ORAL at 08:52

## 2019-04-09 RX ADMIN — DOXYCYCLINE 100 MG: 100 TABLET, FILM COATED ORAL at 06:23

## 2019-04-09 RX ADMIN — DOXYCYCLINE 100 MG: 100 TABLET, FILM COATED ORAL at 16:49

## 2019-04-09 RX ADMIN — MORPHINE SULFATE 15 MG: 15 TABLET, EXTENDED RELEASE ORAL at 12:04

## 2019-04-09 RX ADMIN — MORPHINE SULFATE 15 MG: 15 TABLET, EXTENDED RELEASE ORAL at 01:49

## 2019-04-09 RX ADMIN — MORPHINE SULFATE 4 MG: 4 INJECTION INTRAVENOUS at 16:50

## 2019-04-09 RX ADMIN — FERROUS GLUCONATE 324 MG: 324 TABLET ORAL at 06:23

## 2019-04-09 RX ADMIN — SODIUM CHLORIDE: 9 INJECTION, SOLUTION INTRAVENOUS at 01:42

## 2019-04-09 RX ADMIN — PIPERACILLIN SODIUM AND TAZOBACTAM SODIUM 3.38 G: 3; .375 INJECTION, POWDER, LYOPHILIZED, FOR SOLUTION INTRAVENOUS at 06:20

## 2019-04-09 RX ADMIN — OXYCODONE HYDROCHLORIDE 10 MG: 10 TABLET ORAL at 21:41

## 2019-04-09 RX ADMIN — PIPERACILLIN SODIUM AND TAZOBACTAM SODIUM 3.38 G: 3; .375 INJECTION, POWDER, LYOPHILIZED, FOR SOLUTION INTRAVENOUS at 21:41

## 2019-04-09 ASSESSMENT — ENCOUNTER SYMPTOMS
BRUISES/BLEEDS EASILY: 0
MYALGIAS: 0
EYES NEGATIVE: 1
NECK PAIN: 0
DIARRHEA: 0
HEADACHES: 0
CONSTITUTIONAL NEGATIVE: 1
FEVER: 0
SHORTNESS OF BREATH: 0
DIZZINESS: 0
SEIZURES: 0
CHILLS: 0
GASTROINTESTINAL NEGATIVE: 1
BACK PAIN: 0
RESPIRATORY NEGATIVE: 1
WEIGHT LOSS: 0
NAUSEA: 0
COUGH: 0
CARDIOVASCULAR NEGATIVE: 1
FLANK PAIN: 0
WHEEZING: 0
PALPITATIONS: 0
PSYCHIATRIC NEGATIVE: 1
BLURRED VISION: 0
VOMITING: 0
DEPRESSION: 0
WEAKNESS: 0
FOCAL WEAKNESS: 0
NEUROLOGICAL NEGATIVE: 1
ABDOMINAL PAIN: 0
SPUTUM PRODUCTION: 0
SORE THROAT: 0
MUSCULOSKELETAL NEGATIVE: 1
BLOOD IN STOOL: 0
ABDOMINAL PAIN: 1
DIAPHORESIS: 0

## 2019-04-09 ASSESSMENT — LIFESTYLE VARIABLES
ALCOHOL_USE: NO
SUBSTANCE_ABUSE: 0
EVER_SMOKED: YES

## 2019-04-09 ASSESSMENT — COGNITIVE AND FUNCTIONAL STATUS - GENERAL
MOBILITY SCORE: 15
DAILY ACTIVITIY SCORE: 16
STANDING UP FROM CHAIR USING ARMS: A LOT
PERSONAL GROOMING: A LITTLE
CLIMB 3 TO 5 STEPS WITH RAILING: A LOT
MOVING TO AND FROM BED TO CHAIR: A LITTLE
TOILETING: A LOT
SUGGESTED CMS G CODE MODIFIER MOBILITY: CK
TURNING FROM BACK TO SIDE WHILE IN FLAT BAD: A LITTLE
SUGGESTED CMS G CODE MODIFIER DAILY ACTIVITY: CK
DRESSING REGULAR LOWER BODY CLOTHING: A LOT
WALKING IN HOSPITAL ROOM: A LOT
MOVING FROM LYING ON BACK TO SITTING ON SIDE OF FLAT BED: A LITTLE
DRESSING REGULAR UPPER BODY CLOTHING: A LITTLE
HELP NEEDED FOR BATHING: A LOT

## 2019-04-09 ASSESSMENT — COPD QUESTIONNAIRES
DO YOU EVER COUGH UP ANY MUCUS OR PHLEGM?: NO/ONLY WITH OCCASIONAL COLDS OR INFECTIONS
COPD SCREENING SCORE: 5
HAVE YOU SMOKED AT LEAST 100 CIGARETTES IN YOUR ENTIRE LIFE: YES
DURING THE PAST 4 WEEKS HOW MUCH DID YOU FEEL SHORT OF BREATH: SOME OF THE TIME

## 2019-04-09 NOTE — ED PROVIDER NOTES
ED Provider Note    Scribed for Billie Garrison M.D. by Clarita Rivera. 4/8/2019, 7:31 PM.    Primary care provider: Nicolette Yeager M.D. (Inactive)  Means of arrival: Ambulance  History obtained from: Patient  History limited by: Patient's altered mental status    CHIEF COMPLAINT  Chief Complaint   Patient presents with   • Epigastric Pain     epigastric pain starting today given 4mg morphine by nursing home. After pain returned nursing home called EMS   • RLQ Pain     Pt reports RLQ pain that feels like he's being punched in the gut. pt denies n/v       HPI  Shola Hoyt is a 69 y.o. male with dementia who presents to the Emergency Department evaluation of a wound check of the wounds to his abdomen following recent open cholecystectomy on 03/31. Per Nursing note, EMS was called by nursing staff home tonight after patient had complaints of epigastric abdominal pain. He received 4 mg of Morphine by his nursing home at that time, but patient reports no abdominal pain at this time. The patient denies associated nausea or vomiting. No alleviating or aggravating factors are identified at this time. He was recently discharged from the hospital on 04/04.     HPI limited secondary to patient's altered mental status.    REVIEW OF SYSTEMS  Pertinent positives include abdominal wounds. Pertinent negatives include no abdominal pain, nausea, or vomiting.   ROS limited secondary to patient's altered mental status    PAST MEDICAL HISTORY   has a past medical history of Arthritis; Benign prostate hyperplasia; Colostomy in place (HCC); COPD (chronic obstructive pulmonary disease) (HCC); Diabetes (HCC); and Renal disorder.    SURGICAL HISTORY   has a past surgical history that includes toe amputation (Right, 10/25/2017); ercp,diagnostic (3/29/2019); and carlos by laparoscopy (N/A, 3/31/2019).    SOCIAL HISTORY  Social History   Substance Use Topics   • Smoking status: Former Smoker   • Smokeless tobacco: Former User   • Alcohol  use None noted      History   Drug use: Unknown       FAMILY HISTORY  No pertinent family history noted.    CURRENT MEDICATIONS  Home Medications     Reviewed by Keyla Nunez (Pharmacy Tech) on 04/08/19 at 2156  Med List Status: Complete   Medication Last Dose Status   acetaminophen (TYLENOL) 500 MG Tab 4/5/2019 Active   budesonide-formoterol (SYMBICORT) 160-4.5 MCG/ACT Aerosol 4/8/2019 Active   ferrous gluconate (FERGON) 324 (38 Fe) MG Tab 4/8/2019 Active   furosemide (LASIX) 20 MG Tab 4/8/2019 Active   insulin NPH (HUMULIN,NOVOLIN) 100 UNIT/ML Suspension 4/8/2019 Active   loratadine (CLARITIN) 10 MG Tab 4/8/2019 Active   morphine (MS IR) 15 MG tablet 4/8/2019 Active   morphine ER (MS CONTIN) 15 MG Tab CR tablet 4/8/2019 Active   nystatin (MYCOSTATIN) 846213 UNIT/GM Cream topical cream 4/8/2019 Active   tamsulosin (FLOMAX) 0.4 MG capsule 4/8/2019 Active                ALLERGIES  Allergies   Allergen Reactions   • Ceftriaxone      Has tolerated Merrem, Zosyn, and Unasyn   • Ezetimibe    • Flagyl [Metronidazole]    • Infliximab    • Lovastatin    • Simvastatin    • Vancomycin        PHYSICAL EXAM  VITAL SIGNS: /58   Pulse 98   Temp 36.3 °C (97.4 °F) (Temporal)   Resp 18   Wt (!) 125.2 kg (276 lb)   SpO2 99%   BMI 38.49 kg/m²   Constitutional: Appears demented but in no apparent distress.  HENT: No signs of trauma, Bilateral external ears normal, Nose normal.   Eyes: Pupils are equal and reactive, Conjunctiva normal, Non-icteric.   Neck: Normal range of motion, No tenderness, Supple, No stridor.   Cardiovascular: Regular rate and rhythm, no murmurs.   Thorax & Lungs: Normal breath sounds, No respiratory distress, No wheezing, No chest tenderness.   Abdomen: Soft, No tenderness, No masses, No peritoneal signs. Medial aspect of right upper quadrant surgical wound with purulent drainage. Right colostomy in place.   Skin: Warm, Dry, No erythema, No rash.   Musculoskeletal:  No major deformities noted. No  "lower extremity edema.   Neurologic: Demented. moving all extremities without difficulty, no focal deficits.    LABS  Labs Reviewed   CBC WITH DIFFERENTIAL - Abnormal; Notable for the following:        Result Value    WBC 12.3 (*)     RBC 4.30 (*)     Hemoglobin 12.2 (*)     Hematocrit 37.5 (*)     MCHC 32.5 (*)     Lymphocytes 20.80 (*)     Neutrophils (Absolute) 8.47 (*)     Monos (Absolute) 0.97 (*)     All other components within normal limits   COMP METABOLIC PANEL - Abnormal; Notable for the following:     Sodium 133 (*)     Glucose 138 (*)     Bun 43 (*)     Creatinine 2.95 (*)     Total Protein 8.7 (*)     Globulin 5.2 (*)     All other components within normal limits   ESTIMATED GFR - Abnormal; Notable for the following:     GFR If  26 (*)     GFR If Non  21 (*)     All other components within normal limits   PROTHROMBIN TIME - Abnormal; Notable for the following:     PT 15.7 (*)     INR 1.24 (*)     All other components within normal limits    Narrative:     If not done within the last 4 hours  Indicate which anticoagulants the patient is on:->NONE   APTT - Abnormal; Notable for the following:     APTT 36.9 (*)     All other components within normal limits    Narrative:     If not done within the last 4 hours  Indicate which anticoagulants the patient is on:->NONE   LIPASE   BLOOD CULTURE    Narrative:     From different peripheral sites, if not done within the last  24 hours (Per Hospital Policy: Only change specimen source to  \"Line\" if specified by physician order)   BLOOD CULTURE    Narrative:     From different peripheral sites, if not done within the last  24 hours (Per Hospital Policy: Only change specimen source to  \"Line\" if specified by physician order)   LACTIC ACID     All labs reviewed by me.    EKG  12 Lead EKG interpreted by me to show:  Normal sinus rhythm  Rate 89  Left axis deviation  Intervals: Normal  Normal T waves  Normal ST segments  My impression of " this EKG: Does not indicate ischemia.    RADIOLOGY  CT-ABDOMEN-PELVIS W/O   Final Result         1. Small free peritoneal air and small amount of gas in the gallbladder surgical bed could relate to recent surgery. Correlate clinically.      2. Soft tissue stranding in the epigastrium abdominal wall could relate to recent surgery as well. No discrete fluid collection is seen.      3. Decompressed urinary bladder by Humphrey catheter with apparent wall thickening.        The radiologist's interpretation of all radiological studies have been reviewed by me.      Packing Procedure Note    Indication: post-op wound infection    Procedure: The patient was positioned appropriately and the skin over the surgical site was draped in a sterile fashion. 4 staples over the medial aspect of right upper abdominal wound were removed at this time and incision was packed with 0.5 inch sterile gauze. The patient’s tetanus status was up to date and did not require a booster dose.    The patient tolerated the procedure well.    Complications: None        COURSE & MEDICAL DECISION MAKING  Pertinent Labs & Imaging studies reviewed. (See chart for details)    Differential diagnoses include but are not limited to: Post op abscess, sepsis    7:20 PM - Obtained and reviewed past medical records which indicates patient was admitted from 03/25 - 04/04 for common bile duct stone, so ERCP with stone exraction was performed and later had acholecytectomy on 03/31.    7:35 PM - Patient seen and examined at bedside. Ordered CBC with differential, CMP, Lipase, and EKG to evaluate his symptoms. Will await lab results to determine if CT imaging is indicated as patient has a history of CKD.    8:25 PM - Paged on call surgeon for Dr. Alonzo, Surgery.     8:50 PM I discussed the patient's case and the above findings with Dr. Alonzo (Surgery) who recommends CT non con and medial aspect of wound needs to be open so it can be drained and packed. The patient can  be admitted to the Hospitalist service.     9:28 PM - Patient was reevaluated at bedside. He is resting in bed with stable vital signs. Discussed lab and radiology results that indicate infection. The patient was made aware he will need to be admitted. He is understanding and agreeable to admit.     9:33 PM - Staples removed and packing placed in medial aspect of patient's surgical wound at this time as outlined above.     9:42 PM I discussed the patient's case and the above findings with Dr. Burton (Hospitalist) who agrees to admit the patient. Patient care will be transferred at this time.    Decision Making:  This is a 69 y.o. year old male who presents with reported right upper quadrant abdominal pain.  The patient is not reporting any pain at the moment.  He is afebrile.  He does have a wound that appears infected over the medial aspect and slightly dehisced.  I spoke with his surgeon who recommended removing the staples and packing.  This was performed.  CT scan without contrast was performed given his history of renal failure there is evidence of soft tissue stranding in the epigastrium abdominal wall is unclear if this is related to surgery there is no discrete fluid collection.  Patient will be admitted on IV antibiotics to the hospitalist service.    DISPOSITION:  Patient will be admitted to Dr. Burton, Hospitalist, in guarded condition.    FINAL IMPRESSION  1. Wound infection    2. Right upper quadrant abdominal pain           This dictation has been created using voice recognition software and/or scribes. The accuracy of the dictation is limited by the abilities of the software and the expertise of the scribes. I expect there may be some errors of grammar and possibly content. I made every attempt to manually correct the errors within my dictation. However, errors related to voice recognition software and/or scribes may still exist and should be interpreted within the appropriate context.     Clarita WINTER  Miguel (Scribzia), am scribing for, and in the presence of, Billie Garrison M.D..    Electronically signed by: Clarita Rivera ([employer]), 4/8/2019    I, Billie Garrison M.D. personally performed the services described in this documentation, as scribed by Clarita Rivera in my presence, and it is both accurate and complete.    C.    The note accurately reflects work and decisions made by me.  Billie Garrison  4/9/2019  12:32 AM

## 2019-04-09 NOTE — WOUND TEAM
"[facility]   Inpatient Services   Established Ostomy Management/ troubleshooting  HPI: Reviewed  PMH: Reviewed   SH: Reviewed   Reason for Ostomy nurse consult:  chonically leaking ileosotomy and multiple wounds       Subjective:  \"Do you have that cloth tape?\"     Objective:  In bed, previous appliance leaking, right ABD with iodoform packing, Cheri at bedside.     Ostomy type:  Ileostomy   Stoma location:  RUQ   Stoma assessment:    Appearance:  Red, budded          Size:  3/4     Protrusion:  <1   Output:  Min, loose, brown    MC jxn:  Intact      Peristomal skin:  Severely denuded 2-3 cm circumferentially around stoma, dry flaking pink scar tissue outside of stoma.        Ostomy Appliance (type and size):  Convex with belt and paste ring       Interventions and Education:  Previous appliance removed, cleansed area with warm wash cloth.  Cut barrier, paste ring applied.  Skin crusted x 2 with ostomy powder and no sting skin prep.  Barrier applied, end closed, belt applied.         Evaluation:  Patient with ileostomy that has obviously been leaking for a long time.  Ostomy RN to check daily, change every other day.              Plan:  Wound team to change every other day.      Anticipated discharge needs:  SNF?         [facility]  Inpatient Services  Initial Wound and Skin Care Evaluation    Admission Date: 4/8/2019     HPI, PMH, : Reviewed     Unit where seen by Wound Team: [id number]/02     WOUND CONSULT RELATED TO:  Penis, buttocks/coccyx, ABD     SUBJECTIVE:  \"slow and steady wins the race\"      Self Report / Pain Level:  Pre-medicated, still very hypersensitive        OBJECTIVE:  In bed, ileostomy leaking, Dr. Alonzo at bedside, will place VAC to right ABD incision     WOUND TYPE, LOCATION, CHARACTERISTICS (Pressure Injuries: location, stage, POA or date identified)           Pressure Injury 04/09/19 Sacrum stage 3 with moisture related breakdown to buttock (Active)   Wound Image  "     Pressure Injury Stage 3    State of Healing Non-healing    Site Assessment Red    Nisa-wound Assessment Red;Blanchable erythema    Margins Attached edges    Wound Length (cm) 0.5 cm    Wound Width (cm) 0.5 cm    Wound Depth (cm) 0.5 cm    Wound Surface Area (cm^2) 0.25 cm^2    Tunneling 0 cm    Undermining 0 cm    Closure Secondary intention;Open to air    Drainage Amount Small    Drainage Description Serosanguineous    Treatments Cleansed    Cleansing Normal Saline Irrigation    Periwound Protectant Moisture Barrier    Dressing Options Open to Air    Dressing Cleansing/Solutions Not Applicable                    Pressure Injury 04/09/19 Ear sDTI (Active)   Wound Image    4/9/2019  4:00 AM   Pressure Injury Stage DTPI 4/9/2019 11:00 AM   Site Assessment Light purple;Red 4/9/2019 11:00 AM   Nisa-wound Assessment Intact 4/9/2019 11:00 AM   Margins Attached edges 4/9/2019 11:00 AM   Wound Length (cm) 0.5 cm 4/9/2019 11:00 AM   Wound Width (cm) 0.5 cm 4/9/2019 11:00 AM   Wound Surface Area (cm^2) 0.25 cm^2 4/9/2019 11:00 AM   Drainage Amount None 4/9/2019 11:00 AM   Treatments Cleansed 4/9/2019 11:00 AM   Dressing Options Mepilex 4/9/2019 11:00 AM              Incision 04/09/19 Abdomen (Active)   Wound Image   4/9/2019 11:00 AM   Site Assessment Red;Pink 4/9/2019 11:00 AM   Nisa-wound Assessment Red 4/9/2019 11:00 AM   Margins Attached edges 4/9/2019 11:00 AM   Wound Length (cm) 13.5 cm 4/9/2019 11:00 AM   Wound Width (cm) 1 cm 4/9/2019 11:00 AM   Wound Depth (cm) 3 cm 4/9/2019 11:00 AM   Wound Surface Area (cm^2) 13.5 cm^2 4/9/2019 11:00 AM   Tunneling 0 cm 4/9/2019 11:00 AM   Undermining 0 cm 4/9/2019 11:00 AM   Closure Secondary intention 4/9/2019 11:00 AM   Drainage Amount Small 4/9/2019 11:00 AM   Drainage Description Serosanguineous 4/9/2019 11:00 AM   Treatments Cleansed;Site care 4/9/2019 11:00 AM   Periwound Protectant Skin Protectant wipes to Periwound;Drape 4/9/2019 11:00 AM   Dressing Options Wound Vac  4/9/2019 11:00 AM   Dressing Changed New 4/9/2019 11:00 AM   Dressing Status Clean;Dry;Intact 4/9/2019 11:00 AM   Dressing Change Frequency Tuesday, Thursday, Saturday 4/9/2019 11:00 AM   NEXT Dressing Change  04/11/19 4/9/2019 11:00 AM   NEXT Weekly Photo (Inpatient Only) 04/16/19 4/9/2019 11:00 AM   WOUND NURSE ONLY - Odor None 4/9/2019 11:00 AM   WOUND NURSE ONLY - Exposed Structures Adipose 4/9/2019 11:00 AM   WOUND NURSE ONLY - Tissue Type and Percentage 100% pink/red/yellow 4/9/2019 11:00 AM   WOUND NURSE ONLY - Time Spent with Patient (mins) 120 4/9/2019 11:00 AM                 Wound 04/09/19 Full Thickness Wound 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 (Active)   Wound Image    4/9/2019 11:00 AM   Site Assessment Red 4/9/2019 11:00 AM   Nisa-wound Assessment Edema;Red;Scar tissue 4/9/2019 11:00 AM   Margins Epibole (rolled edges) 4/9/2019 11:00 AM   Closure Secondary intention 4/9/2019 11:00 AM   Drainage Amount Moderate 4/9/2019 11:00 AM   Drainage Description Yellow 4/9/2019 11:00 AM   Non-staged Wound Description Full thickness 4/9/2019 11:00 AM   Treatments Cleansed;Site care 4/9/2019 11:00 AM   Cleansing Approved Wound Cleanser 4/9/2019 11:00 AM   Periwound Protectant Not Applicable 4/9/2019 11:00 AM   Dressing Options Petroleum Gauze (clear) 4/9/2019 11:00 AM   Dressing Changed New 4/9/2019 11:00 AM   Dressing Status Clean;Dry;Intact 4/9/2019 11:00 AM   Dressing Change Frequency As Needed 4/9/2019 11:00 AM   NEXT Dressing Change  04/09/19 4/9/2019 11:00 AM   WOUND NURSE ONLY - Odor None 4/9/2019 11:00 AM     Vascular:    Dorsal Pedal pulses:  NA  Posterior tib pulses:   NA    BERNICE:      NA    Lab Values:    WBC:       WBC   Date/Time Value Ref Range Status   04/08/2019 07:30 PM 12.3 (H) 4.8 - 10.8 K/uL Final     A1C:      Lab Results   Component Value Date/Time    HBA1C 6.4 (H) 02/23/2018 03:58 PM         Culture:   NA    INTERVENTIONS BY WOUND TEAM:  Dr. Alonzo at bedside.  Renata incision dressing  removed, assessed with Dr. Alonzo and decided to place wound VAC.  Patient stood at bedside to assess buttocks/coccyx/sacrum.  New photos of incision and penis taken.  ABD incision cleansed with wound cleanser, measured.  Benzoin and drape to julia-wound.  3 pieces of black foam to wound bed including button, track pad applied and setting at 125 mmHg continuous.  Penis and groin cleansed with wound cleanser and warm wash cloth.  Wrapped harrington with petrolatum gauze.  New orders placed.              Dressing selection:  DOROTHY to buttocks and groin (sacral mepilex if able to keep patient dry), VAC to ABD, petrolatum to penis         Interdisciplinary consultation: Patient, Bedside RN, Dr. Alonzo     EVALUATION: patient admitted from SNF.  Patient has been seen by wound care recently at .  Had carlos with Dr. Alonzo at that time with prevena placement.  Wound dehisced and VAC placed.  Urology consult placed for penis erosion, hopefully patient can have supra pubic catheter placed as harrington is chronic related to BPH.        Factors affecting wound healing: harrington, infection, DM, COPD  Goals: Steady decrease in wound area and depth weekly.    NURSING PLAN OF CARE ORDERS (X):    Dressing changes: See Dressing Care orders: X  Skin care: See Skin Care orders: X  Rectal tube care: See Rectal Tube Care orders:   Other orders:    RSKIN: CURRENT (X) ORDERED (O):   Q shift Malik:  X  Q shift pressure point assessments:  X  Pressure redistribution mattress      X     FARNAZ          Bariatric FARNAZ         Bariatric foam           Heel float boots          Float Heels off Bed with Pillows               Barrier wipes         Barrier Cream         Barrier paste        X  Sacral silicone dressing       X if able to keep patient dry  Silicone O2 tubing         Anchorfast         Cannula fixation Device (Tender )          Gray Foam Ear protectors           Trach with Optifoam split foam                 Waffle cushion        Waffle Overlay        X  Rectal tube or BMS         Antifungal tx      Interdry          Reposition q 2 hours      X  Up to chair        Ambulate      PT/OT        Dietician        Diabetes Education      PO   X  TF     TPN     NPO   # days   Other        WOUND TEAM PLAN OF CARE (X):   NPWT change 3 x week:      X  Dressing changes by wound team:       Follow up as needed:       Other (explain):     Anticipated discharge plans (X):   SNF:         X  Home Care:           Outpatient Wound Center:            Self Care:            Other:

## 2019-04-09 NOTE — PROGRESS NOTES
Assumed care of patient, bedside report received from PREET Brambila. Brought pt up to floor and hooked up to tele monitor. Notified Monitor room. Updated on POC, call light within reach and fall precautions in place. Bed locked and in lowest position. Patient instructed to call for assistance before getting out of bed. All questions answered, no other needs at this time.

## 2019-04-09 NOTE — CONSULTS
[facility] [place] Consult/H&P Note    Primary Service: Hospitalist  Attending: Ann Issa M.D.  Patient's Name/MRN: Shola Hoyt, [id number]    Admit Date:4/8/2019  Today's Date: 4/9/2019   Length of stay:  LOS: 1 day   Room #: T828/02      Reason for consult/chief complaint: traumatic hypospadias  ID/HPI: Shola Hoyt is a 69 y.o. male patient who initially presented with bladder outlet obstruction bilateral hydronephrosis hydronephrosis in 10/2017.  He denied back that has had what appears to be chronic indwelling Humphrey catheter since then which is probably been changed periodically at a skilled nursing facility.  He was lost to follow-up in our clinic but represents to the hospital recent status post lap cholecystectomy complicated by wound infection for which she is admitted.  Hospitalist service noted somewhat erythematous and deformed penis secondary to Humphrey catheter and urology was consulted.  The patient denies any current penile pain       Past Medical History:   Past Medical History:   Diagnosis Date   • Arthritis    • Benign prostate hyperplasia    • Colostomy in place (HCC)    • COPD (chronic obstructive pulmonary disease) (HCC)    • Diabetes (HCC)    • Renal disorder       Past Surgical History:   Past Surgical History:   Procedure Laterality Date   • POP BY LAPAROSCOPY N/A 3/31/2019    Procedure: CHOLECYSTECTOMY, LAPAROSCOPIC-conversion to open cholecystectomy;  Surgeon: Gunnar Alonzo M.D.;  Location: [facility];  Service: General   • PB ERCP,DIAGNOSTIC  3/29/2019    Procedure: ERCP, DIAGNOSTIC;  Surgeon: Tao Saab M.D.;  Location: [facility];  Service: Gastroenterology   • TOE AMPUTATION Right 10/25/2017    Procedure: TOE AMPUTATION-GREAT TOE;  Surgeon: Bobby Alejo M.D.;  Location: [facility];  Service: Orthopedics        Family History:   No family history on file.      Social History:   Social History   Substance Use Topics   •  Smoking status: Former Smoker   • Smokeless tobacco: Former User   • Alcohol use Not on file      Social History     Social History Narrative   • No narrative on file        Allergies: he Ceftriaxone; Ezetimibe; Flagyl [metronidazole]; Infliximab; Lovastatin; Simvastatin; and Vancomycin    Medications:   Prescriptions Prior to Admission   Medication Sig Dispense Refill Last Dose   • acetaminophen (TYLENOL) 500 MG Tab Take 1,000 mg by mouth every 6 hours as needed for Mild Pain.   4/5/2019 at 1800   • ferrous gluconate (FERGON) 324 (38 Fe) MG Tab Take 324 mg by mouth every morning with breakfast.   4/8/2019 at 1000   • furosemide (LASIX) 20 MG Tab Take 20 mg by mouth 2 times a day.   4/8/2019 at 1000   • insulin NPH (HUMULIN,NOVOLIN) 100 UNIT/ML Suspension Inject 30-40 Units as instructed every day. 30 units in AM  40 units at BEDTIME   4/8/2019 at 1000   • loratadine (CLARITIN) 10 MG Tab Take 10 mg by mouth every day.   4/8/2019 at 1000   • morphine (MS IR) 15 MG tablet Take 15 mg by mouth 3 times a day as needed for Severe Pain.   4/8/2019 at 1715   • morphine ER (MS CONTIN) 15 MG Tab CR tablet Take 15 mg by mouth every 12 hours.   4/8/2019 at 1000   • nystatin (MYCOSTATIN) 382549 UNIT/GM Cream topical cream Apply 1 g to affected area(s) 2 times a day. To affected areas   4/8/2019 at 1100   • budesonide-formoterol (SYMBICORT) 160-4.5 MCG/ACT Aerosol Inhale 2 Puffs by mouth 2 Times a Day.   4/8/2019 at 1000   • tamsulosin (FLOMAX) 0.4 MG capsule Take 0.4 mg by mouth ONE-HALF HOUR AFTER BREAKFAST.   4/8/2019 at 1000         Review of Systems  Review of Systems   Unable to perform ROS: Mental status change        Physical Exam  VITAL SIGNS: /62   Pulse 70   Temp 36.8 °C (98.2 °F) (Temporal)   Resp 17   Wt (!) 125.2 kg (276 lb)   SpO2 96%   BMI 38.49 kg/m²   GENERAL:  alert, in no acute distress  EYES: EOMI and normal accomodation  Neck: Supple  BACK: No CVA tenderness.   CHEST AND LUNGS: good air entry, no  audible wheezes  CARDIOVASCULAR: Rate is regular, no peripheral edema.   ABDOMEN: Abdomen soft, nontender. No masses, organomegaly.  : Patient with 18 [place] Humphrey catheter latex in place.  Inspection of the penis reveals traumatic hypospadias secondary to catheter erosion from long-term indwelling catheter with some surrounding erythema and mild tenderness.  EXTREMITIES: Warm and well perfused without c/c/e  NEURO: No focal deficits  SKIN: Skin color, texture, turgor normal. No rashes, lesions, nor jaundice.      Labs:  Recent Labs      04/08/19 1930   WBC  12.3*   RBC  4.30*   HEMOGLOBIN  12.2*   HEMATOCRIT  37.5*   MCV  87.2   MCH  28.4   MCHC  32.5*   RDW  46.2   PLATELETCT  439   MPV  9.3     Recent Labs      04/08/19 1930   SODIUM  133*   POTASSIUM  4.2   CHLORIDE  100   CO2  23   GLUCOSE  138*   BUN  43*   CREATININE  2.95*   CALCIUM  8.8     Recent Labs      04/08/19 1930   APTT  36.9*   INR  1.24*     Glucose:  Lab Results   Component Value Date/Time    GLUCOSE 138 (H) 04/08/2019 07:30 PM    GLUCOSE 123 (H) 04/03/2019 04:26 AM    GLUCOSE 133 (H) 04/02/2019 05:25 AM    GLUCOSE 126 (H) 04/01/2019 05:19 AM     Coags:  Lab Results   Component Value Date/Time    INR 1.24 (H) 04/08/2019 07:30 PM    INR 1.15 (H) 03/28/2019 05:25 AM    INR 1.32 (H) 02/23/2018 03:58 PM         Urinalysis:   Lab Results   Component Value Date/Time    COLORURINE Yellow 02/23/2018 05:09 PM    CLARITY Turbid (A) 02/23/2018 05:09 PM    SPECGRAVITY 1.006 02/23/2018 05:09 PM    PHURINE 5.5 02/23/2018 05:09 PM    GLUCOSEUR Negative 02/23/2018 05:09 PM    KETONES Negative 02/23/2018 05:09 PM    NITRITE Negative 02/23/2018 05:09 PM    OCCULTBLOOD Moderate (A) 02/23/2018 05:09 PM    RBCURINE 10-20 (A) 02/23/2018 05:09 PM    BACTERIA Many (A) 02/23/2018 05:09 PM    EPITHELCELL Negative 02/23/2018 05:09 PM       Imaging:                                                              Results for orders placed during the hospital encounter  of 04/08/19   CT-ABDOMEN-PELVIS W/O    Impression 1. Small free peritoneal air and small amount of gas in the gallbladder surgical bed could relate to recent surgery. Correlate clinically.    2. Soft tissue stranding in the epigastrium abdominal wall could relate to recent surgery as well. No discrete fluid collection is seen.    3. Decompressed urinary bladder by Humphrey catheter with apparent wall thickening.                              Assessment/Recommendation   69 y.o. male with traumatic hypospadias secondary to long-term indwelling catheter catheter.  At this point I would highly recommend placing a suprapubic tube given his history of lower abdominal surgeries under interventional radiology guidance.  This can be done as an inpatient while he is here, however, this is a non-urgent matter and could be delayed to an outpatient if discharged were eminent.  I do think given his nursing facility status it would be easier to do well here.  He would need a follow-up in our clinic with myself or Dr. Rodriguez who initially saw him for his first catheter exchange.  He could have monthly catheter exchanges nursing facility following this.    · Recommend IR placement of SPT  · Will need f/u as outpatient for first exchange 6 weeks post placement       Liban Alex M.D.   [address] [place] [address]   [phone]

## 2019-04-09 NOTE — PROGRESS NOTES
· 2 RN skin check complete with PREET Clement.  · Devices in place Humphrey, nasal cannula, colostomy.  · Skin assessed under devices red, sore, nonblanching, purulent.  · New potential pressure ulcers noted on BL ears, Sacrum.   Wound consult placed and wound reported.  · The following interventions in place Waffle mattress ordered, Q2 turns, Joey cream.  BL ears red and non blanching, nasal cannula off loaded, bottom is red and slow to quan Q2 turns, Mid incision red and dehisced, Red and moist under colostomy, Penis red and tender with purulent drainage, BL heals flaky and cracked.

## 2019-04-09 NOTE — PROGRESS NOTES
· 2 RN skin check complete with PREET Arredondo.  · Devices in place silicone NC, ileostomy RUQ, harrington catheter, wound vac right epigastric region. .  · Skin assessed under devices ears red, blanching. Skin red, flaky but intact around ileostomy.  Penis red, swollen, with DTI on ventral penis where harrington tube was sitting and pus noted from urethra.   · Confirmed pressure ulcers found on sacrum at coccyx.  · New potential pressure ulcers noted on ventral surface of penis. Wound consult placed and wound reported.  · Sacrum red, non-blanching. Skin on buttocks red, blanching with dead epidermal layer peeling off. Skin on buttocks intact.  Groin red, moist.   · The following interventions in place waffle bed ordered, Q2 turns, barrier wipes, mckinley cream ordered per wound RN, silicone NC offloaded to cheeks, harrington tubing repositioned and harrington care provided, harrington tubing wrapped in petrolatum gauze, interdry ordered for groin moisture.

## 2019-04-09 NOTE — PROGRESS NOTES
Pt brought up with chronic fully, penis tender with purulent drainage. Notified MD Lista. Per MD Lista have Day shift address harrington situation.

## 2019-04-09 NOTE — PROGRESS NOTES
[facility] Medicine Daily Progress Note    Date of Service  4/9/2019    Chief Complaint  Abdominal pain    Hospital Course  Patient is a 69 y.o. male with a past medical history of dementia, Crohn's disease status post ileostomy, BPH with chronic indwelling Humphrey catheter, insulin-dependent diabetes mellitus, CKD, chronic pain and opiate dependence who presented 4/8/2019 with abdominal pain.  Patient was recently admitted to [facility] for right upper quadrant pain and is diagnosed with common bile duct stone for which she underwent ERCP with stone extraction followed by open cholecystectomy on 3/31/19 and discharged to SNF.  The patient developed abdominal pain and was noted to have some purulent drainage from his surgical wound for which EMS was called    Interval Problem Update  Purulent d/c from penis  Pt is alert and oriented to person only, he says he is from [facility], thinks it is 1977  Per old chart h/o poor memory  Denies pain  Very poor historian    Consultants/Specialty  Surgery  Urology    Code Status  Full code    Disposition  tbd    Review of Systems  Review of Systems   Constitutional: Negative.  Negative for chills, diaphoresis, fever, malaise/fatigue and weight loss.   HENT: Negative.  Negative for sore throat.    Eyes: Negative.  Negative for blurred vision.   Respiratory: Negative.  Negative for cough and shortness of breath.    Cardiovascular: Negative.  Negative for chest pain, palpitations and leg swelling.   Gastrointestinal: Negative.  Negative for abdominal pain, nausea and vomiting.   Genitourinary: Negative.  Negative for dysuria.   Musculoskeletal: Negative.  Negative for myalgias.   Skin: Negative.  Negative for itching and rash.   Neurological: Negative.  Negative for dizziness, focal weakness, weakness and headaches.   Endo/Heme/Allergies: Negative.  Does not bruise/bleed easily.   Psychiatric/Behavioral: Negative.  Negative for depression, substance abuse and suicidal  ideas.   All other systems reviewed and are negative.       Physical Exam  Temp:  [36.3 °C (97.4 °F)-37 °C (98.6 °F)] 36.8 °C (98.2 °F)  Pulse:  [70-99] 70  Resp:  [14-19] 17  BP: (113-142)/() 113/62  SpO2:  [93 %-99 %] 96 %    Physical Exam   Constitutional: He appears well-developed and well-nourished. No distress.   HENT:   Head: Normocephalic and atraumatic.   Right Ear: External ear normal.   Left Ear: External ear normal.   Nose: Nose normal.   Mouth/Throat: Oropharynx is clear and moist. No oropharyngeal exudate.   Eyes: Pupils are equal, round, and reactive to light. Conjunctivae and EOM are normal. Right eye exhibits no discharge. Left eye exhibits no discharge. No scleral icterus.   Neck: Normal range of motion. Neck supple. No JVD present. No tracheal deviation present. No thyromegaly present.   Cardiovascular: Normal rate, regular rhythm, normal heart sounds and intact distal pulses.  Exam reveals no gallop and no friction rub.    No murmur heard.  Pulmonary/Chest: Effort normal and breath sounds normal. No stridor. No respiratory distress. He has no wheezes. He has no rales. He exhibits no tenderness.   Abdominal: Soft. Bowel sounds are normal. He exhibits no distension and no mass. There is no tenderness. There is no rebound and no guarding.   Ostomy  Surgical site dehis with serous drainage   Genitourinary:   Genitourinary Comments: Penile ulcer with puss, harrington in place   Musculoskeletal: Normal range of motion. He exhibits no edema, tenderness or deformity.   Lymphadenopathy:     He has no cervical adenopathy.   Neurological: He is alert. He has normal reflexes. No cranial nerve deficit. He exhibits normal muscle tone. Coordination normal.   Oriented to person only   Skin: Skin is warm and dry. No rash noted. He is not diaphoretic. No erythema. No pallor.   Psychiatric: Judgment normal.   Nursing note and vitals reviewed.      Fluids    Intake/Output Summary (Last 24 hours) at 04/09/19  1247  Last data filed at 04/08/19 2244   Gross per 24 hour   Intake              100 ml   Output                0 ml   Net              100 ml       Laboratory  Recent Labs      04/08/19 1930   WBC  12.3*   RBC  4.30*   HEMOGLOBIN  12.2*   HEMATOCRIT  37.5*   MCV  87.2   MCH  28.4   MCHC  32.5*   RDW  46.2   PLATELETCT  439   MPV  9.3     Recent Labs      04/08/19 1930   SODIUM  133*   POTASSIUM  4.2   CHLORIDE  100   CO2  23   GLUCOSE  138*   BUN  43*   CREATININE  2.95*   CALCIUM  8.8     Recent Labs      04/08/19 1930   APTT  36.9*   INR  1.24*               Imaging  CT-ABDOMEN-PELVIS W/O   Final Result         1. Small free peritoneal air and small amount of gas in the gallbladder surgical bed could relate to recent surgery. Correlate clinically.      2. Soft tissue stranding in the epigastrium abdominal wall could relate to recent surgery as well. No discrete fluid collection is seen.      3. Decompressed urinary bladder by Humphrey catheter with apparent wall thickening.      US-RENAL    (Results Pending)         Assessment/Plan  Surgical wound infection- (present on admission)   Assessment & Plan    Continue iv abx  Wound vac to be placed  Surgery following       Acute renal failure superimposed on stage 3 chronic kidney disease (HCC)- (present on admission)   Assessment & Plan    Likely prerenal  Hydrating  Will follow  Avoid nephrotoxins  Will check us       BPH (benign prostatic hyperplasia)- (present on admission)   Assessment & Plan    With chronic indwelling Humphrey catheter with s/o secondary ulcer and pus at meatus  Urology to eval       Diabetes mellitus with hyperglycemia ([facility])- (present on admission)   Assessment & Plan    continue insulin sliding scale with serial Accu-Checks  Hypoglycemic protocol in place         COPD (chronic obstructive pulmonary disease) ([facility])- (present on admission)   Assessment & Plan    No acute exacerbation at this time  Continue Symbicort          VTE prophylaxis:  scd

## 2019-04-09 NOTE — ED NOTES
Chief Complaint   Patient presents with   • Epigastric Pain     epigastric pain starting today given 4mg morphine by nursing home. After pain returned nursing home called EMS   • RLQ Pain     Pt reports RLQ pain that feels like he's being punched in the gut. pt denies n/v

## 2019-04-09 NOTE — CONSULTS
Surgical Consultation    Date: 4/9/2019    Requesting Physician: Dr. Garrison  PCP: Nicolette Yeager M.D. (Inactive)  Consulting Physician: Gunnar Alonzo M.D.    CC: wound infection    HPI: This is a 69 y.o. male who is presenting from a nursing home with multiple medical problems.  He underwent laparoscopic converted to open cholecystectomy on 3/31 for a history of choledocholithiasis.  He was discharged to a nursing home facility but re-presented for abdominal pain.  No reports of nausea or vomiting. Had been eating.  He has dementia and does not remember details and does not know where he is.  In the ER, he was noted to have erythema around the medial part of his subcostal incision and I was contacted by Dr. Garrison.  She kindly opened the skin and drained the subcutaneous space of a small amount of fluid, and placed packing.  Noncontrast CT abdomen/pelvis did not show any subcutaneous abscess.  No sign of fascial dehiscence.  The patient is sore, but otherwise does not verbalize any complaints.  I actually evaluated him with the wound care team who was in the room at the same time.    Past Medical History:   Diagnosis Date   • Arthritis    • Benign prostate hyperplasia    • Colostomy in place (HCC)    • COPD (chronic obstructive pulmonary disease) (HCC)    • Diabetes (HCC)    • Renal disorder        Past Surgical History:   Procedure Laterality Date   • POP BY LAPAROSCOPY N/A 3/31/2019    Procedure: CHOLECYSTECTOMY, LAPAROSCOPIC-conversion to open cholecystectomy;  Surgeon: Gunnar Alonzo M.D.;  Location: [facility];  Service: General   • PB ERCP,DIAGNOSTIC  3/29/2019    Procedure: ERCP, DIAGNOSTIC;  Surgeon: Tao Saab M.D.;  Location: [facility];  Service: Gastroenterology   • TOE AMPUTATION Right 10/25/2017    Procedure: TOE AMPUTATION-GREAT TOE;  Surgeon: Bobby Alejo M.D.;  Location: [facility];  Service: Orthopedics       Current Facility-Administered  Medications   Medication Dose Route Frequency Provider Last Rate Last Dose   • albuterol (PROVENTIL) 2.5mg/0.5ml nebulizer solution 2.5 mg  2.5 mg Nebulization Q4H PRN (RT) Guillermo Burton M.D.       • doxycycline monohydrate (ADOXA) tablet 100 mg  100 mg Oral Q12HRS Guillermo Burton M.D.   100 mg at 04/09/19 0623   • insulin regular (HUMULIN R) injection 1-6 Units  1-6 Units Subcutaneous Q6HRS Guillermo Burton M.D.   Stopped at 04/09/19 0600    And   • glucose 4 g chewable tablet 16 g  16 g Oral Q15 MIN PRN Guillermo Burton M.D.        And   • dextrose 50% (D50W) injection 25 mL  25 mL Intravenous Q15 MIN PRN Guillermo Burton M.D.       • piperacillin-tazobactam (ZOSYN) 3.375 g in  mL IVPB  3.375 g Intravenous Q8HRS Guillermo Burton M.D. 25 mL/hr at 04/09/19 0852 3.375 g at 04/09/19 0852   • morphine ER (MS CONTIN) tablet 15 mg  15 mg Oral Q12HRS Ann Issa M.D.       • senna-docusate (PERICOLACE or SENOKOT S) 8.6-50 MG per tablet 2 Tab  2 Tab Oral BID Guillermo Burton M.D.        And   • polyethylene glycol/lytes (MIRALAX) PACKET 1 Packet  1 Packet Oral QDAY PRN Guillermo Burton M.D.        And   • magnesium hydroxide (MILK OF MAGNESIA) suspension 30 mL  30 mL Oral QDAY PRN Guillermo Burton M.D.        And   • bisacodyl (DULCOLAX) suppository 10 mg  10 mg Rectal QDAY PRN Guillermo Burton M.D.       • NS (BOLUS) infusion 1,000 mL  1,000 mL Intravenous Once PRN Guillermo Burton M.D.       • NS infusion   Intravenous Continuous Guillermo Burton M.D. 125 mL/hr at 04/09/19 0142     • Respiratory Care per Protocol   Nebulization Continuous RT Guillermo Josephine, M.D.       • acetaminophen (TYLENOL) tablet 650 mg  650 mg Oral Q6HRS PRN Guillermo Burton M.D.       • Pharmacy Consult Request ...Pain Management Review 1 Each  1 Each Other PHARMACY TO DOSE Guillermo Burton M.D.        And   • oxyCODONE immediate-release (ROXICODONE) tablet 5 mg  5 mg Oral Q3HRS PRN Guillermo Burton M.D.        And   • oxyCODONE immediate-release (ROXICODONE) tablet 10 mg  10 mg Oral Q3HRS PRN Guillermo  KUMAR Burton   10 mg at 04/09/19 0623    And   • morphine (pf) 4 mg/ml injection 4 mg  4 mg Intravenous Q3HRS PRN Guillermo Burton M.D.   4 mg at 04/09/19 0852   • ondansetron (ZOFRAN) syringe/vial injection 4 mg  4 mg Intravenous Q4HRS PRN Guillermo Burton M.D.       • ondansetron (ZOFRAN ODT) dispertab 4 mg  4 mg Oral Q4HRS PRN Guillermo Burton M.D.       • budesonide-formoterol (SYMBICORT) 160-4.5 MCG/ACT inhaler 2 Puff  2 Puff Inhalation BID Guillermo Burton M.D.   2 Puff at 04/09/19 0624   • ferrous gluconate (FERGON) tablet 324 mg  324 mg Oral QDAY with Breakfast Guillermo Burton M.D.   324 mg at 04/09/19 0623   • tamsulosin (FLOMAX) capsule 0.4 mg  0.4 mg Oral AFTER BREAKFAST Guillermo Burton M.D.   0.4 mg at 04/09/19 0852       Social History     Social History   • Marital status:      Spouse name: N/A   • Number of children: N/A   • Years of education: N/A     Occupational History   • Not on file.     Social History Main Topics   • Smoking status: Former Smoker   • Smokeless tobacco: Former User   • Alcohol use Not on file   • Drug use: Unknown   • Sexual activity: Not on file     Other Topics Concern   • Not on file     Social History Narrative   • No narrative on file       No family history on file.    Allergies:  Ceftriaxone; Ezetimibe; Flagyl [metronidazole]; Infliximab; Lovastatin; Simvastatin; and Vancomycin    Review of Systems:  Unable to obtain from the patient due to dementia    Physical Exam:  /63   Pulse 73   Temp 36.9 °C (98.4 °F) (Temporal)   Resp 14   Wt (!) 125.2 kg (276 lb)   SpO2 96%     Constitutional: he is oriented to person, but disoriented to place and time.  he appears disheveled. No acute distress.   Head: Normocephalic and atraumatic.   Neck: Normal range of motion. Neck supple. No JVD present. No tracheal deviation present.   Cardiovascular: Normal rate, regular rhythm  Pulmonary/Chest: Breathing nonlabored on 2L NC. No stridor. No respiratory distress.  Abdominal: Soft, nondistended.  There is no rebound and no guarding. Expected tenderness at subcostal incision.  ~2-3cm of the medial portion open to subcutaneous tissue.  No evidence of abscess or fascial dehiscence when probed.  Mild reactive erythema to remainder of skin staples without drainage or signs of skin separation  Musculoskeletal: Normal range of motion. he exhibits no edema and no tenderness.   Neurological: No gross motor or sensory deficit.  Disoriented as noted above.   Skin: Skin around RLQ stoma is irritated and red, but this is chronic.  Early stage decubitus on sacrum and separation of skin due to ulceration at tip of penis  Psychiatric: unable to assess      Labs:  Recent Labs      04/08/19 1930   WBC  12.3*   RBC  4.30*   HEMOGLOBIN  12.2*   HEMATOCRIT  37.5*   MCV  87.2   MCH  28.4   MCHC  32.5*   RDW  46.2   PLATELETCT  439   MPV  9.3     Recent Labs      04/08/19 1930   SODIUM  133*   POTASSIUM  4.2   CHLORIDE  100   CO2  23   GLUCOSE  138*   BUN  43*   CREATININE  2.95*   CALCIUM  8.8     Recent Labs      04/08/19 1930   APTT  36.9*   INR  1.24*     Recent Labs      04/08/19 1930   ASTSGOT  16   ALTSGPT  15   TBILIRUBIN  0.5   ALKPHOSPHAT  79   GLOBULIN  5.2*   INR  1.24*       Radiology:  CT-ABDOMEN-PELVIS W/O   Final Result         1. Small free peritoneal air and small amount of gas in the gallbladder surgical bed could relate to recent surgery. Correlate clinically.      2. Soft tissue stranding in the epigastrium abdominal wall could relate to recent surgery as well. No discrete fluid collection is seen.      3. Decompressed urinary bladder by Humphrey catheter with apparent wall thickening.          Assessment: This is a 69 y.o. Male with dementia who underwent lap-open cholecystectomy for choledocholithiasis on 3/31, readmitted for multiple complaints.  Evidence of superficial wound infection at medial region of subcostal incision.  No abscess on CT, fascia intact on exam.        Recommendations:   -Discussed  and evaluated with wound care.  They have kindly agreed to place wound VAC which I think will help the wound heal as quickly as possible.  Appreciate their management  -No indication for operation related to prior cholecystectomy at this time.  -All further management per primary team      Gunnar Alonzo M.D.  [facility]  [phone]

## 2019-04-09 NOTE — PROGRESS NOTES
Assumed care of pt.  Assessment complete.  A&O x 2.  No signs of distress.  Pt is very confused. Discussed plan of care.  Call light within reach.  Bed alarm active.  Treaded socks on pt.  Will continue to monitor.

## 2019-04-09 NOTE — H&P
Hospital Medicine History & Physical Note    Date of Service  4/8/2019    Primary Care Physician  Nicolette Yeager M.D. (Inactive)    Consultants  Surgery Dr. Alonzo    Code Status  Full code    Chief Complaint  Abdominal pain    History of Presenting Illness  69 y.o. male with a past medical history of dementia, Crohn's disease status post ileostomy, BPH with chronic indwelling Humphrey catheter, insulin-dependent diabetes mellitus, CKD, chronic pain and opiate dependence who presented 4/8/2019 with abdominal pain.  Patient was recently admitted to [facility] for right upper quadrant pain and is diagnosed with common bile duct stone for which she underwent ERCP with stone extraction followed by open cholecystectomy on 3/31/19 and discharged to SNF.  The patient developed abdominal pain and was noted to have some purulent drainage from his surgical wound for which EMS was called.  No reported fevers, chills, shortness of breath or cough.  He denies any diarrhea or dysuria.  ER physician consulted surgery.      EKG interpreted by me reveals sinus rhythm with no ST elevation or ST depression    Review of Systems  Review of Systems   Constitutional: Negative for diaphoresis.   HENT: Negative for hearing loss and sore throat.    Eyes: Negative for blurred vision.   Respiratory: Negative for cough, sputum production, shortness of breath and wheezing.    Cardiovascular: Negative for chest pain, palpitations and leg swelling.   Gastrointestinal: Positive for abdominal pain. Negative for blood in stool, diarrhea, nausea and vomiting.   Genitourinary: Negative for dysuria, flank pain and urgency.   Musculoskeletal: Negative for back pain, joint pain, myalgias and neck pain.   Skin: Negative for rash.   Neurological: Negative for dizziness, focal weakness, seizures and headaches.   Endo/Heme/Allergies: Does not bruise/bleed easily.   Psychiatric/Behavioral: Negative for suicidal ideas.   All other systems reviewed and  are negative.      Past Medical History   has a past medical history of Arthritis; Benign prostate hyperplasia; Colostomy in place (HCC); COPD (chronic obstructive pulmonary disease) (HCC); Diabetes (HCC); and Renal disorder.    Surgical History   has a past surgical history that includes toe amputation (Right, 10/25/2017); pr ercp,diagnostic (3/29/2019); and carlos by laparoscopy (N/A, 3/31/2019).     Family History  No pertinent family history    Social History   reports that he has quit smoking. He has quit using smokeless tobacco.    Allergies  Allergies   Allergen Reactions   • Ceftriaxone      Has tolerated Merrem, Zosyn, and Unasyn   • Ezetimibe    • Flagyl [Metronidazole]    • Infliximab    • Lovastatin    • Simvastatin    • Vancomycin        Medications  Prior to Admission Medications   Prescriptions Last Dose Informant Patient Reported? Taking?   acetaminophen (TYLENOL) 500 MG Tab 4/5/2019 at 1800 MAR from Other Facility Yes Yes   Sig: Take 1,000 mg by mouth every 6 hours as needed for Mild Pain.   budesonide-formoterol (SYMBICORT) 160-4.5 MCG/ACT Aerosol 4/8/2019 at 1000 MAR from Other Facility Yes Yes   Sig: Inhale 2 Puffs by mouth 2 Times a Day.   ferrous gluconate (FERGON) 324 (38 Fe) MG Tab 4/8/2019 at 1000 MAR from Other Facility Yes Yes   Sig: Take 324 mg by mouth every morning with breakfast.   furosemide (LASIX) 20 MG Tab 4/8/2019 at 1000 MAR from Other Facility Yes Yes   Sig: Take 20 mg by mouth 2 times a day.   insulin NPH (HUMULIN,NOVOLIN) 100 UNIT/ML Suspension 4/8/2019 at 1000 MAR from Other Facility Yes Yes   Sig: Inject 30-40 Units as instructed every day. 30 units in AM  40 units at BEDTIME   loratadine (CLARITIN) 10 MG Tab 4/8/2019 at 1000 MAR from Other Facility Yes Yes   Sig: Take 10 mg by mouth every day.   morphine (MS IR) 15 MG tablet 4/8/2019 at 1715 MAR from Other Facility Yes Yes   Sig: Take 15 mg by mouth 3 times a day as needed for Severe Pain.   morphine ER (MS CONTIN) 15 MG Tab  CR tablet 4/8/2019 at 1000 MAR from Other Facility Yes Yes   Sig: Take 15 mg by mouth every 12 hours.   nystatin (MYCOSTATIN) 500310 UNIT/GM Cream topical cream 4/8/2019 at 1100 MAR from Other Facility Yes Yes   Sig: Apply 1 g to affected area(s) 2 times a day. To affected areas   tamsulosin (FLOMAX) 0.4 MG capsule 4/8/2019 at 1000 MAR from Other Facility Yes Yes   Sig: Take 0.4 mg by mouth ONE-HALF HOUR AFTER BREAKFAST.      Facility-Administered Medications: None       Physical Exam  Temp:  [36.3 °C (97.4 °F)] 36.3 °C (97.4 °F)  Pulse:  [84-98] 89  Resp:  [15-19] 15  BP: (142)/(58) 142/58  SpO2:  [95 %-99 %] 95 %    Physical Exam   Constitutional: He is oriented to person, place, and time. He appears well-developed and well-nourished. No distress.   HENT:   Head: Normocephalic and atraumatic.   Mouth/Throat: Oropharynx is clear and moist.   Eyes: Pupils are equal, round, and reactive to light. Conjunctivae are normal. No scleral icterus.   Neck: Normal range of motion. Neck supple.   Cardiovascular: Normal rate, regular rhythm and normal heart sounds.    Pulmonary/Chest: Effort normal and breath sounds normal. No respiratory distress. He has no wheezes. He has no rales.   Abdominal: Soft. Bowel sounds are normal. He exhibits no distension. There is no tenderness. There is no rebound.   Right upper quadrant surgical wound with minimal purulent drainage. Right colostomy in place.    Musculoskeletal: Normal range of motion. He exhibits no edema or tenderness.   Lymphadenopathy:     He has no cervical adenopathy.   Neurological: He is alert and oriented to person, place, and time. No cranial nerve deficit. Coordination normal.   Skin: Skin is warm. No rash noted.   Psychiatric: He has a normal mood and affect. His behavior is normal.   Nursing note and vitals reviewed.      Laboratory:  Recent Labs      04/08/19   1930   WBC  12.3*   RBC  4.30*   HEMOGLOBIN  12.2*   HEMATOCRIT  37.5*   MCV  87.2   MCH  28.4   MCHC   32.5*   RDW  46.2   PLATELETCT  439   MPV  9.3     Recent Labs      04/08/19 1930   SODIUM  133*   POTASSIUM  4.2   CHLORIDE  100   CO2  23   GLUCOSE  138*   BUN  43*   CREATININE  2.95*   CALCIUM  8.8     Recent Labs      04/08/19 1930   ALTSGPT  15   ASTSGOT  16   ALKPHOSPHAT  79   TBILIRUBIN  0.5   LIPASE  59   GLUCOSE  138*                 No results for input(s): TROPONINI in the last 72 hours.    Urinalysis:    No results found     Imaging:  CT-ABDOMEN-PELVIS W/O   Final Result         1. Small free peritoneal air and small amount of gas in the gallbladder surgical bed could relate to recent surgery. Correlate clinically.      2. Soft tissue stranding in the epigastrium abdominal wall could relate to recent surgery as well. No discrete fluid collection is seen.      3. Decompressed urinary bladder by Humphrey catheter with apparent wall thickening.            Assessment/Plan:  I anticipate this patient will require at least two midnights for appropriate medical management, necessitating inpatient admission.    Surgical wound infection- (present on admission)   Assessment & Plan    Started on IV Zosyn and doxycycline  Surgery has been consulted  Wound care     Acute renal failure superimposed on stage 3 chronic kidney disease (HCC)- (present on admission)   Assessment & Plan    Likely prerenal  IV fluid hydration with normal saline  Monitor BMP and assess response  Avoid IV contrast/nephrotoxins/NSAIDs  Dose adjust meds for decreased GFR         BPH (benign prostatic hyperplasia)- (present on admission)   Assessment & Plan    With chronic indwelling Humphrey catheter  Continue Flomax       Diabetes mellitus with hyperglycemia ([facility])- (present on admission)   Assessment & Plan    Start on insulin sliding scale with serial Accu-Checks  Check hemoglobin A1c  Hypoglycemic protocol in place         COPD (chronic obstructive pulmonary disease) ([facility])- (present on admission)   Assessment & Plan    No acute exacerbation at  this time  Continue Symbicort         VTE prophylaxis: scd

## 2019-04-09 NOTE — RESPIRATORY CARE
COPD EDUCATION by COPD CLINICAL EDUCATOR  4/9/2019  at  11:02 AM by Shilpa Kellogg     Patient interviewed by COPD education team.  Patient unable to participate in full program.  Short intervention has been conducted.  A comprehensive packet including information about COPD, treatments, and smoking cessation given. We reviewed his home medications and use of spacer device

## 2019-04-09 NOTE — ASSESSMENT & PLAN NOTE
Likely prerenal  Continues to improve with hydration  No hydronephrosis  Avoid nephrotoxins  followng

## 2019-04-09 NOTE — ED NOTES
Med rec updated and complete and allergies reviewed per MARS  From [facility].  No oral antibiotics noted.

## 2019-04-10 ENCOUNTER — APPOINTMENT (OUTPATIENT)
Dept: RADIOLOGY | Facility: MEDICAL CENTER | Age: 69
DRG: 862 | End: 2019-04-10
Attending: HOSPITALIST
Payer: MEDICARE

## 2019-04-10 LAB
ANION GAP SERPL CALC-SCNC: 10 MMOL/L (ref 0–11.9)
BASOPHILS # BLD AUTO: 0.9 % (ref 0–1.8)
BASOPHILS # BLD: 0.08 K/UL (ref 0–0.12)
BUN SERPL-MCNC: 27 MG/DL (ref 8–22)
CALCIUM SERPL-MCNC: 8.5 MG/DL (ref 8.5–10.5)
CHLORIDE SERPL-SCNC: 108 MMOL/L (ref 96–112)
CO2 SERPL-SCNC: 21 MMOL/L (ref 20–33)
CREAT SERPL-MCNC: 2.27 MG/DL (ref 0.5–1.4)
EOSINOPHIL # BLD AUTO: 0 K/UL (ref 0–0.51)
EOSINOPHIL NFR BLD: 0 % (ref 0–6.9)
ERYTHROCYTE [DISTWIDTH] IN BLOOD BY AUTOMATED COUNT: 48.4 FL (ref 35.9–50)
GLUCOSE BLD-MCNC: 100 MG/DL (ref 65–99)
GLUCOSE BLD-MCNC: 100 MG/DL (ref 65–99)
GLUCOSE BLD-MCNC: 115 MG/DL (ref 65–99)
GLUCOSE BLD-MCNC: 116 MG/DL (ref 65–99)
GLUCOSE BLD-MCNC: 92 MG/DL (ref 65–99)
GLUCOSE SERPL-MCNC: 95 MG/DL (ref 65–99)
HCT VFR BLD AUTO: 36.9 % (ref 42–52)
HGB BLD-MCNC: 11 G/DL (ref 14–18)
LYMPHOCYTES # BLD AUTO: 1.99 K/UL (ref 1–4.8)
LYMPHOCYTES NFR BLD: 21.6 % (ref 22–41)
MANUAL DIFF BLD: NORMAL
MCH RBC QN AUTO: 27.3 PG (ref 27–33)
MCHC RBC AUTO-ENTMCNC: 29.8 G/DL (ref 33.7–35.3)
MCV RBC AUTO: 91.6 FL (ref 81.4–97.8)
MONOCYTES # BLD AUTO: 0.41 K/UL (ref 0–0.85)
MONOCYTES NFR BLD AUTO: 4.5 % (ref 0–13.4)
MORPHOLOGY BLD-IMP: NORMAL
NEUTROPHILS # BLD AUTO: 6.72 K/UL (ref 1.82–7.42)
NEUTROPHILS NFR BLD: 73 % (ref 44–72)
NRBC # BLD AUTO: 0 K/UL
NRBC BLD-RTO: 0 /100 WBC
PLATELET # BLD AUTO: 368 K/UL (ref 164–446)
PLATELET BLD QL SMEAR: NORMAL
PMV BLD AUTO: 9.3 FL (ref 9–12.9)
POTASSIUM SERPL-SCNC: 4 MMOL/L (ref 3.6–5.5)
RBC # BLD AUTO: 4.03 M/UL (ref 4.7–6.1)
RBC BLD AUTO: NORMAL
SODIUM SERPL-SCNC: 139 MMOL/L (ref 135–145)
WBC # BLD AUTO: 9.2 K/UL (ref 4.8–10.8)

## 2019-04-10 PROCEDURE — 700105 HCHG RX REV CODE 258: Performed by: INTERNAL MEDICINE

## 2019-04-10 PROCEDURE — 85027 COMPLETE CBC AUTOMATED: CPT

## 2019-04-10 PROCEDURE — 85007 BL SMEAR W/DIFF WBC COUNT: CPT

## 2019-04-10 PROCEDURE — 36415 COLL VENOUS BLD VENIPUNCTURE: CPT

## 2019-04-10 PROCEDURE — 82962 GLUCOSE BLOOD TEST: CPT | Mod: 91

## 2019-04-10 PROCEDURE — A9270 NON-COVERED ITEM OR SERVICE: HCPCS | Performed by: INTERNAL MEDICINE

## 2019-04-10 PROCEDURE — 80048 BASIC METABOLIC PNL TOTAL CA: CPT

## 2019-04-10 PROCEDURE — A9270 NON-COVERED ITEM OR SERVICE: HCPCS | Performed by: HOSPITALIST

## 2019-04-10 PROCEDURE — 700102 HCHG RX REV CODE 250 W/ 637 OVERRIDE(OP): Performed by: INTERNAL MEDICINE

## 2019-04-10 PROCEDURE — 302135 SEQUENTIAL COMPRESSION MACHINE: Performed by: HOSPITALIST

## 2019-04-10 PROCEDURE — 700111 HCHG RX REV CODE 636 W/ 250 OVERRIDE (IP): Performed by: RADIOLOGY

## 2019-04-10 PROCEDURE — 700102 HCHG RX REV CODE 250 W/ 637 OVERRIDE(OP): Performed by: HOSPITALIST

## 2019-04-10 PROCEDURE — 700111 HCHG RX REV CODE 636 W/ 250 OVERRIDE (IP): Performed by: HOSPITALIST

## 2019-04-10 PROCEDURE — 700111 HCHG RX REV CODE 636 W/ 250 OVERRIDE (IP)

## 2019-04-10 PROCEDURE — 99232 SBSQ HOSP IP/OBS MODERATE 35: CPT | Performed by: HOSPITALIST

## 2019-04-10 PROCEDURE — 700111 HCHG RX REV CODE 636 W/ 250 OVERRIDE (IP): Performed by: INTERNAL MEDICINE

## 2019-04-10 PROCEDURE — 770006 HCHG ROOM/CARE - MED/SURG/GYN SEMI*

## 2019-04-10 PROCEDURE — 51600 INJECTION FOR BLADDER X-RAY: CPT | Mod: 74

## 2019-04-10 PROCEDURE — 700105 HCHG RX REV CODE 258

## 2019-04-10 PROCEDURE — 3E0K7KZ INTRODUCTION OF OTHER DIAGNOSTIC SUBSTANCE INTO GENITOURINARY TRACT, VIA NATURAL OR ARTIFICIAL OPENING: ICD-10-PCS | Performed by: RADIOLOGY

## 2019-04-10 RX ORDER — MAGNESIUM SULFATE HEPTAHYDRATE 40 MG/ML
2 INJECTION, SOLUTION INTRAVENOUS ONCE
Status: COMPLETED | OUTPATIENT
Start: 2019-04-10 | End: 2019-04-10

## 2019-04-10 RX ORDER — SODIUM CHLORIDE 9 MG/ML
500 INJECTION, SOLUTION INTRAVENOUS
Status: ACTIVE | OUTPATIENT
Start: 2019-04-10 | End: 2019-04-10

## 2019-04-10 RX ORDER — ONDANSETRON 2 MG/ML
4 INJECTION INTRAMUSCULAR; INTRAVENOUS PRN
Status: ACTIVE | OUTPATIENT
Start: 2019-04-10 | End: 2019-04-10

## 2019-04-10 RX ORDER — MIDAZOLAM HYDROCHLORIDE 1 MG/ML
.5-2 INJECTION INTRAMUSCULAR; INTRAVENOUS PRN
Status: ACTIVE | OUTPATIENT
Start: 2019-04-10 | End: 2019-04-10

## 2019-04-10 RX ORDER — MIDAZOLAM HYDROCHLORIDE 1 MG/ML
INJECTION INTRAMUSCULAR; INTRAVENOUS
Status: COMPLETED
Start: 2019-04-10 | End: 2019-04-10

## 2019-04-10 RX ORDER — SODIUM CHLORIDE 9 MG/ML
INJECTION, SOLUTION INTRAVENOUS
Status: COMPLETED
Start: 2019-04-10 | End: 2019-04-10

## 2019-04-10 RX ADMIN — MIDAZOLAM 2 MG: 1 INJECTION INTRAMUSCULAR; INTRAVENOUS at 17:42

## 2019-04-10 RX ADMIN — FERROUS GLUCONATE 324 MG: 324 TABLET ORAL at 06:34

## 2019-04-10 RX ADMIN — MORPHINE SULFATE 4 MG: 4 INJECTION INTRAVENOUS at 06:27

## 2019-04-10 RX ADMIN — MIDAZOLAM 2 MG: 1 INJECTION INTRAMUSCULAR; INTRAVENOUS at 17:47

## 2019-04-10 RX ADMIN — MAGNESIUM SULFATE IN WATER 2 G: 40 INJECTION, SOLUTION INTRAVENOUS at 09:15

## 2019-04-10 RX ADMIN — TAMSULOSIN HYDROCHLORIDE 0.4 MG: 0.4 CAPSULE ORAL at 09:34

## 2019-04-10 RX ADMIN — FENTANYL CITRATE 50 MCG: 50 INJECTION, SOLUTION INTRAMUSCULAR; INTRAVENOUS at 17:43

## 2019-04-10 RX ADMIN — MORPHINE SULFATE 15 MG: 15 TABLET, EXTENDED RELEASE ORAL at 13:30

## 2019-04-10 RX ADMIN — PIPERACILLIN SODIUM AND TAZOBACTAM SODIUM 3.38 G: 3; .375 INJECTION, POWDER, LYOPHILIZED, FOR SOLUTION INTRAVENOUS at 06:33

## 2019-04-10 RX ADMIN — FENTANYL CITRATE 50 MCG: 50 INJECTION, SOLUTION INTRAMUSCULAR; INTRAVENOUS at 17:47

## 2019-04-10 RX ADMIN — SODIUM CHLORIDE: 9 INJECTION, SOLUTION INTRAVENOUS at 11:47

## 2019-04-10 RX ADMIN — MICONAZOLE NITRATE: 20 CREAM TOPICAL at 06:35

## 2019-04-10 RX ADMIN — MORPHINE SULFATE 15 MG: 15 TABLET, EXTENDED RELEASE ORAL at 00:38

## 2019-04-10 RX ADMIN — DOXYCYCLINE 100 MG: 100 TABLET, FILM COATED ORAL at 18:25

## 2019-04-10 RX ADMIN — PIPERACILLIN SODIUM AND TAZOBACTAM SODIUM 3.38 G: 3; .375 INJECTION, POWDER, LYOPHILIZED, FOR SOLUTION INTRAVENOUS at 13:30

## 2019-04-10 RX ADMIN — OXYCODONE HYDROCHLORIDE 5 MG: 5 TABLET ORAL at 09:15

## 2019-04-10 RX ADMIN — PIPERACILLIN SODIUM AND TAZOBACTAM SODIUM 3.38 G: 3; .375 INJECTION, POWDER, LYOPHILIZED, FOR SOLUTION INTRAVENOUS at 21:15

## 2019-04-10 RX ADMIN — DOXYCYCLINE 100 MG: 100 TABLET, FILM COATED ORAL at 06:34

## 2019-04-10 RX ADMIN — SODIUM CHLORIDE 500 ML: 9 INJECTION, SOLUTION INTRAVENOUS at 13:33

## 2019-04-10 RX ADMIN — SODIUM CHLORIDE: 9 INJECTION, SOLUTION INTRAVENOUS at 21:17

## 2019-04-10 RX ADMIN — MICONAZOLE NITRATE: 20 CREAM TOPICAL at 18:00

## 2019-04-10 RX ADMIN — BUDESONIDE AND FORMOTEROL FUMARATE DIHYDRATE 2 PUFF: 160; 4.5 AEROSOL RESPIRATORY (INHALATION) at 06:33

## 2019-04-10 ASSESSMENT — ENCOUNTER SYMPTOMS
NAUSEA: 0
MYALGIAS: 0
RESPIRATORY NEGATIVE: 1
EYES NEGATIVE: 1
CHILLS: 0
ABDOMINAL PAIN: 1
PALPITATIONS: 0
WEIGHT LOSS: 0
SORE THROAT: 0
CONSTITUTIONAL NEGATIVE: 1
BRUISES/BLEEDS EASILY: 0
NEUROLOGICAL NEGATIVE: 1
MUSCULOSKELETAL NEGATIVE: 1
FEVER: 0
WEAKNESS: 0
CARDIOVASCULAR NEGATIVE: 1
VOMITING: 0
FOCAL WEAKNESS: 0
DIZZINESS: 0
COUGH: 0
BLURRED VISION: 0
DEPRESSION: 0
PSYCHIATRIC NEGATIVE: 1
DIAPHORESIS: 0
HEADACHES: 0
SHORTNESS OF BREATH: 0

## 2019-04-10 ASSESSMENT — LIFESTYLE VARIABLES: SUBSTANCE_ABUSE: 0

## 2019-04-10 NOTE — OR SURGEON
Immediate Post- Operative Note        PostOp Diagnosis: Bladder obstruction.       Procedure(s): Aborted Suprapubic catheter placement.       Estimated Blood Loss: Less than 5 ml        Complications: None            4/10/2019     1758 PM     Calvin Beyer

## 2019-04-10 NOTE — DIETARY
Nutrition services: Day 2 of admit.  Shola Hoyt is a 69 y.o. male with admitting DX of Acute on Chronic Kidney injury and surgical wound infection.     Consult received for pressure wound and NPO day #2.     Pt with PMH including renal disorder, DM, COPD, Colostomy in place (ileostomy), BPH and Arthritis. Per H&P, pt with history of dementia, crohn's status post ileostomy placement, and BPH with chronic harrington catheter. Pt was recently at [facility] for common bile duct stone and ERCP with stone extraction followed by open carlos on 3/31 and discharged to SNF. Pt developed purulent drainage from his surgical wound and abd pain.      Assessment:  Weight: 123.6 kg (272 lb 7.8 oz)  Body mass index is 38 kg/m².   Diet/Intake: NPO day #2.     Evaluation:   1. Meds: Adoxa, Ferrous gluconate 324mg QAM, Humulin SSI q 6 hours, Mag Sulfate 2g once, Morphine 15mg q12 hours, Zosyn, Senna (refused this AM), Flomax   2. Fluids: NS 125mL/hr   3. Skin: Per WT note on 4/9, Pt with stage III sacral wound 0.5x0.5x0.5, DTPI to ears, Abdominal surgical wound with wound 1x3cm  And length 13.5cm, Full thickness wound penis erosion related to chronic catheter. Pt with wound vac to carlos incision wound. Wound dehisced. For penis erosion, urology consult placed.   4. GI/: Per WT note 4/9, pt with chronically leaking ileostomy and severely denuded 2-3cm circumferentially around the stoma.   5. Labs: WBC 9.2 (WNL) Hgb 11.0 BUN 27 Cr 2.27 GFR 29 POC Glucose 92 - 121 since admit     Malnutrition Risk: No indication for malnutrition risk at this time.     Recommendations/Plan:  1. Nutrition per MD discretion.   2. RD awaiting diet advancement when medically feasible.   3. Recommend MVI with mineral QD , Vit C 500mg BID x 14 days, and ZnSO4 220mg x 14 days for wound healing.   4. Encourage intake of 50% or greater when diet advanced.   5. Document intake of all meals  as % taken in ADL's to provide interdisciplinary communication across all  shifts.   6. Monitor weight.  7. Nutrition rep will continue to see patient for ongoing meal and snack preferences.     RD following for diet advancement and adequate PO.

## 2019-04-10 NOTE — CARE PLAN
Problem: Communication  Goal: The ability to communicate needs accurately and effectively will improve  Outcome: PROGRESSING AS EXPECTED  Educated pt on the nights plan of care. Encourage pt to voice any concerns or questions. Call light is within reach. All questions answered at this time.

## 2019-04-10 NOTE — PROGRESS NOTES
Urology Progress Note    S: Seen and examined.  Currently denies pain.  Awaiting placement of SPT via IR.  Urethral harrington catheter in place and draining well.    O:   /66   Pulse 81   Temp 36.3 °C (97.3 °F) (Temporal)   Resp 16   Wt 123.6 kg (272 lb 7.8 oz)   SpO2 97%   Recent Labs      04/08/19   1930  04/10/19   0555   SODIUM  133*  139   POTASSIUM  4.2  4.0   CHLORIDE  100  108   CO2  23  21   GLUCOSE  138*  95   BUN  43*  27*   CREATININE  2.95*  2.27*   CALCIUM  8.8  8.5     Recent Labs      04/08/19   1930  04/10/19   0554   WBC  12.3*  9.2   RBC  4.30*  4.03*   HEMOGLOBIN  12.2*  11.0*   HEMATOCRIT  37.5*  36.9*   MCV  87.2  91.6   MCH  28.4  27.3   MCHC  32.5*  29.8*   RDW  46.2  48.4   PLATELETCT  439  368   MPV  9.3  9.3         Intake/Output Summary (Last 24 hours) at 04/10/19 1147  Last data filed at 04/10/19 0633   Gross per 24 hour   Intake                0 ml   Output             1500 ml   Net            -1500 ml       Exam:  Gen: NAD   Abd: Abdomen soft, no TTP.   : harrington in place and draining clear yellow output. Traumatic hypospadias secondary to catheter erosion from long term indwelling catheter.    A/P:    Active Hospital Problems    Diagnosis   • Surgical wound infection [T81.49XA]     Priority: High   • Acute renal failure superimposed on stage 3 chronic kidney disease (HCC) [N17.9, N18.3]     Priority: Medium   • COPD (chronic obstructive pulmonary disease) (HCC) [J44.9]     Priority: Low   • Diabetes mellitus with hyperglycemia (HCC) [E11.65]     Priority: Low   • Crohn's disease (HCC) [K50.90]     Priority: Low   • BPH (benign prostatic hyperplasia) [N40.0]       69 y.o. Male with traumatic hypospadias secondary to long term indwelling urethral harrington cahteter.  Plan:  - keep NPO  - awaiting SPT placement via IR  - will follow

## 2019-04-10 NOTE — PROGRESS NOTES
Assumed care of patient, bedside report received from PREET Tirado. Updated on POC, call light within reach and fall precautions in place. Bed locked and in lowest position. Patient instructed to call for assistance before getting out of bed. All questions answered, no other needs at this time.

## 2019-04-10 NOTE — PROGRESS NOTES
[facility] Medicine Daily Progress Note    Date of Service  4/10/2019    Chief Complaint  Abdominal pain    Hospital Course  Patient is a 69 y.o. male with a past medical history of dementia, Crohn's disease status post ileostomy, BPH with chronic indwelling Humphrey catheter, insulin-dependent diabetes mellitus, CKD, chronic pain and opiate dependence who presented 4/8/2019 with abdominal pain.  Patient was recently admitted to [facility] for right upper quadrant pain and is diagnosed with common bile duct stone for which she underwent ERCP with stone extraction followed by open cholecystectomy on 3/31/19 and discharged to SNF.  The patient developed abdominal pain and was noted to have some purulent drainage from his surgical wound for which EMS was called    Interval Problem Update  Remains a very poor historian, axox2  Penile pain with movement and abdominal pain with movement  Denies sob  Following commands  Ros otherwise negative  No further VT - mag replaced    Consultants/Specialty  Surgery  Urology    Code Status  Full code    Disposition  Awaiting suprapubic cath    Review of Systems  Review of Systems   Constitutional: Negative.  Negative for chills, diaphoresis, fever, malaise/fatigue and weight loss.   HENT: Negative.  Negative for sore throat.    Eyes: Negative.  Negative for blurred vision.   Respiratory: Negative.  Negative for cough and shortness of breath.    Cardiovascular: Negative.  Negative for chest pain, palpitations and leg swelling.   Gastrointestinal: Positive for abdominal pain. Negative for nausea and vomiting.   Genitourinary: Negative.  Negative for dysuria.        Penile pain   Musculoskeletal: Negative.  Negative for myalgias.   Skin: Negative.  Negative for itching and rash.   Neurological: Negative.  Negative for dizziness, focal weakness, weakness and headaches.   Endo/Heme/Allergies: Negative.  Does not bruise/bleed easily.   Psychiatric/Behavioral: Negative.  Negative for  depression, substance abuse and suicidal ideas.   All other systems reviewed and are negative.       Physical Exam  Temp:  [36.3 °C (97.3 °F)-36.9 °C (98.4 °F)] 36.3 °C (97.3 °F)  Pulse:  [72-81] 81  Resp:  [16-18] 16  BP: (131-143)/(60-66) 131/66  SpO2:  [96 %-99 %] 97 %    Physical Exam   Constitutional: He appears well-developed and well-nourished. No distress.   HENT:   Head: Normocephalic and atraumatic.   Right Ear: External ear normal.   Left Ear: External ear normal.   Nose: Nose normal.   Mouth/Throat: Oropharynx is clear and moist. No oropharyngeal exudate.   Eyes: Pupils are equal, round, and reactive to light. Conjunctivae and EOM are normal. Right eye exhibits no discharge. Left eye exhibits no discharge. No scleral icterus.   Neck: Normal range of motion. Neck supple. No JVD present. No tracheal deviation present. No thyromegaly present.   Cardiovascular: Normal rate, regular rhythm, normal heart sounds and intact distal pulses.  Exam reveals no gallop and no friction rub.    No murmur heard.  Pulmonary/Chest: Effort normal and breath sounds normal. No stridor. No respiratory distress. He has no wheezes. He has no rales. He exhibits no tenderness.   Abdominal: Soft. Bowel sounds are normal. He exhibits no distension and no mass. There is no tenderness. There is no rebound and no guarding.   Ostomy  Wound vac in place   Genitourinary:   Genitourinary Comments: Penile injury unchanged  Humphrey remains in place   Musculoskeletal: Normal range of motion. He exhibits no edema, tenderness or deformity.   Lymphadenopathy:     He has no cervical adenopathy.   Neurological: He is alert. He has normal reflexes. No cranial nerve deficit. He exhibits normal muscle tone. Coordination normal.   Skin: Skin is warm and dry. No rash noted. He is not diaphoretic. No erythema. No pallor.   Nursing note and vitals reviewed.      Fluids    Intake/Output Summary (Last 24 hours) at 04/10/19 1211  Last data filed at 04/10/19  0633   Gross per 24 hour   Intake                0 ml   Output             1500 ml   Net            -1500 ml       Laboratory  Recent Labs      04/08/19   1930  04/10/19   0554   WBC  12.3*  9.2   RBC  4.30*  4.03*   HEMOGLOBIN  12.2*  11.0*   HEMATOCRIT  37.5*  36.9*   MCV  87.2  91.6   MCH  28.4  27.3   MCHC  32.5*  29.8*   RDW  46.2  48.4   PLATELETCT  439  368   MPV  9.3  9.3     Recent Labs      04/08/19   1930  04/10/19   0555   SODIUM  133*  139   POTASSIUM  4.2  4.0   CHLORIDE  100  108   CO2  23  21   GLUCOSE  138*  95   BUN  43*  27*   CREATININE  2.95*  2.27*   CALCIUM  8.8  8.5     Recent Labs      04/08/19 1930   APTT  36.9*   INR  1.24*               Imaging  US-RENAL   Final Result      1.  Cortical thinning of LEFT kidney.   2.  No hydronephrosis.   3.  Limited evaluation of the bladder.      CT-ABDOMEN-PELVIS W/O   Final Result         1. Small free peritoneal air and small amount of gas in the gallbladder surgical bed could relate to recent surgery. Correlate clinically.      2. Soft tissue stranding in the epigastrium abdominal wall could relate to recent surgery as well. No discrete fluid collection is seen.      3. Decompressed urinary bladder by Harrington catheter with apparent wall thickening.      IR-DRAIN-BLADDER SUPRAPUB W/CATH    (Results Pending)         Assessment/Plan  Surgical wound infection- (present on admission)   Assessment & Plan    Continue iv abx  Wound vac in place  Surgery following       Acute renal failure superimposed on stage 3 chronic kidney disease (HCC)- (present on admission)   Assessment & Plan    Likely prerenal  Improving with hydration  No hydronephrosis  Avoid nephrotoxins         BPH (benign prostatic hyperplasia)- (present on admission)   Assessment & Plan    Awaiting suprapubic cath  As penile trauma from chronic harrington     Diabetes mellitus with hyperglycemia (HCC)- (present on admission)   Assessment & Plan    continue insulin sliding scale with serial  Accu-Checks  Hypoglycemic protocol in place         COPD (chronic obstructive pulmonary disease) (HCC)- (present on admission)   Assessment & Plan    No acute exacerbation at this time  Continue Symbicort          VTE prophylaxis: scd

## 2019-04-10 NOTE — PROGRESS NOTES
General surgery    VAC now in place in subcostal incision.  Appreciate Wound care management  -Change per protocol  -No general surgical indication  -May eat as tolerated from my standpoint.  I know he is NPO for Urology at this time  -Please contact me with any questions or concerns.      Gunnar Alonzo M.D.  [facility]  [phone]

## 2019-04-10 NOTE — DOCUMENTATION QUERY
formerly [facility]                                                                         Query Response Note      PATIENT:               HANS MAYERS  ACCT #:                  [id number]  MRN:                       [id number]  :                       1950  ADMIT DATE:       2019 6:53 PM  DISCH DATE:          RESPONDING  PROVIDER #:        [id number]           RESPONSE TEXT:    Agree with Wound Care RN assessment of stage 3 pressure injury to sacrum POA and deep tissue injuries to bilateral ears POA    QUERY TEXT:    Pressure Ulcer Type [id number]    Pressure ulcers are documented by the Wound Care RN in the Medical Record.     Please specify if you:    NOTE:  If an appropriate response is not listed below, please respond with a new note.      The patient's Clinical Indicators include:  - Sacrum stage 3 pressure injury with moisture related breakdown to buttock. Bilateral ears with deep tissue injuries per wound team note dated 19.    - Treatments: wound team consult, moisture barrier   - Risk factors: infection following a procedure, Crohn's disease, acute kidney failure, ileostomy, chronic indwelling  urinary catheter, DM2, COPD,  Query created by: Ryann Floyd on 4/10/2019 12:50 PM        Electronically signed by:  MARINO GARCIA MD 4/10/2019 12:51 PM

## 2019-04-10 NOTE — PROGRESS NOTES
· 2 RN skin check complete with PREET Brooks.  · Devices in place harrington catheter, nasal cannula, wound vac, ileostomy w/ bag.  · Skin assessed under devices ears pink, blanching. Skin red, flaky but intact around ileostomy.  Penis red, swollen, with DTI on ventral penis where harrington tube exits urethra w/ pus noted from urethra.   · Confirmed pressure ulcers found on sacrum at coccyx.  · New potential pressure ulcers noted on ventral surface of penis. Wound consult placed and wound reported.  Urological surgery following.   · Sacrum red, non-blanching. Skin on buttocks red, blanching with dead epidermal layer peeling off. Skin on buttocks intact.  Groin red, moist. Heels pink, blanching.   · The following interventions in place waffle bed ordered, Q2 turns, barrier wipes, miconazole cream and barrier cream, harrington tubing repositioned and harrington care provided, harrington tubing wrapped in xeroform gauze, interdry for groin moisture.  Heels floated.

## 2019-04-10 NOTE — PROGRESS NOTES
Assumed care of pt.  Assessment complete.  A&O x 1.  No signs of distress.   Discussed plan of care.  Call light within reach.  Bed alarm active.  Treaded socks on pt.  Will continue to monitor.

## 2019-04-10 NOTE — THERAPY
OT referral received. Per RN, pt has high pain due to penile skin breakdown. Awaiting possible suprapubic. Pt is refusing OOB activity at this time. Will attempt again.

## 2019-04-10 NOTE — WOUND TEAM
"Follow up with ostomy appliance and if intact.  Patient sitting up in the chair and when asked if appliance leaking he responded \"I don't know, you'll have to look\".  Appliance found intact and supplies at bedside.  Patient then reports that this is the wrong supplies that he uses and his suitcase is missing where he has supplies from home.  He reports that he uses a two piece appliance with paste ring and belt.  Discussed that it appeared per Tigist's note yesterday that peristomal skin very denuded due to chronic leaking and he might consider using a one piece appliance with convexity as this is what has remained in place.  He doesn't seem convinced and his main concern is finding his suitcase.  Instructed him that wound team will change appliance tomorrow.  "

## 2019-04-11 ENCOUNTER — ANESTHESIA EVENT (OUTPATIENT)
Dept: RADIOLOGY | Facility: MEDICAL CENTER | Age: 69
DRG: 862 | End: 2019-04-11
Payer: MEDICARE

## 2019-04-11 ENCOUNTER — APPOINTMENT (OUTPATIENT)
Dept: RADIOLOGY | Facility: MEDICAL CENTER | Age: 69
DRG: 862 | End: 2019-04-11
Attending: HOSPITALIST
Payer: MEDICARE

## 2019-04-11 ENCOUNTER — ANESTHESIA (OUTPATIENT)
Dept: RADIOLOGY | Facility: MEDICAL CENTER | Age: 69
DRG: 862 | End: 2019-04-11
Payer: MEDICARE

## 2019-04-11 LAB
ANION GAP SERPL CALC-SCNC: 7 MMOL/L (ref 0–11.9)
BASOPHILS # BLD AUTO: 0.9 % (ref 0–1.8)
BASOPHILS # BLD: 0.08 K/UL (ref 0–0.12)
BUN SERPL-MCNC: 20 MG/DL (ref 8–22)
CALCIUM SERPL-MCNC: 8.6 MG/DL (ref 8.5–10.5)
CHLORIDE SERPL-SCNC: 109 MMOL/L (ref 96–112)
CO2 SERPL-SCNC: 21 MMOL/L (ref 20–33)
CREAT SERPL-MCNC: 2.21 MG/DL (ref 0.5–1.4)
EOSINOPHIL # BLD AUTO: 0.39 K/UL (ref 0–0.51)
EOSINOPHIL NFR BLD: 4.4 % (ref 0–6.9)
ERYTHROCYTE [DISTWIDTH] IN BLOOD BY AUTOMATED COUNT: 48.3 FL (ref 35.9–50)
GLUCOSE BLD-MCNC: 109 MG/DL (ref 65–99)
GLUCOSE BLD-MCNC: 87 MG/DL (ref 65–99)
GLUCOSE BLD-MCNC: 92 MG/DL (ref 65–99)
GLUCOSE SERPL-MCNC: 89 MG/DL (ref 65–99)
HCT VFR BLD AUTO: 37.8 % (ref 42–52)
HGB BLD-MCNC: 11.4 G/DL (ref 14–18)
IMM GRANULOCYTES # BLD AUTO: 0.01 K/UL (ref 0–0.11)
IMM GRANULOCYTES NFR BLD AUTO: 0.1 % (ref 0–0.9)
LYMPHOCYTES # BLD AUTO: 2.94 K/UL (ref 1–4.8)
LYMPHOCYTES NFR BLD: 32.8 % (ref 22–41)
MCH RBC QN AUTO: 27.7 PG (ref 27–33)
MCHC RBC AUTO-ENTMCNC: 30.2 G/DL (ref 33.7–35.3)
MCV RBC AUTO: 91.7 FL (ref 81.4–97.8)
MONOCYTES # BLD AUTO: 0.96 K/UL (ref 0–0.85)
MONOCYTES NFR BLD AUTO: 10.7 % (ref 0–13.4)
NEUTROPHILS # BLD AUTO: 4.58 K/UL (ref 1.82–7.42)
NEUTROPHILS NFR BLD: 51.1 % (ref 44–72)
NRBC # BLD AUTO: 0 K/UL
NRBC BLD-RTO: 0 /100 WBC
PLATELET # BLD AUTO: 372 K/UL (ref 164–446)
PMV BLD AUTO: 9.2 FL (ref 9–12.9)
POTASSIUM SERPL-SCNC: 3.9 MMOL/L (ref 3.6–5.5)
RBC # BLD AUTO: 4.12 M/UL (ref 4.7–6.1)
SODIUM SERPL-SCNC: 137 MMOL/L (ref 135–145)
WBC # BLD AUTO: 9 K/UL (ref 4.8–10.8)

## 2019-04-11 PROCEDURE — 700105 HCHG RX REV CODE 258: Performed by: INTERNAL MEDICINE

## 2019-04-11 PROCEDURE — 97605 NEG PRS WND THER DME<=50SQCM: CPT

## 2019-04-11 PROCEDURE — 99232 SBSQ HOSP IP/OBS MODERATE 35: CPT | Performed by: HOSPITALIST

## 2019-04-11 PROCEDURE — 97166 OT EVAL MOD COMPLEX 45 MIN: CPT

## 2019-04-11 PROCEDURE — 700111 HCHG RX REV CODE 636 W/ 250 OVERRIDE (IP): Performed by: INTERNAL MEDICINE

## 2019-04-11 PROCEDURE — 770006 HCHG ROOM/CARE - MED/SURG/GYN SEMI*

## 2019-04-11 PROCEDURE — 51102 DRAIN BL W/CATH INSERTION: CPT

## 2019-04-11 PROCEDURE — 0TPB70Z REMOVAL OF DRAINAGE DEVICE FROM BLADDER, VIA NATURAL OR ARTIFICIAL OPENING: ICD-10-PCS | Performed by: RADIOLOGY

## 2019-04-11 PROCEDURE — 160002 HCHG RECOVERY MINUTES (STAT)

## 2019-04-11 PROCEDURE — A9270 NON-COVERED ITEM OR SERVICE: HCPCS | Performed by: ANESTHESIOLOGY

## 2019-04-11 PROCEDURE — 700101 HCHG RX REV CODE 250: Performed by: ANESTHESIOLOGY

## 2019-04-11 PROCEDURE — 700102 HCHG RX REV CODE 250 W/ 637 OVERRIDE(OP): Performed by: ANESTHESIOLOGY

## 2019-04-11 PROCEDURE — 700111 HCHG RX REV CODE 636 W/ 250 OVERRIDE (IP): Performed by: ANESTHESIOLOGY

## 2019-04-11 PROCEDURE — 700102 HCHG RX REV CODE 250 W/ 637 OVERRIDE(OP): Performed by: INTERNAL MEDICINE

## 2019-04-11 PROCEDURE — 80048 BASIC METABOLIC PNL TOTAL CA: CPT

## 2019-04-11 PROCEDURE — 700101 HCHG RX REV CODE 250

## 2019-04-11 PROCEDURE — A9270 NON-COVERED ITEM OR SERVICE: HCPCS | Performed by: INTERNAL MEDICINE

## 2019-04-11 PROCEDURE — 82962 GLUCOSE BLOOD TEST: CPT | Mod: 91

## 2019-04-11 PROCEDURE — A9270 NON-COVERED ITEM OR SERVICE: HCPCS | Performed by: HOSPITALIST

## 2019-04-11 PROCEDURE — 700111 HCHG RX REV CODE 636 W/ 250 OVERRIDE (IP)

## 2019-04-11 PROCEDURE — 700102 HCHG RX REV CODE 250 W/ 637 OVERRIDE(OP): Performed by: HOSPITALIST

## 2019-04-11 PROCEDURE — 85025 COMPLETE CBC W/AUTO DIFF WBC: CPT

## 2019-04-11 PROCEDURE — 36415 COLL VENOUS BLD VENIPUNCTURE: CPT

## 2019-04-11 PROCEDURE — BT101ZZ FLUOROSCOPY OF BLADDER USING LOW OSMOLAR CONTRAST: ICD-10-PCS | Performed by: RADIOLOGY

## 2019-04-11 RX ORDER — DIPHENHYDRAMINE HYDROCHLORIDE 50 MG/ML
12.5 INJECTION INTRAMUSCULAR; INTRAVENOUS
Status: DISCONTINUED | OUTPATIENT
Start: 2019-04-11 | End: 2019-04-11 | Stop reason: HOSPADM

## 2019-04-11 RX ORDER — HYDROMORPHONE HYDROCHLORIDE 1 MG/ML
1 INJECTION, SOLUTION INTRAMUSCULAR; INTRAVENOUS; SUBCUTANEOUS
Status: DISCONTINUED | OUTPATIENT
Start: 2019-04-11 | End: 2019-04-11 | Stop reason: HOSPADM

## 2019-04-11 RX ORDER — HYDROMORPHONE HYDROCHLORIDE 1 MG/ML
0.4 INJECTION, SOLUTION INTRAMUSCULAR; INTRAVENOUS; SUBCUTANEOUS
Status: DISCONTINUED | OUTPATIENT
Start: 2019-04-11 | End: 2019-04-11 | Stop reason: HOSPADM

## 2019-04-11 RX ORDER — OXYCODONE HCL 5 MG/5 ML
10 SOLUTION, ORAL ORAL
Status: COMPLETED | OUTPATIENT
Start: 2019-04-11 | End: 2019-04-11

## 2019-04-11 RX ORDER — DEXAMETHASONE SODIUM PHOSPHATE 4 MG/ML
INJECTION, SOLUTION INTRA-ARTICULAR; INTRALESIONAL; INTRAMUSCULAR; INTRAVENOUS; SOFT TISSUE PRN
Status: DISCONTINUED | OUTPATIENT
Start: 2019-04-11 | End: 2019-04-11 | Stop reason: SURG

## 2019-04-11 RX ORDER — HALOPERIDOL 5 MG/ML
1 INJECTION INTRAMUSCULAR
Status: DISCONTINUED | OUTPATIENT
Start: 2019-04-11 | End: 2019-04-11 | Stop reason: HOSPADM

## 2019-04-11 RX ORDER — OXYCODONE HCL 5 MG/5 ML
5 SOLUTION, ORAL ORAL
Status: COMPLETED | OUTPATIENT
Start: 2019-04-11 | End: 2019-04-11

## 2019-04-11 RX ORDER — ESMOLOL HYDROCHLORIDE 10 MG/ML
INJECTION INTRAVENOUS PRN
Status: DISCONTINUED | OUTPATIENT
Start: 2019-04-11 | End: 2019-04-11 | Stop reason: SURG

## 2019-04-11 RX ORDER — SODIUM CHLORIDE, SODIUM GLUCONATE, SODIUM ACETATE, POTASSIUM CHLORIDE AND MAGNESIUM CHLORIDE 526; 502; 368; 37; 30 MG/100ML; MG/100ML; MG/100ML; MG/100ML; MG/100ML
500 INJECTION, SOLUTION INTRAVENOUS CONTINUOUS
Status: DISCONTINUED | OUTPATIENT
Start: 2019-04-11 | End: 2019-04-11 | Stop reason: HOSPADM

## 2019-04-11 RX ORDER — ONDANSETRON 2 MG/ML
4 INJECTION INTRAMUSCULAR; INTRAVENOUS
Status: DISCONTINUED | OUTPATIENT
Start: 2019-04-11 | End: 2019-04-11 | Stop reason: HOSPADM

## 2019-04-11 RX ORDER — LIDOCAINE HYDROCHLORIDE 40 MG/ML
SOLUTION TOPICAL
Status: DISPENSED
Start: 2019-04-11 | End: 2019-04-12

## 2019-04-11 RX ORDER — IPRATROPIUM BROMIDE AND ALBUTEROL SULFATE 2.5; .5 MG/3ML; MG/3ML
3 SOLUTION RESPIRATORY (INHALATION)
Status: DISCONTINUED | OUTPATIENT
Start: 2019-04-11 | End: 2019-04-11 | Stop reason: HOSPADM

## 2019-04-11 RX ORDER — MIDAZOLAM HYDROCHLORIDE 1 MG/ML
1 INJECTION INTRAMUSCULAR; INTRAVENOUS
Status: DISCONTINUED | OUTPATIENT
Start: 2019-04-11 | End: 2019-04-11 | Stop reason: HOSPADM

## 2019-04-11 RX ORDER — MEPERIDINE HYDROCHLORIDE 25 MG/ML
12.5 INJECTION INTRAMUSCULAR; INTRAVENOUS; SUBCUTANEOUS
Status: DISCONTINUED | OUTPATIENT
Start: 2019-04-11 | End: 2019-04-11 | Stop reason: HOSPADM

## 2019-04-11 RX ORDER — HYDROMORPHONE HYDROCHLORIDE 1 MG/ML
0.2 INJECTION, SOLUTION INTRAMUSCULAR; INTRAVENOUS; SUBCUTANEOUS
Status: DISCONTINUED | OUTPATIENT
Start: 2019-04-11 | End: 2019-04-11 | Stop reason: HOSPADM

## 2019-04-11 RX ADMIN — SUCCINYLCHOLINE CHLORIDE 100 MG: 20 INJECTION, SOLUTION INTRAMUSCULAR; INTRAVENOUS at 15:23

## 2019-04-11 RX ADMIN — MORPHINE SULFATE 4 MG: 4 INJECTION INTRAVENOUS at 08:10

## 2019-04-11 RX ADMIN — HYDROMORPHONE HYDROCHLORIDE 0.4 MG: 1 INJECTION, SOLUTION INTRAMUSCULAR; INTRAVENOUS; SUBCUTANEOUS at 16:52

## 2019-04-11 RX ADMIN — PROPOFOL 40 MG: 10 INJECTION, EMULSION INTRAVENOUS at 16:04

## 2019-04-11 RX ADMIN — MORPHINE SULFATE 15 MG: 15 TABLET, EXTENDED RELEASE ORAL at 12:41

## 2019-04-11 RX ADMIN — HYDROMORPHONE HYDROCHLORIDE 0.2 MG: 1 INJECTION, SOLUTION INTRAMUSCULAR; INTRAVENOUS; SUBCUTANEOUS at 17:15

## 2019-04-11 RX ADMIN — SENNOSIDES AND DOCUSATE SODIUM 2 TABLET: 8.6; 5 TABLET ORAL at 06:00

## 2019-04-11 RX ADMIN — FERROUS GLUCONATE 324 MG: 324 TABLET ORAL at 05:27

## 2019-04-11 RX ADMIN — MORPHINE SULFATE 15 MG: 15 TABLET, EXTENDED RELEASE ORAL at 00:39

## 2019-04-11 RX ADMIN — SODIUM CHLORIDE: 9 INJECTION, SOLUTION INTRAVENOUS at 14:50

## 2019-04-11 RX ADMIN — OXYCODONE HYDROCHLORIDE 10 MG: 5 SOLUTION ORAL at 16:40

## 2019-04-11 RX ADMIN — HYDROMORPHONE HYDROCHLORIDE 0.4 MG: 1 INJECTION, SOLUTION INTRAMUSCULAR; INTRAVENOUS; SUBCUTANEOUS at 17:05

## 2019-04-11 RX ADMIN — PROPOFOL 120 MG: 10 INJECTION, EMULSION INTRAVENOUS at 15:23

## 2019-04-11 RX ADMIN — ESMOLOL HYDROCHLORIDE 50 MG: 10 INJECTION, SOLUTION INTRAVENOUS at 15:49

## 2019-04-11 RX ADMIN — HYDROMORPHONE HYDROCHLORIDE 0.4 MG: 1 INJECTION, SOLUTION INTRAMUSCULAR; INTRAVENOUS; SUBCUTANEOUS at 16:42

## 2019-04-11 RX ADMIN — OXYCODONE HYDROCHLORIDE 10 MG: 10 TABLET ORAL at 05:25

## 2019-04-11 RX ADMIN — DOXYCYCLINE 100 MG: 100 TABLET, FILM COATED ORAL at 05:24

## 2019-04-11 RX ADMIN — FENTANYL CITRATE 100 MCG: 50 INJECTION, SOLUTION INTRAMUSCULAR; INTRAVENOUS at 15:46

## 2019-04-11 RX ADMIN — HYDROMORPHONE HYDROCHLORIDE 0.4 MG: 1 INJECTION, SOLUTION INTRAMUSCULAR; INTRAVENOUS; SUBCUTANEOUS at 17:10

## 2019-04-11 RX ADMIN — SODIUM CHLORIDE: 9 INJECTION, SOLUTION INTRAVENOUS at 23:06

## 2019-04-11 RX ADMIN — TAMSULOSIN HYDROCHLORIDE 0.4 MG: 0.4 CAPSULE ORAL at 09:19

## 2019-04-11 RX ADMIN — FENTANYL CITRATE 50 MCG: 50 INJECTION, SOLUTION INTRAMUSCULAR; INTRAVENOUS at 16:33

## 2019-04-11 RX ADMIN — OXYCODONE HYDROCHLORIDE 10 MG: 10 TABLET ORAL at 23:04

## 2019-04-11 RX ADMIN — GLYCOPYRROLATE 0.3 MG: 0.2 INJECTION INTRAMUSCULAR; INTRAVENOUS at 15:23

## 2019-04-11 RX ADMIN — HYDROMORPHONE HYDROCHLORIDE 0.2 MG: 1 INJECTION, SOLUTION INTRAMUSCULAR; INTRAVENOUS; SUBCUTANEOUS at 16:57

## 2019-04-11 RX ADMIN — MICONAZOLE NITRATE: 20 CREAM TOPICAL at 06:00

## 2019-04-11 RX ADMIN — PIPERACILLIN SODIUM AND TAZOBACTAM SODIUM 3.38 G: 3; .375 INJECTION, POWDER, LYOPHILIZED, FOR SOLUTION INTRAVENOUS at 23:06

## 2019-04-11 RX ADMIN — SUGAMMADEX 200 MG: 100 INJECTION, SOLUTION INTRAVENOUS at 16:03

## 2019-04-11 RX ADMIN — BUDESONIDE AND FORMOTEROL FUMARATE DIHYDRATE 2 PUFF: 160; 4.5 AEROSOL RESPIRATORY (INHALATION) at 06:00

## 2019-04-11 RX ADMIN — PIPERACILLIN SODIUM AND TAZOBACTAM SODIUM 3.38 G: 3; .375 INJECTION, POWDER, LYOPHILIZED, FOR SOLUTION INTRAVENOUS at 12:39

## 2019-04-11 RX ADMIN — ONDANSETRON 4 MG: 2 INJECTION INTRAMUSCULAR; INTRAVENOUS at 16:03

## 2019-04-11 RX ADMIN — DEXAMETHASONE SODIUM PHOSPHATE 8 MG: 4 INJECTION, SOLUTION INTRA-ARTICULAR; INTRALESIONAL; INTRAMUSCULAR; INTRAVENOUS; SOFT TISSUE at 15:49

## 2019-04-11 RX ADMIN — PIPERACILLIN SODIUM AND TAZOBACTAM SODIUM 3.38 G: 3; .375 INJECTION, POWDER, LYOPHILIZED, FOR SOLUTION INTRAVENOUS at 05:24

## 2019-04-11 RX ADMIN — OXYCODONE HYDROCHLORIDE 10 MG: 10 TABLET ORAL at 09:19

## 2019-04-11 ASSESSMENT — ENCOUNTER SYMPTOMS
BLURRED VISION: 0
MYALGIAS: 0
PALPITATIONS: 0
CARDIOVASCULAR NEGATIVE: 1
FOCAL WEAKNESS: 0
RESPIRATORY NEGATIVE: 1
CONSTITUTIONAL NEGATIVE: 1
SORE THROAT: 0
VOMITING: 0
BRUISES/BLEEDS EASILY: 0
DEPRESSION: 0
EYES NEGATIVE: 1
COUGH: 0
FEVER: 0
WEAKNESS: 0
NEUROLOGICAL NEGATIVE: 1
HEADACHES: 0
DIAPHORESIS: 0
NAUSEA: 0
SHORTNESS OF BREATH: 0
WEIGHT LOSS: 0
CHILLS: 0
MUSCULOSKELETAL NEGATIVE: 1
ABDOMINAL PAIN: 1
PSYCHIATRIC NEGATIVE: 1
DIZZINESS: 0

## 2019-04-11 ASSESSMENT — COGNITIVE AND FUNCTIONAL STATUS - GENERAL
SUGGESTED CMS G CODE MODIFIER DAILY ACTIVITY: CK
DRESSING REGULAR LOWER BODY CLOTHING: A LOT
TOILETING: TOTAL
DAILY ACTIVITIY SCORE: 17
HELP NEEDED FOR BATHING: A LOT

## 2019-04-11 ASSESSMENT — ACTIVITIES OF DAILY LIVING (ADL): TOILETING: INDEPENDENT

## 2019-04-11 ASSESSMENT — LIFESTYLE VARIABLES: SUBSTANCE_ABUSE: 0

## 2019-04-11 ASSESSMENT — PAIN SCALES - GENERAL: PAIN_LEVEL: 8

## 2019-04-11 NOTE — ANESTHESIA PREPROCEDURE EVALUATION
Relevant Problems   (+) COPD (chronic obstructive pulmonary disease) (HCC)   (+) HTN (hypertension)       Physical Exam    Airway   Mallampati: III  TM distance: >3 FB  Neck ROM: full       Cardiovascular - normal exam  Rhythm: regular  Rate: normal  (-) murmur     Dental - normal exam         Pulmonary - normal exam  Breath sounds clear to auscultation     Abdominal    Neurological - normal exam                 Anesthesia Plan    ASA 3   ASA physical status 3 criteria: other (comment)    Plan - general       (CKD)      Induction: intravenous    Postoperative Plan: Postoperative administration of opioids is intended.    Pertinent diagnostic labs and testing reviewed    Informed Consent:    Anesthetic plan and risks discussed with patient.    Use of blood products discussed with: patient whom consented to blood products.

## 2019-04-11 NOTE — PROGRESS NOTES
Bedside report received from night shift RN. Assumed care. Pt is A&O x 1 (disoriented to time, place and event) . Pt is Medical. Pt denies pain at this time. Pt was updated on plan of care. Pt has call light, personal belongings, and bedside table within reach. Bed is in the lowest position and bed alarm is on. Will continue to monitor

## 2019-04-11 NOTE — PROGRESS NOTES
Hospital Medicine Daily Progress Note    Date of Service  4/11/2019    Chief Complaint  Abdominal pain    Hospital Course  Patient is a 69 y.o. male with a past medical history of dementia, Crohn's disease status post ileostomy, BPH with chronic indwelling Humphrey catheter, insulin-dependent diabetes mellitus, CKD, chronic pain and opiate dependence who presented 4/8/2019 with abdominal pain.  Patient was recently admitted to [facility] for right upper quadrant pain and is diagnosed with common bile duct stone for which she underwent ERCP with stone extraction followed by open cholecystectomy on 3/31/19 and discharged to SNF.  The patient developed abdominal pain and was noted to have some purulent drainage from his surgical wound for which EMS was called    Interval Problem Update  axox2 currently  Did not tolerate IR attempt at suprapubic yesterday, so procedure planned for later today under sedation  Ongoing penile pain, mild abdominal pain  Denies sob  Ros otherwise negative    Consultants/Specialty  Surgery  Urology    Code Status  Full code    Disposition  Awaiting suprapubic cath    Review of Systems  Review of Systems   Constitutional: Negative.  Negative for chills, diaphoresis, fever, malaise/fatigue and weight loss.   HENT: Negative.  Negative for sore throat.    Eyes: Negative.  Negative for blurred vision.   Respiratory: Negative.  Negative for cough and shortness of breath.    Cardiovascular: Negative.  Negative for chest pain, palpitations and leg swelling.   Gastrointestinal: Positive for abdominal pain. Negative for nausea and vomiting.   Genitourinary: Negative.  Negative for dysuria.        Penile pain   Musculoskeletal: Negative.  Negative for myalgias.   Skin: Negative.  Negative for itching and rash.   Neurological: Negative.  Negative for dizziness, focal weakness, weakness and headaches.   Endo/Heme/Allergies: Negative.  Does not bruise/bleed easily.   Psychiatric/Behavioral:  Negative.  Negative for depression, substance abuse and suicidal ideas.   All other systems reviewed and are negative.       Physical Exam  Temp:  [36.6 °C (97.8 °F)-37.4 °C (99.4 °F)] 37.4 °C (99.4 °F)  Pulse:  [] 66  Resp:  [14-27] 16  BP: (124-139)/(59-64) 129/59  SpO2:  [92 %-99 %] 99 %    Physical Exam   Constitutional: He appears well-developed and well-nourished. No distress.   HENT:   Head: Normocephalic and atraumatic.   Right Ear: External ear normal.   Left Ear: External ear normal.   Nose: Nose normal.   Mouth/Throat: Oropharynx is clear and moist. No oropharyngeal exudate.   Eyes: Pupils are equal, round, and reactive to light. Conjunctivae and EOM are normal. Right eye exhibits no discharge. Left eye exhibits no discharge. No scleral icterus.   Neck: Normal range of motion. Neck supple. No JVD present. No tracheal deviation present. No thyromegaly present.   Cardiovascular: Normal rate, regular rhythm, normal heart sounds and intact distal pulses.  Exam reveals no gallop and no friction rub.    No murmur heard.  Pulmonary/Chest: Effort normal and breath sounds normal. No stridor. No respiratory distress. He has no wheezes. He has no rales. He exhibits no tenderness.   Abdominal: Soft. Bowel sounds are normal. He exhibits no distension and no mass. There is no tenderness. There is no rebound and no guarding.   Ostomy  Wound vac in place   Genitourinary:   Genitourinary Comments: Penile injury unchanged  Humphrey remains in place   Musculoskeletal: Normal range of motion. He exhibits no edema, tenderness or deformity.   Lymphadenopathy:     He has no cervical adenopathy.   Neurological: He is alert. He has normal reflexes. No cranial nerve deficit. He exhibits normal muscle tone. Coordination normal.   Skin: Skin is warm and dry. No rash noted. He is not diaphoretic. No erythema. No pallor.   Nursing note and vitals reviewed.      Fluids  No intake or output data in the 24 hours ending 04/11/19  1249    Laboratory  Recent Labs      04/08/19   1930  04/10/19   0554  04/11/19   0217   WBC  12.3*  9.2  9.0   RBC  4.30*  4.03*  4.12*   HEMOGLOBIN  12.2*  11.0*  11.4*   HEMATOCRIT  37.5*  36.9*  37.8*   MCV  87.2  91.6  91.7   MCH  28.4  27.3  27.7   MCHC  32.5*  29.8*  30.2*   RDW  46.2  48.4  48.3   PLATELETCT  439  368  372   MPV  9.3  9.3  9.2     Recent Labs      04/08/19   1930  04/10/19   0555  04/11/19   0217   SODIUM  133*  139  137   POTASSIUM  4.2  4.0  3.9   CHLORIDE  100  108  109   CO2  23  21  21   GLUCOSE  138*  95  89   BUN  43*  27*  20   CREATININE  2.95*  2.27*  2.21*   CALCIUM  8.8  8.5  8.6     Recent Labs      04/08/19 1930   APTT  36.9*   INR  1.24*               Imaging  US-RENAL   Final Result      1.  Cortical thinning of LEFT kidney.   2.  No hydronephrosis.   3.  Limited evaluation of the bladder.      CT-ABDOMEN-PELVIS W/O   Final Result         1. Small free peritoneal air and small amount of gas in the gallbladder surgical bed could relate to recent surgery. Correlate clinically.      2. Soft tissue stranding in the epigastrium abdominal wall could relate to recent surgery as well. No discrete fluid collection is seen.      3. Decompressed urinary bladder by Harrington catheter with apparent wall thickening.      IR-DRAIN-BLADDER SUPRAPUB W/CATH    (Results Pending)         Assessment/Plan  Surgical wound infection- (present on admission)   Assessment & Plan    Continue iv abx  Wound vac in place  Surgery following       Acute renal failure superimposed on stage 3 chronic kidney disease (HCC)- (present on admission)   Assessment & Plan    Likely prerenal  Continues to improve with hydration  No hydronephrosis  Avoid nephrotoxins         BPH (benign prostatic hyperplasia)- (present on admission)   Assessment & Plan    Awaiting suprapubic cath  See above  As penile trauma from chronic harrington     Diabetes mellitus with hyperglycemia (HCC)- (present on admission)   Assessment & Plan     continue insulin sliding scale with serial Accu-Checks  Hypoglycemic protocol in place         COPD (chronic obstructive pulmonary disease) (HCC)- (present on admission)   Assessment & Plan    No acute exacerbation at this time  Continue Symbicort          VTE prophylaxis: scd

## 2019-04-11 NOTE — ANESTHESIA PROCEDURE NOTES
Airway  Date/Time: 4/11/2019 3:23 PM  Performed by: LINDA GREER  Authorized by: LINDA GREER     Location:  OR  Urgency:  Elective  Difficult Airway: No    Indications for Airway Management:  Anesthesia  Spontaneous Ventilation: absent    Sedation Level:  Deep  Preoxygenated: Yes    Patient Position:  Sniffing  Final Airway Type:  Endotracheal airway  Final Endotracheal Airway:  ETT  Cuffed: Yes    Technique Used for Successful ETT Placement:  Direct laryngoscopy  Insertion Site:  Oral  Blade Type:  Palma  Laryngoscope Blade/Videolaryngoscope Blade Size:  3  ETT Size (mm):  8.0  Measured from:  Gums  ETT to Gums (cm):  23  Placement Verified by: auscultation and capnometry    Cormack-Lehane Classification:  Grade III - view of epiglottis only  Number of Attempts at Approach:  1

## 2019-04-11 NOTE — PROGRESS NOTES
IR Procedure Note:    Dr. Sanders and Dr. Harris consented patient prior to procedure; all questions answered.    Site Marked and Confirmed with imaging by MD, RT and RN pre procedure.     Dr. Sanders performed  A suprapubic catheter placement with general anesthesia by Dr. Harris.  All vital signs monitoring and medication administration by anesthesia services. - See anesthesia record.    Gauze and metapore tape applied to midline pelvis, CDI and soft. Humphrey catheter removed by this RN per Dr. Sanders.  Report given to PREET Chavira.  RN transported pt to [facility] with Dr. Harris. Sao2 monitor = 99% on oxygen via simple mask @ 10  lpm.

## 2019-04-11 NOTE — PROGRESS NOTES
· 2 RN skin check complete with PREET Jaramillo.  · Devices in place Harrington, nasal cannula, ileostomy and Wound vac.  · Skin assessed under devices. Ears pink with foam pads. Skin red around ileostomy. Penis is red, swollen and has a DTI where harrington exists.   · Confirmed pressure ulcers found on Sacrum at coccyx.  · New potential pressure ulcers noted on ventral surface of penis. Wound consult placed and wound reported. Urology already following.   · Sacrum is red, with non-blanching areas. Excoriation and peeling noted. Fungal cream applied and mepilex placed. Penis is red and swollen and has drainage/pus. Penile area/groin area cleansed, fungal cream applied and Interdrys now in place.   · The following interventions in place waffle bed. Q2 turns, barrier wipes, miconazole cream/barrier cream in use. Harrington care provided, Harrington tubing wrapped in xeroform gauze. Interdry applied to groin/pannus area. Heels are being floated with pillow.

## 2019-04-11 NOTE — PROGRESS NOTES
IR Procedure Note:    Dr. Beyer consented patient prior to procedure; all questions answered.    Site confirmed with ultrasound imaging by patient, physician, RT, and RN.     Patient unable to tolerate the procedure due to pain with retrograde injection of contrast into the bladder, in spite of physician supervised administration of moderate sedation. ETCo2 baseline 29, with consistent waveform during the procedure.      Per Dr. Beyer, suprapubic catheter will be placed tomorrow, 4 /11/19, with general anesthesia. Pt to be NPO after midnight, hold blood thinners, and clamp harrington catheter tube 2 hours prior to procedure.  [place], bedside RN, informed.    Harrington remains in place. Patient lethargic but verbally appropriate post procedure, vital signs stable during procedure and transport, see flow sheet for vital signs.  Report given to PREET Lovelace.  RN transported patient to T828 with Sao2 monitor = 97 % on oxygen via NC @ 4 lpm.     Sedation End Time: 1754

## 2019-04-11 NOTE — PROGRESS NOTES
Patient was unable to tolerate SP cath placement procedure due to pain with retrograde infusion of dilute contrast into the bladder. Recommend re-attempt with deep sedation or GA tomorrow.     Calvin Beyer MD  Interventional Radiology

## 2019-04-11 NOTE — CARE PLAN
Problem: Knowledge Deficit  Goal: Knowledge of disease process/condition, treatment plan, diagnostic tests, and medications will improve  Outcome: PROGRESSING SLOWER THAN EXPECTED  Pt Orientatedx1. Re-directed by RN. All questions attempted to be answered    Problem: Fluid Volume:  Goal: Will maintain balanced intake and output  Outcome: PROGRESSING AS EXPECTED  Monitoring Is and Os

## 2019-04-11 NOTE — THERAPY
"Occupational Therapy Evaluation completed.   Functional Status: Supine > EOB SBA (HOB elevated), transfers with FWW SBA, LB dressing total A (due to abdominal/penile wounds), seated grooming mod I  Plan of Care: Will benefit from Occupational Therapy 3 times per week  Discharge Recommendations:  Equipment: Will Continue to Assess for Equipment Needs.     See \"Rehab Therapy-Acute\" Patient Summary Report for complete documentation.    69 y.o. male with h/o BPH with chronic Humphrey, Crohs's Dz s/p ileostomy, IDDM, CKD, dementia, chronic pain with opiate dependence, recent cholecystectomy with transfer to SNF. Pt developed drainage at surgical wound and was readmitted. Dx with surgical wound infection, ARF. Abdominal wound vac placed. Awaiting suprapubic catheter. Pt seen for OT eval. Pt completed: supine > EOB, transfers with FWW, seated oral care. Pt required total A to don socks due to abdominal wound vac, penile pain from erosion. Pt appears mildly confused, tangential. Read date from white board, so difficult to determine true orientation. Following commands without difficulty. Per pt he resides in [place] where he has intermittent assist from friend \"Nathaly\". Was unable to recall that he had recently been at SNF. Pt may require increased supv on DC due to memory impairment. Acute OT will follow pt to maximize functional independence and safety.     "

## 2019-04-11 NOTE — WOUND TEAM
"[facility]   Inpatient Services   Established Ostomy Management/ troubleshooting  HPI: Reviewed  PMH: Reviewed   SH: Reviewed   Reason for Ostomy nurse consult:  chonically leaking ileosotomy with denuded skin              Subjective:  \"How old are you?\"     Objective:  Appliance intact, hypafix tape windowed around barrier    Ostomy type:  Ileostomy   Stoma location:  RUQ   Stoma assessment:    Appearance:  Red, budded, slightly oval         Size:  ~ 1\"     Protrusion:  <1   Output:  Large watery green    MC jxn:  Intact      Peristomal skin:  Severely denuded 2-3 cm circumferentially around stoma, dry flaking pink scar tissue outside of stoma.        Ostomy Appliance (type and size):  1 piece convex with paste ring and belt      Interventions and Education:  Previous appliance removed, cleansed area with warm wash cloth.  Template made, cut barrier, paste ring applied to barrier opening.  Skin crusted x 3 with ostomy powder and no sting skin prep.  Barrier applied, end closed, belt applied.         Evaluation:  Patient with ileostomy that has obviously been leaking for a long time.  Ostomy RN to change every other day. Denuded julia stomal skin is improving. Patient is adamant about applying hypafix tape to window barrier. Advised patient against this as his skin is raw and would benefit from less tape. Patient agreed to not apply hypafix tape until later in the day.            Plan:  Wound team to change every other day.      Anticipated discharge needs:  SNF? Patient states that he has his own supplies and supplier information.        [facility]  Wound and Skin Care Progress Note    Admission Date: 4/8/2019     HPI, PMH, SH: Reviewed     Unit where seen by Wound Team: T828/02     WOUND CONSULT RELATED TO:  Scheduled NPWT dressing change    SUBJECTIVE:  \"You can do what you need to.\"      Self Report / Pain Level:  Pre-medicated, no c/o pain to abdomen       OBJECTIVE: vac in place, " no leaks    WOUND TYPE, LOCATION, CHARACTERISTICS (Pressure Injuries: location, stage, POA or date identified)           Incision 04/09/19 Abdomen (Active)   Site Assessment Red    Julia-wound Assessment Clean;Dry;Intact;Pink    Margins Unattached edges    Wound Length (cm) 13.5 cm 4/9/2019 11:00 AM   Wound Width (cm) 1 cm 4/9/2019 11:00 AM   Wound Depth (cm) 3 cm 4/9/2019 11:00 AM   Wound Surface Area (cm^2) 13.5 cm^2 4/9/2019 11:00 AM   Tunneling 0 cm 4/9/2019 11:00 AM   Undermining 0 cm 4/9/2019 11:00 AM   Closure Secondary intention    Drainage Amount Scant    Drainage Description Serosanguineous    Treatments Cleansed    Periwound Protectant Benzoin;Drape    Dressing Options Wound Vac    Dressing Changed Changed    Dressing Status Clean;Dry;Intact    Dressing Change Frequency Tuesday, Thursday, Saturday    NEXT Dressing Change  04/13/19    NEXT Weekly Photo (Inpatient Only) 04/16/19    Odor None    Exposed Structures None    Tissue Type and Percentage 100% red      Negative Pressure Wound Therapy Abdomen Right (Active)   NPWT Pump Mode / Pressure Setting Continuous;125 mmHg    Dressing Type Small;Black foam    Number of Foam Pieces Used 3    Canister Changed No        Vascular:    Dorsal Pedal pulses:  NA  Posterior tib pulses:   NA    BERNICE:      NA    Lab Values:    WBC:       WBC   Date/Time Value Ref Range Status   04/11/2019 02:17 AM 9.0 4.8 - 10.8 K/uL Final     A1C:      Lab Results   Component Value Date/Time    HBA1C 6.4 (H) 02/23/2018 03:58 PM         Culture:   NA    INTERVENTIONS BY WOUND TEAM:  Previous dressing removed, no retained foam noted with probing and visualization of wound. Wound cleansed with normal saline and gauze. Benzoin and drape to julia-wound.  1 piece of black foam to fill depth of wound, 1 piece of black strip foam over remaining closed incision, 1 piece for button. 3 pieces of black foam total.  NPWT resumed at 125 mm Hg continuous, no leaks noted.      Dressing selection: NPWT          Interdisciplinary consultation: Patient, Bedside RN    EVALUATION: Patient admitted from SNF.  Patient has been seen by wound care recently at .  Liliana gonzalez with Dr. Alonzo at that time with prevena placement.  Wound dehisced and VAC placed.  Remainder of incision is still approximated with staples, partial dehiscence should continue to improve with NPWT to promote granular tissue growth and manage exudate. Wound may become to small to fill with foam and may need to start strip packing at next dressing change. Patient to go down for placement of sp harrington today.        Factors affecting wound healing:  infection, DM    Goals: Steady decrease in wound area and depth weekly.    NURSING PLAN OF CARE ORDERS (X):    Dressing changes: See Dressing Care orders: X  Skin care: See Skin Care orders: X  Rectal tube care: See Rectal Tube Care orders:   Other orders:    WOUND TEAM PLAN OF CARE (X):   NPWT change 3 x week:      X  Dressing changes by wound team:       Follow up as needed:       Other (explain):     Anticipated discharge plans (X):   SNF:         X  Home Care:           Outpatient Wound Center:            Self Care:            Other:

## 2019-04-11 NOTE — OR SURGEON
Immediate Post- Operative Note        PostOp Diagnosis: traumatic hypospadias      Procedure(s): suprapubic tube      Estimated Blood Loss: Less than 5 ml        Complications: None            4/11/2019     4:04 PM     Poli Sanders

## 2019-04-11 NOTE — ANESTHESIA PROCEDURE NOTES
Peripheral IV  Date/Time: 4/11/2019 3:18 PM  Performed by: LINDA GREER  Authorized by: LINDA GREER     Size:  20 G  Laterality:  Right  Local Anesthetic:  Lidocaine 1%  Site Prep:  Chlorhexidine  Technique:  Direct puncture  Attempts:  1

## 2019-04-11 NOTE — PROGRESS NOTES
Urology Progress Note      S: Did not tolerate SP cath placement due to pain with infusion of contrast.  Harrington still draining.  Set up for re-attempt today.    O:   /59   Pulse 66   Temp 37.4 °C (99.4 °F) (Temporal)   Resp 16   Wt 115.8 kg (255 lb 4.7 oz)   SpO2 99%   Recent Labs      04/08/19   1930  04/10/19   0555  04/11/19   0217   SODIUM  133*  139  137   POTASSIUM  4.2  4.0  3.9   CHLORIDE  100  108  109   CO2  23  21  21   GLUCOSE  138*  95  89   BUN  43*  27*  20   CREATININE  2.95*  2.27*  2.21*   CALCIUM  8.8  8.5  8.6     Recent Labs      04/08/19   1930  04/10/19   0554  04/11/19   0217   WBC  12.3*  9.2  9.0   RBC  4.30*  4.03*  4.12*   HEMOGLOBIN  12.2*  11.0*  11.4*   HEMATOCRIT  37.5*  36.9*  37.8*   MCV  87.2  91.6  91.7   MCH  28.4  27.3  27.7   MCHC  32.5*  29.8*  30.2*   RDW  46.2  48.4  48.3   PLATELETCT  439  368  372   MPV  9.3  9.3  9.2       No intake or output data in the 24 hours ending 04/11/19 1038    Exam:  Urine: harrington in place and draining clear yellow output. Traumatic hypospadias secondary to catheter erosion from long term indwelling catheter.     A/P:    Active Hospital Problems    Diagnosis   • Surgical wound infection [T81.49XA]     Priority: High   • Acute renal failure superimposed on stage 3 chronic kidney disease (HCC) [N17.9, N18.3]     Priority: Medium   • COPD (chronic obstructive pulmonary disease) ([facility]) [J44.9]     Priority: Low   • Diabetes mellitus with hyperglycemia (HCC) [E11.65]     Priority: Low   • Crohn's disease (HCC) [K50.90]     Priority: Low   • BPH (benign prostatic hyperplasia) [N40.0]       69 y.o. Male with traumatic hypospadias secondary to long term indwelling urethral harrington cahteter.  Plan:  - keep NPO  - awaiting SPT placement re-attempt today  - will follow

## 2019-04-11 NOTE — ANESTHESIA PREPROCEDURE EVALUATION
Relevant Problems   (+) COPD (chronic obstructive pulmonary disease) (HCC)   (+) HTN (hypertension)       Physical Exam    Anesthesia Plan

## 2019-04-12 LAB
ANION GAP SERPL CALC-SCNC: 9 MMOL/L (ref 0–11.9)
BASOPHILS # BLD AUTO: 0.1 % (ref 0–1.8)
BASOPHILS # BLD: 0.01 K/UL (ref 0–0.12)
BUN SERPL-MCNC: 19 MG/DL (ref 8–22)
CALCIUM SERPL-MCNC: 8.4 MG/DL (ref 8.5–10.5)
CHLORIDE SERPL-SCNC: 109 MMOL/L (ref 96–112)
CO2 SERPL-SCNC: 18 MMOL/L (ref 20–33)
COMMENT 1642: NORMAL
CREAT SERPL-MCNC: 2.15 MG/DL (ref 0.5–1.4)
EOSINOPHIL # BLD AUTO: 0 K/UL (ref 0–0.51)
EOSINOPHIL NFR BLD: 0 % (ref 0–6.9)
ERYTHROCYTE [DISTWIDTH] IN BLOOD BY AUTOMATED COUNT: 47.9 FL (ref 35.9–50)
GLUCOSE BLD-MCNC: 102 MG/DL (ref 65–99)
GLUCOSE BLD-MCNC: 105 MG/DL (ref 65–99)
GLUCOSE BLD-MCNC: 87 MG/DL (ref 65–99)
GLUCOSE SERPL-MCNC: 146 MG/DL (ref 65–99)
HCT VFR BLD AUTO: 38.5 % (ref 42–52)
HGB BLD-MCNC: 11.5 G/DL (ref 14–18)
IMM GRANULOCYTES # BLD AUTO: 0.03 K/UL (ref 0–0.11)
IMM GRANULOCYTES NFR BLD AUTO: 0.4 % (ref 0–0.9)
LYMPHOCYTES # BLD AUTO: 1.45 K/UL (ref 1–4.8)
LYMPHOCYTES NFR BLD: 17.8 % (ref 22–41)
MCH RBC QN AUTO: 27.8 PG (ref 27–33)
MCHC RBC AUTO-ENTMCNC: 29.9 G/DL (ref 33.7–35.3)
MCV RBC AUTO: 93 FL (ref 81.4–97.8)
MONOCYTES # BLD AUTO: 0.14 K/UL (ref 0–0.85)
MONOCYTES NFR BLD AUTO: 1.7 % (ref 0–13.4)
MORPHOLOGY BLD-IMP: NORMAL
NEUTROPHILS # BLD AUTO: 6.5 K/UL (ref 1.82–7.42)
NEUTROPHILS NFR BLD: 80 % (ref 44–72)
NRBC # BLD AUTO: 0 K/UL
NRBC BLD-RTO: 0 /100 WBC
PLATELET # BLD AUTO: 383 K/UL (ref 164–446)
PLATELET BLD QL SMEAR: NORMAL
PMV BLD AUTO: 9.1 FL (ref 9–12.9)
POTASSIUM SERPL-SCNC: 4.8 MMOL/L (ref 3.6–5.5)
RBC # BLD AUTO: 4.14 M/UL (ref 4.7–6.1)
RBC BLD AUTO: NORMAL
SODIUM SERPL-SCNC: 136 MMOL/L (ref 135–145)
WBC # BLD AUTO: 8.1 K/UL (ref 4.8–10.8)

## 2019-04-12 PROCEDURE — 700111 HCHG RX REV CODE 636 W/ 250 OVERRIDE (IP): Performed by: INTERNAL MEDICINE

## 2019-04-12 PROCEDURE — 85025 COMPLETE CBC W/AUTO DIFF WBC: CPT

## 2019-04-12 PROCEDURE — 97162 PT EVAL MOD COMPLEX 30 MIN: CPT

## 2019-04-12 PROCEDURE — 82962 GLUCOSE BLOOD TEST: CPT

## 2019-04-12 PROCEDURE — 700102 HCHG RX REV CODE 250 W/ 637 OVERRIDE(OP): Performed by: INTERNAL MEDICINE

## 2019-04-12 PROCEDURE — 99232 SBSQ HOSP IP/OBS MODERATE 35: CPT | Performed by: HOSPITALIST

## 2019-04-12 PROCEDURE — 700102 HCHG RX REV CODE 250 W/ 637 OVERRIDE(OP): Performed by: HOSPITALIST

## 2019-04-12 PROCEDURE — 770006 HCHG ROOM/CARE - MED/SURG/GYN SEMI*

## 2019-04-12 PROCEDURE — 700105 HCHG RX REV CODE 258: Performed by: INTERNAL MEDICINE

## 2019-04-12 PROCEDURE — 700111 HCHG RX REV CODE 636 W/ 250 OVERRIDE (IP): Performed by: HOSPITALIST

## 2019-04-12 PROCEDURE — A9270 NON-COVERED ITEM OR SERVICE: HCPCS | Performed by: INTERNAL MEDICINE

## 2019-04-12 PROCEDURE — 80048 BASIC METABOLIC PNL TOTAL CA: CPT

## 2019-04-12 PROCEDURE — 36415 COLL VENOUS BLD VENIPUNCTURE: CPT

## 2019-04-12 PROCEDURE — 97535 SELF CARE MNGMENT TRAINING: CPT

## 2019-04-12 PROCEDURE — A9270 NON-COVERED ITEM OR SERVICE: HCPCS | Performed by: HOSPITALIST

## 2019-04-12 RX ORDER — DEXTROSE MONOHYDRATE 25 G/50ML
25 INJECTION, SOLUTION INTRAVENOUS
Status: DISCONTINUED | OUTPATIENT
Start: 2019-04-12 | End: 2019-04-13 | Stop reason: HOSPADM

## 2019-04-12 RX ORDER — ASCORBIC ACID 500 MG
500 TABLET ORAL 2 TIMES DAILY
Status: DISCONTINUED | OUTPATIENT
Start: 2019-04-12 | End: 2019-04-13 | Stop reason: HOSPADM

## 2019-04-12 RX ORDER — DEXTROSE MONOHYDRATE 25 G/50ML
25 INJECTION, SOLUTION INTRAVENOUS
Status: DISCONTINUED | OUTPATIENT
Start: 2019-04-12 | End: 2019-04-12

## 2019-04-12 RX ORDER — M-VIT,TX,IRON,MINS/CALC/FOLIC 27MG-0.4MG
1 TABLET ORAL DAILY
Status: DISCONTINUED | OUTPATIENT
Start: 2019-04-12 | End: 2019-04-13 | Stop reason: HOSPADM

## 2019-04-12 RX ORDER — HALOPERIDOL 5 MG/ML
2 INJECTION INTRAMUSCULAR ONCE
Status: COMPLETED | OUTPATIENT
Start: 2019-04-12 | End: 2019-04-12

## 2019-04-12 RX ADMIN — FERROUS GLUCONATE 324 MG: 324 TABLET ORAL at 06:30

## 2019-04-12 RX ADMIN — OXYCODONE HYDROCHLORIDE 5 MG: 5 TABLET ORAL at 02:48

## 2019-04-12 RX ADMIN — TAMSULOSIN HYDROCHLORIDE 0.4 MG: 0.4 CAPSULE ORAL at 08:46

## 2019-04-12 RX ADMIN — OXYCODONE HYDROCHLORIDE 10 MG: 10 TABLET ORAL at 21:55

## 2019-04-12 RX ADMIN — OXYCODONE HYDROCHLORIDE 5 MG: 5 TABLET ORAL at 06:30

## 2019-04-12 RX ADMIN — PIPERACILLIN SODIUM AND TAZOBACTAM SODIUM 3.38 G: 3; .375 INJECTION, POWDER, LYOPHILIZED, FOR SOLUTION INTRAVENOUS at 06:30

## 2019-04-12 RX ADMIN — MULTIPLE VITAMINS W/ MINERALS TAB 1 TABLET: TAB at 13:02

## 2019-04-12 RX ADMIN — HALOPERIDOL LACTATE 2 MG: 5 INJECTION, SOLUTION INTRAMUSCULAR at 02:54

## 2019-04-12 RX ADMIN — DOXYCYCLINE 100 MG: 100 TABLET, FILM COATED ORAL at 17:53

## 2019-04-12 RX ADMIN — BUDESONIDE AND FORMOTEROL FUMARATE DIHYDRATE 2 PUFF: 160; 4.5 AEROSOL RESPIRATORY (INHALATION) at 17:55

## 2019-04-12 RX ADMIN — BUDESONIDE AND FORMOTEROL FUMARATE DIHYDRATE 2 PUFF: 160; 4.5 AEROSOL RESPIRATORY (INHALATION) at 06:31

## 2019-04-12 RX ADMIN — MICONAZOLE NITRATE: 20 CREAM TOPICAL at 18:00

## 2019-04-12 RX ADMIN — PIPERACILLIN SODIUM AND TAZOBACTAM SODIUM 3.38 G: 3; .375 INJECTION, POWDER, LYOPHILIZED, FOR SOLUTION INTRAVENOUS at 21:55

## 2019-04-12 RX ADMIN — OXYCODONE HYDROCHLORIDE AND ACETAMINOPHEN 500 MG: 500 TABLET ORAL at 17:54

## 2019-04-12 RX ADMIN — SODIUM CHLORIDE: 9 INJECTION, SOLUTION INTRAVENOUS at 06:31

## 2019-04-12 RX ADMIN — OXYCODONE HYDROCHLORIDE 10 MG: 10 TABLET ORAL at 13:02

## 2019-04-12 RX ADMIN — PIPERACILLIN SODIUM AND TAZOBACTAM SODIUM 3.38 G: 3; .375 INJECTION, POWDER, LYOPHILIZED, FOR SOLUTION INTRAVENOUS at 13:02

## 2019-04-12 RX ADMIN — DOXYCYCLINE 100 MG: 100 TABLET, FILM COATED ORAL at 06:30

## 2019-04-12 RX ADMIN — MORPHINE SULFATE 15 MG: 15 TABLET, EXTENDED RELEASE ORAL at 01:04

## 2019-04-12 RX ADMIN — MICONAZOLE NITRATE: 20 CREAM TOPICAL at 06:31

## 2019-04-12 RX ADMIN — MORPHINE SULFATE 15 MG: 15 TABLET, EXTENDED RELEASE ORAL at 12:14

## 2019-04-12 RX ADMIN — MORPHINE SULFATE 4 MG: 4 INJECTION INTRAVENOUS at 08:50

## 2019-04-12 RX ADMIN — SENNOSIDES AND DOCUSATE SODIUM 2 TABLET: 8.6; 5 TABLET ORAL at 06:29

## 2019-04-12 ASSESSMENT — PATIENT HEALTH QUESTIONNAIRE - PHQ9
9. THOUGHTS THAT YOU WOULD BE BETTER OFF DEAD, OR OF HURTING YOURSELF: NOT AT ALL
3. TROUBLE FALLING OR STAYING ASLEEP OR SLEEPING TOO MUCH: NEARLY EVERY DAY
1. LITTLE INTEREST OR PLEASURE IN DOING THINGS: SEVERAL DAYS
5. POOR APPETITE OR OVEREATING: SEVERAL DAYS
8. MOVING OR SPEAKING SO SLOWLY THAT OTHER PEOPLE COULD HAVE NOTICED. OR THE OPPOSITE, BEING SO FIGETY OR RESTLESS THAT YOU HAVE BEEN MOVING AROUND A LOT MORE THAN USUAL: NOT AT ALL
7. TROUBLE CONCENTRATING ON THINGS, SUCH AS READING THE NEWSPAPER OR WATCHING TELEVISION: NOT AT ALL
2. FEELING DOWN, DEPRESSED, IRRITABLE, OR HOPELESS: SEVERAL DAYS
SUM OF ALL RESPONSES TO PHQ QUESTIONS 1-9: 8
6. FEELING BAD ABOUT YOURSELF - OR THAT YOU ARE A FAILURE OR HAVE LET YOURSELF OR YOUR FAMILY DOWN: SEVERAL DAYS
4. FEELING TIRED OR HAVING LITTLE ENERGY: SEVERAL DAYS
SUM OF ALL RESPONSES TO PHQ9 QUESTIONS 1 AND 2: 2

## 2019-04-12 ASSESSMENT — COGNITIVE AND FUNCTIONAL STATUS - GENERAL
WALKING IN HOSPITAL ROOM: A LITTLE
TURNING FROM BACK TO SIDE WHILE IN FLAT BAD: A LITTLE
MOVING FROM LYING ON BACK TO SITTING ON SIDE OF FLAT BED: A LITTLE
MOVING TO AND FROM BED TO CHAIR: A LITTLE
CLIMB 3 TO 5 STEPS WITH RAILING: A LITTLE
STANDING UP FROM CHAIR USING ARMS: A LITTLE
SUGGESTED CMS G CODE MODIFIER MOBILITY: CK
MOBILITY SCORE: 18

## 2019-04-12 ASSESSMENT — GAIT ASSESSMENTS
DISTANCE (FEET): 300
GAIT LEVEL OF ASSIST: STAND BY ASSIST
ASSISTIVE DEVICE: FRONT WHEEL WALKER

## 2019-04-12 ASSESSMENT — ENCOUNTER SYMPTOMS
RESPIRATORY NEGATIVE: 1
CARDIOVASCULAR NEGATIVE: 1
VOMITING: 0
FOCAL WEAKNESS: 0
BLURRED VISION: 0
ABDOMINAL PAIN: 1
EYES NEGATIVE: 1
CHILLS: 0
DEPRESSION: 0
WEIGHT LOSS: 0
COUGH: 0
FEVER: 0
DIZZINESS: 0
PSYCHIATRIC NEGATIVE: 1
NAUSEA: 0
HEADACHES: 0
NEUROLOGICAL NEGATIVE: 1
MYALGIAS: 0
DIAPHORESIS: 0
BRUISES/BLEEDS EASILY: 0
MUSCULOSKELETAL NEGATIVE: 1
SHORTNESS OF BREATH: 0
WEAKNESS: 0
CONSTITUTIONAL NEGATIVE: 1
SORE THROAT: 0
PALPITATIONS: 0

## 2019-04-12 ASSESSMENT — LIFESTYLE VARIABLES: SUBSTANCE_ABUSE: 0

## 2019-04-12 NOTE — CARE PLAN
Problem: Safety  Goal: Will remain free from injury  Outcome: PROGRESSING AS EXPECTED  Pt remains free from injury. Bed in lowest position, locked, and alarm activated. Nonskid footwear in place. Call light and personal belongings within reach. Hourly rounding. BUE soft wrist restraints and 4 bedrails up due to unsafe attempts at ambulation    Problem: Venous Thromboembolism (VTW)/Deep Vein Thrombosis (DVT) Prevention:  Goal: Patient will participate in Venous Thrombosis (VTE)/Deep Vein Thrombosis (DVT)Prevention Measures  Outcome: PROGRESSING SLOWER THAN EXPECTED   04/11/19 2300   Mechanical/VTE Prophylaxis   Mechanical Prophylaxis  SCDs, Sequential Compression Device   SCDs, Sequential Compression Device Refused  (compatative, refusing)   OTHER   Risk Assessment Score 3   VTE RISK High   Pt refusing to wear SCD's and kicks at staff when attempting to apply.

## 2019-04-12 NOTE — PROGRESS NOTES
· 2 RN skin check complete with PREET Mccabe.  · Devices in place Wound vac to abdomen, suprapubic catheter, RUQ colostomy.  · Skin assessed under devices Abdomen, suprapubic catheter, and colostomy.  · Confirmed pressure ulcers found on penis.  · New potential pressure ulcers noted on none. Wound consult placed and wound reported.  · The following interventions in place waffle mattress, q2h turns/repositioning, dressing care to penis, colostomy, and suprapubic catheter site per wound care orders. Mepilex removed from bottome due to rash/excoriation on bilat buttocks. Groin moist, mckinley cream applied, refusing interdrys. R ear red, blanching. Silicone tubing in place, refusing foam pads. Bilat elbows pink, blanching, floated on pillows. Feet dry/calloused, floated on pillows.

## 2019-04-12 NOTE — DIETARY
Nutrition Services: Update   Spoke with Dr. Issa requesting MVI with minerals and Vit C 500 mg BID for wound healing r/t Stage 3 sacral ulcer with vac.    Recommendations/Plan:  1. MVI with minerals and Vit C 500 mg BID to assist with wound healing.   2. Encourage intake of meals  3. Document intake of all meals as % taken in ADL's to provide interdisciplinary communication across all shifts.   4. Monitor weight.  5. Nutrition rep will continue to see patient for ongoing meal and snack preferences.  6. Obtain supplement order per RD as needed.    RD following

## 2019-04-12 NOTE — PROGRESS NOTES
.  Urology Progress Note      S: S/P placement of SPT yesterday.  In place and draining well.  Denies pain. No fevers, chills.    O:   /86   Pulse 82   Temp 37.1 °C (98.7 °F) (Temporal)   Resp 16   Wt 117.6 kg (259 lb 4.2 oz)   SpO2 98%   Recent Labs      04/10/19   0555  04/11/19 0217  04/12/19 0225   SODIUM  139  137  136   POTASSIUM  4.0  3.9  4.8   CHLORIDE  108  109  109   CO2  21  21  18*   GLUCOSE  95  89  146*   BUN  27*  20  19   CREATININE  2.27*  2.21*  2.15*   CALCIUM  8.5  8.6  8.4*     Recent Labs      04/10/19   0554  04/11/19 0217 04/12/19 0225   WBC  9.2  9.0  8.1   RBC  4.03*  4.12*  4.14*   HEMOGLOBIN  11.0*  11.4*  11.5*   HEMATOCRIT  36.9*  37.8*  38.5*   MCV  91.6  91.7  93.0   MCH  27.3  27.7  27.8   MCHC  29.8*  30.2*  29.9*   RDW  48.4  48.3  47.9   PLATELETCT  368  372  383   MPV  9.3  9.2  9.1       No intake or output data in the 24 hours ending 04/11/19 1038    Exam:  Urine: SPT in place and draining well.  Dressing around insertion site C/D/I.  Traumatic hypospadias secondary to catheter erosion from long term indwelling cath.  Harrington cath removed.    A/P:    Active Hospital Problems    Diagnosis   • Surgical wound infection [T81.49XA]     Priority: High   • Acute renal failure superimposed on stage 3 chronic kidney disease (HCC) [N17.9, N18.3]     Priority: Medium   • COPD (chronic obstructive pulmonary disease) ([facility]) [J44.9]     Priority: Low   • Diabetes mellitus with hyperglycemia ([facility]) [E11.65]     Priority: Low   • Crohn's disease ([facility]) [K50.90]     Priority: Low   • BPH (benign prostatic hyperplasia) [N40.0]       69 y.o. Male with traumatic hypospadias secondary to long term indwelling urethral harrington cahteter; now S/P placement of SPT, and removal of urethral harrington catheter,  Plan:  - monitor output via SPT  - monitor pain control  - will need replacement of SPT about 6 weeks post placement (about 5/23/19). This can be done as an outpatient.   - urology  signing off

## 2019-04-12 NOTE — PROGRESS NOTES
Assumed pt care. Pt resting in bed. Bed in lowest position, locked, and alarm activated. Call light within reach. No tele monitor - pt is medical

## 2019-04-12 NOTE — PROGRESS NOTES
Pt being compatative and hostile toward staff. Multiple attempts to get out of bed without calling, pulled out his only PIV, has colostomy, new suprapubic catheter, and wound vac on abdominal incision. Unable to place lap belt safely. MD paged and orders received for bilat wrist restraints, all 4 bedrails up, and 2 mg haldol IM to administer prior to staff attempt to place restraints.

## 2019-04-12 NOTE — ANESTHESIA TIME REPORT
Anesthesia Start and Stop Event Times     Date Time Event    4/11/2019 1450 Anesthesia Start     1620 Anesthesia Stop        Responsible Staff  04/11/19    Name Role Begin End    Kishore Harris III, M.D. Anesth 1450 1620        Preop Diagnosis (Free Text):  Pre-op Diagnosis             Preop Diagnosis (Codes):    Post op Diagnosis  Acute-on-chronic kidney injury (HCC)  Surgical wound infection    Premium Reason  B. 1st Call    Comments:

## 2019-04-12 NOTE — PROGRESS NOTES
"Pt back to room. Agitated, insisting on \"sitting in wheelchair and talking to someone  [he] knows\". Pt calm with therapeutic presence and touch. VSS on 3L O2. Refused SCDs.   "

## 2019-04-12 NOTE — DISCHARGE PLANNING
RN CM talked with [facility] Liaison.     Pt is accepted back when medically clear.    Pt will go with wound vac.  Liaison notified [facility].    Liaison request that when new choice for SNF is made put on the choice that pt will be coming with wound vac.

## 2019-04-12 NOTE — CARE PLAN
Problem: Nutritional:  Goal: Achieve adequate nutritional intake  Advance diet beyond CLD and patient will consume 50% or greater of meals   Outcome: PROGRESSING AS EXPECTED

## 2019-04-12 NOTE — PROGRESS NOTES
2mg IM haldol given and bilat wrist restraints applied while security standing at bedside as pt has been combative toward staff this shift. All 4 bedrails up as well per MD orders

## 2019-04-12 NOTE — PROGRESS NOTES
Assumed care of pt, bedside report received from PREET Michelle. Pt sleeping soundly in bed in no acute distress. Bilat wrist restraints and 4 bed rails up noted. Wound vac on, suprapubic cath draining, per Viviana no colostomy output overnight. IV atbx infusing. Bed low and locked, call light within reach, bed alarm in place. Will monitor.

## 2019-04-12 NOTE — PROGRESS NOTES
Hospital Medicine Daily Progress Note    Date of Service  4/12/2019    Chief Complaint  Abdominal pain    Hospital Course  Patient is a 69 y.o. male with a past medical history of dementia, Crohn's disease status post ileostomy, BPH with chronic indwelling Humphrey catheter, insulin-dependent diabetes mellitus, CKD, chronic pain and opiate dependence who presented 4/8/2019 with abdominal pain.  Patient was recently admitted to [facility] for right upper quadrant pain and is diagnosed with common bile duct stone for which she underwent ERCP with stone extraction followed by open cholecystectomy on 3/31/19 and discharged to SNF.  The patient developed abdominal pain and was noted to have some purulent drainage from his surgical wound for which EMS was called    Interval Problem Update  axox3 this am  Reportedly confused and trying to pull at lines ect last night - resolved this am  Penile pain  Stable abdominal pain  Denies sob  Following commands  Ros otherwise negative    Consultants/Specialty  Surgery  Urology  IR    Code Status  Full code    Disposition  Awaiting suprapubic cath    Review of Systems  Review of Systems   Constitutional: Negative.  Negative for chills, diaphoresis, fever, malaise/fatigue and weight loss.   HENT: Negative.  Negative for sore throat.    Eyes: Negative.  Negative for blurred vision.   Respiratory: Negative.  Negative for cough and shortness of breath.    Cardiovascular: Negative.  Negative for chest pain, palpitations and leg swelling.   Gastrointestinal: Positive for abdominal pain. Negative for nausea and vomiting.   Genitourinary: Negative.  Negative for dysuria.        Penile pain   Musculoskeletal: Negative.  Negative for myalgias.   Skin: Negative.  Negative for itching and rash.   Neurological: Negative.  Negative for dizziness, focal weakness, weakness and headaches.   Endo/Heme/Allergies: Negative.  Does not bruise/bleed easily.   Psychiatric/Behavioral: Negative.   Negative for depression, substance abuse and suicidal ideas.   All other systems reviewed and are negative.       Physical Exam  Temp:  [36.4 °C (97.5 °F)-37.1 °C (98.7 °F)] 37.1 °C (98.7 °F)  Pulse:  [50-99] 82  Resp:  [12-20] 16  BP: (131-169)/(61-86) 131/86  SpO2:  [90 %-99 %] 99 %    Physical Exam   Constitutional: He appears well-developed and well-nourished. No distress.   HENT:   Head: Normocephalic and atraumatic.   Right Ear: External ear normal.   Left Ear: External ear normal.   Nose: Nose normal.   Mouth/Throat: Oropharynx is clear and moist. No oropharyngeal exudate.   Eyes: Pupils are equal, round, and reactive to light. Conjunctivae and EOM are normal. Right eye exhibits no discharge. Left eye exhibits no discharge. No scleral icterus.   Neck: Normal range of motion. Neck supple. No JVD present. No tracheal deviation present. No thyromegaly present.   Cardiovascular: Normal rate, regular rhythm, normal heart sounds and intact distal pulses.  Exam reveals no gallop and no friction rub.    No murmur heard.  Pulmonary/Chest: Effort normal and breath sounds normal. No stridor. No respiratory distress. He has no wheezes. He has no rales. He exhibits no tenderness.   Abdominal: Soft. Bowel sounds are normal. He exhibits no distension and no mass. There is no tenderness. There is no rebound and no guarding.   Ostomy, periostomy area remains irritated  Wound vac in place   Genitourinary:   Genitourinary Comments: Penile injury unchanged  Suprapubic in place   Musculoskeletal: Normal range of motion. He exhibits no edema, tenderness or deformity.   Lymphadenopathy:     He has no cervical adenopathy.   Neurological: He is alert. He has normal reflexes. No cranial nerve deficit. He exhibits normal muscle tone. Coordination normal.   Skin: Skin is warm and dry. No rash noted. He is not diaphoretic. No erythema. No pallor.   Nursing note and vitals reviewed.      Fluids    Intake/Output Summary (Last 24 hours) at  04/12/19 1142  Last data filed at 04/12/19 0631   Gross per 24 hour   Intake          2845.42 ml   Output             1425 ml   Net          1420.42 ml       Laboratory  Recent Labs      04/10/19   0554  04/11/19   0217  04/12/19   0225   WBC  9.2  9.0  8.1   RBC  4.03*  4.12*  4.14*   HEMOGLOBIN  11.0*  11.4*  11.5*   HEMATOCRIT  36.9*  37.8*  38.5*   MCV  91.6  91.7  93.0   MCH  27.3  27.7  27.8   MCHC  29.8*  30.2*  29.9*   RDW  48.4  48.3  47.9   PLATELETCT  368  372  383   MPV  9.3  9.2  9.1     Recent Labs      04/10/19   0555  04/11/19 0217 04/12/19   0225   SODIUM  139  137  136   POTASSIUM  4.0  3.9  4.8   CHLORIDE  108  109  109   CO2  21  21  18*   GLUCOSE  95  89  146*   BUN  27*  20  19   CREATININE  2.27*  2.21*  2.15*   CALCIUM  8.5  8.6  8.4*                   Imaging  IR-DRAIN-BLADDER SUPRAPUB W/CATH   Final Result      1.     Ultrasound and fluoroscopic guided placement of a 14 Chinese locking loop suprapubic bladder catheter.      2.     Cystogram documenting catheter placement.      3.     The catheter can be upsized and exchanged for a Humphrey-type balloon catheter in  3-4 weeks.      US-RENAL   Final Result      1.  Cortical thinning of LEFT kidney.   2.  No hydronephrosis.   3.  Limited evaluation of the bladder.      CT-ABDOMEN-PELVIS W/O   Final Result         1. Small free peritoneal air and small amount of gas in the gallbladder surgical bed could relate to recent surgery. Correlate clinically.      2. Soft tissue stranding in the epigastrium abdominal wall could relate to recent surgery as well. No discrete fluid collection is seen.      3. Decompressed urinary bladder by Humphrey catheter with apparent wall thickening.            Assessment/Plan  Surgical wound infection- (present on admission)   Assessment & Plan    Continue iv abx  Wound vac in place  Surgery following       Acute renal failure superimposed on stage 3 chronic kidney disease (HCC)- (present on admission)   Assessment &  Plan    Likely prerenal  Continues to improve with hydration  No hydronephrosis  Avoid nephrotoxins  followng       BPH (benign prostatic hyperplasia)- (present on admission)   Assessment & Plan    Suprapubic cath in place POD #1  As penile trauma from chronic harrington     Diabetes mellitus with hyperglycemia (HCC)- (present on admission)   Assessment & Plan    continue insulin sliding scale with serial Accu-Checks  Hypoglycemic protocol in place         COPD (chronic obstructive pulmonary disease) (HCC)- (present on admission)   Assessment & Plan    No acute exacerbation at this time  Continue Symbicort          VTE prophylaxis: scd

## 2019-04-12 NOTE — ANESTHESIA POSTPROCEDURE EVALUATION
Patient: Shola Hoyt    Procedure Summary     Date:  04/11/19 Room / Location:  [facility] IMAGING - INTERVENTIONAL - [facility]    Anesthesia Start:  1450 Anesthesia Stop:  1620    Procedure:  IR-DRAIN-BLADDER SUPRAPUB W/CATH Diagnosis:  (per urology recommendation)    Scheduled Providers:   Responsible Provider:  Kishore Harris III, M.D.    Anesthesia Type:  general ASA Status:  3          Final Anesthesia Type: general  Last vitals  BP   Blood Pressure : 129/59, NIBP: 143/58    Temp   36.7 °C (98 °F)    Pulse   Pulse: 88, Heart Rate (Monitored): 81   Resp   17    SpO2   95 %      Anesthesia Post Evaluation    Patient location during evaluation: PACU  Patient participation: complete - patient participated  Level of consciousness: awake and alert  Pain score: 8  Initial assessment, pain score improved after pain meds administered in PACU  Airway patency: patent  Anesthetic complications: no  Cardiovascular status: hemodynamically stable  Respiratory status: acceptable  Hydration status: euvolemic    PONV: none           Nurse Pain Score: 8 (NPRS)

## 2019-04-12 NOTE — ANESTHESIA QCDR
2019 [facility] Clinical Data Registry (for Quality Improvement)     Postoperative nausea/vomiting risk protocol (Adult = 18 yrs and Pediatric 3-17 yrs)- (430 and 463)  General inhalation anesthetic (NOT TIVA) with PONV risk factors: Yes  Provision of anti-emetic therapy with at least 2 different classes of agents: Yes   Patient DID NOT receive anti-emetic therapy and reason is documented in Medical Record:  N/A    Multimodal Pain Management- (AQI59)  Patient undergoing Elective Surgery (i.e. Outpatient, or ASC, or Prescheduled Surgery prior to Hospital Admission): Yes  Use of Multimodal Pain Management, two or more drugs and/or interventions, NOT including systemic opioids: Yes   Exception: Documented allergy to multiple classes of analgesics:  N/A    PACU assessment of acute postoperative pain prior to Anesthesia Care End- Applies to Patients Age = 18- (ABG7)  Initial PACU pain score is which of the following: < 7/10  Patient unable to report pain score: N/A    Post-anesthetic transfer of care checklist/protocol to PACU/ICU- (426 and 427)  Upon conclusion of case, patient transferred to which of the following locations: PACU/Non-ICU  Use of transfer checklist/protocol: Yes  Exclusion: Service Performed in Patient Hospital Room (and thus did not require transfer): N/A    PACU Reintubation- (AQI31)  General anesthesia requiring endotracheal intubation (ETT) along with subsequent extubation in OR or PACU: No  Required reintubation in the PACU: N/A  Extubation was a planned trial documented in the medical record prior to removal of the original airway device: N/A    Unplanned admission to ICU related to anesthesia service up through end of PACU care- (MD51)  Unplanned admission to ICU (not initially anticipated at anesthesia start time): No

## 2019-04-12 NOTE — PROGRESS NOTES
2 RN skin check complete with PREET Mazariegos    Bilat ears and elbows pink and blanching  Scattered bruising to BUE  Wound vac to midline upper abd running continuous at 125 mmhg  Colostomy to RUQ with belt in place, red and raw skin surrounding ostomy  Guaze dressing to Suprapubic cath-CDI  Penile pressure ulcer HELENE  BLE red and flaky, calloused feet, heels intact.  Sacrum red, slow to quan with excoriation to bilat buttocks    Waffle cushion to bed and chair, banza cream to sacrum, wound care rounding every other day.

## 2019-04-13 VITALS
DIASTOLIC BLOOD PRESSURE: 74 MMHG | OXYGEN SATURATION: 94 % | BODY MASS INDEX: 36.34 KG/M2 | TEMPERATURE: 98.3 F | HEART RATE: 74 BPM | RESPIRATION RATE: 20 BRPM | SYSTOLIC BLOOD PRESSURE: 155 MMHG | WEIGHT: 260.58 LBS

## 2019-04-13 PROBLEM — N18.30 ACUTE RENAL FAILURE SUPERIMPOSED ON STAGE 3 CHRONIC KIDNEY DISEASE (HCC): Status: RESOLVED | Noted: 2017-10-17 | Resolved: 2019-04-13

## 2019-04-13 PROBLEM — N17.9 ACUTE RENAL FAILURE SUPERIMPOSED ON STAGE 3 CHRONIC KIDNEY DISEASE (HCC): Status: RESOLVED | Noted: 2017-10-17 | Resolved: 2019-04-13

## 2019-04-13 LAB
ANION GAP SERPL CALC-SCNC: 10 MMOL/L (ref 0–11.9)
BUN SERPL-MCNC: 22 MG/DL (ref 8–22)
CALCIUM SERPL-MCNC: 8.4 MG/DL (ref 8.5–10.5)
CHLORIDE SERPL-SCNC: 107 MMOL/L (ref 96–112)
CO2 SERPL-SCNC: 18 MMOL/L (ref 20–33)
CREAT SERPL-MCNC: 2.08 MG/DL (ref 0.5–1.4)
GLUCOSE BLD-MCNC: 107 MG/DL (ref 65–99)
GLUCOSE BLD-MCNC: 79 MG/DL (ref 65–99)
GLUCOSE BLD-MCNC: 85 MG/DL (ref 65–99)
GLUCOSE SERPL-MCNC: 99 MG/DL (ref 65–99)
POTASSIUM SERPL-SCNC: 3.7 MMOL/L (ref 3.6–5.5)
SODIUM SERPL-SCNC: 135 MMOL/L (ref 135–145)

## 2019-04-13 PROCEDURE — 82962 GLUCOSE BLOOD TEST: CPT | Mod: 91

## 2019-04-13 PROCEDURE — 700102 HCHG RX REV CODE 250 W/ 637 OVERRIDE(OP): Performed by: INTERNAL MEDICINE

## 2019-04-13 PROCEDURE — A9270 NON-COVERED ITEM OR SERVICE: HCPCS | Performed by: HOSPITALIST

## 2019-04-13 PROCEDURE — 99239 HOSP IP/OBS DSCHRG MGMT >30: CPT | Performed by: HOSPITALIST

## 2019-04-13 PROCEDURE — 36415 COLL VENOUS BLD VENIPUNCTURE: CPT

## 2019-04-13 PROCEDURE — 700102 HCHG RX REV CODE 250 W/ 637 OVERRIDE(OP): Performed by: SURGERY

## 2019-04-13 PROCEDURE — A9270 NON-COVERED ITEM OR SERVICE: HCPCS | Performed by: INTERNAL MEDICINE

## 2019-04-13 PROCEDURE — 700105 HCHG RX REV CODE 258: Performed by: INTERNAL MEDICINE

## 2019-04-13 PROCEDURE — 700111 HCHG RX REV CODE 636 W/ 250 OVERRIDE (IP): Performed by: INTERNAL MEDICINE

## 2019-04-13 PROCEDURE — 80048 BASIC METABOLIC PNL TOTAL CA: CPT

## 2019-04-13 PROCEDURE — 700102 HCHG RX REV CODE 250 W/ 637 OVERRIDE(OP): Performed by: HOSPITALIST

## 2019-04-13 RX ORDER — MORPHINE SULFATE 15 MG/1
15 TABLET, FILM COATED, EXTENDED RELEASE ORAL EVERY 12 HOURS
Qty: 6 TAB | Refills: 0 | Status: SHIPPED | OUTPATIENT
Start: 2019-04-13 | End: 2019-04-16

## 2019-04-13 RX ORDER — M-VIT,TX,IRON,MINS/CALC/FOLIC 27MG-0.4MG
1 TABLET ORAL DAILY
Qty: 30 TAB | Refills: 11 | Status: SHIPPED | OUTPATIENT
Start: 2019-04-14 | End: 2019-05-29

## 2019-04-13 RX ORDER — ASCORBIC ACID 500 MG
500 TABLET ORAL 2 TIMES DAILY
Qty: 30 TAB | Refills: 3
Start: 2019-04-13 | End: 2019-05-29

## 2019-04-13 RX ADMIN — DOXYCYCLINE 100 MG: 100 TABLET, FILM COATED ORAL at 05:52

## 2019-04-13 RX ADMIN — MORPHINE SULFATE 15 MG: 15 TABLET, EXTENDED RELEASE ORAL at 11:56

## 2019-04-13 RX ADMIN — PIPERACILLIN SODIUM AND TAZOBACTAM SODIUM 3.38 G: 3; .375 INJECTION, POWDER, LYOPHILIZED, FOR SOLUTION INTRAVENOUS at 05:48

## 2019-04-13 RX ADMIN — SODIUM CHLORIDE: 9 INJECTION, SOLUTION INTRAVENOUS at 05:48

## 2019-04-13 RX ADMIN — MICONAZOLE NITRATE: 20 CREAM TOPICAL at 05:52

## 2019-04-13 RX ADMIN — FERROUS GLUCONATE 324 MG: 324 TABLET ORAL at 05:52

## 2019-04-13 RX ADMIN — MORPHINE SULFATE 15 MG: 15 TABLET, EXTENDED RELEASE ORAL at 01:12

## 2019-04-13 RX ADMIN — OXYCODONE HYDROCHLORIDE AND ACETAMINOPHEN 500 MG: 500 TABLET ORAL at 05:52

## 2019-04-13 RX ADMIN — MULTIPLE VITAMINS W/ MINERALS TAB 1 TABLET: TAB at 05:52

## 2019-04-13 RX ADMIN — TAMSULOSIN HYDROCHLORIDE 0.4 MG: 0.4 CAPSULE ORAL at 05:52

## 2019-04-13 RX ADMIN — MORPHINE SULFATE 4 MG: 4 INJECTION INTRAVENOUS at 13:21

## 2019-04-13 RX ADMIN — BUDESONIDE AND FORMOTEROL FUMARATE DIHYDRATE 2 PUFF: 160; 4.5 AEROSOL RESPIRATORY (INHALATION) at 05:48

## 2019-04-13 NOTE — DISCHARGE PLANNING
Received Choice form at 2370  Agency/Facility Name: [facility]  Referral sent per Choice form @ [id number]

## 2019-04-13 NOTE — THERAPY
"Physical Therapy Evaluation completed.   Bed Mobility:  Supine to Sit:  (NT, up in chair)  Transfers: Sit to Stand: Stand by Assist  Gait: Level Of Assist: Stand by Assist with Front-Wheel Walker       Plan of Care: Will benefit from Physical Therapy 3 times per week  Discharge Recommendations: Equipment: Will Continue to Assess for Equipment Needs. Post-acute therapy Discharge to a transitional care facility for continued skilled therapy services.    Patient is 69/M with h/o BPH with chronic Humphrey, Crohs's Dz s/p ileostomy, IDDM, CKD, dementia, chronic pain with opiate dependence, recent cholecystectomy with transfer to SNF. Pt developed drainage at surgical wound and was readmitted. Dx with surgical wound infection, ARF. Abdominal wound vac and suprapubic cath placed. Patient lives alone in [place], very little social support. PLOF MOD INDEP community amb with SPC or FWW depending on how patient is feeling. Patient presents with generalized weakness, decreased activity tolerance, and impaired balance. Acute PT indicated to progress mobility. Encouraged patient to ambulate with nsg several times/day. D/c pending progress, will likely need post acute services prior to d/c to home alone.     See \"Rehab Therapy-Acute\" Patient Summary Report for complete documentation.     "

## 2019-04-13 NOTE — DISCHARGE PLANNING
Care Transition Team Discharge Planning     Anticipated Discharge Disposition: SNF     Action: This RN CM spoke with Suzan from [facility] admitting. They have a wound vac and still have a bed available for the patient today. Patient has been scheduled for transport at 1500 via [facility].    COBRA form was signed by 2 RNs after obtaining verbal order from Dr. Issa over the phone.     Due to patient being A/Ox3, consent was obtained from patient's DPOA over the phone and was signed by 2 RNs as well.      Barriers to Discharge: None.     Plan: No other D/C needs identified at this time.

## 2019-04-13 NOTE — PROGRESS NOTES
Assumed care from Billie OVIEDO. Received bedside report.  Updated patient on daily plan of care on white board. Patient denies any additional needs at this time.  Patient belongings and call light with in reach.  Vitals stable. Bed alarm on and working appropriately.  Will continue to monitor.

## 2019-04-13 NOTE — PROGRESS NOTES
· 2 RN skin check complete with PREET Cohen.  · Devices in place Suprapubic Cath, Wound Vac to abdomen,  .  · Skin assessed under devices-CESARIO.  · Confirmed pressure ulcers found on: DTI to bilateral ears that are looking better. Stage 3 to sacrum/buttocks. Partial thickness wound to meatus of penis.   · New potential pressure ulcers noted on-NA.   · Bilateral ears and elbows are red and  blanching. Generalized bruising. Wound Vac to midline upper abdomen. Illestomy to RUQ with belt in place, red and raw skin surrounding ostomy. Patient picks at skin. Advised at pt not to. Gauze dressing to Suprapubic catheter-CDIBLE red, flaky, calloused. Bilateral heels are red and blanching. Sacrum red, slow to quan with excoriation to bilateral buttocks   · The following interventions in place: Waffle cushion in place, [id number], Joey cream to sacrum, floating heels,  wound care rounding every other day.

## 2019-04-13 NOTE — DISCHARGE PLANNING
Received Transport Form @ [id number]  Spoke to Ken @ [facility]    Transport is scheduled for 4/13/19 @1500 going to [facility].    RN/GERSON Langley notified

## 2019-04-13 NOTE — CARE PLAN
Problem: Safety  Goal: Will remain free from falls  Outcome: PROGRESSING AS EXPECTED  Fall risk assessed, and in place.  Patient requires assistance of 1.  Patient educated not to get up with out assistance.  Patient verbalized understanding.  Bed alarm in place and on.  Call light with in reach.        Problem: Pain Management  Goal: Pain level will decrease to patient's comfort goal  Outcome: PROGRESSING SLOWER THAN EXPECTED  Monitor pain per protocol, medicated per MD orders.  Will continue to assess throughout shift.

## 2019-04-13 NOTE — DISCHARGE SUMMARY
Discharge Summary    CHIEF COMPLAINT ON ADMISSION  Chief Complaint   Patient presents with   • Epigastric Pain     epigastric pain starting today given 4mg morphine by nursing home. After pain returned nursing home called EMS   • RLQ Pain     Pt reports RLQ pain that feels like he's being punched in the gut. pt denies n/v       Reason for Admission  em     Admission Date  4/8/2019    CODE STATUS  Full Code    HPI & HOSPITAL COURSE  Patient is a 69 y.o. male with a past medical history of dementia, Crohn's disease status post ileostomy, BPH with chronic indwelling Harrington catheter, insulin-dependent diabetes mellitus, CKD, chronic pain and opiate dependence who presented 4/8/2019 with abdominal pain.  Patient was recently admitted to [facility] for right upper quadrant pain and is diagnosed with common bile duct stone for which he underwent ERCP with stone extraction followed by open cholecystectomy on 3/31/19 and discharged to SNF.  The patient developed abdominal pain and was noted to have drainage from his  surgical wound he was also found to have traumatic hypospadias from his chronic indwelling catheter.  He was seen by surgery and a wound vac was placed.  He was also followed by urology and interventional radiology placed a suprapubic catheter so the harrington could be removed.  Wound care has been following both wounds.  He is now ready to return to skilled nursing with his wound vac and will follow up with surgery and urology as an outpatient.  He will require the suprapubic cath to be changed in four weeks.       Therefore, he is discharged in fair and stable condition to skilled nursing facility.    The patient met 2-midnight criteria for an inpatient stay at the time of discharge.    Discharge Date  4/13/19    FOLLOW UP ITEMS POST DISCHARGE  Urology  Surgery      DISCHARGE DIAGNOSES  Active Problems:    Surgical wound infection POA: Yes    COPD (chronic obstructive pulmonary disease) (HCC) POA: Yes     Diabetes mellitus with hyperglycemia (HCC) POA: Yes    Crohn's disease (HCC) POA: Yes    BPH (benign prostatic hyperplasia) POA: Yes  Resolved Problems:    Acute renal failure superimposed on stage 3 chronic kidney disease (HCC) POA: Yes      FOLLOW UP  No future appointments.  No follow-up provider specified.    MEDICATIONS ON DISCHARGE     Medication List      START taking these medications      Instructions   ascorbic acid 500 MG tablet  Commonly known as:  VITAMIN C   Take 1 Tab by mouth 2 Times a Day.  Dose:  500 mg     glucose 4 g chewable tablet   Take 4 Tabs by mouth as needed for Low Blood Sugar (If FSBG is less than or equal to 70 mg/dL and patient able to eat or drink).  Dose:  16 g     insulin regular 100 Unit/mL Soln  Commonly known as:  HUMULIN R   Inject 1-6 Units as instructed 4 Times a Day,Before Meals and at Bedtime.  Dose:  1-6 Units     therapeutic multivitamin-minerals Tabs  Start taking on:  4/14/2019   Take 1 Tab by mouth every day.  Dose:  1 Tab        CHANGE how you take these medications      Instructions   morphine ER 15 MG Tbcr tablet  What changed:  Another medication with the same name was removed. Continue taking this medication, and follow the directions you see here.  Commonly known as:  MS CONTIN   Take 1 Tab by mouth every 12 hours for 3 days.  Dose:  15 mg        CONTINUE taking these medications      Instructions   acetaminophen 500 MG Tabs  Commonly known as:  TYLENOL   Take 1,000 mg by mouth every 6 hours as needed for Mild Pain.  Dose:  1000 mg     budesonide-formoterol 160-4.5 MCG/ACT Aero  Commonly known as:  SYMBICORT   Inhale 2 Puffs by mouth 2 Times a Day.  Dose:  2 Puff     ferrous gluconate 324 (38 Fe) MG Tabs  Commonly known as:  FERGON   Take 324 mg by mouth every morning with breakfast.  Dose:  324 mg     loratadine 10 MG Tabs  Commonly known as:  CLARITIN   Take 10 mg by mouth every day.  Dose:  10 mg     nystatin 282417 UNIT/GM Crea topical cream  Commonly known  as:  MYCOSTATIN   Apply 1 g to affected area(s) 2 times a day. To affected areas  Dose:  1 g     tamsulosin 0.4 MG capsule  Commonly known as:  FLOMAX   Take 0.4 mg by mouth ONE-HALF HOUR AFTER BREAKFAST.  Dose:  0.4 mg        STOP taking these medications    furosemide 20 MG Tabs  Commonly known as:  LASIX     insulin  UNIT/ML Susp  Commonly known as:  HUMULIN,NOVOLIN            Allergies  Allergies   Allergen Reactions   • Ceftriaxone      Has tolerated Merrem, Zosyn, and Unasyn   • Ezetimibe    • Flagyl [Metronidazole]    • Infliximab    • Lovastatin    • Simvastatin    • Vancomycin        DIET  Orders Placed This Encounter   Procedures   • Diet Order Diabetic     Standing Status:   Standing     Number of Occurrences:   1     Order Specific Question:   Diet:     Answer:   Diabetic [3]       ACTIVITY  As tolerated.    CONSULTATIONS  Surgery  Urology  IR    PROCEDURES  IR-DRAIN-BLADDER SUPRAPUB W/CATH   Final Result      1.     Ultrasound and fluoroscopic guided placement of a 14 Greenlandic locking loop suprapubic bladder catheter.      2.     Cystogram documenting catheter placement.      3.     The catheter can be upsized and exchanged for a Humphrey-type balloon catheter in  3-4 weeks.      US-RENAL   Final Result      1.  Cortical thinning of LEFT kidney.   2.  No hydronephrosis.   3.  Limited evaluation of the bladder.      CT-ABDOMEN-PELVIS W/O   Final Result         1. Small free peritoneal air and small amount of gas in the gallbladder surgical bed could relate to recent surgery. Correlate clinically.      2. Soft tissue stranding in the epigastrium abdominal wall could relate to recent surgery as well. No discrete fluid collection is seen.      3. Decompressed urinary bladder by Humphrey catheter with apparent wall thickening.            LABORATORY  Lab Results   Component Value Date    SODIUM 135 04/13/2019    POTASSIUM 3.7 04/13/2019    CHLORIDE 107 04/13/2019    CO2 18 (L) 04/13/2019    GLUCOSE 99  04/13/2019    BUN 22 04/13/2019    CREATININE 2.08 (H) 04/13/2019        Lab Results   Component Value Date    WBC 8.1 04/12/2019    HEMOGLOBIN 11.5 (L) 04/12/2019    HEMATOCRIT 38.5 (L) 04/12/2019    PLATELETCT 383 04/12/2019        Total time of the discharge process exceeds 45 minutes.

## 2019-04-13 NOTE — CARE PLAN
Problem: Knowledge Deficit  Goal: Knowledge of disease process/condition, treatment plan, diagnostic tests, and medications will improve  Outcome: PROGRESSING SLOWER THAN EXPECTED  Pt educated regarding activity, diabetic diet, meds and plan of care. Verbalized understanding.     Problem: Pain Management  Goal: Pain level will decrease to patient's comfort goal  Outcome: PROGRESSING AS EXPECTED  Pt assessed for pain q4h and medicated PRN. Pt instructed to notify RN of any new or increasing pain to prevent it from becoming intolerable. Verbalized understanding.

## 2019-04-13 NOTE — PROGRESS NOTES
Received report from Marie  & sujatha patient care at 0700. Pt. Is A&Ox4, forgetful at times. Pt is sitting up in a chair. Wound vac in place. Patient educated not to pick skin around ileostomy. Pt educated to call appropriately.

## 2019-04-13 NOTE — DISCHARGE INSTRUCTIONS
Regular Insulin injection  What is this medicine?  REGULAR INSULIN (REG yuh ler IN harper sheila) is a human-made form of insulin. This medicine lowers the amount of sugar in your blood. It is a short-acting insulin that starts working about 30 minutes after it is injected.  This medicine may be used for other purposes; ask your health care provider or pharmacist if you have questions.  COMMON BRAND NAME(S): Humulin R, Novolin R, ReliOn, Velosulin BR  What should I tell my health care provider before I take this medicine?  They need to know if you have any of these conditions:  -episodes of hypoglycemia  -kidney disease  -liver disease  -an unusual or allergic reaction to insulin, metacresol, other medicines, foods, dyes, or preservatives  -pregnant or trying to get pregnant  -breast-feeding  How should I use this medicine?  This medicine is for injection under the skin. Use exactly as directed. It is important to follow the directions given to you by your doctor or health care professional. Your doctor or health care professional will tell you how long to wait after you inject your dose before eating a meal. Most of the time, you should eat a meal within 30 minutes of your injection. You will be taught how to use this medicine and how to adjust doses for activities and illness. Do not use more insulin than prescribed. Do not use more or less often than prescribed.  If you use U-500 insulin: Make sure you are using the right insulin vial prior to each use. You should only use a U-500 insulin syringe. Do not use a U-100 insulin syringe or a tuberculin syringe. The markings on each syringe are different. If you do not use the right syringe, you may take the wrong dose. This can lead to serious side effects.  Always check the appearance of your insulin before using it. This medicine should be clear and colorless like water. Do not use it if it is cloudy, thickened, colored, or has solid particles in it.  It is important that  you put your used needles and syringes in a special sharps container. Do not put them in a trash can. If you do not have a sharps container, call your pharmacist or healthcare provider to get one.  Talk to your pediatrician regarding the use of this medicine in children. While this medicine may be prescribed for children for selected conditions, precautions do apply.  Overdosage: If you think you have taken too much of this medicine contact a poison control center or emergency room at once.  NOTE: This medicine is only for you. Do not share this medicine with others.  What if I miss a dose?  It is important not to miss a dose. Your health care professional or doctor should discuss a plan for missed doses with you. If you do miss a dose, follow their plan. Do not take double doses.  What may interact with this medicine?  -other medicines for diabetes  Many medications may cause an increase or decrease in blood sugar, these include:  -alcohol containing beverages  -aspirin and aspirin-like drugs  -chloramphenicol  -chromium  -diuretics  -female hormones, like estrogens or progestins and birth control pills  -heart medicines  -isoniazid  -male hormones or anabolic steroids  -medicines for weight loss  -medicines for allergies, asthma, cold, or cough  -medicines for mental problems  -medicines called MAO Inhibitors like Nardil, Parnate, Marplan, Eldepryl  -niacin  -NSAIDs, medicines for pain and inflammation, like ibuprofen or naproxen  -pentamidine  -phenytoin  -probenecid  -quinolone antibiotics like ciprofloxacin, levofloxacin, ofloxacin  -some herbal dietary supplements  -steroid medicines like prednisone or cortisone  -thyroid medicine  Some medications can hide the warning symptoms of low blood sugar. You may need to monitor your blood sugar more closely if you are taking one of these medications. These include:  -beta-blockers such as atenolol, metoprolol, propranolol  -clonidine  -guanethidine  -reserpine  This  list may not describe all possible interactions. Give your health care provider a list of all the medicines, herbs, non-prescription drugs, or dietary supplements you use. Also tell them if you smoke, drink alcohol, or use illegal drugs. Some items may interact with your medicine.  What should I watch for while using this medicine?  Visit your health care professional or doctor for regular checks on your progress.  A test called the HbA1C (A1C) will be monitored. This is a simple blood test. It measures your blood sugar control over the last 2 to 3 months. You will receive this test every 3 to 6 months.  Learn how to check your blood sugar. Learn the symptoms of low and high blood sugar and how to manage them.  Always carry a quick-source of sugar with you in case you have symptoms of low blood sugar. Examples include hard sugar candy or glucose tablets. Make sure others know that you can choke if you eat or drink when you develop serious symptoms of low blood sugar, such as seizures or unconsciousness. They must get medical help at once.  Tell your doctor or health care professional if you have high blood sugar. You might need to change the dose of your medicine. If you are sick or exercising more than usual, you might need to change the dose of your medicine.  Do not skip meals. Ask your doctor or health care professional if you should avoid alcohol. Many nonprescription cough and cold products contain sugar or alcohol. These can affect blood sugar.  Make sure that you have the right kind of syringe for the type of insulin you use. Try not to change the brand and type of insulin or syringe unless your health care professional or doctor tells you to. Switching insulin brand or type can cause dangerously high or low blood sugar. Always keep an extra supply of insulin, syringes, and needles on hand. Use a syringe one time only. Throw away syringe and needle in a closed container to prevent accidental needle  sticks.  Insulin pens and cartridges should never be shared. Even if the needle is changed, sharing may result in passing of viruses like hepatitis or HIV.  Wear a medical ID bracelet or chain, and carry a card that describes your disease and details of your medicine and dosage times.  What side effects may I notice from receiving this medicine?  Side effects that you should report to your doctor or health care professional as soon as possible:  -allergic reactions like skin rash, itching or hives, swelling of the face, lips, or tongue  -breathing problems  -signs and symptoms of high blood sugar such as dizziness, dry mouth, dry skin, fruity breath, nausea, stomach pain, increased hunger or thirst, increased urination  -signs and symptoms of low blood sugar such as feeling anxious, confusion, dizziness, increased hunger, unusually weak or tired, sweating, shakiness, cold, irritable, headache, blurred vision, fast heartbeat, loss of consciousness  Side effects that usually do not require medical attention (report to your doctor or health care professional if they continue or are bothersome):  -increase or decrease in fatty tissue under the skin due to overuse of a particular injection site  -itching, burning, swelling, or rash at site where injected  This list may not describe all possible side effects. Call your doctor for medical advice about side effects. You may report side effects to FDA at 4-450-FDA-0164.  Where should I keep my medicine?  Keep out of the reach of children.  Store unopened insulin vials in a refrigerator between 2 and 8 degrees C (36 and 46 degrees F). Do not freeze or use if the insulin has been frozen. If unopened and in the refrigerator, your insulin can be used until the expiration date printed on the vial. Opened vials that are in use may be kept at room temperature to decrease the amount of pain during injection.  Novolin R vials (open vials currently in use): Store at room temperature,  at approximately 25 degrees C (77 degrees F) or cooler. Do not refrigerate or freeze. Throw away after 42 days.  Humulin R U-100 vials (open vials currently in use): Store at room temperature, at approximately 30 degrees C (86 degrees F) or cooler. May store in the refrigerator. Do not freeze. Throw away after 31 days.  Humulin R U-500 (open vials currently in use): Store at room temperature, below 30 degrees C (86 degrees F) or cooler. May store in the refrigerator. Do not freeze. Throw away after 40 days.  Insulin Pens: Store unopened cartridges or disposable pens in a refrigerator between 2 and 8 degrees C (36 and 46 degrees F). Do not freeze or use if the insulin has been frozen. If stored at room temperature below 30 degrees C (86 degrees F), the insulin must be thrown away after 28 days. Once opened, store the disposable pens and cartridges that are inserted to pens at room temperature, approximately 30 degrees C (86 degrees F) or cooler. Do not store in the refrigerator. Once opened, the insulin can be used for 28 days. After 28 days, the cartridge or disposable pen should be thrown away.  Protect from light and excessive heat. Throw away any unused medicine after the expiration date or after the specified time for room temperature storage has passed.  NOTE: This sheet is a summary. It may not cover all possible information. If you have questions about this medicine, talk to your doctor, pharmacist, or health care provider.  © 2018 [employer]/Gold Standard (2017-02-06 14:31:24)  Discharge Instructions    Discharged to other by medical transportation with escort. Discharged via wheelchair, hospital escort: Yes.  Special equipment needed: Not Applicable and Oxygen    Be sure to schedule a follow-up appointment with your primary care doctor or any specialists as instructed.     Discharge Plan:   Diet Plan: Discussed  Activity Level: Discussed  Confirmed Follow up Appointment: No (Comments)  Confirmed Symptoms  Management: Discussed  Medication Reconciliation Updated: Yes  Influenza Vaccine Indication: Not indicated: Previously immunized this influenza season and > 8 years of age    I understand that a diet low in cholesterol, fat, and sodium is recommended for good health. Unless I have been given specific instructions below for another diet, I accept this instruction as my diet prescription.   Other diet:     Special Instructions: None    · Is patient discharged on Warfarin / Coumadin?   No     Depression / Suicide Risk    As you are discharged from this [facility] facility, it is important to learn how to keep safe from harming yourself.    Recognize the warning signs:  · Abrupt changes in personality, positive or negative- including increase in energy   · Giving away possessions  · Change in eating patterns- significant weight changes-  positive or negative  · Change in sleeping patterns- unable to sleep or sleeping all the time   · Unwillingness or inability to communicate  · Depression  · Unusual sadness, discouragement and loneliness  · Talk of wanting to die  · Neglect of personal appearance   · Rebelliousness- reckless behavior  · Withdrawal from people/activities they love  · Confusion- inability to concentrate     If you or a loved one observes any of these behaviors or has concerns about self-harm, here's what you can do:  · Talk about it- your feelings and reasons for harming yourself  · Remove any means that you might use to hurt yourself (examples: pills, rope, extension cords, firearm)  · Get professional help from the community (Mental Health, Substance Abuse, psychological counseling)  · Do not be alone:Call your Safe Contact- someone whom you trust who will be there for you.  · Call your local CRISIS HOTLINE 300-1033 or 582-640-3502  · Call your local Children's Mobile Crisis Response Team [place] (574) 674-5716 or www.AG&P  · Call the toll free National Suicide Prevention Hotlines    · National Suicide Prevention Lifeline 809-260-XQDS (6353)  · National Hope Line Network 800-SUICIDE (461-8381)

## 2019-04-13 NOTE — WOUND TEAM
"[facility]   Inpatient Services   Established Ostomy Management/ troubleshooting  HPI: Reviewed  PMH: Reviewed   SH: Reviewed   Reason for Ostomy nurse consult:  chonically leaking ileosotomy with denuded skin              Subjective:  \"What does your arm say?\"     Objective:  Appliance intact, hypafix tape windowed around barrier    Ostomy type:  Ileostomy   Stoma location:  RUQ   Stoma assessment:    Appearance:  Red, budded, slightly oval         Size:  ~ 1\"     Protrusion:  <1   Output:  Large watery green    MC jxn:  Intact      Peristomal skin:  Severely denuded 2-3 cm circumferentially around stoma, dry flaking pink scar tissue outside of stoma.        Ostomy Appliance (type and size):  1 piece convex (chris #[id number]) with paste ring and belt      Interventions and Education:  Previous appliance removed, cleansed area with warm wash cloth. Also cleansed abdominal skin, pt had flaking skin that was easily removed using warm wash cloth. Template made, cut barrier, paste ring applied to barrier opening.  Skin crusted x 3 with ostomy powder and no sting skin prep.  Barrier applied, end closed, belt applied.         Evaluation:  Patient with ileostomy that has obviously been leaking for a long time.  Per bedside RN Fernanda pt is discharging today. If patient stays, Ostomy RN to change every other day.          Plan:  Wound team to change every other day.      Anticipated discharge needs:  Plan for patient to dc to SNF today.         [facility]  Wound and Skin Care Progress Note    Admission Date: 4/8/2019     HPI, PMH, SH: Reviewed     Unit where seen by Wound Team: [id number]     WOUND CONSULT RELATED TO:  Scheduled NPWT dressing change per protocol    SUBJECTIVE:  \"What does your arm say?\"      Self Report / Pain Level:  Pre-medicated w/ morphine prior to dressing change.    OBJECTIVE: vac in place, no leaks    WOUND TYPE, LOCATION, CHARACTERISTICS (Pressure Injuries: location, stage, " POA or date identified)              Incision 04/09/19 Abdomen (Active)        Site Assessment Red    Nisa-wound Assessment Intact    Margins Defined edges    Wound Length (cm) 2 cm    Wound Width (cm) 0.3 cm    Wound Depth (cm) 0.5 cm    Wound Surface Area (cm^2) 0.6 cm^2    Closure Secondary intention    Drainage Amount Scant    Drainage Description Serosanguineous    Treatments Cleansed;Site care    Periwound Protectant Benzoin;Drape    Dressing Options Silver Strip Packing;Nonadhesive Foam;Hypafix Tape    Dressing Changed New    Dressing Status Clean;Dry;Intact    Dressing Change Frequency Daily    NEXT Dressing Change  04/14/19    NEXT Weekly Photo (Inpatient Only) 04/16/19    WOUND NURSE ONLY - Odor None    WOUND NURSE ONLY - Exposed Structures None    WOUND NURSE ONLY - Tissue Type and Percentage 100% red                  Vascular:    Dorsal Pedal pulses:  NA  Posterior tib pulses:   NA    BERNICE:      NA    Lab Values:    WBC:       WBC   Date/Time Value Ref Range Status   04/12/2019 02:25 AM 8.1 4.8 - 10.8 K/uL Final     A1C:      Lab Results   Component Value Date/Time    HBA1C 6.4 (H) 02/23/2018 03:58 PM         Culture:   NA    INTERVENTIONS BY WOUND TEAM:  Previous dressing removed, no retained foam noted with probing and visualization of wound. Wound cleansed w/ wound cleanser and gauze. Wound measured from dehisced portion of wound located on left aspect of wound. Wound bed appears to be smaller based on measurements. NPWT d/c'd and silver strip packing was packed loosely into wound bed leaving a tail outside of woundbed for easy removal of dressing. Secured dressing w/ non adhesive foam and hypafix tape. Fernanda OVIEDO updated.     Dressing selection: Silver strip packing, non adhesive foam, hypafix tape.     Interdisciplinary consultation: Patient, Bedside RN    EVALUATION: Patient admitted from SNF.  Patient has been seen by wound care recently at .  Liliana gonzalez with Dr. Alonzo at that time with allan  placement.  Wound dehisced and VAC placed. Vac dc'd on 4/13.  Portion of incision that has dehisced is much smaller and is now being packed with silver strip packing. Silver provides antimicrobial properties.     Factors affecting wound healing:  infection, DM    Goals: Steady decrease in wound area and depth weekly.    NURSING PLAN OF CARE ORDERS (X):    Dressing changes: See Dressing Care orders: X  Skin care: See Skin Care orders: X  Rectal tube care: See Rectal Tube Care orders:   Other orders:    WOUND TEAM PLAN OF CARE (X):   NPWT change 3 x week:        Dressing changes by wound team:       Follow up as needed:   X    Other (explain):     Anticipated discharge plans (X):   SNF:         X plan for patient to be discharged today.   Home Care:           Outpatient Wound Center:            Self Care:            Other:

## 2019-04-14 LAB
BACTERIA BLD CULT: NORMAL
BACTERIA BLD CULT: NORMAL
SIGNIFICANT IND 70042: NORMAL
SIGNIFICANT IND 70042: NORMAL
SITE SITE: NORMAL
SITE SITE: NORMAL
SOURCE SOURCE: NORMAL
SOURCE SOURCE: NORMAL

## 2019-05-14 NOTE — OR SURGEON
Immediate Post- Operative Note        PostOp Diagnosis: Bladder obstruction. Unable to place secondary to patient intolerance to procedure. Will need GA.      Procedure(s): Attempted suprapubic cath placement. Following administration of 100 mcg fentanyl and 4 mg versed IV the patient was unable to tolerate the procedure prep which included exposure of the suprapubic region and preliminary US evaluation of the bladder.       Estimated Blood Loss: n/a.         Complications: None            5/14/2019     9:43 AM     Calvin Beyer

## 2019-05-29 ENCOUNTER — HOSPITAL ENCOUNTER (INPATIENT)
Facility: MEDICAL CENTER | Age: 69
LOS: 15 days | DRG: 673 | End: 2019-06-13
Attending: EMERGENCY MEDICINE | Admitting: HOSPITALIST
Payer: MEDICARE

## 2019-05-29 DIAGNOSIS — F11.20 UNCOMPLICATED OPIOID DEPENDENCE (HCC): ICD-10-CM

## 2019-05-29 DIAGNOSIS — E11.621 DIABETIC ULCER OF TOE OF RIGHT FOOT ASSOCIATED WITH TYPE 2 DIABETES MELLITUS, WITH BONE INVOLVEMENT WITHOUT EVIDENCE OF NECROSIS (HCC): ICD-10-CM

## 2019-05-29 DIAGNOSIS — R07.89 OTHER CHEST PAIN: ICD-10-CM

## 2019-05-29 DIAGNOSIS — N17.9 AKI (ACUTE KIDNEY INJURY) (HCC): ICD-10-CM

## 2019-05-29 DIAGNOSIS — M86.671 OTHER CHRONIC OSTEOMYELITIS OF RIGHT FOOT (HCC): ICD-10-CM

## 2019-05-29 DIAGNOSIS — L97.516 DIABETIC ULCER OF TOE OF RIGHT FOOT ASSOCIATED WITH TYPE 2 DIABETES MELLITUS, WITH BONE INVOLVEMENT WITHOUT EVIDENCE OF NECROSIS (HCC): ICD-10-CM

## 2019-05-29 DIAGNOSIS — T83.510D URINARY TRACT INFECTION ASSOCIATED WITH CYSTOSTOMY CATHETER, SUBSEQUENT ENCOUNTER: ICD-10-CM

## 2019-05-29 DIAGNOSIS — N39.0 URINARY TRACT INFECTION ASSOCIATED WITH CYSTOSTOMY CATHETER, SUBSEQUENT ENCOUNTER: ICD-10-CM

## 2019-05-29 DIAGNOSIS — Z93.59 SUPRAPUBIC CATHETER (HCC): ICD-10-CM

## 2019-05-29 DIAGNOSIS — N39.0 ACUTE UTI: ICD-10-CM

## 2019-05-29 DIAGNOSIS — E87.1 HYPONATREMIA: ICD-10-CM

## 2019-05-29 PROBLEM — D50.9 IRON DEFICIENCY ANEMIA: Status: ACTIVE | Noted: 2017-10-18

## 2019-05-29 PROBLEM — A41.9 SEPSIS (HCC): Status: ACTIVE | Noted: 2019-05-29

## 2019-05-29 PROBLEM — Z79.4 TYPE 2 DIABETES MELLITUS, WITH LONG-TERM CURRENT USE OF INSULIN (HCC): Status: ACTIVE | Noted: 2017-10-17

## 2019-05-29 PROBLEM — J44.9 COPD (CHRONIC OBSTRUCTIVE PULMONARY DISEASE) (HCC): Status: RESOLVED | Noted: 2017-10-17 | Resolved: 2019-05-29

## 2019-05-29 PROBLEM — L03.90 CELLULITIS: Status: ACTIVE | Noted: 2019-05-29

## 2019-05-29 LAB
LACTATE BLD-SCNC: 0.7 MMOL/L (ref 0.5–2)
LACTATE BLD-SCNC: 1.5 MMOL/L (ref 0.5–2)

## 2019-05-29 PROCEDURE — 99223 1ST HOSP IP/OBS HIGH 75: CPT | Performed by: HOSPITALIST

## 2019-05-29 PROCEDURE — 700105 HCHG RX REV CODE 258: Performed by: HOSPITALIST

## 2019-05-29 PROCEDURE — 700111 HCHG RX REV CODE 636 W/ 250 OVERRIDE (IP): Performed by: HOSPITALIST

## 2019-05-29 PROCEDURE — A9270 NON-COVERED ITEM OR SERVICE: HCPCS | Performed by: HOSPITALIST

## 2019-05-29 PROCEDURE — 83605 ASSAY OF LACTIC ACID: CPT

## 2019-05-29 PROCEDURE — 700102 HCHG RX REV CODE 250 W/ 637 OVERRIDE(OP): Performed by: HOSPITALIST

## 2019-05-29 PROCEDURE — 96366 THER/PROPH/DIAG IV INF ADDON: CPT

## 2019-05-29 PROCEDURE — 770006 HCHG ROOM/CARE - MED/SURG/GYN SEMI*

## 2019-05-29 PROCEDURE — 90732 PPSV23 VACC 2 YRS+ SUBQ/IM: CPT | Performed by: HOSPITALIST

## 2019-05-29 PROCEDURE — 96365 THER/PROPH/DIAG IV INF INIT: CPT

## 2019-05-29 PROCEDURE — 99285 EMERGENCY DEPT VISIT HI MDM: CPT

## 2019-05-29 PROCEDURE — 3E0234Z INTRODUCTION OF SERUM, TOXOID AND VACCINE INTO MUSCLE, PERCUTANEOUS APPROACH: ICD-10-PCS | Performed by: HOSPITALIST

## 2019-05-29 PROCEDURE — 87040 BLOOD CULTURE FOR BACTERIA: CPT

## 2019-05-29 PROCEDURE — 90471 IMMUNIZATION ADMIN: CPT

## 2019-05-29 RX ORDER — ONDANSETRON 4 MG/1
4 TABLET, ORALLY DISINTEGRATING ORAL EVERY 4 HOURS PRN
Status: DISCONTINUED | OUTPATIENT
Start: 2019-05-29 | End: 2019-06-10

## 2019-05-29 RX ORDER — MORPHINE SULFATE 4 MG/ML
2 INJECTION, SOLUTION INTRAMUSCULAR; INTRAVENOUS
Status: DISCONTINUED | OUTPATIENT
Start: 2019-05-29 | End: 2019-05-30

## 2019-05-29 RX ORDER — AMOXICILLIN 250 MG
2 CAPSULE ORAL 2 TIMES DAILY
Status: DISCONTINUED | OUTPATIENT
Start: 2019-05-29 | End: 2019-06-13 | Stop reason: HOSPADM

## 2019-05-29 RX ORDER — BUDESONIDE AND FORMOTEROL FUMARATE DIHYDRATE 160; 4.5 UG/1; UG/1
2 AEROSOL RESPIRATORY (INHALATION) 2 TIMES DAILY
Status: DISCONTINUED | OUTPATIENT
Start: 2019-05-29 | End: 2019-06-13 | Stop reason: HOSPADM

## 2019-05-29 RX ORDER — AMOXICILLIN 500 MG/1
500 CAPSULE ORAL 3 TIMES DAILY
Status: ON HOLD | COMMUNITY
Start: 2019-04-29 | End: 2019-06-13

## 2019-05-29 RX ORDER — NITROFURANTOIN 25; 75 MG/1; MG/1
100 CAPSULE ORAL 2 TIMES DAILY
Status: ON HOLD | COMMUNITY
Start: 2019-05-22 | End: 2019-06-13

## 2019-05-29 RX ORDER — M-VIT,TX,IRON,MINS/CALC/FOLIC 27MG-0.4MG
1 TABLET ORAL DAILY
Status: DISCONTINUED | OUTPATIENT
Start: 2019-05-29 | End: 2019-06-13 | Stop reason: HOSPADM

## 2019-05-29 RX ORDER — MICONAZOLE NITRATE 20 MG/G
CREAM TOPICAL 2 TIMES DAILY
Status: DISCONTINUED | OUTPATIENT
Start: 2019-05-29 | End: 2019-06-13 | Stop reason: HOSPADM

## 2019-05-29 RX ORDER — NYSTATIN 100000 [USP'U]/G
POWDER TOPICAL 2 TIMES DAILY
Status: DISCONTINUED | OUTPATIENT
Start: 2019-05-29 | End: 2019-06-13 | Stop reason: HOSPADM

## 2019-05-29 RX ORDER — ACETAMINOPHEN 325 MG/1
650 TABLET ORAL EVERY 6 HOURS PRN
Status: DISCONTINUED | OUTPATIENT
Start: 2019-05-29 | End: 2019-06-10

## 2019-05-29 RX ORDER — HEPARIN SODIUM 5000 [USP'U]/ML
5000 INJECTION, SOLUTION INTRAVENOUS; SUBCUTANEOUS EVERY 8 HOURS
Status: DISCONTINUED | OUTPATIENT
Start: 2019-05-29 | End: 2019-06-13 | Stop reason: HOSPADM

## 2019-05-29 RX ORDER — POLYETHYLENE GLYCOL 3350 17 G/17G
1 POWDER, FOR SOLUTION ORAL
Status: DISCONTINUED | OUTPATIENT
Start: 2019-05-29 | End: 2019-06-13 | Stop reason: HOSPADM

## 2019-05-29 RX ORDER — LISINOPRIL 5 MG/1
5 TABLET ORAL DAILY
Status: ON HOLD | COMMUNITY
End: 2019-06-13

## 2019-05-29 RX ORDER — TAMSULOSIN HYDROCHLORIDE 0.4 MG/1
0.4 CAPSULE ORAL
Status: DISCONTINUED | OUTPATIENT
Start: 2019-05-29 | End: 2019-06-13 | Stop reason: HOSPADM

## 2019-05-29 RX ORDER — BISACODYL 10 MG
10 SUPPOSITORY, RECTAL RECTAL
Status: DISCONTINUED | OUTPATIENT
Start: 2019-05-29 | End: 2019-06-13 | Stop reason: HOSPADM

## 2019-05-29 RX ORDER — TRAZODONE HYDROCHLORIDE 100 MG/1
100 TABLET ORAL NIGHTLY
Status: ON HOLD | COMMUNITY
End: 2019-06-13

## 2019-05-29 RX ORDER — OXYCODONE HYDROCHLORIDE 5 MG/1
2.5 TABLET ORAL
Status: DISCONTINUED | OUTPATIENT
Start: 2019-05-29 | End: 2019-05-30

## 2019-05-29 RX ORDER — SODIUM CHLORIDE 9 MG/ML
INJECTION, SOLUTION INTRAVENOUS CONTINUOUS
Status: DISCONTINUED | OUTPATIENT
Start: 2019-05-29 | End: 2019-06-04

## 2019-05-29 RX ORDER — OXYCODONE HYDROCHLORIDE 5 MG/1
5 TABLET ORAL
Status: DISCONTINUED | OUTPATIENT
Start: 2019-05-29 | End: 2019-05-30

## 2019-05-29 RX ORDER — ONDANSETRON 2 MG/ML
4 INJECTION INTRAMUSCULAR; INTRAVENOUS EVERY 4 HOURS PRN
Status: DISCONTINUED | OUTPATIENT
Start: 2019-05-29 | End: 2019-06-10

## 2019-05-29 RX ORDER — SODIUM CHLORIDE 9 MG/ML
500 INJECTION, SOLUTION INTRAVENOUS
Status: DISCONTINUED | OUTPATIENT
Start: 2019-05-29 | End: 2019-06-13 | Stop reason: HOSPADM

## 2019-05-29 RX ORDER — BUPRENORPHINE 15 UG/H
15 PATCH TRANSDERMAL
Status: ON HOLD | COMMUNITY
End: 2019-06-13

## 2019-05-29 RX ADMIN — OXYCODONE HYDROCHLORIDE 5 MG: 5 TABLET ORAL at 21:09

## 2019-05-29 RX ADMIN — MICONAZOLE NITRATE: 20 CREAM TOPICAL at 17:32

## 2019-05-29 RX ADMIN — MEROPENEM 500 MG: 500 INJECTION, POWDER, FOR SOLUTION INTRAVENOUS at 12:36

## 2019-05-29 RX ADMIN — SENNOSIDES,DOCUSATE SODIUM 2 TABLET: 8.6; 5 TABLET, FILM COATED ORAL at 06:24

## 2019-05-29 RX ADMIN — MEROPENEM 500 MG: 500 INJECTION, POWDER, FOR SOLUTION INTRAVENOUS at 17:21

## 2019-05-29 RX ADMIN — TAMSULOSIN HYDROCHLORIDE 0.4 MG: 0.4 CAPSULE ORAL at 10:47

## 2019-05-29 RX ADMIN — MEROPENEM 500 MG: 500 INJECTION, POWDER, FOR SOLUTION INTRAVENOUS at 06:23

## 2019-05-29 RX ADMIN — SODIUM CHLORIDE: 9 INJECTION, SOLUTION INTRAVENOUS at 06:24

## 2019-05-29 RX ADMIN — SODIUM CHLORIDE: 9 INJECTION, SOLUTION INTRAVENOUS at 23:29

## 2019-05-29 RX ADMIN — NYSTATIN: 100000 POWDER TOPICAL at 17:20

## 2019-05-29 RX ADMIN — HEPARIN SODIUM 5000 UNITS: 5000 INJECTION, SOLUTION INTRAVENOUS; SUBCUTANEOUS at 14:23

## 2019-05-29 RX ADMIN — MULTIPLE VITAMINS W/ MINERALS TAB 1 TABLET: TAB at 06:24

## 2019-05-29 RX ADMIN — HEPARIN SODIUM 5000 UNITS: 5000 INJECTION, SOLUTION INTRAVENOUS; SUBCUTANEOUS at 21:09

## 2019-05-29 RX ADMIN — HEPARIN SODIUM 5000 UNITS: 5000 INJECTION, SOLUTION INTRAVENOUS; SUBCUTANEOUS at 06:24

## 2019-05-29 RX ADMIN — MEROPENEM 500 MG: 500 INJECTION, POWDER, FOR SOLUTION INTRAVENOUS at 23:27

## 2019-05-29 RX ADMIN — BUDESONIDE AND FORMOTEROL FUMARATE DIHYDRATE 2 PUFF: 160; 4.5 AEROSOL RESPIRATORY (INHALATION) at 17:20

## 2019-05-29 RX ADMIN — PNEUMOCOCCAL VACCINE POLYVALENT 25 MCG
25; 25; 25; 25; 25; 25; 25; 25; 25; 25; 25; 25; 25; 25; 25; 25; 25; 25; 25; 25; 25; 25; 25 INJECTION, SOLUTION INTRAMUSCULAR; SUBCUTANEOUS at 17:21

## 2019-05-29 ASSESSMENT — PATIENT HEALTH QUESTIONNAIRE - PHQ9
6. FEELING BAD ABOUT YOURSELF - OR THAT YOU ARE A FAILURE OR HAVE LET YOURSELF OR YOUR FAMILY DOWN: MORE THAN HALF THE DAYS
1. LITTLE INTEREST OR PLEASURE IN DOING THINGS: MORE THAN HALF THE DAYS
7. TROUBLE CONCENTRATING ON THINGS, SUCH AS READING THE NEWSPAPER OR WATCHING TELEVISION: SEVERAL DAYS
SUM OF ALL RESPONSES TO PHQ9 QUESTIONS 1 AND 2: 3
2. FEELING DOWN, DEPRESSED, IRRITABLE, OR HOPELESS: SEVERAL DAYS
8. MOVING OR SPEAKING SO SLOWLY THAT OTHER PEOPLE COULD HAVE NOTICED. OR THE OPPOSITE, BEING SO FIGETY OR RESTLESS THAT YOU HAVE BEEN MOVING AROUND A LOT MORE THAN USUAL: SEVERAL DAYS
9. THOUGHTS THAT YOU WOULD BE BETTER OFF DEAD, OR OF HURTING YOURSELF: NOT AT ALL
4. FEELING TIRED OR HAVING LITTLE ENERGY: SEVERAL DAYS
3. TROUBLE FALLING OR STAYING ASLEEP OR SLEEPING TOO MUCH: MORE THAN HALF THE DAYS
SUM OF ALL RESPONSES TO PHQ QUESTIONS 1-9: 10
5. POOR APPETITE OR OVEREATING: NOT AT ALL

## 2019-05-29 ASSESSMENT — COGNITIVE AND FUNCTIONAL STATUS - GENERAL
MOBILITY SCORE: 16
MOVING TO AND FROM BED TO CHAIR: A LITTLE
TURNING FROM BACK TO SIDE WHILE IN FLAT BAD: A LITTLE
CLIMB 3 TO 5 STEPS WITH RAILING: A LOT
DRESSING REGULAR UPPER BODY CLOTHING: A LITTLE
SUGGESTED CMS G CODE MODIFIER MOBILITY: CK
HELP NEEDED FOR BATHING: A LOT
SUGGESTED CMS G CODE MODIFIER DAILY ACTIVITY: CK
DRESSING REGULAR LOWER BODY CLOTHING: A LITTLE
WALKING IN HOSPITAL ROOM: A LOT
DAILY ACTIVITIY SCORE: 19
MOVING FROM LYING ON BACK TO SITTING ON SIDE OF FLAT BED: A LITTLE
STANDING UP FROM CHAIR USING ARMS: A LITTLE
PERSONAL GROOMING: A LITTLE

## 2019-05-29 ASSESSMENT — LIFESTYLE VARIABLES: EVER_SMOKED: YES

## 2019-05-29 NOTE — ASSESSMENT & PLAN NOTE
Complicated by presence of suprapubic catheter, with surrounding cellulitis & skin breakdown.  Catheter has not been changed in several months per patient report.  Has history of drug resistant UTI's.  UCx from outside facility growing MRSA and pseudomonoas  Infectious disease consulted and completed antibiotics zosyn and zyvox.  repeat urine cx for pseudomonas resistant to zosyn. ID consulted and started on meropenem. Cath exchanged 6/10/19.   I have ordered repeat UA

## 2019-05-29 NOTE — ASSESSMENT & PLAN NOTE
This is sepsis (without associated acute organ dysfunction).  Source: urinary tract infection.  UCx growing MRSA and pseudomonoas  Resolving with antibiotics

## 2019-05-29 NOTE — PROGRESS NOTES
Pt received on unit pt AOx2. Pt is not able to speak to why he is at the hospital or what the date is. Pt knows that it is 2019. Pt complaining 10/10 pain at suprapubic site. Site is red and appears to have dried pus surrounding the site. Pt is very lethargic and falls asleep immediately after answering questions. Bed locked and in lowest position. Bed alarm on. Call light within reach. RN and CNA numbers provided.

## 2019-05-29 NOTE — CARE PLAN
Problem: Communication  Goal: The ability to communicate needs accurately and effectively will improve  Outcome: PROGRESSING AS EXPECTED  Pt oriented to room and call light. Pt knows when to call for assistance.    Problem: Pain Management  Goal: Pain level will decrease to patient's comfort goal  Outcome: PROGRESSING AS EXPECTED  Pain medication ordered. Pt educated on the practice of safe administration of opioids and risk factors. Pt very lethargic at this time.

## 2019-05-29 NOTE — ED NOTES
Pt more relaxed and after explaining to pt why we need to keep him in bed and safe the pt stopped pulling on cords and wires. Pt is cooperative at this time.

## 2019-05-29 NOTE — ED NOTES
[facility]  Allergies Reviewed    Pt filled a 5 day course of MACROBID on 5/22    Pt filled a 14 day course of AMOXIL on 4/29

## 2019-05-29 NOTE — PROGRESS NOTES
· 2 RN skin check complete with Lorrie.   · Devices in place colostomy, suprapubic.  · Skin assessed under devices yes.  · Confirmed pressure ulcers found on NA.  · New potential pressure ulcers noted on NA. Wound consult placed? NA. Photo uploaded? NA.   · The following interventions are in place waffle overlay, pt instructed on the importance of turning self, barrier cream applied, pt bathed, colostomy supplies changed out.    Blanchable redness noted on perineum and over coccyx, redness moisture related. Skin breakdown noted under dressing surrounding colostomy. Skin on right lower extremity red, macerated, and excoriated. Redness noted to left lower extremity. Moisture fissure noted on sacrum.

## 2019-05-29 NOTE — ASSESSMENT & PLAN NOTE
S/p suprapubic catheter placement.  Pain from possible bladder spasms per Dr. Donohue. Trial of belladonna and emla topically, patient refused  No change with oxybutynin

## 2019-05-29 NOTE — ED NOTES
Spoke with wound care, pt ostomy bag has been leaking, WC will be down to assist and place supplies at bedside

## 2019-05-29 NOTE — PROGRESS NOTES
Complicated UTI with history of resistant organism before, meropenum pending cultures. I evaluated and examined the patient at bedside, patient's questions were answered. For details see admission H&P.      Supra pubic needs to be replaced, if can not be done by nursing per policy. Seen before by Isai April 9 before. Urology consulted, Dr Louis will see.

## 2019-05-29 NOTE — WOUND TEAM
"[facility]   Inpatient Services   Established Ostomy Management/ troubleshooting  HPI: Reviewed  PMH: Reviewed   SH: Reviewed   Reason for Ostomy nurse consult:  new admit to floor with established ostomy       Subjective:  \"You have to tape it.\"      Objective:  Appliance just removed by nursing, stoma is open to air with wash cloth on top  Ostomy type:  Ileostomy   Stoma location:  RUQ   Stoma assessment:               Appearance:  Red, budded, slightly oval                    Size:  ~ 1\"                Protrusion:  <1              Output:  scant, watery green               MC jxn: resolved                Peristomal skin:  Severely denuded 3-5 cm circumferentially around stoma, dry flaking pink scar tissue outside of stoma.         Ostomy Appliance (type and size):  1 piece convex (chris #[id number]) with paste ring and belt      Interventions and Education:  Cleansed area with warm wash cloth, dried.  Template made, cut barrier, paste ring applied to barrier opening.  Skin crusted x 3 with ostomy powder and no sting skin prep.  Barrier applied, end closed, belt applied. Hypafix tape windowed around barrier per patient request.        Evaluation:  Patient with ileostomy with severely denuded skin. Patient well known to wound team from previous admission.  Patient has had ileostomy for some time and states that his skin is always denuded. Unknown if patient is crusting skin and changing appliance himself regularly.        Plan:  Wound team to change every other day until julia stomal skin is improved, then will have nursing perform appliance changes.       Anticipated discharge needs:  Would recommend outpatient wound clinic for ostomy care to heal denuded skin, patient will likely refuse as he has in the past and wishes to perform his own care.   "

## 2019-05-29 NOTE — ED NOTES
Pt sleeping at this time, pt awakens with stimuli , bed in lowest position, resp even and non labored, call bell within reach, soft restraints were previously utilized, removed by this nurse.

## 2019-05-29 NOTE — ED NOTES
Pt watching tv at this time, requested blanket.  Pt received two.  resp even and nonlabored at this time

## 2019-05-29 NOTE — H&P
Hospital Medicine History & Physical Note    Date of Service  5/29/2019    Primary Care Physician  Nicolette Yeager M.D. (Inactive)    Consultants  None    Code Status  Full code     Chief Complaint  Pain with urination    History of Presenting Illness  69 y.o. male with history of crohns disease which ultimately resulted in ostomy creation, diabetes mellitus on as needed insulin therapy with relative hypoglycemia and urinary obstruction status post apparent temporary suprapubic catheter placement apparently has not had this changed in three months.  He was seen by his power of  who had been asking him to have home health and to have his situation monitored, with patient refusal.  He reported pain with urination or attempts at urination, as well as approximately 4 hours of pain at his suprapubic catheter site.  According to the outside facility, he has a history of multiple drug resistant urine infections, [facility] and so [facility] was attempted but bed availability is currently limited.  He was then transferred to this facility.  He unfortunately will not provide much more information, after having been given large pain management doses in route and on arrival.      Review of Systems  Review of Systems   Unable to perform ROS: Medical condition       Past Medical History   has a past medical history of Arthritis; Benign prostate hyperplasia; Colostomy in place (HCC); COPD (chronic obstructive pulmonary disease) (HCC); Diabetes (HCC); and Renal disorder.    Surgical History   has a past surgical history that includes toe amputation (Right, 10/25/2017); pr ercp,diagnostic (3/29/2019); and carlos by laparoscopy (N/A, 3/31/2019).     Family History  family history includes No Known Problems in his father and mother.     Social History   reports that he has quit smoking. He has quit using smokeless tobacco.    Allergies  Allergies   Allergen Reactions   • Ceftriaxone      Has tolerated Merrem,  Zosyn, and Unasyn   • Ezetimibe    • Flagyl [Metronidazole]    • Infliximab    • Levaquin    • Lovastatin    • Simvastatin    • Vancomycin        Medications  Prior to Admission Medications   Prescriptions Last Dose Informant Patient Reported? Taking?   Dextrose, Diabetic Use, (GLUCOSE) 4 g chewable tablet   No No   Sig: Take 4 Tabs by mouth as needed for Low Blood Sugar (If FSBG is less than or equal to 70 mg/dL and patient able to eat or drink).   acetaminophen (TYLENOL) 500 MG Tab  MAR from Other Facility Yes No   Sig: Take 1,000 mg by mouth every 6 hours as needed for Mild Pain.   ascorbic acid (VITAMIN C) 500 MG tablet   No No   Sig: Take 1 Tab by mouth 2 Times a Day.   budesonide-formoterol (SYMBICORT) 160-4.5 MCG/ACT Aerosol  MAR from Other Facility Yes No   Sig: Inhale 2 Puffs by mouth 2 Times a Day.   ferrous gluconate (FERGON) 324 (38 Fe) MG Tab  MAR from Other Facility Yes No   Sig: Take 324 mg by mouth every morning with breakfast.   insulin regular (HUMULIN R) 100 Unit/mL Solution   No No   Sig: Inject 1-6 Units as instructed 4 Times a Day,Before Meals and at Bedtime.   loratadine (CLARITIN) 10 MG Tab  MAR from Other Facility Yes No   Sig: Take 10 mg by mouth every day.   nystatin (MYCOSTATIN) 464038 UNIT/GM Cream topical cream  MAR from Other Facility Yes No   Sig: Apply 1 g to affected area(s) 2 times a day. To affected areas   tamsulosin (FLOMAX) 0.4 MG capsule  MAR from Other Facility Yes No   Sig: Take 0.4 mg by mouth ONE-HALF HOUR AFTER BREAKFAST.   therapeutic multivitamin-minerals (THERAGRAN-M) Tab   No No   Sig: Take 1 Tab by mouth every day.      Facility-Administered Medications: None       Physical Exam  Temp:  [37 °C (98.6 °F)] 37 °C (98.6 °F)  Pulse:  [86] 86  Resp:  [20] 20  SpO2:  [97 %] 97 %    Physical Exam   Constitutional: He is oriented to person, place, and time. He appears well-developed and well-nourished. No distress.   HENT:   Head: Normocephalic and atraumatic.   Eyes: Pupils  are equal, round, and reactive to light. Conjunctivae are normal.   Neck: Normal range of motion. Neck supple. No tracheal deviation present. No thyromegaly present.   Cardiovascular: Normal rate, regular rhythm and normal heart sounds.  Exam reveals no gallop and no friction rub.    No murmur heard.  Pulmonary/Chest: Effort normal and breath sounds normal. No respiratory distress. He has no wheezes.   Abdominal: Soft. Bowel sounds are normal. He exhibits no distension and no mass. There is tenderness. There is no rebound and no guarding.   Musculoskeletal: Normal range of motion. He exhibits no edema.   Right great toe amputation, surrounding flaky skin and underlying erythema.    Neurological: He is alert and oriented to person, place, and time. No cranial nerve deficit.   Skin: Skin is warm. He is not diaphoretic. There is erythema.   Erythema of skin around groin and suprapubic catheter site.    Psychiatric: He has a normal mood and affect.   Nursing note and vitals reviewed.      Laboratory:  WBC 14, hemoglobin 11.0, hematocrit 32.1, platelets 403    Sodium 119 chloride 86, potassium 4.3, bicarbonate 22, glucose 89, creatinine 1.76, albumin 2.8, globulin 5.1, AST 20, ALT 25, total protein 7.9, calcium 9.2, alkaline phosphatase 82    Lipase 198    Urine with 3+ leukocyte esterase, nitrites positive, 2+ blood, white blood cells 21-50, rare squamous epithelial Gram stain of the urine without organisms seen      Imaging:  CT of the abdomen and pelvis without evidence of acute abdominal or pelvic pathology      Assessment/Plan:  I anticipate this patient will require at least two midnights for appropriate medical management, necessitating inpatient admission.    * UTI (urinary tract infection)- (present on admission)   Assessment & Plan    Complicated by presence of suprapubic catheter, with surrounding cellulitis.  Patient apparently has been visited by POA regarding inability to care for self at home.  Not  accepting home health, so apparently catheter has not been changed in several months.  Plan for IV meropenem given history of drug resistant pathogens.       Cellulitis   Assessment & Plan    Groin and possible extremities.  Suspect would be the same pathogen as underlying UTI.  Monitor.  Likely due also to underlying candidal infection.       Sepsis ([facility])   Assessment & Plan    This is sepsis (without associated acute organ dysfunction).     CRF (chronic renal failure), stage 3 (moderate) ([facility])- (present on admission)   Assessment & Plan    Unclear if acute on chronic component, currently with stage III disease.  Monitor with hydration.       Hyponatremia- (present on admission)   Assessment & Plan    Suspect due to volume depletion, monitor with intravenous fluids.     BPH (benign prostatic hyperplasia)- (present on admission)   Assessment & Plan    Continue current medication regimen.  Currently bypassed with suprapubic catheterization.       Crohn's disease ([facility])- (present on admission)   Assessment & Plan    Did require ostomy creation.  Continue with ostomy care.      Type 2 diabetes mellitus, with long-term current use of insulin ([facility])- (present on admission)   Assessment & Plan    With current relative hypoglycemia, monitor with correction dose insulin only as needed.  Unclear compliance with the same.           VTE prophylaxis: SCD, heparin

## 2019-05-29 NOTE — ED NOTES
Pt attempting to get out of bed after many narcotics and pt has ALOC, pt started pulling at gown and cords/wires. Pt placed in soft wrist restraints

## 2019-05-29 NOTE — PROGRESS NOTES
Page placed to MD eD Paz regarding suprapubic. Pt failed to follow up after suprapubic was placed in the beginning of May. MD updated. Urology needed to replace suprapubic for the first time.

## 2019-05-29 NOTE — ED PROVIDER NOTES
ED Provider Note    Scribed for Bo Hermosillo M.D. by Mendy Cowart. 5/29/2019  4:41 AM    Primary care provider: Nicolette Yeager M.D. (Inactive)  Means of arrival: Ambulance  History obtained from: Nurse and transferring facility  History limited by: Patient's altered mental status.     CHIEF COMPLAINT  Chief Complaint   Patient presents with   • UTI     possible urosepsis   • ALOC       HPI  Shola Hoty is a 69 y.o. male with a past medical history of BPH, COPD, diabetes, renal disorder, and colostomy placement who presents to the Emergency Department as a transfer from [facility]. Patient was seen at the transferring facility at 10 PM last night complaining of 4 hours of pain about his suprapubic catheter. He was noted to have a suprapubic catheter in early May without follow up. Patient was given narcotics at the transferring facility. [facility] and was sent to this ED for possible urosepsis. Patient is afebrile.     REVIEW OF SYSTEMS  Pertinent positives include suprapubic pain.   Pertinent negatives include no fever.    All other systems reviewed and negative. See HPI for further details.     PAST MEDICAL HISTORY   has a past medical history of Arthritis; Benign prostate hyperplasia; Colostomy in place (HCC); COPD (chronic obstructive pulmonary disease) (HCC); Diabetes (HCC); and Renal disorder.    SURGICAL HISTORY   has a past surgical history that includes toe amputation (Right, 10/25/2017); ercp,diagnostic (3/29/2019); and carlos by laparoscopy (N/A, 3/31/2019).    SOCIAL HISTORY  Social History   Substance Use Topics   • Smoking status: Former Smoker   • Smokeless tobacco: Former User   • Alcohol use None noted       History   Drug use: Unknown     FAMILY HISTORY  None noted    CURRENT MEDICATIONS  No current facility-administered medications on file prior to encounter.      Current Outpatient Prescriptions on File Prior to  "Encounter   Medication Sig Dispense Refill   • Dextrose, Diabetic Use, (GLUCOSE) 4 g chewable tablet Take 4 Tabs by mouth as needed for Low Blood Sugar (If FSBG is less than or equal to 70 mg/dL and patient able to eat or drink). 30 Tab    • insulin regular (HUMULIN R) 100 Unit/mL Solution Inject 1-6 Units as instructed 4 Times a Day,Before Meals and at Bedtime. 10 mL    • therapeutic multivitamin-minerals (THERAGRAN-M) Tab Take 1 Tab by mouth every day. 30 Tab 11   • ascorbic acid (VITAMIN C) 500 MG tablet Take 1 Tab by mouth 2 Times a Day. 30 Tab 3   • acetaminophen (TYLENOL) 500 MG Tab Take 1,000 mg by mouth every 6 hours as needed for Mild Pain.     • ferrous gluconate (FERGON) 324 (38 Fe) MG Tab Take 324 mg by mouth every morning with breakfast.     • loratadine (CLARITIN) 10 MG Tab Take 10 mg by mouth every day.     • nystatin (MYCOSTATIN) 539120 UNIT/GM Cream topical cream Apply 1 g to affected area(s) 2 times a day. To affected areas     • budesonide-formoterol (SYMBICORT) 160-4.5 MCG/ACT Aerosol Inhale 2 Puffs by mouth 2 Times a Day.     • tamsulosin (FLOMAX) 0.4 MG capsule Take 0.4 mg by mouth ONE-HALF HOUR AFTER BREAKFAST.       ALLERGIES  Allergies   Allergen Reactions   • Ceftriaxone      Has tolerated Merrem, Zosyn, and Unasyn   • Ezetimibe    • Flagyl [Metronidazole]    • Infliximab    • Levaquin    • Lovastatin    • Simvastatin    • Vancomycin        PHYSICAL EXAM  VITAL SIGNS: Pulse 86   Temp 37 °C (98.6 °F) (Temporal)   Resp 20   Ht 1.803 m (5' 11\")   SpO2 97%   BMI 36.34 kg/m²     Nursing note and vitals reviewed.  Constitutional: Well-developed and well-nourished. Confused.  Chronically ill-appearing.  HENT: Head is normocephalic and atraumatic. Oropharynx is clear and moist without exudate or erythema.   Eyes: Pupils are equal, round, and reactive to light. Conjunctiva are normal.   Cardiovascular: Normal rate and regular rhythm. No murmur heard. Normal radial pulses.  Pulmonary/Chest: " Breath sounds normal. No wheezes or rales.   Abdominal: Soft. No distention, Suprapubic catheter in place with mild surrounding erythema.   Musculoskeletal: Extremities exhibit normal range of motion without tenderness.  2+ bilateral lower extremity edema. S/p right great toe amputation.   Neurological: Awake, alert and oriented x 2, Confused, Answers simple questions, No focal deficits noted.  Skin: Skin is warm and dry. No rash.   Psychiatric: Somewhat agitated.     COURSE & MEDICAL DECISION MAKING  Nursing notes, VS, PMSFHx reviewed in chart.     Review of medical records from the transferring facility showed the following:     WBC elevated at 14  HGB 11  Sodium low at 119   Creatinine elevated at 1.76   UA is nitrite positive with abundant bacteria, 21-50 WBC's   Chest x ray negative   CT abdomen pelvis negative     4:41 AM Patient seen and examined at bedside. He will be admitted for further work up and IV antibiotics treatment. No ED work up was completed given patient underwent prior lab work and studies at the transferring facility. The differential diagnoses include but are not limited to: UTI, BRI, Sepsis, Hyponatremia     4:59 AM Paged [facility].     5:03 AM Discussed patient's case and diagnostic results with L[facility] Morales, who agreed to admit patient. Patient's care was transferred at this time.     DISPOSITION:  Patient will be admitted to [facility], in guarded condition.    FINAL IMPRESSION  1. Acute UTI    2. BRI (acute kidney injury) (HCC)    3. Hyponatremia    4. Suprapubic catheter (HCC)       Mendy WINTER (Scribzia), am scribing for, and in the presence of, Bo Hermosillo M.D..  Electronically signed by: Mendy Cowart (Scribe), 5/29/2019  Bo WINTER M.D. personally performed the services described in this documentation, as scribed by Mendy Cowart in my presence, and it is both accurate and complete. C.     The note accurately reflects work and decisions  made by me.  Bo Hermosillo  5/29/2019  11:12 AM

## 2019-05-29 NOTE — ASSESSMENT & PLAN NOTE
On BID humulin 70/30 at home, has not needed here  A1c 6.5  Added Accu-Cheks ACHS with low scale SSI as required.  Hypoglycemia protocol in place if needed.  Diet changed to diabetic.

## 2019-05-29 NOTE — ED NOTES
Attending Hospitalist is Dr De Paz starting at 0700. Please contact this physician for orders, updates or questions today.

## 2019-05-30 PROBLEM — T83.510A URINARY TRACT INFECTION ASSOCIATED WITH CYSTOSTOMY CATHETER (HCC): Status: ACTIVE | Noted: 2018-02-24

## 2019-05-30 PROBLEM — E87.1 HYPONATREMIA: Status: RESOLVED | Noted: 2019-03-25 | Resolved: 2019-05-30

## 2019-05-30 LAB
ANION GAP SERPL CALC-SCNC: 9 MMOL/L (ref 0–11.9)
APPEARANCE UR: ABNORMAL
BACTERIA #/AREA URNS HPF: ABNORMAL /HPF
BASOPHILS # BLD AUTO: 0.5 % (ref 0–1.8)
BASOPHILS # BLD: 0.04 K/UL (ref 0–0.12)
BILIRUB UR QL STRIP.AUTO: NEGATIVE
BUN SERPL-MCNC: 18 MG/DL (ref 8–22)
CALCIUM SERPL-MCNC: 8.2 MG/DL (ref 8.5–10.5)
CHLORIDE SERPL-SCNC: 103 MMOL/L (ref 96–112)
CO2 SERPL-SCNC: 23 MMOL/L (ref 20–33)
COLOR UR: YELLOW
CREAT SERPL-MCNC: 1.93 MG/DL (ref 0.5–1.4)
EOSINOPHIL # BLD AUTO: 0.26 K/UL (ref 0–0.51)
EOSINOPHIL NFR BLD: 3 % (ref 0–6.9)
EPI CELLS #/AREA URNS HPF: NEGATIVE /HPF
ERYTHROCYTE [DISTWIDTH] IN BLOOD BY AUTOMATED COUNT: 49.4 FL (ref 35.9–50)
GLUCOSE BLD-MCNC: 121 MG/DL (ref 65–99)
GLUCOSE BLD-MCNC: 139 MG/DL (ref 65–99)
GLUCOSE SERPL-MCNC: 92 MG/DL (ref 65–99)
GLUCOSE UR STRIP.AUTO-MCNC: NEGATIVE MG/DL
GRAM STN SPEC: NORMAL
HCT VFR BLD AUTO: 34.5 % (ref 42–52)
HGB BLD-MCNC: 10.8 G/DL (ref 14–18)
HYALINE CASTS #/AREA URNS LPF: ABNORMAL /LPF
IMM GRANULOCYTES # BLD AUTO: 0.04 K/UL (ref 0–0.11)
IMM GRANULOCYTES NFR BLD AUTO: 0.5 % (ref 0–0.9)
KETONES UR STRIP.AUTO-MCNC: ABNORMAL MG/DL
LEUKOCYTE ESTERASE UR QL STRIP.AUTO: ABNORMAL
LYMPHOCYTES # BLD AUTO: 2.15 K/UL (ref 1–4.8)
LYMPHOCYTES NFR BLD: 25 % (ref 22–41)
MCH RBC QN AUTO: 27.7 PG (ref 27–33)
MCHC RBC AUTO-ENTMCNC: 31.3 G/DL (ref 33.7–35.3)
MCV RBC AUTO: 88.5 FL (ref 81.4–97.8)
MICRO URNS: ABNORMAL
MONOCYTES # BLD AUTO: 0.69 K/UL (ref 0–0.85)
MONOCYTES NFR BLD AUTO: 8 % (ref 0–13.4)
NEUTROPHILS # BLD AUTO: 5.43 K/UL (ref 1.82–7.42)
NEUTROPHILS NFR BLD: 63 % (ref 44–72)
NITRITE UR QL STRIP.AUTO: POSITIVE
NRBC # BLD AUTO: 0 K/UL
NRBC BLD-RTO: 0 /100 WBC
PH UR STRIP.AUTO: 6.5 [PH]
PLATELET # BLD AUTO: 363 K/UL (ref 164–446)
PMV BLD AUTO: 8.6 FL (ref 9–12.9)
POTASSIUM SERPL-SCNC: 3.9 MMOL/L (ref 3.6–5.5)
PROT UR QL STRIP: 100 MG/DL
RBC # BLD AUTO: 3.9 M/UL (ref 4.7–6.1)
RBC # URNS HPF: >150 /HPF
RBC UR QL AUTO: ABNORMAL
SIGNIFICANT IND 70042: NORMAL
SITE SITE: NORMAL
SODIUM SERPL-SCNC: 135 MMOL/L (ref 135–145)
SOURCE SOURCE: NORMAL
SP GR UR STRIP.AUTO: 1.01
UROBILINOGEN UR STRIP.AUTO-MCNC: 0.2 MG/DL
WBC # BLD AUTO: 8.6 K/UL (ref 4.8–10.8)
WBC #/AREA URNS HPF: ABNORMAL /HPF

## 2019-05-30 PROCEDURE — A9270 NON-COVERED ITEM OR SERVICE: HCPCS | Performed by: HOSPITALIST

## 2019-05-30 PROCEDURE — 700102 HCHG RX REV CODE 250 W/ 637 OVERRIDE(OP): Performed by: HOSPITALIST

## 2019-05-30 PROCEDURE — 81001 URINALYSIS AUTO W/SCOPE: CPT

## 2019-05-30 PROCEDURE — 700102 HCHG RX REV CODE 250 W/ 637 OVERRIDE(OP): Performed by: NURSE PRACTITIONER

## 2019-05-30 PROCEDURE — 99233 SBSQ HOSP IP/OBS HIGH 50: CPT | Performed by: HOSPITALIST

## 2019-05-30 PROCEDURE — 85025 COMPLETE CBC W/AUTO DIFF WBC: CPT

## 2019-05-30 PROCEDURE — 87086 URINE CULTURE/COLONY COUNT: CPT

## 2019-05-30 PROCEDURE — 80048 BASIC METABOLIC PNL TOTAL CA: CPT

## 2019-05-30 PROCEDURE — 302097 BARRIER RING,CONVEX 40MM: Performed by: HOSPITALIST

## 2019-05-30 PROCEDURE — 700111 HCHG RX REV CODE 636 W/ 250 OVERRIDE (IP): Performed by: HOSPITALIST

## 2019-05-30 PROCEDURE — 87077 CULTURE AEROBIC IDENTIFY: CPT | Mod: 91

## 2019-05-30 PROCEDURE — 87186 SC STD MICRODIL/AGAR DIL: CPT | Mod: 91

## 2019-05-30 PROCEDURE — 770006 HCHG ROOM/CARE - MED/SURG/GYN SEMI*

## 2019-05-30 PROCEDURE — 700105 HCHG RX REV CODE 258: Performed by: HOSPITALIST

## 2019-05-30 PROCEDURE — 82962 GLUCOSE BLOOD TEST: CPT

## 2019-05-30 PROCEDURE — A9270 NON-COVERED ITEM OR SERVICE: HCPCS | Performed by: NURSE PRACTITIONER

## 2019-05-30 PROCEDURE — 87205 SMEAR GRAM STAIN: CPT

## 2019-05-30 PROCEDURE — 302140 GU CART: Performed by: HOSPITALIST

## 2019-05-30 PROCEDURE — 302094 BELT OSTOMY (HOLLISTER): Performed by: HOSPITALIST

## 2019-05-30 PROCEDURE — 36415 COLL VENOUS BLD VENIPUNCTURE: CPT

## 2019-05-30 RX ORDER — MORPHINE SULFATE 4 MG/ML
2-4 INJECTION, SOLUTION INTRAMUSCULAR; INTRAVENOUS
Status: DISCONTINUED | OUTPATIENT
Start: 2019-05-30 | End: 2019-06-05

## 2019-05-30 RX ORDER — OXYCODONE HYDROCHLORIDE 5 MG/1
5 TABLET ORAL
Status: DISCONTINUED | OUTPATIENT
Start: 2019-05-30 | End: 2019-06-05

## 2019-05-30 RX ORDER — OXYCODONE HYDROCHLORIDE 10 MG/1
10 TABLET ORAL
Status: DISCONTINUED | OUTPATIENT
Start: 2019-05-30 | End: 2019-06-05

## 2019-05-30 RX ORDER — DOXYCYCLINE 100 MG/1
100 TABLET ORAL EVERY 12 HOURS
Status: DISCONTINUED | OUTPATIENT
Start: 2019-05-30 | End: 2019-05-31

## 2019-05-30 RX ADMIN — MEROPENEM 500 MG: 500 INJECTION, POWDER, FOR SOLUTION INTRAVENOUS at 11:25

## 2019-05-30 RX ADMIN — OXYCODONE HYDROCHLORIDE 10 MG: 10 TABLET ORAL at 23:31

## 2019-05-30 RX ADMIN — OXYCODONE HYDROCHLORIDE 5 MG: 5 TABLET ORAL at 01:06

## 2019-05-30 RX ADMIN — BUDESONIDE AND FORMOTEROL FUMARATE DIHYDRATE 2 PUFF: 160; 4.5 AEROSOL RESPIRATORY (INHALATION) at 17:07

## 2019-05-30 RX ADMIN — OXYCODONE HYDROCHLORIDE 5 MG: 5 TABLET ORAL at 07:14

## 2019-05-30 RX ADMIN — MEROPENEM 500 MG: 500 INJECTION, POWDER, FOR SOLUTION INTRAVENOUS at 04:41

## 2019-05-30 RX ADMIN — NYSTATIN: 100000 POWDER TOPICAL at 04:42

## 2019-05-30 RX ADMIN — NYSTATIN: 100000 POWDER TOPICAL at 17:07

## 2019-05-30 RX ADMIN — HEPARIN SODIUM 5000 UNITS: 5000 INJECTION, SOLUTION INTRAVENOUS; SUBCUTANEOUS at 15:08

## 2019-05-30 RX ADMIN — SODIUM CHLORIDE: 9 INJECTION, SOLUTION INTRAVENOUS at 09:22

## 2019-05-30 RX ADMIN — DOXYCYCLINE 100 MG: 100 TABLET, FILM COATED ORAL at 17:07

## 2019-05-30 RX ADMIN — OXYCODONE HYDROCHLORIDE 5 MG: 5 TABLET ORAL at 11:25

## 2019-05-30 RX ADMIN — MORPHINE SULFATE 2 MG: 4 INJECTION INTRAVENOUS at 13:17

## 2019-05-30 RX ADMIN — HEPARIN SODIUM 5000 UNITS: 5000 INJECTION, SOLUTION INTRAVENOUS; SUBCUTANEOUS at 04:41

## 2019-05-30 RX ADMIN — TAMSULOSIN HYDROCHLORIDE 0.4 MG: 0.4 CAPSULE ORAL at 07:13

## 2019-05-30 RX ADMIN — MEROPENEM 500 MG: 500 INJECTION, POWDER, FOR SOLUTION INTRAVENOUS at 19:20

## 2019-05-30 RX ADMIN — OXYCODONE HYDROCHLORIDE 5 MG: 5 TABLET ORAL at 17:10

## 2019-05-30 RX ADMIN — MEROPENEM 500 MG: 500 INJECTION, POWDER, FOR SOLUTION INTRAVENOUS at 23:28

## 2019-05-30 RX ADMIN — TOBRAMYCIN: 3 OINTMENT OPHTHALMIC at 15:09

## 2019-05-30 RX ADMIN — MICONAZOLE NITRATE: 20 CREAM TOPICAL at 04:42

## 2019-05-30 RX ADMIN — DOXYCYCLINE 100 MG: 100 TABLET, FILM COATED ORAL at 08:43

## 2019-05-30 RX ADMIN — MULTIPLE VITAMINS W/ MINERALS TAB 1 TABLET: TAB at 04:42

## 2019-05-30 RX ADMIN — TOBRAMYCIN: 3 OINTMENT OPHTHALMIC at 17:08

## 2019-05-30 RX ADMIN — MICONAZOLE NITRATE: 20 CREAM TOPICAL at 17:07

## 2019-05-30 RX ADMIN — BUDESONIDE AND FORMOTEROL FUMARATE DIHYDRATE 2 PUFF: 160; 4.5 AEROSOL RESPIRATORY (INHALATION) at 04:42

## 2019-05-30 ASSESSMENT — ENCOUNTER SYMPTOMS
NERVOUS/ANXIOUS: 0
DEPRESSION: 0
MEMORY LOSS: 1
FEVER: 0
COUGH: 0
ABDOMINAL PAIN: 1
PALPITATIONS: 0
HEADACHES: 0
SHORTNESS OF BREATH: 0
SPEECH CHANGE: 0
WEAKNESS: 1
VOMITING: 0
SENSORY CHANGE: 0
DIAPHORESIS: 0
FOCAL WEAKNESS: 0
CHILLS: 0
NAUSEA: 0
DIZZINESS: 0
INSOMNIA: 0
WHEEZING: 0

## 2019-05-30 NOTE — CONSULTS
UROLOGY Consult Note:    Nathaly Perla APRN  Date & Time note created:    5/30/2019   10:45 AM     Referring MD:  Dr. Lake    Patient ID:   Name:             Shola Hoyt   YOB: 1950  Age:                 69 y.o.  male   MRN:               [id number]                                                             Reason for Consult:      Supra pubic catheter change    History of Present Illness:    69 y.o male with chronic harrington from bladder outlet obstruction. Had supra pubic catheter placed due to urethral meatal erosion from harrington catheter 4/11/19. Catheter has not yet been changed. Requesting SPT change with urology while in hospital.    Review of Systems:        No fever/chills. Pain right groin, leg. Unable to complete ROS: medical condition           Past Medical History:   Past Medical History:   Diagnosis Date   • Arthritis    • Benign prostate hyperplasia    • Colostomy in place ([facility])    • COPD (chronic obstructive pulmonary disease) ([facility])    • Diabetes ([facility])    • Renal disorder      Active Hospital Problems    Diagnosis   • UTI (urinary tract infection) [N39.0]     Priority: Medium   • Crohn's disease ([facility]) [K50.90]     Priority: Low   • Type 2 diabetes mellitus, with long-term current use of insulin ([facility]) [E11.9, Z79.4]     Priority: Low   • Sepsis ([facility]) [A41.9]   • Cellulitis [L03.90]   • CRF (chronic renal failure), stage 3 (moderate) ([facility]) [N18.3]   • Hyponatremia [E87.1]   • BPH (benign prostatic hyperplasia) [N40.0]       Past Surgical History:  Past Surgical History:   Procedure Laterality Date   • POP BY LAPAROSCOPY N/A 3/31/2019    Procedure: CHOLECYSTECTOMY, LAPAROSCOPIC-conversion to open cholecystectomy;  Surgeon: Gunnar Alonzo M.D.;  Location: [facility];  Service: General   • PB ERCP,DIAGNOSTIC  3/29/2019    Procedure: ERCP, DIAGNOSTIC;  Surgeon: Tao Saab M.D.;  Location: [facility];  Service: Gastroenterology   • TOE AMPUTATION Right  10/25/2017    Procedure: TOE AMPUTATION-GREAT TOE;  Surgeon: Bobby Alejo M.D.;  Location: SURGERY [facility];  Service: Orthopedics       Hospital Medications:    Current Facility-Administered Medications:   •  doxycycline monohydrate (ADOXA) tablet 100 mg, 100 mg, Oral, Q12HRS, Katlin Aldana M.D., 100 mg at 05/30/19 0843  •  budesonide-formoterol (SYMBICORT) 160-4.5 MCG/ACT inhaler 2 Puff, 2 Puff, Inhalation, BID, Luis Eduardo Lake M.D., 2 Puff at 05/30/19 0442  •  tamsulosin (FLOMAX) capsule 0.4 mg, 0.4 mg, Oral, AFTER BREAKFAST, Luis Eduardo Lake M.D., 0.4 mg at 05/30/19 0713  •  therapeutic multivitamin-minerals (THERAGRAN-M) tablet 1 Tab, 1 Tab, Oral, DAILY, Luis Eduardo Lake M.D., 1 Tab at 05/30/19 0442  •  NS infusion, , Intravenous, Continuous, Luis Eduardo Lake M.D., Last Rate: 125 mL/hr at 05/30/19 0922  •  acetaminophen (TYLENOL) tablet 650 mg, 650 mg, Oral, Q6HRS PRN, Luis Eduardo Lake M.D.  •  Notify provider if pain remains uncontrolled, , , CONTINUOUS **AND** Use the numeric rating scale (NRS-11) on regular floors and Critical-Care Pain Observation Tool (CPOT) on ICUs/Trauma to assess pain, , , CONTINUOUS **AND** Pulse Ox (Oximetry), , , CONTINUOUS **AND** Pharmacy Consult Request ...Pain Management Review 1 Each, 1 Each, Other, PHARMACY TO DOSE **AND** If patient difficult to arouse and/or has respiratory depression, stop any opiates that are currently infusing and call a Rapid Response., , , CONTINUOUS **AND** oxyCODONE immediate-release (ROXICODONE) tablet 2.5 mg, 2.5 mg, Oral, Q3HRS PRN **AND** oxyCODONE immediate-release (ROXICODONE) tablet 5 mg, 5 mg, Oral, Q3HRS PRN, 5 mg at 05/30/19 0714 **AND** morphine (pf) 4 mg/ml injection 2 mg, 2 mg, Intravenous, Q3HRS PRN, Luis Eduardo Lake M.D.  •  ondansetron (ZOFRAN) syringe/vial injection 4 mg, 4 mg, Intravenous, Q4HRS PRN, Luis Eduardo Lake M.D.  •  ondansetron (ZOFRAN ODT) dispertab 4 mg, 4 mg, Oral, Q4HRS PRN, Luis Eduardo Lake M.D.  •   senna-docusate (PERICOLACE or SENOKOT S) 8.6-50 MG per tablet 2 Tab, 2 Tab, Oral, BID, Stopped at 05/29/19 1800 **AND** polyethylene glycol/lytes (MIRALAX) PACKET 1 Packet, 1 Packet, Oral, QDAY PRN **AND** magnesium hydroxide (MILK OF MAGNESIA) suspension 30 mL, 30 mL, Oral, QDAY PRN **AND** bisacodyl (DULCOLAX) suppository 10 mg, 10 mg, Rectal, QDAY PRN, Luis Eduardo Lake M.D.  •  NS (BOLUS) infusion 500 mL, 500 mL, Intravenous, Once PRN, Luis Eduardo Lake M.D.  •  heparin injection 5,000 Units, 5,000 Units, Subcutaneous, Q8HRS, Luis Eduardo Lake M.D., 5,000 Units at 05/30/19 0441  •  meropenem (MERREM) 500 mg in  mL IVPB, 500 mg, Intravenous, Q6HRS, Luis Eduardo Lake M.D., Stopped at 05/30/19 0511  •  miconazole 2%-zinc oxide (DOROTHY) topical cream, , Topical, BID, Conor De Paz M.D.  •  nystatin (MYCOSTATIN) powder, , Topical, BID, Conor De Paz M.D.    Current Outpatient Medications:  Prescriptions Prior to Admission   Medication Sig Dispense Refill Last Dose   • Adalimumab (HUMIRA) 40 MG/0.4ML Prefilled Syringe Kit Inject 80 mg as instructed every 14 days.   UNK at [facility]   • Buprenorphine 15 MCG/HR PATCH WEEKLY Apply 15 mcg to skin as directed every 7 days.   UNK at [facility]   • insulin 70/30 (HUMULIN,NOVOLIN) (70-30) 100 UNIT/ML Suspension Inject 35-45 Units as instructed 2 Times a Day. 35 units every morning  45 units every night   UNK at [facility]   • lisinopril (PRINIVIL) 5 MG Tab Take 5 mg by mouth every day.   UNK at [facility]   • nitrofurantoin monohyd macro (MACROBID) 100 MG Cap Take 100 mg by mouth 2 times a day. Pt filled a 5 day course of MACROBID on 5/22   UNK at [facility]   • traZODone (DESYREL) 100 MG Tab Take 100 mg by mouth every evening.   UNK at [facility]   • amoxicillin (AMOXIL) 500 MG Cap Take 500 mg by mouth 3 times a day. Pt filled a 14 day course of AMOXIL on 4/29   UNK at [facility]       Medication Allergy:  Allergies   Allergen Reactions   • Ceftriaxone      Has tolerated Merrem, Zosyn, and Unasyn   • Ezetimibe    •  "Flagyl [Metronidazole]    • Infliximab    • Levaquin    • Lovastatin    • Simvastatin    • Vancomycin        Family History:  Family History   Problem Relation Age of Onset   • No Known Problems Mother    • No Known Problems Father        Social History:  Social History     Social History   • Marital status:      Spouse name: N/A   • Number of children: N/A   • Years of education: N/A     Occupational History   • Not on file.     Social History Main Topics   • Smoking status: Former Smoker   • Smokeless tobacco: Former User   • Alcohol use Not on file   • Drug use: Unknown   • Sexual activity: Not on file     Other Topics Concern   • Not on file     Social History Narrative   • No narrative on file         Physical Exam:  Vitals/ General Appearance:   Weight/BMI: Body mass index is 39.05 kg/m².  /70   Pulse 87   Temp 37.1 °C (98.8 °F) (Temporal)   Resp 18   Ht 1.803 m (5' 11\")   Wt (!) 127 kg (280 lb)   SpO2 94%   Vitals:    05/29/19 1505 05/29/19 1904 05/30/19 0415 05/30/19 0723   BP: 139/66 133/58 132/59 131/70   Pulse: 70 74 79 87   Resp: 16 17 17 18   Temp: 36.6 °C (97.9 °F) 36.6 °C (97.9 °F) 36.5 °C (97.7 °F) 37.1 °C (98.8 °F)   TempSrc: Temporal Temporal Temporal Temporal   SpO2: 96% 95% 96% 94%   Weight:       Height:         Oxygen Therapy:  Pulse Oximetry: 94 %, O2 (LPM): 0, O2 Delivery: None (Room Air)    Constitutional:   Well developed, Well nourished, No acute distress  HENMT:  Normocephalic, Atraumatic  Eyes:  EOMI, Conjunctiva normal, No discharge.  Neck:  Normal range of motion  Cardiovascular:  Normal heart rate  Lungs:  Normal breath sounds  Abdomen: Bowel sounds normal  : supra pubic tube. Erythema around site  Skin: Warm, Dry, No erythema, No rash, no induration.  Neurologic: Alert & oriented x 2  Psychiatric: Affect normal, Mood normal.      MDM (Data Review):     Records reviewed and summarized in current documentation    Lab Data Review:  Recent Results (from the past 24 " hour(s))   Lactic Acid Every four hours after STAT order    Collection Time: 05/29/19 10:57 AM   Result Value Ref Range    Lactic Acid 1.5 0.5 - 2.0 mmol/L   Basic Metabolic Panel (BMP)    Collection Time: 05/30/19  2:33 AM   Result Value Ref Range    Sodium 135 135 - 145 mmol/L    Potassium 3.9 3.6 - 5.5 mmol/L    Chloride 103 96 - 112 mmol/L    Co2 23 20 - 33 mmol/L    Glucose 92 65 - 99 mg/dL    Bun 18 8 - 22 mg/dL    Creatinine 1.93 (H) 0.50 - 1.40 mg/dL    Calcium 8.2 (L) 8.5 - 10.5 mg/dL    Anion Gap 9.0 0.0 - 11.9   CBC with Differential    Collection Time: 05/30/19  2:33 AM   Result Value Ref Range    WBC 8.6 4.8 - 10.8 K/uL    RBC 3.90 (L) 4.70 - 6.10 M/uL    Hemoglobin 10.8 (L) 14.0 - 18.0 g/dL    Hematocrit 34.5 (L) 42.0 - 52.0 %    MCV 88.5 81.4 - 97.8 fL    MCH 27.7 27.0 - 33.0 pg    MCHC 31.3 (L) 33.7 - 35.3 g/dL    RDW 49.4 35.9 - 50.0 fL    Platelet Count 363 164 - 446 K/uL    MPV 8.6 (L) 9.0 - 12.9 fL    Neutrophils-Polys 63.00 44.00 - 72.00 %    Lymphocytes 25.00 22.00 - 41.00 %    Monocytes 8.00 0.00 - 13.40 %    Eosinophils 3.00 0.00 - 6.90 %    Basophils 0.50 0.00 - 1.80 %    Immature Granulocytes 0.50 0.00 - 0.90 %    Nucleated RBC 0.00 /100 WBC    Neutrophils (Absolute) 5.43 1.82 - 7.42 K/uL    Lymphs (Absolute) 2.15 1.00 - 4.80 K/uL    Monos (Absolute) 0.69 0.00 - 0.85 K/uL    Eos (Absolute) 0.26 0.00 - 0.51 K/uL    Baso (Absolute) 0.04 0.00 - 0.12 K/uL    Immature Granulocytes (abs) 0.04 0.00 - 0.11 K/uL    NRBC (Absolute) 0.00 K/uL   ESTIMATED GFR    Collection Time: 05/30/19  2:33 AM   Result Value Ref Range    GFR If  42 (A) >60 mL/min/1.73 m 2    GFR If Non African American 35 (A) >60 mL/min/1.73 m 2   URINALYSIS    Collection Time: 05/30/19  9:56 AM   Result Value Ref Range    Color Yellow     Character Cloudy (A)     Specific Gravity 1.011 <1.035    Ph 6.5 5.0 - 8.0    Glucose Negative Negative mg/dL    Ketones Trace (A) Negative mg/dL    Protein 100 (A) Negative mg/dL     Bilirubin Negative Negative    Urobilinogen, Urine 0.2 Negative    Nitrite Positive (A) Negative    Leukocyte Esterase Large (A) Negative    Occult Blood Large (A) Negative    Micro Urine Req Microscopic    URINE MICROSCOPIC (W/UA)    Collection Time: 05/30/19  9:56 AM   Result Value Ref Range    WBC Packed (A) /hpf    RBC >150 (A) /hpf    Bacteria Few (A) None /hpf    Epithelial Cells Negative /hpf    Hyaline Cast 0-2 /lpf       Imaging/Procedures Review:    Reviewed    MDM (Assessment and Plan):     Active Hospital Problems    Diagnosis   • UTI (urinary tract infection) [N39.0]     Priority: Medium   • Crohn's disease (HCC) [K50.90]     Priority: Low   • Type 2 diabetes mellitus, with long-term current use of insulin (HCC) [E11.9, Z79.4]     Priority: Low   • Sepsis (HCC) [A41.9]   • Cellulitis [L03.90]   • CRF (chronic renal failure), stage 3 (moderate) (HCC) [N18.3]   • Hyponatremia [E87.1]   • BPH (benign prostatic hyperplasia) [N40.0]       Supra pubic catheter due to urethral erosion from chronic harrington catheter  Urology cart to room  Dr Louis will change catheter later today

## 2019-05-30 NOTE — PROGRESS NOTES
· 2 RN skin check complete with Lorrie.   · Devices in place colostomy, suprapubic.  · Skin assessed under devices yes.  · Confirmed pressure ulcers found on NA.  · New potential pressure ulcers noted on NA. Wound consult placed? NA. Photo uploaded? NA.   · The following interventions are in place waffle overlay, pt instructed on the importance of turning self, barrier cream applied, pt bathed, colostomy supplies changed out.     Blanchable redness noted on perineum and over coccyx, redness moisture related. Skin breakdown noted under dressing surrounding colostomy. Skin surrounding suprapubic is red, open, with yellow drainage. Skin on right lower extremity red, macerated, and excoriated. Redness noted to left lower extremity. Moisture fissure noted on sacrum.

## 2019-05-30 NOTE — ASSESSMENT & PLAN NOTE
Ongoing confusion  Metabolic, with ongoing UTI  POA says he's very sensitive to narcotics, will minimize. At baseline he does not know his medications or date  Vit B12 is normal  His memory is fluctuating, poor short term memory

## 2019-05-30 NOTE — PROGRESS NOTES
"Hospital Medicine Daily Progress Note    Date of Service  5/30/2019    Chief Complaint  Pain at his suprapubic catheter site      Hospital Course   Mr. Hoyt is a 69-year-old male who was transferred from [facility] on 5/29/2019 for urinary tract infection and altered level of consciousness with concern for urosepsis.  He has a past medical history of BPH, COPD, diabetes, CKD, and Chron's disease resulting in colostomy placement.  He initially presented to the [facility] with complaints of pain at the site of his suprapubic catheter which was reportedly placed in May but has not had follow-up since.  According to the [facility] facility he has a history of multiple drug-resistant urinary tract infections that have been previously treated at the [facility].  Upon initial evaluation here he was noted to have cellulitis surrounding the suprapubic catheter site.  Given the above findings he was placed on IV meropenem and oral doxycycline.      Interval Problem Update  \"I feel terrible\". Pain uncontrolled, primarily at the SPC site & in his RLE which is also erythematous. A&O to self and place only.     No leukocytosis on a.m. lab work.  H/H is at his baseline.  Renal function just mildly improved from baseline.  Lactic acid levels have been normal.      Blood cultures done 5/29/2019 are negative to date.  Urine culture pending.    Afebrile overnight, HR 70s-80s, BP 130s, O2 saturations 94-96% on room air.    Currently on IV meropenem and oral doxycycline.    Urology has been consulted and plans on replacing the suprapubic catheter in IR today.    Consultants/Specialty  Urology-Dr. Louis  Wound care for ostomy management.    Code Status  Full code     Disposition  Clinical for now. Will likely need placement but will hold off on PT/OT until he is more stable.     Review of Systems  Review of Systems   Constitutional: Positive for malaise/fatigue. Negative for chills, diaphoresis and fever.   HENT: " Negative for congestion.    Respiratory: Negative for cough, shortness of breath and wheezing.    Cardiovascular: Negative for chest pain, palpitations and leg swelling.   Gastrointestinal: Positive for abdominal pain (bladder tenderness). Negative for nausea and vomiting.   Genitourinary:        SPC in place.   Musculoskeletal:        Increased pain in his RLE and at the SPC site.   Neurological: Positive for weakness (generalized). Negative for dizziness, sensory change, speech change, focal weakness and headaches.   Psychiatric/Behavioral: Positive for memory loss. Negative for depression. The patient is not nervous/anxious and does not have insomnia.    All other systems reviewed and are negative.     Physical Exam  Temp:  [36.5 °C (97.7 °F)-37.1 °C (98.8 °F)] 37.1 °C (98.8 °F)  Pulse:  [70-87] 87  Resp:  [16-18] 18  BP: (131-139)/(58-70) 131/70  SpO2:  [94 %-96 %] 94 %    Physical Exam   Constitutional: He appears well-developed and well-nourished. He is active and cooperative. He has a sickly appearance. He appears ill. No distress.   Morbidly obese.   HENT:   Head: Normocephalic and atraumatic.   Right Ear: External ear normal.   Left Ear: External ear normal.   Nose: Nose normal.   Eyes: Pupils are equal, round, and reactive to light. EOM are normal. Right eye exhibits no discharge. Left eye exhibits no discharge. Right conjunctiva is injected. No scleral icterus.   Eyelids red bilaterally.   Neck: Normal range of motion and phonation normal. Neck supple. No JVD present. Edema (BLE) and erythema (RLE) present.   Cardiovascular: Normal rate, regular rhythm, normal heart sounds and intact distal pulses.  Exam reveals no gallop and no friction rub.    No murmur heard.  Pulmonary/Chest: Effort normal and breath sounds normal. No accessory muscle usage or stridor. No tachypnea. No respiratory distress. He has no decreased breath sounds. He has no wheezes. He has no rhonchi. He has no rales.   Abdominal: Soft. He  exhibits no distension and no abdominal bruit. Bowel sounds are decreased. There is tenderness in the suprapubic area. There is no rigidity, no guarding and no CVA tenderness.       Genitourinary:         Musculoskeletal: Normal range of motion. He exhibits no edema.   Neurological: He is alert. He is disoriented. He exhibits abnormal muscle tone. Coordination normal. GCS eye subscore is 4. GCS verbal subscore is 4. GCS motor subscore is 6.   Oriented to self and place only.   Skin: Skin is warm and dry. No rash noted. He is not diaphoretic. There is erythema. No pallor.        Psychiatric: He has a normal mood and affect. His speech is normal and behavior is normal. Judgment and thought content normal. Cognition and memory are impaired.   Nursing note and vitals reviewed.    Fluids    Intake/Output Summary (Last 24 hours) at 05/30/19 1450  Last data filed at 05/30/19 1315   Gross per 24 hour   Intake              250 ml   Output             3005 ml   Net            -2755 ml     Laboratory  Recent Labs      05/30/19   0233   WBC  8.6   RBC  3.90*   HEMOGLOBIN  10.8*   HEMATOCRIT  34.5*   MCV  88.5   MCH  27.7   MCHC  31.3*   RDW  49.4   PLATELETCT  363   MPV  8.6*     Recent Labs      05/30/19   0233   SODIUM  135   POTASSIUM  3.9   CHLORIDE  103   CO2  23   GLUCOSE  92   BUN  18   CREATININE  1.93*   CALCIUM  8.2*     Imaging  IR-CONSULT AND TREAT    (Results Pending)      Assessment/Plan  * Urinary tract infection associated with cystostomy catheter (HCC)- (present on admission)   Assessment & Plan    Complicated by presence of suprapubic catheter, with surrounding cellulitis & skin breakdown.  Catheter has not been changed in several months per patient report.  Has history of drug resistant UTI's.  Continue IV meropenem.   [facility]      Cellulitis- (present on admission)   Assessment & Plan    Groin and right lower extremity.  Likely affected by underlying  Candida infection.  Nystatin has been ordered.  Continue oral doxycycline and monitor progression.     Acute encephalopathy- (present on admission)   Assessment & Plan    Likely secondary to urinary tract infection.  We will continue IV antibiotics and monitor closely.     Sepsis ([facility])- (present on admission)   Assessment & Plan    This is sepsis (without associated acute organ dysfunction).  Source: urinary tract infection.  Continue IV meropenem and oral doxycycline until culture results are obtained.  Suprapubic catheter scheduled to be switched out today in interventional radiology.  Sepsis protocol is in place.     CRF (chronic renal failure), stage 3 (moderate) ([facility])- (present on admission)   Assessment & Plan    Just mildly improved from baseline.  Getting IV fluids, so will recheck tomorrow.  Avoid nephrotoxins.  Renally dose all appropriate medications.     BPH (benign prostatic hyperplasia)- (present on admission)   Assessment & Plan    S/p suprapubic catheter placement.     Crohn's disease ([facility])- (present on admission)   Assessment & Plan    S/p colostomy.  Wound care has been consulted for ostomy management.     Type 2 diabetes mellitus, with long-term current use of insulin ([facility])- (present on admission)   Assessment & Plan    On BID humulin 70/30 at home.   Obtain updated A1c tomorrow morning.  Added Accu-Cheks ACHS with low scale SSI as required.  Hypoglycemia protocol in place if needed.  Diet changed to diabetic.        VTE prophylaxis: Subcutaneous heparin

## 2019-05-30 NOTE — CARE PLAN
Problem: Skin Integrity  Goal: Risk for impaired skin integrity will decrease  Outcome: PROGRESSING AS EXPECTED  Waffle overlay in place, heels floated while in bed, anitfungal paste applied to skin, nystatin to pannus, pt instructed to turn self from side to side q2h, Skin kept clean and dry.    Problem: Infection  Goal: Will remain free from infection  Outcome: PROGRESSING AS EXPECTED  Pt taught the importance of cleansing suprapubic for infection prevention. Pt taught proper cleansing and assisted with the process.

## 2019-05-30 NOTE — CARE PLAN
Problem: Skin Integrity  Goal: Risk for impaired skin integrity will decrease  Outcome: PROGRESSING AS EXPECTED  Wound consult has been placed. Joey cream and nystatin being used.     Problem: Psychosocial Needs:  Goal: Level of anxiety will decrease  Outcome: PROGRESSING AS EXPECTED  Patient has flat affect, appears fatigued.

## 2019-05-30 NOTE — PROGRESS NOTES
"Assumed care at 1900. Received report from RN. Patient is AOx3. Patient stating has pain in groin area. PRN pain medications given. Patient has colostomy and suprapubic catheter. Pending Urology consult. Patient having trouble with grabbing cup at bedside and holding items. Assessment complete. Labs reviewed. Patient and RN discussed plan of care. Patient questions answered. Patient needs are met at this time. Bed in lowest and locked position. Call light is within reach. Hourly rounding in place. /58   Pulse 74   Temp 36.6 °C (97.9 °F) (Temporal)   Resp 17   Ht 1.803 m (5' 11\")   Wt (!) 127 kg (280 lb)   SpO2 95%   BMI 39.05 kg/m²       "

## 2019-05-30 NOTE — RESPIRATORY CARE
COPD EDUCATION by COPD CLINICAL EDUCATOR  5/30/2019 at 8:25 AM by Zaira Gaines     Patient's chart reviewed by COPD education team. Patient does not have an order for Respiratory Care Protocol, therefore the COPD education team cannot treat.

## 2019-05-30 NOTE — PROGRESS NOTES
· 2 RN skin check complete with Viviane OVIEDO.   · Devices in place suprapubic cath, piv, colostomy.  · Skin assessed under devices yes.  · Confirmed pressure ulcers found on n/a.  · New potential pressure ulcers noted on n/. Wound consult placed? n/a. Photo uploaded? n/a.   · The following interventions are in place waffle cushion, mckinley cream, nystatin powder, pillows for support.    BLE redness and swelling. Groin area is excoriated, red. Area around ostomy site is red and flaky.

## 2019-05-30 NOTE — PROGRESS NOTES
"Report received. Assumed care at 0700, assessment complete. Pt is A&O x 2. Pt stating that the year is \"1900\" and pt does not know why he is in the hospital. 9/10 pain at this time in groin and right and left leg. Right leg appears swollen and red. Pt complains of pain with any flexing of extremity. 2+ pulse palpated in left food and 1+ palpated in right foot. Suprapubic site is bloody. Site cleaned. Patient instructed on the plan of care for the day. Bed in lowest position. Bed alarm on. Call light within reach. Patient provided RN and CNA extension.    "

## 2019-05-31 ENCOUNTER — APPOINTMENT (OUTPATIENT)
Dept: RADIOLOGY | Facility: MEDICAL CENTER | Age: 69
DRG: 673 | End: 2019-05-31
Attending: HOSPITALIST
Payer: MEDICARE

## 2019-05-31 LAB
ANION GAP SERPL CALC-SCNC: 6 MMOL/L (ref 0–11.9)
BASOPHILS # BLD AUTO: 0.4 % (ref 0–1.8)
BASOPHILS # BLD: 0.03 K/UL (ref 0–0.12)
BUN SERPL-MCNC: 15 MG/DL (ref 8–22)
CALCIUM SERPL-MCNC: 8.3 MG/DL (ref 8.5–10.5)
CHLORIDE SERPL-SCNC: 103 MMOL/L (ref 96–112)
CO2 SERPL-SCNC: 24 MMOL/L (ref 20–33)
CREAT SERPL-MCNC: 1.83 MG/DL (ref 0.5–1.4)
CRP SERPL HS-MCNC: 7.86 MG/DL (ref 0–0.75)
EOSINOPHIL # BLD AUTO: 0.38 K/UL (ref 0–0.51)
EOSINOPHIL NFR BLD: 4.7 % (ref 0–6.9)
ERYTHROCYTE [DISTWIDTH] IN BLOOD BY AUTOMATED COUNT: 50.6 FL (ref 35.9–50)
ERYTHROCYTE [SEDIMENTATION RATE] IN BLOOD BY WESTERGREN METHOD: 72 MM/HOUR (ref 0–20)
EST. AVERAGE GLUCOSE BLD GHB EST-MCNC: 140 MG/DL
GLUCOSE BLD-MCNC: 109 MG/DL (ref 65–99)
GLUCOSE BLD-MCNC: 115 MG/DL (ref 65–99)
GLUCOSE BLD-MCNC: 117 MG/DL (ref 65–99)
GLUCOSE BLD-MCNC: 131 MG/DL (ref 65–99)
GLUCOSE SERPL-MCNC: 157 MG/DL (ref 65–99)
HBA1C MFR BLD: 6.5 % (ref 0–5.6)
HCT VFR BLD AUTO: 34.9 % (ref 42–52)
HGB BLD-MCNC: 11 G/DL (ref 14–18)
IMM GRANULOCYTES # BLD AUTO: 0.02 K/UL (ref 0–0.11)
IMM GRANULOCYTES NFR BLD AUTO: 0.2 % (ref 0–0.9)
INR PPP: 1.16 (ref 0.87–1.13)
LYMPHOCYTES # BLD AUTO: 2.4 K/UL (ref 1–4.8)
LYMPHOCYTES NFR BLD: 29.7 % (ref 22–41)
MAGNESIUM SERPL-MCNC: 1.8 MG/DL (ref 1.5–2.5)
MCH RBC QN AUTO: 28.4 PG (ref 27–33)
MCHC RBC AUTO-ENTMCNC: 31.5 G/DL (ref 33.7–35.3)
MCV RBC AUTO: 89.9 FL (ref 81.4–97.8)
MONOCYTES # BLD AUTO: 0.61 K/UL (ref 0–0.85)
MONOCYTES NFR BLD AUTO: 7.6 % (ref 0–13.4)
NEUTROPHILS # BLD AUTO: 4.63 K/UL (ref 1.82–7.42)
NEUTROPHILS NFR BLD: 57.4 % (ref 44–72)
NRBC # BLD AUTO: 0 K/UL
NRBC BLD-RTO: 0 /100 WBC
PLATELET # BLD AUTO: 351 K/UL (ref 164–446)
PMV BLD AUTO: 8.4 FL (ref 9–12.9)
POTASSIUM SERPL-SCNC: 4.1 MMOL/L (ref 3.6–5.5)
PROTHROMBIN TIME: 15.1 SEC (ref 12–14.6)
RBC # BLD AUTO: 3.88 M/UL (ref 4.7–6.1)
SODIUM SERPL-SCNC: 133 MMOL/L (ref 135–145)
WBC # BLD AUTO: 8.1 K/UL (ref 4.8–10.8)

## 2019-05-31 PROCEDURE — 302094 BELT OSTOMY (HOLLISTER): Performed by: HOSPITALIST

## 2019-05-31 PROCEDURE — 73660 X-RAY EXAM OF TOE(S): CPT | Mod: RT

## 2019-05-31 PROCEDURE — 700111 HCHG RX REV CODE 636 W/ 250 OVERRIDE (IP)

## 2019-05-31 PROCEDURE — 85025 COMPLETE CBC W/AUTO DIFF WBC: CPT

## 2019-05-31 PROCEDURE — 83735 ASSAY OF MAGNESIUM: CPT

## 2019-05-31 PROCEDURE — 700102 HCHG RX REV CODE 250 W/ 637 OVERRIDE(OP): Performed by: NURSE PRACTITIONER

## 2019-05-31 PROCEDURE — 36415 COLL VENOUS BLD VENIPUNCTURE: CPT

## 2019-05-31 PROCEDURE — 99233 SBSQ HOSP IP/OBS HIGH 50: CPT | Performed by: HOSPITALIST

## 2019-05-31 PROCEDURE — 700105 HCHG RX REV CODE 258: Performed by: HOSPITALIST

## 2019-05-31 PROCEDURE — 700111 HCHG RX REV CODE 636 W/ 250 OVERRIDE (IP): Performed by: RADIOLOGY

## 2019-05-31 PROCEDURE — 700102 HCHG RX REV CODE 250 W/ 637 OVERRIDE(OP): Performed by: HOSPITALIST

## 2019-05-31 PROCEDURE — A9270 NON-COVERED ITEM OR SERVICE: HCPCS | Performed by: NURSE PRACTITIONER

## 2019-05-31 PROCEDURE — 770006 HCHG ROOM/CARE - MED/SURG/GYN SEMI*

## 2019-05-31 PROCEDURE — 82962 GLUCOSE BLOOD TEST: CPT | Mod: 91

## 2019-05-31 PROCEDURE — 86140 C-REACTIVE PROTEIN: CPT

## 2019-05-31 PROCEDURE — 700117 HCHG RX CONTRAST REV CODE 255: Performed by: RADIOLOGY

## 2019-05-31 PROCEDURE — 85652 RBC SED RATE AUTOMATED: CPT

## 2019-05-31 PROCEDURE — BT101ZZ FLUOROSCOPY OF BLADDER USING LOW OSMOLAR CONTRAST: ICD-10-PCS | Performed by: RADIOLOGY

## 2019-05-31 PROCEDURE — 302098 PASTE RING (FLAT): Performed by: HOSPITALIST

## 2019-05-31 PROCEDURE — 51798 US URINE CAPACITY MEASURE: CPT

## 2019-05-31 PROCEDURE — A9270 NON-COVERED ITEM OR SERVICE: HCPCS | Performed by: HOSPITALIST

## 2019-05-31 PROCEDURE — 700111 HCHG RX REV CODE 636 W/ 250 OVERRIDE (IP): Performed by: HOSPITALIST

## 2019-05-31 PROCEDURE — 99152 MOD SED SAME PHYS/QHP 5/>YRS: CPT

## 2019-05-31 PROCEDURE — 85610 PROTHROMBIN TIME: CPT

## 2019-05-31 PROCEDURE — 0T2BX0Z CHANGE DRAINAGE DEVICE IN BLADDER, EXTERNAL APPROACH: ICD-10-PCS | Performed by: RADIOLOGY

## 2019-05-31 PROCEDURE — 700111 HCHG RX REV CODE 636 W/ 250 OVERRIDE (IP): Performed by: NURSE PRACTITIONER

## 2019-05-31 PROCEDURE — 83036 HEMOGLOBIN GLYCOSYLATED A1C: CPT

## 2019-05-31 PROCEDURE — 97161 PT EVAL LOW COMPLEX 20 MIN: CPT

## 2019-05-31 PROCEDURE — 80048 BASIC METABOLIC PNL TOTAL CA: CPT

## 2019-05-31 RX ORDER — MIDAZOLAM HYDROCHLORIDE 1 MG/ML
INJECTION INTRAMUSCULAR; INTRAVENOUS
Status: COMPLETED
Start: 2019-05-31 | End: 2019-05-31

## 2019-05-31 RX ORDER — MIDAZOLAM HYDROCHLORIDE 1 MG/ML
.5-2 INJECTION INTRAMUSCULAR; INTRAVENOUS PRN
Status: ACTIVE | OUTPATIENT
Start: 2019-05-31 | End: 2019-05-31

## 2019-05-31 RX ORDER — ONDANSETRON 2 MG/ML
4 INJECTION INTRAMUSCULAR; INTRAVENOUS PRN
Status: ACTIVE | OUTPATIENT
Start: 2019-05-31 | End: 2019-05-31

## 2019-05-31 RX ORDER — SODIUM CHLORIDE 9 MG/ML
500 INJECTION, SOLUTION INTRAVENOUS
Status: ACTIVE | OUTPATIENT
Start: 2019-05-31 | End: 2019-05-31

## 2019-05-31 RX ORDER — LINEZOLID 600 MG/1
600 TABLET, FILM COATED ORAL EVERY 12 HOURS
Status: DISCONTINUED | OUTPATIENT
Start: 2019-05-31 | End: 2019-06-04

## 2019-05-31 RX ADMIN — PIPERACILLIN AND TAZOBACTAM 3.38 G: 3; .375 INJECTION, POWDER, LYOPHILIZED, FOR SOLUTION INTRAVENOUS; PARENTERAL at 11:41

## 2019-05-31 RX ADMIN — MIDAZOLAM HYDROCHLORIDE 1 MG: 1 INJECTION, SOLUTION INTRAMUSCULAR; INTRAVENOUS at 15:51

## 2019-05-31 RX ADMIN — PIPERACILLIN SODIUM AND TAZOBACTAM SODIUM 3.38 G: 3; .375 INJECTION, POWDER, FOR SOLUTION INTRAVENOUS at 17:54

## 2019-05-31 RX ADMIN — MICONAZOLE NITRATE: 20 CREAM TOPICAL at 04:43

## 2019-05-31 RX ADMIN — NYSTATIN: 100000 POWDER TOPICAL at 17:56

## 2019-05-31 RX ADMIN — IOHEXOL 30 ML: 300 INJECTION, SOLUTION INTRAVENOUS at 16:00

## 2019-05-31 RX ADMIN — MORPHINE SULFATE 4 MG: 4 INJECTION INTRAVENOUS at 18:22

## 2019-05-31 RX ADMIN — OXYCODONE HYDROCHLORIDE 10 MG: 10 TABLET ORAL at 04:42

## 2019-05-31 RX ADMIN — BUDESONIDE AND FORMOTEROL FUMARATE DIHYDRATE 2 PUFF: 160; 4.5 AEROSOL RESPIRATORY (INHALATION) at 04:43

## 2019-05-31 RX ADMIN — LINEZOLID 600 MG: 600 TABLET, FILM COATED ORAL at 11:41

## 2019-05-31 RX ADMIN — HEPARIN SODIUM 5000 UNITS: 5000 INJECTION, SOLUTION INTRAVENOUS; SUBCUTANEOUS at 22:26

## 2019-05-31 RX ADMIN — OXYCODONE HYDROCHLORIDE 10 MG: 10 TABLET ORAL at 08:00

## 2019-05-31 RX ADMIN — DOXYCYCLINE 100 MG: 100 TABLET, FILM COATED ORAL at 04:42

## 2019-05-31 RX ADMIN — MIDAZOLAM 1 MG: 1 INJECTION INTRAMUSCULAR; INTRAVENOUS at 15:51

## 2019-05-31 RX ADMIN — MEROPENEM 500 MG: 500 INJECTION, POWDER, FOR SOLUTION INTRAVENOUS at 04:42

## 2019-05-31 RX ADMIN — FENTANYL CITRATE 50 MCG: 50 INJECTION, SOLUTION INTRAMUSCULAR; INTRAVENOUS at 15:56

## 2019-05-31 RX ADMIN — MIDAZOLAM 1 MG: 1 INJECTION INTRAMUSCULAR; INTRAVENOUS at 15:56

## 2019-05-31 RX ADMIN — MORPHINE SULFATE 2 MG: 4 INJECTION INTRAVENOUS at 22:25

## 2019-05-31 RX ADMIN — NYSTATIN: 100000 POWDER TOPICAL at 04:43

## 2019-05-31 RX ADMIN — FENTANYL CITRATE 50 MCG: 50 INJECTION, SOLUTION INTRAMUSCULAR; INTRAVENOUS at 15:51

## 2019-05-31 RX ADMIN — TAMSULOSIN HYDROCHLORIDE 0.4 MG: 0.4 CAPSULE ORAL at 08:00

## 2019-05-31 RX ADMIN — TOBRAMYCIN: 3 OINTMENT OPHTHALMIC at 11:50

## 2019-05-31 RX ADMIN — TOBRAMYCIN: 3 OINTMENT OPHTHALMIC at 17:55

## 2019-05-31 RX ADMIN — MULTIPLE VITAMINS W/ MINERALS TAB 1 TABLET: TAB at 04:42

## 2019-05-31 RX ADMIN — FENTANYL CITRATE 50 MCG: 50 INJECTION INTRAMUSCULAR; INTRAVENOUS at 15:51

## 2019-05-31 RX ADMIN — BUDESONIDE AND FORMOTEROL FUMARATE DIHYDRATE 2 PUFF: 160; 4.5 AEROSOL RESPIRATORY (INHALATION) at 17:56

## 2019-05-31 RX ADMIN — LINEZOLID 600 MG: 600 TABLET, FILM COATED ORAL at 17:56

## 2019-05-31 RX ADMIN — OXYCODONE HYDROCHLORIDE 10 MG: 10 TABLET ORAL at 19:41

## 2019-05-31 RX ADMIN — MICONAZOLE NITRATE: 20 CREAM TOPICAL at 17:56

## 2019-05-31 ASSESSMENT — ENCOUNTER SYMPTOMS
NAUSEA: 0
DIAPHORESIS: 0
ABDOMINAL PAIN: 1
HEADACHES: 0
CHILLS: 0
COUGH: 0
WHEEZING: 0
SHORTNESS OF BREATH: 0
DEPRESSION: 0
VOMITING: 0
NERVOUS/ANXIOUS: 0
DIZZINESS: 0
MEMORY LOSS: 1
WEAKNESS: 1
INSOMNIA: 0
FEVER: 0
PALPITATIONS: 0

## 2019-05-31 NOTE — PROGRESS NOTES
Hospital Medicine Daily Progress Note    Date of Service  5/31/2019    Chief Complaint  Pain at his suprapubic catheter site      Hospital Course   Mr. Hoyt is a 69-year-old male who was transferred from [facility] on 5/29/2019 for urinary tract infection and altered level of consciousness with concern for urosepsis.  He has a past medical history of BPH, COPD, diabetes, CKD, and Chron's disease resulting in colostomy placement.  He initially presented to the [facility] with complaints of pain at the site of his suprapubic catheter which was reportedly placed in May but has not had follow-up since.  According to the [facility] facility he has a history of multiple drug-resistant urinary tract infections that have been previously treated at the [facility].  Upon initial evaluation here he was noted to have cellulitis surrounding the suprapubic catheter site.  Given the above findings he was placed on IV meropenem and oral doxycycline.      Interval Problem Update  SPC to be changed out today  UCx from [place] growing pseudomonas and MRSA   His RLE appears more dusky rather than actually infected. I will d/c doxy and order an BERNICE  His eye drainage is improved      Consultants/Specialty  Urology-Dr. Louis  Wound care for ostomy management.    Code Status  Full code - patient's will reviewed and appears he would want to be DNR/DNI. I will discuss with palliative.     Disposition  TBD    Review of Systems  Review of Systems   Constitutional: Positive for malaise/fatigue. Negative for chills, diaphoresis and fever.   Respiratory: Negative for cough, shortness of breath and wheezing.    Cardiovascular: Negative for chest pain, palpitations and leg swelling.   Gastrointestinal: Positive for abdominal pain. Negative for nausea and vomiting.   Genitourinary:        SPC in place.   Musculoskeletal:        Increased pain in his RLE and at the SPC site.   Neurological: Positive for weakness  (generalized). Negative for dizziness and headaches.   Psychiatric/Behavioral: Positive for memory loss. Negative for depression. The patient is not nervous/anxious and does not have insomnia.    All other systems reviewed and are negative.     Physical Exam  Temp:  [36.2 °C (97.2 °F)-36.8 °C (98.3 °F)] 36.8 °C (98.3 °F)  Pulse:  [] 82  Resp:  [14-20] 14  BP: (130-149)/(60-75) 149/70  SpO2:  [94 %-98 %] 94 %    Physical Exam   Constitutional: He appears well-developed and well-nourished. He is active and cooperative. He has a sickly appearance. He appears ill. No distress.   Morbidly obese.   HENT:   Head: Normocephalic and atraumatic.   Eyes: Right eye exhibits no discharge. Left eye exhibits no discharge. Right conjunctiva is injected. No scleral icterus.   Eyelids red bilaterally. improved   Neck: Phonation normal. Edema (BLE) and erythema (RLE) present.   Cardiovascular: Normal rate, regular rhythm and normal heart sounds.    No murmur heard.  Pulmonary/Chest: Effort normal and breath sounds normal. No accessory muscle usage. No tachypnea. No respiratory distress. He has no decreased breath sounds. He has no wheezes. He has no rhonchi.   Abdominal: Soft. Bowel sounds are normal. There is no tenderness. There is no rigidity and no guarding.       Genitourinary:         Musculoskeletal: Normal range of motion. He exhibits no edema.   Dusky RLE, pulse not palpable    Neurological: He is alert. He is disoriented.   Oriented to self and place only.   Skin: Skin is warm and dry. No rash noted. He is not diaphoretic. There is erythema. No pallor.        Psychiatric: He has a normal mood and affect. His speech is normal. Cognition and memory are impaired.   Nursing note and vitals reviewed.    Fluids    Intake/Output Summary (Last 24 hours) at 05/31/19 8114  Last data filed at 05/31/19 9658   Gross per 24 hour   Intake              250 ml   Output             2050 ml   Net            -1800 ml      Laboratory  Recent Labs      05/30/19   0233  05/31/19   0021   WBC  8.6  8.1   RBC  3.90*  3.88*   HEMOGLOBIN  10.8*  11.0*   HEMATOCRIT  34.5*  34.9*   MCV  88.5  89.9   MCH  27.7  28.4   MCHC  31.3*  31.5*   RDW  49.4  50.6*   PLATELETCT  363  351   MPV  8.6*  8.4*     Recent Labs      05/30/19   0233  05/31/19   0021   SODIUM  135  133*   POTASSIUM  3.9  4.1   CHLORIDE  103  103   CO2  23  24   GLUCOSE  92  157*   BUN  18  15   CREATININE  1.93*  1.83*   CALCIUM  8.2*  8.3*     Imaging  DX-TOE(S) 2+ RIGHT   Final Result         Soft tissue swelling about the second digit. Rarefaction of the tip of the second distal phalanx, concerning for osteomyelitis.      US-EXTREMITY ARTERY LOWER UNILAT W/BERNICE (COMBO) RIGHT    (Results Pending)   IR-DRAIN-BLADDER SUPRAPUB W/CATH    (Results Pending)      Assessment/Plan  * Urinary tract infection associated with cystostomy catheter (HCC)- (present on admission)   Assessment & Plan    Complicated by presence of suprapubic catheter, with surrounding cellulitis & skin breakdown.  Catheter has not been changed in several months per patient report.  Has history of drug resistant UTI's.  UCx growing MRSA and pseudomonoas  On zosyn and zyvox     Cellulitis- (present on admission)   Assessment & Plan    I do NOT think this is cellulitis but rather peripheral vascular disease  I have discontinued doxycyline and ordered BERNICE     Acute encephalopathy- (present on admission)   Assessment & Plan    Likely secondary to urinary tract infection.  We will continue IV antibiotics and monitor closely.  Improved but has significant short term memory loss     Sepsis (HCC)- (present on admission)   Assessment & Plan    This is sepsis (without associated acute organ dysfunction).  Source: urinary tract infection.  UCx growing MRSA and pseudomonoas  Dc meropenem. Started on zosyn and zyvox  Suprapubic catheter scheduled to be switched out today in interventional radiology.  Sepsis protocol is in  place.     CRF (chronic renal failure), stage 3 (moderate) ([facility])- (present on admission)   Assessment & Plan    Just mildly improved from baseline.  Getting IV fluids, so will recheck tomorrow.  Avoid nephrotoxins.  Renally dose all appropriate medications.     BPH (benign prostatic hyperplasia)- (present on admission)   Assessment & Plan    S/p suprapubic catheter placement.     Crohn's disease ([facility])- (present on admission)   Assessment & Plan    S/p colostomy.  Wound care has been consulted for ostomy management.     Type 2 diabetes mellitus, with long-term current use of insulin ([facility])- (present on admission)   Assessment & Plan    On BID humulin 70/30 at home.   A1c 6.5  Added Accu-Cheks ACHS with low scale SSI as required.  Hypoglycemia protocol in place if needed.  Diet changed to diabetic.        VTE prophylaxis: Subcutaneous heparin

## 2019-05-31 NOTE — WOUND TEAM
"[facility]   Inpatient Services   Established Ostomy Management/ troubleshooting  HPI: Reviewed  PMH: Reviewed   SH: Reviewed   Reason for Ostomy nurse consult:  check appliance     Subjective:  reports he likes to use the belt.     Objective:    Ostomy type:   Ileostomy   Stoma location:   RUQ   Stoma assessment:               Appearance:   Red, budded, slightly oval                    Size:    ~ 1\"                Protrusion:   <1              Output:  scant, watery brown               MC jxn:  NA              Peristomal skin:  Severely denuded 3-5 cm circumferentially around stoma, dry flaking pink scar tissue outside of stoma.         Ostomy Appliance (type and size):  1 piece convex (chris #[id number] or [id number]) with paste ring and belt      Interventions and Education: checked appliance. Brought additional supplies to room with Hypafix tape. Ordered additional belt. Instructions provided for nurisng     Evaluation:  RN reports night RN changes twice, and she had just changed appliance. Intact, no leakage noted. Patient did not want appliance changed again and stated that he would do it when this one needs to be changed.     Plan:  Ostomy RN to recheck in 2 days.     Anticipated discharge needs:  Would recommend outpatient wound clinic for ostomy care to heal denuded skin, patient will likely refuse as he has in the past and wishes to perform his own care.   "

## 2019-05-31 NOTE — WOUND TEAM
[facility]  Inpatient Services  Initial Wound and Skin Care Evaluation    Admission Date:  5/29/2019   HPI, PMH, SH: Reviewed  Unit where seen by Wound Team: S531/[id number]    HPI: Pt transferred from [facility] [place] by [facility] 5/29/19 for UTI and ALOC.  Pt has a colostomy and suprapubic catheter.      WOUND CONSULT RELATED TO:  Sacrum, right leg, below panus, right distal second toe.      SUBJECTIVE:  Pt agreeable    Self Report / Pain Level:  Right leg is tender       OBJECTIVE:  Pt in bed, A of 1 to roll.      WOUND TYPE, LOCATION, CHARACTERISTICS (Pressure ulcers: location, stage, POA or date identified)         Wound 05/29/19 Other (comment) Leg (Active)   Site Assessment Excoriated;Red 6/1/2019  9:00 AM   Nisa-wound Assessment Excoriated;Edema 5/31/2019  5:00 PM   Margins Undefined edges 5/31/2019  5:00 PM   Tunneling 0 cm 5/31/2019  5:00 PM   Undermining 0 cm 5/31/2019  5:00 PM   Closure Secondary intention 5/31/2019  5:00 PM   Drainage Amount None 5/31/2019  5:00 PM   Non-staged Wound Description Partial thickness 5/31/2019  5:00 PM   Treatments Site care;Cleansed 5/31/2019  5:00 PM   Cleansing Approved Wound Cleanser 5/31/2019  5:00 PM   Periwound Protectant Skin Moisturizer 5/31/2019  5:00 PM   Dressing Options Open to Air 6/1/2019  9:00 AM   Dressing Change Frequency Every Shift 5/31/2019  5:00 PM   NEXT Dressing Change  06/01/19 5/31/2019  5:00 PM   NEXT Weekly Photo (Inpatient Only) 06/07/19 5/31/2019  5:00 PM   WOUND NURSE ONLY - Odor None 5/31/2019  5:00 PM   WOUND NURSE ONLY - Exposed Structures None 5/31/2019  5:00 PM   WOUND NURSE ONLY - Tissue Type and Percentage 100% red 5/31/2019  5:00 PM       Wound 05/31/19 Diabetic Ulcer Toe (Comment which one) right second toe distal (Active)   Wound Image   5/31/2019  5:00 PM   Site Assessment Brown 5/31/2019  5:00 PM   Nisa-wound Assessment Edema;Pink 5/31/2019  5:00 PM   Margins Undefined edges 5/31/2019  5:00 PM   Wound Length (cm) 2.2 cm  5/31/2019  5:00 PM   Wound Width (cm) 1 cm 5/31/2019  5:00 PM   Wound Surface Area (cm^2) 2.2 cm^2 5/31/2019  5:00 PM   Tunneling 0 cm 5/31/2019  5:00 PM   Undermining 0 cm 5/31/2019  5:00 PM   Closure Secondary intention 5/31/2019  5:00 PM   Drainage Amount Scant 5/31/2019  5:00 PM   Drainage Description Serous 5/31/2019  5:00 PM   Non-staged Wound Description Full thickness 5/31/2019  5:00 PM   Treatments Site care;Cleansed 5/31/2019  5:00 PM   Cleansing Approved Wound Cleanser 5/31/2019  5:00 PM   Dressing Options Open to Air 5/31/2019  5:00 PM   Dressing Cleansing/Solutions 3% Betadine 5/31/2019  5:00 PM   Dressing Change Frequency Daily 5/31/2019  5:00 PM   NEXT Dressing Change  06/01/19 5/31/2019  5:00 PM   NEXT Weekly Photo (Inpatient Only) 06/07/19 5/31/2019  5:00 PM   WOUND NURSE ONLY - Odor None 5/31/2019  5:00 PM   WOUND NURSE ONLY - Pulses Right;2+;DP;PT 5/31/2019  5:00 PM   WOUND NURSE ONLY - Tissue Type and Percentage 100% gray/brwon 5/31/2019  5:00 PM   WOUND NURSE ONLY - Time Spent with Patient (mins) 60 5/31/2019  5:00 PM      Sacrum with blanching pink erythema and peeling skin  Infra pannus blanching red petechiae     Vascular: 5/31/19 by hand held doppler right ankle    Dorsal Pedal pulses:  2+ multiphasic  Posterior tib pulses:  2+ multiphasic    BERNICE:     Test ordered    Lab Values:    WBC:       WBC   Date/Time Value Ref Range Status   05/31/2019 12:21 AM 8.1 4.8 - 10.8 K/uL Final     AIC:      Lab Results   Component Value Date/Time    HBA1C 6.5 (H) 05/31/2019 12:21 AM         Culture:   Completed nt    INTERVENTIONS BY WOUND TEAM:  Sacrum and iinfra pannus assessed, obtained barrier paste for nursing to apply.  Nystatin in use for infra pannus.  Right leg cleaned with damp cloth, moisture lotion applied and tubi F placed on leg.  Right second toe wound cleansed with saline, left HELENE, orders for betadine to toe written.      Dressing selection:  Barrier paste, nystatin, lotion, light  compression, betadine      Interdisciplinary consultation:  RN, patient, Abhilash Dielh LPS     EVALUATION:  Pt with MAD buttock, groin, pannus, anti fungal will improve rash.  Pt with inflamed right leg with excessively dry tissue.  Moisture and compression will decrease inflammation.  Pt with DFU, LPS consulted, dx, BERNICE ordered, labs performed.        Factors affecting wound healing:  DM, previous amputation, CVI, edema, COPD  Goals:  Steady decrease in wound area and depth weekly     NURSING PLAN OF CARE ORDERS (X):    Dressing changes: See Dressing Care orders:     Skin care: See Skin Care orders: X  Rectal tube care: See Rectal Tube Care orders:   Other orders:    RSKIN: CURRENT (X) ORDERED (O)  Q shift Malik:  X  Q shift pressure point assessments:  X  Pressure redistribution mattress        Waffle overlay X  FARNAZ      Bariatric FARNAZ      Bariatric foam        Heel float boots       Heels floated on pillows   X   Barrier wipes      Barrier Cream      Barrier paste    X  Sacral silicone dressing      Silicone O2 tubing      Anchorfast      Trach with Optifoam split foam       Waffle cushion      Rectal tube or BMS      Antifungal tx    Turn q 2 hours   X  Up to chair     Ambulate   PT/OT     Dietician      PO     TF   TPN   NPO   # days   Other       WOUND TEAM PLAN OF CARE (X):   NPWT change 3 x week:        Dressing changes by wound team:       Follow up as needed:     X  Other (explain):    Anticipated discharge plans (X):  SNF:        Pt will need ongoing wound care for right leg, toe and perineal area   Home Care:           Outpatient Wound Center:            Self Care:            Other:

## 2019-05-31 NOTE — OR SURGEON
Immediate Post- Operative Note        PostOp Diagnosis: Suprapubic catheter dysfunction.       Procedure(s): Suprapubic catheter exchange/upsize to 16 fr [place].       Estimated Blood Loss: Less than 5 ml        Complications: None            5/31/2019     4:07 PM     Calvin Beyer

## 2019-05-31 NOTE — PROGRESS NOTES
"Assumed care at 1900. Received report from RN. Patient is AOx4. Patient has ostomy and suprapubic loop cathether/drain in place. Plan to replace suprapubic in IR tomorrow. Patient will be NPO at midnight. Ostomy bag replaced. Assessment complete. Labs reviewed. Patient and RN discussed plan of care. Patient questions answered. Patient needs are met at this time. Bed in lowest and locked position. Call light is within reach. Hourly rounding in place. /60   Pulse 78   Temp 36.2 °C (97.2 °F) (Temporal)   Resp 17   Ht 1.803 m (5' 11\")   Wt (!) 127 kg (280 lb)   SpO2 94%   BMI 39.05 kg/m²       "

## 2019-05-31 NOTE — PROGRESS NOTES
Report received. Assumed care at 0700, assessment complete. Pt is A&O x 2. Pt disoriented to time and situation. 9/10 pain at this time in groin and bilateral legs. Pt's skin is extremely sensitive surrounding suprapubic and in entire groin area. Skin surrounding colostomy is irritated and pealing. Colostomy device has difficulty sticking to skin. Patient instructed on the plan of care for the day. Bed in lowest position. Bed alarm on. Call light within reach. Patient provided RN and CNA extension.

## 2019-05-31 NOTE — CARE PLAN
Problem: Skin Integrity  Goal: Risk for impaired skin integrity will decrease  Outcome: PROGRESSING AS EXPECTED  Waffle overlay, barrier cream, and antifungal cream applied, nystatin to pannus. Q2h turns implemented when pt in bed.    Problem: Mobility  Goal: Risk for activity intolerance will decrease  Outcome: PROGRESSING AS EXPECTED  Pt instructed on the importance of getting out of bed and up to chair for meals and ambulating. Pt refusing to walk due to fatigue.

## 2019-05-31 NOTE — PROGRESS NOTES
Patient brought to IR2.  Patient consented by Dr. Beyer.  All questions regarding the procedure answered.    Assisted patient to the procedure table.  Monitoring equipment and safety straps applied.  EtCO2 range 32-38 during procedure.    Procedure completed.  Dressing applied. Patient transported back to room on O2 at 2L/NC.  SpO2 96%.  Report to PREET Licea.

## 2019-05-31 NOTE — PROGRESS NOTES
· 2 RN skin check complete with Tram OVIEDO.   · Devices in place ostomy, suprapubic, piv.  · Skin assessed under devices yes.  · Confirmed pressure ulcers found on n/a.  · New potential pressure ulcers noted on n/a. Wound consult placed? n/a. Photo uploaded? n/a.   · The following interventions are in place waffle overlay, encouraged to turn self in bed, barrier cream, nystatin powder, changed colostomy bag, pillows for repositioning/comfort.    Blanchable redness and excoriation on perineum and coccyx. Redness and peeling noted around colostomy site. Skin around suprapubic is red with yellow drainage. Skin on RLE is red, macerated, excoriated. Redness on LLE. Moisture fissure on sacrum.

## 2019-05-31 NOTE — CARE PLAN
Problem: Skin Integrity  Goal: Risk for impaired skin integrity will decrease  Outcome: PROGRESSING AS EXPECTED  Joey cream and nystatin powder used.     Problem: Communication  Goal: The ability to communicate needs accurately and effectively will improve  Outcome: PROGRESSING SLOWER THAN EXPECTED    Intervention: Educate patient and significant other/support system about the plan of care, procedures, treatments, medications and allow for questions  POC explained.

## 2019-06-01 ENCOUNTER — APPOINTMENT (OUTPATIENT)
Dept: RADIOLOGY | Facility: MEDICAL CENTER | Age: 69
DRG: 673 | End: 2019-06-01
Attending: HOSPITALIST
Payer: MEDICARE

## 2019-06-01 LAB
GLUCOSE BLD-MCNC: 101 MG/DL (ref 65–99)
GLUCOSE BLD-MCNC: 104 MG/DL (ref 65–99)
GLUCOSE BLD-MCNC: 135 MG/DL (ref 65–99)

## 2019-06-01 PROCEDURE — A9270 NON-COVERED ITEM OR SERVICE: HCPCS | Performed by: NURSE PRACTITIONER

## 2019-06-01 PROCEDURE — 99232 SBSQ HOSP IP/OBS MODERATE 35: CPT | Performed by: HOSPITALIST

## 2019-06-01 PROCEDURE — 700102 HCHG RX REV CODE 250 W/ 637 OVERRIDE(OP): Performed by: HOSPITALIST

## 2019-06-01 PROCEDURE — 700111 HCHG RX REV CODE 636 W/ 250 OVERRIDE (IP): Performed by: HOSPITALIST

## 2019-06-01 PROCEDURE — 770021 HCHG ROOM/CARE - ISO PRIVATE

## 2019-06-01 PROCEDURE — 82962 GLUCOSE BLOOD TEST: CPT | Mod: 91

## 2019-06-01 PROCEDURE — 700102 HCHG RX REV CODE 250 W/ 637 OVERRIDE(OP): Performed by: NURSE PRACTITIONER

## 2019-06-01 PROCEDURE — A9270 NON-COVERED ITEM OR SERVICE: HCPCS | Performed by: HOSPITALIST

## 2019-06-01 PROCEDURE — 700111 HCHG RX REV CODE 636 W/ 250 OVERRIDE (IP): Performed by: NURSE PRACTITIONER

## 2019-06-01 PROCEDURE — 700105 HCHG RX REV CODE 258: Performed by: HOSPITALIST

## 2019-06-01 PROCEDURE — 700102 HCHG RX REV CODE 250 W/ 637 OVERRIDE(OP): Performed by: INTERNAL MEDICINE

## 2019-06-01 PROCEDURE — 93922 UPR/L XTREMITY ART 2 LEVELS: CPT | Mod: 26,52,GZ | Performed by: SURGERY

## 2019-06-01 PROCEDURE — A9270 NON-COVERED ITEM OR SERVICE: HCPCS | Performed by: INTERNAL MEDICINE

## 2019-06-01 PROCEDURE — 93922 UPR/L XTREMITY ART 2 LEVELS: CPT | Mod: RT

## 2019-06-01 RX ORDER — DIPHENHYDRAMINE HCL 25 MG
25 TABLET ORAL EVERY 6 HOURS PRN
Status: DISCONTINUED | OUTPATIENT
Start: 2019-06-01 | End: 2019-06-05

## 2019-06-01 RX ADMIN — PIPERACILLIN SODIUM AND TAZOBACTAM SODIUM 3.38 G: 3; .375 INJECTION, POWDER, FOR SOLUTION INTRAVENOUS at 16:55

## 2019-06-01 RX ADMIN — OXYCODONE HYDROCHLORIDE 10 MG: 10 TABLET ORAL at 08:09

## 2019-06-01 RX ADMIN — TOBRAMYCIN: 3 OINTMENT OPHTHALMIC at 06:00

## 2019-06-01 RX ADMIN — MORPHINE SULFATE 2 MG: 4 INJECTION INTRAVENOUS at 04:23

## 2019-06-01 RX ADMIN — BUDESONIDE AND FORMOTEROL FUMARATE DIHYDRATE 2 PUFF: 160; 4.5 AEROSOL RESPIRATORY (INHALATION) at 04:23

## 2019-06-01 RX ADMIN — PIPERACILLIN SODIUM AND TAZOBACTAM SODIUM 3.38 G: 3; .375 INJECTION, POWDER, FOR SOLUTION INTRAVENOUS at 02:19

## 2019-06-01 RX ADMIN — TOBRAMYCIN: 3 OINTMENT OPHTHALMIC at 17:07

## 2019-06-01 RX ADMIN — HEPARIN SODIUM 5000 UNITS: 5000 INJECTION, SOLUTION INTRAVENOUS; SUBCUTANEOUS at 13:31

## 2019-06-01 RX ADMIN — MICONAZOLE NITRATE: 20 CREAM TOPICAL at 18:00

## 2019-06-01 RX ADMIN — PIPERACILLIN SODIUM AND TAZOBACTAM SODIUM 3.38 G: 3; .375 INJECTION, POWDER, FOR SOLUTION INTRAVENOUS at 10:00

## 2019-06-01 RX ADMIN — HEPARIN SODIUM 5000 UNITS: 5000 INJECTION, SOLUTION INTRAVENOUS; SUBCUTANEOUS at 05:00

## 2019-06-01 RX ADMIN — TOBRAMYCIN: 3 OINTMENT OPHTHALMIC at 13:32

## 2019-06-01 RX ADMIN — DIPHENHYDRAMINE HCL 25 MG: 25 TABLET ORAL at 01:22

## 2019-06-01 RX ADMIN — OXYCODONE HYDROCHLORIDE 10 MG: 10 TABLET ORAL at 02:32

## 2019-06-01 RX ADMIN — LINEZOLID 600 MG: 600 TABLET, FILM COATED ORAL at 04:23

## 2019-06-01 RX ADMIN — OXYCODONE HYDROCHLORIDE 10 MG: 10 TABLET ORAL at 13:32

## 2019-06-01 RX ADMIN — INSULIN HUMAN 1 UNITS: 100 INJECTION, SOLUTION PARENTERAL at 22:04

## 2019-06-01 RX ADMIN — HEPARIN SODIUM 5000 UNITS: 5000 INJECTION, SOLUTION INTRAVENOUS; SUBCUTANEOUS at 21:54

## 2019-06-01 RX ADMIN — NYSTATIN: 100000 POWDER TOPICAL at 13:32

## 2019-06-01 RX ADMIN — MULTIPLE VITAMINS W/ MINERALS TAB 1 TABLET: TAB at 04:23

## 2019-06-01 RX ADMIN — MICONAZOLE NITRATE: 20 CREAM TOPICAL at 04:24

## 2019-06-01 RX ADMIN — BUDESONIDE AND FORMOTEROL FUMARATE DIHYDRATE 2 PUFF: 160; 4.5 AEROSOL RESPIRATORY (INHALATION) at 16:55

## 2019-06-01 RX ADMIN — LINEZOLID 600 MG: 600 TABLET, FILM COATED ORAL at 16:56

## 2019-06-01 RX ADMIN — NYSTATIN: 100000 POWDER TOPICAL at 06:00

## 2019-06-01 RX ADMIN — TAMSULOSIN HYDROCHLORIDE 0.4 MG: 0.4 CAPSULE ORAL at 08:04

## 2019-06-01 ASSESSMENT — ENCOUNTER SYMPTOMS
DEPRESSION: 0
SHORTNESS OF BREATH: 0
WHEEZING: 0
INSOMNIA: 0
ABDOMINAL PAIN: 1
NAUSEA: 0
COUGH: 0
HEADACHES: 0
MEMORY LOSS: 1
FEVER: 0
NERVOUS/ANXIOUS: 0
DIAPHORESIS: 0
DIZZINESS: 0
VOMITING: 0
PALPITATIONS: 0
CHILLS: 0
WEAKNESS: 1

## 2019-06-01 NOTE — PROGRESS NOTES
Hospital Medicine Daily Progress Note    Date of Service  6/1/2019    Chief Complaint  Pain at his suprapubic catheter site      Hospital Course   Mr. Hoyt is a 69-year-old male who was transferred from [facility] on 5/29/2019 for urinary tract infection and altered level of consciousness with concern for urosepsis.  He has a past medical history of BPH, COPD, diabetes, CKD, and Chron's disease resulting in colostomy placement.  He initially presented to the [facility] with complaints of pain at the site of his suprapubic catheter which was reportedly placed in May but has not had follow-up since.  According to the [facility] facility he has a history of multiple drug-resistant urinary tract infections that have been previously treated at the [facility].  Upon initial evaluation here he was noted to have cellulitis surrounding the suprapubic catheter site.  Given the above findings he was placed on IV meropenem and oral doxycycline. His urine culture from [facility] grew MRSA and pseudomonas so his antibiotics were changed to Zosyn and zyvox.      Interval Problem Update  Suprapubic catheter changed yesterday  He is feeling unchanged from yesterday  Still having abdominal pain   Pain at suprapubic catheter site is not improved today     Consultants/Specialty  Urology-Dr. Louis  Wound care for ostomy management.    Code Status  Full code    Disposition  TBD- PT/OT pending    Review of Systems  Review of Systems   Constitutional: Positive for malaise/fatigue. Negative for chills, diaphoresis and fever.   Respiratory: Negative for cough, shortness of breath and wheezing.    Cardiovascular: Negative for chest pain, palpitations and leg swelling.   Gastrointestinal: Positive for abdominal pain. Negative for nausea and vomiting.   Genitourinary:        SPC in place.   Musculoskeletal:        Increased pain in his RLE and at the SPC site.   Neurological: Positive for weakness (generalized).  Negative for dizziness and headaches.   Psychiatric/Behavioral: Positive for memory loss. Negative for depression. The patient is not nervous/anxious and does not have insomnia.    All other systems reviewed and are negative.     Physical Exam  Temp:  [36.2 °C (97.2 °F)-37.1 °C (98.7 °F)] 36.7 °C (98 °F)  Pulse:  [] 71  Resp:  [14-20] 18  BP: (114-150)/() 142/61  SpO2:  [94 %-98 %] 96 %    Physical Exam   Constitutional: He appears well-developed and well-nourished. He is active and cooperative. He has a sickly appearance. He appears ill. No distress.   Morbidly obese.   HENT:   Head: Normocephalic and atraumatic.   Eyes: Right eye exhibits no discharge. Left eye exhibits no discharge. Right conjunctiva is injected. No scleral icterus.   Eyelids red bilaterally. improved   Neck: Phonation normal. Edema (BLE) and erythema (RLE) present.   Cardiovascular: Normal rate, regular rhythm and normal heart sounds.    No murmur heard.  Pulmonary/Chest: Effort normal and breath sounds normal. No accessory muscle usage. No tachypnea. No respiratory distress. He has no decreased breath sounds. He has no wheezes. He has no rhonchi.   Abdominal: Soft. Bowel sounds are normal. There is no tenderness. There is no rigidity and no guarding.       Genitourinary:         Musculoskeletal: Normal range of motion. He exhibits no edema.   Dusky RLE, pulse not palpable    Neurological: He is alert. He is disoriented.   Oriented to self and place only.   Skin: Skin is warm and dry. No rash noted. He is not diaphoretic. There is erythema. No pallor.        Psychiatric: He has a normal mood and affect. His speech is normal. Cognition and memory are impaired.   Nursing note and vitals reviewed.    Fluids    Intake/Output Summary (Last 24 hours) at 06/01/19 1206  Last data filed at 06/01/19 1000   Gross per 24 hour   Intake              640 ml   Output             2700 ml   Net            -2060 ml     Laboratory  Recent Labs       05/30/19   0233  05/31/19   0021   WBC  8.6  8.1   RBC  3.90*  3.88*   HEMOGLOBIN  10.8*  11.0*   HEMATOCRIT  34.5*  34.9*   MCV  88.5  89.9   MCH  27.7  28.4   MCHC  31.3*  31.5*   RDW  49.4  50.6*   PLATELETCT  363  351   MPV  8.6*  8.4*     Recent Labs      05/30/19 0233  05/31/19   0021   SODIUM  135  133*   POTASSIUM  3.9  4.1   CHLORIDE  103  103   CO2  23  24   GLUCOSE  92  157*   BUN  18  15   CREATININE  1.93*  1.83*   CALCIUM  8.2*  8.3*     Imaging  US-BERNICE SINGLE LEVEL UNILAT RIGHT         DX-TOE(S) 2+ RIGHT   Final Result         Soft tissue swelling about the second digit. Rarefaction of the tip of the second distal phalanx, concerning for osteomyelitis.      IR-DRAIN-BLADDER SUPRAPUB W/CATH    (Results Pending)      Assessment/Plan  * Urinary tract infection associated with cystostomy catheter (HCC)- (present on admission)   Assessment & Plan    Complicated by presence of suprapubic catheter, with surrounding cellulitis & skin breakdown.  Catheter has not been changed in several months per patient report.  Has history of drug resistant UTI's.  UCx growing MRSA and pseudomonoas  On zosyn and zyvox     Cellulitis- (present on admission)   Assessment & Plan    I do NOT think this is cellulitis but rather peripheral vascular disease  I have discontinued doxycyline and ordered BERNICE     Acute encephalopathy- (present on admission)   Assessment & Plan    Likely secondary to urinary tract infection.  We will continue IV antibiotics and monitor closely.  Improved but has significant short term memory loss     Sepsis ([facility])- (present on admission)   Assessment & Plan    This is sepsis (without associated acute organ dysfunction).  Source: urinary tract infection.  UCx growing MRSA and pseudomonoas  Dc meropenem. Started on zosyn and zyvox  Suprapubic catheter scheduled to be switched out today in interventional radiology.  Sepsis protocol is in place.     CRF (chronic renal failure), stage 3 (moderate) ([facility])-  (present on admission)   Assessment & Plan    Just mildly improved from baseline.  Getting IV fluids, so will recheck tomorrow.  Avoid nephrotoxins.  Renally dose all appropriate medications.     BPH (benign prostatic hyperplasia)- (present on admission)   Assessment & Plan    S/p suprapubic catheter placement.     Crohn's disease (HCC)- (present on admission)   Assessment & Plan    S/p colostomy.  Wound care has been consulted for ostomy management.     Type 2 diabetes mellitus, with long-term current use of insulin ([facility])- (present on admission)   Assessment & Plan    On BID humulin 70/30 at home.   A1c 6.5  Added Accu-Cheks ACHS with low scale SSI as required.  Hypoglycemia protocol in place if needed.  Diet changed to diabetic.        VTE prophylaxis: Subcutaneous heparin

## 2019-06-01 NOTE — PROGRESS NOTES
LIMB PRESERVATION SERVICE INITIAL CONSULT  - RN NOTE    REASON FOR LPS CONSULTATION: Right 2nd toe necrotic tip      Past medical history, medicines, allergies, family history, social history,    reviewed    History of Present Illness:   Patient is well known to [facility] and outpatient wound care services.    Shola Hoyt is a 69 y.o. male, with a past medical history that includes uncontrolled type 2 diabetes,  colostomy, OA, BPH, renal insufficiency, COPD, admitted 5/29/2019 for UTI (urinary tract infection)  Sepsis ([facility]).   [facility] has been consulted for his right 2nd toe with necrotic tip by wound care.  This wound is chronic per the pt since 2017. [facility]. Pt states he cleans his foot with soap and water daily, uses vaseline for dry skin and gauze dressing and tape for wound. Pt wears diabetic shoes but with no orthotic inserts.  Pt has been diabetic for 40 years. He does not check blood sugars. He takes oral diabetic medication. He had a right great toe amputation for osteomyelitis in 10/2017 by Dr Alejo.     Tobacco Abuse Hx:   reports that he has quit smoking. He has quit using smokeless tobacco.    Alcohol Abuse Hx:   has no alcohol history on file.    Drug Abuse Hx:   has no drug history on file.    PAST MEDICAL HISTORY:   Past Medical History:   Diagnosis Date   • Arthritis    • Benign prostate hyperplasia    • Colostomy in place ([facility])    • COPD (chronic obstructive pulmonary disease) ([facility])    • Diabetes ([facility])    • Renal disorder        MEDICATIONS:   Current Facility-Administered Medications   Medication Dose   • diphenhydrAMINE (BENADRYL) tablet/capsule 25 mg  25 mg   • piperacillin-tazobactam (ZOSYN) 3.375 g in  mL IVPB  3.375 g   • linezolid (ZYVOX) tablet 600 mg  600 mg   • tobramycin (TOBREX) 0.3 % ophthalmic ointment     • Pharmacy Consult Request ...Pain Management Review 1 Each  1 Each    And   • oxyCODONE immediate release (ROXICODONE) tablet 10 mg   10 mg    And   • oxyCODONE immediate-release (ROXICODONE) tablet 5 mg  5 mg    And   • morphine (pf) 4 mg/ml injection 2-4 mg  2-4 mg   • insulin regular (HUMULIN R) injection 1-6 Units  1-6 Units    And   • glucose 4 g chewable tablet 16 g  16 g    And   • DEXTROSE 10% BOLUS 250 mL  250 mL   • budesonide-formoterol (SYMBICORT) 160-4.5 MCG/ACT inhaler 2 Puff  2 Puff   • tamsulosin (FLOMAX) capsule 0.4 mg  0.4 mg   • therapeutic multivitamin-minerals (THERAGRAN-M) tablet 1 Tab  1 Tab   • NS infusion     • acetaminophen (TYLENOL) tablet 650 mg  650 mg   • ondansetron (ZOFRAN) syringe/vial injection 4 mg  4 mg   • ondansetron (ZOFRAN ODT) dispertab 4 mg  4 mg   • senna-docusate (PERICOLACE or SENOKOT S) 8.6-50 MG per tablet 2 Tab  2 Tab    And   • polyethylene glycol/lytes (MIRALAX) PACKET 1 Packet  1 Packet    And   • magnesium hydroxide (MILK OF MAGNESIA) suspension 30 mL  30 mL    And   • bisacodyl (DULCOLAX) suppository 10 mg  10 mg   • NS (BOLUS) infusion 500 mL  500 mL   • heparin injection 5,000 Units  5,000 Units   • miconazole 2%-zinc oxide (DOROTHY) topical cream     • nystatin (MYCOSTATIN) powder         ALLERGIES:    Allergies   Allergen Reactions   • Ceftriaxone      Has tolerated Merrem, Zosyn, and Unasyn   • Ezetimibe    • Flagyl [Metronidazole]    • Infliximab    • Levaquin    • Lovastatin    • Simvastatin    • Vancomycin         PAST SURGICAL HISTORY:   Past Surgical History:   Procedure Laterality Date   • POP BY LAPAROSCOPY N/A 3/31/2019    Procedure: CHOLECYSTECTOMY, LAPAROSCOPIC-conversion to open cholecystectomy;  Surgeon: Gunnar Alonzo M.D.;  Location: [facility];  Service: General   • PB ERCP,DIAGNOSTIC  3/29/2019    Procedure: ERCP, DIAGNOSTIC;  Surgeon: Tao Saab M.D.;  Location: [facility];  Service: Gastroenterology   • TOE AMPUTATION Right 10/25/2017    Procedure: TOE AMPUTATION-GREAT TOE;  Surgeon: Bobby Alejo M.D.;  Location: [facility]  [facility];  Service: Orthopedics       SOCIAL HISTORY:   Social History     Social History   • Marital status:      Spouse name: N/A   • Number of children: N/A   • Years of education: N/A     Social History Main Topics   • Smoking status: Former Smoker   • Smokeless tobacco: Former User   • Alcohol use Not on file   • Drug use: Unknown   • Sexual activity: Not on file     Other Topics Concern   • Not on file     Social History Narrative   • No narrative on file        FAMILY HISTORY:   Family History   Problem Relation Age of Onset   • No Known Problems Mother    • No Known Problems Father        DIAGNOSTIC DATA:       Xray:       Soft tissue swelling about the second digit. Rarefaction of the tip of the second distal phalanx, concerning for osteomyelitis.      MRI: NA             CT:NA             Vascular:      Arterial studies:    R:  1.08 L:     RLE: Triphasic/triphasic     Labs                                      ESR: 72                      CRP: 7.86           A1c:   Lab Results   Component Value Date/Time    HBA1C 6.5 (H) 05/31/2019 12:21 AM                 Lab Results   Component Value Date/Time    GLUCOSE 157 (H) 05/31/2019 12:21 AM          Microbiology:   WBC   Date/Time Value Ref Range Status   05/31/2019 12:21 AM 8.1 4.8 - 10.8 K/uL Final      Procedure Component Value Units Date/Time    URINE CULTURE(NEW) [[id number]]  (Abnormal)  (Susceptibility) Collected:  05/30/19 0956    Order Status:  Completed Specimen:  Urine from Urine, Suprapubic Updated:  06/01/19 0913     Significant Indicator POS (POS)     Source UR     Site URINE, SUPRAPUBIC     Culture Result - (A)      Pseudomonas aeruginosa  ,000 cfu/mL  P.aeruginosa may develop resistance during prolonged therapy  with all antibiotics. Isolates that are initially susceptible  may become resistant within three to four days after  initiation of therapy. Testing of repeat isolates may be  warranted.   (A)      Group D Enterococcus  "species  10-50,000 cfu/mL   (A)            PHYSICAL EXAMINATION:     Vitals  /61   Pulse 71   Temp 36.7 °C (98 °F) (Temporal)   Resp 18   Ht 1.803 m (5' 11\")   Wt 115.4 kg (254 lb 6.6 oz)   SpO2 96%   BMI 35.48 kg/m²      General Appearance:  Well developed, well nourished, in no acute distress    Vascular Assessment:    Edema    Pulses: RLE- DP pulse +1 palpable; PT pulse unpalpable               LLE- DP pulse +1 palpable; PT pulse unpalpable    Sensory Assessment  Monofilament testing  0/10  Feet Insensate to light touch    Wound Assessment:    Wound 05/31/19 Diabetic Ulcer Toe (Comment which one) right second toe distal (Active)   Wound Image   5/31/2019  5:00 PM   Site Assessment Red 6/1/2019  3:00 PM   Nisa-wound Assessment Blanchable erythema;Maceration;Edema 6/1/2019  3:00 PM   Margins Unattached edges 6/1/2019  3:00 PM   Wound Length (cm) 2.2 cm 6/1/2019  3:00 PM   Wound Width (cm) 1 cm 6/1/2019  3:00 PM   Wound Surface Area (cm^2) 2.2 cm^2 6/1/2019  3:00 PM   Post Wound Length (cm) 0.5 cm 6/1/2019  3:00 PM    Post Wound Width (cm) 0.5 cm 6/1/2019  3:00 PM   Post Wound Depth (cm) 0.5 cm 6/1/2019  3:00 PM   Post Wound Surface Area (cm^2) 0.25 cm^2 6/1/2019  3:00 PM   Tunneling 0 cm 5/31/2019  5:00 PM   Undermining 0 cm 5/31/2019  5:00 PM   Closure Open to air 6/1/2019  3:00 PM   Drainage Amount Scant 6/1/2019  3:00 PM   Drainage Description Serosanguineous 6/1/2019  3:00 PM   Non-staged Wound Description Full thickness 6/1/2019  3:00 PM   Treatments Cleansed;Site care 6/1/2019  3:00 PM   Cleansing Normal Saline Irrigation 6/1/2019  3:00 PM   Periwound Protectant Skin Protectant wipes to Periwound 6/1/2019  3:00 PM   Dressing Options Hydrofiber Silver;Hypafix Tape;Nonadhesive Foam 6/1/2019  3:00 PM   Dressing Cleansing/Solutions Not Applicable 6/1/2019  3:00 PM   Dressing Changed New 6/1/2019  3:00 PM   Dressing Status Clean;Dry;Intact 6/1/2019  3:00 PM   Dressing Change Frequency Every 48 hrs " 6/1/2019  3:00 PM   NEXT Dressing Change  06/03/19 6/1/2019  3:00 PM   NEXT Weekly Photo (Inpatient Only) 06/07/19 6/1/2019  3:00 PM   WOUND NURSE ONLY - Odor None 6/1/2019  3:00 PM   WOUND NURSE ONLY - Pulses Right;DP;1+ 6/1/2019  3:00 PM   WOUND NURSE ONLY - Tissue Type and Percentage 100% REd 6/1/2019  3:00 PM   WOUND NURSE ONLY - Time Spent with Patient (mins) 45 6/1/2019  3:00 PM        Conservative Sharp Wound Debridement (CSWD) with curette, scissors, and forceps:   Normal Saline and guaze were used to cleanse the wound and periwound of biofilm.  A 2cm by 2cm area was debrided from the julia-wound and wound area consisting of callous, bio film, and misc devitalized tissue. The patient tolerated the procedure well with little to no discomfort or pain with only scant bleeding from the wound bed.  The area was once again cleansed with normal saline until some healthy, bleeding tissue was visualized, revealing 100% red/pink tissue in the wound bed.       Procedures:       Debridement : CSWD     Cleansed with:           NS                                                               Periwound protected with: skin prep   Primary dressing: aqag   Secondary Dressing: foam   Other: tape      Patient Education:    Implications of loss of protective sensation (LOPS) discussed with patient- including increased risk for amputation.  Advised to check foot at least daily, moisturize foot, and to always wear protective foot wear.      Plan:        1. Wound Care:   Right 2nd Toe  Limb Preservation status guarded - Prior right great toe removal - with neuropathy and only right 3-5 toe right TMA likely and patient agrees to procedure  Right Tendon lengthening would be beneficial - GSR likely  PT NPO Sunday at midnight for possible right TMA/right 2nd toe amp with tendon lengthing Monday with Dr Grant/Dr Gutierrez   Dressing Orders Updated.                Nursing to change every 48 HOURS and PRN for Saturation or  Dislodgement    2. Offloading:  Offloading boot  at all times for 6 weeks if GSR is done  Ortho Techs involved:pt to get orthotics/prosthetics  as [facility] involved, pt to wear shoes that do not rub on Wound sites      3. Diabetes CDE declined         4. Infection Management:    Infection: Bone probed   Microbiology wait for bone cultures/patho   Antibiotics: Per ID recommendation    7. Referrals:   Vascular NA   Ortho Dr Grant to assess pt - PT NPO Sunday at midnight for possible right TMA/right 2nd toe amp with tendon lengthing Monday   Infectious diseases Dr Aldana will contact for possible need for long term antibiotics   Diabetes Education NA   Ortho tech contacted for offloading boot to RLE   Other NA    Professional collaboration: Bedside RN, Dr Grant/Dr Zhao, Dr Aldana, ID    LPS will follow    Abhilash Diehl R.N.

## 2019-06-01 NOTE — PROGRESS NOTES
Cleaned leg with NS dried, pick top , lotion, t sock back on.  Cleaned toe with NS and . Ordered betadine. Up and down from chair. Ate BK in chair. Non-invasive of r leg done. AAOX4,  Sometimes cranky. Ate well. Sp to dd. Clear ylw urine. Colostomy bag brown liquid.     Mycostatin cream to groin.  Red and dry.

## 2019-06-01 NOTE — PROGRESS NOTES
· 2 RN skin check complete with Lucy OVIEDO.   · Devices in place ostomy, suprapubic, piv.  · Skin assessed under devices yes.  · Confirmed pressure ulcers found on n/a.  · New potential pressure ulcers noted on n/a. Wound consult placed? n/a. Photo uploaded? n/a.   · The following interventions are in place waffle overlay, encouraged to turn self in bed, barrier cream, nystatin powder, changed colostomy bag, pillows for repositioning/comfort.    Blanchable redness and excoriation on perineum and coccyx. Redness and peeling noted around colostomy site. Skin around suprapubic is red with dressing in place. Skin on RLE is red, macerated, excoriated with tubigrip. Redness on LLE. Moisture fissure on sacrum.

## 2019-06-01 NOTE — PROGRESS NOTES
"Received alert and oriented x 3. Check vitals sign and recorded accordingly and due med given per MAR. Monitor sign and symptoms of respiratory distress and treatment given accordingly per MAR.Medicated per MAR and reassessed every 2 hours per protocol. Post op site suprapubic catheter with dressing in place dry and with old blood. no sign of active bleeding. New iv access placed and continued iv abx and ivf ordered. Colostomy bag draining well dry and intact. Call light within reach. Bed alarm in placed. Needs attended. Will continue to monitor./61   Pulse 83   Temp 36.7 °C (98 °F) (Temporal)   Resp 17   Ht 1.803 m (5' 11\")   Wt (!) 127 kg (280 lb)   SpO2 97%   BMI 39.05 kg/m² .  "

## 2019-06-02 PROBLEM — M86.9 OSTEOMYELITIS OF RIGHT FOOT (HCC): Status: ACTIVE | Noted: 2019-06-02

## 2019-06-02 LAB
BACTERIA UR CULT: ABNORMAL
GLUCOSE BLD-MCNC: 115 MG/DL (ref 65–99)
GLUCOSE BLD-MCNC: 116 MG/DL (ref 65–99)
GLUCOSE BLD-MCNC: 121 MG/DL (ref 65–99)
GLUCOSE BLD-MCNC: 162 MG/DL (ref 65–99)
GLUCOSE BLD-MCNC: 184 MG/DL (ref 65–99)
SIGNIFICANT IND 70042: ABNORMAL
SITE SITE: ABNORMAL
SOURCE SOURCE: ABNORMAL

## 2019-06-02 PROCEDURE — 99233 SBSQ HOSP IP/OBS HIGH 50: CPT | Performed by: HOSPITALIST

## 2019-06-02 PROCEDURE — 700105 HCHG RX REV CODE 258: Performed by: HOSPITALIST

## 2019-06-02 PROCEDURE — 700102 HCHG RX REV CODE 250 W/ 637 OVERRIDE(OP): Performed by: HOSPITALIST

## 2019-06-02 PROCEDURE — A9270 NON-COVERED ITEM OR SERVICE: HCPCS | Performed by: HOSPITALIST

## 2019-06-02 PROCEDURE — A9270 NON-COVERED ITEM OR SERVICE: HCPCS | Performed by: NURSE PRACTITIONER

## 2019-06-02 PROCEDURE — 82962 GLUCOSE BLOOD TEST: CPT | Mod: 91

## 2019-06-02 PROCEDURE — 700102 HCHG RX REV CODE 250 W/ 637 OVERRIDE(OP): Performed by: NURSE PRACTITIONER

## 2019-06-02 PROCEDURE — 770021 HCHG ROOM/CARE - ISO PRIVATE

## 2019-06-02 PROCEDURE — A9270 NON-COVERED ITEM OR SERVICE: HCPCS | Performed by: INTERNAL MEDICINE

## 2019-06-02 PROCEDURE — 99223 1ST HOSP IP/OBS HIGH 75: CPT | Performed by: INTERNAL MEDICINE

## 2019-06-02 PROCEDURE — 700111 HCHG RX REV CODE 636 W/ 250 OVERRIDE (IP): Performed by: HOSPITALIST

## 2019-06-02 PROCEDURE — 700102 HCHG RX REV CODE 250 W/ 637 OVERRIDE(OP): Performed by: INTERNAL MEDICINE

## 2019-06-02 RX ADMIN — MICONAZOLE NITRATE: 20 CREAM TOPICAL at 17:01

## 2019-06-02 RX ADMIN — NYSTATIN: 100000 POWDER TOPICAL at 05:01

## 2019-06-02 RX ADMIN — TAMSULOSIN HYDROCHLORIDE 0.4 MG: 0.4 CAPSULE ORAL at 08:05

## 2019-06-02 RX ADMIN — PIPERACILLIN SODIUM AND TAZOBACTAM SODIUM 3.38 G: 3; .375 INJECTION, POWDER, FOR SOLUTION INTRAVENOUS at 10:57

## 2019-06-02 RX ADMIN — PIPERACILLIN SODIUM AND TAZOBACTAM SODIUM 3.38 G: 3; .375 INJECTION, POWDER, FOR SOLUTION INTRAVENOUS at 02:01

## 2019-06-02 RX ADMIN — OXYCODONE HYDROCHLORIDE 10 MG: 10 TABLET ORAL at 08:05

## 2019-06-02 RX ADMIN — OXYCODONE HYDROCHLORIDE 10 MG: 10 TABLET ORAL at 17:00

## 2019-06-02 RX ADMIN — TOBRAMYCIN: 3 OINTMENT OPHTHALMIC at 17:18

## 2019-06-02 RX ADMIN — MICONAZOLE NITRATE: 20 CREAM TOPICAL at 05:01

## 2019-06-02 RX ADMIN — BUDESONIDE AND FORMOTEROL FUMARATE DIHYDRATE 2 PUFF: 160; 4.5 AEROSOL RESPIRATORY (INHALATION) at 17:18

## 2019-06-02 RX ADMIN — INSULIN HUMAN 1 UNITS: 100 INJECTION, SOLUTION PARENTERAL at 21:31

## 2019-06-02 RX ADMIN — LINEZOLID 600 MG: 600 TABLET, FILM COATED ORAL at 05:01

## 2019-06-02 RX ADMIN — SODIUM CHLORIDE: 9 INJECTION, SOLUTION INTRAVENOUS at 06:08

## 2019-06-02 RX ADMIN — TOBRAMYCIN: 3 OINTMENT OPHTHALMIC at 05:01

## 2019-06-02 RX ADMIN — TOBRAMYCIN: 3 OINTMENT OPHTHALMIC at 11:22

## 2019-06-02 RX ADMIN — PIPERACILLIN SODIUM AND TAZOBACTAM SODIUM 3.38 G: 3; .375 INJECTION, POWDER, FOR SOLUTION INTRAVENOUS at 17:18

## 2019-06-02 RX ADMIN — OXYCODONE HYDROCHLORIDE 10 MG: 10 TABLET ORAL at 13:37

## 2019-06-02 RX ADMIN — MULTIPLE VITAMINS W/ MINERALS TAB 1 TABLET: TAB at 05:01

## 2019-06-02 RX ADMIN — DIPHENHYDRAMINE HCL 25 MG: 25 TABLET ORAL at 17:18

## 2019-06-02 RX ADMIN — NYSTATIN: 100000 POWDER TOPICAL at 17:00

## 2019-06-02 RX ADMIN — BUDESONIDE AND FORMOTEROL FUMARATE DIHYDRATE 2 PUFF: 160; 4.5 AEROSOL RESPIRATORY (INHALATION) at 05:01

## 2019-06-02 RX ADMIN — HEPARIN SODIUM 5000 UNITS: 5000 INJECTION, SOLUTION INTRAVENOUS; SUBCUTANEOUS at 13:37

## 2019-06-02 RX ADMIN — HEPARIN SODIUM 5000 UNITS: 5000 INJECTION, SOLUTION INTRAVENOUS; SUBCUTANEOUS at 05:01

## 2019-06-02 RX ADMIN — LINEZOLID 600 MG: 600 TABLET, FILM COATED ORAL at 17:00

## 2019-06-02 ASSESSMENT — ENCOUNTER SYMPTOMS
DIZZINESS: 0
DEPRESSION: 0
PALPITATIONS: 0
INSOMNIA: 0
DIAPHORESIS: 0
NERVOUS/ANXIOUS: 0
ABDOMINAL PAIN: 1
MEMORY LOSS: 1
WHEEZING: 0
SHORTNESS OF BREATH: 0
NAUSEA: 0
COUGH: 0
WEAKNESS: 1
VOMITING: 0
CHILLS: 0
HEADACHES: 0
FEVER: 0

## 2019-06-02 NOTE — PROGRESS NOTES
· 2 RN skin check complete. PREET Osborn  · Devices in place: Suprapubic catheter, RLQ ostomy, Right upper arm PIV.  · Skin assessed under devices: Yes, suprapubic catheter site: red, tender, and excoriated--cleansed with soap and water--applied 4x4 fenestrated gauze.  RLQ ostomy site: stoma and pouch intact--noted to have redness and excoriation around pouch. Right PIV dressing: clean, dry, and intact.   · Skin findings: Right lower extremity: redness, rash, and multiple scabs noted--tubigrip in place. Right second toe dressing in place. Left lower leg redness and rash noted. Sacrum and groin area are excoriated, flaky, and red.   · Confirmed pressure ulcers found on: None.  · New potential pressure ulcers noted on: None Wound consult placed? N/A Photo uploaded? None.   · The following interventions are in place: Patient sits up in chair for most of the day, turn q2h while in bed, mckinley cream and antifungal cream as ordered, clean suprapubic cath site as needed, waffle cushion in place. Ostomy and wound care per order

## 2019-06-02 NOTE — CARE PLAN
Problem: Infection  Goal: Will remain free from infection    Intervention: Implement standard precautions and perform hand washing before and after patient contact  Isolation precautions in place for MRSA. Yellow isolation gowns utilized.       Problem: Medication  Goal: Compliance with prescribed medication will improve  Pt is compliant with medications.

## 2019-06-02 NOTE — PROGRESS NOTES
Hospital Medicine Daily Progress Note    Date of Service  6/2/2019    Chief Complaint  Pain at his suprapubic catheter site      Hospital Course   Mr. Hoyt is a 69-year-old male who was transferred from [facility], [place] on 5/29/2019 for urinary tract infection and altered level of consciousness with concern for urosepsis.  He has a past medical history of BPH, COPD, diabetes, CKD, and Chron's disease resulting in colostomy placement.  He initially presented to the [facility] with complaints of pain at the site of his suprapubic catheter which was reportedly placed in May but has not had follow-up since.  According to the [facility] facility he has a history of multiple drug-resistant urinary tract infections that have been previously treated at the [facility].  Upon initial evaluation here he was noted to have cellulitis surrounding the suprapubic catheter site.  Given the above findings he was placed on IV meropenem and oral doxycycline. His urine culture from [facility] grew MRSA and pseudomonas so his antibiotics were changed to Zosyn and zyvox.  He was evaluated by LPS and found to have clinical osteomyelitis of his right second toe.  Infectious disease and Ortho work consulted with plan for intervention on 6/3.      Interval Problem Update  His foot feels better after debridement yesterday  His abdomen is still sore  Denies any fevers/chills  Seen by LPS yesterday and found to have clinical osteomyelitis of his right second toe  Patient n.p.o. at midnight for possible orthopedic intervention tomorrow  I have discussed case with Dr. Marx who will be seeing patient   I called patient's POA and updated her on plan for surgery tomorrow and current care which has been provided. She consents to the surgery and requested she be called and informed of any follow up appointments.     Consultants/Specialty  Urology-Dr. Louis  Orthopedics  ID    Code Status  Full code    Disposition  TBD-  PT/OT pending    Review of Systems  Review of Systems   Constitutional: Positive for malaise/fatigue. Negative for chills, diaphoresis and fever.   Respiratory: Negative for cough, shortness of breath and wheezing.    Cardiovascular: Negative for chest pain, palpitations and leg swelling.   Gastrointestinal: Positive for abdominal pain. Negative for nausea and vomiting.   Genitourinary:        SPC in place.   Neurological: Positive for weakness (generalized). Negative for dizziness and headaches.   Psychiatric/Behavioral: Positive for memory loss. Negative for depression. The patient is not nervous/anxious and does not have insomnia.    All other systems reviewed and are negative.     Physical Exam  Temp:  [36.2 °C (97.2 °F)-36.6 °C (97.9 °F)] 36.2 °C (97.2 °F)  Pulse:  [69-81] 80  Resp:  [16-20] 16  BP: (130-158)/(53-71) 158/71  SpO2:  [94 %-99 %] 99 %    Physical Exam   Constitutional: He appears well-developed and well-nourished. He is active and cooperative. He has a sickly appearance. He appears ill. No distress.   Morbidly obese.   HENT:   Head: Normocephalic and atraumatic.   Eyes: Right eye exhibits no discharge. Left eye exhibits no discharge. Right conjunctiva is injected. No scleral icterus.   Eyelids red bilaterally. improved   Neck: Phonation normal. Edema (BLE) and erythema (RLE) present.   Cardiovascular: Normal rate, regular rhythm and normal heart sounds.    No murmur heard.  Pulmonary/Chest: Effort normal and breath sounds normal. No accessory muscle usage. No tachypnea. No respiratory distress. He has no decreased breath sounds. He has no wheezes. He has no rhonchi.   Abdominal: Soft. Bowel sounds are normal. There is no tenderness. There is no rigidity and no guarding.       Genitourinary:         Musculoskeletal: Normal range of motion. He exhibits no edema.   Right great toe amputation  Deep ulcer on base of right second toe    Neurological: He is alert. He is disoriented.   Oriented to self  and place only.   Skin: Skin is warm and dry. No rash noted. He is not diaphoretic. There is erythema. No pallor.        Psychiatric: He has a normal mood and affect. His speech is normal. Cognition and memory are impaired.   Nursing note and vitals reviewed.    Fluids    Intake/Output Summary (Last 24 hours) at 06/02/19 1039  Last data filed at 06/02/19 0600   Gross per 24 hour   Intake             2200 ml   Output             3150 ml   Net             -950 ml     Laboratory  Recent Labs      05/31/19   0021   WBC  8.1   RBC  3.88*   HEMOGLOBIN  11.0*   HEMATOCRIT  34.9*   MCV  89.9   MCH  28.4   MCHC  31.5*   RDW  50.6*   PLATELETCT  351   MPV  8.4*     Recent Labs      05/31/19   0021   SODIUM  133*   POTASSIUM  4.1   CHLORIDE  103   CO2  24   GLUCOSE  157*   BUN  15   CREATININE  1.83*   CALCIUM  8.3*     Imaging  IR-DRAIN-BLADDER SUPRAPUB W/CATH   Final Result      1. Fluoroscopic guided exchange of the 14 [place] locking loop suprapubic catheter for a new 16 [place] Humphrey catheter.   3. Tubes cystogram demonstrating appropriate positioning of the new Humphrey catheter.      US-BERNICE SINGLE LEVEL UNILAT RIGHT         DX-TOE(S) 2+ RIGHT   Final Result         Soft tissue swelling about the second digit. Rarefaction of the tip of the second distal phalanx, concerning for osteomyelitis.         Assessment/Plan  * Urinary tract infection associated with cystostomy catheter (HCC)- (present on admission)   Assessment & Plan    Complicated by presence of suprapubic catheter, with surrounding cellulitis & skin breakdown.  Catheter has not been changed in several months per patient report.  Has history of drug resistant UTI's.  UCx growing MRSA and pseudomonoas  On zosyn and zyvox     Cellulitis- (present on admission)   Assessment & Plan    I do NOT think this is cellulitis but rather peripheral vascular disease  I have discontinued doxycyline and ordered BERNICE     Acute encephalopathy- (present on admission)   Assessment &  Plan    Likely secondary to urinary tract infection.  We will continue IV antibiotics and monitor closely.  Improved but has significant short term memory loss     Osteomyelitis of right foot (HCC)   Assessment & Plan    Right second toe  Seen by LPS, ortho consulted  I discussed case with REYMUNDO Cantrell, who will be seeing patient  NPO at midnight for surgery tomorrow  I discussed plan for surgery with viola RILEY, who has provided verbal consent      Sepsis ([facility])- (present on admission)   Assessment & Plan    This is sepsis (without associated acute organ dysfunction).  Source: urinary tract infection.  UCx growing MRSA and pseudomonoas  Dc meropenem. Started on zosyn and zyvox  Suprapubic catheter scheduled to be switched out today in interventional radiology.  Sepsis protocol is in place.     CRF (chronic renal failure), stage 3 (moderate) ([facility])- (present on admission)   Assessment & Plan    Just mildly improved from baseline.  Getting IV fluids, so will recheck tomorrow.  Avoid nephrotoxins.  Renally dose all appropriate medications.     BPH (benign prostatic hyperplasia)- (present on admission)   Assessment & Plan    S/p suprapubic catheter placement.     Crohn's disease (HCC)- (present on admission)   Assessment & Plan    S/p colostomy.  Wound care has been consulted for ostomy management.     Type 2 diabetes mellitus, with long-term current use of insulin (HCC)- (present on admission)   Assessment & Plan    On BID humulin 70/30 at home.   A1c 6.5  Added Accu-Cheks ACHS with low scale SSI as required.  Hypoglycemia protocol in place if needed.  Diet changed to diabetic.        VTE prophylaxis: Subcutaneous heparin    Time spend: 34 minutes. > 50 % time spend providing counseling / co ordination of care.

## 2019-06-02 NOTE — CARE PLAN
Problem: Pain Management  Goal: Pain level will decrease to patient's comfort goal    Intervention: Follow pain managment plan developed in collaboration with patient and Interdisciplinary Team  Educated patient on importance of reporting pain, purpose and side effects of pain medication, patient needs reinforcement. Patient complaining of groin especially at suprapubic catheter site (which is very tender and red) as well as bilateral lower leg pain-oxycodone given with good effect. Will continue to monitor.       Problem: Knowledge Deficit  Goal: Knowledge of disease process/condition, treatment plan, diagnostic tests, and medications will improve    Intervention: Assess knowledge level of disease process/condition, treatment plan, diagnostic tests, and medications  Patient has some short term memory loss, patient educated about importance of suprapubic catheter care at least once a shift to prevent worsening the infection.  Patient's suprapubic catheter site: cleansed with soap and water, pat dry. Will continue to monitor.

## 2019-06-02 NOTE — PROGRESS NOTES
Rec'd report from day shift RN. Assumed pt care. Assessment completed. AA&OX4. Denies pain at this time. No s/s of discomfort or distress. Pt wanted to ambulate in room. Pt ambulated various in room, SBA with FWW, maintained steady gait. Dressing in place to RLE, CDI. Suprapubic cath draining to gravity. Colostomy to RLQ, patent. Rash to backside, blanching redness to sacrum, buttocks. Bed in lowest position, bed locked, bed alarm on for safety, treaded socks in place, RN and CNA numbers provided, call light within reach.

## 2019-06-02 NOTE — CONSULTS
ADULT INFECTIOUS DISEASE CONSULT     Date of Service: 6/2/2019    Consult Requested By: Katlin Aldana M.D.    Reason for Consultation: Diabetic foot infection /UTI    History of Present Illness:   Shola Hoyt is a 69 y.o. male with history of Crohn's disease, previous history of diabetic foot infection, renal insufficiency recently had a prolonged stay in the hospital.  On 3/31/2019 he had undergone open cholecystectomy.  He was readmitted on 4/8/2019 with wound infection.  Wound VAC was placed.  He also was found to have traumatic hypospadias from his chronic indwelling catheter.  He also had bladder obstruction and a suprapubic catheter was placed.  He was discharged on 4/13/2019.  He was seen at the outside facility in [facility] for suprapubic pain on 5/29/2019.  He was thought to have urinary tract infection and was transferred to [facility].  His urine culture from [facility] grew MRSA and Pseudomonas.  Also he was found to have second toe osteomyelitis of the foot.  In view of all these issues infectious disease consult has been called.    Review Of Systems:  Gen.-denies fevers. Denies any chills.  HEENT- denies any sore throat, headache or vision changes  Pulmonary- denies any cough, shortness of breath  Cardiovascular- denies any chest pain, leg swelling.    GI- denies any nausea vomiting diarrhea or abdominal pain  Musculoskeletal- denies any joint pains or swelling  Neuro- denies any weakness or sensory change  Psych- denies any depression or suicidal ideation  Genitourinary-complains of pain around the suprapubic area and the groin        PMH:   Past Medical History:   Diagnosis Date   • Arthritis    • Benign prostate hyperplasia    • Colostomy in place ([facility])    • COPD (chronic obstructive pulmonary disease) ([facility])    • Diabetes ([facility])    • Renal disorder          PSH:  Past Surgical History:   Procedure Laterality Date   • POP BY LAPAROSCOPY N/A 3/31/2019     Procedure: CHOLECYSTECTOMY, LAPAROSCOPIC-conversion to open cholecystectomy;  Surgeon: Gunnar Alonzo M.D.;  Location: SURGERY [facility];  Service: General   • PB ERCP,DIAGNOSTIC  3/29/2019    Procedure: ERCP, DIAGNOSTIC;  Surgeon: Tao Saab M.D.;  Location: [facility];  Service: Gastroenterology   • TOE AMPUTATION Right 10/25/2017    Procedure: TOE AMPUTATION-GREAT TOE;  Surgeon: Bobby Alejo M.D.;  Location: [facility];  Service: Orthopedics       FAMILY HX:  Family History   Problem Relation Age of Onset   • No Known Problems Mother    • No Known Problems Father        SOCIAL HX:  Social History     Social History   • Marital status:      Spouse name: N/A   • Number of children: N/A   • Years of education: N/A     Occupational History   • Not on file.     Social History Main Topics   • Smoking status: Former Smoker   • Smokeless tobacco: Former User   • Alcohol use Not on file   • Drug use: Unknown   • Sexual activity: Not on file     Other Topics Concern   • Not on file     Social History Narrative   • No narrative on file     History   Smoking Status   • Former Smoker   Smokeless Tobacco   • Former User     History   Alcohol use Not on file       Allergies/Intolerances:  Allergies   Allergen Reactions   • Ceftriaxone      Has tolerated Merrem, Zosyn, and Unasyn   • Ezetimibe    • Flagyl [Metronidazole]    • Infliximab    • Levaquin    • Lovastatin    • Simvastatin    • Vancomycin        History reviewed with the patient    Other Current Medications:    Current Facility-Administered Medications:   •  diphenhydrAMINE (BENADRYL) tablet/capsule 25 mg, 25 mg, Oral, Q6HRS PRN, Felix Zapata D.ORachael, 25 mg at 06/01/19 0122  •  [COMPLETED] piperacillin-tazobactam (ZOSYN) 3.375 g in  mL IVPB, 3.375 g, Intravenous, Once, Stopped at 05/31/19 1211 **AND** piperacillin-tazobactam (ZOSYN) 3.375 g in  mL IVPB, 3.375 g, Intravenous, Q8HRS, Katlin Aldana M.D.,  Last Rate: 25 mL/hr at 06/02/19 1057, 3.375 g at 06/02/19 1057  •  linezolid (ZYVOX) tablet 600 mg, 600 mg, Oral, Q12HRS, Katlin Aldana M.D., 600 mg at 06/02/19 0501  •  tobramycin (TOBREX) 0.3 % ophthalmic ointment, , Right Eye, TID, Katlin Aldana M.D.  •  Notify provider if pain remains uncontrolled, , , CONTINUOUS **AND** Use the numeric rating scale (NRS-11) on regular floors and Critical-Care Pain Observation Tool (CPOT) on ICUs/Trauma to assess pain, , , CONTINUOUS **AND** Pulse Ox (Oximetry), , , CONTINUOUS **AND** Pharmacy Consult Request ...Pain Management Review 1 Each, 1 Each, Other, PHARMACY TO DOSE **AND** If patient difficult to arouse and/or has respiratory depression, stop any opiates that are currently infusing and call a Rapid Response., , , CONTINUOUS **AND** oxyCODONE immediate release (ROXICODONE) tablet 10 mg, 10 mg, Oral, Q3HRS PRN, 10 mg at 06/02/19 0805 **AND** oxyCODONE immediate-release (ROXICODONE) tablet 5 mg, 5 mg, Oral, Q3HRS PRN, 5 mg at 05/30/19 1710 **AND** morphine (pf) 4 mg/ml injection 2-4 mg, 2-4 mg, Intravenous, Q3HRS PRN, Nayeli Washington, A.P.R.N., 2 mg at 06/01/19 0423  •  insulin regular (HUMULIN R) injection 1-6 Units, 1-6 Units, Subcutaneous, 4X/DAY ACHS, Stopped at 06/02/19 0757 **AND** Accu-Chek ACHS, , , Q AC AND BEDTIME(S) **AND** NOTIFY MD and PharmD, , , Once **AND** glucose 4 g chewable tablet 16 g, 16 g, Oral, Q15 MIN PRN **AND** DEXTROSE 10% BOLUS 250 mL, 250 mL, Intravenous, Q15 MIN PRN, Nayeli Washington, A.P.RRachaelN.  •  budesonide-formoterol (SYMBICORT) 160-4.5 MCG/ACT inhaler 2 Puff, 2 Puff, Inhalation, BID, Luis Eduardo Lake M.D., 2 Puff at 06/02/19 0501  •  tamsulosin (FLOMAX) capsule 0.4 mg, 0.4 mg, Oral, AFTER BREAKFAST, Luis Eduardo Lake M.D., 0.4 mg at 06/02/19 0805  •  therapeutic multivitamin-minerals (THERAGRAN-M) tablet 1 Tab, 1 Tab, Oral, DAILY, Luis Eduardo Lake M.D., 1 Tab at 06/02/19 0501  •  NS infusion, , Intravenous, Continuous, Luis Eduardo Lake,  "M.D., Last Rate: 125 mL/hr at 19 0608  •  acetaminophen (TYLENOL) tablet 650 mg, 650 mg, Oral, Q6HRS PRN, Luis Eduardo Lake M.D.  •  ondansetron (ZOFRAN) syringe/vial injection 4 mg, 4 mg, Intravenous, Q4HRS PRN, Luis Eduardo Lake M.D.  •  ondansetron (ZOFRAN ODT) dispertab 4 mg, 4 mg, Oral, Q4HRS PRN, Luis Eduardo Lake M.D.  •  senna-docusate (PERICOLACE or SENOKOT S) 8.6-50 MG per tablet 2 Tab, 2 Tab, Oral, BID, Stopped at 19 1800 **AND** polyethylene glycol/lytes (MIRALAX) PACKET 1 Packet, 1 Packet, Oral, QDAY PRN **AND** magnesium hydroxide (MILK OF MAGNESIA) suspension 30 mL, 30 mL, Oral, QDAY PRN **AND** bisacodyl (DULCOLAX) suppository 10 mg, 10 mg, Rectal, QDAY PRN, Luis Eduardo Lake M.D.  •  NS (BOLUS) infusion 500 mL, 500 mL, Intravenous, Once PRN, Luis Eduardo Lake M.D.  •  heparin injection 5,000 Units, 5,000 Units, Subcutaneous, Q8HRS, Luis Eduardo Lake M.D., 5,000 Units at 19 0501  •  miconazole 2%-zinc oxide (DOROTHY) topical cream, , Topical, BID, Conor De Paz M.D.  •  nystatin (MYCOSTATIN) powder, , Topical, BID, Conor De Paz M.D.  [unfilled]    Most Recent Vital Signs:  /71 Comment: RN notified  Pulse 80   Temp 36.2 °C (97.2 °F) (Temporal)   Resp 16   Ht 1.803 m (5' 11\")   Wt 115 kg (253 lb 8.5 oz)   SpO2 99%   BMI 35.36 kg/m²   Temp  Av.6 °C (97.9 °F)  Min: 36.2 °C (97.2 °F)  Max: 37.1 °C (98.8 °F)    Physical Exam:  General: Looks chronically ill, obese  HEENT: sclera anicteric, PERRL, EOMI, MMM, no oral lesions  Neck: supple, no lymphadenopathy  Chest: CTAB, no r/r/w, normal work of breathing.  Cardiac: Regular, no murmurs no gallops heard  Abdomen: Obese.  Has stoma in the right side.  Has erythematous rash around it.  Has pubic catheter with some drainage around it on the pubic area.  Extremities: Edema present.  Has necrotic second toe-looks ischemic.  Skin: Has erythematous rash on his lower extremity.  Also he has flaking dry skin.  He has Candida groin " rash  Neuro: Alert and oriented times 3, non-focal exam    Pertinent Lab Results:  Recent Labs      05/31/19 0021   WBC  8.1      Recent Labs      05/31/19 0021   HEMOGLOBIN  11.0*   HEMATOCRIT  34.9*   MCV  89.9   MCH  28.4   PLATELETCT  351         Recent Labs      05/31/19 0021   SODIUM  133*   POTASSIUM  4.1   CHLORIDE  103   CO2  24   CREATININE  1.83*        No results for input(s): ALBUMIN in the last 72 hours.    Invalid input(s): AST, ALT, ALKPHOS, BILITOT, TOTALBILIRUB, BILIRUBINTOT, BILIRUBINDIR, BILIRUBININD, ALKALINEPHOS     Pertinent Micro:  Results     Procedure Component Value Units Date/Time    URINE CULTURE(NEW) [[id number]]  (Abnormal)  (Susceptibility) Collected:  05/30/19 0956    Order Status:  Completed Specimen:  Urine from Urine, Suprapubic Updated:  06/02/19 1026     Significant Indicator POS (POS)     Source UR     Site URINE, SUPRAPUBIC     Culture Result - (A)      Pseudomonas aeruginosa  ,000 cfu/mL  P.aeruginosa may develop resistance during prolonged therapy  with all antibiotics. Isolates that are initially susceptible  may become resistant within three to four days after  initiation of therapy. Testing of repeat isolates may be  warranted.   (A)      Enterococcus faecalis  10-50,000 cfu/mL   (A)    Narrative:       Collected By:[id number] SILAS TITUS  Indication for culture:->Patient WITHOUT an indwelling Humphrey  catheter in place with new onset of Dysuria, Frequency,  Urgency, and/or Suprapubic pain  Collected By:[id number] SILAS TITUS    Culture & Susceptibility     ENTEROCOCCUS FAECALIS     Antibiotic Sensitivity Microscan Unit Status    Ampicillin Sensitive <=2 mcg/mL Final    Method: CARLITO    Daptomycin Sensitive <=0.5 mcg/mL Final    Method: CARLITO    Nitrofurantoin Sensitive <=32 mcg/mL Final    Method: CARLITO    Penicillin Sensitive 8 mcg/mL Final    Method: CARLITO    Tetracycline Resistant >8 mcg/mL Final    Method: CARLITO              PSEUDOMONAS AERUGINOSA     Antibiotic  Sensitivity Microscan Unit Status    Amikacin Sensitive <=16 mcg/mL Final    Method: CARLITO    Cefepime Intermediate 16 mcg/mL Final    Method: CARLITO    Ceftazidime Sensitive 8 mcg/mL Final    Method: CARLITO    Ciprofloxacin Resistant >2 mcg/mL Final    Method: CARLITO    Gentamicin Sensitive <=4 mcg/mL Final    Method: CARLITO    Imipenem Sensitive <=1 mcg/mL Final    Method: CARLITO    Levofloxacin Resistant >4 mcg/mL Final    Method: CARLITO    Meropenem Sensitive <=1 mcg/mL Final    Method: CARLITO    Pip/Tazobactam Sensitive 64 mcg/mL Final    Method: CARLITO    Piperacillin Sensitive 32 mcg/mL Final    Method: CARLITO    Tobramycin Sensitive <=4 mcg/mL Final    Method: CARLITO                       GRAM STAIN ONLY [[id number]] Collected:  05/30/19 0956    Order Status:  Completed Specimen:  Urine Updated:  06/02/19 1026     Significant Indicator NEG     Source UR     Site URINE, SUPRAPUBIC     Gram Stain Result Few WBCs.  Few Gram negative rods.  Rare Gram positive cocci.      Narrative:       Collected By:[id number] SILAS TITUS  Indication for culture:->Patient WITHOUT an indwelling Humphrey  catheter in place with new onset of Dysuria, Frequency,  Urgency, and/or Suprapubic pain  Collected By:[id number] SILAS TITUS    URINALYSIS [[id number]]  (Abnormal) Collected:  05/30/19 0956    Order Status:  Completed Specimen:  Urine from Urine, Suprapubic Updated:  05/30/19 1014     Color Yellow     Character Cloudy (A)     Specific Gravity 1.011     Ph 6.5     Glucose Negative mg/dL      Ketones Trace (A) mg/dL      Protein 100 (A) mg/dL      Bilirubin Negative     Urobilinogen, Urine 0.2     Nitrite Positive (A)     Leukocyte Esterase Large (A)     Occult Blood Large (A)     Micro Urine Req Microscopic    Narrative:       Collected By:[id number] SILAS TITUS  Indication for culture:->Patient WITHOUT an indwelling Humphrey  catheter in place with new onset of Dysuria, Frequency,  Urgency, and/or Suprapubic pain    Blood Culture [[id number]] Collected:   "05/29/19 0540    Order Status:  Completed Specimen:  Blood from Peripheral Updated:  05/30/19 0848     Significant Indicator NEG     Source BLD     Site PERIPHERAL     Culture Result No Growth  Note: Blood cultures are incubated for 5 days and  are monitored continuously.Positive blood cultures  are called to the RN and reported as soon as  they are identified.      Narrative:       From different peripheral sites, if not done within the last  24 hours (Per Hospital Policy: Only change specimen source to  \"Line\" if specified by physician order)  Right AC    Blood Culture [[id number]] Collected:  05/29/19 0550    Order Status:  Completed Specimen:  Blood from Peripheral Updated:  05/30/19 0848     Significant Indicator NEG     Source BLD     Site PERIPHERAL     Culture Result No Growth  Note: Blood cultures are incubated for 5 days and  are monitored continuously.Positive blood cultures  are called to the RN and reported as soon as  they are identified.      Narrative:       From different peripheral sites, if not done within the last  24 hours (Per Hospital Policy: Only change specimen source to  \"Line\" if specified by physician order)  Right Hand        Blood Culture   Date Value Ref Range Status   04/08/2019 No growth after 5 days of incubation.  Final        Studies:  Dx-toe(s) 2+ Right    Result Date: 5/31/2019 5/31/2019 4:55 PM HISTORY/REASON FOR EXAM:  right second toe distal non healing wound. Right foot 2nd digit has a non healing wound TECHNIQUE/EXAM DESCRIPTION AND NUMBER OF VIEWS:  3 views of the RIGHT toes. 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 No rarefaction or destruction of the stump. Soft tissue swelling in the second digit with rarefaction of the tip of the second distal phalanx.     Soft tissue swelling about the second digit. Rarefaction of the tip of the second distal phalanx, concerning for " osteomyelitis.    Ir-drain-bladder Suprapub W/cath    Result Date: 6/1/2019 5/31/2019 3:04 PM HISTORY/REASON FOR EXAM:  Exchange locking loop catheter for a 16 Tanzanian Humphrey catheter. TECHNIQUE/EXAM DESCRIPTION AND NUMBER OF VIEWS:  Ultrasound and fluoroscopic guided exchange of a suprapubic bladder catheter placement, Cystogram. Fluoroscopy time: 0.8 minutes. Fluoroscopic images: 3. PROCEDURE:  Informed consent was obtained. Conscious sedation with 100 mcg Fentanyl and 2 mg Versed was administered during the procedure with appropriate continuous patient monitoring by the radiology nurse. Conscious sedation duration was 30 minutes. The existing 14 Tanzanian locking loop suprapubic catheter was prepped and draped in sterile manner. The skin entry site of the suprapubic catheter was anesthetized with 7 mL 1% lidocaine. Under fluoroscopic visualization 15 mL of contrast was injected through the existing tube confirming intracystic positioning. A sidehole was then cut in the suprapubic catheter allowing for placement of a wire and the urinary bladder through the existing catheter. Existing catheter was removed over the wire under fluoroscopic visualization. Dilation of the catheter entry site was performed up to 16 Tanzanian. A new 16 Tanzanian Humphrey catheter was then advanced over the wire into the urinary bladder. The retention balloon was inflated with 7 mL of saline. A final contrast injection through the tube was performed utilizing 15 mL Omnipaque intravenous contrast confirming intracystic positioning of the new tube with no evidence of extravasation. The patient tolerated the procedure poorly with no evidence of complication. COMPARISON: Suprapubic catheter placement imaging dated 4/11/2019 FINDINGS:  The cystogram shows satisfactory catheter position with all sideholes within the urinary bladder. There is no extravasation.     1. Fluoroscopic guided exchange of the 14 Tanzanian locking loop suprapubic catheter for a new 16  Swedish Humphrey catheter. 3. Tubes cystogram demonstrating appropriate positioning of the new Humphrey catheter.    Us-kemi Single Level Unilat Right    Result Date: 2019   Vascular Laboratory  Conclusions  No prior study is available for comparison.  HANS MAYERS  Age:    69    Gender:     M  MRN:    [id number]  :    1950      BSA:  Exam Date:     2019 10:49  Room #:     Inpatient  Priority:     Routine  Ht (in):             Wt (lb):  Ordering Physician:        NARCISA VEGA  Referring Physician:       NARCISA VEGA  Sonographer:               Mati Diehl RVT  Study Type:                Complete Unilateral  Technical Quality:         Adequate  Indications:     Cyanosis  CPT Codes:       27767  ICD Codes:       782.5  History:         Discoloration of right lower extremity; history of diabetes  Limitations:                 RIGHT  Waveform            Systolic BPs (mmHg)                             134           Brachial  Triphasic                                Common Femoral  Triphasic                  144           Posterior Tibial  Triphasic                  136           Dorsalis Pedis                                           Peroneal                             1.08          KEMI                                           TBI                       LEFT  Waveform        Systolic BPs (mmHg)                             131           Brachial                                           Common Femoral                                           Posterior Tibial                                           Dorsalis Pedis                                           Peroneal                                           KEMI                                           TBI  Findings  Right.  Doppler waveform of the common femoral artery is of high amplitude and  triphasic.  Doppler waveforms at the ankle are brisk and triphasic.  Ankle-brachial index is normal.  Additional testing was not performed in accordance  with lower extremity  arterial evaluation protocol.   IMPRESSION:     Complicated UTI  Candida rash  Diabetic foot infection  Morbid obesity  Osteomyelitis      PLAN:   Shola Hoyt is a 69 y.o. male with complicated past medical history being admitted for UTI.  His urine culture  is growing enterococcus and Pseudomonas.  There is a report of MRSA at home for general.  Will try to get those results.  Continue with the Zosyn and Zyvox.  Continue with the Candida rash treatment.  The foot infection looks chronic.  He would likely need amputation of the distal phalanx.  Continue with the supportive measures.  I have reviewed all the labs, available medical records.      Discussed with IM. Will continue to follow    Meghan Marx M.D.     Please note that this dictation was created using voice recognition software. I have worked with technical experts from [employer] to optimize the interface.  I have made every reasonable attempt to correct obvious errors, but there may be errors of grammar and possibly content that I did not discover before finalizing the note.

## 2019-06-02 NOTE — ASSESSMENT & PLAN NOTE
Right second toe  Seen by LPS, ortho consulted and ID consulted  Completed course of antibiotics  Status post debridement 6/3

## 2019-06-03 ENCOUNTER — ANESTHESIA (OUTPATIENT)
Dept: SURGERY | Facility: MEDICAL CENTER | Age: 69
DRG: 673 | End: 2019-06-03
Payer: MEDICARE

## 2019-06-03 ENCOUNTER — ANESTHESIA EVENT (OUTPATIENT)
Dept: SURGERY | Facility: MEDICAL CENTER | Age: 69
DRG: 673 | End: 2019-06-03
Payer: MEDICARE

## 2019-06-03 LAB
APTT PPP: 30.7 SEC (ref 24.7–36)
BACTERIA BLD CULT: NORMAL
BACTERIA BLD CULT: NORMAL
BASOPHILS # BLD AUTO: 0.4 % (ref 0–1.8)
BASOPHILS # BLD: 0.03 K/UL (ref 0–0.12)
EOSINOPHIL # BLD AUTO: 0.26 K/UL (ref 0–0.51)
EOSINOPHIL NFR BLD: 3.9 % (ref 0–6.9)
ERYTHROCYTE [DISTWIDTH] IN BLOOD BY AUTOMATED COUNT: 48.9 FL (ref 35.9–50)
GLUCOSE BLD-MCNC: 104 MG/DL (ref 65–99)
GLUCOSE BLD-MCNC: 112 MG/DL (ref 65–99)
GLUCOSE BLD-MCNC: 126 MG/DL (ref 65–99)
GLUCOSE BLD-MCNC: 84 MG/DL (ref 65–99)
HCT VFR BLD AUTO: 36.5 % (ref 42–52)
HGB BLD-MCNC: 11.5 G/DL (ref 14–18)
IMM GRANULOCYTES # BLD AUTO: 0.02 K/UL (ref 0–0.11)
IMM GRANULOCYTES NFR BLD AUTO: 0.3 % (ref 0–0.9)
INR PPP: 1.12 (ref 0.87–1.13)
LYMPHOCYTES # BLD AUTO: 2.34 K/UL (ref 1–4.8)
LYMPHOCYTES NFR BLD: 34.7 % (ref 22–41)
MCH RBC QN AUTO: 28.3 PG (ref 27–33)
MCHC RBC AUTO-ENTMCNC: 31.5 G/DL (ref 33.7–35.3)
MCV RBC AUTO: 89.7 FL (ref 81.4–97.8)
MONOCYTES # BLD AUTO: 0.38 K/UL (ref 0–0.85)
MONOCYTES NFR BLD AUTO: 5.6 % (ref 0–13.4)
NEUTROPHILS # BLD AUTO: 3.72 K/UL (ref 1.82–7.42)
NEUTROPHILS NFR BLD: 55.1 % (ref 44–72)
NRBC # BLD AUTO: 0 K/UL
NRBC BLD-RTO: 0 /100 WBC
PLATELET # BLD AUTO: 327 K/UL (ref 164–446)
PMV BLD AUTO: 8.5 FL (ref 9–12.9)
PROTHROMBIN TIME: 14.7 SEC (ref 12–14.6)
RBC # BLD AUTO: 4.07 M/UL (ref 4.7–6.1)
SIGNIFICANT IND 70042: NORMAL
SIGNIFICANT IND 70042: NORMAL
SITE SITE: NORMAL
SITE SITE: NORMAL
SOURCE SOURCE: NORMAL
SOURCE SOURCE: NORMAL
WBC # BLD AUTO: 6.8 K/UL (ref 4.8–10.8)

## 2019-06-03 PROCEDURE — 85730 THROMBOPLASTIN TIME PARTIAL: CPT

## 2019-06-03 PROCEDURE — 99232 SBSQ HOSP IP/OBS MODERATE 35: CPT | Performed by: HOSPITALIST

## 2019-06-03 PROCEDURE — 99233 SBSQ HOSP IP/OBS HIGH 50: CPT | Performed by: INTERNAL MEDICINE

## 2019-06-03 PROCEDURE — 302097 BARRIER RING,CONVEX 40MM: Performed by: HOSPITALIST

## 2019-06-03 PROCEDURE — 36415 COLL VENOUS BLD VENIPUNCTURE: CPT

## 2019-06-03 PROCEDURE — 700102 HCHG RX REV CODE 250 W/ 637 OVERRIDE(OP): Performed by: HOSPITALIST

## 2019-06-03 PROCEDURE — 700111 HCHG RX REV CODE 636 W/ 250 OVERRIDE (IP): Performed by: HOSPITALIST

## 2019-06-03 PROCEDURE — A9270 NON-COVERED ITEM OR SERVICE: HCPCS | Performed by: NURSE PRACTITIONER

## 2019-06-03 PROCEDURE — 0LNV0ZZ RELEASE RIGHT FOOT TENDON, OPEN APPROACH: ICD-10-PCS | Performed by: ORTHOPAEDIC SURGERY

## 2019-06-03 PROCEDURE — A9270 NON-COVERED ITEM OR SERVICE: HCPCS | Performed by: HOSPITALIST

## 2019-06-03 PROCEDURE — 85025 COMPLETE CBC W/AUTO DIFF WBC: CPT

## 2019-06-03 PROCEDURE — 770021 HCHG ROOM/CARE - ISO PRIVATE

## 2019-06-03 PROCEDURE — 82962 GLUCOSE BLOOD TEST: CPT

## 2019-06-03 PROCEDURE — 302094 BELT OSTOMY (HOLLISTER): Performed by: HOSPITALIST

## 2019-06-03 PROCEDURE — 700102 HCHG RX REV CODE 250 W/ 637 OVERRIDE(OP): Performed by: ANESTHESIOLOGY

## 2019-06-03 PROCEDURE — 85610 PROTHROMBIN TIME: CPT

## 2019-06-03 PROCEDURE — 0HBMXZZ EXCISION OF RIGHT FOOT SKIN, EXTERNAL APPROACH: ICD-10-PCS | Performed by: ORTHOPAEDIC SURGERY

## 2019-06-03 PROCEDURE — 302098 PASTE RING (FLAT): Performed by: HOSPITALIST

## 2019-06-03 PROCEDURE — 700105 HCHG RX REV CODE 258: Performed by: HOSPITALIST

## 2019-06-03 PROCEDURE — 700102 HCHG RX REV CODE 250 W/ 637 OVERRIDE(OP): Performed by: NURSE PRACTITIONER

## 2019-06-03 PROCEDURE — A9270 NON-COVERED ITEM OR SERVICE: HCPCS | Performed by: ANESTHESIOLOGY

## 2019-06-03 PROCEDURE — 700101 HCHG RX REV CODE 250: Performed by: ORTHOPAEDIC SURGERY

## 2019-06-03 RX ORDER — ACETAMINOPHEN 500 MG
1000 TABLET ORAL ONCE
Status: COMPLETED | OUTPATIENT
Start: 2019-06-03 | End: 2019-06-03

## 2019-06-03 RX ORDER — GABAPENTIN 300 MG/1
300 CAPSULE ORAL ONCE
Status: COMPLETED | OUTPATIENT
Start: 2019-06-03 | End: 2019-06-03

## 2019-06-03 RX ADMIN — SODIUM CHLORIDE: 9 INJECTION, SOLUTION INTRAVENOUS at 17:49

## 2019-06-03 RX ADMIN — NYSTATIN: 100000 POWDER TOPICAL at 17:43

## 2019-06-03 RX ADMIN — OXYCODONE HYDROCHLORIDE 10 MG: 10 TABLET ORAL at 20:14

## 2019-06-03 RX ADMIN — PIPERACILLIN SODIUM AND TAZOBACTAM SODIUM 3.38 G: 3; .375 INJECTION, POWDER, FOR SOLUTION INTRAVENOUS at 02:08

## 2019-06-03 RX ADMIN — MICONAZOLE NITRATE: 20 CREAM TOPICAL at 05:46

## 2019-06-03 RX ADMIN — MULTIPLE VITAMINS W/ MINERALS TAB 1 TABLET: TAB at 05:46

## 2019-06-03 RX ADMIN — SODIUM CHLORIDE: 9 INJECTION, SOLUTION INTRAVENOUS at 00:30

## 2019-06-03 RX ADMIN — HEPARIN SODIUM 5000 UNITS: 5000 INJECTION, SOLUTION INTRAVENOUS; SUBCUTANEOUS at 21:17

## 2019-06-03 RX ADMIN — ACETAMINOPHEN 650 MG: 325 TABLET, FILM COATED ORAL at 23:19

## 2019-06-03 RX ADMIN — TOBRAMYCIN: 3 OINTMENT OPHTHALMIC at 05:46

## 2019-06-03 RX ADMIN — SENNOSIDES,DOCUSATE SODIUM 2 TABLET: 8.6; 5 TABLET, FILM COATED ORAL at 17:45

## 2019-06-03 RX ADMIN — ACETAMINOPHEN 1000 MG: 500 TABLET ORAL at 11:17

## 2019-06-03 RX ADMIN — LINEZOLID 600 MG: 600 TABLET, FILM COATED ORAL at 05:46

## 2019-06-03 RX ADMIN — PIPERACILLIN SODIUM AND TAZOBACTAM SODIUM 3.38 G: 3; .375 INJECTION, POWDER, FOR SOLUTION INTRAVENOUS at 17:45

## 2019-06-03 RX ADMIN — GABAPENTIN 300 MG: 300 CAPSULE ORAL at 11:17

## 2019-06-03 RX ADMIN — NYSTATIN: 100000 POWDER TOPICAL at 05:46

## 2019-06-03 RX ADMIN — TOBRAMYCIN: 3 OINTMENT OPHTHALMIC at 17:44

## 2019-06-03 RX ADMIN — TAMSULOSIN HYDROCHLORIDE 0.4 MG: 0.4 CAPSULE ORAL at 08:09

## 2019-06-03 RX ADMIN — BUDESONIDE AND FORMOTEROL FUMARATE DIHYDRATE 2 PUFF: 160; 4.5 AEROSOL RESPIRATORY (INHALATION) at 17:43

## 2019-06-03 RX ADMIN — LINEZOLID 600 MG: 600 TABLET, FILM COATED ORAL at 17:43

## 2019-06-03 RX ADMIN — LIDOCAINE HYDROCHLORIDE 20 ML: 10 INJECTION, SOLUTION INFILTRATION; PERINEURAL at 15:57

## 2019-06-03 RX ADMIN — BUDESONIDE AND FORMOTEROL FUMARATE DIHYDRATE 2 PUFF: 160; 4.5 AEROSOL RESPIRATORY (INHALATION) at 05:46

## 2019-06-03 ASSESSMENT — ENCOUNTER SYMPTOMS
INSOMNIA: 0
WHEEZING: 0
DIZZINESS: 0
SENSORY CHANGE: 0
FEVER: 0
MYALGIAS: 0
DEPRESSION: 0
COUGH: 0
NAUSEA: 0
PALPITATIONS: 0
SPEECH CHANGE: 0
MEMORY LOSS: 1
SHORTNESS OF BREATH: 0
HEADACHES: 0
ABDOMINAL PAIN: 1
WEAKNESS: 1
VOMITING: 0
NERVOUS/ANXIOUS: 0
CHILLS: 0
DIAPHORESIS: 0

## 2019-06-03 NOTE — PROGRESS NOTES
Colostomy piece dislodged. Pt cleaned up. CHG bath given for procedure today. New colostomy piece applied.

## 2019-06-03 NOTE — ANESTHESIA PREPROCEDURE EVALUATION
Relevant Problems   (+) Acute encephalopathy   (+) CRF (chronic renal failure), stage 3 (moderate) (HCC)   (+) HTN (hypertension)   (+) Osteomyelitis of right foot (HCC)   (+) Sepsis (HCC)   (+) Type 2 diabetes mellitus, with long-term current use of insulin (HCC)       Physical Exam    Airway   Mallampati: I  TM distance: >3 FB  Neck ROM: full    Comments: History of grade III view with previous intubations   Cardiovascular - normal exam  Rhythm: regular  Rate: normal  (-) murmur     Dental - normal exam         Pulmonary - normal exam  Breath sounds clear to auscultation     Abdominal    Neurological - normal exam               Anesthesia Plan    ASA 3 (morbid obesity (metabolic syndrome), sepsis)   ASA physical status 3 criteria: morbid obesity - BMI greater than or equal to 40    Plan - general       Airway plan will be LMA        Induction: intravenous    Postoperative Plan: Postoperative administration of opioids is intended.    Pertinent diagnostic labs and testing reviewed    Informed Consent:    Anesthetic plan and risks discussed with patient.    Use of blood products discussed with: patient whom consented to blood products.

## 2019-06-03 NOTE — PROGRESS NOTES
Hospital Medicine Daily Progress Note    Date of Service  6/3/2019    Chief Complaint  Pain at his suprapubic catheter site      Hospital Course   Mr. Hoyt is a 69-year-old male who was transferred from [facility] on 5/29/2019 for urinary tract infection and altered level of consciousness with concern for urosepsis.  He has a past medical history of BPH, COPD, diabetes, CKD, and Chron's disease resulting in colostomy placement.  He initially presented to the [facility] with complaints of pain at the site of his suprapubic catheter which was reportedly placed in May but has not had follow-up since.  According to the [facility] facility he has a history of multiple drug-resistant urinary tract infections that have been previously treated at the [facility].  Upon initial evaluation here he was noted to have cellulitis surrounding the suprapubic catheter site.  Given the above findings he was placed on IV meropenem and oral doxycycline. His urine culture from [facility] grew MRSA and pseudomonas so his antibiotics were changed to Zosyn and zyvox.  He was evaluated by LPS and found to have clinical osteomyelitis of his right second toe.  Infectious disease and Ortho work consulted with plan for intervention on 6/3.      Interval Problem Update  His abdominal pain is unchanged  Plan for OR today for ampuation and tendon release  No new complaints    Consultants/Specialty  Urology-Dr. Louis  Orthopedics  ID    Code Status  Full code    Disposition  TBD- PT/OT pending    Review of Systems  Review of Systems   Constitutional: Positive for malaise/fatigue. Negative for chills, diaphoresis and fever.   Respiratory: Negative for cough, shortness of breath and wheezing.    Cardiovascular: Negative for chest pain, palpitations and leg swelling.   Gastrointestinal: Positive for abdominal pain. Negative for nausea and vomiting.   Genitourinary:        SPC in place.   Neurological: Positive for  weakness (generalized). Negative for dizziness and headaches.   Psychiatric/Behavioral: Positive for memory loss. Negative for depression. The patient is not nervous/anxious and does not have insomnia.    All other systems reviewed and are negative.     Physical Exam  Temp:  [36.4 °C (97.5 °F)-37.2 °C (99 °F)] 36.6 °C (97.9 °F)  Pulse:  [60-77] 77  Resp:  [18] 18  BP: (141-158)/(66-81) 155/81  SpO2:  [96 %-97 %] 97 %    Physical Exam   Constitutional: He appears well-developed and well-nourished. He is active and cooperative. He has a sickly appearance. He appears ill. No distress.   Morbidly obese.   HENT:   Head: Normocephalic and atraumatic.   Eyes: Right eye exhibits no discharge. Left eye exhibits no discharge. Right conjunctiva is injected. No scleral icterus.   Eyelids red bilaterally. improved   Neck: Phonation normal. Edema (BLE) and erythema (RLE) present.   Cardiovascular: Normal rate, regular rhythm and normal heart sounds.    No murmur heard.  Pulmonary/Chest: Effort normal and breath sounds normal. No accessory muscle usage. No tachypnea. No respiratory distress. He has no decreased breath sounds. He has no wheezes. He has no rhonchi.   Abdominal: Soft. Bowel sounds are normal. There is no tenderness. There is no rigidity and no guarding.       Genitourinary:         Musculoskeletal: Normal range of motion. He exhibits no edema.   Right great toe amputation  Deep ulcer on base of right second toe    Neurological: He is alert. He is disoriented.   Oriented to self and place only.   Skin: Skin is warm and dry. No rash noted. He is not diaphoretic. There is erythema. No pallor.        Psychiatric: He has a normal mood and affect. His speech is normal. Cognition and memory are impaired.   Nursing note and vitals reviewed.    Fluids    Intake/Output Summary (Last 24 hours) at 06/03/19 2058  Last data filed at 06/03/19 1700   Gross per 24 hour   Intake              700 ml   Output             5150 ml   Net             -4450 ml     Laboratory  Recent Labs      06/03/19   1225   WBC  6.8   RBC  4.07*   HEMOGLOBIN  11.5*   HEMATOCRIT  36.5*   MCV  89.7   MCH  28.3   MCHC  31.5*   RDW  48.9   PLATELETCT  327   MPV  8.5*         Imaging  US-BERNICE SINGLE LEVEL UNILAT RIGHT   Final Result      IR-DRAIN-BLADDER SUPRAPUB W/CATH   Final Result      1. Fluoroscopic guided exchange of the 14 Cymraes locking loop suprapubic catheter for a new 16 Cymraes Humphrey catheter.   3. Tubes cystogram demonstrating appropriate positioning of the new Humphrey catheter.      DX-TOE(S) 2+ RIGHT   Final Result         Soft tissue swelling about the second digit. Rarefaction of the tip of the second distal phalanx, concerning for osteomyelitis.         Assessment/Plan  * Urinary tract infection associated with cystostomy catheter (HCC)- (present on admission)   Assessment & Plan    Complicated by presence of suprapubic catheter, with surrounding cellulitis & skin breakdown.  Catheter has not been changed in several months per patient report.  Has history of drug resistant UTI's.  UCx from outside facility growing MRSA and pseudomonoas  On zosyn and zyvox     Cellulitis- (present on admission)   Assessment & Plan    I do NOT think this is cellulitis  I have discontinued doxycyline   bernice negative     Acute encephalopathy- (present on admission)   Assessment & Plan    Likely secondary to urinary tract infection.  We will continue IV antibiotics and monitor closely.  Improved but has significant short term memory loss     Osteomyelitis of right foot (HCC)   Assessment & Plan    Right second toe  Seen by LPS, ortho consulted and ID following   Ortho procedure to be done 6/3     Sepsis (HCC)- (present on admission)   Assessment & Plan    This is sepsis (without associated acute organ dysfunction).  Source: urinary tract infection.  UCx growing MRSA and pseudomonoas  Dc meropenem. Started on zosyn and zyvox- cultures reviewed from outside hospital  Suprapubic  catheter scheduled to be switched out today in interventional radiology.  Sepsis protocol is in place.     CRF (chronic renal failure), stage 3 (moderate) ([facility])- (present on admission)   Assessment & Plan    Just mildly improved from baseline.  Getting IV fluids, so will recheck tomorrow.  Avoid nephrotoxins.  Renally dose all appropriate medications.     BPH (benign prostatic hyperplasia)- (present on admission)   Assessment & Plan    S/p suprapubic catheter placement.     Crohn's disease ([facility])- (present on admission)   Assessment & Plan    S/p colostomy.  Wound care has been consulted for ostomy management.     Type 2 diabetes mellitus, with long-term current use of insulin ([facility])- (present on admission)   Assessment & Plan    On BID humulin 70/30 at home.   A1c 6.5  Added Accu-Cheks ACHS with low scale SSI as required.  Hypoglycemia protocol in place if needed.  Diet changed to diabetic.        VTE prophylaxis: Subcutaneous heparin

## 2019-06-03 NOTE — CARE PLAN
Problem: Urinary Elimination:  Goal: Ability to reestablish a normal urinary elimination pattern will improve  Outcome: PROGRESSING AS EXPECTED  Suprapubic catheter in place and draining clear yellow urine w/ sediment.       Problem: Communication  Goal: The ability to communicate needs accurately and effectively will improve  Outcome: PROGRESSING AS EXPECTED  Pt able to communicate needs to staff effectively. Pt using call bell appropriately.    Problem: Safety  Goal: Will remain free from falls  Outcome: PROGRESSING AS EXPECTED  Fall prevention education provided. Fall precautions maintained. Hourly rounding completed. Call bell within reach.       Problem: Bowel/Gastric:  Goal: Normal bowel function is maintained or improved  Outcome: PROGRESSING AS EXPECTED  Colostomy is draining loose brown stool.     Problem: Mobility  Goal: Risk for activity intolerance will decrease  Outcome: PROGRESSING AS EXPECTED  Pt was OOB w/ one assist to the chair.

## 2019-06-03 NOTE — PROGRESS NOTES
Infectious Disease Progress Note    Author: Meghan Marx M.D. Date & Time of service: 6/3/2019  1:50 PM    Chief Complaint:  Diabetic foot infection/UTI    Interval History:  6/3/2019-no fevers.  Evaluated by orthopedics and will be taken to surgery today.  WBC 6.8  Labs Reviewed and Wound Reviewed.    Review of Systems:  Review of Systems   Constitutional: Positive for malaise/fatigue. Negative for fever.   HENT: Negative for hearing loss.    Respiratory: Negative for cough and shortness of breath.    Cardiovascular: Positive for leg swelling. Negative for chest pain.   Gastrointestinal: Negative for nausea and vomiting.   Genitourinary: Negative for dysuria.        Some pain around the suprapubic catheter   Musculoskeletal: Negative for myalgias.   Neurological: Negative for sensory change and speech change.       Hemodynamics:  Temp (24hrs), Av.9 °C (98.4 °F), Min:36.4 °C (97.5 °F), Max:37.2 °C (99 °F)  Temperature: 36.4 °C (97.5 °F)  Pulse  Av  Min: 58  Max: 131   Blood Pressure : 158/69       Physical Exam:  Physical Exam   Constitutional: He is oriented to person, place, and time. No distress.   Morbidly obese  Looks chronically ill   HENT:   Mouth/Throat: No oropharyngeal exudate.   Eyes: Pupils are equal, round, and reactive to light. No scleral icterus.   Neck: Neck supple.   Cardiovascular: Regular rhythm.    No murmur heard.  Pulmonary/Chest: He has no wheezes. He has no rales.   Abdominal: Soft. There is no tenderness. There is no rebound and no guarding.   Genitourinary:   Genitourinary Comments: Catheter in the pubic area   Musculoskeletal: He exhibits edema. He exhibits no tenderness.   Necrotic toe-refer to the pictures   Lymphadenopathy:     He has no cervical adenopathy.   Neurological: He is alert and oriented to person, place, and time.   No focal neurological deficit   Skin: Skin is warm. No erythema.   Chronic changes of the lower extremities  Edema present   Vitals  reviewed.      Meds:    Current Facility-Administered Medications:   •  diphenhydrAMINE  •  [COMPLETED] piperacillin-tazobactam **AND** piperacillin-tazobactam  •  linezolid  •  tobramycin  •  Notify provider if pain remains uncontrolled **AND** Use the numeric rating scale (NRS-11) on regular floors and Critical-Care Pain Observation Tool (CPOT) on ICUs/Trauma to assess pain **AND** Pulse Ox (Oximetry) **AND** Pharmacy Consult Request **AND** If patient difficult to arouse and/or has respiratory depression, stop any opiates that are currently infusing and call a Rapid Response. **AND** oxyCODONE immediate-release **AND** oxyCODONE immediate-release **AND** morphine injection  •  insulin regular **AND** Accu-Chek ACHS **AND** NOTIFY MD and PharmD **AND** glucose **AND** dextrose 10% bolus  •  budesonide-formoterol  •  tamsulosin  •  therapeutic multivitamin-minerals  •  NS  •  acetaminophen  •  ondansetron  •  ondansetron  •  senna-docusate **AND** polyethylene glycol/lytes **AND** magnesium hydroxide **AND** bisacodyl  •  NS  •  heparin  •  miconazole 2%-zinc oxide  •  nystatin    Labs:  Recent Labs      06/03/19   1225   WBC  6.8   RBC  4.07*   HEMOGLOBIN  11.5*   HEMATOCRIT  36.5*   MCV  89.7   MCH  28.3   RDW  48.9   PLATELETCT  327   MPV  8.5*   NEUTSPOLYS  55.10   LYMPHOCYTES  34.70   MONOCYTES  5.60   EOSINOPHILS  3.90   BASOPHILS  0.40     No results for input(s): SODIUM, POTASSIUM, CHLORIDE, CO2, GLUCOSE, BUN, CPKTOTAL in the last 72 hours.  No results for input(s): ALBUMIN, TBILIRUBIN, ALKPHOSPHAT, TOTPROTEIN, ALTSGPT, ASTSGOT, CREATININE in the last 72 hours.    Imaging:  Dx-toe(s) 2+ Right    Result Date: 5/31/2019 5/31/2019 4:55 PM HISTORY/REASON FOR EXAM:  right second toe distal non healing wound. Right foot 2nd digit has a non healing wound TECHNIQUE/EXAM DESCRIPTION AND NUMBER OF VIEWS:  3 views of the RIGHT toes. COMPARISON: 10/21/2017 FINDINGS: Postsurgical change from amputation at the level of  the first proximal phalanx. No rarefaction or destruction of the stump. Soft tissue swelling in the second digit with rarefaction of the tip of the second distal phalanx.     Soft tissue swelling about the second digit. Rarefaction of the tip of the second distal phalanx, concerning for osteomyelitis.    Ir-drain-bladder Suprapub W/cath    Result Date: 6/1/2019 5/31/2019 3:04 PM HISTORY/REASON FOR EXAM:  Exchange locking loop catheter for a 16 Stateless Humphrey catheter. TECHNIQUE/EXAM DESCRIPTION AND NUMBER OF VIEWS:  Ultrasound and fluoroscopic guided exchange of a suprapubic bladder catheter placement, Cystogram. Fluoroscopy time: 0.8 minutes. Fluoroscopic images: 3. PROCEDURE:  Informed consent was obtained. Conscious sedation with 100 mcg Fentanyl and 2 mg Versed was administered during the procedure with appropriate continuous patient monitoring by the radiology nurse. Conscious sedation duration was 30 minutes. The existing 14 Stateless locking loop suprapubic catheter was prepped and draped in sterile manner. The skin entry site of the suprapubic catheter was anesthetized with 7 mL 1% lidocaine. Under fluoroscopic visualization 15 mL of contrast was injected through the existing tube confirming intracystic positioning. A sidehole was then cut in the suprapubic catheter allowing for placement of a wire and the urinary bladder through the existing catheter. Existing catheter was removed over the wire under fluoroscopic visualization. Dilation of the catheter entry site was performed up to 16 Stateless. A new 16 Stateless Humphrey catheter was then advanced over the wire into the urinary bladder. The retention balloon was inflated with 7 mL of saline. A final contrast injection through the tube was performed utilizing 15 mL Omnipaque intravenous contrast confirming intracystic positioning of the new tube with no evidence of extravasation. The patient tolerated the procedure poorly with no evidence of complication. COMPARISON:  Suprapubic catheter placement imaging dated 2019 FINDINGS:  The cystogram shows satisfactory catheter position with all sideholes within the urinary bladder. There is no extravasation.     1. Fluoroscopic guided exchange of the 14 Faroese locking loop suprapubic catheter for a new 16 Faroese Humphrey catheter. 3. Tubes cystogram demonstrating appropriate positioning of the new Humphrey catheter.    Us-kemi Single Level Unilat Right    Result Date: 2019   Vascular Laboratory  Conclusions  1.  Normal resting right lower extremity arterial perfusion.  HANS MAYERS  Age:    69    Gender:     M  MRN:    [id number]  :    1950      BSA:  Exam Date:     2019 10:49  Room #:     Inpatient  Priority:     Routine  Ht (in):             Wt (lb):  Ordering Physician:        NARCISA VEGA  Referring Physician:       NARCISA VEGA  Sonographer:               Mati Diehl RVT  Study Type:                Complete Unilateral  Technical Quality:         Adequate  Indications:     Cyanosis  CPT Codes:       13237  ICD Codes:       782.5  History:         Discoloration of right lower extremity; history of diabetes  Limitations:                 RIGHT  Waveform            Systolic BPs (mmHg)                             134           Brachial  Triphasic                                Common Femoral  Triphasic                  144           Posterior Tibial  Triphasic                  136           Dorsalis Pedis                                           Peroneal                             1.08          KEMI                                           TBI                       LEFT  Waveform        Systolic BPs (mmHg)                             131           Brachial                                           Common Femoral                                           Posterior Tibial                                           Dorsalis Pedis                                           Peroneal                                   "         BERNICE                                           TBI  Findings  Right.  Doppler waveform of the common femoral artery is of high amplitude and  triphasic.  Doppler waveforms at the ankle are brisk and triphasic.  Ankle-brachial index is normal.  Additional testing was not performed in accordance with lower extremity  arterial evaluation protocol.  Alka Hermosillo M.D.  (Electronically Signed)  Final Date:      02 June 2019                   18:17      Micro:  Results     Procedure Component Value Units Date/Time    Blood Culture [[id number]] Collected:  05/29/19 0540    Order Status:  Completed Specimen:  Blood from Peripheral Updated:  06/03/19 0700     Significant Indicator NEG     Source BLD     Site PERIPHERAL     Culture Result No growth after 5 days of incubation.    Narrative:       From different peripheral sites, if not done within the last  24 hours (Per Hospital Policy: Only change specimen source to  \"Line\" if specified by physician order)  Right AC    Blood Culture [[id number]] Collected:  05/29/19 0550    Order Status:  Completed Specimen:  Blood from Peripheral Updated:  06/03/19 0700     Significant Indicator NEG     Source BLD     Site PERIPHERAL     Culture Result No growth after 5 days of incubation.    Narrative:       From different peripheral sites, if not done within the last  24 hours (Per Hospital Policy: Only change specimen source to  \"Line\" if specified by physician order)  Right Hand    URINE CULTURE(NEW) [[id number]]  (Abnormal)  (Susceptibility) Collected:  05/30/19 0956    Order Status:  Completed Specimen:  Urine from Urine, Suprapubic Updated:  06/02/19 1026     Significant Indicator POS (POS)     Source UR     Site URINE, SUPRAPUBIC     Culture Result - (A)      Pseudomonas aeruginosa  ,000 cfu/mL  P.aeruginosa may develop resistance during prolonged therapy  with all antibiotics. Isolates that are initially susceptible  may become resistant within three to four days " after  initiation of therapy. Testing of repeat isolates may be  warranted.   (A)      Enterococcus faecalis  10-50,000 cfu/mL   (A)    Narrative:       Collected By:[id number] SILAS TITUS  Indication for culture:->Patient WITHOUT an indwelling Humphrey  catheter in place with new onset of Dysuria, Frequency,  Urgency, and/or Suprapubic pain  Collected By:[id number] SILAS TITUS    Culture & Susceptibility     ENTEROCOCCUS FAECALIS     Antibiotic Sensitivity Microscan Unit Status    Ampicillin Sensitive <=2 mcg/mL Final    Method: CARLITO    Daptomycin Sensitive <=0.5 mcg/mL Final    Method: CARLITO    Nitrofurantoin Sensitive <=32 mcg/mL Final    Method: CARLITO    Penicillin Sensitive 8 mcg/mL Final    Method: CARLITO    Tetracycline Resistant >8 mcg/mL Final    Method: CARLITO              PSEUDOMONAS AERUGINOSA     Antibiotic Sensitivity Microscan Unit Status    Amikacin Sensitive <=16 mcg/mL Final    Method: CARLITO    Cefepime Intermediate 16 mcg/mL Final    Method: CARLITO    Ceftazidime Sensitive 8 mcg/mL Final    Method: CARLITO    Ciprofloxacin Resistant >2 mcg/mL Final    Method: CARLITO    Gentamicin Sensitive <=4 mcg/mL Final    Method: CARLITO    Imipenem Sensitive <=1 mcg/mL Final    Method: CARLITO    Levofloxacin Resistant >4 mcg/mL Final    Method: CARLITO    Meropenem Sensitive <=1 mcg/mL Final    Method: CARLITO    Pip/Tazobactam Sensitive 64 mcg/mL Final    Method: CARLITO    Piperacillin Sensitive 32 mcg/mL Final    Method: CARLITO    Tobramycin Sensitive <=4 mcg/mL Final    Method: CARLITO                       GRAM STAIN ONLY [[id number]] Collected:  05/30/19 0956    Order Status:  Completed Specimen:  Urine Updated:  06/02/19 1026     Significant Indicator NEG     Source UR     Site URINE, SUPRAPUBIC     Gram Stain Result Few WBCs.  Few Gram negative rods.  Rare Gram positive cocci.      Narrative:       Collected By:[id number] SILAS TITUS  Indication for culture:->Patient WITHOUT an indwelling Humphrey  catheter in place with new onset of Dysuria,  Frequency,  Urgency, and/or Suprapubic pain  Collected By:[id number] SILAS TITUS    URINALYSIS [[id number]]  (Abnormal) Collected:  05/30/19 0956    Order Status:  Completed Specimen:  Urine from Urine, Suprapubic Updated:  05/30/19 1014     Color Yellow     Character Cloudy (A)     Specific Gravity 1.011     Ph 6.5     Glucose Negative mg/dL      Ketones Trace (A) mg/dL      Protein 100 (A) mg/dL      Bilirubin Negative     Urobilinogen, Urine 0.2     Nitrite Positive (A)     Leukocyte Esterase Large (A)     Occult Blood Large (A)     Micro Urine Req Microscopic    Narrative:       Collected By:[id number] SILAS TITUS  Indication for culture:->Patient WITHOUT an indwelling Humphrey  catheter in place with new onset of Dysuria, Frequency,  Urgency, and/or Suprapubic pain          Assessment:  Active Hospital Problems    Diagnosis   • *Urinary tract infection associated with cystostomy catheter ([facility]) [T83.510A, N39.0]   • Cellulitis [L03.90]   • Acute encephalopathy [G93.40]   • Crohn's disease ([facility]) [K50.90]   • Type 2 diabetes mellitus, with long-term current use of insulin ([facility]) [E11.9, Z79.4]   • BPH (benign prostatic hyperplasia) [N40.0]   • Osteomyelitis of right foot ([facility]) [M86.9]   • Sepsis ([facility]) [A41.9]   • CRF (chronic renal failure), stage 3 (moderate) ([facility]) [N18.3]       Plan:  Right second toe infection  Chronic osteomyelitis  He is going for amputation today    Complicated UTI  Associated with indwelling catheter  He will be finishing the course of antibiotics soon  We will discontinue the antibiotics after his surgery    Morbid obesity    Diabetes mellitus  Control the blood sugars for wound healing  Discussed with internal medicine.

## 2019-06-03 NOTE — PROGRESS NOTES
"6/3/2019    Shola Betts Evelina    .S: Right second toe infection    O:  /66   Pulse 60   Temp 37.2 °C (99 °F) (Temporal)   Resp 18   Ht 1.803 m (5' 11\")   Wt 115 kg (253 lb 8.5 oz)   SpO2 97%           No results for input(s): SODIUM, POTASSIUM, CHLORIDE, CO2, GLUCOSE, BUN, CPKTOTAL in the last 72 hours.    Intake/Output Summary (Last 24 hours) at 06/03/19 1111  Last data filed at 06/03/19 1055   Gross per 24 hour   Intake              940 ml   Output             5900 ml   Net            -4960 ml     I have reviewed the relevant vascular and radiology studies.  Right second toe amp, previous 1st toe amp.    A:   Active Hospital Problems    Diagnosis   • Cellulitis [L03.90]     Priority: High   • Acute encephalopathy [G93.40]     Priority: High   • Urinary tract infection associated with cystostomy catheter (HCC) [T83.510A, N39.0]     Priority: High   • Crohn's disease (HCC) [K50.90]     Priority: Low   • Type 2 diabetes mellitus, with long-term current use of insulin (HCC) [E11.9, Z79.4]     Priority: Low   • BPH (benign prostatic hyperplasia) [N40.0]     Priority: Low   • Osteomyelitis of right foot (HCC) [M86.9]   • Sepsis (HCC) [A41.9]   • CRF (chronic renal failure), stage 3 (moderate) (HCC) [N18.3]         P: Right second toe amputation, FTR 3-5, GSR  "

## 2019-06-03 NOTE — CARE PLAN
Problem: Infection  Goal: Will remain free from infection  Per day shift report, infectious disease specialist to be consulted for infection control.     Problem: Venous Thromboembolism (VTW)/Deep Vein Thrombosis (DVT) Prevention:  Goal: Patient will participate in Venous Thrombosis (VTE)/Deep Vein Thrombosis (DVT)Prevention Measures    Intervention: Assess and monitor for anticoagulation complications  Pt receives Heparin subcutaneous for VTE prophylaxis.

## 2019-06-03 NOTE — PROGRESS NOTES
Assumed patient care at 0700.  Patient was initially drowsy, but easily arousable this morning, now alert, awake, and oriented only to self.  Non labored breathing on room air, no signs of acute distress noted.  Patient complains of tenderness and sharp pain on groin and bilateral lower leg--due to short term memory deficits, patient needs reminders of his plan of care including frequency of pain medication.  Patient has been up to chair for the whole day, watching television. RLQ Ostomy bag: clean, dry, and intact. Suprapubic catheter care provided--dressing changed twice. Bed alarm in place. Bed in lowest position. Call light and belongings wtihin reach. Will continue to monitor.

## 2019-06-03 NOTE — PROGRESS NOTES
Rec'd report from day shift RN. Assumed pt care. Assessment completed. AA&OX2, reoriented to time, event. Denies pain at this time. No s/s of discomfort or distress. Pt ambulates with SBA and use of FWW. Dressing in place to RLE, CDI. Suprapubic cath draining to gravity. Colostomy to RLQ, patent. Rash to backside, blanching redness to sacrum, buttocks. Bed in lowest position, bed locked, bed alarm on for safety, treaded socks in place, RN and CNA numbers provided, call light within reach.

## 2019-06-03 NOTE — OR NURSING
Pt not oriented, unable to obtain consents in time, pt sent back to unit until consents can be obtained for procedure.

## 2019-06-03 NOTE — DOCUMENTATION QUERY
[facility]                                                                       Query Response Note      PATIENT:               HANS MAYERS  ACCT #:                  [id number]  MRN:                     [id number]  :                      1950  ADMIT DATE:       2019 4:23 AM  DISCH DATE:          RESPONDING  PROVIDER #:        [id number]           QUERY TEXT:    Acute encephalopathy (type unspecified)  likely secondary to urinary tract infection is documented in the Medical Record. Please specify the type of encephalopathy.     NOTE:  If an appropriate response is not listed below, please respond with a new note.    The patient's Clinical Indicators include:  Altered mental status/confused  Sepsis/UTI  CKD  Osteomyelitis  Cellulitis  Culture MRSA/Pseudomonas  Antibiotics IV  Memory loss    Query created by: Afia Knight on 6/3/2019 2:39 PM    RESPONSE TEXT:    Septic encephalopathy          Electronically signed by:  NARCISA VEGA MD 6/3/2019 3:17 PM

## 2019-06-03 NOTE — PROGRESS NOTES
POST-OP NOTE FOR LIMB PRESERVATION SERVICE    SURGERY DATE: 6/3/2019    PROCEDURE: Procedure(s):  Right second toe flexor tendon release    WEIGHT BEARING STATUS: Weight bearing as tolerated    PT CONSULT: No     ANTIBIOTICS: Continue current ABX    PLAN TO RETURN TO O.R.: No    WOUND CARE PLAN: Incisional dressing - Change POD #2 (Directions: Adaptic over incision, Gauze, Roll Gauze, Ace Wrap)    FOLLOW-UP: OP Wound Clinic    DURABLE MEDICAL EQUIPMENT: Offloading shoe    OTHER:       Sahil Gutierrez M.D.

## 2019-06-04 LAB
GLUCOSE BLD-MCNC: 119 MG/DL (ref 65–99)
GLUCOSE BLD-MCNC: 121 MG/DL (ref 65–99)
GLUCOSE BLD-MCNC: 167 MG/DL (ref 65–99)
GLUCOSE BLD-MCNC: 92 MG/DL (ref 65–99)

## 2019-06-04 PROCEDURE — 82962 GLUCOSE BLOOD TEST: CPT | Mod: 91

## 2019-06-04 PROCEDURE — 700102 HCHG RX REV CODE 250 W/ 637 OVERRIDE(OP): Performed by: INTERNAL MEDICINE

## 2019-06-04 PROCEDURE — A9270 NON-COVERED ITEM OR SERVICE: HCPCS | Performed by: HOSPITALIST

## 2019-06-04 PROCEDURE — 700105 HCHG RX REV CODE 258: Performed by: HOSPITALIST

## 2019-06-04 PROCEDURE — A9270 NON-COVERED ITEM OR SERVICE: HCPCS | Performed by: INTERNAL MEDICINE

## 2019-06-04 PROCEDURE — 700102 HCHG RX REV CODE 250 W/ 637 OVERRIDE(OP): Performed by: HOSPITALIST

## 2019-06-04 PROCEDURE — 97162 PT EVAL MOD COMPLEX 30 MIN: CPT

## 2019-06-04 PROCEDURE — 700102 HCHG RX REV CODE 250 W/ 637 OVERRIDE(OP): Performed by: NURSE PRACTITIONER

## 2019-06-04 PROCEDURE — A9270 NON-COVERED ITEM OR SERVICE: HCPCS | Performed by: NURSE PRACTITIONER

## 2019-06-04 PROCEDURE — 700111 HCHG RX REV CODE 636 W/ 250 OVERRIDE (IP): Performed by: HOSPITALIST

## 2019-06-04 PROCEDURE — 770006 HCHG ROOM/CARE - MED/SURG/GYN SEMI*

## 2019-06-04 PROCEDURE — 700111 HCHG RX REV CODE 636 W/ 250 OVERRIDE (IP): Performed by: NURSE PRACTITIONER

## 2019-06-04 PROCEDURE — 97165 OT EVAL LOW COMPLEX 30 MIN: CPT

## 2019-06-04 PROCEDURE — 99232 SBSQ HOSP IP/OBS MODERATE 35: CPT | Performed by: INTERNAL MEDICINE

## 2019-06-04 RX ORDER — BENZOCAINE/MENTHOL 6 MG-10 MG
LOZENGE MUCOUS MEMBRANE 2 TIMES DAILY PRN
Status: DISPENSED | OUTPATIENT
Start: 2019-06-04 | End: 2019-06-06

## 2019-06-04 RX ADMIN — OXYCODONE HYDROCHLORIDE 10 MG: 10 TABLET ORAL at 05:05

## 2019-06-04 RX ADMIN — OXYCODONE HYDROCHLORIDE 10 MG: 10 TABLET ORAL at 12:22

## 2019-06-04 RX ADMIN — BUDESONIDE AND FORMOTEROL FUMARATE DIHYDRATE 2 PUFF: 160; 4.5 AEROSOL RESPIRATORY (INHALATION) at 05:04

## 2019-06-04 RX ADMIN — HEPARIN SODIUM 5000 UNITS: 5000 INJECTION, SOLUTION INTRAVENOUS; SUBCUTANEOUS at 20:25

## 2019-06-04 RX ADMIN — OXYCODONE HYDROCHLORIDE 10 MG: 10 TABLET ORAL at 00:01

## 2019-06-04 RX ADMIN — NYSTATIN: 100000 POWDER TOPICAL at 05:05

## 2019-06-04 RX ADMIN — MULTIPLE VITAMINS W/ MINERALS TAB 1 TABLET: TAB at 05:05

## 2019-06-04 RX ADMIN — PIPERACILLIN SODIUM AND TAZOBACTAM SODIUM 3.38 G: 3; .375 INJECTION, POWDER, FOR SOLUTION INTRAVENOUS at 10:42

## 2019-06-04 RX ADMIN — SENNOSIDES,DOCUSATE SODIUM 2 TABLET: 8.6; 5 TABLET, FILM COATED ORAL at 17:04

## 2019-06-04 RX ADMIN — HYDROCORTISONE: 10 CREAM TOPICAL at 17:04

## 2019-06-04 RX ADMIN — PIPERACILLIN SODIUM AND TAZOBACTAM SODIUM 3.38 G: 3; .375 INJECTION, POWDER, FOR SOLUTION INTRAVENOUS at 01:27

## 2019-06-04 RX ADMIN — MICONAZOLE NITRATE: 20 CREAM TOPICAL at 17:04

## 2019-06-04 RX ADMIN — TOBRAMYCIN: 3 OINTMENT OPHTHALMIC at 05:04

## 2019-06-04 RX ADMIN — OXYCODONE HYDROCHLORIDE 10 MG: 10 TABLET ORAL at 20:25

## 2019-06-04 RX ADMIN — BUDESONIDE AND FORMOTEROL FUMARATE DIHYDRATE 2 PUFF: 160; 4.5 AEROSOL RESPIRATORY (INHALATION) at 17:04

## 2019-06-04 RX ADMIN — TAMSULOSIN HYDROCHLORIDE 0.4 MG: 0.4 CAPSULE ORAL at 08:11

## 2019-06-04 RX ADMIN — OXYCODONE HYDROCHLORIDE 10 MG: 10 TABLET ORAL at 16:09

## 2019-06-04 RX ADMIN — INSULIN HUMAN 1 UNITS: 100 INJECTION, SOLUTION PARENTERAL at 20:28

## 2019-06-04 RX ADMIN — MICONAZOLE NITRATE: 20 CREAM TOPICAL at 05:04

## 2019-06-04 RX ADMIN — SENNOSIDES,DOCUSATE SODIUM 2 TABLET: 8.6; 5 TABLET, FILM COATED ORAL at 05:05

## 2019-06-04 RX ADMIN — LINEZOLID 600 MG: 600 TABLET, FILM COATED ORAL at 05:05

## 2019-06-04 RX ADMIN — HEPARIN SODIUM 5000 UNITS: 5000 INJECTION, SOLUTION INTRAVENOUS; SUBCUTANEOUS at 14:17

## 2019-06-04 RX ADMIN — MORPHINE SULFATE 2 MG: 4 INJECTION INTRAVENOUS at 22:43

## 2019-06-04 RX ADMIN — HEPARIN SODIUM 5000 UNITS: 5000 INJECTION, SOLUTION INTRAVENOUS; SUBCUTANEOUS at 05:05

## 2019-06-04 RX ADMIN — NYSTATIN: 100000 POWDER TOPICAL at 17:04

## 2019-06-04 ASSESSMENT — ENCOUNTER SYMPTOMS
MYALGIAS: 0
ABDOMINAL PAIN: 0
FEVER: 0
DIZZINESS: 0
COUGH: 0
NAUSEA: 0
SHORTNESS OF BREATH: 0
VOMITING: 0
SPEECH CHANGE: 0
SENSORY CHANGE: 0

## 2019-06-04 ASSESSMENT — COGNITIVE AND FUNCTIONAL STATUS - GENERAL
PERSONAL GROOMING: A LITTLE
TOILETING: A LITTLE
HELP NEEDED FOR BATHING: A LITTLE
CLIMB 3 TO 5 STEPS WITH RAILING: A LOT
DRESSING REGULAR LOWER BODY CLOTHING: A LOT
MOVING FROM LYING ON BACK TO SITTING ON SIDE OF FLAT BED: A LITTLE
SUGGESTED CMS G CODE MODIFIER MOBILITY: CK
WALKING IN HOSPITAL ROOM: A LITTLE
MOVING TO AND FROM BED TO CHAIR: A LITTLE
EATING MEALS: A LITTLE
DRESSING REGULAR UPPER BODY CLOTHING: A LITTLE
MOBILITY SCORE: 17
DAILY ACTIVITIY SCORE: 17
TURNING FROM BACK TO SIDE WHILE IN FLAT BAD: A LITTLE
STANDING UP FROM CHAIR USING ARMS: A LITTLE
SUGGESTED CMS G CODE MODIFIER DAILY ACTIVITY: CK

## 2019-06-04 ASSESSMENT — GAIT ASSESSMENTS
DISTANCE (FEET): 75
ASSISTIVE DEVICE: FRONT WHEEL WALKER
GAIT LEVEL OF ASSIST: MINIMAL ASSIST

## 2019-06-04 NOTE — CARE PLAN
Problem: Communication  Goal: The ability to communicate needs accurately and effectively will improve  Outcome: PROGRESSING SLOWER THAN EXPECTED      Problem: Safety  Goal: Will remain free from injury  Outcome: PROGRESSING AS EXPECTED    Goal: Will remain free from falls  Outcome: PROGRESSING AS EXPECTED      Problem: Pain Management  Goal: Pain level will decrease to patient's comfort goal  Outcome: PROGRESSING AS EXPECTED

## 2019-06-04 NOTE — PROGRESS NOTES
Pt complained of pain in Right foot post tendon release on 6/3. Treated with PRN pain meds. VSS. GCS 14. All needs met.

## 2019-06-04 NOTE — PROGRESS NOTES
Infectious Disease Progress Note    Author: Meghan Marx M.D. Date & Time of service: 2019  3:43 PM    Chief Complaint:  Diabetic foot infection/UTI    Interval History:  6/3/2019-no fevers.  Evaluated by orthopedics and will be taken to surgery today.  WBC 6.8  2019-no fevers.  He says he feels much better.  He underwent right second toe flexor tendon release and debridement of the callus.  Labs Reviewed and Wound Reviewed.    Review of Systems:  Review of Systems   Constitutional: Positive for malaise/fatigue. Negative for fever.   HENT: Negative for hearing loss.    Respiratory: Negative for cough and shortness of breath.    Cardiovascular: Positive for leg swelling. Negative for chest pain.   Gastrointestinal: Negative for nausea and vomiting.   Genitourinary: Negative for dysuria.        Some pain around the suprapubic catheter   Musculoskeletal: Negative for myalgias.   Neurological: Negative for sensory change and speech change.       Hemodynamics:  Temp (24hrs), Av.7 °C (98 °F), Min:36.3 °C (97.3 °F), Max:37 °C (98.6 °F)  Temperature: 36.3 °C (97.3 °F)  Pulse  Av.3  Min: 58  Max: 131   Blood Pressure : 152/74       Physical Exam:  Physical Exam   Constitutional: He is oriented to person, place, and time. No distress.   Morbidly obese  Looks chronically ill   HENT:   Mouth/Throat: No oropharyngeal exudate.   Eyes: Pupils are equal, round, and reactive to light. No scleral icterus.   Neck: Neck supple.   Cardiovascular: Regular rhythm.    No murmur heard.  Pulmonary/Chest: He has no wheezes. He has no rales.   Abdominal: Soft. There is no tenderness. There is no rebound and no guarding.   Genitourinary:   Genitourinary Comments: Catheter in the pubic area   Musculoskeletal: He exhibits edema. He exhibits no tenderness.   Necrotic toe-refer to the pictures   Lymphadenopathy:     He has no cervical adenopathy.   Neurological: He is alert and oriented to person, place, and time.   No focal  neurological deficit   Skin: Skin is warm. No erythema.   Chronic changes of the lower extremities  Edema present   Vitals reviewed.      Meds:    Current Facility-Administered Medications:   •  diphenhydrAMINE  •  Notify provider if pain remains uncontrolled **AND** Use the numeric rating scale (NRS-11) on regular floors and Critical-Care Pain Observation Tool (CPOT) on ICUs/Trauma to assess pain **AND** Pulse Ox (Oximetry) **AND** Pharmacy Consult Request **AND** If patient difficult to arouse and/or has respiratory depression, stop any opiates that are currently infusing and call a Rapid Response. **AND** oxyCODONE immediate-release **AND** oxyCODONE immediate-release **AND** morphine injection  •  insulin regular **AND** Accu-Chek ACHS **AND** NOTIFY MD and PharmD **AND** glucose **AND** dextrose 10% bolus  •  budesonide-formoterol  •  tamsulosin  •  therapeutic multivitamin-minerals  •  acetaminophen  •  ondansetron  •  ondansetron  •  senna-docusate **AND** polyethylene glycol/lytes **AND** magnesium hydroxide **AND** bisacodyl  •  NS  •  heparin  •  miconazole 2%-zinc oxide  •  nystatin    Labs:  Recent Labs      06/03/19   1225   WBC  6.8   RBC  4.07*   HEMOGLOBIN  11.5*   HEMATOCRIT  36.5*   MCV  89.7   MCH  28.3   RDW  48.9   PLATELETCT  327   MPV  8.5*   NEUTSPOLYS  55.10   LYMPHOCYTES  34.70   MONOCYTES  5.60   EOSINOPHILS  3.90   BASOPHILS  0.40     No results for input(s): SODIUM, POTASSIUM, CHLORIDE, CO2, GLUCOSE, BUN, CPKTOTAL in the last 72 hours.  No results for input(s): ALBUMIN, TBILIRUBIN, ALKPHOSPHAT, TOTPROTEIN, ALTSGPT, ASTSGOT, CREATININE in the last 72 hours.    Imaging:  Dx-toe(s) 2+ Right    Result Date: 5/31/2019 5/31/2019 4:55 PM HISTORY/REASON FOR EXAM:  right second toe distal non healing wound. Right foot 2nd digit has a non healing wound TECHNIQUE/EXAM DESCRIPTION AND NUMBER OF VIEWS:  3 views of the RIGHT toes. COMPARISON: 10/21/2017 FINDINGS: Postsurgical change from amputation  at the level of the first proximal phalanx. No rarefaction or destruction of the stump. Soft tissue swelling in the second digit with rarefaction of the tip of the second distal phalanx.     Soft tissue swelling about the second digit. Rarefaction of the tip of the second distal phalanx, concerning for osteomyelitis.    Ir-drain-bladder Suprapub W/cath    Result Date: 6/1/2019 5/31/2019 3:04 PM HISTORY/REASON FOR EXAM:  Exchange locking loop catheter for a 16 Mongolian Humphrey catheter. TECHNIQUE/EXAM DESCRIPTION AND NUMBER OF VIEWS:  Ultrasound and fluoroscopic guided exchange of a suprapubic bladder catheter placement, Cystogram. Fluoroscopy time: 0.8 minutes. Fluoroscopic images: 3. PROCEDURE:  Informed consent was obtained. Conscious sedation with 100 mcg Fentanyl and 2 mg Versed was administered during the procedure with appropriate continuous patient monitoring by the radiology nurse. Conscious sedation duration was 30 minutes. The existing 14 Mongolian locking loop suprapubic catheter was prepped and draped in sterile manner. The skin entry site of the suprapubic catheter was anesthetized with 7 mL 1% lidocaine. Under fluoroscopic visualization 15 mL of contrast was injected through the existing tube confirming intracystic positioning. A sidehole was then cut in the suprapubic catheter allowing for placement of a wire and the urinary bladder through the existing catheter. Existing catheter was removed over the wire under fluoroscopic visualization. Dilation of the catheter entry site was performed up to 16 Mongolian. A new 16 Mongolian Humphrey catheter was then advanced over the wire into the urinary bladder. The retention balloon was inflated with 7 mL of saline. A final contrast injection through the tube was performed utilizing 15 mL Omnipaque intravenous contrast confirming intracystic positioning of the new tube with no evidence of extravasation. The patient tolerated the procedure poorly with no evidence of  complication. COMPARISON: Suprapubic catheter placement imaging dated 2019 FINDINGS:  The cystogram shows satisfactory catheter position with all sideholes within the urinary bladder. There is no extravasation.     1. Fluoroscopic guided exchange of the 14 Dominican locking loop suprapubic catheter for a new 16 Dominican Humphrey catheter. 3. Tubes cystogram demonstrating appropriate positioning of the new Humphrey catheter.    Us-kemi Single Level Unilat Right    Result Date: 2019   Vascular Laboratory  Conclusions  1.  Normal resting right lower extremity arterial perfusion.  HANS MAYERS  Age:    69    Gender:     M  MRN:    [id number]  :    1950      BSA:  Exam Date:     2019 10:49  Room #:     Inpatient  Priority:     Routine  Ht (in):             Wt (lb):  Ordering Physician:        NARCISA VEGA  Referring Physician:       NARCISA VEGA  Sonographer:               Mati Diehl RVT  Study Type:                Complete Unilateral  Technical Quality:         Adequate  Indications:     Cyanosis  CPT Codes:       12454  ICD Codes:       782.5  History:         Discoloration of right lower extremity; history of diabetes  Limitations:                 RIGHT  Waveform            Systolic BPs (mmHg)                             134           Brachial  Triphasic                                Common Femoral  Triphasic                  144           Posterior Tibial  Triphasic                  136           Dorsalis Pedis                                           Peroneal                             1.08          KEMI                                           TBI                       LEFT  Waveform        Systolic BPs (mmHg)                             131           Brachial                                           Common Femoral                                           Posterior Tibial                                           Dorsalis Pedis                                           Peroneal         "                                   BERNICE                                           TBI  Findings  Right.  Doppler waveform of the common femoral artery is of high amplitude and  triphasic.  Doppler waveforms at the ankle are brisk and triphasic.  Ankle-brachial index is normal.  Additional testing was not performed in accordance with lower extremity  arterial evaluation protocol.  Alka Hermosillo M.D.  (Electronically Signed)  Final Date:      02 June 2019                   18:17      Micro:  Results     Procedure Component Value Units Date/Time    Blood Culture [[id number]] Collected:  05/29/19 0540    Order Status:  Completed Specimen:  Blood from Peripheral Updated:  06/03/19 0700     Significant Indicator NEG     Source BLD     Site PERIPHERAL     Culture Result No growth after 5 days of incubation.    Narrative:       From different peripheral sites, if not done within the last  24 hours (Per Hospital Policy: Only change specimen source to  \"Line\" if specified by physician order)  Right AC    Blood Culture [[id number]] Collected:  05/29/19 0550    Order Status:  Completed Specimen:  Blood from Peripheral Updated:  06/03/19 0700     Significant Indicator NEG     Source BLD     Site PERIPHERAL     Culture Result No growth after 5 days of incubation.    Narrative:       From different peripheral sites, if not done within the last  24 hours (Per Hospital Policy: Only change specimen source to  \"Line\" if specified by physician order)  Right Hand    URINE CULTURE(NEW) [[id number]]  (Abnormal)  (Susceptibility) Collected:  05/30/19 0956    Order Status:  Completed Specimen:  Urine from Urine, Suprapubic Updated:  06/02/19 1026     Significant Indicator POS (POS)     Source UR     Site URINE, SUPRAPUBIC     Culture Result - (A)      Pseudomonas aeruginosa  ,000 cfu/mL  P.aeruginosa may develop resistance during prolonged therapy  with all antibiotics. Isolates that are initially susceptible  may become resistant " within three to four days after  initiation of therapy. Testing of repeat isolates may be  warranted.   (A)      Enterococcus faecalis  10-50,000 cfu/mL   (A)    Narrative:       Collected By:[id number] SILAS TITUS  Indication for culture:->Patient WITHOUT an indwelling Humphrey  catheter in place with new onset of Dysuria, Frequency,  Urgency, and/or Suprapubic pain  Collected By:[id number] SILAS TITUS    Culture & Susceptibility     ENTEROCOCCUS FAECALIS     Antibiotic Sensitivity Microscan Unit Status    Ampicillin Sensitive <=2 mcg/mL Final    Method: CARLITO    Daptomycin Sensitive <=0.5 mcg/mL Final    Method: CARLITO    Nitrofurantoin Sensitive <=32 mcg/mL Final    Method: CARLITO    Penicillin Sensitive 8 mcg/mL Final    Method: CARLITO    Tetracycline Resistant >8 mcg/mL Final    Method: CARLITO              PSEUDOMONAS AERUGINOSA     Antibiotic Sensitivity Microscan Unit Status    Amikacin Sensitive <=16 mcg/mL Final    Method: CARLITO    Cefepime Intermediate 16 mcg/mL Final    Method: CARLITO    Ceftazidime Sensitive 8 mcg/mL Final    Method: CARLITO    Ciprofloxacin Resistant >2 mcg/mL Final    Method: CARLITO    Gentamicin Sensitive <=4 mcg/mL Final    Method: CARLITO    Imipenem Sensitive <=1 mcg/mL Final    Method: CARLITO    Levofloxacin Resistant >4 mcg/mL Final    Method: CARLITO    Meropenem Sensitive <=1 mcg/mL Final    Method: CARLITO    Pip/Tazobactam Sensitive 64 mcg/mL Final    Method: CARLITO    Piperacillin Sensitive 32 mcg/mL Final    Method: CARLITO    Tobramycin Sensitive <=4 mcg/mL Final    Method: CARLITO                       GRAM STAIN ONLY [[id number]] Collected:  05/30/19 0956    Order Status:  Completed Specimen:  Urine Updated:  06/02/19 1026     Significant Indicator NEG     Source UR     Site URINE, SUPRAPUBIC     Gram Stain Result Few WBCs.  Few Gram negative rods.  Rare Gram positive cocci.      Narrative:       Collected By:[id number] SILAS TITUS  Indication for culture:->Patient WITHOUT an indwelling Humphrey  catheter in place with new  onset of Dysuria, Frequency,  Urgency, and/or Suprapubic pain  Collected By:[id number] SILAS TITUS    URINALYSIS [[id number]]  (Abnormal) Collected:  05/30/19 0956    Order Status:  Completed Specimen:  Urine from Urine, Suprapubic Updated:  05/30/19 1014     Color Yellow     Character Cloudy (A)     Specific Gravity 1.011     Ph 6.5     Glucose Negative mg/dL      Ketones Trace (A) mg/dL      Protein 100 (A) mg/dL      Bilirubin Negative     Urobilinogen, Urine 0.2     Nitrite Positive (A)     Leukocyte Esterase Large (A)     Occult Blood Large (A)     Micro Urine Req Microscopic    Narrative:       Collected By:[id number] SILAS TITUS  Indication for culture:->Patient WITHOUT an indwelling Humphrey  catheter in place with new onset of Dysuria, Frequency,  Urgency, and/or Suprapubic pain          Assessment:  Active Hospital Problems    Diagnosis   • *Urinary tract infection associated with cystostomy catheter ([facility]) [T83.510A, N39.0]   • Cellulitis [L03.90]   • Acute encephalopathy [G93.40]   • Crohn's disease ([facility]) [K50.90]   • Type 2 diabetes mellitus, with long-term current use of insulin ([facility]) [E11.9, Z79.4]   • BPH (benign prostatic hyperplasia) [N40.0]   • Osteomyelitis of right foot ([facility]) [M86.9]   • Sepsis ([facility]) [A41.9]   • CRF (chronic renal failure), stage 3 (moderate) ([facility]) [N18.3]       Plan:  Right second toe infection  Chronic osteomyelitis  He underwent right second toe flexor tendon release and debridement of callus  Continue with the wound care    Complicated UTI  Associated with indwelling catheter  He will be finishing the course of antibiotics soon  We will discontinue the antibiotics soon    Morbid obesity    Diabetes mellitus  Control the blood sugars for wound healing  Discussed with internal medicine.

## 2019-06-04 NOTE — OP REPORT
DATE OF SERVICE:  06/03/2019    PREOPERATIVE DIAGNOSIS:  Right second toe distal tip wound.    POSTOPERATIVE DIAGNOSIS:  Right second toe distal tip wound.    PROCEDURES PERFORMED:  1.  Right second toe flexor tendon release.  2.  Debridement of callus, sharp, with a knife.    SURGEON:  Sahil Gutierrez MD    ANESTHESIA:  5 mL of 1% lidocaine without epinephrine and a local block.    PROCEDURE LOCATION:  Done on the floor.    COMPLICATIONS:  None.    FINDINGS:  The patient's flexor tendon was able to be released given the right   second toe more excursion.  The distal wound was excised and found to have   good blood flow.    INDICATIONS:  The patient is a 69-year-old male with a right second toe distal   phalanx ulcer, likely secondary to a pressure wound.  After discussion with   the patient and power of , the decision was made to do a flexor tendon   release on the floor.  All risks and benefits have been explained.    DESCRIPTION OF PROCEDURE:  The patient's right foot was sterilely prepped and   draped with Betadine.  A 5 mL of 1% lidocaine were injected with a local nerve   block around the second toe.  After adequate anesthesia, a 15 blade was used   to excise the flexor tendon from the plantar aspect of the foot.  The toe had   greater exposure following the release.  The distal phalanx ulcer was then   sharply excised with a 15 blade removing necrotic material off the foot.   A 3-0 nylon was then used to close the incision   and Adaptic dressings as well as 4x4s and a Sof-Rol placed over the wound.       ____________________________________     MD VENKAT Salmon / OLIMPIA    DD:  06/03/2019 16:58:59  DT:  06/03/2019 17:07:29    D#:  [id number]  Job#:  [id number]

## 2019-06-04 NOTE — THERAPY
"Physical Therapy Evaluation completed.   Bed Mobility:  Supine to Sit:  (NT, in chair upon entry and exit, appears capable)  Transfers: Sit to Stand: Minimal Assist (for safety)  Gait: Level Of Assist: Minimal Assist with Front-Wheel Walker       Plan of Care: Will benefit from Physical Therapy 3 times per week  Discharge Recommendations: Equipment: Will Continue to Assess for Equipment Needs. Post-acute therapy: see below.    See \"Rehab Therapy-Acute\" Patient Summary Report for complete documentation.    Patient is a 68 YO male that was admitted 5/29 following transfer from outside facility with urinary obstruction, dysuria, and hypoglycemia. Per chart review with temporary suprapubic catheter which has not been changed in 3 months. PMHx significant for dementia, Crohns disease with ostomy, DM. He is s/p R flexor tendon release of second digit. He presented to PT with impaired cognition and impaired balance which are limiting his ability to safely perform mobility at PLOF. He was confused throughout session and unable to provide any social history or PLOF. He ambulated approximately 75ft with FWW and min A with offloading boot on RLE. He is moving well and managed the boot during ambulation with minimal verbal cueing. Anticipate he will progress to baseline functional mobility with continued mobilization but have concerns regarding DC home given cognition. Will continue to follow during acute stay.  "

## 2019-06-04 NOTE — DISCHARGE PLANNING
"Care Transition Team Assessment    LSW spoke with pt's DPOA, Nathaly. She assists pt with getting his medications, which she states he is non-compliant taking. Pt lives alone and is independent with all ADLs/IADLs. Nathaly checks on pt every 2-3 days. Pt was at [facility] and Nathaly states she would like him to go back there at discharge if possible. LSW explained that pt is confused. Nathaly explained that when pt's sodium is low or when he is on any sort of pain pills, he becomes confused and then goes back to \"normal\" once off.    Information Source  Orientation : Disoriented to Time, Disoriented to Event  Information Given By: Patient  Who is responsible for making decisions for patient? : Patient    Readmission Evaluation  Is this a readmission?: No    Elopement Risk  Legal Hold: No  Ambulatory or Self Mobile in Wheelchair: Yes  Disoriented: Time-At Risk for Elopement, Situation-At Risk for Elopement  Psychiatric Symptoms: None  History of Wandering: No  Elopement this Admit: No  Vocalizing Wanting to Leave: No  Displays Behaviors, Body Language Wanting to Leave: No-Not at Risk for Elopement  Elopement Risk: Not at Risk for Elopement  Picture of Patient on Inside Chart Front Cover: No (See Comments)  Purple Armband on Patient: No (See Comments)    Interdisciplinary Discharge Planning  Patient or legal guardian wants to designate a caregiver (see row info): No  Prior Services: Unable To Determine At This Time    Discharge Preparedness  What is your plan after discharge?: Uncertain - pending medical team collaboration  Prior Functional Level: Ambulatory, Independent with Activities of Daily Living, Independent with Medication Management  Difficulity with ADLs: None  Difficulity with IADLs: None    Functional Assesment  Prior Functional Level: Ambulatory, Independent with Activities of Daily Living, Independent with Medication Management    Finances  Financial Barriers to Discharge: No  Prescription Coverage: " Yes    Vision / Hearing Impairment  Vision Impairment : No  Hearing Impairment : No  Hearing Impairment: Both Ears, Hearing Device Not Available         Advance Directive  Advance Directive?: Living Will, DPOA for Health Care  Durable Power of  Name and Contact : Nathaly,    Domestic Abuse  Have you ever been the victim of abuse or violence?: No  Physical Abuse or Sexual Abuse: No  Verbal Abuse or Emotional Abuse: No  Possible Abuse Reported to:: Not Applicable    Psychological Assessment  History of Substance Abuse: Prescription opioids  History of Psychiatric Problems: No  Non-compliant with Treatment: Yes  Newly Diagnosed Illness: No    Discharge Risks or Barriers  Discharge risks or barriers?: No  Patient risk factors: Noncompliance    Anticipated Discharge Information  Anticipated discharge disposition: Discharge needs currently unknown

## 2019-06-04 NOTE — CARE PLAN
Problem: Infection  Goal: Will remain free from infection    Intervention: Implement standard precautions and perform hand washing before and after patient contact  Isolation precautions discontinued, no longer required. No results of MRSA at this time.       Problem: Mobility  Goal: Risk for activity intolerance will decrease    Intervention: Encourage patient to increase activity level in collaboration with Interdisciplinary Team  Patient up to chair for all meals. Using offloading boot to right lower extremity when out of bed.

## 2019-06-04 NOTE — PROGRESS NOTES
Assumed patient care at 0700. Received report from night shift. Assessment completed. A&Ox 2, self and place. Very forgetful, short-term memory, requires reminding and orientation. C/o 4/10 right lower extremity pain, repositioned to chair. High fall precautions in place; ala rm in use, pt wearing treaded socks, personal possessions and call light placed within reach. RLQ ostomy, CDI. Suprapubic catheter in place, site care provided, Patient is post procedure day #1 (right flexor tendon release of toe), dressing to right second digit, CDI, tubigrip to lower leg. Offloading boot in use when out of bed. Pt ambulates to with assistance of one and use of FWW, up to chair for all meals. POC discussed with pt, communication board updated. Patient denies any additional needs at this time.

## 2019-06-04 NOTE — PROGRESS NOTES
Hospital Medicine Daily Progress Note    Date of Service  6/4/2019    Chief Complaint  69 y.o. male admitted 5/29/2019 with dysuria    Hospital Course    Past medical history of Crohn's with ostomy, suprapubic cath, diabetes.  Patient has not had prepubic cath changed for several months despite his POA asking him to have home health assess him.  He complains of painful urination and has a history of multiple drug-resistant urine infections.  He was admitted on meropenem empirically.  Urology was consulted and exchanged catheter.  His urine culture from [facility] returned with MRSA and Pseudomonas and his antibiotic was changed to Zosyn and Zyvox.  He was noted to have right toe osteomyelitis.  LPS and Ortho were consulted and performed debridement with right second toe flexor tendon release 6/3/2019. Dr. Marx with infectious disease was consulted for antibiotic management.      Interval Problem Update  Patient is confused he does not remember what he did yesterday or where he lives.  He cannot tell me if he is homeless or lives in a house.  He denies any pain.  I attempted to call POA, no answer    Consultants/Specialty  Infectious disease  LPS, Ortho    Code Status  Full    Disposition  SNF    Review of Systems  Review of Systems   Constitutional: Negative for malaise/fatigue.   HENT: Negative for congestion.    Respiratory: Negative for cough and shortness of breath.    Cardiovascular: Negative for chest pain.   Gastrointestinal: Negative for abdominal pain and nausea.   Genitourinary: Negative for dysuria.   Musculoskeletal: Negative for joint pain and myalgias.   Neurological: Negative for dizziness.        Physical Exam  Temp:  [36.3 °C (97.3 °F)-37 °C (98.6 °F)] 36.3 °C (97.3 °F)  Pulse:  [60-77] 73  Resp:  [18] 18  BP: (148-166)/(73-81) 152/74  SpO2:  [94 %-99 %] 99 %    Physical Exam   Constitutional: He appears well-developed and well-nourished.   HENT:   Head: Normocephalic.   Mouth/Throat:  Oropharynx is clear and moist.   Eyes: Conjunctivae are normal. Right eye exhibits no discharge. Left eye exhibits no discharge.   Cardiovascular: Normal rate and regular rhythm.    Pulmonary/Chest: Effort normal. He has no wheezes.   Abdominal: Soft. There is no tenderness. There is no rebound and no guarding.   Ostomy and suprapubic cath in place   Musculoskeletal: He exhibits no edema.   Right foot with clean dressings and boot in place   Neurological: He is alert.   Oriented to self, disoriented to place and date  Equal strength in all extremities   Skin: Skin is warm and dry.   Nursing note and vitals reviewed.      Fluids    Intake/Output Summary (Last 24 hours) at 06/04/19 1531  Last data filed at 06/04/19 1530   Gross per 24 hour   Intake              780 ml   Output             4100 ml   Net            -3320 ml       Laboratory  Recent Labs      06/03/19   1225   WBC  6.8   RBC  4.07*   HEMOGLOBIN  11.5*   HEMATOCRIT  36.5*   MCV  89.7   MCH  28.3   MCHC  31.5*   RDW  48.9   PLATELETCT  327   MPV  8.5*         Recent Labs      06/03/19   1225   APTT  30.7   INR  1.12               Imaging  US-BERNICE SINGLE LEVEL UNILAT RIGHT   Final Result      IR-DRAIN-BLADDER SUPRAPUB W/CATH   Final Result      1. Fluoroscopic guided exchange of the 14 Albanian locking loop suprapubic catheter for a new 16 Albanian Humphrey catheter.   3. Tubes cystogram demonstrating appropriate positioning of the new Humphrey catheter.      DX-TOE(S) 2+ RIGHT   Final Result         Soft tissue swelling about the second digit. Rarefaction of the tip of the second distal phalanx, concerning for osteomyelitis.           Assessment/Plan  * Urinary tract infection associated with cystostomy catheter (HCC)- (present on admission)   Assessment & Plan    Complicated by presence of suprapubic catheter, with surrounding cellulitis & skin breakdown.  Catheter has not been changed in several months per patient report.  Has history of drug resistant UTI's.  UCx  from outside facility growing MRSA and pseudomonoas  On zosyn and zyvox  Infectious disease consulted and following     Acute encephalopathy- (present on admission)   Assessment & Plan    Likely secondary to urinary tract infection.  We will continue IV antibiotics and monitor closely.  Improved but has significant short term memory loss  Has POA     Osteomyelitis of right foot ([facility])   Assessment & Plan    Right second toe  Seen by LPS, ortho consulted and ID following   Status post debridement 6/3     Sepsis ([facility])- (present on admission)   Assessment & Plan    This is sepsis (without associated acute organ dysfunction).  Source: urinary tract infection.  UCx growing MRSA and pseudomonoas  Resolving with antibiotics     CRF (chronic renal failure), stage 3 (moderate) ([facility])- (present on admission)   Assessment & Plan    At baseline  Avoid nephrotoxins.  Renally dose all appropriate medications.     BPH (benign prostatic hyperplasia)- (present on admission)   Assessment & Plan    S/p suprapubic catheter placement.     Crohn's disease ([facility])- (present on admission)   Assessment & Plan    S/p colostomy.  Wound care has been consulted for ostomy management.     Type 2 diabetes mellitus, with long-term current use of insulin ([facility])- (present on admission)   Assessment & Plan    On BID humulin 70/30 at home, has not needed here  A1c 6.5  Added Accu-Cheks ACHS with low scale SSI as required.  Hypoglycemia protocol in place if needed.  Diet changed to diabetic.          VTE prophylaxis: heparin

## 2019-06-04 NOTE — PROGRESS NOTES
· 2 RN skin check completed with PREET Michelle.   · Devices in place: piv, suprapubic catheter, RLQ ostomy.    · Skin assessed under devices: yes/intact   · Confirmed pressure ulcers found on N/A  · New potential pressure ulcers noted on N/A. Wound consult placed? N/A. Photo uploaded? N/A.   · Skin assessment: RLE redness ,swelling and scabs, POD#1 right second digit (dressing in place), LLE redness. Redness around suprapubic catheter site. Skin aroun ostomy is red and denuded, Moisture fissure on sacrum. Buttocks red, blanching.   · The following interventions are in place: Patient up in chair frequently throughout day, q2h turns when in bed. Joey cream in use. Suprapubic site kept clean. Waffle overlay and seat cushion in use. Ostomy care per order.

## 2019-06-04 NOTE — THERAPY
"Occupational Therapy Evaluation completed.   Functional Status: Pt is a 70 y/o male admitted with AMS found to have UTI. He also had RLE cellulitis, now s/p R second toe flexor tendon release. He was pleasantly confused throughout, able to follow direction appropriately. Able to doff L sock and don L slipper. Mango sit<>stand. Mango functional mobility with FWW. He declined further ADLs, will continue to assess. He is limited by weakness, fatigue, impaired balance, and impaired cognition which impacts independence in self care and functional mobility.  Plan of Care: Will benefit from Occupational Therapy 3 times per week  Discharge Recommendations:  Equipment: Will Continue to Assess for Equipment Needs. Recommend inpatient transitional care services for continued occupational therapy services.       See \"Rehab Therapy-Acute\" Patient Summary Report for complete documentation.    "

## 2019-06-05 PROBLEM — A41.9 SEPSIS (HCC): Status: RESOLVED | Noted: 2019-05-29 | Resolved: 2019-06-05

## 2019-06-05 LAB
ANION GAP SERPL CALC-SCNC: 10 MMOL/L (ref 0–11.9)
BUN SERPL-MCNC: 21 MG/DL (ref 8–22)
CALCIUM SERPL-MCNC: 9.4 MG/DL (ref 8.5–10.5)
CHLORIDE SERPL-SCNC: 101 MMOL/L (ref 96–112)
CO2 SERPL-SCNC: 23 MMOL/L (ref 20–33)
CREAT SERPL-MCNC: 2.35 MG/DL (ref 0.5–1.4)
GLUCOSE BLD-MCNC: 130 MG/DL (ref 65–99)
GLUCOSE BLD-MCNC: 140 MG/DL (ref 65–99)
GLUCOSE BLD-MCNC: 142 MG/DL (ref 65–99)
GLUCOSE BLD-MCNC: 90 MG/DL (ref 65–99)
GLUCOSE SERPL-MCNC: 116 MG/DL (ref 65–99)
POTASSIUM SERPL-SCNC: 4.7 MMOL/L (ref 3.6–5.5)
SODIUM SERPL-SCNC: 134 MMOL/L (ref 135–145)

## 2019-06-05 PROCEDURE — A9270 NON-COVERED ITEM OR SERVICE: HCPCS | Performed by: INTERNAL MEDICINE

## 2019-06-05 PROCEDURE — 36415 COLL VENOUS BLD VENIPUNCTURE: CPT

## 2019-06-05 PROCEDURE — 80048 BASIC METABOLIC PNL TOTAL CA: CPT

## 2019-06-05 PROCEDURE — 700111 HCHG RX REV CODE 636 W/ 250 OVERRIDE (IP): Performed by: HOSPITALIST

## 2019-06-05 PROCEDURE — 338960 DRESSING AG HYDROFIBER,AQUACEL RIBBON: Performed by: INTERNAL MEDICINE

## 2019-06-05 PROCEDURE — 700102 HCHG RX REV CODE 250 W/ 637 OVERRIDE(OP): Performed by: NURSE PRACTITIONER

## 2019-06-05 PROCEDURE — 700102 HCHG RX REV CODE 250 W/ 637 OVERRIDE(OP): Performed by: INTERNAL MEDICINE

## 2019-06-05 PROCEDURE — 770006 HCHG ROOM/CARE - MED/SURG/GYN SEMI*

## 2019-06-05 PROCEDURE — 700102 HCHG RX REV CODE 250 W/ 637 OVERRIDE(OP): Performed by: HOSPITALIST

## 2019-06-05 PROCEDURE — 99232 SBSQ HOSP IP/OBS MODERATE 35: CPT | Performed by: INTERNAL MEDICINE

## 2019-06-05 PROCEDURE — 302098 PASTE RING (FLAT): Performed by: INTERNAL MEDICINE

## 2019-06-05 PROCEDURE — A9270 NON-COVERED ITEM OR SERVICE: HCPCS | Performed by: HOSPITALIST

## 2019-06-05 PROCEDURE — A9270 NON-COVERED ITEM OR SERVICE: HCPCS | Performed by: NURSE PRACTITIONER

## 2019-06-05 PROCEDURE — 82962 GLUCOSE BLOOD TEST: CPT | Mod: 91

## 2019-06-05 RX ORDER — BUPRENORPHINE 15 UG/H
15 PATCH TRANSDERMAL
Status: DISCONTINUED | OUTPATIENT
Start: 2019-06-05 | End: 2019-06-05

## 2019-06-05 RX ORDER — TRAMADOL HYDROCHLORIDE 50 MG/1
50-100 TABLET ORAL EVERY 6 HOURS PRN
Status: DISCONTINUED | OUTPATIENT
Start: 2019-06-05 | End: 2019-06-05

## 2019-06-05 RX ORDER — DIPHENHYDRAMINE HCL 25 MG
12.5 TABLET ORAL EVERY 6 HOURS PRN
Status: DISCONTINUED | OUTPATIENT
Start: 2019-06-05 | End: 2019-06-06

## 2019-06-05 RX ORDER — TRAMADOL HYDROCHLORIDE 50 MG/1
50 TABLET ORAL EVERY 6 HOURS PRN
Status: DISCONTINUED | OUTPATIENT
Start: 2019-06-05 | End: 2019-06-06

## 2019-06-05 RX ADMIN — MICONAZOLE NITRATE: 20 CREAM TOPICAL at 04:52

## 2019-06-05 RX ADMIN — BUDESONIDE AND FORMOTEROL FUMARATE DIHYDRATE 2 PUFF: 160; 4.5 AEROSOL RESPIRATORY (INHALATION) at 17:33

## 2019-06-05 RX ADMIN — TRAMADOL HYDROCHLORIDE 50 MG: 50 TABLET, FILM COATED ORAL at 16:34

## 2019-06-05 RX ADMIN — MULTIPLE VITAMINS W/ MINERALS TAB 1 TABLET: TAB at 04:52

## 2019-06-05 RX ADMIN — BUDESONIDE AND FORMOTEROL FUMARATE DIHYDRATE 2 PUFF: 160; 4.5 AEROSOL RESPIRATORY (INHALATION) at 04:52

## 2019-06-05 RX ADMIN — NYSTATIN: 100000 POWDER TOPICAL at 04:52

## 2019-06-05 RX ADMIN — OXYCODONE HYDROCHLORIDE 10 MG: 10 TABLET ORAL at 04:52

## 2019-06-05 RX ADMIN — HYDROCORTISONE: 10 CREAM TOPICAL at 08:58

## 2019-06-05 RX ADMIN — HEPARIN SODIUM 5000 UNITS: 5000 INJECTION, SOLUTION INTRAVENOUS; SUBCUTANEOUS at 20:19

## 2019-06-05 RX ADMIN — TAMSULOSIN HYDROCHLORIDE 0.4 MG: 0.4 CAPSULE ORAL at 08:58

## 2019-06-05 RX ADMIN — ACETAMINOPHEN 650 MG: 325 TABLET, FILM COATED ORAL at 23:55

## 2019-06-05 RX ADMIN — TRAMADOL HYDROCHLORIDE 50 MG: 50 TABLET, FILM COATED ORAL at 22:56

## 2019-06-05 RX ADMIN — DIPHENHYDRAMINE HCL 25 MG: 25 TABLET ORAL at 07:25

## 2019-06-05 RX ADMIN — HEPARIN SODIUM 5000 UNITS: 5000 INJECTION, SOLUTION INTRAVENOUS; SUBCUTANEOUS at 04:52

## 2019-06-05 ASSESSMENT — ENCOUNTER SYMPTOMS
NAUSEA: 0
VOMITING: 0
SPEECH CHANGE: 0
MYALGIAS: 0
SENSORY CHANGE: 0
FEVER: 0
ABDOMINAL PAIN: 0
COUGH: 0
SHORTNESS OF BREATH: 0

## 2019-06-05 NOTE — PROGRESS NOTES
" LIMB PRESERVATION SERVICE      HPI:  69 y.o. male, with a past medical history that includes uncontrolled type 2 diabetes,  colostomy, OA, BPH, renal insufficiency, COPD, admitted 5/29/2019 for UTI (urinary tract infection)  Sepsis (HCC).   LPS has been consulted for his right 2nd toe with necrotic tip by wound care.  This wound is chronic per the pt since 2017.    SURGERY DATE: 6/3/19 by Dr. Gutierrez  PROCEDURE: 1.  Right second toe flexor tendon release.  2.  Debridement of callus, sharp, with a knife.    6/5/2019: Patient denies fevers, chills, nausea, vomiting.  Pain controlled.     /68   Pulse 100   Temp 36.5 °C (97.7 °F) (Temporal)   Resp 16   Ht 1.803 m (5' 11\")   Wt 112.4 kg (247 lb 12.8 oz)   SpO2 95%   BMI 34.56 kg/m²     SURGICAL SITE ASSESSMENT:    Pedal Pulses: +2 DP, +2 PT  Foot warm    Stab site with suture approximated, without erythema, edema, drainage    Right second toe distal ulcer:  Clean 95% pink wound bed, minimal callus  No erythema, edema  Minimal drainage     Wound care to be completed by RN    DIABETES MANAGEMENT:  Blood glucose: 140  A1c:   Lab Results   Component Value Date/Time    HBA1C 6.5 (H) 05/31/2019 12:21 AM        Diabetes education: consulted     INFECTION MANAGEMENT:  WBC: 6.8 on 6/3/2019  Wound culture results:   Results     Procedure Component Value Units Date/Time    Blood Culture [[id number]] Collected:  05/29/19 0540    Order Status:  Completed Specimen:  Blood from Peripheral Updated:  06/03/19 0700     Significant Indicator NEG     Source BLD     Site PERIPHERAL     Culture Result No growth after 5 days of incubation.    Narrative:       From different peripheral sites, if not done within the last  24 hours (Per Hospital Policy: Only change specimen source to  \"Line\" if specified by physician order)  Right AC    Blood Culture [[id number]] Collected:  05/29/19 0550    Order Status:  Completed Specimen:  Blood from Peripheral Updated:  06/03/19 0700     " "Significant Indicator NEG     Source BLD     Site PERIPHERAL     Culture Result No growth after 5 days of incubation.    Narrative:       From different peripheral sites, if not done within the last  24 hours (Per Hospital Policy: Only change specimen source to  \"Line\" if specified by physician order)  Right Hand    URINE CULTURE(NEW) [[id number]]  (Abnormal)  (Susceptibility) Collected:  05/30/19 0956    Order Status:  Completed Specimen:  Urine from Urine, Suprapubic Updated:  06/02/19 1026     Significant Indicator POS (POS)     Source UR     Site URINE, SUPRAPUBIC     Culture Result - (A)      Pseudomonas aeruginosa  ,000 cfu/mL  P.aeruginosa may develop resistance during prolonged therapy  with all antibiotics. Isolates that are initially susceptible  may become resistant within three to four days after  initiation of therapy. Testing of repeat isolates may be  warranted.   (A)      Enterococcus faecalis  10-50,000 cfu/mL   (A)    Narrative:       Collected By:[id number] SILAS TITUS  Indication for culture:->Patient WITHOUT an indwelling Humphrey  catheter in place with new onset of Dysuria, Frequency,  Urgency, and/or Suprapubic pain  Collected By:[id number] SILAS TITUS    Culture & Susceptibility     ENTEROCOCCUS FAECALIS     Antibiotic Sensitivity Microscan Unit Status    Ampicillin Sensitive <=2 mcg/mL Final    Method: CARLITO    Daptomycin Sensitive <=0.5 mcg/mL Final    Method: CARLITO    Nitrofurantoin Sensitive <=32 mcg/mL Final    Method: CARLITO    Penicillin Sensitive 8 mcg/mL Final    Method: CARLITO    Tetracycline Resistant >8 mcg/mL Final    Method: CARLITO              PSEUDOMONAS AERUGINOSA     Antibiotic Sensitivity Microscan Unit Status    Amikacin Sensitive <=16 mcg/mL Final    Method: CARLITO    Cefepime Intermediate 16 mcg/mL Final    Method: CARLITO    Ceftazidime Sensitive 8 mcg/mL Final    Method: CARLITO    Ciprofloxacin Resistant >2 mcg/mL Final    Method: CARLITO    Gentamicin Sensitive <=4 mcg/mL Final    " Method: CARLITO    Imipenem Sensitive <=1 mcg/mL Final    Method: CARLITO    Levofloxacin Resistant >4 mcg/mL Final    Method: CARLITO    Meropenem Sensitive <=1 mcg/mL Final    Method: CARLITO    Pip/Tazobactam Sensitive 64 mcg/mL Final    Method: CARLITO    Piperacillin Sensitive 32 mcg/mL Final    Method: CARLITO    Tobramycin Sensitive <=4 mcg/mL Final    Method: CARLITO                       GRAM STAIN ONLY [[id number]] Collected:  05/30/19 0956    Order Status:  Completed Specimen:  Urine Updated:  06/02/19 1026     Significant Indicator NEG     Source UR     Site URINE, SUPRAPUBIC     Gram Stain Result Few WBCs.  Few Gram negative rods.  Rare Gram positive cocci.      Narrative:       Collected By:[id number] SILAS TITUS  Indication for culture:->Patient WITHOUT an indwelling Humphrey  catheter in place with new onset of Dysuria, Frequency,  Urgency, and/or Suprapubic pain  Collected By:[id number] SILAS TITUS    URINALYSIS [[id number]]  (Abnormal) Collected:  05/30/19 0956    Order Status:  Completed Specimen:  Urine from Urine, Suprapubic Updated:  05/30/19 1014     Color Yellow     Character Cloudy (A)     Specific Gravity 1.011     Ph 6.5     Glucose Negative mg/dL      Ketones Trace (A) mg/dL      Protein 100 (A) mg/dL      Bilirubin Negative     Urobilinogen, Urine 0.2     Nitrite Positive (A)     Leukocyte Esterase Large (A)     Occult Blood Large (A)     Micro Urine Req Microscopic    Narrative:       Collected By:[id number] SILAS TITUS  Indication for culture:->Patient WITHOUT an indwelling Humphrey  catheter in place with new onset of Dysuria, Frequency,  Urgency, and/or Suprapubic pain                 PLAN:    Wound care: Resume previous Wound Care orders.    Antibiotics: none, completed yesterday     Weight Bearing Status: Heel weight bearing    Offloading: offloading shoe, ordered     PT Consult: Yes    Diabetes Education: already previously consulted    Plan to return to O.R.: No      DISCHARGE PLAN:    Disposition:  TBD    Follow-up: LPS rounds in [facility]. 6/21/19. sutures to be removed approximately 2 weeks post-op          DOREEN MckeonRRachaelN.    If any questions or concerns, please call [phone]

## 2019-06-05 NOTE — PROGRESS NOTES
Hospital Medicine Daily Progress Note    Date of Service  6/5/2019    Chief Complaint  69 y.o. male admitted 5/29/2019 with dysuria    Hospital Course    Past medical history of Crohn's with ostomy, suprapubic cath, diabetes.  Patient has not had suprapubic cath changed for several months despite his POA asking him to have home health assess him.  He complains of painful urination and has a history of multiple drug-resistant urine infections.  He was admitted on meropenem empirically.  Urology was consulted and exchanged catheter.  His urine culture from [facility] returned with MRSA and Pseudomonas and his antibiotic was changed to Zosyn and Zyvox.  He was noted to have right toe osteomyelitis.  LPS and Ortho were consulted and performed debridement with right second toe flexor tendon release 6/3/2019. Dr. Marx with infectious disease was consulted for antibiotic management and completed course.      Interval Problem Update  Antibiotics stopped by infectious disease  Yesterday patient could not remember where he lived or that he had surgery. Today he remembers he lives in [place] and is at [facility]  However, he is unable to tell me how much insulin he takes. He does not know why he takes Humira, and told me it is a pill. He does not remember getting his suprapubic cath exchanged. Currently, patient does not have the capacity to make medical decision.  I spoke with his POA Nathaly and he has memory loss with narcotics. He was recently started on buprenorphine patch and did not have memory issues. At baseline he normally does not know his medications or what day it is. Nathaly was agreeable to trying to get patient into group home. I will try to wean off opioids to see if there is improvement of his memory.  He denies pain or dysuria  Wound care to return today    Consultants/Specialty  Infectious disease  LPS, Ortho    Code Status  Full    Disposition  Needs placement with home health  Has POA    Review of  Systems  Review of Systems   Constitutional: Negative for malaise/fatigue.   Respiratory: Negative for cough and shortness of breath.    Cardiovascular: Negative for chest pain.   Gastrointestinal: Negative for abdominal pain and nausea.   Genitourinary: Negative for dysuria.   Musculoskeletal: Negative for joint pain.        Physical Exam  Temp:  [36.3 °C (97.4 °F)-37.1 °C (98.8 °F)] 36.5 °C (97.7 °F)  Pulse:  [] 100  Resp:  [16-18] 16  BP: (114-139)/(68-88) 123/68  SpO2:  [95 %-97 %] 95 %    Physical Exam   Constitutional: He appears well-developed and well-nourished.   HENT:   Head: Normocephalic.   Mouth/Throat: Oropharynx is clear and moist.   Eyes: Conjunctivae are normal.   Cardiovascular: Normal rate and regular rhythm.    Pulmonary/Chest: Effort normal. He has no wheezes.   Abdominal: Soft. There is no tenderness.   Ostomy and suprapubic cath in place. Mild erythema around suprapubic cath, no tenderness   Musculoskeletal: He exhibits no edema.   Right foot with clean dressings and boot in place   Neurological: He is alert.   Oriented to self and place, disoriented to date  Equal strength in all extremities   Skin: Skin is warm and dry.   Nursing note and vitals reviewed.      Fluids    Intake/Output Summary (Last 24 hours) at 06/05/19 1200  Last data filed at 06/05/19 1000   Gross per 24 hour   Intake              720 ml   Output             4500 ml   Net            -3780 ml       Laboratory  Recent Labs      06/03/19   1225   WBC  6.8   RBC  4.07*   HEMOGLOBIN  11.5*   HEMATOCRIT  36.5*   MCV  89.7   MCH  28.3   MCHC  31.5*   RDW  48.9   PLATELETCT  327   MPV  8.5*     Recent Labs      06/05/19   0047   SODIUM  134*   POTASSIUM  4.7   CHLORIDE  101   CO2  23   GLUCOSE  116*   BUN  21   CREATININE  2.35*   CALCIUM  9.4     Recent Labs      06/03/19   1225   APTT  30.7   INR  1.12               Imaging  US-BERNICE SINGLE LEVEL UNILAT RIGHT   Final Result      IR-DRAIN-BLADDER SUPRAPUB W/CATH   Final Result       1. Fluoroscopic guided exchange of the 14 Slovenian locking loop suprapubic catheter for a new 16 Slovenian Humphrey catheter.   3. Tubes cystogram demonstrating appropriate positioning of the new Humphrey catheter.      DX-TOE(S) 2+ RIGHT   Final Result         Soft tissue swelling about the second digit. Rarefaction of the tip of the second distal phalanx, concerning for osteomyelitis.           Assessment/Plan  * Urinary tract infection associated with cystostomy catheter (HCC)- (present on admission)   Assessment & Plan    Complicated by presence of suprapubic catheter, with surrounding cellulitis & skin breakdown.  Catheter has not been changed in several months per patient report.  Has history of drug resistant UTI's.  UCx from outside facility growing MRSA and pseudomonoas  On zosyn and zyvox  Infectious disease consulted and completed antibiotics     Acute encephalopathy- (present on admission)   Assessment & Plan    Likely secondary to urinary tract infection.    Improved but short term memory loss  Has POA     Osteomyelitis of right foot (HCC)   Assessment & Plan    Right second toe  Seen by LPS, ortho consulted and ID consulted  Completed course of antibiotics  Status post debridement 6/3     CRF (chronic renal failure), stage 3 (moderate) ([facility])- (present on admission)   Assessment & Plan    At baseline  Avoid nephrotoxins.  Renally dose all appropriate medications.     BPH (benign prostatic hyperplasia)- (present on admission)   Assessment & Plan    S/p suprapubic catheter placement.     Crohn's disease (HCC)- (present on admission)   Assessment & Plan    S/p colostomy.  Wound care has been consulted for ostomy management.     Type 2 diabetes mellitus, with long-term current use of insulin ([facility])- (present on admission)   Assessment & Plan    On BID humulin 70/30 at home, has not needed here  A1c 6.5  Added Accu-Cheks ACHS with low scale SSI as required.  Hypoglycemia protocol in place if needed.  Diet changed  to diabetic.          VTE prophylaxis: heparin

## 2019-06-05 NOTE — CONSULTS
DATE OF SERVICE:  06/04/2019    CHIEF COMPLAINT:  _right foot pain.    HISTORY OF PRESENT ILLNESS:  Patient is a 69-year-old male who was initially   transferred from outside hospital for urinary tract infection, altered level   of consciousness.  He has significant medical problems with BPH, COPD,   diabetes, CKD, Crohn's disease.  Orthopedic was consulted by the [facility] service   for a right second toe pressure ulcer.  He previously had a right first toe   amputation.  Currently, the patient does not have any pain in the toe and   states that it has only been there for a short time  Past Medical History:   Diagnosis Date   • Arthritis    • Benign prostate hyperplasia    • Colostomy in place (HCC)    • COPD (chronic obstructive pulmonary disease) (HCC)    • Diabetes ([facility])    • Renal disorder      Past Surgical History:   Procedure Laterality Date   • POP BY LAPAROSCOPY N/A 3/31/2019    Procedure: CHOLECYSTECTOMY, LAPAROSCOPIC-conversion to open cholecystectomy;  Surgeon: Gunnar Alonzo M.D.;  Location: [facility];  Service: General   • PB ERCP,DIAGNOSTIC  3/29/2019    Procedure: ERCP, DIAGNOSTIC;  Surgeon: Tao Saab M.D.;  Location: [facility];  Service: Gastroenterology   • TOE AMPUTATION Right 10/25/2017    Procedure: TOE AMPUTATION-GREAT TOE;  Surgeon: Bobby Alejo M.D.;  Location: [facility];  Service: Orthopedics     Allergies   Allergen Reactions   • Ceftriaxone      Has tolerated Merrem, Zosyn, and Unasyn   • Ezetimibe    • Flagyl [Metronidazole]    • Infliximab    • Levaquin    • Lovastatin    • Simvastatin    • Vancomycin      Family History   Problem Relation Age of Onset   • No Known Problems Mother    • No Known Problems Father          REVIEW OF SYSTEMS:  Please refer to the chart.  The patient was minimally   talkative and not able to give a full review.    PHYSICAL EXAMINATION:  GENERAL:  Age appropriate male.  He appears slightly ill, morbidly obese,    unable to carry on a full conversation.  CARDIOVASCULAR:  Distal pulses present.  Regular rate.  RESPIRATORY:  Equal rise and fall of chest, unlabored.  MUSCULOSKELETAL:  Left foot revealed intact range of motion and the right foot   revealed a right distal second toe ulcer.  He did have some surrounding   erythema.    DIAGNOSTIC STUDIES:  Reveal a previous right great toe amputation.  The second   toe evidence of an ulcer on plain radiographs.    ASSESSMENT AND PLAN:  The patient likely has a pressure ulcer on the second   toe from a hammertoe deformity.  The patient would benefit from a right   flexure toe tendon release as well as I and D of the distal toe.  At this   point, I do not think that he needs an amputation.  The patient should   continue antibiotics and have an offloading shoe for this wound.  Please refer   to the procedure note for the state of procedure.  The patient will be   followed by the LPS service and wound care.       ____________________________________     MD VENKAT Salmon / O[place]IA    DD:  06/04/2019 16:10:08  DT:  06/04/2019 19:38:48    D#:  [id number]  Job#:  [id number]

## 2019-06-05 NOTE — PROGRESS NOTES
· 2 RN skin check complete with Eriberto OVIEDO.   · Devices in place ostomy, suprapubic, piv.  · Skin assessed under devices yes.  · Confirmed pressure ulcers found on n/a.  · New potential pressure ulcers noted on n/a. Wound consult placed? n/a. Photo uploaded? n/a.   · The following interventions are in place pt up to chair frequently, q2h while in bed, mckinley cream, nystatin, barrier wipes, waffle cushion, seat cushion.    RLE redness and swelling, scabs. Right second digit dressing in place, LLE redness. Redness around suprapubic site. Skin around ostomy is red, denuded. Moisture fissue on sacrum. Buttocks is red, blanching. .Pannus is red

## 2019-06-05 NOTE — PROGRESS NOTES
Assumed patient care at 0700. Received report from night shift. Assessment completed. A&Ox 2, self and place. Very forgetful, short-term memory, requires reminding and orientation. High fall precautions in place; alarm in use, pt wearing treaded socks, personal possessions and call light placed within reach. RLQ ostomy, CDI. Suprapubic catheter in place, site care provided, Patient is post procedure day #2 (right flexor tendon release of toe), dressing to right second digit, CDI, tubigrip to lower leg. Offloading boot in use when out of bed. Pt ambulates to with assistance of one and use of FWW, up to chair for all meals. POC discussed with pt, communication board updated. Patient denies any additional needs at this time.

## 2019-06-05 NOTE — PROGRESS NOTES
Infectious Disease Progress Note    Author: Meghan Marx M.D. Date & Time of service: 2019  2:34 PM    Chief Complaint:  Diabetic foot infection/UTI    Interval History:  6/3/2019-no fevers.  Evaluated by orthopedics and will be taken to surgery today.  WBC 6.8  2019-no fevers.  He says he feels much better.  He underwent right second toe flexor tendon release and debridement of the callus.  2019 no fevers.  No new issues overnight.  The wound looks good.  Labs Reviewed and Wound Reviewed.    Review of Systems:  Review of Systems   Constitutional: Positive for malaise/fatigue. Negative for fever.   HENT: Negative for hearing loss.    Respiratory: Negative for cough and shortness of breath.    Cardiovascular: Positive for leg swelling. Negative for chest pain.   Gastrointestinal: Negative for nausea and vomiting.   Genitourinary: Negative for dysuria.        Some pain around the suprapubic catheter   Musculoskeletal: Negative for myalgias.   Neurological: Negative for sensory change and speech change.       Hemodynamics:  Temp (24hrs), Av.6 °C (97.9 °F), Min:36.3 °C (97.4 °F), Max:37.1 °C (98.8 °F)  Temperature: 36.5 °C (97.7 °F)  Pulse  Av.4  Min: 58  Max: 131   Blood Pressure : 123/68       Physical Exam:  Physical Exam   Constitutional: He is oriented to person, place, and time. No distress.   Morbidly obese  Looks chronically ill   HENT:   Mouth/Throat: No oropharyngeal exudate.   Eyes: Pupils are equal, round, and reactive to light. No scleral icterus.   Neck: Neck supple.   Cardiovascular: Regular rhythm.    No murmur heard.  Pulmonary/Chest: He has no wheezes. He has no rales.   Abdominal: Soft. There is no tenderness. There is no rebound and no guarding.   Genitourinary:   Genitourinary Comments: Catheter in the pubic area   Musculoskeletal: He exhibits edema. He exhibits no tenderness.   The surgery site is clean.   Lymphadenopathy:     He has no cervical adenopathy.   Neurological:  He is alert and oriented to person, place, and time.   No focal neurological deficit   Skin: Skin is warm. No erythema.   Chronic changes of the lower extremities  Edema present   Vitals reviewed.      Meds:    Current Facility-Administered Medications:   •  diphenhydrAMINE  •  tramadol  •  hydrocortisone  •  Notify provider if pain remains uncontrolled **AND** Use the numeric rating scale (NRS-11) on regular floors and Critical-Care Pain Observation Tool (CPOT) on ICUs/Trauma to assess pain **AND** Pulse Ox (Oximetry) **AND** Pharmacy Consult Request **AND** If patient difficult to arouse and/or has respiratory depression, stop any opiates that are currently infusing and call a Rapid Response. **AND** [DISCONTINUED] oxyCODONE immediate-release **AND** [DISCONTINUED] oxyCODONE immediate-release **AND** [DISCONTINUED] morphine injection  •  insulin regular **AND** Accu-Chek ACHS **AND** NOTIFY MD and PharmD **AND** glucose **AND** dextrose 10% bolus  •  budesonide-formoterol  •  tamsulosin  •  therapeutic multivitamin-minerals  •  acetaminophen  •  ondansetron  •  ondansetron  •  senna-docusate **AND** polyethylene glycol/lytes **AND** magnesium hydroxide **AND** bisacodyl  •  NS  •  heparin  •  miconazole 2%-zinc oxide  •  nystatin    Labs:  Recent Labs      06/03/19   1225   WBC  6.8   RBC  4.07*   HEMOGLOBIN  11.5*   HEMATOCRIT  36.5*   MCV  89.7   MCH  28.3   RDW  48.9   PLATELETCT  327   MPV  8.5*   NEUTSPOLYS  55.10   LYMPHOCYTES  34.70   MONOCYTES  5.60   EOSINOPHILS  3.90   BASOPHILS  0.40     Recent Labs      06/05/19   0047   SODIUM  134*   POTASSIUM  4.7   CHLORIDE  101   CO2  23   GLUCOSE  116*   BUN  21     Recent Labs      06/05/19   0047   CREATININE  2.35*       Imaging:  Dx-toe(s) 2+ Right    Result Date: 5/31/2019 5/31/2019 4:55 PM HISTORY/REASON FOR EXAM:  right second toe distal non healing wound. Right foot 2nd digit has a non healing wound TECHNIQUE/EXAM DESCRIPTION AND NUMBER OF VIEWS:  3  views of the RIGHT toes. 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 No rarefaction or destruction of the stump. Soft tissue swelling in the second digit with rarefaction of the tip of the second distal phalanx.     Soft tissue swelling about the second digit. Rarefaction of the tip of the second distal phalanx, concerning for osteomyelitis.    Ir-drain-bladder Suprapub W/cath    Result Date: 6/1/2019 5/31/2019 3:04 PM HISTORY/REASON FOR EXAM:  Exchange locking loop catheter for a 16 [place] Islander Humphrey catheter. TECHNIQUE/EXAM DESCRIPTION AND NUMBER OF VIEWS:  Ultrasound and fluoroscopic guided exchange of a suprapubic bladder catheter placement, Cystogram. Fluoroscopy time: 0.8 minutes. Fluoroscopic images: 3. PROCEDURE:  Informed consent was obtained. Conscious sedation with 100 mcg Fentanyl and 2 mg Versed was administered during the procedure with appropriate continuous patient monitoring by the radiology nurse. Conscious sedation duration was 30 minutes. The existing 14 [place] Islander locking loop suprapubic catheter was prepped and draped in sterile manner. The skin entry site of the suprapubic catheter was anesthetized with 7 mL 1% lidocaine. Under fluoroscopic visualization 15 mL of contrast was injected through the existing tube confirming intracystic positioning. A sidehole was then cut in the suprapubic catheter allowing for placement of a wire and the urinary bladder through the existing catheter. Existing catheter was removed over the wire under fluoroscopic visualization. Dilation of the catheter entry site was performed up to 16 Cook Islander. A new 16 [place] Islander Humphrey catheter was then advanced over the wire into the urinary bladder. The retention balloon was inflated with 7 mL of saline. A final contrast injection through the tube was performed utilizing 15 mL Omnipaque intravenous contrast confirming intracystic positioning of the new tube with no  evidence of extravasation. The patient tolerated the procedure poorly with no evidence of complication. COMPARISON: Suprapubic catheter placement imaging dated 2019 FINDINGS:  The cystogram shows satisfactory catheter position with all sideholes within the urinary bladder. There is no extravasation.     1. Fluoroscopic guided exchange of the 14 Congolese locking loop suprapubic catheter for a new 16 Congolese Humphrey catheter. 3. Tubes cystogram demonstrating appropriate positioning of the new Humphrey catheter.    Us-kemi Single Level Unilat Right    Result Date: 2019   Vascular Laboratory  Conclusions  1.  Normal resting right lower extremity arterial perfusion.  HANS MAYERS  Age:    69    Gender:     M  MRN:    [id number]  :    1950      BSA:  Exam Date:     2019 10:49  Room #:     Inpatient  Priority:     Routine  Ht (in):             Wt (lb):  Ordering Physician:        NARCISA VEGA  Referring Physician:       NARCISA VEGA  Sonographer:               Mati Diehl RVT  Study Type:                Complete Unilateral  Technical Quality:         Adequate  Indications:     Cyanosis  CPT Codes:       74437  ICD Codes:       782.5  History:         Discoloration of right lower extremity; history of diabetes  Limitations:                 RIGHT  Waveform            Systolic BPs (mmHg)                             134           Brachial  Triphasic                                Common Femoral  Triphasic                  144           Posterior Tibial  Triphasic                  136           Dorsalis Pedis                                           Peroneal                             1.08          KEMI                                           TBI                       LEFT  Waveform        Systolic BPs (mmHg)                             131           Brachial                                           Common Femoral                                           Posterior Tibial                           "                 Dorsalis Pedis                                           Peroneal                                           BERNICE                                           TBI  Findings  Right.  Doppler waveform of the common femoral artery is of high amplitude and  triphasic.  Doppler waveforms at the ankle are brisk and triphasic.  Ankle-brachial index is normal.  Additional testing was not performed in accordance with lower extremity  arterial evaluation protocol.  Alka Hermosillo M.D.  (Electronically Signed)  Final Date:      02 June 2019                   18:17      Micro:  Results     Procedure Component Value Units Date/Time    Blood Culture [[id number]] Collected:  05/29/19 0540    Order Status:  Completed Specimen:  Blood from Peripheral Updated:  06/03/19 0700     Significant Indicator NEG     Source BLD     Site PERIPHERAL     Culture Result No growth after 5 days of incubation.    Narrative:       From different peripheral sites, if not done within the last  24 hours (Per Hospital Policy: Only change specimen source to  \"Line\" if specified by physician order)  Right AC    Blood Culture [[id number]] Collected:  05/29/19 0550    Order Status:  Completed Specimen:  Blood from Peripheral Updated:  06/03/19 0700     Significant Indicator NEG     Source BLD     Site PERIPHERAL     Culture Result No growth after 5 days of incubation.    Narrative:       From different peripheral sites, if not done within the last  24 hours (Per Hospital Policy: Only change specimen source to  \"Line\" if specified by physician order)  Right Hand    URINE CULTURE(NEW) [[id number]]  (Abnormal)  (Susceptibility) Collected:  05/30/19 0956    Order Status:  Completed Specimen:  Urine from Urine, Suprapubic Updated:  06/02/19 1026     Significant Indicator POS (POS)     Source UR     Site URINE, SUPRAPUBIC     Culture Result - (A)      Pseudomonas aeruginosa  ,000 cfu/mL  P.aeruginosa may develop resistance during prolonged " therapy  with all antibiotics. Isolates that are initially susceptible  may become resistant within three to four days after  initiation of therapy. Testing of repeat isolates may be  warranted.   (A)      Enterococcus faecalis  10-50,000 cfu/mL   (A)    Narrative:       Collected By:[id number] SILAS TITUS  Indication for culture:->Patient WITHOUT an indwelling Humphrey  catheter in place with new onset of Dysuria, Frequency,  Urgency, and/or Suprapubic pain  Collected By:[id number] SILAS TITUS    Culture & Susceptibility     ENTEROCOCCUS FAECALIS     Antibiotic Sensitivity Microscan Unit Status    Ampicillin Sensitive <=2 mcg/mL Final    Method: CARLITO    Daptomycin Sensitive <=0.5 mcg/mL Final    Method: CARLITO    Nitrofurantoin Sensitive <=32 mcg/mL Final    Method: CARLITO    Penicillin Sensitive 8 mcg/mL Final    Method: CARLITO    Tetracycline Resistant >8 mcg/mL Final    Method: CARLITO              PSEUDOMONAS AERUGINOSA     Antibiotic Sensitivity Microscan Unit Status    Amikacin Sensitive <=16 mcg/mL Final    Method: CARLITO    Cefepime Intermediate 16 mcg/mL Final    Method: CARLITO    Ceftazidime Sensitive 8 mcg/mL Final    Method: CARLITO    Ciprofloxacin Resistant >2 mcg/mL Final    Method: CARLITO    Gentamicin Sensitive <=4 mcg/mL Final    Method: CARLITO    Imipenem Sensitive <=1 mcg/mL Final    Method: CARLITO    Levofloxacin Resistant >4 mcg/mL Final    Method: CARLITO    Meropenem Sensitive <=1 mcg/mL Final    Method: CARLITO    Pip/Tazobactam Sensitive 64 mcg/mL Final    Method: CARLITO    Piperacillin Sensitive 32 mcg/mL Final    Method: CARLITO    Tobramycin Sensitive <=4 mcg/mL Final    Method: CARLITO                       GRAM STAIN ONLY [[id number]] Collected:  05/30/19 0956    Order Status:  Completed Specimen:  Urine Updated:  06/02/19 1026     Significant Indicator NEG     Source UR     Site URINE, SUPRAPUBIC     Gram Stain Result Few WBCs.  Few Gram negative rods.  Rare Gram positive cocci.      Narrative:       Collected By:[id number] SILAS LEE  L.  Indication for culture:->Patient WITHOUT an indwelling Humphrey  catheter in place with new onset of Dysuria, Frequency,  Urgency, and/or Suprapubic pain  Collected By:[id number] SILAS TITUS    URINALYSIS [[id number]]  (Abnormal) Collected:  05/30/19 0956    Order Status:  Completed Specimen:  Urine from Urine, Suprapubic Updated:  05/30/19 1014     Color Yellow     Character Cloudy (A)     Specific Gravity 1.011     Ph 6.5     Glucose Negative mg/dL      Ketones Trace (A) mg/dL      Protein 100 (A) mg/dL      Bilirubin Negative     Urobilinogen, Urine 0.2     Nitrite Positive (A)     Leukocyte Esterase Large (A)     Occult Blood Large (A)     Micro Urine Req Microscopic    Narrative:       Collected By:[id number] SILAS TITUS  Indication for culture:->Patient WITHOUT an indwelling Humphrey  catheter in place with new onset of Dysuria, Frequency,  Urgency, and/or Suprapubic pain          Assessment:  Active Hospital Problems    Diagnosis   • *Urinary tract infection associated with cystostomy catheter ([facility]) [T83.510A, N39.0]   • Cellulitis [L03.90]   • Acute encephalopathy [G93.40]   • Crohn's disease ([facility]) [K50.90]   • Type 2 diabetes mellitus, with long-term current use of insulin ([facility]) [E11.9, Z79.4]   • BPH (benign prostatic hyperplasia) [N40.0]   • Osteomyelitis of right foot ([facility]) [M86.9]   • Sepsis ([facility]) [A41.9]   • CRF (chronic renal failure), stage 3 (moderate) ([facility]) [N18.3]       Plan:  Right second toe infection  Underwent surgery on 6/3/2019 and received antibiotics for 24 hours after the surgery  The surgery was right second toe flexor tendon release and debridement of callus  The incision looks good  Continue with the wound care    Complicated UTI  Associated with indwelling catheter  Off antibiotics  Remains at high risk for repeat infection    Morbid obesity    Diabetes mellitus  Control the blood sugars for wound healing    ID will sign off.  Please call us as needed  Discussed with internal  medicine.

## 2019-06-05 NOTE — CARE PLAN
Problem: Safety  Goal: Will remain free from falls  Outcome: PROGRESSING AS EXPECTED  Fall precautions in place. Frequent rounding started. Pt educated on fall preventions.

## 2019-06-05 NOTE — PROGRESS NOTES
"Assumed care at 1900. Received report from RN. Patient is AOx2-3 forgetful. Patient has suprapubic catheter in place. RUQ colostomy in place. Skin is red and excoriated on groin/pannus, ostomy site, and sacrum. PRN pain meds given for complaint of pain at groin. SBA to chair. Assessment complete. Labs reviewed. Patient and RN discussed plan of care. Patient questions answered. Patient needs are met at this time. Bed in lowest and locked position. Call light is within reach. Hourly rounding in place. /68   Pulse 100   Temp 37.1 °C (98.8 °F) (Temporal)   Resp 18   Ht 1.803 m (5' 11\")   Wt 112.4 kg (247 lb 12.8 oz)   SpO2 96%   BMI 34.56 kg/m²       "

## 2019-06-05 NOTE — PROGRESS NOTES
Offloading shoe was delivered and fitted to pt.   For questions or adjustments contact traction at ext. [phone].

## 2019-06-05 NOTE — CARE PLAN
Problem: Skin Integrity  Goal: Risk for impaired skin integrity will decrease  Outcome: PROGRESSING AS EXPECTED  Daily skin check. Excoriation noted over pannus, groin, sacrum, and ostomy site. Wound following.    Problem: Communication  Goal: The ability to communicate needs accurately and effectively will improve  Outcome: PROGRESSING SLOWER THAN EXPECTED  Patient needing to be reoriented often. Retaught patient to use call light appropriately.

## 2019-06-06 LAB
GLUCOSE BLD-MCNC: 109 MG/DL (ref 65–99)
GLUCOSE BLD-MCNC: 126 MG/DL (ref 65–99)
GLUCOSE BLD-MCNC: 172 MG/DL (ref 65–99)
GLUCOSE BLD-MCNC: 191 MG/DL (ref 65–99)

## 2019-06-06 PROCEDURE — 82962 GLUCOSE BLOOD TEST: CPT | Mod: 91

## 2019-06-06 PROCEDURE — 700102 HCHG RX REV CODE 250 W/ 637 OVERRIDE(OP): Performed by: HOSPITALIST

## 2019-06-06 PROCEDURE — A9270 NON-COVERED ITEM OR SERVICE: HCPCS | Performed by: HOSPITALIST

## 2019-06-06 PROCEDURE — 97530 THERAPEUTIC ACTIVITIES: CPT

## 2019-06-06 PROCEDURE — 97116 GAIT TRAINING THERAPY: CPT

## 2019-06-06 PROCEDURE — 770006 HCHG ROOM/CARE - MED/SURG/GYN SEMI*

## 2019-06-06 PROCEDURE — 99232 SBSQ HOSP IP/OBS MODERATE 35: CPT | Performed by: INTERNAL MEDICINE

## 2019-06-06 PROCEDURE — 700111 HCHG RX REV CODE 636 W/ 250 OVERRIDE (IP): Performed by: HOSPITALIST

## 2019-06-06 RX ADMIN — BUDESONIDE AND FORMOTEROL FUMARATE DIHYDRATE 2 PUFF: 160; 4.5 AEROSOL RESPIRATORY (INHALATION) at 16:39

## 2019-06-06 RX ADMIN — INSULIN HUMAN 1 UNITS: 100 INJECTION, SOLUTION PARENTERAL at 21:11

## 2019-06-06 RX ADMIN — MICONAZOLE NITRATE: 20 CREAM TOPICAL at 16:39

## 2019-06-06 RX ADMIN — HEPARIN SODIUM 5000 UNITS: 5000 INJECTION, SOLUTION INTRAVENOUS; SUBCUTANEOUS at 21:05

## 2019-06-06 RX ADMIN — NYSTATIN: 100000 POWDER TOPICAL at 04:24

## 2019-06-06 RX ADMIN — INSULIN HUMAN 1 UNITS: 100 INJECTION, SOLUTION PARENTERAL at 11:10

## 2019-06-06 RX ADMIN — ACETAMINOPHEN 650 MG: 325 TABLET, FILM COATED ORAL at 16:50

## 2019-06-06 RX ADMIN — MICONAZOLE NITRATE: 20 CREAM TOPICAL at 04:24

## 2019-06-06 RX ADMIN — MULTIPLE VITAMINS W/ MINERALS TAB 1 TABLET: TAB at 04:24

## 2019-06-06 RX ADMIN — TAMSULOSIN HYDROCHLORIDE 0.4 MG: 0.4 CAPSULE ORAL at 07:47

## 2019-06-06 RX ADMIN — NYSTATIN: 100000 POWDER TOPICAL at 16:39

## 2019-06-06 RX ADMIN — BUDESONIDE AND FORMOTEROL FUMARATE DIHYDRATE 2 PUFF: 160; 4.5 AEROSOL RESPIRATORY (INHALATION) at 04:24

## 2019-06-06 RX ADMIN — ACETAMINOPHEN 650 MG: 325 TABLET, FILM COATED ORAL at 23:08

## 2019-06-06 RX ADMIN — HEPARIN SODIUM 5000 UNITS: 5000 INJECTION, SOLUTION INTRAVENOUS; SUBCUTANEOUS at 14:13

## 2019-06-06 RX ADMIN — HEPARIN SODIUM 5000 UNITS: 5000 INJECTION, SOLUTION INTRAVENOUS; SUBCUTANEOUS at 04:24

## 2019-06-06 ASSESSMENT — COGNITIVE AND FUNCTIONAL STATUS - GENERAL
STANDING UP FROM CHAIR USING ARMS: A LITTLE
MOBILITY SCORE: 18
TURNING FROM BACK TO SIDE WHILE IN FLAT BAD: A LITTLE
SUGGESTED CMS G CODE MODIFIER MOBILITY: CK
WALKING IN HOSPITAL ROOM: A LITTLE
MOVING TO AND FROM BED TO CHAIR: A LITTLE
CLIMB 3 TO 5 STEPS WITH RAILING: A LITTLE
MOVING FROM LYING ON BACK TO SITTING ON SIDE OF FLAT BED: A LITTLE

## 2019-06-06 ASSESSMENT — GAIT ASSESSMENTS
DEVIATION: DECREASED HEEL STRIKE;DECREASED TOE OFF;OTHER (COMMENT)
ASSISTIVE DEVICE: FRONT WHEEL WALKER
GAIT LEVEL OF ASSIST: SUPERVISED
DISTANCE (FEET): 350

## 2019-06-06 ASSESSMENT — ENCOUNTER SYMPTOMS
ABDOMINAL PAIN: 0
NAUSEA: 0
SHORTNESS OF BREATH: 0
COUGH: 0

## 2019-06-06 NOTE — PROGRESS NOTES
Notified Dr. Jones of patient having increased confusion, pulling out IV, and duskiness noted to LLE. See orders. Provider at bedside to see patient.

## 2019-06-06 NOTE — PROGRESS NOTES
Received handoff report from PREET Lee and assumed pt care at 1900.  Pt resting in bed comfortably. Labs reviewed. Patient and RN discussed plan of care and questions were answered. Bed in lowest and locked position and bed alarm in place. Call light and personal belongings within reach.

## 2019-06-06 NOTE — WOUND TEAM
"[facility]   Inpatient Services   Established Ostomy Management/ troubleshooting  HPI: Reviewed  PMH: Reviewed   SH: Reviewed   Reason for Ostomy nurse consult:  follow up and change appliance     Subjective:  \"I don't want to change it since it isn't leaking\"     Objective:  Appliance intact since changed by staff two days ago due to leaking; one piece with paste ring was applied and no belt.  Discussed concern with patient that it will leak and staff and he will be unable to manage    Ostomy type:   Ileostomy   Stoma location:   RLQ   Stoma assessment:               Appearance:   Red, mildly oval                    Size:    ~ 1\"                Protrusion:   <1              Output:  moderate soft brown               MC jxn: intact              Peristomal skin:  intact with scarred pink skin; patient reports that he is allergic to tape and hence skin issues         Ostomy Appliance (type and size):  1 piece convex and belt      Interventions and Education:  See above discussion with patient.  Obtained large ostomy belt as patient reports he uses this (medium too small) and applied around patient.  Ordered one piece convex appliance in EPIC (Pouch Ostomy Drainage 12 Lgth In x 2In opening) and 3 left at bedside.  Removed old appliance, cleansed peristomal skin and cut barrier to fit leaving template in ziplock bag by bedside.  Patient reports he is independent with care.  Crusted peristomal skin for protection and applied appliance rubbing to secure and attached belt.  Picture framed barrier edges at patient's request.  Discussed with staff RN that staff to assist patient PRN his request and I would update ostomy directions.    Evaluation:  Patient is independent with care and happy with current POC; staff to assist     Plan: Patient and staff to manage ostomy     Anticipated discharge needs: None; patient lives in [place] and reports he needs no assistance   "

## 2019-06-06 NOTE — THERAPY
"Physical Therapy Treatment completed.   Bed Mobility:  Supine to Sit: Supervised  Transfers: Sit to Stand: Minimal Assist (for safety)  Gait: Level Of Assist: Supervised with Front-Wheel Walker       Plan of Care: Will benefit from Physical Therapy 3 times per week  Discharge Recommendations: Equipment: Will Continue to Assess for Equipment Needs and does not use AD at baseline. Post-acute therapy: see below.    See \"Rehab Therapy-Acute\" Patient Summary Report for complete documentation.     Patient demonstrating progress with functional mobility. POA at bedside and provided social history and home details; patient lives in 1 story apartment alone and does not have any assist with mobility at baseline, does not use AD at baseline. POA assists with groceries and transport; she reports he is primarily sedentary at baseline and watches a lot of TV and [email or web address] movies. He is still confused though improved somewhat from last session; POA reported he is not oriented to day or year at baseline. Continue to recommend post acute placement given cognition though anticipate that will continue to improve; POA reported prior confusion with hospitalizations and medication that has resolved in the past. Will continue to visit.  "

## 2019-06-06 NOTE — PROGRESS NOTES
Rn entered pts room and found pt to have removed wound dressing. Pt has open wound in direct contact with the floor. Wound cleansed and redressed. Pt educated on the importance of keeping wound dressing in place and infection prevention.

## 2019-06-06 NOTE — PROGRESS NOTES
Pt frequently making sexual comments towards RN. Pt asked to stop making inappropriate comments.

## 2019-06-06 NOTE — PROGRESS NOTES
2 RN skin check complete with Viviane Rodrigues RN.   Devices in place: Suprapubic catheter, colostomy.  Skin assessed under devices Yes  Confirmed pressure ulcers found on:Sacrum, redness around ostomy site, redness to groin, redness to bilateral elbows and heels (blanchable x4, redness to sacrum, diabetic foot ulcer right second toe,   New potential pressure ulcers noted on sacrum. Wound consult placed yes  The following interventions in place: Pressure relief bed, q shift skin assessment, frequent repositioning, moisture barrier, scheduled dressing changes. Mobility encouraged.

## 2019-06-06 NOTE — PROGRESS NOTES
· 2 RN skin check complete with PREET Ugalde.   · Devices in place - suprapubic cath, ostomy, PIV.  · Skin assessed under devices - Yes.  · Confirmed pressure ulcers found on - NA.  · No new potential pressure ulcers noted.   · LLE has a dressing in place. Redness around suprapubic cath. Redness around ostomy. Moisture fissure present on sacrum. Buttock is red and blanching. Pannus is red.    · The following interventions are in place - frequent repositioning, waffle overlay, wound care per order, barrier cream, pillows in use for repositioning.

## 2019-06-06 NOTE — ADDENDUM NOTE
Encounter addended by: Calvin Beyer M.D. on: 6/6/2019 11:16 AM<BR>    Actions taken: Sign clinical note

## 2019-06-06 NOTE — PROGRESS NOTES
Hospital Medicine Daily Progress Note    Date of Service  6/6/2019    Chief Complaint  69 y.o. male admitted 5/29/2019 with dysuria    Hospital Course    Past medical history of Crohn's with ostomy, suprapubic cath, diabetes.  Patient has not had suprapubic cath changed for several months despite his POA asking him to have home health assess him.  He complains of painful urination and has a history of multiple drug-resistant urine infections.  He was admitted on meropenem empirically.  Urology was consulted and exchanged catheter.  His urine culture from [facility] returned with MRSA and Pseudomonas and his antibiotic was changed to Zosyn and Zyvox.  He was noted to have right toe osteomyelitis.  LPS and Ortho were consulted and performed debridement with right second toe flexor tendon release 6/3/2019. Dr. Marx with infectious disease was consulted for antibiotic management and completed course.      Interval Problem Update  Patient pulled out his IV. Remains confused, does not remember why he's here.  Has not complained of pain today, discussed with nursing to minimized narcotics    Consultants/Specialty  Infectious disease  LPS, Ortho    Code Status  Full    Disposition  Lives alone, POA agreeable to [facility]  Sutures out 2 weeks post-op 6/17/19    Review of Systems  Review of Systems   Unable to perform ROS: Mental status change   Respiratory: Negative for cough and shortness of breath.    Cardiovascular: Negative for chest pain.   Gastrointestinal: Negative for abdominal pain and nausea.   Musculoskeletal: Negative for joint pain.        Physical Exam  Temp:  [36.2 °C (97.2 °F)-37.2 °C (99 °F)] 36.2 °C (97.2 °F)  Pulse:  [] 98  Resp:  [16-18] 18  BP: (115-153)/(61-79) 129/65  SpO2:  [92 %-96 %] 96 %    Physical Exam   Constitutional: He appears well-developed and well-nourished.   HENT:   Head: Normocephalic.   Mouth/Throat: Oropharynx is clear and moist.   Eyes: Conjunctivae are normal.    Cardiovascular: Normal rate, regular rhythm and intact distal pulses.    Pulmonary/Chest: Effort normal. He has no wheezes.   Abdominal: Soft. There is tenderness (mild, diffuse).   Ostomy and suprapubic cath in place. Mild erythema around suprapubic cath   Musculoskeletal: He exhibits no edema.   Right foot with clean dressings in place   Neurological: He is alert.   Oriented to self and place, disoriented to date and situation  Equal strength in all extremities   Skin: Skin is warm and dry.   Nursing note and vitals reviewed.      Fluids    Intake/Output Summary (Last 24 hours) at 06/06/19 1224  Last data filed at 06/06/19 1000   Gross per 24 hour   Intake              715 ml   Output             3925 ml   Net            -3210 ml       Laboratory  Recent Labs      06/03/19   1225   WBC  6.8   RBC  4.07*   HEMOGLOBIN  11.5*   HEMATOCRIT  36.5*   MCV  89.7   MCH  28.3   MCHC  31.5*   RDW  48.9   PLATELETCT  327   MPV  8.5*     Recent Labs      06/05/19   0047   SODIUM  134*   POTASSIUM  4.7   CHLORIDE  101   CO2  23   GLUCOSE  116*   BUN  21   CREATININE  2.35*   CALCIUM  9.4     Recent Labs      06/03/19   1225   APTT  30.7   INR  1.12               Imaging  US-BERNICE SINGLE LEVEL UNILAT RIGHT   Final Result      IR-DRAIN-BLADDER SUPRAPUB W/CATH   Final Result      1. Fluoroscopic guided exchange of the 14 [place] locking loop suprapubic catheter for a new 16 [place] Humphrey catheter.   3. Tubes cystogram demonstrating appropriate positioning of the new Humphrey catheter.      DX-TOE(S) 2+ RIGHT   Final Result         Soft tissue swelling about the second digit. Rarefaction of the tip of the second distal phalanx, concerning for osteomyelitis.           Assessment/Plan  * Urinary tract infection associated with cystostomy catheter (HCC)- (present on admission)   Assessment & Plan    Complicated by presence of suprapubic catheter, with surrounding cellulitis & skin breakdown.  Catheter has not been changed in several months  per patient report.  Has history of drug resistant UTI's.  UCx from outside facility growing MRSA and pseudomonoas  On zosyn and zyvox  Infectious disease consulted and completed antibiotics     Acute encephalopathy- (present on admission)   Assessment & Plan    Ongoing confusion  Received adequate treatment for infections   POA says he's very sensitive to narcotics, will minimize, and baseline does not know his medications or date  Check Vit B12      Osteomyelitis of right foot (HCC)   Assessment & Plan    Right second toe  Seen by LPS, ortho consulted and ID consulted  Completed course of antibiotics  Status post debridement 6/3     CRF (chronic renal failure), stage 3 (moderate) ([facility])- (present on admission)   Assessment & Plan    At baseline  Avoid nephrotoxins.  Renally dose all appropriate medications.     BPH (benign prostatic hyperplasia)- (present on admission)   Assessment & Plan    S/p suprapubic catheter placement.     Crohn's disease ([facility])- (present on admission)   Assessment & Plan    S/p colostomy.  Wound care has been consulted for ostomy management.     Type 2 diabetes mellitus, with long-term current use of insulin ([facility])- (present on admission)   Assessment & Plan    On BID humulin 70/30 at home, has not needed here  A1c 6.5  Added Accu-Cheks ACHS with low scale SSI as required.  Hypoglycemia protocol in place if needed.  Diet changed to diabetic.          VTE prophylaxis: heparin

## 2019-06-06 NOTE — PROGRESS NOTES
Report received from Ashley OVIEDO. Patient resting on side of bed. Patient anxious and confused repeatedly asking for their clothes. Patient reoriented and assisted back to bed. Bed in low position with alarm and audible. Call light in reach. Will continue to monitor.

## 2019-06-06 NOTE — CARE PLAN
Problem: Fluid Volume:  Goal: Will maintain balanced intake and output  Outcome: PROGRESSING AS EXPECTED  Pt maintaining adequate intake and output.     Problem: Skin Integrity  Goal: Risk for impaired skin integrity will decrease  Outcome: PROGRESSING AS EXPECTED  Pt is able to reposition self. Pt educated about benefits of frequent repositioning.

## 2019-06-06 NOTE — PROGRESS NOTES
· 2 RN skin check complete with Adrian RN.   · Devices in place ostomy, suprapubic, piv.  · Skin assessed under devices yes.    · The following interventions are in place pt up to chair frequently, q2h while in bed, mckinley cream, nystatin, barrier wipes, waffle cushion, seat cushion.     RLE redness and swelling, scabs. Right second digit dressing in place, LLE redness. Redness around suprapubic site. Skin around ostomy is red, denuded. Moisture fissue on sacrum. Buttocks is red, blanching. .Pannus is red

## 2019-06-06 NOTE — CARE PLAN
Problem: Skin Integrity  Goal: Risk for impaired skin integrity will decrease  Outcome: PROGRESSING AS EXPECTED  Patient will be free of any new skin breakdown. Skin precautions maintained.     Problem: Safety  Goal: Will remain free from injury  Outcome: PROGRESSING AS EXPECTED  Patient will be free of injury or falls.

## 2019-06-06 NOTE — DISCHARGE PLANNING
Pt discussed in IDT rounds. MD discussed group home placement with Nathaly RILEY, and she is agreeable. LSW LM for Nathaly to discuss finances.

## 2019-06-07 LAB
ANION GAP SERPL CALC-SCNC: 9 MMOL/L (ref 0–11.9)
APPEARANCE UR: ABNORMAL
BACTERIA #/AREA URNS HPF: ABNORMAL /HPF
BILIRUB UR QL STRIP.AUTO: NEGATIVE
BUN SERPL-MCNC: 36 MG/DL (ref 8–22)
CALCIUM SERPL-MCNC: 9.1 MG/DL (ref 8.5–10.5)
CHLORIDE SERPL-SCNC: 103 MMOL/L (ref 96–112)
CO2 SERPL-SCNC: 19 MMOL/L (ref 20–33)
COLOR UR: YELLOW
CREAT SERPL-MCNC: 2.36 MG/DL (ref 0.5–1.4)
EPI CELLS #/AREA URNS HPF: ABNORMAL /HPF
GLUCOSE BLD-MCNC: 138 MG/DL (ref 65–99)
GLUCOSE BLD-MCNC: 161 MG/DL (ref 65–99)
GLUCOSE BLD-MCNC: 187 MG/DL (ref 65–99)
GLUCOSE BLD-MCNC: 206 MG/DL (ref 65–99)
GLUCOSE SERPL-MCNC: 140 MG/DL (ref 65–99)
GLUCOSE UR STRIP.AUTO-MCNC: NEGATIVE MG/DL
HYALINE CASTS #/AREA URNS LPF: ABNORMAL /LPF
KETONES UR STRIP.AUTO-MCNC: NEGATIVE MG/DL
LEUKOCYTE ESTERASE UR QL STRIP.AUTO: ABNORMAL
MICRO URNS: ABNORMAL
NITRITE UR QL STRIP.AUTO: POSITIVE
PH UR STRIP.AUTO: 6 [PH]
POTASSIUM SERPL-SCNC: 4.2 MMOL/L (ref 3.6–5.5)
PROT UR QL STRIP: >=300 MG/DL
RBC # URNS HPF: ABNORMAL /HPF
RBC UR QL AUTO: ABNORMAL
SODIUM SERPL-SCNC: 131 MMOL/L (ref 135–145)
SP GR UR STRIP.AUTO: 1.01
UROBILINOGEN UR STRIP.AUTO-MCNC: 0.2 MG/DL
VIT B12 SERPL-MCNC: 426 PG/ML (ref 211–911)
WBC #/AREA URNS HPF: ABNORMAL /HPF

## 2019-06-07 PROCEDURE — A9270 NON-COVERED ITEM OR SERVICE: HCPCS | Performed by: HOSPITALIST

## 2019-06-07 PROCEDURE — 700102 HCHG RX REV CODE 250 W/ 637 OVERRIDE(OP): Performed by: HOSPITALIST

## 2019-06-07 PROCEDURE — 87186 SC STD MICRODIL/AGAR DIL: CPT

## 2019-06-07 PROCEDURE — 81001 URINALYSIS AUTO W/SCOPE: CPT

## 2019-06-07 PROCEDURE — 87086 URINE CULTURE/COLONY COUNT: CPT

## 2019-06-07 PROCEDURE — 87077 CULTURE AEROBIC IDENTIFY: CPT

## 2019-06-07 PROCEDURE — 770006 HCHG ROOM/CARE - MED/SURG/GYN SEMI*

## 2019-06-07 PROCEDURE — 82607 VITAMIN B-12: CPT

## 2019-06-07 PROCEDURE — 82962 GLUCOSE BLOOD TEST: CPT

## 2019-06-07 PROCEDURE — 51798 US URINE CAPACITY MEASURE: CPT

## 2019-06-07 PROCEDURE — 99233 SBSQ HOSP IP/OBS HIGH 50: CPT | Performed by: INTERNAL MEDICINE

## 2019-06-07 PROCEDURE — 80048 BASIC METABOLIC PNL TOTAL CA: CPT

## 2019-06-07 PROCEDURE — 36415 COLL VENOUS BLD VENIPUNCTURE: CPT

## 2019-06-07 PROCEDURE — 700111 HCHG RX REV CODE 636 W/ 250 OVERRIDE (IP): Performed by: INTERNAL MEDICINE

## 2019-06-07 PROCEDURE — 700111 HCHG RX REV CODE 636 W/ 250 OVERRIDE (IP): Performed by: HOSPITALIST

## 2019-06-07 PROCEDURE — 97535 SELF CARE MNGMENT TRAINING: CPT

## 2019-06-07 RX ORDER — LIDOCAINE AND PRILOCAINE 25; 25 MG/G; MG/G
CREAM TOPICAL PRN
Status: DISCONTINUED | OUTPATIENT
Start: 2019-06-07 | End: 2019-06-13 | Stop reason: HOSPADM

## 2019-06-07 RX ADMIN — HEPARIN SODIUM 5000 UNITS: 5000 INJECTION, SOLUTION INTRAVENOUS; SUBCUTANEOUS at 14:52

## 2019-06-07 RX ADMIN — INSULIN HUMAN 1 UNITS: 100 INJECTION, SOLUTION PARENTERAL at 07:56

## 2019-06-07 RX ADMIN — MICONAZOLE NITRATE: 20 CREAM TOPICAL at 04:31

## 2019-06-07 RX ADMIN — ACETAMINOPHEN 650 MG: 325 TABLET, FILM COATED ORAL at 05:10

## 2019-06-07 RX ADMIN — SENNOSIDES,DOCUSATE SODIUM 2 TABLET: 8.6; 5 TABLET, FILM COATED ORAL at 17:39

## 2019-06-07 RX ADMIN — MULTIPLE VITAMINS W/ MINERALS TAB 1 TABLET: TAB at 04:31

## 2019-06-07 RX ADMIN — BUDESONIDE AND FORMOTEROL FUMARATE DIHYDRATE 2 PUFF: 160; 4.5 AEROSOL RESPIRATORY (INHALATION) at 17:55

## 2019-06-07 RX ADMIN — MICONAZOLE NITRATE: 20 CREAM TOPICAL at 17:39

## 2019-06-07 RX ADMIN — FENTANYL CITRATE 25 MCG: 50 INJECTION, SOLUTION INTRAMUSCULAR; INTRAVENOUS at 12:30

## 2019-06-07 RX ADMIN — NYSTATIN: 100000 POWDER TOPICAL at 17:39

## 2019-06-07 RX ADMIN — TAMSULOSIN HYDROCHLORIDE 0.4 MG: 0.4 CAPSULE ORAL at 07:57

## 2019-06-07 RX ADMIN — BUDESONIDE AND FORMOTEROL FUMARATE DIHYDRATE 2 PUFF: 160; 4.5 AEROSOL RESPIRATORY (INHALATION) at 04:31

## 2019-06-07 RX ADMIN — INSULIN HUMAN 1 UNITS: 100 INJECTION, SOLUTION PARENTERAL at 21:22

## 2019-06-07 RX ADMIN — HEPARIN SODIUM 5000 UNITS: 5000 INJECTION, SOLUTION INTRAVENOUS; SUBCUTANEOUS at 04:31

## 2019-06-07 RX ADMIN — HEPARIN SODIUM 5000 UNITS: 5000 INJECTION, SOLUTION INTRAVENOUS; SUBCUTANEOUS at 21:22

## 2019-06-07 RX ADMIN — NYSTATIN: 100000 POWDER TOPICAL at 04:31

## 2019-06-07 RX ADMIN — INSULIN HUMAN 2 UNITS: 100 INJECTION, SOLUTION PARENTERAL at 12:31

## 2019-06-07 RX ADMIN — FENTANYL CITRATE 25 MCG: 50 INJECTION, SOLUTION INTRAMUSCULAR; INTRAVENOUS at 17:39

## 2019-06-07 RX ADMIN — FENTANYL CITRATE 25 MCG: 50 INJECTION, SOLUTION INTRAMUSCULAR; INTRAVENOUS at 10:07

## 2019-06-07 ASSESSMENT — ENCOUNTER SYMPTOMS
NAUSEA: 0
ABDOMINAL PAIN: 1

## 2019-06-07 ASSESSMENT — COGNITIVE AND FUNCTIONAL STATUS - GENERAL
TOILETING: A LOT
SUGGESTED CMS G CODE MODIFIER DAILY ACTIVITY: CK
DAILY ACTIVITIY SCORE: 15
HELP NEEDED FOR BATHING: A LOT
PERSONAL GROOMING: A LITTLE
DRESSING REGULAR UPPER BODY CLOTHING: A LITTLE
DRESSING REGULAR LOWER BODY CLOTHING: A LOT
EATING MEALS: A LITTLE

## 2019-06-07 NOTE — PROGRESS NOTES
NAD noted this shift. VSS. Patient resting in bed. Strip alarm on and audible. Call light in reach. Will report to oncoming shift.

## 2019-06-07 NOTE — PROGRESS NOTES
Bladder scan ordered, no rentention noted. Pt continues to complain of pubic pain, medicated per MAR.

## 2019-06-07 NOTE — PROGRESS NOTES
"Hospital Medicine Daily Progress Note    Date of Service  6/7/2019    Chief Complaint  69 y.o. male admitted 5/29/2019 with dysuria    Hospital Course    Past medical history of Crohn's with ostomy, suprapubic cath, diabetes.  Patient has not had suprapubic cath changed for several months despite his POA asking him to have home health assess him.  He complains of painful urination and has a history of multiple drug-resistant urine infections.  He was admitted on meropenem empirically.  Urology was consulted and exchanged catheter.  His urine culture from [facility] returned with MRSA and Pseudomonas and his antibiotic was changed to Zosyn and Zyvox.  He was noted to have right toe osteomyelitis.  LPS and Ortho were consulted and performed debridement with right second toe flexor tendon release 6/3/2019. Dr. Marx with infectious disease was consulted for antibiotic management and completed course.      Interval Problem Update  Patient had severe pain around suprapubic cath, demanding it to be removed. He asked \"who put this in\", he could not remember it was a chronic cath.  Cath is draining well, flushed without issue and no clots  I spoke with Dr. Donohue who said possible bladder spasms, to try belladonna and emla topically.  Patient is more comfortable this afternoon  UA ordered, culture pending. Hold antibiotics since he recently completed    Consultants/Specialty  Urology  Infectious disease  LPS, Ortho    Code Status  Full    Disposition  Lives alone, POA agreeable to [facility]  Sutures out 2 weeks post-op 6/17/19  SNF    Review of Systems  Review of Systems   Unable to perform ROS: Mental acuity   Cardiovascular: Negative for chest pain.   Gastrointestinal: Positive for abdominal pain. Negative for nausea.   Musculoskeletal: Negative for joint pain.        Physical Exam  Temp:  [36.6 °C (97.9 °F)-36.9 °C (98.4 °F)] 36.6 °C (97.9 °F)  Pulse:  [] 98  Resp:  [17-18] 17  BP: (123-154)/(63-79) 154/79  SpO2:  " [94 %-95 %] 95 %    Physical Exam   Constitutional: He appears well-developed and well-nourished.   HENT:   Head: Normocephalic.   Mouth/Throat: Oropharynx is clear and moist.   Eyes: Conjunctivae are normal.   Cardiovascular: Normal rate and regular rhythm.    Pulmonary/Chest: Effort normal. He has no wheezes.   Abdominal: Soft. There is tenderness (suprapubic).   Ostomy and suprapubic cath in place. Mild erythema around suprapubic cath   Musculoskeletal: He exhibits no edema.   Right foot with clean dressings in place   Neurological: He is alert.   Oriented to self and place, disoriented to date and situation  Equal strength in all extremities   Skin: Skin is warm and dry.   Nursing note and vitals reviewed.      Fluids    Intake/Output Summary (Last 24 hours) at 06/07/19 1510  Last data filed at 06/07/19 1100   Gross per 24 hour   Intake              320 ml   Output             2600 ml   Net            -2280 ml       Laboratory      Recent Labs      06/05/19   0047  06/07/19   0104   SODIUM  134*  131*   POTASSIUM  4.7  4.2   CHLORIDE  101  103   CO2  23  19*   GLUCOSE  116*  140*   BUN  21  36*   CREATININE  2.35*  2.36*   CALCIUM  9.4  9.1                   Imaging  US-BERNICE SINGLE LEVEL UNILAT RIGHT   Final Result      IR-DRAIN-BLADDER SUPRAPUB W/CATH   Final Result      1. Fluoroscopic guided exchange of the 14 [place]er locking loop suprapubic catheter for a new 16 [place] Humphrey catheter.   3. Tubes cystogram demonstrating appropriate positioning of the new Humphrey catheter.      DX-TOE(S) 2+ RIGHT   Final Result         Soft tissue swelling about the second digit. Rarefaction of the tip of the second distal phalanx, concerning for osteomyelitis.           Assessment/Plan  * Urinary tract infection associated with cystostomy catheter (HCC)- (present on admission)   Assessment & Plan    Complicated by presence of suprapubic catheter, with surrounding cellulitis & skin breakdown.  Catheter has not been changed in  several months per patient report.  Has history of drug resistant UTI's.  UCx from outside facility growing MRSA and pseudomonoas  On zosyn and zyvox  Infectious disease consulted and completed antibiotics     Acute encephalopathy- (present on admission)   Assessment & Plan    Ongoing confusion  Received adequate treatment for infections   POA says he's very sensitive to narcotics, will minimize, and baseline does not know his medications or date  Vit B12 is normal     Osteomyelitis of right foot ([facility])   Assessment & Plan    Right second toe  Seen by LPS, ortho consulted and ID consulted  Completed course of antibiotics  Status post debridement 6/3     CRF (chronic renal failure), stage 3 (moderate) ([facility])- (present on admission)   Assessment & Plan    At baseline  Avoid nephrotoxins.  Renally dose all appropriate medications.     BPH (benign prostatic hyperplasia)- (present on admission)   Assessment & Plan    S/p suprapubic catheter placement.  Pain from possible bladder spasms per Dr. Donohue. Trial of belladonna and emla topically     Crohn's disease ([facility])- (present on admission)   Assessment & Plan    S/p colostomy.  Wound care has been consulted for ostomy management.     Type 2 diabetes mellitus, with long-term current use of insulin ([facility])- (present on admission)   Assessment & Plan    On BID humulin 70/30 at home, has not needed here  A1c 6.5  Added Accu-Cheks ACHS with low scale SSI as required.  Hypoglycemia protocol in place if needed.  Diet changed to diabetic.          VTE prophylaxis: heparin

## 2019-06-07 NOTE — CARE PLAN
Problem: Urinary Elimination:  Goal: Ability to reestablish a normal urinary elimination pattern will improve  Flushed suprapubic cath as pt was complaining of bladder discomfort. Sediment noted in urine.     Problem: Pain Management  Goal: Pain level will decrease to patient's comfort goal  Medicated pt with Tylenol per MAR order. Pt was upset at no narcotics available, accepted med.     Problem: Discharge Barriers/Planning  Goal: Patient's continuum of care needs will be met    Intervention: Assess potential discharge barriers on admission and throughout hospital stay  Per  note, looking into a group home for D/C plan.

## 2019-06-07 NOTE — PROGRESS NOTES
Report received, patient care assumed. Patient awake, alert and oriented to self. Sitting up as side of bed. Visible chest rise/fall. No s/s of distress. Bed low, locked position. Bed alarm on. Call light in reach.     Updated on plan of care. No questions at this time.

## 2019-06-07 NOTE — PROGRESS NOTES
Pt was complaining of pain to groin area, requested heat pack. Flushed suprapubic cath with sterile water, difficult to flush at first, painful for pt. Urine is clear yellow and has white sediment present.

## 2019-06-07 NOTE — PROGRESS NOTES
Ambulated pt around the unit. Pt ambulated 2 laps around, used walker, SBA, maintained steady gait.

## 2019-06-07 NOTE — PROGRESS NOTES
· 2 RN skin check complete Bi OVIEDO.   · Devices in place colostomy, suprapubic cath.  · Skin assessed under devices yes.  · Confirmed pressure ulcers found on n/a.  · New potential pressure ulcers noted on 6/6. Wound consult placed? Yes, by day RN. Photo uploaded? Yes, by day RN.   · The following interventions are in place pt self turns, SBA with a walker, waffle cushion to chair, waffle mattress to bed.    Opened area to sacrum, mckinley in use. Colostomy area is denuded, flaky/dry skin around colostomy. Suprapubic cath site is red, painful, flaky, crusty. Pannus area and groin are pink/red, moist, nystatin powder in use. Right foot has a dressing in place to 2nd toe.

## 2019-06-07 NOTE — THERAPY
"Occupational Therapy Treatment completed with focus on ADLs, ADL transfers and cognition.  Functional Status: Pt seen today for OT treatment. He was pleasant and cooperative, still confused and disoriented to situation. Pt gave fair effort trying to learn how to don CAM boot and offloading shoe, but ultimately required modA due to pain. Supv sit<>stand and functional mobility with FWW. Pt limited by increased pain to suprapubic catheter. RN already aware and has pain meds available shortly. He remains limited by weakness, fatigue, impaired cognition, and pain which impacts independence in self care and functional mobility.  Plan of Care: Will benefit from Occupational Therapy 2 times per week  Discharge Recommendations:  Equipment Will Continue to Assess for Equipment Needs. Recommend inpatient transitional care services for continued occupational therapy services.       See \"Rehab Therapy-Acute\" Patient Summary Report for complete documentation.   "

## 2019-06-07 NOTE — DISCHARGE PLANNING
Anticipated Discharge Disposition: Unknown at this time    Action: LSW left a message with Nathaly MEDEROS ([phone]) requesting a call back to discuss pt's finances.     Barriers to Discharge: None    Plan: Awaiting call back from Nathaly.

## 2019-06-07 NOTE — PROGRESS NOTES
Rec'd report from day shift RN. Assumed pt care. Assessment completed. AA&O to self, reoriented to place, time, event. Denies pain at this time. No s/s of discomfort or distress. Pt ambulates with SBA and use of FWW. Dressing in place to RLE, CDI. Suprapubic cath draining to gravity. Colostomy to RLQ, draining watery green stool. Painful area surrounding suprapubic cath and colostomy, skin is dry, flaky, pink/red. Bed in lowest position, bed locked, bed alarm on for safety, treaded socks in place, RN and CNA numbers provided, call light within reach.

## 2019-06-08 LAB
ANION GAP SERPL CALC-SCNC: 11 MMOL/L (ref 0–11.9)
BASOPHILS # BLD AUTO: 0.6 % (ref 0–1.8)
BASOPHILS # BLD: 0.07 K/UL (ref 0–0.12)
BUN SERPL-MCNC: 40 MG/DL (ref 8–22)
CALCIUM SERPL-MCNC: 9.3 MG/DL (ref 8.5–10.5)
CHLORIDE SERPL-SCNC: 105 MMOL/L (ref 96–112)
CO2 SERPL-SCNC: 17 MMOL/L (ref 20–33)
CREAT SERPL-MCNC: 2.31 MG/DL (ref 0.5–1.4)
EOSINOPHIL # BLD AUTO: 0.35 K/UL (ref 0–0.51)
EOSINOPHIL NFR BLD: 2.9 % (ref 0–6.9)
ERYTHROCYTE [DISTWIDTH] IN BLOOD BY AUTOMATED COUNT: 49.1 FL (ref 35.9–50)
GLUCOSE BLD-MCNC: 114 MG/DL (ref 65–99)
GLUCOSE BLD-MCNC: 148 MG/DL (ref 65–99)
GLUCOSE BLD-MCNC: 204 MG/DL (ref 65–99)
GLUCOSE SERPL-MCNC: 135 MG/DL (ref 65–99)
HCT VFR BLD AUTO: 38.4 % (ref 42–52)
HGB BLD-MCNC: 12.4 G/DL (ref 14–18)
IMM GRANULOCYTES # BLD AUTO: 0.05 K/UL (ref 0–0.11)
IMM GRANULOCYTES NFR BLD AUTO: 0.4 % (ref 0–0.9)
LYMPHOCYTES # BLD AUTO: 4.07 K/UL (ref 1–4.8)
LYMPHOCYTES NFR BLD: 33.5 % (ref 22–41)
MCH RBC QN AUTO: 28.3 PG (ref 27–33)
MCHC RBC AUTO-ENTMCNC: 32.3 G/DL (ref 33.7–35.3)
MCV RBC AUTO: 87.7 FL (ref 81.4–97.8)
MONOCYTES # BLD AUTO: 0.82 K/UL (ref 0–0.85)
MONOCYTES NFR BLD AUTO: 6.7 % (ref 0–13.4)
NEUTROPHILS # BLD AUTO: 6.79 K/UL (ref 1.82–7.42)
NEUTROPHILS NFR BLD: 55.9 % (ref 44–72)
NRBC # BLD AUTO: 0 K/UL
NRBC BLD-RTO: 0 /100 WBC
PLATELET # BLD AUTO: 417 K/UL (ref 164–446)
PMV BLD AUTO: 9 FL (ref 9–12.9)
POTASSIUM SERPL-SCNC: 4 MMOL/L (ref 3.6–5.5)
RBC # BLD AUTO: 4.38 M/UL (ref 4.7–6.1)
SODIUM SERPL-SCNC: 133 MMOL/L (ref 135–145)
WBC # BLD AUTO: 12.2 K/UL (ref 4.8–10.8)

## 2019-06-08 PROCEDURE — A9270 NON-COVERED ITEM OR SERVICE: HCPCS | Performed by: HOSPITALIST

## 2019-06-08 PROCEDURE — 700102 HCHG RX REV CODE 250 W/ 637 OVERRIDE(OP): Performed by: HOSPITALIST

## 2019-06-08 PROCEDURE — 36415 COLL VENOUS BLD VENIPUNCTURE: CPT

## 2019-06-08 PROCEDURE — 99232 SBSQ HOSP IP/OBS MODERATE 35: CPT | Performed by: INTERNAL MEDICINE

## 2019-06-08 PROCEDURE — A9270 NON-COVERED ITEM OR SERVICE: HCPCS | Performed by: INTERNAL MEDICINE

## 2019-06-08 PROCEDURE — 700111 HCHG RX REV CODE 636 W/ 250 OVERRIDE (IP): Performed by: HOSPITALIST

## 2019-06-08 PROCEDURE — 97597 DBRDMT OPN WND 1ST 20 CM/<: CPT

## 2019-06-08 PROCEDURE — 770006 HCHG ROOM/CARE - MED/SURG/GYN SEMI*

## 2019-06-08 PROCEDURE — 82962 GLUCOSE BLOOD TEST: CPT | Mod: 91

## 2019-06-08 PROCEDURE — 80048 BASIC METABOLIC PNL TOTAL CA: CPT

## 2019-06-08 PROCEDURE — 700102 HCHG RX REV CODE 250 W/ 637 OVERRIDE(OP): Performed by: INTERNAL MEDICINE

## 2019-06-08 PROCEDURE — 85025 COMPLETE CBC W/AUTO DIFF WBC: CPT

## 2019-06-08 RX ORDER — OXYBUTYNIN CHLORIDE 5 MG/1
5 TABLET ORAL EVERY 12 HOURS
Status: DISCONTINUED | OUTPATIENT
Start: 2019-06-08 | End: 2019-06-13 | Stop reason: HOSPADM

## 2019-06-08 RX ADMIN — MICONAZOLE NITRATE: 20 CREAM TOPICAL at 18:00

## 2019-06-08 RX ADMIN — NYSTATIN: 100000 POWDER TOPICAL at 17:57

## 2019-06-08 RX ADMIN — MULTIPLE VITAMINS W/ MINERALS TAB 1 TABLET: TAB at 05:58

## 2019-06-08 RX ADMIN — HEPARIN SODIUM 5000 UNITS: 5000 INJECTION, SOLUTION INTRAVENOUS; SUBCUTANEOUS at 20:51

## 2019-06-08 RX ADMIN — INSULIN HUMAN 2 UNITS: 100 INJECTION, SOLUTION PARENTERAL at 20:51

## 2019-06-08 RX ADMIN — BUDESONIDE AND FORMOTEROL FUMARATE DIHYDRATE 2 PUFF: 160; 4.5 AEROSOL RESPIRATORY (INHALATION) at 06:00

## 2019-06-08 RX ADMIN — HEPARIN SODIUM 5000 UNITS: 5000 INJECTION, SOLUTION INTRAVENOUS; SUBCUTANEOUS at 13:53

## 2019-06-08 RX ADMIN — TAMSULOSIN HYDROCHLORIDE 0.4 MG: 0.4 CAPSULE ORAL at 09:25

## 2019-06-08 RX ADMIN — SENNOSIDES,DOCUSATE SODIUM 2 TABLET: 8.6; 5 TABLET, FILM COATED ORAL at 17:55

## 2019-06-08 RX ADMIN — OXYBUTYNIN CHLORIDE 5 MG: 5 TABLET ORAL at 17:55

## 2019-06-08 RX ADMIN — BUDESONIDE AND FORMOTEROL FUMARATE DIHYDRATE 2 PUFF: 160; 4.5 AEROSOL RESPIRATORY (INHALATION) at 18:00

## 2019-06-08 RX ADMIN — HEPARIN SODIUM 5000 UNITS: 5000 INJECTION, SOLUTION INTRAVENOUS; SUBCUTANEOUS at 05:58

## 2019-06-08 RX ADMIN — OXYBUTYNIN CHLORIDE 5 MG: 5 TABLET ORAL at 08:10

## 2019-06-08 RX ADMIN — NYSTATIN: 100000 POWDER TOPICAL at 06:00

## 2019-06-08 RX ADMIN — MICONAZOLE NITRATE: 20 CREAM TOPICAL at 05:59

## 2019-06-08 ASSESSMENT — ENCOUNTER SYMPTOMS
ABDOMINAL PAIN: 0
NAUSEA: 0

## 2019-06-08 NOTE — CARE PLAN
Problem: Safety  Goal: Will remain free from falls    Intervention: Implement fall precautions   06/08/19 0035   OTHER   Environmental Precautions Treaded Slipper Socks on Patient;Personal Belongings, Wastebasket, Call Bell etc. in Easy Reach;Report Given to Other Health Care Providers Regarding Fall Risk;Bed in Low Position;Communication Sign for Patients & Families   Chair/Bed Strip Alarm Yes - Alarm On         Problem: Discharge Barriers/Planning  Goal: Patient's continuum of care needs will be met    Intervention: Collaborate with Transitional Care Team and Interdisciplinary Team to meet discharge needs  Possible discharge to group home after POA given financial status.

## 2019-06-08 NOTE — PROGRESS NOTES
Diabetes education: Met with pt this evening. Please see consult note.  Plan: Not sure how reliable pt is as well as med rec. Caution discharging pt on above dose of 70/30 insulin. Up to 6/6 pt was not using much insulin. If pt is to go home back on insulin, pt will need to be assessed for his ability to give his insulin with or with out supervision. Please call [phone] when discharge plan /needs known.

## 2019-06-08 NOTE — PROGRESS NOTES
Hospital Medicine Daily Progress Note    Date of Service  6/8/2019    Chief Complaint  69 y.o. male admitted 5/29/2019 with dysuria    Hospital Course    Past medical history of Crohn's with ostomy, suprapubic cath, diabetes.  Patient has not had suprapubic cath changed for several months despite his POA asking him to have home health assess him.  He complains of painful urination and has a history of multiple drug-resistant urine infections.  He was admitted on meropenem empirically.  Urology was consulted and exchanged catheter.  His urine culture from [facility] returned with MRSA and Pseudomonas and his antibiotic was changed to Zosyn and Zyvox.  He was noted to have right toe osteomyelitis.  LPS and Ortho were consulted and performed debridement with right second toe flexor tendon release 6/3/2019. Dr. Marx with infectious disease was consulted for antibiotic management and completed course.      Interval Problem Update  Mild leukocytosis  He refused belladonna, received none. Will order oxybutynin.   He remains confused. Denies abd pain but tender around cath site.     Consultants/Specialty  Urology  Infectious disease  LPS, Ortho    Code Status  Full    Disposition  Lives alone, POA agreeable to [facility]  Sutures out 2 weeks post-op 6/17/19  [facility]    Review of Systems  Review of Systems   Unable to perform ROS: Mental acuity   Cardiovascular: Negative for chest pain.   Gastrointestinal: Negative for abdominal pain and nausea.   Musculoskeletal: Negative for joint pain.        Physical Exam  Temp:  [36.1 °C (97 °F)-36.7 °C (98 °F)] 36.1 °C (97 °F)  Pulse:  [75-96] 75  Resp:  [18] 18  BP: (125-154)/(71-79) 125/73  SpO2:  [92 %-97 %] 93 %    Physical Exam   Constitutional: He appears well-developed and well-nourished.   HENT:   Head: Normocephalic.   Mouth/Throat: Oropharynx is clear and moist.   Eyes: Conjunctivae are normal.   Cardiovascular: Normal rate and regular rhythm.    Pulmonary/Chest: Effort normal.  He has no wheezes.   Abdominal: Soft. He exhibits distension. There is tenderness (around suprapubic cath, mild erythema). There is no rebound and no guarding.   Ostomy and suprapubic cath in place. Mild erythema around suprapubic cath   Musculoskeletal: He exhibits no edema.   Right foot with clean dressings in place   Neurological: He is alert.   Oriented to self. Disoriented to hospital, where he lives, that he has ostomy and cath   Skin: Skin is warm and dry.   Nursing note and vitals reviewed.      Fluids    Intake/Output Summary (Last 24 hours) at 06/08/19 1123  Last data filed at 06/08/19 0600   Gross per 24 hour   Intake             1400 ml   Output             2200 ml   Net             -800 ml       Laboratory  Recent Labs      06/08/19   0504   WBC  12.2*   RBC  4.38*   HEMOGLOBIN  12.4*   HEMATOCRIT  38.4*   MCV  87.7   MCH  28.3   MCHC  32.3*   RDW  49.1   PLATELETCT  417   MPV  9.0     Recent Labs      06/07/19   0104  06/08/19   0504   SODIUM  131*  133*   POTASSIUM  4.2  4.0   CHLORIDE  103  105   CO2  19*  17*   GLUCOSE  140*  135*   BUN  36*  40*   CREATININE  2.36*  2.31*   CALCIUM  9.1  9.3                   Imaging  US-BERNICE SINGLE LEVEL UNILAT RIGHT   Final Result      IR-DRAIN-BLADDER SUPRAPUB W/CATH   Final Result      1. Fluoroscopic guided exchange of the 14 [place] locking loop suprapubic catheter for a new 16 [place] Humphrey catheter.   3. Tubes cystogram demonstrating appropriate positioning of the new Humphrey catheter.      DX-TOE(S) 2+ RIGHT   Final Result         Soft tissue swelling about the second digit. Rarefaction of the tip of the second distal phalanx, concerning for osteomyelitis.           Assessment/Plan  * Urinary tract infection associated with cystostomy catheter (HCC)- (present on admission)   Assessment & Plan    Complicated by presence of suprapubic catheter, with surrounding cellulitis & skin breakdown.  Catheter has not been changed in several months per patient report.  Has  history of drug resistant UTI's.  UCx from outside facility growing MRSA and pseudomonoas  On zosyn and zyvox  Infectious disease consulted and completed antibiotics     Acute encephalopathy- (present on admission)   Assessment & Plan    Ongoing confusion  Received adequate treatment for infections   POA says he's very sensitive to narcotics, will minimize, and baseline does not know his medications or date  Vit B12 is normal     Osteomyelitis of right foot ([facility])   Assessment & Plan    Right second toe  Seen by LPS, ortho consulted and ID consulted  Completed course of antibiotics  Status post debridement 6/3     CRF (chronic renal failure), stage 3 (moderate) ([facility])- (present on admission)   Assessment & Plan    At baseline  Avoid nephrotoxins.  Renally dose all appropriate medications.     BPH (benign prostatic hyperplasia)- (present on admission)   Assessment & Plan    S/p suprapubic catheter placement.  Pain from possible bladder spasms per Dr. Donohue. Trial of belladonna and emla topically, patient refused  Ordered for oxybutynin     Crohn's disease ([facility])- (present on admission)   Assessment & Plan    S/p colostomy.  Wound care has been consulted for ostomy management.     Type 2 diabetes mellitus, with long-term current use of insulin ([facility])- (present on admission)   Assessment & Plan    On BID humulin 70/30 at home, has not needed here  A1c 6.5  Added Accu-Cheks ACHS with low scale SSI as required.  Hypoglycemia protocol in place if needed.  Diet changed to diabetic.          VTE prophylaxis: heparin

## 2019-06-08 NOTE — CARE PLAN
Problem: Safety  Goal: Will remain free from falls  Outcome: PROGRESSING AS EXPECTED  Fall precautions in place, Bed alarm on, frequent rounding initiated.

## 2019-06-08 NOTE — CONSULTS
Diabetes education: met with pt briefly this evening. Pt has a hx of diabetes and per EPIC is on 70/30 insulin 35 units in am and 45 units pm. Pt states he takes insulin ( did not remember name) once a day and tests his blood sugars. Pt was admitted with blood sugars of 92 and Hg A1c of 6.5%.  Pt is currently on only regular insulin sliding scale coverage ac and hs with blood sugars of 206 (2 units), 138, and 187 (1 unit).  Plan: Not sure how reliable pt is as well as med rec. Caution discharging pt on above dose of 70/30 insulin. Up to 6/6 pt was not using much insulin. If pt is to go home back on insulin, pt will need to be assessed for his ability to give his insulin with or with out supervision. Please call [phone] when discharge plan /needs known.

## 2019-06-08 NOTE — PROGRESS NOTES
Assumed patient care at 0700. Received report from night shift. Assessment completed. A&Ox 2, self and place. Very forgetful, short-term memory, requires reminding and orientation. High fall precautions in place; alarm in use, pt wearing treaded socks, personal possessions and call light placed within reach. RLQ ostomy, CDI. Suprapubic catheter in place, site care provided, Patient is post procedure (right flexor tendon release of toe), dressing to right second digit, CDI, tubigrip to lower leg. Offloading boot in use when out of bed. Pt ambulates to with assistance of one and use of FWW, up to chair for all meals. POC discussed with pt, communication board updated. Patient denies any additional needs at this time.

## 2019-06-08 NOTE — PROGRESS NOTES
"Received alert and oriented x 1. Check vitals sign and recorded accordingly and due med given per MAR. Monitor sign and symptoms of respiratory distress and treatment given accordingly per MAR.Medicated per MAR and reassessed every 2 hours per protocol. Call light within reach. Bed alarm in placed.  insulin coverage given, snack given as required. Ostomy and suprapubic care done. Dressing at the 2nd toe dry and intact. Foot care done with moisturizer and lotion covered with clean non skid socks. Needs attended. Will continue to monitor./76   Pulse 94   Temp 36.3 °C (97.4 °F) (Temporal)   Resp 18   Ht 1.803 m (5' 11\")   Wt 108.2 kg (238 lb 8.6 oz)   SpO2 97%   BMI 33.27 kg/m² .  "

## 2019-06-08 NOTE — PROGRESS NOTES
· 2 RN skin check complete with Bee OVIEDO.  · Devices in place colostomy bag and suprapubic catheter..  · Skin assessed under devices Yes.  · Confirmed pressure ulcers found on N/A.  · New potential pressure ulcers noted on N/A. Wound consult placed? N/A. Photo uploaded? N/A.   · The following interventions are in place able to turn selfs in bed, waffle overlay inflated per policy, extra pillows for comfort, kept skin dry and clean, barrier and antifungal cream/powder applied, foot care done with moisturizer and non skids sock in placed.  · Pinkish reddish periostoma noted applied stoma powder and changed ostomy bag per protocol, barrier cream Joey at sacral area, moisture fissure and bilateral groin, cleansed with soap and water suprapubic catheter pinkish and crusted noted around catheter.

## 2019-06-09 LAB
ANION GAP SERPL CALC-SCNC: 9 MMOL/L (ref 0–11.9)
BACTERIA UR CULT: ABNORMAL
BACTERIA UR CULT: ABNORMAL
BASOPHILS # BLD AUTO: 0.5 % (ref 0–1.8)
BASOPHILS # BLD: 0.07 K/UL (ref 0–0.12)
BUN SERPL-MCNC: 41 MG/DL (ref 8–22)
CALCIUM SERPL-MCNC: 9.2 MG/DL (ref 8.5–10.5)
CHLORIDE SERPL-SCNC: 107 MMOL/L (ref 96–112)
CO2 SERPL-SCNC: 19 MMOL/L (ref 20–33)
CREAT SERPL-MCNC: 2.45 MG/DL (ref 0.5–1.4)
EOSINOPHIL # BLD AUTO: 0.28 K/UL (ref 0–0.51)
EOSINOPHIL NFR BLD: 2.1 % (ref 0–6.9)
ERYTHROCYTE [DISTWIDTH] IN BLOOD BY AUTOMATED COUNT: 48.3 FL (ref 35.9–50)
GLUCOSE BLD-MCNC: 152 MG/DL (ref 65–99)
GLUCOSE BLD-MCNC: 164 MG/DL (ref 65–99)
GLUCOSE BLD-MCNC: 178 MG/DL (ref 65–99)
GLUCOSE BLD-MCNC: 236 MG/DL (ref 65–99)
GLUCOSE SERPL-MCNC: 142 MG/DL (ref 65–99)
HCT VFR BLD AUTO: 40.8 % (ref 42–52)
HGB BLD-MCNC: 13.2 G/DL (ref 14–18)
IMM GRANULOCYTES # BLD AUTO: 0.04 K/UL (ref 0–0.11)
IMM GRANULOCYTES NFR BLD AUTO: 0.3 % (ref 0–0.9)
LYMPHOCYTES # BLD AUTO: 4.23 K/UL (ref 1–4.8)
LYMPHOCYTES NFR BLD: 31.4 % (ref 22–41)
MCH RBC QN AUTO: 28.2 PG (ref 27–33)
MCHC RBC AUTO-ENTMCNC: 32.4 G/DL (ref 33.7–35.3)
MCV RBC AUTO: 87.2 FL (ref 81.4–97.8)
MONOCYTES # BLD AUTO: 0.94 K/UL (ref 0–0.85)
MONOCYTES NFR BLD AUTO: 7 % (ref 0–13.4)
NEUTROPHILS # BLD AUTO: 7.9 K/UL (ref 1.82–7.42)
NEUTROPHILS NFR BLD: 58.7 % (ref 44–72)
NRBC # BLD AUTO: 0 K/UL
NRBC BLD-RTO: 0 /100 WBC
PLATELET # BLD AUTO: 493 K/UL (ref 164–446)
PMV BLD AUTO: 8.9 FL (ref 9–12.9)
POTASSIUM SERPL-SCNC: 4 MMOL/L (ref 3.6–5.5)
RBC # BLD AUTO: 4.68 M/UL (ref 4.7–6.1)
SIGNIFICANT IND 70042: ABNORMAL
SITE SITE: ABNORMAL
SODIUM SERPL-SCNC: 135 MMOL/L (ref 135–145)
SOURCE SOURCE: ABNORMAL
WBC # BLD AUTO: 13.5 K/UL (ref 4.8–10.8)

## 2019-06-09 PROCEDURE — 700111 HCHG RX REV CODE 636 W/ 250 OVERRIDE (IP): Performed by: INTERNAL MEDICINE

## 2019-06-09 PROCEDURE — 99233 SBSQ HOSP IP/OBS HIGH 50: CPT | Performed by: INTERNAL MEDICINE

## 2019-06-09 PROCEDURE — A9270 NON-COVERED ITEM OR SERVICE: HCPCS | Performed by: HOSPITALIST

## 2019-06-09 PROCEDURE — A9270 NON-COVERED ITEM OR SERVICE: HCPCS | Performed by: INTERNAL MEDICINE

## 2019-06-09 PROCEDURE — 85025 COMPLETE CBC W/AUTO DIFF WBC: CPT

## 2019-06-09 PROCEDURE — 82962 GLUCOSE BLOOD TEST: CPT | Mod: 91

## 2019-06-09 PROCEDURE — 700102 HCHG RX REV CODE 250 W/ 637 OVERRIDE(OP): Performed by: INTERNAL MEDICINE

## 2019-06-09 PROCEDURE — 80048 BASIC METABOLIC PNL TOTAL CA: CPT

## 2019-06-09 PROCEDURE — 700102 HCHG RX REV CODE 250 W/ 637 OVERRIDE(OP): Performed by: HOSPITALIST

## 2019-06-09 PROCEDURE — 770006 HCHG ROOM/CARE - MED/SURG/GYN SEMI*

## 2019-06-09 PROCEDURE — 700111 HCHG RX REV CODE 636 W/ 250 OVERRIDE (IP): Performed by: HOSPITALIST

## 2019-06-09 PROCEDURE — 700105 HCHG RX REV CODE 258: Performed by: INTERNAL MEDICINE

## 2019-06-09 PROCEDURE — 36415 COLL VENOUS BLD VENIPUNCTURE: CPT

## 2019-06-09 RX ADMIN — INSULIN HUMAN 1 UNITS: 100 INJECTION, SOLUTION PARENTERAL at 08:11

## 2019-06-09 RX ADMIN — INSULIN HUMAN 2 UNITS: 100 INJECTION, SOLUTION PARENTERAL at 20:22

## 2019-06-09 RX ADMIN — OXYBUTYNIN CHLORIDE 5 MG: 5 TABLET ORAL at 18:04

## 2019-06-09 RX ADMIN — HEPARIN SODIUM 5000 UNITS: 5000 INJECTION, SOLUTION INTRAVENOUS; SUBCUTANEOUS at 15:42

## 2019-06-09 RX ADMIN — BUDESONIDE AND FORMOTEROL FUMARATE DIHYDRATE 2 PUFF: 160; 4.5 AEROSOL RESPIRATORY (INHALATION) at 18:00

## 2019-06-09 RX ADMIN — ACETAMINOPHEN 650 MG: 325 TABLET, FILM COATED ORAL at 15:42

## 2019-06-09 RX ADMIN — SENNOSIDES,DOCUSATE SODIUM 2 TABLET: 8.6; 5 TABLET, FILM COATED ORAL at 18:04

## 2019-06-09 RX ADMIN — BUDESONIDE AND FORMOTEROL FUMARATE DIHYDRATE 2 PUFF: 160; 4.5 AEROSOL RESPIRATORY (INHALATION) at 04:43

## 2019-06-09 RX ADMIN — TAMSULOSIN HYDROCHLORIDE 0.4 MG: 0.4 CAPSULE ORAL at 08:11

## 2019-06-09 RX ADMIN — MICONAZOLE NITRATE: 20 CREAM TOPICAL at 04:43

## 2019-06-09 RX ADMIN — MEROPENEM 500 MG: 500 INJECTION, POWDER, FOR SOLUTION INTRAVENOUS at 18:04

## 2019-06-09 RX ADMIN — MEROPENEM 500 MG: 500 INJECTION, POWDER, FOR SOLUTION INTRAVENOUS at 08:11

## 2019-06-09 RX ADMIN — HEPARIN SODIUM 5000 UNITS: 5000 INJECTION, SOLUTION INTRAVENOUS; SUBCUTANEOUS at 05:00

## 2019-06-09 RX ADMIN — INSULIN HUMAN 1 UNITS: 100 INJECTION, SOLUTION PARENTERAL at 12:00

## 2019-06-09 RX ADMIN — MULTIPLE VITAMINS W/ MINERALS TAB 1 TABLET: TAB at 04:42

## 2019-06-09 RX ADMIN — OXYBUTYNIN CHLORIDE 5 MG: 5 TABLET ORAL at 04:42

## 2019-06-09 RX ADMIN — NYSTATIN: 100000 POWDER TOPICAL at 04:43

## 2019-06-09 RX ADMIN — HEPARIN SODIUM 5000 UNITS: 5000 INJECTION, SOLUTION INTRAVENOUS; SUBCUTANEOUS at 20:19

## 2019-06-09 RX ADMIN — INSULIN HUMAN 1 UNITS: 100 INJECTION, SOLUTION PARENTERAL at 18:09

## 2019-06-09 ASSESSMENT — ENCOUNTER SYMPTOMS
ABDOMINAL PAIN: 1
SENSORY CHANGE: 0
DIZZINESS: 0
NAUSEA: 0
ABDOMINAL PAIN: 0
SPEECH CHANGE: 0
SHORTNESS OF BREATH: 0
MYALGIAS: 0
FEVER: 0
DIARRHEA: 0
COUGH: 0
VOMITING: 0

## 2019-06-09 NOTE — PROGRESS NOTES
· 2 RN skin check complete with Becky RN.  · Devices in place colostomy and suprapubic catheter..  · Skin assessed under devices Yes.  · Confirmed pressure ulcers found on N/A.  · New potential pressure ulcers noted on N/A. Wound consult placed? N/A. Photo uploaded? N/A.   · The following interventions are in place able to turn selfs in bed, waffle mattress overlay, extra pillows for comfort, kept skin dry and clean, barrier and antifungal cream/powder applied, foot care done with moisturizer and non skids sock in placed.  · Pinkish reddish periostoma noted, Mepilex with honey colloid applied at moisture fissure, bilateral groin mckinley cream, cleansed with soap and water suprapubic catheter pinkish and crusted noted around catheter and painful to touch. Right 2nd toe dry and intact dressing. Right leg wearing tubi .

## 2019-06-09 NOTE — PROGRESS NOTES
Assumed patient care at 0700. Received report from night shift. Assessment completed. AxOX2, self and place. Very forgetful, short-term memory, requires reminding and orientation. High fall precautions in place; alarm in use, pt wearing treaded socks, personal possessions and call light placed within reach. RLQ ostomy, CDI. Suprapubic catheter in place, site care provided, Patient is post procedure (right flexor tendon release of toe), dressing to right second digit, CDI, tubigrip to lower leg. Offloading boot in use when out of bed. Pt ambulates to with assistance of one and use of FWW, up to chair for all meals. POC discussed with pt, communication board updated. Patient denies any additional needs at this time.

## 2019-06-09 NOTE — PROGRESS NOTES
"Received alert and oriented x 3. Check vitals sign and recorded accordingly and due med given per MAR. Denies pain at this moment. Painful to touch suprapubic cathter. Medicated per MAR and reassessed every 2 hours per protocol. Call light within reach. Bed alarm in placed. Needs attended. FS done 204 and coverage given. Will continue to monitor./65   Pulse (!) 110 Comment: notified rn  Temp 36.4 °C (97.6 °F) (Temporal)   Resp 19   Ht 1.803 m (5' 11\")   Wt 107.4 kg (236 lb 12.4 oz)   SpO2 94%   BMI 33.02 kg/m² .  "

## 2019-06-09 NOTE — WOUND TEAM
[facility]  Inpatient Services  Initial Wound and Skin Care Evaluation    Admission Date:  5/29/2019   HPI, PMH, SH: Reviewed  Unit where seen by Wound Team: [id number]    HPI: Pt transferred from [facility] [place] operated by [facility] 5/29/19 for UTI and ALOC.  Pt has a colostomy and suprapubic catheter.      WOUND CONSULT RELATED TO:  Sacrum, right leg, below panus, right distal second toe.      SUBJECTIVE:  Pt agreeable    Self Report / Pain Level:  Right leg is tender       OBJECTIVE:  Pt in bed, A of 1 to roll.      WOUND TYPE, LOCATION, CHARACTERISTICS (Pressure ulcers: location, stage, POA or date identified)                 [facility]ound [facility]29[facility]19 [facility])   Site Assessment Red 6/8/2019  7:00 PM   Nisa-wound Assessment Pink 6/8/2019  7:00 PM   Margins Defined edges 6/8/2019  7:00 PM   Wound Length (cm) 1.2 cm 6/8/2019  7:00 PM   Wound Width (cm) 0.4 cm 6/8/2019  7:00 PM   Wound Depth (cm) 0.2 cm 6/8/2019  7:00 PM   Wound Surface Area (cm^2) 0.48 cm^2 6/8/2019  7:00 PM   Closure Secondary intention 6/8/2019  7:00 PM   Drainage Amount Scant 6/8/2019  7:00 PM   Drainage Description Serosanguineous 6/8/2019  7:00 PM   Non-staged Wound Description Partial thickness 6/8/2019  7:00 PM   Treatments Site care;Cleansed 6/8/2019  7:00 PM   Cleansing Approved Wound Cleanser 6/8/2019  7:00 PM   Periwound Protectant Antifungal Therapy 6/8/2019 10:00 AM   Dressing Options Honey Colloid;Mepilex 6/8/2019  7:00 PM   Dressing Change Frequency Every 48 hrs 6/8/2019  7:00 PM   NEXT Dressing Change  06/10/19 6/8/2019  7:00 PM   NEXT Weekly Photo (Inpatient Only) 06/13/19 6/8/2019  7:00 PM   WOUND NURSE ONLY - Odor None 6/8/2019  7:00 PM   WOUND NURSE ONLY - Exposed Structures None 6/8/2019  7:00 PM   WOUND NURSE ONLY - Tissue Type and Percentage red 6/8/2019  7:00 PM       Wound 05/31/19 Diabetic Ulcer Toe (Comment which one) right second toe distal (Active)   Wound Image   5/31/2019  5:00 PM   Site  Assessment Red 6/8/2019  7:00 PM   Nisa-wound Assessment Callused 6/8/2019  7:00 PM   Margins Defined edges 6/8/2019  7:00 PM   Wound Length (cm) 0.5 cm 6/8/2019  7:00 PM   Wound Width (cm) 0.5 cm 6/8/2019  7:00 PM   Wound Surface Area (cm^2) 0.25 cm^2 6/8/2019  7:00 PM   Post Wound Length (cm) 0.5 cm 6/1/2019  3:00 PM    Post Wound Width (cm) 0.5 cm 6/1/2019  3:00 PM   Post Wound Depth (cm) 0.5 cm 6/1/2019  3:00 PM   Post Wound Surface Area (cm^2) 0.25 cm^2 6/1/2019  3:00 PM   Tunneling 0 cm 5/31/2019  5:00 PM   Undermining 0 cm 5/31/2019  5:00 PM   Closure Secondary intention 6/8/2019  7:00 PM   Drainage Amount Scant 6/8/2019  7:00 PM   Drainage Description Serosanguineous 6/8/2019  7:00 PM   Non-staged Wound Description Full thickness 6/8/2019  7:00 PM   Treatments Cleansed;Site care 6/6/2019  3:13 AM   Cleansing Approved Wound Cleanser 6/8/2019  7:00 PM   Periwound Protectant Skin Protectant wipes to Periwound 6/8/2019  7:00 PM   Dressing Options Nonadhesive Foam 6/8/2019  7:00 PM   Dressing Cleansing/Solutions Normal Saline 6/6/2019  9:00 PM   Dressing Changed Changed 6/8/2019  7:00 PM   Dressing Status Clean;Dry;Intact 6/8/2019 10:00 AM   Dressing Change Frequency Every 48 hrs 6/8/2019  7:00 PM   NEXT Dressing Change  06/10/19 6/8/2019  7:00 PM   NEXT Weekly Photo (Inpatient Only) 06/15/19 6/8/2019  7:00 PM   WOUND NURSE ONLY - Odor None 6/8/2019  7:00 PM   WOUND NURSE ONLY - Pulses Right;DP;1+ 6/1/2019  3:00 PM   WOUND NURSE ONLY - Exposed Structures None 6/8/2019  7:00 PM   WOUND NURSE ONLY - Tissue Type and Percentage red 6/8/2019  7:00 PM   WOUND NURSE ONLY - Time Spent with Patient (mins) 60 6/8/2019  7:00 PM        Vascular: 5/31/19 by hand held doppler right ankle    Dorsal Pedal pulses:  2+ multiphasic  Posterior tib pulses:  2+ multiphasic    BERNICE:     Test ordered    Lab Values:    WBC:       WBC   Date/Time Value Ref Range Status   06/08/2019 05:04 AM 12.2 (H) 4.8 - 10.8 K/uL Final     AIC:      Lab  Results   Component Value Date/Time    HBA1C 6.5 (H) 05/31/2019 12:21 AM         Culture:   Completed nt    INTERVENTIONS BY WOUND TEAM:  Sacrum  Cleaned with wipes, applied hydrocolloid to open PTW and mepilex, right second toe cleaned with wound , debrided julia wound callus 2 cm2 with tweezers and scissors, cleansed woudnagain, applied foam non adheisve and aquacel ag to tendon release.and secured with hypafix.      Interdisciplinary consultation:  RN, patient,     EVALUATION:  Pt with MAD and PTW/fissure buttock. Colloid for moisture balance and mepilex to off load.   Pt with DFU that is improving since tendon release.        Factors affecting wound healing:  DM, previous amputation, CVI, edema, COPD  Goals:  Steady decrease in wound area and depth weekly     NURSING PLAN OF CARE ORDERS (X):    Dressing changes: See Dressing Care orders:     Skin care: See Skin Care orders: X  Rectal tube care: See Rectal Tube Care orders:   Other orders:        WOUND TEAM PLAN OF CARE (X):   NPWT change 3 x week:        Dressing changes by wound team:       Follow up as needed:     X  Other (explain):    Anticipated discharge plans (X):  SNF:        Pt will need ongoing wound care for right leg, toe and perineal area and moisture fissure  Home Care:           Outpatient Wound Center:            Self Care:            Other:

## 2019-06-09 NOTE — PROGRESS NOTES
Infectious Disease Progress Note    Author: Mickie Liu M.D. Date & Time of service: 2019  8:53 AM    Chief Complaint:  Diabetic foot infection/UTI    Interval History:  6/3/2019-no fevers.  Evaluated by orthopedics and will be taken to surgery today.  WBC 6.8  2019-no fevers.  He says he feels much better.  He underwent right second toe flexor tendon release and debridement of the callus.  2019 no fevers.  No new issues overnight.  The wound looks good.   afebrile WBC 13.5.  ID reconsulted for recurrent urinary tract infection in the setting of acute worsening suprapubic pain.  Patient continues to endorse pain around his suprapubic catheter.  It was last changed on 2019 per report.  Urine cultures are now growing pseudomonas aeruginosa.    Labs Reviewed and Wound Reviewed.    Review of Systems:  Review of Systems   Constitutional: Positive for malaise/fatigue. Negative for fever.   HENT: Negative for hearing loss.    Respiratory: Negative for cough and shortness of breath.    Cardiovascular: Positive for leg swelling. Negative for chest pain.   Gastrointestinal: Negative for abdominal pain, diarrhea, nausea and vomiting.   Genitourinary: Negative for dysuria.        Pain around his suprapubic catheter   Musculoskeletal: Negative for myalgias.   Neurological: Negative for sensory change and speech change.       Hemodynamics:  Temp (24hrs), Av.6 °C (97.9 °F), Min:36.4 °C (97.6 °F), Max:36.7 °C (98 °F)  Temperature: 36.6 °C (97.9 °F)  Pulse  Av  Min: 58  Max: 131   Blood Pressure : 151/72       Physical Exam:  Physical Exam   Constitutional: He is oriented to person, place, and time. No distress.   Morbidly obese  Looks chronically ill   HENT:   Mouth/Throat: No oropharyngeal exudate.   Eyes: Pupils are equal, round, and reactive to light. EOM are normal. No scleral icterus.   Neck: Neck supple.   Cardiovascular: Normal rate and regular rhythm.    No murmur heard.  Pulmonary/Chest:  Effort normal. He has no wheezes. He has no rales.   Abdominal: Soft. There is no tenderness. There is no rebound and no guarding.   Genitourinary:   Genitourinary Comments: Suprapubic catheter- dry drainage around insertion site   Musculoskeletal: He exhibits edema. He exhibits no tenderness.   Evidence of right great toe amputation site.  Clean    Right second toe dressed   Lymphadenopathy:     He has no cervical adenopathy.   Neurological: He is alert and oriented to person, place, and time.   Skin: Skin is warm. No erythema.   Bilateral lower extremity chronic skin changes   Vitals reviewed.      Meds:    Current Facility-Administered Medications:   •  meropenem  •  oxybutynin  •  lidocaine-prilocaine  •  Notify provider if pain remains uncontrolled **AND** Use the numeric rating scale (NRS-11) on regular floors and Critical-Care Pain Observation Tool (CPOT) on ICUs/Trauma to assess pain **AND** Pulse Ox (Oximetry) **AND** Pharmacy Consult Request **AND** If patient difficult to arouse and/or has respiratory depression, stop any opiates that are currently infusing and call a Rapid Response. **AND** [DISCONTINUED] oxyCODONE immediate-release **AND** [DISCONTINUED] oxyCODONE immediate-release **AND** [DISCONTINUED] morphine injection  •  insulin regular **AND** Accu-Chek ACHS **AND** NOTIFY MD and PharmD **AND** glucose **AND** dextrose 10% bolus  •  budesonide-formoterol  •  tamsulosin  •  therapeutic multivitamin-minerals  •  acetaminophen  •  ondansetron  •  ondansetron  •  senna-docusate **AND** polyethylene glycol/lytes **AND** magnesium hydroxide **AND** bisacodyl  •  NS  •  heparin  •  miconazole 2%-zinc oxide  •  nystatin    Labs:  Recent Labs      06/08/19   0504  06/09/19   0104   WBC  12.2*  13.5*   RBC  4.38*  4.68*   HEMOGLOBIN  12.4*  13.2*   HEMATOCRIT  38.4*  40.8*   MCV  87.7  87.2   MCH  28.3  28.2   RDW  49.1  48.3   PLATELETCT  417  493*   MPV  9.0  8.9*   NEUTSPOLYS  55.90  58.70   LYMPHOCYTES   33.50  31.40   MONOCYTES  6.70  7.00   EOSINOPHILS  2.90  2.10   BASOPHILS  0.60  0.50     Recent Labs      06/07/19   0104  06/08/19   0504  06/09/19   0104   SODIUM  131*  133*  135   POTASSIUM  4.2  4.0  4.0   CHLORIDE  103  105  107   CO2  19*  17*  19*   GLUCOSE  140*  135*  142*   BUN  36*  40*  41*     Recent Labs      06/07/19   0104  06/08/19   0504  06/09/19   0104   CREATININE  2.36*  2.31*  2.45*       Imaging:  Dx-toe(s) 2+ Right    Result Date: 5/31/2019 5/31/2019 4:55 PM HISTORY/REASON FOR EXAM:  right second toe distal non healing wound. Right foot 2nd digit has a non healing wound TECHNIQUE/EXAM DESCRIPTION AND NUMBER OF VIEWS:  3 views of the RIGHT toes. 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 No rarefaction or destruction of the stump. Soft tissue swelling in the second digit with rarefaction of the tip of the second distal phalanx.     Soft tissue swelling about the second digit. Rarefaction of the tip of the second distal phalanx, concerning for osteomyelitis.    Ir-drain-bladder Suprapub W/cath    Result Date: 6/1/2019 5/31/2019 3:04 PM HISTORY/REASON FOR EXAM:  Exchange locking loop catheter for a 16 Australian Humphrey catheter. TECHNIQUE/EXAM DESCRIPTION AND NUMBER OF VIEWS:  Ultrasound and fluoroscopic guided exchange of a suprapubic bladder catheter placement, Cystogram. Fluoroscopy time: 0.8 minutes. Fluoroscopic images: 3. PROCEDURE:  Informed consent was obtained. Conscious sedation with 100 mcg Fentanyl and 2 mg Versed was administered during the procedure with appropriate continuous patient monitoring by the radiology nurse. Conscious sedation duration was 30 minutes. The existing 14 Australian locking loop suprapubic catheter was prepped and draped in sterile manner. The skin entry site of the suprapubic catheter was anesthetized with 7 mL 1% lidocaine. Under fluoroscopic visualization 15 mL of contrast was injected  through the existing tube confirming intracystic positioning. A sidehole was then cut in the suprapubic catheter allowing for placement of a wire and the urinary bladder through the existing catheter. Existing catheter was removed over the wire under fluoroscopic visualization. Dilation of the catheter entry site was performed up to 16 Fijian. A new 16 Fijian Humphrey catheter was then advanced over the wire into the urinary bladder. The retention balloon was inflated with 7 mL of saline. A final contrast injection through the tube was performed utilizing 15 mL Omnipaque intravenous contrast confirming intracystic positioning of the new tube with no evidence of extravasation. The patient tolerated the procedure poorly with no evidence of complication. COMPARISON: Suprapubic catheter placement imaging dated 2019 FINDINGS:  The cystogram shows satisfactory catheter position with all sideholes within the urinary bladder. There is no extravasation.     1. Fluoroscopic guided exchange of the 14 Fijian locking loop suprapubic catheter for a new 16 Fijian Humphrey catheter. 3. Tubes cystogram demonstrating appropriate positioning of the new Humphrey catheter.    Us-kemi Single Level Unilat Right    Result Date: 2019   Vascular Laboratory  Conclusions  1.  Normal resting right lower extremity arterial perfusion.  HANS MAYERS  Age:    69    Gender:     M  MRN:    [id number]  :    1950      BSA:  Exam Date:     2019 10:49  Room #:     Inpatient  Priority:     Routine  Ht (in):             Wt (lb):  Ordering Physician:        NARCISA VEGA  Referring Physician:       NARCISA VEGA  Sonographer:               Mati Diehl RVT  Study Type:                Complete Unilateral  Technical Quality:         Adequate  Indications:     Cyanosis  CPT Codes:       10467  ICD Codes:       782.5  History:         Discoloration of right lower extremity; history of diabetes  Limitations:                 RIGHT   Waveform            Systolic BPs (mmHg)                             134           Brachial  Triphasic                                Common Femoral  Triphasic                  144           Posterior Tibial  Triphasic                  136           Dorsalis Pedis                                           Peroneal                             1.08          BERNICE                                           TBI                       LEFT  Waveform        Systolic BPs (mmHg)                             131           Brachial                                           Common Femoral                                           Posterior Tibial                                           Dorsalis Pedis                                           Peroneal                                           BERNICE                                           TBI  Findings  Right.  Doppler waveform of the common femoral artery is of high amplitude and  triphasic.  Doppler waveforms at the ankle are brisk and triphasic.  Ankle-brachial index is normal.  Additional testing was not performed in accordance with lower extremity  arterial evaluation protocol.  Alka Hermosillo M.D.  (Electronically Signed)  Final Date:      02 June 2019                   18:17      Micro:  Results     Procedure Component Value Units Date/Time    URINE CULTURE(NEW) [[id number]]  (Abnormal)  (Susceptibility) Collected:  06/07/19 0929    Order Status:  Completed Specimen:  Urine from Urine, Suprapubic Updated:  06/09/19 0846     Significant Indicator POS (POS)     Source UR     Site URINE, SUPRAPUBIC     Culture Result - (A)      Pseudomonas aeruginosa  >100,000 cfu/mL  P.aeruginosa may develop resistance during prolonged therapy  with all antibiotics. Isolates that are initially susceptible  may become resistant within three to four days after  initiation of therapy. Testing of repeat isolates may be  warranted.   (A)    Narrative:       Collected By:[id number] AVRIL GO  JEFFREY  Indication for culture:->Patient WITHOUT an indwelling Humphrey  catheter in place with new onset of Dysuria, Frequency,  Urgency, and/or Suprapubic pain  Collected By:[id number] AVRIL MURPHY    Culture & Susceptibility     PSEUDOMONAS AERUGINOSA     Antibiotic Sensitivity Microscan Unit Status    Amikacin Sensitive <=16 mcg/mL Final    Method: CARLITO    Cefepime Intermediate 16 mcg/mL Final    Method: CARLITO    Ceftazidime Sensitive 8 mcg/mL Final    Method: CARLITO    Ciprofloxacin Resistant >2 mcg/mL Final    Method: CARLITO    Gentamicin Sensitive <=4 mcg/mL Final    Method: CARLITO    Imipenem Sensitive <=1 mcg/mL Final    Method: CARLITO    Levofloxacin Intermediate 4 mcg/mL Final    Method: CARLITO    Meropenem Sensitive <=1 mcg/mL Final    Method: CARLITO    Pip/Tazobactam Sensitive 64 mcg/mL Final    Method: CARLITO    Piperacillin Sensitive 64 mcg/mL Final    Method: CARLITO    Tobramycin Sensitive <=4 mcg/mL Final    Method: CARLITO                       URINALYSIS [[id number]]  (Abnormal) Collected:  06/07/19 0929    Order Status:  Completed Specimen:  Urine from Urine, Suprapubic Updated:  06/07/19 0938     Color Yellow     Character Sl Cloudy (A)     Comment: Corrected result; previously reported as Hazy on 06/07/19 at 09:36        Specific Gravity 1.015     Ph 6.0     Glucose Negative mg/dL      Ketones Negative mg/dL      Protein >=300 (A) mg/dL      Bilirubin Negative     Urobilinogen, Urine 0.2     Nitrite Positive (A)     Leukocyte Esterase Small (A)     Occult Blood Moderate (A)     Micro Urine Req Microscopic    Narrative:       Collected By:[id number] AVRIL MURPHY  Indication for culture:->Patient WITHOUT an indwelling Humphrey  catheter in place with new onset of Dysuria, Frequency,  Urgency, and/or Suprapubic pain    Blood Culture [[id number]] Collected:  05/29/19 0540    Order Status:  Completed Specimen:  Blood from Peripheral Updated:  06/03/19 0700     Significant Indicator NEG     Source BLD     Site PERIPHERAL     Culture  "Result No growth after 5 days of incubation.    Narrative:       From different peripheral sites, if not done within the last  24 hours (Per Hospital Policy: Only change specimen source to  \"Line\" if specified by physician order)  Right AC    Blood Culture [[id number]] Collected:  05/29/19 0550    Order Status:  Completed Specimen:  Blood from Peripheral Updated:  06/03/19 0700     Significant Indicator NEG     Source BLD     Site PERIPHERAL     Culture Result No growth after 5 days of incubation.    Narrative:       From different peripheral sites, if not done within the last  24 hours (Per Hospital Policy: Only change specimen source to  \"Line\" if specified by physician order)  Right Hand    URINE CULTURE(NEW) [[id number]]  (Abnormal)  (Susceptibility) Collected:  05/30/19 0956    Order Status:  Completed Specimen:  Urine from Urine, Suprapubic Updated:  06/02/19 1026     Significant Indicator POS (POS)     Source UR     Site URINE, SUPRAPUBIC     Culture Result - (A)      Pseudomonas aeruginosa  ,000 cfu/mL  P.aeruginosa may develop resistance during prolonged therapy  with all antibiotics. Isolates that are initially susceptible  may become resistant within three to four days after  initiation of therapy. Testing of repeat isolates may be  warranted.   (A)      Enterococcus faecalis  10-50,000 cfu/mL   (A)    Narrative:       Collected By:[id number] SILAS TITUS  Indication for culture:->Patient WITHOUT an indwelling Humphrey  catheter in place with new onset of Dysuria, Frequency,  Urgency, and/or Suprapubic pain  Collected By:[id number] SILAS TITUS    Culture & Susceptibility     ENTEROCOCCUS FAECALIS     Antibiotic Sensitivity Microscan Unit Status    Ampicillin Sensitive <=2 mcg/mL Final    Method: CARLITO    Daptomycin Sensitive <=0.5 mcg/mL Final    Method: CARLITO    Nitrofurantoin Sensitive <=32 mcg/mL Final    Method: CARLITO    Penicillin Sensitive 8 mcg/mL Final    Method: CARLITO    Tetracycline Resistant >8 " mcg/mL Final    Method: CARLITO              PSEUDOMONAS AERUGINOSA     Antibiotic Sensitivity Microscan Unit Status    Amikacin Sensitive <=16 mcg/mL Final    Method: CARLITO    Cefepime Intermediate 16 mcg/mL Final    Method: CARLITO    Ceftazidime Sensitive 8 mcg/mL Final    Method: CARLITO    Ciprofloxacin Resistant >2 mcg/mL Final    Method: CARLITO    Gentamicin Sensitive <=4 mcg/mL Final    Method: CARLITO    Imipenem Sensitive <=1 mcg/mL Final    Method: CARLITO    Levofloxacin Resistant >4 mcg/mL Final    Method: CARLITO    Meropenem Sensitive <=1 mcg/mL Final    Method: CARLITO    Pip/Tazobactam Sensitive 64 mcg/mL Final    Method: CARLITO    Piperacillin Sensitive 32 mcg/mL Final    Method: CARLITO    Tobramycin Sensitive <=4 mcg/mL Final    Method: CARLITO                       GRAM STAIN ONLY [[id number]] Collected:  05/30/19 0956    Order Status:  Completed Specimen:  Urine Updated:  06/02/19 1026     Significant Indicator NEG     Source UR     Site URINE, SUPRAPUBIC     Gram Stain Result Few WBCs.  Few Gram negative rods.  Rare Gram positive cocci.      Narrative:       Collected By:[id number] SILAS TITUS  Indication for culture:->Patient WITHOUT an indwelling Humphrey  catheter in place with new onset of Dysuria, Frequency,  Urgency, and/or Suprapubic pain  Collected By:[id number] SILAS TITUS          Assessment:  Active Hospital Problems    Diagnosis   • *Urinary tract infection associated with cystostomy catheter ([facility]) [T83.510A, N39.0]   • Type 2 diabetes mellitus, with long-term current use of insulin ([facility]) [E11.9, Z79.4]   • Osteomyelitis of right foot ([facility]) [M86.9]   • Sepsis ([facility]) [A41.9]   • CRF (chronic renal failure), stage 3 (moderate) ([facility]) [N18.3]       Plan:  Complicated urinary tract infection, recurrent  In the setting of a chronic supra pubic catheter  Patient had acute pain in the suprapubic area  Increased leukocytosis  Repeat UA abnormal  Urine culture on 6/7-pseudomonas aeruginosa (Zosyn CARLITO 64)  Continue meropenem  Change  suprapubic catheter as current catheter is likely colonized  Repeat urine culture with new catheter in place    Right second toe infection  Status post right second toe flexor tendon release and debridement on 6/3/2019 and received antibiotics for 24 hours after the surgery  Surgical site clean  Continue wound care    Type 2 diabetes mellitus   Hemoglobin A1c 6.5   Control blood sugars  Blood sugar 152 today    Chronic kidney disease, stage III  Renally dose antibiotics accordingly  Avoid nephrotoxins  Monitor    Discussed with internal medicine/Dr Jones

## 2019-06-09 NOTE — PROGRESS NOTES
"Hospital Medicine Daily Progress Note    Date of Service  6/9/2019    Chief Complaint  69 y.o. male admitted 5/29/2019 with dysuria    Hospital Course    Past medical history of Crohn's with ostomy, suprapubic cath, diabetes.  Patient has not had suprapubic cath changed for several months despite his POA asking him to have home health assess him.  He complains of painful urination and has a history of multiple drug-resistant urine infections.  He was admitted on meropenem empirically.  Urology was consulted and exchanged catheter.  His urine culture from [facility] returned with MRSA and Pseudomonas and his antibiotic was changed to Zosyn and Zyvox.  He was noted to have right toe osteomyelitis.  LPS and Ortho were consulted and performed debridement with right second toe flexor tendon release 6/3/2019. Dr. Marx with infectious disease was consulted for antibiotic management and completed course. He was awaiting placement when he had recurrent pain around suprapubic cath and repeat urine grew pseudomonas. He was started on meropenem.       Interval Problem Update  WBC rising  Pseudomonas in urine cx, >100k cfu/mL  Started antibiotics. I consulted ID. Plan for suprapubic cath exchange. Area is very tender and requests to be \"put out\". Anesthesia ordered  His confusion is improved today, does not remember why he is here.    Consultants/Specialty  Urology  Infectious disease  LPS, Ortho    Code Status  Full    Disposition  Lives alone, POA agreeable to [facility]  Sutures out 2 weeks post-op 6/17/19  [facility]    Review of Systems  Review of Systems   Unable to perform ROS: Mental acuity   Constitutional: Negative for malaise/fatigue.   HENT: Negative for congestion.    Respiratory: Negative for cough and shortness of breath.    Cardiovascular: Negative for chest pain.   Gastrointestinal: Positive for abdominal pain. Negative for nausea.   Genitourinary: Negative for dysuria.   Musculoskeletal: Negative for joint pain. "   Neurological: Negative for dizziness.        Physical Exam  Temp:  [36.4 °C (97.6 °F)-36.7 °C (98 °F)] 36.6 °C (97.9 °F)  Pulse:  [] 60  Resp:  [18-19] 18  BP: (140-151)/(65-79) 151/72  SpO2:  [93 %-96 %] 93 %    Physical Exam   Constitutional: He appears well-developed and well-nourished.   HENT:   Head: Normocephalic.   Mouth/Throat: Oropharynx is clear and moist.   Eyes: Conjunctivae are normal.   Cardiovascular: Normal rate and regular rhythm.    Pulmonary/Chest: Effort normal. He has no wheezes.   Abdominal: Soft. He exhibits distension. There is tenderness (around suprapubic cath, mild erythema). There is no rebound and no guarding.   Ostomy and suprapubic cath in place. Mild erythema around suprapubic cath   Musculoskeletal: He exhibits no edema.   Right foot with clean dressings in place   Neurological: He is alert.   Oriented to self, [place], [facility], his home. Disoriented to month and cause of hospitalization   Skin: Skin is warm and dry.   Nursing note and vitals reviewed.      Fluids    Intake/Output Summary (Last 24 hours) at 06/09/19 1128  Last data filed at 06/09/19 0600   Gross per 24 hour   Intake              640 ml   Output             4900 ml   Net            -4260 ml       Laboratory  Recent Labs      06/08/19   0504  06/09/19   0104   WBC  12.2*  13.5*   RBC  4.38*  4.68*   HEMOGLOBIN  12.4*  13.2*   HEMATOCRIT  38.4*  40.8*   MCV  87.7  87.2   MCH  28.3  28.2   MCHC  32.3*  32.4*   RDW  49.1  48.3   PLATELETCT  417  493*   MPV  9.0  8.9*     Recent Labs      06/07/19   0104  06/08/19   0504  06/09/19   0104   SODIUM  131*  133*  135   POTASSIUM  4.2  4.0  4.0   CHLORIDE  103  105  107   CO2  19*  17*  19*   GLUCOSE  140*  135*  142*   BUN  36*  40*  41*   CREATININE  2.36*  2.31*  2.45*   CALCIUM  9.1  9.3  9.2                   Imaging  US-BERNICE SINGLE LEVEL UNILAT RIGHT   Final Result      IR-DRAIN-BLADDER SUPRAPUB W/CATH   Final Result      1. Fluoroscopic guided exchange of the 14  Pakistani locking loop suprapubic catheter for a new 16 Pakistani Humphrey catheter.   3. Tubes cystogram demonstrating appropriate positioning of the new Humphrey catheter.      DX-TOE(S) 2+ RIGHT   Final Result         Soft tissue swelling about the second digit. Rarefaction of the tip of the second distal phalanx, concerning for osteomyelitis.      IR-DRAIN-BLADDER SUPRAPUB W/CATH    (Results Pending)        Assessment/Plan  * Urinary tract infection associated with cystostomy catheter (HCC)- (present on admission)   Assessment & Plan    Complicated by presence of suprapubic catheter, with surrounding cellulitis & skin breakdown.  Catheter has not been changed in several months per patient report.  Has history of drug resistant UTI's.  UCx from outside facility growing MRSA and pseudomonoas  On zosyn and zyvox  Infectious disease consulted and completed antibiotics    6/9/19. Recurrence of suprapubic cath pain with leukocytosis and positive urine cx for pseudomonas. ID consulted. Started on meropenem. Plan for cath exchange. Repeat urine cx tomorrow.     Acute encephalopathy- (present on admission)   Assessment & Plan    Ongoing confusion  Received adequate treatment for infections   POA says he's very sensitive to narcotics, will minimize, and baseline does not know his medications or date  Vit B12 is normal     Osteomyelitis of right foot (HCC)   Assessment & Plan    Right second toe  Seen by LPS, ortho consulted and ID consulted  Completed course of antibiotics  Status post debridement 6/3     CRF (chronic renal failure), stage 3 (moderate) (HCC)- (present on admission)   Assessment & Plan    At baseline  Avoid nephrotoxins.  Renally dose all appropriate medications.     BPH (benign prostatic hyperplasia)- (present on admission)   Assessment & Plan    S/p suprapubic catheter placement.  Pain from possible bladder spasms per Dr. Donohue. Trial of belladonna and emla topically, patient refused  Ordered for oxybutynin      Crohn's disease (HCC)- (present on admission)   Assessment & Plan    S/p colostomy.  Wound care has been consulted for ostomy management.     Type 2 diabetes mellitus, with long-term current use of insulin ([facility])- (present on admission)   Assessment & Plan    On BID humulin 70/30 at home, has not needed here  A1c 6.5  Added Accu-Cheks ACHS with low scale SSI as required.  Hypoglycemia protocol in place if needed.  Diet changed to diabetic.          VTE prophylaxis: heparin

## 2019-06-09 NOTE — CARE PLAN
Problem: Safety  Goal: Will remain free from falls    Intervention: Implement fall precautions   06/08/19 9393   OTHER   Environmental Precautions Treaded Slipper Socks on Patient;Personal Belongings, Wastebasket, [employer] etc. in Easy Reach;Transferred to Stronger Side;Report Given to Other Health Care Providers Regarding Fall Risk;Communication Sign for Patients & Families;Bed in Low Position   Bed Alarm Yes - Alarm On         Problem: Discharge Barriers/Planning  Goal: Patient's continuum of care needs will be met    Intervention: Collaborate with Transitional Care Team and Interdisciplinary Team to meet discharge needs  Awaiting financial status from [facility] prior to group home discharge.

## 2019-06-09 NOTE — CARE PLAN
Problem: Safety  Goal: Will remain free from falls  Outcome: PROGRESSING AS EXPECTED  Fall precautions in place, bed alarm activated, frequent rounds initiated

## 2019-06-10 ENCOUNTER — APPOINTMENT (OUTPATIENT)
Dept: CARDIOLOGY | Facility: MEDICAL CENTER | Age: 69
DRG: 673 | End: 2019-06-10
Attending: INTERNAL MEDICINE
Payer: MEDICARE

## 2019-06-10 ENCOUNTER — APPOINTMENT (OUTPATIENT)
Dept: RADIOLOGY | Facility: MEDICAL CENTER | Age: 69
DRG: 673 | End: 2019-06-10
Attending: INTERNAL MEDICINE
Payer: MEDICARE

## 2019-06-10 PROBLEM — R07.9 CHEST PAIN: Status: ACTIVE | Noted: 2019-06-10

## 2019-06-10 PROBLEM — Z91.199 NONCOMPLIANCE: Status: ACTIVE | Noted: 2019-06-10

## 2019-06-10 PROBLEM — R94.31 PROLONGED Q-T INTERVAL ON ECG: Status: ACTIVE | Noted: 2019-06-10

## 2019-06-10 PROBLEM — I48.92 ATRIAL FLUTTER (HCC): Status: ACTIVE | Noted: 2019-06-10

## 2019-06-10 LAB
ANION GAP SERPL CALC-SCNC: 11 MMOL/L (ref 0–11.9)
BASOPHILS # BLD AUTO: 0.7 % (ref 0–1.8)
BASOPHILS # BLD: 0.09 K/UL (ref 0–0.12)
BUN SERPL-MCNC: 47 MG/DL (ref 8–22)
CALCIUM SERPL-MCNC: 9.4 MG/DL (ref 8.5–10.5)
CHLORIDE SERPL-SCNC: 105 MMOL/L (ref 96–112)
CO2 SERPL-SCNC: 18 MMOL/L (ref 20–33)
CREAT SERPL-MCNC: 2.72 MG/DL (ref 0.5–1.4)
EKG IMPRESSION: NORMAL
EOSINOPHIL # BLD AUTO: 0.25 K/UL (ref 0–0.51)
EOSINOPHIL NFR BLD: 2 % (ref 0–6.9)
ERYTHROCYTE [DISTWIDTH] IN BLOOD BY AUTOMATED COUNT: 49.6 FL (ref 35.9–50)
GLUCOSE BLD-MCNC: 140 MG/DL (ref 65–99)
GLUCOSE BLD-MCNC: 153 MG/DL (ref 65–99)
GLUCOSE BLD-MCNC: 156 MG/DL (ref 65–99)
GLUCOSE BLD-MCNC: 198 MG/DL (ref 65–99)
GLUCOSE SERPL-MCNC: 133 MG/DL (ref 65–99)
HCT VFR BLD AUTO: 42.4 % (ref 42–52)
HGB BLD-MCNC: 13.6 G/DL (ref 14–18)
IMM GRANULOCYTES # BLD AUTO: 0.05 K/UL (ref 0–0.11)
IMM GRANULOCYTES NFR BLD AUTO: 0.4 % (ref 0–0.9)
INR PPP: 1.11 (ref 0.87–1.13)
LV EJECT FRACT  99904: 45
LV EJECT FRACT MOD 2C 99903: 51.45
LV EJECT FRACT MOD 4C 99902: 51.17
LV EJECT FRACT MOD BP 99901: 52.22
LYMPHOCYTES # BLD AUTO: 4.2 K/UL (ref 1–4.8)
LYMPHOCYTES NFR BLD: 32.8 % (ref 22–41)
MCH RBC QN AUTO: 28.3 PG (ref 27–33)
MCHC RBC AUTO-ENTMCNC: 32.1 G/DL (ref 33.7–35.3)
MCV RBC AUTO: 88.3 FL (ref 81.4–97.8)
MONOCYTES # BLD AUTO: 0.97 K/UL (ref 0–0.85)
MONOCYTES NFR BLD AUTO: 7.6 % (ref 0–13.4)
NEUTROPHILS # BLD AUTO: 7.23 K/UL (ref 1.82–7.42)
NEUTROPHILS NFR BLD: 56.5 % (ref 44–72)
NRBC # BLD AUTO: 0 K/UL
NRBC BLD-RTO: 0 /100 WBC
PLATELET # BLD AUTO: 506 K/UL (ref 164–446)
PMV BLD AUTO: 9.2 FL (ref 9–12.9)
POTASSIUM SERPL-SCNC: 4.1 MMOL/L (ref 3.6–5.5)
PROTHROMBIN TIME: 14.5 SEC (ref 12–14.6)
RBC # BLD AUTO: 4.8 M/UL (ref 4.7–6.1)
SODIUM SERPL-SCNC: 134 MMOL/L (ref 135–145)
TROPONIN I SERPL-MCNC: <0.01 NG/ML (ref 0–0.04)
TROPONIN I SERPL-MCNC: <0.01 NG/ML (ref 0–0.04)
WBC # BLD AUTO: 12.8 K/UL (ref 4.8–10.8)

## 2019-06-10 PROCEDURE — 93306 TTE W/DOPPLER COMPLETE: CPT | Mod: 26 | Performed by: INTERNAL MEDICINE

## 2019-06-10 PROCEDURE — 700102 HCHG RX REV CODE 250 W/ 637 OVERRIDE(OP): Performed by: RADIOLOGY

## 2019-06-10 PROCEDURE — 87186 SC STD MICRODIL/AGAR DIL: CPT

## 2019-06-10 PROCEDURE — 700111 HCHG RX REV CODE 636 W/ 250 OVERRIDE (IP): Performed by: RADIOLOGY

## 2019-06-10 PROCEDURE — 770020 HCHG ROOM/CARE - TELE (206)

## 2019-06-10 PROCEDURE — A9270 NON-COVERED ITEM OR SERVICE: HCPCS | Performed by: HOSPITALIST

## 2019-06-10 PROCEDURE — 93005 ELECTROCARDIOGRAM TRACING: CPT | Performed by: INTERNAL MEDICINE

## 2019-06-10 PROCEDURE — 85025 COMPLETE CBC W/AUTO DIFF WBC: CPT

## 2019-06-10 PROCEDURE — 700105 HCHG RX REV CODE 258: Performed by: INTERNAL MEDICINE

## 2019-06-10 PROCEDURE — 700102 HCHG RX REV CODE 250 W/ 637 OVERRIDE(OP): Performed by: HOSPITALIST

## 2019-06-10 PROCEDURE — 85610 PROTHROMBIN TIME: CPT

## 2019-06-10 PROCEDURE — 99223 1ST HOSP IP/OBS HIGH 75: CPT | Performed by: INTERNAL MEDICINE

## 2019-06-10 PROCEDURE — 700102 HCHG RX REV CODE 250 W/ 637 OVERRIDE(OP): Performed by: INTERNAL MEDICINE

## 2019-06-10 PROCEDURE — 700111 HCHG RX REV CODE 636 W/ 250 OVERRIDE (IP): Performed by: HOSPITALIST

## 2019-06-10 PROCEDURE — 93306 TTE W/DOPPLER COMPLETE: CPT

## 2019-06-10 PROCEDURE — 700111 HCHG RX REV CODE 636 W/ 250 OVERRIDE (IP)

## 2019-06-10 PROCEDURE — 700101 HCHG RX REV CODE 250: Performed by: INTERNAL MEDICINE

## 2019-06-10 PROCEDURE — 87086 URINE CULTURE/COLONY COUNT: CPT

## 2019-06-10 PROCEDURE — 84484 ASSAY OF TROPONIN QUANT: CPT | Mod: 91

## 2019-06-10 PROCEDURE — 99233 SBSQ HOSP IP/OBS HIGH 50: CPT | Performed by: INTERNAL MEDICINE

## 2019-06-10 PROCEDURE — 80048 BASIC METABOLIC PNL TOTAL CA: CPT

## 2019-06-10 PROCEDURE — 700117 HCHG RX CONTRAST REV CODE 255: Performed by: INTERNAL MEDICINE

## 2019-06-10 PROCEDURE — 82962 GLUCOSE BLOOD TEST: CPT | Mod: 91

## 2019-06-10 PROCEDURE — 99153 MOD SED SAME PHYS/QHP EA: CPT

## 2019-06-10 PROCEDURE — 99232 SBSQ HOSP IP/OBS MODERATE 35: CPT | Performed by: INTERNAL MEDICINE

## 2019-06-10 PROCEDURE — 87077 CULTURE AEROBIC IDENTIFY: CPT

## 2019-06-10 PROCEDURE — 93010 ELECTROCARDIOGRAM REPORT: CPT | Performed by: INTERNAL MEDICINE

## 2019-06-10 PROCEDURE — 700111 HCHG RX REV CODE 636 W/ 250 OVERRIDE (IP): Performed by: INTERNAL MEDICINE

## 2019-06-10 PROCEDURE — 700117 HCHG RX CONTRAST REV CODE 255: Performed by: RADIOLOGY

## 2019-06-10 PROCEDURE — A9270 NON-COVERED ITEM OR SERVICE: HCPCS | Performed by: INTERNAL MEDICINE

## 2019-06-10 PROCEDURE — 0T2BX0Z CHANGE DRAINAGE DEVICE IN BLADDER, EXTERNAL APPROACH: ICD-10-PCS | Performed by: RADIOLOGY

## 2019-06-10 PROCEDURE — 36415 COLL VENOUS BLD VENIPUNCTURE: CPT

## 2019-06-10 PROCEDURE — 302098 PASTE RING (FLAT): Performed by: INTERNAL MEDICINE

## 2019-06-10 PROCEDURE — A9270 NON-COVERED ITEM OR SERVICE: HCPCS | Performed by: RADIOLOGY

## 2019-06-10 RX ORDER — ATORVASTATIN CALCIUM 40 MG/1
40 TABLET, FILM COATED ORAL EVERY EVENING
Status: DISCONTINUED | OUTPATIENT
Start: 2019-06-10 | End: 2019-06-10

## 2019-06-10 RX ORDER — ASPIRIN 81 MG/1
81 TABLET, CHEWABLE ORAL DAILY
Status: DISCONTINUED | OUTPATIENT
Start: 2019-06-10 | End: 2019-06-13 | Stop reason: HOSPADM

## 2019-06-10 RX ORDER — NITROGLYCERIN 0.4 MG/1
0.4 TABLET SUBLINGUAL
Status: DISCONTINUED | OUTPATIENT
Start: 2019-06-10 | End: 2019-06-13 | Stop reason: HOSPADM

## 2019-06-10 RX ORDER — ONDANSETRON 2 MG/ML
4 INJECTION INTRAMUSCULAR; INTRAVENOUS PRN
Status: DISCONTINUED | OUTPATIENT
Start: 2019-06-10 | End: 2019-06-10 | Stop reason: HOSPADM

## 2019-06-10 RX ORDER — ATORVASTATIN CALCIUM 80 MG/1
80 TABLET, FILM COATED ORAL EVERY EVENING
Status: DISCONTINUED | OUTPATIENT
Start: 2019-06-10 | End: 2019-06-13 | Stop reason: HOSPADM

## 2019-06-10 RX ORDER — OXYCODONE HYDROCHLORIDE 5 MG/1
5 TABLET ORAL EVERY 4 HOURS PRN
Status: DISCONTINUED | OUTPATIENT
Start: 2019-06-10 | End: 2019-06-13 | Stop reason: HOSPADM

## 2019-06-10 RX ORDER — OXYCODONE HYDROCHLORIDE 5 MG/1
5 TABLET ORAL ONCE
Status: COMPLETED | OUTPATIENT
Start: 2019-06-11 | End: 2019-06-10

## 2019-06-10 RX ORDER — TRAMADOL HYDROCHLORIDE 50 MG/1
50 TABLET ORAL EVERY 6 HOURS PRN
Status: DISCONTINUED | OUTPATIENT
Start: 2019-06-10 | End: 2019-06-13 | Stop reason: HOSPADM

## 2019-06-10 RX ORDER — OXYCODONE HYDROCHLORIDE 5 MG/1
5 TABLET ORAL
Status: DISCONTINUED | OUTPATIENT
Start: 2019-06-10 | End: 2019-06-10

## 2019-06-10 RX ORDER — MIDAZOLAM HYDROCHLORIDE 1 MG/ML
INJECTION INTRAMUSCULAR; INTRAVENOUS
Status: COMPLETED
Start: 2019-06-10 | End: 2019-06-10

## 2019-06-10 RX ORDER — MIDAZOLAM HYDROCHLORIDE 1 MG/ML
.5-2 INJECTION INTRAMUSCULAR; INTRAVENOUS PRN
Status: DISCONTINUED | OUTPATIENT
Start: 2019-06-10 | End: 2019-06-10 | Stop reason: HOSPADM

## 2019-06-10 RX ORDER — ACETAMINOPHEN 500 MG
1000 TABLET ORAL EVERY 6 HOURS
Status: COMPLETED | OUTPATIENT
Start: 2019-06-10 | End: 2019-06-11

## 2019-06-10 RX ORDER — OXYCODONE HYDROCHLORIDE 10 MG/1
10 TABLET ORAL
Status: DISCONTINUED | OUTPATIENT
Start: 2019-06-10 | End: 2019-06-10

## 2019-06-10 RX ORDER — SODIUM CHLORIDE 9 MG/ML
500 INJECTION, SOLUTION INTRAVENOUS
Status: DISCONTINUED | OUTPATIENT
Start: 2019-06-10 | End: 2019-06-10 | Stop reason: HOSPADM

## 2019-06-10 RX ORDER — METOPROLOL TARTRATE 1 MG/ML
5 INJECTION, SOLUTION INTRAVENOUS EVERY 4 HOURS PRN
Status: DISCONTINUED | OUTPATIENT
Start: 2019-06-10 | End: 2019-06-13 | Stop reason: HOSPADM

## 2019-06-10 RX ADMIN — MIDAZOLAM HYDROCHLORIDE 1 MG: 1 INJECTION INTRAMUSCULAR; INTRAVENOUS at 09:39

## 2019-06-10 RX ADMIN — HEPARIN SODIUM 5000 UNITS: 5000 INJECTION, SOLUTION INTRAVENOUS; SUBCUTANEOUS at 22:04

## 2019-06-10 RX ADMIN — OXYCODONE HYDROCHLORIDE 5 MG: 5 TABLET ORAL at 23:57

## 2019-06-10 RX ADMIN — MICONAZOLE NITRATE: 20 CREAM TOPICAL at 05:46

## 2019-06-10 RX ADMIN — METOPROLOL TARTRATE 25 MG: 25 TABLET ORAL at 17:20

## 2019-06-10 RX ADMIN — HEPARIN SODIUM 5000 UNITS: 5000 INJECTION, SOLUTION INTRAVENOUS; SUBCUTANEOUS at 13:56

## 2019-06-10 RX ADMIN — NYSTATIN: 100000 POWDER TOPICAL at 17:21

## 2019-06-10 RX ADMIN — BUDESONIDE AND FORMOTEROL FUMARATE DIHYDRATE 2 PUFF: 160; 4.5 AEROSOL RESPIRATORY (INHALATION) at 05:46

## 2019-06-10 RX ADMIN — OXYBUTYNIN CHLORIDE 5 MG: 5 TABLET ORAL at 05:47

## 2019-06-10 RX ADMIN — ACETAMINOPHEN 650 MG: 325 TABLET, FILM COATED ORAL at 01:58

## 2019-06-10 RX ADMIN — HUMAN ALBUMIN MICROSPHERES AND PERFLUTREN 3 ML: 10; .22 INJECTION, SOLUTION INTRAVENOUS at 13:00

## 2019-06-10 RX ADMIN — MIDAZOLAM HYDROCHLORIDE 1 MG: 1 INJECTION, SOLUTION INTRAMUSCULAR; INTRAVENOUS at 09:39

## 2019-06-10 RX ADMIN — INSULIN HUMAN 1 UNITS: 100 INJECTION, SOLUTION PARENTERAL at 11:00

## 2019-06-10 RX ADMIN — MICONAZOLE NITRATE: 20 CREAM TOPICAL at 17:22

## 2019-06-10 RX ADMIN — ASPIRIN 81 MG 81 MG: 81 TABLET ORAL at 11:15

## 2019-06-10 RX ADMIN — FENTANYL CITRATE 50 MCG: 50 INJECTION INTRAMUSCULAR; INTRAVENOUS at 09:36

## 2019-06-10 RX ADMIN — FENTANYL CITRATE 50 MCG: 50 INJECTION, SOLUTION INTRAMUSCULAR; INTRAVENOUS at 09:50

## 2019-06-10 RX ADMIN — MEROPENEM 500 MG: 500 INJECTION, POWDER, FOR SOLUTION INTRAVENOUS at 17:20

## 2019-06-10 RX ADMIN — ATORVASTATIN CALCIUM 80 MG: 80 TABLET, FILM COATED ORAL at 17:19

## 2019-06-10 RX ADMIN — ACETAMINOPHEN 1000 MG: 500 TABLET ORAL at 17:19

## 2019-06-10 RX ADMIN — LIDOCAINE AND PRILOCAINE 1 APPLICATION: 25; 25 CREAM TOPICAL at 23:22

## 2019-06-10 RX ADMIN — IOHEXOL 25 ML: 300 INJECTION, SOLUTION INTRAVENOUS at 10:06

## 2019-06-10 RX ADMIN — MULTIPLE VITAMINS W/ MINERALS TAB 1 TABLET: TAB at 05:47

## 2019-06-10 RX ADMIN — TAMSULOSIN HYDROCHLORIDE 0.4 MG: 0.4 CAPSULE ORAL at 07:29

## 2019-06-10 RX ADMIN — MEROPENEM 500 MG: 500 INJECTION, POWDER, FOR SOLUTION INTRAVENOUS at 05:46

## 2019-06-10 RX ADMIN — ACETAMINOPHEN 1000 MG: 500 TABLET ORAL at 22:03

## 2019-06-10 RX ADMIN — OXYCODONE HYDROCHLORIDE 5 MG: 5 TABLET ORAL at 22:04

## 2019-06-10 RX ADMIN — OXYCODONE HYDROCHLORIDE 5 MG: 5 TABLET ORAL at 13:56

## 2019-06-10 RX ADMIN — OXYBUTYNIN CHLORIDE 5 MG: 5 TABLET ORAL at 17:19

## 2019-06-10 RX ADMIN — INSULIN HUMAN 1 UNITS: 100 INJECTION, SOLUTION PARENTERAL at 22:09

## 2019-06-10 RX ADMIN — FENTANYL CITRATE 50 MCG: 50 INJECTION, SOLUTION INTRAMUSCULAR; INTRAVENOUS at 09:36

## 2019-06-10 RX ADMIN — ACETAMINOPHEN 1000 MG: 500 TABLET ORAL at 10:59

## 2019-06-10 RX ADMIN — NYSTATIN: 100000 POWDER TOPICAL at 05:46

## 2019-06-10 RX ADMIN — BUDESONIDE AND FORMOTEROL FUMARATE DIHYDRATE 2 PUFF: 160; 4.5 AEROSOL RESPIRATORY (INHALATION) at 17:22

## 2019-06-10 ASSESSMENT — ENCOUNTER SYMPTOMS
NAUSEA: 0
COUGH: 0
DIZZINESS: 0
SHORTNESS OF BREATH: 0
ABDOMINAL PAIN: 1
VOMITING: 0
DIARRHEA: 0
MYALGIAS: 0
FEVER: 0
SENSORY CHANGE: 0
SPEECH CHANGE: 0

## 2019-06-10 NOTE — DIETARY
Nutrition Services: WILY consulted for diet education.  Completed 6/1.  Please re-consult WILY prn.

## 2019-06-10 NOTE — OR SURGEON
Immediate Post- Operative Note        PostOp Diagnosis: INFECTED S/P TUBE    Procedure(s): S/P TUBE EX      Estimated Blood Loss: Less than 5 ml        Complications: None            6/10/2019     10:18 AM     Judah Grove

## 2019-06-10 NOTE — ASSESSMENT & PLAN NOTE
GATITO says patient does not take his pills, sometimes takes his insulin  Has severe cognitive deficits  SNF placement pending

## 2019-06-10 NOTE — PROGRESS NOTES
Pt returned from IR. Report from IR stating pt's HR reached 170 and remained irregular throughout procedure. MD notified. 12 Lead ECG ordered. Pt now complaining of new onset chest pain. Trops ordered. Tele orders received.

## 2019-06-10 NOTE — PROGRESS NOTES
"Report received. Assumed care at 0700, assessment complete. Pt is A&O x 2. 8/10 pain at this time in lower abdomen and groin. Pt declines tylenol and states \"that doesn't work. I need something stronger\". Pt educated on rationale for no narcotics. Patient instructed on the plan of care for the day. Bed in lowest position. Bed alarm on. Call light within reach. Patient provided RN and CNA extension.    "

## 2019-06-10 NOTE — PROGRESS NOTES
2 RN skin check complete.   Devices in place colostomy, urostomy.  Skin assessed under devices excoriated, reddened.  Confirmed pressure ulcers found on sacrum, right medial toe wound with eschar and redenned. Pannus pink, perineum red and excoriated. BLLE flaky, dry, and cracked. BLUE with scattered bruises.   New potential pressure ulcers noted: N/A. Wound consult placed Yes.      The following interventions in place: Interdry to pannus, nystatin to perineum and pannus, pillows in use for support, mepiliex to sacrum

## 2019-06-10 NOTE — PROGRESS NOTES
Hospital Medicine Daily Progress Note    Date of Service  6/10/2019    Chief Complaint  69 y.o. male admitted 5/29/2019 with dysuria    Hospital Course    Past medical history of Crohn's with ostomy, suprapubic cath, diabetes.  Patient has not had suprapubic cath changed for several months despite his POA asking him to have home health assess him.  He complains of painful urination and has a history of multiple drug-resistant urine infections.  He was admitted on meropenem empirically.  Urology was consulted and exchanged catheter.  His urine culture from [facility] returned with MRSA and Pseudomonas and his antibiotic was changed to Zosyn and Zyvox.  He was noted to have right toe osteomyelitis.  LPS and Ortho were consulted and performed debridement with right second toe flexor tendon release 6/3/2019. Dr. Marx with infectious disease was consulted for antibiotic management and completed course. He was awaiting placement when he had recurrent pain around suprapubic cath and repeat urine grew pseudomonas. He was started on meropenem. His suprapubic cath was exchanged 6/10 and he developed aflutter and transferred to telemetry. Cardiology was consulted.        Interval Problem Update  Continue meropenem, cath exchanged today. In PACU, patient had nonsustained HR 180s.  Patient complained of abd pain this morning, but says his chest is hurting after the procedure  EKG showed aflutter with possible ST changes, I reviewed with Dr. Dixon. He has allergies to statins, discussed with Dr. Dixon and will order for lipitor. I spoke with KIRBYLYNDA Nationyl and she says he lists many medication allergies, but they are unlikely true. She is ok with trying lipitor.  I ordered for aspirin, lipitor and transferred to telemetry.   TTE pending.    Consultants/Specialty  Urology  Infectious disease  LPS, Ortho    Code Status  Full    Disposition  Lives alone, OSVALDO Andersen agreeable to SNF  Sutures out 2 weeks post-op  6/17/19    Review of Systems  Review of Systems   Unable to perform ROS: Mental acuity   Constitutional: Negative for malaise/fatigue.   Respiratory: Negative for shortness of breath.    Cardiovascular: Positive for chest pain.   Gastrointestinal: Positive for abdominal pain. Negative for nausea.   Genitourinary: Negative for dysuria.   Musculoskeletal: Negative for joint pain.   Skin: Negative for itching.   Neurological: Negative for dizziness.        Physical Exam  Temp:  [36.3 °C (97.3 °F)-36.9 °C (98.4 °F)] 36.3 °C (97.4 °F)  Pulse:  [] 116  Resp:  [14-29] 17  BP: (111-147)/(58-96) 117/58  SpO2:  [93 %-98 %] 96 %    Physical Exam   Constitutional: He appears well-developed and well-nourished.   HENT:   Head: Normocephalic.   Mouth/Throat: Oropharynx is clear and moist.   Eyes: Conjunctivae are normal.   Cardiovascular:   Tachycardic, irregular   Pulmonary/Chest: He has no wheezes.   tachypneic  Diminished breath sounds at bases   Abdominal: Soft. He exhibits distension. There is tenderness (suprapubic). There is no rebound and no guarding.   Ostomy and suprapubic cath in place. Mild erythema around suprapubic cath   Musculoskeletal: He exhibits no edema.   Right foot with clean dressings in place   Neurological: He is alert.   Oriented to self and hospital, disoriented to time   Skin: Skin is warm and dry.   Nursing note and vitals reviewed.      Fluids    Intake/Output Summary (Last 24 hours) at 06/10/19 1431  Last data filed at 06/10/19 1340   Gross per 24 hour   Intake              340 ml   Output             2650 ml   Net            -2310 ml       Laboratory  Recent Labs      06/08/19   0504  06/09/19   0104  06/10/19   0049   WBC  12.2*  13.5*  12.8*   RBC  4.38*  4.68*  4.80   HEMOGLOBIN  12.4*  13.2*  13.6*   HEMATOCRIT  38.4*  40.8*  42.4   MCV  87.7  87.2  88.3   MCH  28.3  28.2  28.3   MCHC  32.3*  32.4*  32.1*   RDW  49.1  48.3  49.6   PLATELETCT  417  493*  506*   MPV  9.0  8.9*  9.2      Recent Labs      06/08/19   0504  06/09/19   0104  06/10/19   0049   SODIUM  133*  135  134*   POTASSIUM  4.0  4.0  4.1   CHLORIDE  105  107  105   CO2  17*  19*  18*   GLUCOSE  135*  142*  133*   BUN  40*  41*  47*   CREATININE  2.31*  2.45*  2.72*   CALCIUM  9.3  9.2  9.4     Recent Labs      06/10/19   0049   INR  1.11               Imaging  EC-ECHOCARDIOGRAM COMPLETE W/ CONT   Final Result      IR-DRAIN-BLADDER SUPRAPUB W/CATH   Final Result         1.  Successful removal and replacement of suprapubic tube.      2.  Cystogram demonstrates new 16 [place] Humphrey catheter well positioned in the bladder.      US-BERNICE SINGLE LEVEL UNILAT RIGHT   Final Result      IR-DRAIN-BLADDER SUPRAPUB W/CATH   Final Result      1. Fluoroscopic guided exchange of the 14 [place] locking loop suprapubic catheter for a new 16 [place] Humphrey catheter.   3. Tubes cystogram demonstrating appropriate positioning of the new Humphrey catheter.      DX-TOE(S) 2+ RIGHT   Final Result         Soft tissue swelling about the second digit. Rarefaction of the tip of the second distal phalanx, concerning for osteomyelitis.           Assessment/Plan  * Urinary tract infection associated with cystostomy catheter (HCC)- (present on admission)   Assessment & Plan    Complicated by presence of suprapubic catheter, with surrounding cellulitis & skin breakdown.  Catheter has not been changed in several months per patient report.  Has history of drug resistant UTI's.  UCx from outside facility growing MRSA and pseudomonoas  Infectious disease consulted and completed antibiotics zosyn and zyvox.    6/9/19. Recurrence of suprapubic cath pain and tenderness with leukocytosis and positive urine cx for pseudomonas. ID consulted. Cath exchanged 6/10/19. Repeat urine cx after cath exchange ordered.  Continue meropenem, appreciate ID recs     Acute encephalopathy- (present on admission)   Assessment & Plan    Ongoing confusion  Metabolic, with ongoing UTI  POA says  he's very sensitive to narcotics, will minimize. At baseline he does not know his medications or date  Vit B12 is normal  His memory is fluctuating, poor short term memory     Noncompliance   Assessment & Plan    DPOA says patient does not take his pills, sometimes takes his insulin  Currently he is encephalopathic and unable to make medical decisions  She would like him placed in a group home after SNF     Chest pain   Assessment & Plan    With new changes on EKG  Cardiology consulted, trend troponin and EKG  Aspirin and statin ordered  Lipid panel ordered  TTE pending  Telemetry  Patient poor historian, may be related to abd pain     Atrial flutter (HCC)   Assessment & Plan    New onset, after suprapubic cath exchange  TTE, TSH ordered  Troponin is normal, trend with EKG  Telemetry  Started on oral metop, IV metop PRN  Anticoagulation if not further procedures indicated  Cardiology consulted and appreciate recs     Osteomyelitis of right foot (HCC)   Assessment & Plan    Right second toe  Seen by LPS, ortho consulted and ID consulted  Completed course of antibiotics  Status post debridement 6/3     CRF (chronic renal failure), stage 3 (moderate) (HCC)- (present on admission)   Assessment & Plan    At baseline  Avoid nephrotoxins.  Renally dose all appropriate medications.     BPH (benign prostatic hyperplasia)- (present on admission)   Assessment & Plan    S/p suprapubic catheter placement.  Pain from possible bladder spasms per Dr. Donohue. Trial of belladonna and emla topically, patient refused  Ordered for oxybutynin     Crohn's disease ([facility])- (present on admission)   Assessment & Plan    S/p colostomy.  Wound care has been consulted for ostomy management.     Type 2 diabetes mellitus, with long-term current use of insulin ([facility])- (present on admission)   Assessment & Plan    On BID humulin 70/30 at home, has not needed here  A1c 6.5  Added Accu-Cheks ACHS with low scale SSI as required.  Hypoglycemia protocol in  place if needed.  Diet changed to diabetic.          VTE prophylaxis: heparin

## 2019-06-10 NOTE — CARE PLAN
Problem: Communication  Goal: The ability to communicate needs accurately and effectively will improve    Intervention: [place] patient and significant other/support system to call light to alert staff of needs  Pt is oriented to self, has called appropriately using the call light.       Problem: Mobility  Goal: Risk for activity intolerance will decrease    Intervention: Encourage patient to increase activity level in collaboration with Interdisciplinary Team  Pt ambulated around the unit once. Ambulates with SBA and use of FWW, maintains steady gait.

## 2019-06-10 NOTE — ASSESSMENT & PLAN NOTE
New onset, after suprapubic cath exchange  TTE with EF of 45%, TSH wnl  Troponin is normal  cont on oral metop- dose reduced as patient bradycardic, IV metop PRN  I discussed with POA and d/t patients known noncompliance will defer AC at this time

## 2019-06-10 NOTE — CARE PLAN
Problem: Skin Integrity  Goal: Risk for impaired skin integrity will decrease  Outcome: PROGRESSING AS EXPECTED  Waffle gladis, pt instructed to turn self and reminded to do so every two hours    Problem: Pain Management  Goal: Pain level will decrease to patient's comfort goal  Outcome: PROGRESSING AS EXPECTED  Pt positioned for comfort, pain medication ordered.

## 2019-06-10 NOTE — PROGRESS NOTES
"Suprapubic catheter exchange performed by Dr Grove. Procedure BARs explained to POA and 2 RN phone consent verified. Patient moved to table in IR1; of note was that the patient is extremely sensitive to even the tiniest movement and complained loudly over the application of monitoring devices, adjustment of linens, etc. Skin surrounding SP catheter and in pelvic fold red and excoriated, powder present; red humphrey SP draining clear, yellow urine. Patient was continuously monitored and assessed throughout procedure; ETCO2 26-30 with consistent waveform.  Cystogram performed and over-wire exchange of #16F red councill-tipped humphrey completed. Patient tachycardic and tachypneic throughout procedure, approximately 3 minutes (patient reports he has had irregular heartbeat and \"heart murmur\" all his adult life). Balloon inflated with 5 ml saline and attached to bag for gravity drainage, urine clear yellow. Patient moved back to bed and returned to his room in good condition, intermittently asking for water and pain medicine.  Bedside handoff to PREET Licea.    Bardex Humphrey #16 Councill Model red latex w/5 ml balloon ref [id number] lot [id number]  "

## 2019-06-10 NOTE — PROGRESS NOTES
Infectious Disease Progress Note    Author: Mickie Liu M.D. Date & Time of service: 6/10/2019  10:35 AM    Chief Complaint:  Follow up for R diabetic foot infection/complicated UTI    Interval History:  69-year-old man with a history of Crohn's disease status post colostomy, type 2 diabetes mellitus complicated by diabetic foot infections, chronic kidney disease stage III and chronic indwelling Humphrey catheter admitted for suprapubic pain.  Patient was found to have second toe osteomyelitis of the foot and a complicated UTI.     6/3/2019-no fevers.  Evaluated by orthopedics and will be taken to surgery today.  WBC 6.8  6/4/2019-no fevers.  He says he feels much better.  He underwent right second toe flexor tendon release and debridement of the callus.  6/5/2019 no fevers.  No new issues overnight.  The wound looks good.  6/9 afebrile WBC 13.5.  ID reconsulted for recurrent urinary tract infection in the setting of acute worsening suprapubic pain.  Patient continues to endorse pain around his suprapubic catheter.  It was last changed on 5/31/2019 per report.  Urine cultures are now growing pseudomonas aeruginosa.  6/10 afebrile WBC 12.8 patient has ongoing suprapubic pain 8.5 out of 10 in severity decreased to 4 out of 10 in severity with pain medications.  He is also having spasms in his abdomen.  Plan for suprapubic catheter exchange today    Labs Reviewed and Wound Reviewed.    Review of Systems:  Review of Systems   Constitutional: Positive for malaise/fatigue. Negative for fever.   HENT: Negative for hearing loss.    Respiratory: Negative for cough and shortness of breath.    Cardiovascular: Positive for leg swelling. Negative for chest pain.   Gastrointestinal: Positive for abdominal pain. Negative for diarrhea, nausea and vomiting.        Abdominal spasms   Genitourinary: Negative for dysuria.        Pain around his suprapubic catheter   Musculoskeletal: Negative for myalgias.   Neurological: Negative  for sensory change and speech change.       Hemodynamics:  Temp (24hrs), Av.6 °C (97.8 °F), Min:36.4 °C (97.6 °F), Max:36.7 °C (98 °F)  Temperature: 36.4 °C (97.6 °F)  Pulse  Av.7  Min: 53  Max: 131Heart Rate (Monitored): (!) 105  Blood Pressure : 142/73, NIBP: 119/81       Physical Exam:  Physical Exam   Constitutional: He is oriented to person, place, and time. No distress.   Morbidly obese  Chronically ill   HENT:   Mouth/Throat: No oropharyngeal exudate.   Eyes: Pupils are equal, round, and reactive to light. EOM are normal. No scleral icterus.   Neck: Neck supple.   Cardiovascular: Normal rate and regular rhythm.    No murmur heard.  Pulmonary/Chest: Effort normal. He has no wheezes. He has no rales.   Abdominal: Soft. There is no tenderness. There is no rebound and no guarding.   Genitourinary:   Genitourinary Comments: Suprapubic catheter- dry drainage around insertion site   Musculoskeletal: He exhibits edema. He exhibits no tenderness.   Clean right great toe amputation site    Right second toe dressed   Lymphadenopathy:     He has no cervical adenopathy.   Neurological: He is alert and oriented to person, place, and time.   Skin: Skin is warm. No erythema.   Bilateral lower extremity chronic skin changes   Vitals reviewed.      Meds:    Current Facility-Administered Medications:   •  NS  •  ondansetron  •  fentaNYL  •  midazolam  •  fentaNYL  •  meropenem  •  oxybutynin  •  lidocaine-prilocaine  •  Notify provider if pain remains uncontrolled **AND** Use the numeric rating scale (NRS-11) on regular floors and Critical-Care Pain Observation Tool (CPOT) on ICUs/Trauma to assess pain **AND** Pulse Ox (Oximetry) **AND** Pharmacy Consult Request **AND** If patient difficult to arouse and/or has respiratory depression, stop any opiates that are currently infusing and call a Rapid Response. **AND** [DISCONTINUED] oxyCODONE immediate-release **AND** [DISCONTINUED] oxyCODONE immediate-release **AND**  [DISCONTINUED] morphine injection  •  insulin regular **AND** Accu-Chek ACHS **AND** NOTIFY MD and PharmD **AND** glucose **AND** dextrose 10% bolus  •  budesonide-formoterol  •  tamsulosin  •  therapeutic multivitamin-minerals  •  acetaminophen  •  ondansetron  •  ondansetron  •  senna-docusate **AND** polyethylene glycol/lytes **AND** magnesium hydroxide **AND** bisacodyl  •  NS  •  heparin  •  miconazole 2%-zinc oxide  •  nystatin    Labs:  Recent Labs      06/08/19   0504  06/09/19   0104  06/10/19   0049   WBC  12.2*  13.5*  12.8*   RBC  4.38*  4.68*  4.80   HEMOGLOBIN  12.4*  13.2*  13.6*   HEMATOCRIT  38.4*  40.8*  42.4   MCV  87.7  87.2  88.3   MCH  28.3  28.2  28.3   RDW  49.1  48.3  49.6   PLATELETCT  417  493*  506*   MPV  9.0  8.9*  9.2   NEUTSPOLYS  55.90  58.70  56.50   LYMPHOCYTES  33.50  31.40  32.80   MONOCYTES  6.70  7.00  7.60   EOSINOPHILS  2.90  2.10  2.00   BASOPHILS  0.60  0.50  0.70     Recent Labs      06/08/19   0504  06/09/19   0104  06/10/19   0049   SODIUM  133*  135  134*   POTASSIUM  4.0  4.0  4.1   CHLORIDE  105  107  105   CO2  17*  19*  18*   GLUCOSE  135*  142*  133*   BUN  40*  41*  47*     Recent Labs      06/08/19   0504  06/09/19   0104  06/10/19   0049   CREATININE  2.31*  2.45*  2.72*       Imaging:  No new imaging to review    Micro:  Results     Procedure Component Value Units Date/Time    URINE CULTURE(NEW) [[id number]]  (Abnormal)  (Susceptibility) Collected:  06/07/19 0929    Order Status:  Completed Specimen:  Urine from Urine, Suprapubic Updated:  06/09/19 0846     Significant Indicator POS (POS)     Source UR     Site URINE, SUPRAPUBIC     Culture Result - (A)      Pseudomonas aeruginosa  >100,000 cfu/mL  P.aeruginosa may develop resistance during prolonged therapy  with all antibiotics. Isolates that are initially susceptible  may become resistant within three to four days after  initiation of therapy. Testing of repeat isolates may be  warranted.   (A)    Narrative:        Collected By:[id number] AVRIL MURPHY  Indication for culture:->Patient WITHOUT an indwelling Humphrey  catheter in place with new onset of Dysuria, Frequency,  Urgency, and/or Suprapubic pain  Collected By:[id number] AVRIL MURPHY    Culture & Susceptibility     PSEUDOMONAS AERUGINOSA     Antibiotic Sensitivity Microscan Unit Status    Amikacin Sensitive <=16 mcg/mL Final    Method: CARLITO    Cefepime Intermediate 16 mcg/mL Final    Method: CARLITO    Ceftazidime Sensitive 8 mcg/mL Final    Method: CARLITO    Ciprofloxacin Resistant >2 mcg/mL Final    Method: CARLITO    Gentamicin Sensitive <=4 mcg/mL Final    Method: CARLITO    Imipenem Sensitive <=1 mcg/mL Final    Method: CARLITO    Levofloxacin Intermediate 4 mcg/mL Final    Method: CARLITO    Meropenem Sensitive <=1 mcg/mL Final    Method: CARLITO    Pip/Tazobactam Sensitive 64 mcg/mL Final    Method: CARLITO    Piperacillin Sensitive 64 mcg/mL Final    Method: CARLITO    Tobramycin Sensitive <=4 mcg/mL Final    Method: CARLITO                       URINALYSIS [[id number]]  (Abnormal) Collected:  06/07/19 0929    Order Status:  Completed Specimen:  Urine from Urine, Suprapubic Updated:  06/07/19 0938     Color Yellow     Character Sl Cloudy (A)     Comment: Corrected result; previously reported as Hazy on 06/07/19 at 09:36        Specific Gravity 1.015     Ph 6.0     Glucose Negative mg/dL      Ketones Negative mg/dL      Protein >=300 (A) mg/dL      Bilirubin Negative     Urobilinogen, Urine 0.2     Nitrite Positive (A)     Leukocyte Esterase Small (A)     Occult Blood Moderate (A)     Micro Urine Req Microscopic    Narrative:       Collected By:[id number] AVRIL MURPHY  Indication for culture:->Patient WITHOUT an indwelling Humphrey  catheter in place with new onset of Dysuria, Frequency,  Urgency, and/or Suprapubic pain          Assessment:  Active Hospital Problems    Diagnosis   • *Urinary tract infection associated with cystostomy catheter (HCC) [T83.510A, N39.0]   • Type 2 diabetes mellitus,  with long-term current use of insulin ([facility]) [E11.9, Z79.4]   • Osteomyelitis of right foot ([facility]) [M86.9]   • CRF (chronic renal failure), stage 3 (moderate) ([facility]) [N18.3]       Plan:  Complicated urinary tract infection, recurrent  In the setting of a chronic supra pubic catheter  Patient had acute pain in the suprapubic area  Leukocytosis  Repeat UA abnormal  Urine culture on 6/7- pseudomonas aeruginosa (Zosyn CARLITO 64)  Continue meropenem  Change suprapubic catheter as current catheter is likely colonized-plan for today  Repeat urine culture with new catheter in place  Anticipate a 10-day course of antibiotics total    Right second toe infection, status post treatment  Status post right second toe flexor tendon release and debridement on 6/3/2019 and received antibiotics for 24 hours after the surgery  Surgical site clean  Continue wound care    Type 2 diabetes mellitus   Hemoglobin A1c 6.5   Control blood sugars  Blood sugar 153 today    Chronic kidney disease, stage III  Renally dose antibiotics accordingly  Avoid nephrotoxins  Monitor    I have performed a physical exam and reviewed and updated ROS and plan today 6/10/2019.  In review of yesterday's note 6/9/2019, there are no changes except as documented above.    Discussed with internal medicine/Dr Jones

## 2019-06-10 NOTE — PROGRESS NOTES
Rec'd report from day shift RN. Assumed pt care. Assessment completed. AA&O to self, reoriented to place, time, event. Denies pain at this time. No s/s of discomfort or distress. Pt ambulates with SBA and use of FWW. Dressing in place to RLE, CDI. Suprapubic cath draining to gravity. Colostomy to RLQ, draining loose stool. Painful area surrounding suprapubic cath and colostomy, skin is dry, flaky, pink. Bed in lowest position, bed locked, bed alarm on for safety, treaded socks in place, RN and CNA numbers provided, call light within reach

## 2019-06-10 NOTE — CONSULTS
Cardiology Initial Consult Note    Date of note:    6/10/2019      Consulting Physician: Caren Jones M.D.    Patient ID:    Name:   Shola Hoyt   YOB: 1950  Age:   69 y.o.  male   MRN:   [id number]      Reason for Consultation: chest pain    HPI:  Shola Hoyt is a 69 y.o.-year-old male with a history of crohns disease which ultimately resulted in ostomy creation, poorly controlled diabetes mellitus , and urinary obstruction status post temporary suprapubic catheter placement who presented on 5/29/2019 with complicated UTI and diabetic foot ulcer.     Today he went to IR for a suprapubic catheter replacement.  Afterwards he had acute onset of 8/10 epigastric pain with severe tenderness to palpitation of his upper stomach.  EKG was obtained which showed atrial flutter with controlled ventricular response. There was thought to be ST elevation and thus cardiology was emergently consulted, however these were flutter waves.  He had mild lightheadedness but no other nausea, vomiting, diaphoresis, SOB, or other symptoms. He has no cardiac history,  And no history of CVA or CHF.      Of note, when I asked him about the surgery on his foot just 1 week ago he said he didn't remember if it even happened.       ROS  Constitution: Negative for chills, fever and night sweats.   HENT: Negative for nosebleeds.    Eyes: Negative for vision loss in left eye and vision loss in right eye.   Respiratory: Negative for hemoptysis.    Gastrointestinal: Negative for hematemesis, hematochezia and melena. + abdominal pain.   Genitourinary: Negative for hematuria.   Neurological: Negative for focal weakness, numbness and paresthesias.      All others reviewed and negative.      Past Medical History:   Diagnosis Date   • Arthritis    • Benign prostate hyperplasia    • Colostomy in place (HCC)    • COPD (chronic obstructive pulmonary disease) (HCC)    • Diabetes (HCC)    • Renal disorder        Past Surgical  History:   Procedure Laterality Date   • POP BY LAPAROSCOPY N/A 3/31/2019    Procedure: CHOLECYSTECTOMY, LAPAROSCOPIC-conversion to open cholecystectomy;  Surgeon: Gunnar Alonzo M.D.;  Location: SURGERY [facility];  Service: General   • PB ERCP,DIAGNOSTIC  3/29/2019    Procedure: ERCP, DIAGNOSTIC;  Surgeon: Tao Saab M.D.;  Location: SURGERY [facility];  Service: Gastroenterology   • TOE AMPUTATION Right 10/25/2017    Procedure: TOE AMPUTATION-GREAT TOE;  Surgeon: Bobby Alejo M.D.;  Location: [facility];  Service: Orthopedics         Prescriptions Prior to Admission   Medication Sig Dispense Refill Last Dose   • Adalimumab (HUMIRA) 40 MG/0.4ML Prefilled Syringe Kit Inject 80 mg as instructed every 14 days.   UNK at [facility]   • Buprenorphine 15 MCG/HR PATCH WEEKLY Apply 15 mcg to skin as directed every 7 days.   UNK at [facility]   • insulin 70/30 (HUMULIN,NOVOLIN) (70-30) 100 UNIT/ML Suspension Inject 35-45 Units as instructed 2 Times a Day. 35 units every morning  45 units every night   UNK at [facility]   • lisinopril (PRINIVIL) 5 MG Tab Take 5 mg by mouth every day.   UNK at [facility]   • nitrofurantoin monohyd macro (MACROBID) 100 MG Cap Take 100 mg by mouth 2 times a day. Pt filled a 5 day course of MACROBID on 5/22   UNK at [facility]   • traZODone (DESYREL) 100 MG Tab Take 100 mg by mouth every evening.   UNK at [facility]   • amoxicillin (AMOXIL) 500 MG Cap Take 500 mg by mouth 3 times a day. Pt filled a 14 day course of AMOXIL on 4/29   UNK at [facility]       Allergies   Allergen Reactions   • Ceftriaxone      Has tolerated Merrem, Zosyn, and Unasyn   • Ezetimibe    • Flagyl [Metronidazole]    • Infliximab    • Levaquin    • Lovastatin    • Simvastatin    • Vancomycin          Family History   Problem Relation Age of Onset   • No Known Problems Mother    • No Known Problems Father          Social History     Social History   • Marital status:      Spouse name: N/A   • Number of children: N/A   • Years  "of education: N/A     Occupational History   • Not on file.     Social History Main Topics   • Smoking status: Former Smoker   • Smokeless tobacco: Former User   • Alcohol use Not on file   • Drug use: Unknown   • Sexual activity: Not on file     Other Topics Concern   • Not on file     Social History Narrative   • No narrative on file         Physical Exam  Body mass index is 32.65 kg/m².  /96   Pulse (!) 114   Temp 36.9 °C (98.4 °F) (Temporal)   Resp 14   Ht 1.803 m (5' 11\")   Wt 106.2 kg (234 lb 2.1 oz)   SpO2 94%   Vitals:    06/10/19 1010 06/10/19 1015 06/10/19 1053 06/10/19 1101   BP:   119/76 132/96   Pulse: 95 91 (!) 112 (!) 114   Resp:   14 14   Temp:   36.9 °C (98.4 °F)    TempSrc:   Temporal    SpO2: 96% 95% 93% 94%   Weight:       Height:         Oxygen Therapy:  Pulse Oximetry: 94 %, O2 (LPM): 0, O2 Delivery: None (Room Air)    General: No apparent distress  Eyes: nl conjunctiva  ENT: OP clear  Neck: JVP 4 cm H2O, no carotid bruits  Lungs: normal respiratory effort, CTAB  Heart: RRR, no murmurs, no rubs or gallops, trace edema bilateral lower extremities. No LV/RV heave on cardiac palpatation. 2+ bilateral radial pulses.  2+ bilateral dp pulses.   Abdomen: soft, very tender diffusely with rebound and guarding.  Unable to palpate further for examination.   Extremities/MSK: no clubbing, no cyanosis  Neurological: No focal sensory deficits  Psychiatric: Appropriate affect, A/O x 3  Skin: Warm extremities        Labs (personally reviewed and notable for):   Creatinine 2.7      Cardiac Imaging and Procedures Review:    EKG dated 6/10/2019: My personal interpretation is a flutter, poor r wave progression.     Radiology test Review:  LE BERNICE: 6/2/2019:     Vascular Laboratory   Conclusions   1.  Normal resting right lower extremity arterial perfusion.      Impression and Medical Decision Making:  # Abdominal pain - query worsening infection from crohn's vs complicated UTI  # Atrial flutter - new " diagnosis, [id number] score of 3.  # CKD stage IV  # COPD  # Diabetes  # Crohn's disease  # Diabetic foot ulcer s/p debridement 6/3/2019  # urinary obstruction with suprapubic catheter s/p replacement 6/10/2019      Recommendations:  # start high dose statin  # check on more EKG/troponin in 6 hours, but I do think his symptoms are abdominal and not ACS at this point  # would start on NOAC or coumadin for a fib, possibly xarelto 15mg PO daily once ok per surgery and IR. Stop aspirin when starting NOAC or coumadin.   # metoprolol as needed for rate control, can add digoxin as a second agent if needed. Goal resting rate <100.  # echo, TSH  # f/u with cardiology as outpatient to discuss stress testing or any further work-up depending on symptoms and functional status  # further work-up for abdominal     Discussed with Dr. Jones.       Thank you for allowing me to participate in the care of this patient, Cardiology will sign off.  Please contact me with any questions.      Luis Eduardo Dixon MD  Cardiologist, [facility] [place]   [phone]

## 2019-06-11 LAB
ALBUMIN SERPL BCP-MCNC: 3.4 G/DL (ref 3.2–4.9)
ALBUMIN/GLOB SERPL: 0.7 G/DL
ALP SERPL-CCNC: 70 U/L (ref 30–99)
ALT SERPL-CCNC: 13 U/L (ref 2–50)
ANION GAP SERPL CALC-SCNC: 11 MMOL/L (ref 0–11.9)
AST SERPL-CCNC: 13 U/L (ref 12–45)
BASOPHILS # BLD AUTO: 0.9 % (ref 0–1.8)
BASOPHILS # BLD: 0.11 K/UL (ref 0–0.12)
BILIRUB SERPL-MCNC: 0.3 MG/DL (ref 0.1–1.5)
BUN SERPL-MCNC: 51 MG/DL (ref 8–22)
CALCIUM SERPL-MCNC: 9.1 MG/DL (ref 8.5–10.5)
CHLORIDE SERPL-SCNC: 103 MMOL/L (ref 96–112)
CHOLEST SERPL-MCNC: 94 MG/DL (ref 100–199)
CO2 SERPL-SCNC: 19 MMOL/L (ref 20–33)
CREAT SERPL-MCNC: 2.5 MG/DL (ref 0.5–1.4)
EKG IMPRESSION: NORMAL
EOSINOPHIL # BLD AUTO: 0.3 K/UL (ref 0–0.51)
EOSINOPHIL NFR BLD: 2.5 % (ref 0–6.9)
ERYTHROCYTE [DISTWIDTH] IN BLOOD BY AUTOMATED COUNT: 49.5 FL (ref 35.9–50)
GLOBULIN SER CALC-MCNC: 5.2 G/DL (ref 1.9–3.5)
GLUCOSE BLD-MCNC: 138 MG/DL (ref 65–99)
GLUCOSE BLD-MCNC: 145 MG/DL (ref 65–99)
GLUCOSE BLD-MCNC: 169 MG/DL (ref 65–99)
GLUCOSE BLD-MCNC: 195 MG/DL (ref 65–99)
GLUCOSE SERPL-MCNC: 133 MG/DL (ref 65–99)
HCT VFR BLD AUTO: 41.7 % (ref 42–52)
HDLC SERPL-MCNC: 23 MG/DL
HGB BLD-MCNC: 13.3 G/DL (ref 14–18)
IMM GRANULOCYTES # BLD AUTO: 0.05 K/UL (ref 0–0.11)
IMM GRANULOCYTES NFR BLD AUTO: 0.4 % (ref 0–0.9)
LDLC SERPL CALC-MCNC: 48 MG/DL
LYMPHOCYTES # BLD AUTO: 4.29 K/UL (ref 1–4.8)
LYMPHOCYTES NFR BLD: 36.2 % (ref 22–41)
MAGNESIUM SERPL-MCNC: 2.2 MG/DL (ref 1.5–2.5)
MCH RBC QN AUTO: 27.9 PG (ref 27–33)
MCHC RBC AUTO-ENTMCNC: 31.9 G/DL (ref 33.7–35.3)
MCV RBC AUTO: 87.6 FL (ref 81.4–97.8)
MONOCYTES # BLD AUTO: 0.75 K/UL (ref 0–0.85)
MONOCYTES NFR BLD AUTO: 6.3 % (ref 0–13.4)
NEUTROPHILS # BLD AUTO: 6.36 K/UL (ref 1.82–7.42)
NEUTROPHILS NFR BLD: 53.7 % (ref 44–72)
NRBC # BLD AUTO: 0 K/UL
NRBC BLD-RTO: 0 /100 WBC
PHOSPHATE SERPL-MCNC: 4.9 MG/DL (ref 2.5–4.5)
PLATELET # BLD AUTO: 549 K/UL (ref 164–446)
PMV BLD AUTO: 9 FL (ref 9–12.9)
POTASSIUM SERPL-SCNC: 4.5 MMOL/L (ref 3.6–5.5)
PROT SERPL-MCNC: 8.6 G/DL (ref 6–8.2)
RBC # BLD AUTO: 4.76 M/UL (ref 4.7–6.1)
SODIUM SERPL-SCNC: 133 MMOL/L (ref 135–145)
TRIGL SERPL-MCNC: 117 MG/DL (ref 0–149)
TSH SERPL DL<=0.005 MIU/L-ACNC: 3.57 UIU/ML (ref 0.38–5.33)
WBC # BLD AUTO: 11.9 K/UL (ref 4.8–10.8)

## 2019-06-11 PROCEDURE — A9270 NON-COVERED ITEM OR SERVICE: HCPCS | Performed by: INTERNAL MEDICINE

## 2019-06-11 PROCEDURE — 700105 HCHG RX REV CODE 258: Performed by: INTERNAL MEDICINE

## 2019-06-11 PROCEDURE — A9270 NON-COVERED ITEM OR SERVICE: HCPCS | Performed by: HOSPITALIST

## 2019-06-11 PROCEDURE — 83735 ASSAY OF MAGNESIUM: CPT

## 2019-06-11 PROCEDURE — 99232 SBSQ HOSP IP/OBS MODERATE 35: CPT | Performed by: INTERNAL MEDICINE

## 2019-06-11 PROCEDURE — 700111 HCHG RX REV CODE 636 W/ 250 OVERRIDE (IP): Performed by: INTERNAL MEDICINE

## 2019-06-11 PROCEDURE — 99232 SBSQ HOSP IP/OBS MODERATE 35: CPT | Performed by: HOSPITALIST

## 2019-06-11 PROCEDURE — 93010 ELECTROCARDIOGRAM REPORT: CPT | Performed by: INTERNAL MEDICINE

## 2019-06-11 PROCEDURE — 770020 HCHG ROOM/CARE - TELE (206)

## 2019-06-11 PROCEDURE — 85025 COMPLETE CBC W/AUTO DIFF WBC: CPT

## 2019-06-11 PROCEDURE — 700102 HCHG RX REV CODE 250 W/ 637 OVERRIDE(OP): Performed by: INTERNAL MEDICINE

## 2019-06-11 PROCEDURE — 84443 ASSAY THYROID STIM HORMONE: CPT

## 2019-06-11 PROCEDURE — 97116 GAIT TRAINING THERAPY: CPT

## 2019-06-11 PROCEDURE — 99233 SBSQ HOSP IP/OBS HIGH 50: CPT | Performed by: INTERNAL MEDICINE

## 2019-06-11 PROCEDURE — 700102 HCHG RX REV CODE 250 W/ 637 OVERRIDE(OP): Performed by: HOSPITALIST

## 2019-06-11 PROCEDURE — 700111 HCHG RX REV CODE 636 W/ 250 OVERRIDE (IP): Performed by: HOSPITALIST

## 2019-06-11 PROCEDURE — 97535 SELF CARE MNGMENT TRAINING: CPT

## 2019-06-11 PROCEDURE — 36415 COLL VENOUS BLD VENIPUNCTURE: CPT

## 2019-06-11 PROCEDURE — 84100 ASSAY OF PHOSPHORUS: CPT

## 2019-06-11 PROCEDURE — 80061 LIPID PANEL: CPT

## 2019-06-11 PROCEDURE — 80053 COMPREHEN METABOLIC PANEL: CPT

## 2019-06-11 PROCEDURE — 82962 GLUCOSE BLOOD TEST: CPT | Mod: 91

## 2019-06-11 RX ADMIN — HEPARIN SODIUM 5000 UNITS: 5000 INJECTION, SOLUTION INTRAVENOUS; SUBCUTANEOUS at 12:57

## 2019-06-11 RX ADMIN — MEROPENEM 500 MG: 500 INJECTION, POWDER, FOR SOLUTION INTRAVENOUS at 17:54

## 2019-06-11 RX ADMIN — BUDESONIDE AND FORMOTEROL FUMARATE DIHYDRATE 2 PUFF: 160; 4.5 AEROSOL RESPIRATORY (INHALATION) at 05:31

## 2019-06-11 RX ADMIN — ASPIRIN 81 MG 81 MG: 81 TABLET ORAL at 05:24

## 2019-06-11 RX ADMIN — INSULIN HUMAN 1 UNITS: 100 INJECTION, SOLUTION PARENTERAL at 11:11

## 2019-06-11 RX ADMIN — MICONAZOLE NITRATE: 20 CREAM TOPICAL at 17:53

## 2019-06-11 RX ADMIN — ACETAMINOPHEN 1000 MG: 500 TABLET ORAL at 05:24

## 2019-06-11 RX ADMIN — METOPROLOL TARTRATE 12.5 MG: 25 TABLET ORAL at 17:54

## 2019-06-11 RX ADMIN — TAMSULOSIN HYDROCHLORIDE 0.4 MG: 0.4 CAPSULE ORAL at 08:29

## 2019-06-11 RX ADMIN — HEPARIN SODIUM 5000 UNITS: 5000 INJECTION, SOLUTION INTRAVENOUS; SUBCUTANEOUS at 05:25

## 2019-06-11 RX ADMIN — MICONAZOLE NITRATE: 20 CREAM TOPICAL at 05:36

## 2019-06-11 RX ADMIN — MULTIPLE VITAMINS W/ MINERALS TAB 1 TABLET: TAB at 05:25

## 2019-06-11 RX ADMIN — OXYCODONE HYDROCHLORIDE 5 MG: 5 TABLET ORAL at 10:22

## 2019-06-11 RX ADMIN — NYSTATIN: 100000 POWDER TOPICAL at 17:53

## 2019-06-11 RX ADMIN — LIDOCAINE AND PRILOCAINE 5 G: 25; 25 CREAM TOPICAL at 15:12

## 2019-06-11 RX ADMIN — INSULIN HUMAN 1 UNITS: 100 INJECTION, SOLUTION PARENTERAL at 21:56

## 2019-06-11 RX ADMIN — OXYBUTYNIN CHLORIDE 5 MG: 5 TABLET ORAL at 17:55

## 2019-06-11 RX ADMIN — OXYBUTYNIN CHLORIDE 5 MG: 5 TABLET ORAL at 05:22

## 2019-06-11 RX ADMIN — OXYCODONE HYDROCHLORIDE 5 MG: 5 TABLET ORAL at 14:56

## 2019-06-11 RX ADMIN — ATORVASTATIN CALCIUM 80 MG: 80 TABLET, FILM COATED ORAL at 17:55

## 2019-06-11 RX ADMIN — HEPARIN SODIUM 5000 UNITS: 5000 INJECTION, SOLUTION INTRAVENOUS; SUBCUTANEOUS at 21:58

## 2019-06-11 RX ADMIN — NYSTATIN: 100000 POWDER TOPICAL at 05:36

## 2019-06-11 RX ADMIN — BUDESONIDE AND FORMOTEROL FUMARATE DIHYDRATE 2 PUFF: 160; 4.5 AEROSOL RESPIRATORY (INHALATION) at 17:53

## 2019-06-11 RX ADMIN — METOPROLOL TARTRATE 25 MG: 25 TABLET ORAL at 05:24

## 2019-06-11 RX ADMIN — MEROPENEM 500 MG: 500 INJECTION, POWDER, FOR SOLUTION INTRAVENOUS at 05:22

## 2019-06-11 ASSESSMENT — COGNITIVE AND FUNCTIONAL STATUS - GENERAL
DAILY ACTIVITIY SCORE: 18
TOILETING: A LOT
STANDING UP FROM CHAIR USING ARMS: A LITTLE
MOBILITY SCORE: 18
HELP NEEDED FOR BATHING: A LITTLE
PERSONAL GROOMING: A LITTLE
MOVING TO AND FROM BED TO CHAIR: A LITTLE
SUGGESTED CMS G CODE MODIFIER DAILY ACTIVITY: CK
WALKING IN HOSPITAL ROOM: A LITTLE
TURNING FROM BACK TO SIDE WHILE IN FLAT BAD: A LITTLE
CLIMB 3 TO 5 STEPS WITH RAILING: A LITTLE
SUGGESTED CMS G CODE MODIFIER MOBILITY: CK
DRESSING REGULAR LOWER BODY CLOTHING: A LITTLE
DRESSING REGULAR UPPER BODY CLOTHING: A LITTLE
MOVING FROM LYING ON BACK TO SITTING ON SIDE OF FLAT BED: A LITTLE

## 2019-06-11 ASSESSMENT — ENCOUNTER SYMPTOMS
MYALGIAS: 0
PALPITATIONS: 0
NAUSEA: 0
CONSTIPATION: 0
SHORTNESS OF BREATH: 0
SENSORY CHANGE: 0
ABDOMINAL PAIN: 1
HEADACHES: 0
DIARRHEA: 0
COUGH: 0
NERVOUS/ANXIOUS: 0
CHILLS: 0
MEMORY LOSS: 1
VOMITING: 0
FEVER: 0
DIZZINESS: 0
FLANK PAIN: 0

## 2019-06-11 ASSESSMENT — GAIT ASSESSMENTS
GAIT LEVEL OF ASSIST: MINIMAL ASSIST
DEVIATION: BRADYKINETIC
DISTANCE (FEET): 75

## 2019-06-11 NOTE — THERAPY
"PT consult for cardiac rehab received. Per discussion with RN, pt with dementia and is confused and with no complaints of chest pain. Pt not appropriate for cardiac rehab education at this time. Attempted PT treatment session, pt declining and requesting therapist return later because he is \"relaxing.\" Will re-attempt later this PM as able.   "

## 2019-06-11 NOTE — DISCHARGE PLANNING
Received Choice form at [phone]  Agency/Facility Name: [facility]  Referral sent per Choice form @ [phone]

## 2019-06-11 NOTE — THERAPY
"Physical Therapy Treatment completed.   Bed Mobility:  Supine to Sit: Supervised (HOB elevated)  Transfers: Sit to Stand: Supervised  Gait: Level Of Assist: Minimal Assist (intermittent min A for LOB with incr fatigue, majority SPV) with No Equipment Needed       Plan of Care: Will benefit from Physical Therapy 3 times per week  Discharge Recommendations: Equipment: Will Continue to Assess for Equipment Needs. Recommend inpatient transitional care services for continued physical therapy services.      See \"Rehab Therapy-Acute\" Patient Summary Report for complete documentation.     Pt agreeable to participate in therapy session this afternoon. Pt demonstrated ability to move EOB with SPV and HOB elevated. Was able to perform STS with SPV and transfer into bedside chair with good eccentric control. Pt ambulated with no AD and SPV 50ft before becoming increasingly SOB though stating he felt fine. Pt then required intermittent min A to maintain balance due to LOB to the right and anteriorly a few times while ambulating back to room. Sitting EOB post-ambulation pt's  but decreasing with rest. SpO2 98%. Discussed appropriate activity tolerance and monitoring for SOB during ambulation. Pt agreeable to using FWW when ambulating with nursing given unsteadiness and LOB requiring physical assist to recover. Pt stating he lives alone in [facility]. RN stating pt lives in a SNF. Would recommend return to SNF for continued therapy as pt is at risk for falls. Will continue to follow in acute setting.   "

## 2019-06-11 NOTE — PROGRESS NOTES
Bedside report received from nurse. Assumed care of pt. Pt resting comfortably in bed. A/Ox3, VSS,  All needs met. POC reviewed and white board updated. Tele box on. Call light in reach. Bed locked in lowest position with 2 upper bed rails up.

## 2019-06-11 NOTE — DISCHARGE PLANNING
Anticipated Discharge Disposition: SNF    Action: Pt is new to this CM. CM reviewed previous chart notes which indicate that pt came from [facility]. There are currently orders for SNF.   CM called pt's OSVALDO Andersen. She is in agreement for referral back to [facility]. Nathaly states that pt receives $1600 per month and $475 goes to rent for him. She gets groceries for pt, gets his medications, and pays his bills for him and checks in on him about every 3 days. Nathaly lives about 10 miles outside of [place].   CM completed SNF choice form and sent referral to [facility].     Barriers to Discharge:     Plan: Monitor for acceptance back to [facility].

## 2019-06-11 NOTE — PROGRESS NOTES
Infectious Disease Progress Note    Author: JOE Chiu Date & Time of service: 6/11/2019  2:31 PM    Chief Complaint:  Follow up for R diabetic foot infection/complicated UTI    Interval History:  69-year-old man with a history of Crohn's disease status post colostomy, type 2 diabetes mellitus complicated by diabetic foot infections, chronic kidney disease stage III and chronic indwelling Humphrey catheter admitted for suprapubic pain.  Patient was found to have second toe osteomyelitis of the foot and a complicated UTI.     6/3/2019-no fevers.  Evaluated by orthopedics and will be taken to surgery today.  WBC 6.8  6/4/2019-no fevers.  He says he feels much better.  He underwent right second toe flexor tendon release and debridement of the callus.  6/5/2019 no fevers.  No new issues overnight.  The wound looks good.  6/9 afebrile WBC 13.5.  ID reconsulted for recurrent urinary tract infection in the setting of acute worsening suprapubic pain.  Patient continues to endorse pain around his suprapubic catheter.  It was last changed on 5/31/2019 per report.  Urine cultures are now growing pseudomonas aeruginosa.  6/10 afebrile WBC 12.8 patient has ongoing suprapubic pain 8.5 out of 10 in severity decreased to 4 out of 10 in severity with pain medications.  He is also having spasms in his abdomen.  Plan for suprapubic catheter exchange today  6/11- AF, WBC 11.9, complains of 5/10 deep squeezing internal pain around suprapubic catheter that is chronic and somewhat improved with pain meds, no pain to right foot, tolerating antibiotics without issue.    Labs Reviewed, Medications Reviewed, Radiology Reviewed and Wound Reviewed.    Review of Systems:  Review of Systems   Constitutional: Positive for malaise/fatigue. Negative for chills and fever.   Respiratory: Negative for cough and shortness of breath.    Cardiovascular: Positive for leg swelling. Negative for chest pain.   Gastrointestinal: Positive for  abdominal pain. Negative for constipation, diarrhea, nausea and vomiting.        Abdominal spasms   Genitourinary: Negative for dysuria, flank pain and hematuria.        Pain around and internally from suprapubic catheter   Musculoskeletal: Negative for joint pain and myalgias.   Skin: Negative for rash.   Neurological: Negative for sensory change and headaches.   Psychiatric/Behavioral: The patient is not nervous/anxious.        Hemodynamics:  Temp (24hrs), Av.3 °C (97.3 °F), Min:36.2 °C (97.1 °F), Max:36.7 °C (98 °F)  Temperature: 36.7 °C (98 °F)  Pulse  Av.8  Min: 50  Max: 131  Blood Pressure : 105/80       Physical Exam:  Physical Exam   Constitutional: He is oriented to person, place, and time. He appears well-developed and well-nourished.   Obese  Appears chronically ill  Mildly fatigued   HENT:   Mouth/Throat: Oropharynx is clear and moist. No oropharyngeal exudate.   Eyes: Pupils are equal, round, and reactive to light. Conjunctivae and EOM are normal. No scleral icterus.   Neck: Normal range of motion. Neck supple. No tracheal deviation present.   Cardiovascular: Normal rate, regular rhythm, normal heart sounds and intact distal pulses.    No murmur heard.  Pulmonary/Chest: Effort normal. No respiratory distress. He has no wheezes. He has no rales.   Diminished breath sounds to BLL   Abdominal: Soft. He exhibits no distension. There is no tenderness. There is no rebound and no guarding.   RLQ colostomy with liquid brown stool present and excoriated skin to surrounding tissue.   Genitourinary:   Genitourinary Comments: Suprapubic catheter-mild erythema to surrounding tissue with dried drainage.  Urine clear yellow.   Musculoskeletal: He exhibits edema. He exhibits no tenderness.   Right second toe tenotomy site-sutures in place, no drainage, no erythema, nontender.  Right great toe amp site-well-healed and approximated without any breakdown.   Neurological: He is alert and oriented to person, place,  and time. No cranial nerve deficit.   Skin: Skin is warm and dry. He is not diaphoretic. There is erythema.   Chronic BLE skin changes   Psychiatric: He has a normal mood and affect. Thought content normal.   Nursing note and vitals reviewed.      Meds:    Current Facility-Administered Medications:   •  metoprolol  •  aspirin  •  nitroglycerin  •  Metoprolol Tartrate  •  atorvastatin  •  oxyCODONE immediate-release  •  tramadol  •  meropenem  •  oxybutynin  •  lidocaine-prilocaine  •  Notify provider if pain remains uncontrolled **AND** Use the numeric rating scale (NRS-11) on regular floors and Critical-Care Pain Observation Tool (CPOT) on ICUs/Trauma to assess pain **AND** Pulse Ox (Oximetry) **AND** Pharmacy Consult Request **AND** If patient difficult to arouse and/or has respiratory depression, stop any opiates that are currently infusing and call a Rapid Response. **AND** [DISCONTINUED] oxyCODONE immediate-release **AND** [DISCONTINUED] oxyCODONE immediate-release **AND** [DISCONTINUED] morphine injection  •  insulin regular **AND** Accu-Chek ACHS **AND** NOTIFY MD and PharmD **AND** glucose **AND** dextrose 10% bolus  •  budesonide-formoterol  •  tamsulosin  •  therapeutic multivitamin-minerals  •  senna-docusate **AND** polyethylene glycol/lytes **AND** magnesium hydroxide **AND** bisacodyl  •  NS  •  heparin  •  miconazole 2%-zinc oxide  •  nystatin    Labs:  Recent Labs      06/09/19   0104  06/10/19   0049  06/11/19   0149   WBC  13.5*  12.8*  11.9*   RBC  4.68*  4.80  4.76   HEMOGLOBIN  13.2*  13.6*  13.3*   HEMATOCRIT  40.8*  42.4  41.7*   MCV  87.2  88.3  87.6   MCH  28.2  28.3  27.9   RDW  48.3  49.6  49.5   PLATELETCT  493*  506*  549*   MPV  8.9*  9.2  9.0   NEUTSPOLYS  58.70  56.50  53.70   LYMPHOCYTES  31.40  32.80  36.20   MONOCYTES  7.00  7.60  6.30   EOSINOPHILS  2.10  2.00  2.50   BASOPHILS  0.50  0.70  0.90     Recent Labs      06/09/19   0104  06/10/19   0049  06/11/19   0149   SODIUM  135   134*  133*   POTASSIUM  4.0  4.1  4.5   CHLORIDE  107  105  103   CO2  19*  18*  19*   GLUCOSE  142*  133*  133*   BUN  41*  47*  51*     Recent Labs      06/09/19   0104  06/10/19   0049  06/11/19   0149   ALBUMIN   --    --   3.4   TBILIRUBIN   --    --   0.3   ALKPHOSPHAT   --    --   70   TOTPROTEIN   --    --   8.6*   ALTSGPT   --    --   13   ASTSGOT   --    --   13   CREATININE  2.45*  2.72*  2.50*       Imaging:  Mon Jose Alejandro 10, 2019  1:53 PM  EC-ECHOCARDIOGRAM COMPLETE W/ CONT   CONCLUSIONS  No prior study is available for comparison.   Hypokinesis of the septal walls. Left ventricular ejection fraction is   visually estimated to be 45%. Diastolic function is difficult to assess   with aatrial fibrillation.   Reduced right ventricular systolic function.  Unable to estimate pulmonary artery pressure due to an inadequate   tricuspid regurgitant jet.  No evidence of valvular abnormality based on Doppler evaluation.   Ascending aorta diameter is 3.6 cm.      Micro:  Results     Procedure Component Value Units Date/Time    URINE CULTURE(NEW) [[id number]]  (Abnormal) Collected:  06/10/19 1629    Order Status:  Completed Specimen:  Urine from Urine, Suprapubic Updated:  06/11/19 1425     Significant Indicator POS (POS)     Source UR     Site URINE, SUPRAPUBIC     Culture Result - (A)      Pseudomonas species  10-50,000 cfu/mL   (A)    Narrative:       Collected By:[id number] KAILA TAMEZ  Indication for culture:->Unexplained new onset of Flank pain  and/or Costovertebral angle tenderness  Collected By:[id number] KAILA TAMEZ    URINE CULTURE(NEW) [[id number]]  (Abnormal)  (Susceptibility) Collected:  06/07/19 0929    Order Status:  Completed Specimen:  Urine from Urine, Suprapubic Updated:  06/09/19 0846     Significant Indicator POS (POS)     Source UR     Site URINE, SUPRAPUBIC     Culture Result - (A)      Pseudomonas aeruginosa  >100,000 cfu/mL  P.aeruginosa may develop resistance during prolonged therapy  with all  antibiotics. Isolates that are initially susceptible  may become resistant within three to four days after  initiation of therapy. Testing of repeat isolates may be  warranted.   (A)    Narrative:       Collected By:[id number] AVRIL MURPHY  Indication for culture:->Patient WITHOUT an indwelling Humphrey  catheter in place with new onset of Dysuria, Frequency,  Urgency, and/or Suprapubic pain  Collected By:[id number] AVRIL MURPHY    Culture & Susceptibility     PSEUDOMONAS AERUGINOSA     Antibiotic Sensitivity Microscan Unit Status    Amikacin Sensitive <=16 mcg/mL Final    Method: CARLITO    Cefepime Intermediate 16 mcg/mL Final    Method: CARLITO    Ceftazidime Sensitive 8 mcg/mL Final    Method: CARLITO    Ciprofloxacin Resistant >2 mcg/mL Final    Method: CARLITO    Gentamicin Sensitive <=4 mcg/mL Final    Method: CARLITO    Imipenem Sensitive <=1 mcg/mL Final    Method: CARLITO    Levofloxacin Intermediate 4 mcg/mL Final    Method: CARLITO    Meropenem Sensitive <=1 mcg/mL Final    Method: CARLITO    Pip/Tazobactam Sensitive 64 mcg/mL Final    Method: ACRLITO    Piperacillin Sensitive 64 mcg/mL Final    Method: CARLITO    Tobramycin Sensitive <=4 mcg/mL Final    Method: CARLITO                       URINALYSIS [[id number]]  (Abnormal) Collected:  06/07/19 0929    Order Status:  Completed Specimen:  Urine from Urine, Suprapubic Updated:  06/07/19 0938     Color Yellow     Character Sl Cloudy (A)     Comment: Corrected result; previously reported as Hazy on 06/07/19 at 09:36        Specific Gravity 1.015     Ph 6.0     Glucose Negative mg/dL      Ketones Negative mg/dL      Protein >=300 (A) mg/dL      Bilirubin Negative     Urobilinogen, Urine 0.2     Nitrite Positive (A)     Leukocyte Esterase Small (A)     Occult Blood Moderate (A)     Micro Urine Req Microscopic    Narrative:       Collected By:[id number] AVRIL MURPHY  Indication for culture:->Patient WITHOUT an indwelling Humphrey  catheter in place with new onset of Dysuria, Frequency,  Urgency,  and/or Suprapubic pain          Assessment:  Active Hospital Problems    Diagnosis   • *Urinary tract infection associated with cystostomy catheter ([facility]) [T83.510A, N39.0]   • Type 2 diabetes mellitus, with long-term current use of insulin ([facility]) [E11.9, Z79.4]   • Osteomyelitis of right foot ([facility]) [M86.9]   • CRF (chronic renal failure), stage 3 (moderate) ([facility]) [N18.3]       Plan:  Complicated urinary tract infection, recurrent  In the setting of a chronic supra pubic catheter  Afebrile  Persistent leukocytosis, slowly improving  Continues to have chronic pain around the suprapubic area  Urine culture on 6/7- pseudomonas aeruginosa (Zosyn CARLITO 64)  Suprapubic Humphrey changed on 6/10  Repeat UA on 6/10+ Pseudomonas-likely due to colonization  Continue IV meropenem 500 mg twice daily  Will treat for a total of 7 days from date of Humphrey change  End date 6/18/2019    Right second toe infection, status post treatment  Status post right second toe flexor tendon release and debridement on 6/3/2019 and received antibiotics for 24 hours after the surgery  Surgical site clean  Continue wound care    Type 2 diabetes mellitus   Hemoglobin A1c 6.5%  On 5/31/19   Keep blood sugars under 150 to help control infection   this a.m.    Chronic kidney disease, stage III-stable  Renally dose antibiotics as needed  Avoid nephrotoxins  Continue to monitor      Discussed case with hospitalist, Dr. Aldana.

## 2019-06-11 NOTE — PROGRESS NOTES
"Cardiology Follow-up Consult Note    Date of Service:    6/11/2019      Consulting Physician: Katlin Aldana M.D.    Patient ID:    Name:   Shola Hoyt   YOB: 1950  Age:   69 y.o.  male   MRN:   [id number]      Reason for Consultation: chest pain    HPI/Patient ID:    Shola Hoyt is a 69 y.o.-year-old male with a history of crohns disease which ultimately resulted in ostomy creation, poorly controlled diabetes mellitus , and urinary obstruction status post temporary suprapubic catheter placement who presented on 5/29/2019 with complicated UTI and diabetic foot ulcer.     Interim Events:  # continued abdominal pain  # no further chest pain    Past medical, surgical, social, and family history reviewed and unchanged from admission except as noted in assessment and plan.    Medications: Reviewed in MAR      Allergies   Allergen Reactions   • Ceftriaxone      Has tolerated Merrem, Zosyn, and Unasyn   • Ezetimibe    • Flagyl [Metronidazole]    • Infliximab    • Levaquin    • Lovastatin Shortness of Breath   • Simvastatin Shortness of Breath   • Vancomycin            Physical Exam  Body mass index is 31.21 kg/m².  /64   Pulse 71   Temp 36.4 °C (97.5 °F) (Temporal)   Resp 17   Ht 1.803 m (5' 11\")   Wt 101.5 kg (223 lb 12.3 oz)   SpO2 96%   Vitals:    06/10/19 1900 06/11/19 0000 06/11/19 0300 06/11/19 0700   BP: 111/64 136/71 109/62 112/64   Pulse: 69 70 75 71   Resp: 18 18 18 17   Temp: 36.2 °C (97.1 °F) 36.2 °C (97.1 °F) 36.2 °C (97.2 °F) 36.4 °C (97.5 °F)   TempSrc: Temporal Temporal Temporal Temporal   SpO2: 95% 97% 96% 96%   Weight: 101.5 kg (223 lb 12.3 oz)      Height:         Oxygen Therapy:  Pulse Oximetry: 96 %, O2 (LPM): 0, O2 Delivery: None (Room Air)    General: No apparent distress  Eyes: nl conjunctiva  ENT: OP clear  Neck: JVP 5-6 cm H2O  Lungs: normal respiratory effort, CTAB  Heart: RRR, no murmurs, no rubs or gallops, trace edema bilateral lower extremities. " Wrapped left LE.  No LV/RV heave on cardiac palpatation. 2+ bilateral radial pulses.   Abdomen: did not examine due to tenderness, he asked me not to.   Extremities/MSK: no clubbing, no cyanosis  Neurological: No focal sensory deficits  Psychiatric: Appropriate affect, A/O x 3  Skin: Warm extremities    Exam repeated in full and unchanged except for as noted above.        Labs (personally reviewed and notable for):   Lab Results   Component Value Date/Time    SODIUM 133 (L) 06/11/2019 01:49 AM    POTASSIUM 4.5 06/11/2019 01:49 AM    CHLORIDE 103 06/11/2019 01:49 AM    CO2 19 (L) 06/11/2019 01:49 AM    GLUCOSE 133 (H) 06/11/2019 01:49 AM    BUN 51 (H) 06/11/2019 01:49 AM    CREATININE 2.50 (H) 06/11/2019 01:49 AM      Lab Results   Component Value Date/Time    WBC 11.9 (H) 06/11/2019 01:49 AM    RBC 4.76 06/11/2019 01:49 AM    HEMOGLOBIN 13.3 (L) 06/11/2019 01:49 AM    HEMATOCRIT 41.7 (L) 06/11/2019 01:49 AM    MCV 87.6 06/11/2019 01:49 AM    MCH 27.9 06/11/2019 01:49 AM    MCHC 31.9 (L) 06/11/2019 01:49 AM    MPV 9.0 06/11/2019 01:49 AM    NEUTSPOLYS 53.70 06/11/2019 01:49 AM    LYMPHOCYTES 36.20 06/11/2019 01:49 AM    MONOCYTES 6.30 06/11/2019 01:49 AM    EOSINOPHILS 2.50 06/11/2019 01:49 AM    BASOPHILS 0.90 06/11/2019 01:49 AM    ANISOCYTOSIS 1+ 02/25/2018 02:42 AM            Cardiac Imaging and Procedures Review:      EKG dated 6/10/2019: My personal interpretation is a flutter, poor r wave progression.     Repeat ekg 6/11/2019 reviewed personally and was unchanged.     TTE 6/11/2019 reviewed personally.     CONCLUSIONS  No prior study is available for comparison.   Hypokinesis of the septal walls. Left ventricular ejection fraction is   visually estimated to be 45%. Diastolic function is difficult to assess   with aatrial fibrillation.   Reduced right ventricular systolic function.  Unable to estimate pulmonary artery pressure due to an inadequate   tricuspid regurgitant jet.  No evidence of valvular  abnormality based on Doppler evaluation.   Ascending aorta diameter is 3.6 cm.    Radiology test Review:  LE BERNICE: 6/2/2019:     Vascular Laboratory   Conclusions   1.  Normal resting right lower extremity arterial perfusion.    Impression and Medical Decision Making:  # Abdominal pain - query worsening infection from crohn's vs complicated UTI  # Cardiomyopathy, LVEF 45%, with septal wall motion abnormality. No acute ACS based on EKG and troponins.   # Atrial flutter - ? new diagnosis, [id number] score of 3.  He did say he had severe bleeding on blood thinners and prefers not to take.   # CKD stage IV  # COPD  # Diabetes  # Crohn's disease  # Diabetic foot ulcer s/p debridement 6/3/2019  # urinary obstruction with suprapubic catheter s/p replacement 6/10/2019  # Questionable ability to make medical decisions, Dr. Aldana in touch with DPOA.         Recommendations:  # aspirin/statin  # would start on NOAC or coumadin for a fib, possibly xarelto 15mg PO daily once ok per surgery and IR. Stop aspirin when starting NOAC or coumadin. (Dr. Aldana will discuss with DPOA but patient did report today he did not want further blood thinning agents  # metoprolol as needed for rate control, can add digoxin as a second agent if needed. Goal resting rate <100.  # f/u with cardiology as outpatient to discuss stress testing or any further work-up depending on symptoms and functional status  # further work-up for abdominal pain per hospitalists.      Discussed with Dr. Aldana.       Thank you for allowing me to participate in the care of this patient, Cardiology will sign off.  Please contact me with any questions.    Luis Eduardo Dixon MD  Cardiologist, [facility] [place]   [phone]

## 2019-06-11 NOTE — PROGRESS NOTES
Hospital Medicine Daily Progress Note    Date of Service  6/11/2019    Chief Complaint  69 y.o. male admitted 5/29/2019 with dysuria    Hospital Course    68 y/o M with Past medical history of Crohn's with ostomy, suprapubic cath, diabetes, COPD, CKD.  Patient has not had suprapubic cath changed for several months despite his POA asking him to have home health assess him.  He complains of painful urination and has a history of multiple drug-resistant urine infections.  He was admitted on meropenem empirically.  Urology was consulted and exchanged catheter.  His urine culture from [facility] returned with MRSA and Pseudomonas and his antibiotic was changed to Zosyn and Zyvox.  He was thought to have right toe osteomyelitis per LPS and Ortho were consulted and performed debridement with right second toe flexor tendon release 6/3/2019. ID was consulted and this was thought to simply be a superficial right second toe infection. He continued to have have pain at his suprapubic cath and repeat urine grew pseudomonas. He was started on meropenem as it was resistant to zosyn. His suprapubic cath was exchanged 6/10 and he developed aflutter and transferred to telemetry. Cardiology was consulted and rate control and AC was recommended.        Interval Problem Update  Pt bradycardic down to low 50s with metoprolol, I have reduced dose  [id number]: 2, I discussed with POA about starting AC.  She states patient is very noncompliant with his medications and only takes his medications once every 2 to 3 weeks.  He does not like taking medications at home.  I discussed with her about the options of Coumadin and NOAC.  Including certain dietary modifications associated with Coumadin.  She states patient would not be compliant with any dietary changes.  Due to patient's known noncompliance, decision was made to not start patient on anticoagulation.  Tolerating statin, unchanged chronic muscle aches and joint pains   Day 3/10 of  meropenem  Echo with EF fo 45%      Consultants/Specialty  Urology  Infectious disease  LPS, Ortho    Code Status  Full    Disposition  Lives alone, OSVALDO Andersen agreeable to SNF  Sutures out 2 weeks post-op 6/17/19    Review of Systems  Review of Systems   Constitutional: Negative for fever and malaise/fatigue.   Respiratory: Negative for cough and shortness of breath.    Cardiovascular: Negative for chest pain and palpitations.   Gastrointestinal: Positive for abdominal pain. Negative for nausea.   Genitourinary: Negative for dysuria.   Musculoskeletal: Negative for joint pain.   Neurological: Negative for dizziness and headaches.   Psychiatric/Behavioral: Positive for memory loss.        Physical Exam  Temp:  [36.2 °C (97.1 °F)-36.7 °C (98 °F)] 36.7 °C (98 °F)  Pulse:  [69-97] 85  Resp:  [17-18] 17  BP: (105-136)/(62-84) 127/84  SpO2:  [94 %-98 %] 98 %    Physical Exam   Constitutional: He appears well-developed and well-nourished.   HENT:   Head: Normocephalic.   Mouth/Throat: Oropharynx is clear and moist.   Eyes: Conjunctivae are normal.   Cardiovascular: Normal rate.    No murmur heard.  Pulmonary/Chest: Effort normal. He has no wheezes.   Abdominal: Soft. He exhibits distension. There is tenderness (suprapubic). There is no rebound and no guarding.   Ostomy and suprapubic cath in place. Mild erythema around suprapubic cath and ostomy   Musculoskeletal: He exhibits no edema.   Right foot with clean dressings in place   Neurological: He is alert.   disoriented   Skin: Skin is warm and dry.   Nursing note and vitals reviewed.      Fluids    Intake/Output Summary (Last 24 hours) at 06/11/19 1600  Last data filed at 06/11/19 1500   Gross per 24 hour   Intake              480 ml   Output             2875 ml   Net            -2395 ml       Laboratory  Recent Labs      06/09/19   0104  06/10/19   0049  06/11/19   0149   WBC  13.5*  12.8*  11.9*   RBC  4.68*  4.80  4.76   HEMOGLOBIN  13.2*  13.6*  13.3*   HEMATOCRIT   40.8*  42.4  41.7*   MCV  87.2  88.3  87.6   MCH  28.2  28.3  27.9   MCHC  32.4*  32.1*  31.9*   RDW  48.3  49.6  49.5   PLATELETCT  493*  506*  549*   MPV  8.9*  9.2  9.0     Recent Labs      06/09/19   0104  06/10/19   0049  06/11/19   0149   SODIUM  135  134*  133*   POTASSIUM  4.0  4.1  4.5   CHLORIDE  107  105  103   CO2  19*  18*  19*   GLUCOSE  142*  133*  133*   BUN  41*  47*  51*   CREATININE  2.45*  2.72*  2.50*   CALCIUM  9.2  9.4  9.1     Recent Labs      06/10/19   0049   INR  1.11         Recent Labs      06/11/19   0149   TRIGLYCERIDE  117   HDL  23*   LDL  48       Imaging  EC-ECHOCARDIOGRAM COMPLETE W/ CONT   Final Result      IR-DRAIN-BLADDER SUPRAPUB W/CATH   Final Result         1.  Successful removal and replacement of suprapubic tube.      2.  Cystogram demonstrates new 16 Kuwaiti Humphrey catheter well positioned in the bladder.      US-BERNICE SINGLE LEVEL UNILAT RIGHT   Final Result      IR-DRAIN-BLADDER SUPRAPUB W/CATH   Final Result      1. Fluoroscopic guided exchange of the 14 Kuwaiti locking loop suprapubic catheter for a new 16 [place]i Humphrye catheter.   3. Tubes cystogram demonstrating appropriate positioning of the new Humphrey catheter.      DX-TOE(S) 2+ RIGHT   Final Result         Soft tissue swelling about the second digit. Rarefaction of the tip of the second distal phalanx, concerning for osteomyelitis.           Assessment/Plan  * Urinary tract infection associated with cystostomy catheter (HCC)- (present on admission)   Assessment & Plan    Complicated by presence of suprapubic catheter, with surrounding cellulitis & skin breakdown.  Catheter has not been changed in several months per patient report.  Has history of drug resistant UTI's.  UCx from outside facility growing MRSA and pseudomonoas  Infectious disease consulted and completed antibiotics zosyn and zyvox.  repeat urine cx for pseudomonas resistant to zosyn. ID consulted and started on meropenem. Cath exchanged 6/10/19.   I have  ordered repeat UA     Acute encephalopathy- (present on admission)   Assessment & Plan    Ongoing confusion  Metabolic, with ongoing UTI  POA says he's very sensitive to narcotics, will minimize. At baseline he does not know his medications or date  Vit B12 is normal  His memory is fluctuating, poor short term memory     Noncompliance   Assessment & Plan    DPOA says patient does not take his pills, sometimes takes his insulin  Currently he is encephalopathic and unable to make medical decisions  She would like him placed in a group home after SNF     Chest pain   Assessment & Plan    Secondary to atrial flutter with RVR  resolved     Atrial flutter (HCC)   Assessment & Plan    New onset, after suprapubic cath exchange  TTE with EF of 45%, TSH wnl  Troponin is normal  cont on oral metop- dose reduced as patient bradycardic, IV metop PRN  I discussed with POA and d/t patients known noncompliance will defer AC at this time     Osteomyelitis of right foot (HCC)   Assessment & Plan    Right second toe  Seen by LPS, ortho consulted and ID consulted  Completed course of antibiotics  Status post debridement 6/3     CRF (chronic renal failure), stage 3 (moderate) (HCC)- (present on admission)   Assessment & Plan    At baseline  Avoid nephrotoxins.  Renally dose all appropriate medications.     BPH (benign prostatic hyperplasia)- (present on admission)   Assessment & Plan    S/p suprapubic catheter placement.  Pain from possible bladder spasms per Dr. Donohue. Trial of belladonna and emla topically, patient refused  No change with oxybutynin     Crohn's disease ([facility])- (present on admission)   Assessment & Plan    S/p colostomy.  Wound care has been consulted for ostomy management.     Type 2 diabetes mellitus, with long-term current use of insulin ([facility])- (present on admission)   Assessment & Plan    On BID humulin 70/30 at home, has not needed here  A1c 6.5  Added Accu-Cheks ACHS with low scale SSI as required.  Hypoglycemia  protocol in place if needed.  Diet changed to diabetic.          VTE prophylaxis: heparin

## 2019-06-11 NOTE — PROGRESS NOTES
Patient's heart rate increased to 130s while ambulating with physical therapy. Patient was asymptomatic. Encouraged him to go back to his room and rest. As soon as he did, his heart rate returned to the 80s. Will continue to monitor.

## 2019-06-11 NOTE — CARE PLAN
Problem: Urinary Elimination:  Goal: Ability to reestablish a normal urinary elimination pattern will improve  Outcome: PROGRESSING AS EXPECTED  Patient's sp catheter has adequate urine output.

## 2019-06-11 NOTE — CARE PLAN
Problem: Communication  Goal: The ability to communicate needs accurately and effectively will improve  Outcome: PROGRESSING AS EXPECTED    Intervention: Reorient patient to environment as needed  Pt reoriented to where he is and why he is here. P[t educated to use call light      Problem: Pain Management  Goal: Pain level will decrease to patient's comfort goal  Outcome: PROGRESSING AS EXPECTED  Spoke with MD on plan to reduce pt pain. New orders received. Non pharm measurers used. Pt educated on pain scale

## 2019-06-11 NOTE — CARE PLAN
Problem: Safety  Goal: Will remain free from injury  Outcome: PROGRESSING AS EXPECTED  Patient has remained free from injury. Is compliant with using call light for assistance.

## 2019-06-11 NOTE — PROGRESS NOTES
Pt had bradycardic episode with a couple pauses lasting approximately 1-2 seconds each. Patient was sleeping during this time but was easily aroused. HR recovered quickly into 60-70s. Notified Dr Aldana.

## 2019-06-12 LAB
APPEARANCE UR: CLEAR
BACTERIA #/AREA URNS HPF: NEGATIVE /HPF
BACTERIA UR CULT: ABNORMAL
BACTERIA UR CULT: ABNORMAL
BILIRUB UR QL STRIP.AUTO: NEGATIVE
COLOR UR: YELLOW
EPI CELLS #/AREA URNS HPF: NEGATIVE /HPF
GLUCOSE BLD-MCNC: 139 MG/DL (ref 65–99)
GLUCOSE BLD-MCNC: 158 MG/DL (ref 65–99)
GLUCOSE BLD-MCNC: 166 MG/DL (ref 65–99)
GLUCOSE BLD-MCNC: 241 MG/DL (ref 65–99)
GLUCOSE UR STRIP.AUTO-MCNC: NEGATIVE MG/DL
HYALINE CASTS #/AREA URNS LPF: ABNORMAL /LPF
KETONES UR STRIP.AUTO-MCNC: NEGATIVE MG/DL
LEUKOCYTE ESTERASE UR QL STRIP.AUTO: ABNORMAL
MICRO URNS: ABNORMAL
NITRITE UR QL STRIP.AUTO: NEGATIVE
PH UR STRIP.AUTO: 5 [PH]
PROT UR QL STRIP: 100 MG/DL
RBC # URNS HPF: ABNORMAL /HPF
RBC UR QL AUTO: ABNORMAL
SIGNIFICANT IND 70042: ABNORMAL
SITE SITE: ABNORMAL
SOURCE SOURCE: ABNORMAL
SP GR UR STRIP.AUTO: 1.01
UROBILINOGEN UR STRIP.AUTO-MCNC: 0.2 MG/DL
WBC #/AREA URNS HPF: ABNORMAL /HPF

## 2019-06-12 PROCEDURE — 700105 HCHG RX REV CODE 258: Performed by: INTERNAL MEDICINE

## 2019-06-12 PROCEDURE — 81001 URINALYSIS AUTO W/SCOPE: CPT

## 2019-06-12 PROCEDURE — A9270 NON-COVERED ITEM OR SERVICE: HCPCS | Performed by: INTERNAL MEDICINE

## 2019-06-12 PROCEDURE — 770001 HCHG ROOM/CARE - MED/SURG/GYN PRIV*

## 2019-06-12 PROCEDURE — 99232 SBSQ HOSP IP/OBS MODERATE 35: CPT | Performed by: INTERNAL MEDICINE

## 2019-06-12 PROCEDURE — 99231 SBSQ HOSP IP/OBS SF/LOW 25: CPT | Performed by: HOSPITALIST

## 2019-06-12 PROCEDURE — 700102 HCHG RX REV CODE 250 W/ 637 OVERRIDE(OP): Performed by: HOSPITALIST

## 2019-06-12 PROCEDURE — 700102 HCHG RX REV CODE 250 W/ 637 OVERRIDE(OP): Performed by: INTERNAL MEDICINE

## 2019-06-12 PROCEDURE — 700111 HCHG RX REV CODE 636 W/ 250 OVERRIDE (IP): Performed by: INTERNAL MEDICINE

## 2019-06-12 PROCEDURE — 700111 HCHG RX REV CODE 636 W/ 250 OVERRIDE (IP): Performed by: HOSPITALIST

## 2019-06-12 PROCEDURE — A9270 NON-COVERED ITEM OR SERVICE: HCPCS | Performed by: HOSPITALIST

## 2019-06-12 PROCEDURE — 700102 HCHG RX REV CODE 250 W/ 637 OVERRIDE(OP): Performed by: NURSE PRACTITIONER

## 2019-06-12 PROCEDURE — 82962 GLUCOSE BLOOD TEST: CPT

## 2019-06-12 RX ADMIN — MULTIPLE VITAMINS W/ MINERALS TAB 1 TABLET: TAB at 05:27

## 2019-06-12 RX ADMIN — NYSTATIN: 100000 POWDER TOPICAL at 18:16

## 2019-06-12 RX ADMIN — BUDESONIDE AND FORMOTEROL FUMARATE DIHYDRATE 2 PUFF: 160; 4.5 AEROSOL RESPIRATORY (INHALATION) at 05:41

## 2019-06-12 RX ADMIN — INSULIN HUMAN 1 UNITS: 100 INJECTION, SOLUTION PARENTERAL at 21:43

## 2019-06-12 RX ADMIN — MICONAZOLE NITRATE: 20 CREAM TOPICAL at 05:31

## 2019-06-12 RX ADMIN — INSULIN HUMAN 2 UNITS: 100 INJECTION, SOLUTION PARENTERAL at 18:23

## 2019-06-12 RX ADMIN — OXYCODONE HYDROCHLORIDE 5 MG: 5 TABLET ORAL at 13:49

## 2019-06-12 RX ADMIN — MEROPENEM 500 MG: 500 INJECTION, POWDER, FOR SOLUTION INTRAVENOUS at 18:16

## 2019-06-12 RX ADMIN — OXYBUTYNIN CHLORIDE 5 MG: 5 TABLET ORAL at 18:15

## 2019-06-12 RX ADMIN — OXYCODONE HYDROCHLORIDE 5 MG: 5 TABLET ORAL at 01:32

## 2019-06-12 RX ADMIN — TAMSULOSIN HYDROCHLORIDE 0.4 MG: 0.4 CAPSULE ORAL at 09:33

## 2019-06-12 RX ADMIN — NYSTATIN: 100000 POWDER TOPICAL at 05:31

## 2019-06-12 RX ADMIN — OXYBUTYNIN CHLORIDE 5 MG: 5 TABLET ORAL at 05:28

## 2019-06-12 RX ADMIN — OXYCODONE HYDROCHLORIDE 5 MG: 5 TABLET ORAL at 09:33

## 2019-06-12 RX ADMIN — HEPARIN SODIUM 5000 UNITS: 5000 INJECTION, SOLUTION INTRAVENOUS; SUBCUTANEOUS at 21:43

## 2019-06-12 RX ADMIN — MEROPENEM 500 MG: 500 INJECTION, POWDER, FOR SOLUTION INTRAVENOUS at 05:31

## 2019-06-12 RX ADMIN — ASPIRIN 81 MG 81 MG: 81 TABLET ORAL at 05:29

## 2019-06-12 RX ADMIN — TRAMADOL HYDROCHLORIDE 50 MG: 50 TABLET, FILM COATED ORAL at 19:46

## 2019-06-12 RX ADMIN — HEPARIN SODIUM 5000 UNITS: 5000 INJECTION, SOLUTION INTRAVENOUS; SUBCUTANEOUS at 13:49

## 2019-06-12 RX ADMIN — MICONAZOLE NITRATE: 20 CREAM TOPICAL at 18:16

## 2019-06-12 RX ADMIN — ATORVASTATIN CALCIUM 80 MG: 80 TABLET, FILM COATED ORAL at 18:15

## 2019-06-12 RX ADMIN — BUDESONIDE AND FORMOTEROL FUMARATE DIHYDRATE 2 PUFF: 160; 4.5 AEROSOL RESPIRATORY (INHALATION) at 18:15

## 2019-06-12 RX ADMIN — HEPARIN SODIUM 5000 UNITS: 5000 INJECTION, SOLUTION INTRAVENOUS; SUBCUTANEOUS at 05:31

## 2019-06-12 ASSESSMENT — ENCOUNTER SYMPTOMS
SHORTNESS OF BREATH: 0
DIZZINESS: 0
CHILLS: 0
VOMITING: 0
SPUTUM PRODUCTION: 0
ABDOMINAL PAIN: 1
BLURRED VISION: 0
FEVER: 0
MEMORY LOSS: 1
SENSORY CHANGE: 0
PALPITATIONS: 0
SORE THROAT: 0
MYALGIAS: 0
CONSTIPATION: 0
HEADACHES: 0
NAUSEA: 0
BACK PAIN: 0
FLANK PAIN: 0
DIARRHEA: 0
NERVOUS/ANXIOUS: 0
DIAPHORESIS: 0
COUGH: 0

## 2019-06-12 NOTE — PROGRESS NOTES
Bedside report received from nurse. Assumed care of pt. Pt resting comfortably in bed. A/Ox2, VSS,  All needs met. POC reviewed and white board updated. Tele box on. Call light in reach. Bed locked in lowest position with 2 upper bed rails up.

## 2019-06-12 NOTE — THERAPY
"Occupational Therapy Treatment completed with focus on ADLs.  Functional Status:    Pt resting in bed when received by therapy. Completed bed mobility with supv. Pt's offloading shoe donned for him initially so that he could complete xfer into chair. Sit><stand supv, pivoted to chair. Pt then doffed and re-donned offloading boot with supv but increased time and effort, appearing to only use RUE for task. Pt elected to ambulate without AD, but he was fairly unsteady, having 2 losses of balance laterally and anteriorly, requiring assist to correct. Pt sounded fairly out of breath after session, satting at 98, but with HR increasing to 140s. Pt declined grooming, stating that he would like to change clothes once he has had a shower, however no shower order in chart at this time. Will continue working with pt in this setting to improve activity tolerance, balance, and independence and safety with functional mobility and ADL.     Plan of Care: Will benefit from Occupational Therapy 3 times per week  Discharge Recommendations:  Equipment Will Continue to Assess for Equipment Needs. Post-acute therapy Recommend inpatient transitional care services for continued occupational therapy services.       See \"Rehab Therapy-Acute\" Patient Summary Report for complete documentation.   "

## 2019-06-12 NOTE — PROGRESS NOTES
Hospital Medicine Daily Progress Note    Date of Service  6/12/2019    Chief Complaint  69 y.o. male admitted 5/29/2019 with dysuria    Hospital Course    70 y/o M with Past medical history of Crohn's with ostomy, suprapubic cath, diabetes, COPD, CKD.  Patient has not had suprapubic cath changed for several months despite his POA asking him to have home health assess him.  He complains of painful urination and has a history of multiple drug-resistant urine infections.  He was admitted on meropenem empirically.  Urology was consulted and exchanged catheter.  His urine culture from [facility] returned with MRSA and Pseudomonas and his antibiotic was changed to Zosyn and Zyvox.  He was thought to have right toe osteomyelitis per LPS and Ortho were consulted and performed debridement with right second toe flexor tendon release 6/3/2019. ID was consulted and this was thought to simply be a superficial right second toe infection. He continued to have have pain at his suprapubic cath and repeat urine grew pseudomonas. He was started on meropenem as it was resistant to zosyn. His suprapubic cath was exchanged 6/10 and he developed aflutter and transferred to telemetry. Cardiology was consulted and rate control and AC was recommended.        Interval Problem Update  No acute events overnight  Bradycardic while sleeping, asymptomatic  He denies any new complaints    Consultants/Specialty  Urology  Infectious disease  LPS, Ortho    Code Status  Full    Disposition  Lives alone, OSVALDO Andersen agreeable to SNF  Sutures out 2 weeks post-op 6/17/19    Review of Systems  Review of Systems   Constitutional: Negative for fever and malaise/fatigue.   Respiratory: Negative for cough and shortness of breath.    Cardiovascular: Negative for chest pain and palpitations.   Gastrointestinal: Positive for abdominal pain. Negative for nausea.   Genitourinary: Negative for dysuria.   Musculoskeletal: Negative for joint pain.    Neurological: Negative for dizziness and headaches.   Psychiatric/Behavioral: Positive for memory loss.        Physical Exam  Temp:  [35.8 °C (96.5 °F)-37 °C (98.6 °F)] 37 °C (98.6 °F)  Pulse:  [] 85  Resp:  [17-19] 19  BP: (108-127)/(65-84) 108/71  SpO2:  [97 %-98 %] 98 %    Physical Exam   Constitutional: He appears well-developed and well-nourished.   HENT:   Head: Normocephalic.   Mouth/Throat: Oropharynx is clear and moist.   Eyes: Conjunctivae are normal.   Cardiovascular: Normal rate.    No murmur heard.  Pulmonary/Chest: Effort normal. He has no wheezes.   Abdominal: Soft. He exhibits distension. There is tenderness (suprapubic). There is no rebound and no guarding.   Ostomy and suprapubic cath in place. Mild erythema around suprapubic cath and ostomy   Musculoskeletal: He exhibits no edema.   Right foot with clean dressings in place   Neurological: He is alert.   disoriented   Skin: Skin is warm and dry.   Nursing note and vitals reviewed.      Fluids    Intake/Output Summary (Last 24 hours) at 06/12/19 1242  Last data filed at 06/12/19 0500   Gross per 24 hour   Intake              480 ml   Output             2900 ml   Net            -2420 ml       Laboratory  Recent Labs      06/10/19   0049  06/11/19 0149   WBC  12.8*  11.9*   RBC  4.80  4.76   HEMOGLOBIN  13.6*  13.3*   HEMATOCRIT  42.4  41.7*   MCV  88.3  87.6   MCH  28.3  27.9   MCHC  32.1*  31.9*   RDW  49.6  49.5   PLATELETCT  506*  549*   MPV  9.2  9.0     Recent Labs      06/10/19   0049  06/11/19 0149   SODIUM  134*  133*   POTASSIUM  4.1  4.5   CHLORIDE  105  103   CO2  18*  19*   GLUCOSE  133*  133*   BUN  47*  51*   CREATININE  2.72*  2.50*   CALCIUM  9.4  9.1     Recent Labs      06/10/19   0049   INR  1.11         Recent Labs      06/11/19 0149   TRIGLYCERIDE  117   HDL  23*   LDL  48       Imaging  EC-ECHOCARDIOGRAM COMPLETE W/ CONT   Final Result      IR-DRAIN-BLADDER SUPRAPUB W/CATH   Final Result         1.  Successful  removal and replacement of suprapubic tube.      2.  Cystogram demonstrates new 16 Swedish Humphrey catheter well positioned in the bladder.      US-BERNICE SINGLE LEVEL UNILAT RIGHT   Final Result      IR-DRAIN-BLADDER SUPRAPUB W/CATH   Final Result      1. Fluoroscopic guided exchange of the 14 Swedish locking loop suprapubic catheter for a new 16 [place] Humphrey catheter.   3. Tubes cystogram demonstrating appropriate positioning of the new Humphrey catheter.      DX-TOE(S) 2+ RIGHT   Final Result         Soft tissue swelling about the second digit. Rarefaction of the tip of the second distal phalanx, concerning for osteomyelitis.           Assessment/Plan  * Urinary tract infection associated with cystostomy catheter (HCC)- (present on admission)   Assessment & Plan    Complicated by presence of suprapubic catheter, with surrounding cellulitis & skin breakdown.  Catheter has not been changed in several months per patient report.  Has history of drug resistant UTI's.  UCx from outside facility growing MRSA and pseudomonoas  Infectious disease consulted and completed antibiotics zosyn and zyvox.  repeat urine cx for pseudomonas resistant to zosyn. ID consulted and started on meropenem. Cath exchanged 6/10/19.   I have ordered repeat UA     Acute encephalopathy- (present on admission)   Assessment & Plan    Ongoing confusion  Metabolic, with ongoing UTI  POA says he's very sensitive to narcotics, will minimize. At baseline he does not know his medications or date  Vit B12 is normal  His memory is fluctuating, poor short term memory     Noncompliance   Assessment & Plan    DPOA says patient does not take his pills, sometimes takes his insulin  Has severe cognitive deficits  SNF placement pending     Chest pain   Assessment & Plan    Secondary to atrial flutter with RVR  resolved     Atrial flutter (HCC)   Assessment & Plan    New onset, after suprapubic cath exchange  TTE with EF of 45%, TSH wnl  Troponin is normal  cont on oral  metop- dose reduced as patient bradycardic, IV metop PRN  I discussed with POA and d/t patients known noncompliance will defer AC at this time     Osteomyelitis of right foot (HCC)   Assessment & Plan    Right second toe  Seen by LPS, ortho consulted and ID consulted  Completed course of antibiotics  Status post debridement 6/3     CRF (chronic renal failure), stage 3 (moderate) (HCC)- (present on admission)   Assessment & Plan    At baseline  Avoid nephrotoxins.  Renally dose all appropriate medications.     BPH (benign prostatic hyperplasia)- (present on admission)   Assessment & Plan    S/p suprapubic catheter placement.  Pain from possible bladder spasms per Dr. Donohue. Trial of belladonna and emla topically, patient refused  No change with oxybutynin     Crohn's disease ([facility])- (present on admission)   Assessment & Plan    S/p colostomy.  Wound care has been consulted for ostomy management.     Type 2 diabetes mellitus, with long-term current use of insulin ([facility])- (present on admission)   Assessment & Plan    On BID humulin 70/30 at home, has not needed here  A1c 6.5  Added Accu-Cheks ACHS with low scale SSI as required.  Hypoglycemia protocol in place if needed.  Diet changed to diabetic.          VTE prophylaxis: heparin

## 2019-06-12 NOTE — DISCHARGE PLANNING
Agency/Facility Name: [facility]  Spoke To: Suzan  Outcome: Is patient on wound vac, [place] stop date, and dc plan?  PT/OT sent.    @1100  Agency/Facility Name: [facility]  Spoke To: Suzan  Outcome: Reviewing referral.    @1415  Agency/Facility Name: [facility]  Spoke To: Suzan  Outcome: Accepted and bed tomorrow, no transport.    Received Transport Form @ 1420  Spoke to Melissa @ [facility]    Transport is scheduled for 6/13 @1500 going to [facility].    Agency/Facility Name: [facility]  Spoke To: Suzan  Outcome: Informed of transport time.

## 2019-06-12 NOTE — PROGRESS NOTES
Infectious Disease Progress Note    Author: JOE Chiu Date & Time of service: 6/12/2019  2:10 PM    Chief Complaint:  Follow up for R diabetic foot infection/complicated UTI    Interval History:  69-year-old man with a history of Crohn's disease status post colostomy, type 2 diabetes mellitus complicated by diabetic foot infections, chronic kidney disease stage III and chronic indwelling Humphrey catheter admitted for suprapubic pain.  Patient was found to have second toe osteomyelitis of the foot and a complicated UTI.     6/3/2019-no fevers.  Evaluated by orthopedics and will be taken to surgery today.  WBC 6.8  6/4/2019-no fevers.  He says he feels much better.  He underwent right second toe flexor tendon release and debridement of the callus.  6/5/2019 no fevers.  No new issues overnight.  The wound looks good.  6/9 afebrile WBC 13.5.  ID reconsulted for recurrent urinary tract infection in the setting of acute worsening suprapubic pain.  Patient continues to endorse pain around his suprapubic catheter.  It was last changed on 5/31/2019 per report.  Urine cultures are now growing pseudomonas aeruginosa.  6/10 afebrile WBC 12.8 patient has ongoing suprapubic pain 8.5 out of 10 in severity decreased to 4 out of 10 in severity with pain medications.  He is also having spasms in his abdomen.  Plan for suprapubic catheter exchange today  6/11- AF, WBC 11.9, complains of 5/10 deep squeezing internal pain around suprapubic catheter that is chronic and somewhat improved with pain meds, no pain to right foot, tolerating antibiotics without issue.  6/12-AF, no WBC, sleeping but easily arousable, no issue with antibiotics, denies pain in right foot, continues to have chronic 5/10 deep squeezing internal pain around suprapubic catheter.    Labs Reviewed, Medications Reviewed and Wound Reviewed.    Review of Systems:  Review of Systems   Constitutional: Positive for malaise/fatigue. Negative for chills,  diaphoresis and fever.   HENT: Negative for sore throat.    Eyes: Negative for blurred vision.   Respiratory: Negative for cough, sputum production and shortness of breath.    Cardiovascular: Positive for leg swelling. Negative for chest pain.   Gastrointestinal: Positive for abdominal pain. Negative for constipation, diarrhea, nausea and vomiting.        Abdominal spasms   Genitourinary: Negative for dysuria, flank pain and hematuria.        Pain around and internally from suprapubic catheter   Musculoskeletal: Negative for back pain, joint pain and myalgias.   Skin: Negative for itching and rash.   Neurological: Negative for sensory change and headaches.   Psychiatric/Behavioral: The patient is not nervous/anxious.        Hemodynamics:  Temp (24hrs), Av.4 °C (97.5 °F), Min:35.8 °C (96.5 °F), Max:37 °C (98.6 °F)  Temperature: 37 °C (98.6 °F)  Pulse  Av.2  Min: 50  Max: 131  Blood Pressure : 108/71 (Refused BP at 07:40, came back at this time.)       Physical Exam:  Physical Exam   Constitutional: He is oriented to person, place, and time. He appears well-developed and well-nourished. No distress.   Obese  Appears chronically ill  Fatigued   HENT:   Head: Normocephalic and atraumatic.   Mouth/Throat: Oropharynx is clear and moist. No oropharyngeal exudate.   Eyes: Pupils are equal, round, and reactive to light. Conjunctivae and EOM are normal.   Neck: Normal range of motion. Neck supple. No JVD present.   Cardiovascular: Normal rate, regular rhythm, normal heart sounds and intact distal pulses.  Exam reveals no gallop and no friction rub.    Pulmonary/Chest: Effort normal. No respiratory distress. He has no wheezes. He has no rales.   Diminished breath sounds to BLL   Abdominal: Soft. He exhibits no distension. There is tenderness. There is no rebound and no guarding.   RLQ colostomy with liquid brown stool present and excoriated skin to surrounding tissue.   Genitourinary:   Genitourinary Comments:  Suprapubic catheter-mild erythema to surrounding tissue with dried drainage.  Urine clear yellow with scant sediment.   Musculoskeletal: He exhibits edema. He exhibits no tenderness.   Right second toe tenotomy site- sutures remain in place, no drainage, no erythema, nontender.  Ulcer at tip of toe healing, no active drainage, no erythema.    Right great toe amp site-well-healed and approximated without any breakdown.   Neurological: He is oriented to person, place, and time. No cranial nerve deficit.   Somnolent   Skin: Skin is warm and dry. There is erythema. No pallor.   Chronic BLE skin changes   Psychiatric: He has a normal mood and affect. His behavior is normal.   Nursing note and vitals reviewed.      Meds:    Current Facility-Administered Medications:   •  metoprolol  •  aspirin  •  nitroglycerin  •  Metoprolol Tartrate  •  atorvastatin  •  oxyCODONE immediate-release  •  tramadol  •  meropenem  •  oxybutynin  •  lidocaine-prilocaine  •  Notify provider if pain remains uncontrolled **AND** Use the numeric rating scale (NRS-11) on regular floors and Critical-Care Pain Observation Tool (CPOT) on ICUs/Trauma to assess pain **AND** Pulse Ox (Oximetry) **AND** Pharmacy Consult Request **AND** If patient difficult to arouse and/or has respiratory depression, stop any opiates that are currently infusing and call a Rapid Response. **AND** [DISCONTINUED] oxyCODONE immediate-release **AND** [DISCONTINUED] oxyCODONE immediate-release **AND** [DISCONTINUED] morphine injection  •  insulin regular **AND** Accu-Chek ACHS **AND** NOTIFY MD and PharmD **AND** glucose **AND** dextrose 10% bolus  •  budesonide-formoterol  •  tamsulosin  •  therapeutic multivitamin-minerals  •  senna-docusate **AND** polyethylene glycol/lytes **AND** magnesium hydroxide **AND** bisacodyl  •  NS  •  heparin  •  miconazole 2%-zinc oxide  •  nystatin    Labs:  Recent Labs      06/10/19   0049  06/11/19   0149   WBC  12.8*  11.9*   RBC  4.80  4.76    HEMOGLOBIN  13.6*  13.3*   HEMATOCRIT  42.4  41.7*   MCV  88.3  87.6   MCH  28.3  27.9   RDW  49.6  49.5   PLATELETCT  506*  549*   MPV  9.2  9.0   NEUTSPOLYS  56.50  53.70   LYMPHOCYTES  32.80  36.20   MONOCYTES  7.60  6.30   EOSINOPHILS  2.00  2.50   BASOPHILS  0.70  0.90     Recent Labs      06/10/19   0049  06/11/19   0149   SODIUM  134*  133*   POTASSIUM  4.1  4.5   CHLORIDE  105  103   CO2  18*  19*   GLUCOSE  133*  133*   BUN  47*  51*     Recent Labs      06/10/19   0049  06/11/19   0149   ALBUMIN   --   3.4   TBILIRUBIN   --   0.3   ALKPHOSPHAT   --   70   TOTPROTEIN   --   8.6*   ALTSGPT   --   13   ASTSGOT   --   13   CREATININE  2.72*  2.50*       Imaging:  No new imaging within the last 24 hours.      Micro:  Results     Procedure Component Value Units Date/Time    URINE CULTURE(NEW) [[id number]]  (Abnormal)  (Susceptibility) Collected:  06/10/19 1629    Order Status:  Completed Specimen:  Urine from Urine, Suprapubic Updated:  06/12/19 0835     Significant Indicator POS (POS)     Source UR     Site URINE, SUPRAPUBIC     Culture Result - (A)      Pseudomonas aeruginosa  10-50,000 cfu/mL  P.aeruginosa may develop resistance during prolonged therapy  with all antibiotics. Isolates that are initially susceptible  may become resistant within three to four days after  initiation of therapy. Testing of repeat isolates may be  warranted.   (A)    Narrative:       Collected By:[id number] KAILA TAMEZ  Indication for culture:->Unexplained new onset of Flank pain  and/or Costovertebral angle tenderness  Collected By:[id number] KAILA SALMERON.    Culture & Susceptibility     PSEUDOMONAS AERUGINOSA     Antibiotic Sensitivity Microscan Unit Status    Amikacin Sensitive <=16 mcg/mL Final    Method: CARLITO    Cefepime Intermediate 16 mcg/mL Final    Method: CARLITO    Ceftazidime Intermediate 16 mcg/mL Final    Method: CARLITO    Ciprofloxacin Resistant >2 mcg/mL Final    Method: CARLITO    Gentamicin Sensitive <=4 mcg/mL Final     Method: CARLITO    Imipenem Sensitive <=1 mcg/mL Final    Method: CARLITO    Levofloxacin Resistant >4 mcg/mL Final    Method: CARLITO    Meropenem Sensitive <=1 mcg/mL Final    Method: CARLITO    Pip/Tazobactam Sensitive 64 mcg/mL Final    Method: CARLITO    Piperacillin Sensitive 64 mcg/mL Final    Method: CARLITO    Tobramycin Sensitive <=4 mcg/mL Final    Method: CARLITO                       URINALYSIS [[id number]]     Order Status:  No result Specimen:  Urine     URINE CULTURE(NEW) [[id number]]  (Abnormal)  (Susceptibility) Collected:  06/07/19 0929    Order Status:  Completed Specimen:  Urine from Urine, Suprapubic Updated:  06/09/19 0846     Significant Indicator POS (POS)     Source UR     Site URINE, SUPRAPUBIC     Culture Result - (A)      Pseudomonas aeruginosa  >100,000 cfu/mL  P.aeruginosa may develop resistance during prolonged therapy  with all antibiotics. Isolates that are initially susceptible  may become resistant within three to four days after  initiation of therapy. Testing of repeat isolates may be  warranted.   (A)    Narrative:       Collected By:[id number] AVRIL MURPHY  Indication for culture:->Patient WITHOUT an indwelling Humphrey  catheter in place with new onset of Dysuria, Frequency,  Urgency, and/or Suprapubic pain  Collected By:[id number] AVRIL MURPHY    Culture & Susceptibility     PSEUDOMONAS AERUGINOSA     Antibiotic Sensitivity Microscan Unit Status    Amikacin Sensitive <=16 mcg/mL Final    Method: CARLITO    Cefepime Intermediate 16 mcg/mL Final    Method: CARLITO    Ceftazidime Sensitive 8 mcg/mL Final    Method: CARLITO    Ciprofloxacin Resistant >2 mcg/mL Final    Method: CARLITO    Gentamicin Sensitive <=4 mcg/mL Final    Method: CARLITO    Imipenem Sensitive <=1 mcg/mL Final    Method: CARLITO    Levofloxacin Intermediate 4 mcg/mL Final    Method: CARLITO    Meropenem Sensitive <=1 mcg/mL Final    Method: CARLITO    Pip/Tazobactam Sensitive 64 mcg/mL Final    Method: CARLITO    Piperacillin Sensitive 64 mcg/mL Final    Method: CARLITO     Tobramycin Sensitive <=4 mcg/mL Final    Method: CARLITO                       URINALYSIS [[id number]]  (Abnormal) Collected:  06/07/19 0929    Order Status:  Completed Specimen:  Urine from Urine, Suprapubic Updated:  06/07/19 0938     Color Yellow     Character Sl Cloudy (A)     Comment: Corrected result; previously reported as Hazy on 06/07/19 at 09:36        Specific Gravity 1.015     Ph 6.0     Glucose Negative mg/dL      Ketones Negative mg/dL      Protein >=300 (A) mg/dL      Bilirubin Negative     Urobilinogen, Urine 0.2     Nitrite Positive (A)     Leukocyte Esterase Small (A)     Occult Blood Moderate (A)     Micro Urine Req Microscopic    Narrative:       Collected By:[id number] AVRIL MURPHY  Indication for culture:->Patient WITHOUT an indwelling Humphrey  catheter in place with new onset of Dysuria, Frequency,  Urgency, and/or Suprapubic pain          Assessment:  Active Hospital Problems    Diagnosis   • *Urinary tract infection associated with cystostomy catheter ([facility]) [T83.510A, N39.0]   • Type 2 diabetes mellitus, with long-term current use of insulin ([facility]) [E11.9, Z79.4]   • Osteomyelitis of right foot ([facility]) [M86.9]   • CRF (chronic renal failure), stage 3 (moderate) ([facility]) [N18.3]       Plan:  Complicated urinary tract infection, recurrent  In the setting of a chronic supra pubic catheter  Afebrile  Persistent leukocytosis  Continues to have chronic pain around the suprapubic area  Urine culture on 6/7- pseudomonas aeruginosa (Zosyn CARLITO 64)  Suprapubic Humphrey changed on 6/10  Repeat UA on 6/10+ Pseudomonas-likely due to colonization  Continue IV meropenem 500 mg twice daily  Will treat for a total of 7 days from date of Humphrey change  End date 6/18/2019    Right second toe infection, status post treatment  Status post right second toe flexor tendon release and debridement on 6/3/2019 and received antibiotics for 24 hours after the surgery  Surgical site clean  Continue wound care  Ulcer to tip of toe  healing    Type 2 diabetes mellitus   Hemoglobin A1c 6.5%  On 5/31/19   Keep blood sugars under 150 to help control infection   this a.m.    Chronic kidney disease, stage III-stable  Renally dose antibiotics as needed  Avoid nephrotoxins  Continue to monitor      ID will sign off, please reconsult if needed.  Thank you

## 2019-06-12 NOTE — PROGRESS NOTES
Monitor Summary:  Aflutter 53-98  Nonsustained nonsymptomatic down to 29 and 40, Rpvc  --.10--

## 2019-06-13 VITALS
OXYGEN SATURATION: 96 % | RESPIRATION RATE: 18 BRPM | HEIGHT: 71 IN | WEIGHT: 227.51 LBS | HEART RATE: 72 BPM | TEMPERATURE: 97.8 F | DIASTOLIC BLOOD PRESSURE: 73 MMHG | SYSTOLIC BLOOD PRESSURE: 146 MMHG | BODY MASS INDEX: 31.85 KG/M2

## 2019-06-13 PROBLEM — N39.0 URINARY TRACT INFECTION ASSOCIATED WITH CYSTOSTOMY CATHETER (HCC): Status: RESOLVED | Noted: 2018-02-24 | Resolved: 2019-06-13

## 2019-06-13 PROBLEM — T83.510A URINARY TRACT INFECTION ASSOCIATED WITH CYSTOSTOMY CATHETER (HCC): Status: RESOLVED | Noted: 2018-02-24 | Resolved: 2019-06-13

## 2019-06-13 PROBLEM — R07.9 CHEST PAIN: Status: RESOLVED | Noted: 2019-06-10 | Resolved: 2019-06-13

## 2019-06-13 PROBLEM — G93.40 ACUTE ENCEPHALOPATHY: Status: RESOLVED | Noted: 2019-03-28 | Resolved: 2019-06-13

## 2019-06-13 PROBLEM — M86.9 OSTEOMYELITIS OF RIGHT FOOT (HCC): Status: RESOLVED | Noted: 2019-06-02 | Resolved: 2019-06-13

## 2019-06-13 LAB
GLUCOSE BLD-MCNC: 131 MG/DL (ref 65–99)
GLUCOSE BLD-MCNC: 138 MG/DL (ref 65–99)

## 2019-06-13 PROCEDURE — A9270 NON-COVERED ITEM OR SERVICE: HCPCS | Performed by: INTERNAL MEDICINE

## 2019-06-13 PROCEDURE — A9270 NON-COVERED ITEM OR SERVICE: HCPCS | Performed by: HOSPITALIST

## 2019-06-13 PROCEDURE — 82962 GLUCOSE BLOOD TEST: CPT

## 2019-06-13 PROCEDURE — 0JBQ0ZZ EXCISION OF RIGHT FOOT SUBCUTANEOUS TISSUE AND FASCIA, OPEN APPROACH: ICD-10-PCS | Performed by: ORTHOPAEDIC SURGERY

## 2019-06-13 PROCEDURE — 700111 HCHG RX REV CODE 636 W/ 250 OVERRIDE (IP): Performed by: INTERNAL MEDICINE

## 2019-06-13 PROCEDURE — 700102 HCHG RX REV CODE 250 W/ 637 OVERRIDE(OP): Performed by: HOSPITALIST

## 2019-06-13 PROCEDURE — 99239 HOSP IP/OBS DSCHRG MGMT >30: CPT | Performed by: HOSPITALIST

## 2019-06-13 PROCEDURE — 700105 HCHG RX REV CODE 258: Performed by: INTERNAL MEDICINE

## 2019-06-13 PROCEDURE — 700111 HCHG RX REV CODE 636 W/ 250 OVERRIDE (IP): Performed by: HOSPITALIST

## 2019-06-13 PROCEDURE — 700102 HCHG RX REV CODE 250 W/ 637 OVERRIDE(OP): Performed by: INTERNAL MEDICINE

## 2019-06-13 RX ORDER — TAMSULOSIN HYDROCHLORIDE 0.4 MG/1
0.4 CAPSULE ORAL
Qty: 30 CAP | Status: ON HOLD
Start: 2019-06-13 | End: 2019-07-22

## 2019-06-13 RX ORDER — BUDESONIDE AND FORMOTEROL FUMARATE DIHYDRATE 160; 4.5 UG/1; UG/1
2 AEROSOL RESPIRATORY (INHALATION) 2 TIMES DAILY
Status: ON HOLD
Start: 2019-06-13 | End: 2019-07-22

## 2019-06-13 RX ORDER — OXYBUTYNIN CHLORIDE 5 MG/1
5 TABLET ORAL EVERY 12 HOURS
Qty: 90 TAB
Start: 2019-06-13

## 2019-06-13 RX ORDER — LIDOCAINE AND PRILOCAINE 25; 25 MG/G; MG/G
CREAM TOPICAL
Qty: 30 G
Start: 2019-06-13 | End: 2019-07-16

## 2019-06-13 RX ORDER — M-VIT,TX,IRON,MINS/CALC/FOLIC 27MG-0.4MG
1 TABLET ORAL DAILY
Qty: 30 TAB | Refills: 11 | Status: SHIPPED | OUTPATIENT
Start: 2019-06-14

## 2019-06-13 RX ORDER — OXYCODONE HYDROCHLORIDE 5 MG/1
5 TABLET ORAL EVERY 8 HOURS PRN
Qty: 9 TAB | Refills: 0 | Status: SHIPPED | OUTPATIENT
Start: 2019-06-13 | End: 2019-06-16

## 2019-06-13 RX ORDER — ASPIRIN 81 MG/1
81 TABLET, CHEWABLE ORAL DAILY
Qty: 100 TAB
Start: 2019-06-14 | End: 2019-07-16

## 2019-06-13 RX ORDER — ATORVASTATIN CALCIUM 80 MG/1
80 TABLET, FILM COATED ORAL EVERY EVENING
Qty: 30 TAB | Status: ON HOLD
Start: 2019-06-13 | End: 2019-07-22

## 2019-06-13 RX ADMIN — ASPIRIN 81 MG 81 MG: 81 TABLET ORAL at 05:00

## 2019-06-13 RX ADMIN — MULTIPLE VITAMINS W/ MINERALS TAB 1 TABLET: TAB at 05:00

## 2019-06-13 RX ADMIN — TAMSULOSIN HYDROCHLORIDE 0.4 MG: 0.4 CAPSULE ORAL at 08:46

## 2019-06-13 RX ADMIN — OXYBUTYNIN CHLORIDE 5 MG: 5 TABLET ORAL at 05:00

## 2019-06-13 RX ADMIN — BUDESONIDE AND FORMOTEROL FUMARATE DIHYDRATE 2 PUFF: 160; 4.5 AEROSOL RESPIRATORY (INHALATION) at 05:07

## 2019-06-13 RX ADMIN — MICONAZOLE NITRATE: 20 CREAM TOPICAL at 05:07

## 2019-06-13 RX ADMIN — HEPARIN SODIUM 5000 UNITS: 5000 INJECTION, SOLUTION INTRAVENOUS; SUBCUTANEOUS at 05:06

## 2019-06-13 RX ADMIN — OXYCODONE HYDROCHLORIDE 5 MG: 5 TABLET ORAL at 14:37

## 2019-06-13 RX ADMIN — TRAMADOL HYDROCHLORIDE 50 MG: 50 TABLET, FILM COATED ORAL at 08:49

## 2019-06-13 RX ADMIN — MEROPENEM 500 MG: 500 INJECTION, POWDER, FOR SOLUTION INTRAVENOUS at 05:00

## 2019-06-13 RX ADMIN — METOPROLOL TARTRATE 12.5 MG: 25 TABLET ORAL at 05:00

## 2019-06-13 RX ADMIN — NYSTATIN: 100000 POWDER TOPICAL at 05:07

## 2019-06-13 NOTE — PROGRESS NOTES
" [facility]      HPI:  69 y.o. male, with a past medical history that includes uncontrolled type 2 diabetes,  colostomy, OA, BPH, renal insufficiency, COPD, admitted 5/29/2019 for UTI (urinary tract infection)  Sepsis (HCC).   LPS has been consulted for his right 2nd toe with necrotic tip by wound care.  This wound is chronic per the pt since 2017.    SURGERY DATE: 6/3/19 by Dr. Gutierrez  PROCEDURE: 1.  Right second toe flexor tendon release.  2.  Debridement of callus, sharp, with a knife.    6/5/2019: Patient denies fevers, chills, nausea, vomiting.  Pain controlled.   6/13: anxious to discharge. tubigrip bunched at mid shin.       /73   Pulse 72   Temp 36.6 °C (97.8 °F) (Temporal)   Resp 18   Ht 1.803 m (5' 11\")   Wt 103.2 kg (227 lb 8.2 oz)   SpO2 96%   BMI 31.73 kg/m²     SURGICAL SITE ASSESSMENT:    Pedal Pulses: +2 DP, +2 PT  Foot warm    Stab site with suture approximated, without erythema, edema, drainage    Right second toe distal ulcer:  Moderate callus, dried exudate to wound bed  No erythema, no edema  No drainage      Minimal callus to R 1st MTH  Mycotic thick toenails      PROCEDURE:   Using curette and forceps excised non viable tissue and closed skin edges until healthy bleeding tissue present. Total area debrided 0.35cm2 to subq tissue level.   Cleaned wound with NS, applied aquacel ag, dry gauze to cover wound and suture, hypafix tape        DIABETES MANAGEMENT:  Blood glucose: 138  A1c:   Lab Results   Component Value Date/Time    HBA1C 6.5 (H) 05/31/2019 12:21 AM        Diabetes education: seen pt on 6/7/19    INFECTION MANAGEMENT:  WBC: 11.9 6/11/19  Wound culture results:   Results     Procedure Component Value Units Date/Time    URINALYSIS [[id number]]  (Abnormal) Collected:  06/12/19 1430    Order Status:  Completed Specimen:  Urine from Urine, Suprapubic Updated:  06/12/19 0429     Color Yellow     Character Clear     Specific Gravity 1.010     Ph 5.0     Glucose " Negative mg/dL      Ketones Negative mg/dL      Protein 100 (A) mg/dL      Bilirubin Negative     Urobilinogen, Urine 0.2     Nitrite Negative     Leukocyte Esterase Small (A)     Occult Blood Small (A)     Micro Urine Req Microscopic    Narrative:       Collected By:[id number] KRISTY CHONG I    URINE CULTURE(NEW) [[id number]]  (Abnormal)  (Susceptibility) Collected:  06/10/19 1629    Order Status:  Completed Specimen:  Urine from Urine, Suprapubic Updated:  06/12/19 0835     Significant Indicator POS (POS)     Source UR     Site URINE, SUPRAPUBIC     Culture Result - (A)      Pseudomonas aeruginosa  10-50,000 cfu/mL  P.aeruginosa may develop resistance during prolonged therapy  with all antibiotics. Isolates that are initially susceptible  may become resistant within three to four days after  initiation of therapy. Testing of repeat isolates may be  warranted.   (A)    Narrative:       Collected By:[id number] KAILA TAMEZ  Indication for culture:->Unexplained new onset of Flank pain  and/or Costovertebral angle tenderness  Collected By:[id number] KAILA SALMERON.    Culture & Susceptibility     PSEUDOMONAS AERUGINOSA     Antibiotic Sensitivity Microscan Unit Status    Amikacin Sensitive <=16 mcg/mL Final    Method: CARLITO    Cefepime Intermediate 16 mcg/mL Final    Method: CARLITO    Ceftazidime Intermediate 16 mcg/mL Final    Method: CARLITO    Ciprofloxacin Resistant >2 mcg/mL Final    Method: CARLITO    Gentamicin Sensitive <=4 mcg/mL Final    Method: CARLITO    Imipenem Sensitive <=1 mcg/mL Final    Method: CARLITO    Levofloxacin Resistant >4 mcg/mL Final    Method: CARLITO    Meropenem Sensitive <=1 mcg/mL Final    Method: CARLITO    Pip/Tazobactam Sensitive 64 mcg/mL Final    Method: CARLITO    Piperacillin Sensitive 64 mcg/mL Final    Method: CARLITO    Tobramycin Sensitive <=4 mcg/mL Final    Method: CARLITO                       URINE CULTURE(NEW) [[id number]]  (Abnormal)  (Susceptibility) Collected:  06/07/19 0925    Order Status:  Completed Specimen:   Urine from Urine, Suprapubic Updated:  06/09/19 0846     Significant Indicator POS (POS)     Source UR     Site URINE, SUPRAPUBIC     Culture Result - (A)      Pseudomonas aeruginosa  >100,000 cfu/mL  P.aeruginosa may develop resistance during prolonged therapy  with all antibiotics. Isolates that are initially susceptible  may become resistant within three to four days after  initiation of therapy. Testing of repeat isolates may be  warranted.   (A)    Narrative:       Collected By:[id number] AVRIL MURPHY  Indication for culture:->Patient WITHOUT an indwelling Humphrey  catheter in place with new onset of Dysuria, Frequency,  Urgency, and/or Suprapubic pain  Collected By:[id number] AVRIL MURPHY    Culture & Susceptibility     PSEUDOMONAS AERUGINOSA     Antibiotic Sensitivity Microscan Unit Status    Amikacin Sensitive <=16 mcg/mL Final    Method: CARLITO    Cefepime Intermediate 16 mcg/mL Final    Method: CARLITO    Ceftazidime Sensitive 8 mcg/mL Final    Method: CARLITO    Ciprofloxacin Resistant >2 mcg/mL Final    Method: CARLITO    Gentamicin Sensitive <=4 mcg/mL Final    Method: CARLITO    Imipenem Sensitive <=1 mcg/mL Final    Method: CARLITO    Levofloxacin Intermediate 4 mcg/mL Final    Method: CARLITO    Meropenem Sensitive <=1 mcg/mL Final    Method: CARLITO    Pip/Tazobactam Sensitive 64 mcg/mL Final    Method: CARLITO    Piperacillin Sensitive 64 mcg/mL Final    Method: CARLITO    Tobramycin Sensitive <=4 mcg/mL Final    Method: CARLIOT                       URINALYSIS [[id number]]  (Abnormal) Collected:  06/07/19 0929    Order Status:  Completed Specimen:  Urine from Urine, Suprapubic Updated:  06/07/19 0938     Color Yellow     Character Sl Cloudy (A)     Comment: Corrected result; previously reported as Hazy on 06/07/19 at 09:36        Specific Gravity 1.015     Ph 6.0     Glucose Negative mg/dL      Ketones Negative mg/dL      Protein >=300 (A) mg/dL      Bilirubin Negative     Urobilinogen, Urine 0.2     Nitrite Positive (A)     Leukocyte  Esterase Small (A)     Occult Blood Moderate (A)     Micro Urine Req Microscopic    Narrative:       Collected By:[id number] AVRIL ABBI MURPHY  Indication for culture:->Patient WITHOUT an indwelling Humhprey  catheter in place with new onset of Dysuria, Frequency,  Urgency, and/or Suprapubic pain                 PLAN:  POD # 10 s/p  R 2nd toe ulcer debridement and flexor tendon release  Ulcer has decreased in size  Excisional debridement at bedside today    Wound care: Resume previous Wound Care orders.    Antibiotics: followed by ID on merrem for UTI     Weight Bearing Status: Heel weight bearing    Offloading: offloading shoe. Pt to wear when OOB    PT Consult: Yes , involved    Diabetes Education: involved    Plan to return to O.R.: No      DISCHARGE PLAN:    Disposition: SNF- return to [facility]    Follow-up: LPS rounds in [facility]. 6/21/19. suture to be removed approximately 2 weeks post-op at this appt and to assess wound progress          LYNDA Mckeon.P.R.N.    If any questions or concerns, please call [phone]

## 2019-06-13 NOTE — CARE PLAN
Problem: Safety  Goal: Will remain free from injury  Outcome: PROGRESSING AS EXPECTED    Intervention: Provide assistance with mobility  Pt educated to. Will reinforce education as pt does call 50% of time.    06/12/19 1900   Mobility   Assistance Standby Assist   Ambulation Tolerance Tolerates Well         Problem: Pain Management  Goal: Pain level will decrease to patient's comfort goal   06/12/19 2000   OTHER   Pain Rating Scale (NPRS) 8   Non Verbal Scale  Grimacing   Comfort Goal Comfort at Rest;Sleep Comfortably   Pt given meds per mar. Will monitor

## 2019-06-13 NOTE — DISCHARGE PLANNING
Anticipated Discharge Disposition: SNF    Action: Pt has been accepted back to [facility] and transport has been set for 1500. CM completed chart copy and COBRA and spoke with pts OSVALDO Andersen who is in agreement with transfer.     Barriers to Discharge:     Plan: Pt to [facility] at 1500 via [facility].

## 2019-06-13 NOTE — DISCHARGE SUMMARY
Discharge Summary    CHIEF COMPLAINT ON ADMISSION  Chief Complaint   Patient presents with   • UTI     possible urosepsis   • ALOC       Reason for Admission  EMS     CODE STATUS  Full Code    HPI & HOSPITAL COURSE      68 y/o M with Past medical history of Crohn's with ostomy, suprapubic cath, diabetes, COPD, CKD.  Patient has not had suprapubic cath changed for several months despite his POA asking him to have home health assess him.  He complains of painful urination and has a history of multiple drug-resistant urine infections.  He was admitted on meropenem empirically.  Urology was consulted and exchanged catheter.  His urine culture from [facility] returned with MRSA and Pseudomonas and his antibiotic was changed to Zosyn and Zyvox.  He was thought to have right toe osteomyelitis per LPS and Ortho were consulted and performed debridement with right second toe flexor tendon release 6/3/2019. ID was consulted and this was thought to  be a superficial right second toe infection. He continued to have have pain at his suprapubic cath and repeat urine grew pseudomonas. He was started on meropenem as it was resistant to zosyn. His suprapubic cath was exchanged 6/10 and he developed aflutter and transferred to telemetry. Cardiology was consulted and rate control and AC was recommended.  Upon discussion with patient's power of  it was decided not to start patient on any anticoagulation.  He does continue to have some degree of abdominal pain.  His repeat urine analysis on 6/12 was negative for bacteria.  His stop date for meropenem is 6/18.    In prescribing controlled substances to this patient, I certify that I have obtained and reviewed the medical history of Shola Hoyt. I have also made a good mimi effort to obtain applicable records from other providers who have treated the patient and no other records are available at this time.      I have conducted a physical exam and documented it. I  have reviewed Shola Hoyt prescription history as maintained by the [place] Prescription Monitoring Program.      I have assessed the patient’s risk for abuse, dependency, and addiction using the validated Opioid Risk Tool available at https://www.mdcalc.com/tiewzh-zmxm-ylpq-ort-narcotic-abuse.      Given the above, I believe the benefits of controlled substance therapy outweigh the risks. The reasons for prescribing controlled substances include non-narcotic, oral analgesic alternatives have been inadequate for pain control. Accordingly, I have discussed the risk and benefits, treatment plan, and alternative therapies with the patient.     Therefore, he is discharged in fair and stable condition to skilled nursing facility.    The patient met 2-midnight criteria for an inpatient stay at the time of discharge.      FOLLOW UP ITEMS POST DISCHARGE  Continue antibiotics until 6/18  Will need resumption of his Humira once infection has resolved at discretion of his specialist   LPS rounds in [facility]. 6/21/19. suture to be removed approximately 2 weeks post-op at this appt and to assess wound progress    DISCHARGE DIAGNOSES  Principal Problem (Resolved):    Urinary tract infection associated with cystostomy catheter (HCC) POA: Yes  Active Problems:    Type 2 diabetes mellitus, with long-term current use of insulin (HCC) POA: Yes    Crohn's disease (HCC) POA: Yes    BPH (benign prostatic hyperplasia) POA: Yes    CRF (chronic renal failure), stage 3 (moderate) (HCC) POA: Yes    Atrial flutter (HCC) POA: Unknown    Noncompliance POA: Unknown    Prolonged Q-T interval on ECG POA: Unknown  Resolved Problems:    Acute encephalopathy POA: Yes    COPD (chronic obstructive pulmonary disease) (HCC) POA: Yes    Hyponatremia POA: Yes    Sepsis (HCC) POA: Yes    Osteomyelitis of right foot (HCC) POA: Unknown    Chest pain POA: Unknown      FOLLOW UP  Future Appointments  Date Time Provider Department Center   6/21/2019  8:00 AM Deepak Grant M.D. [facility] 2nd [facility]  [address] [place] [address]  [phone]          MEDICATIONS ON DISCHARGE     Medication List      START taking these medications      Instructions   aspirin 81 MG Chew chewable tablet  Start taking on:  6/14/2019  Commonly known as:  ASA   Take 1 Tab by mouth every day.  Dose:  81 mg     atorvastatin 80 MG tablet  Commonly known as:  LIPITOR   Take 1 Tab by mouth every evening.  Dose:  80 mg     glucose 4 g chewable tablet   Take 4 Tabs by mouth as needed for Low Blood Sugar (If FSBG is less than or equal to 70 mg/dL and patient able to eat or drink).  Dose:  16 g     insulin regular 100 Unit/mL Soln  Commonly known as:  HUMULIN R   Inject 1-6 Units as instructed 4 Times a Day,Before Meals and at Bedtime.  Dose:  1-6 Units     lidocaine-prilocaine 2.5-2.5 % Crea  Commonly known as:  EMLA   Apply around suprapubic catheter     metoprolol 25 MG Tabs  Commonly known as:  LOPRESSOR   Take 0.5 Tabs by mouth 2 Times a Day.  Dose:  12.5 mg     NS SOLN 100 mL with meropenem 500 MG SOLR 500 mg   500 mg by Intravenous route every 12 hours for 5 days.  Dose:  500 mg     oxybutynin 5 MG Tabs  Commonly known as:  DITROPAN   Take 1 Tab by mouth every 12 hours.  Dose:  5 mg     oxyCODONE immediate-release 5 MG Tabs  Commonly known as:  ROXICODONE   Take 1 Tab by mouth every 8 hours as needed for Severe Pain for up to 3 days.  Dose:  5 mg        CONTINUE taking these medications      Instructions   budesonide-formoterol 160-4.5 MCG/ACT Aero  Commonly known as:  SYMBICORT   Inhale 2 Puffs by mouth 2 Times a Day.  Dose:  2 Puff     tamsulosin 0.4 MG capsule  Commonly known as:  FLOMAX   Take 1 Cap by mouth ONE-HALF HOUR AFTER BREAKFAST.  Dose:  0.4 mg     therapeutic multivitamin-minerals Tabs  Start taking on:  6/14/2019   Take 1 Tab by mouth every day.  Dose:  1 Tab        STOP taking these medications    amoxicillin 500 MG  Caps  Commonly known as:  AMOXIL     Buprenorphine 15 MCG/HR Ptwk     HUMIRA 40 MG/0.4ML Pskt  Generic drug:  Adalimumab     insulin 70/30 (70-30) 100 UNIT/ML Susp  Commonly known as:  HUMULIN,NOVOLIN     lisinopril 5 MG Tabs  Commonly known as:  PRINIVIL     nitrofurantoin monohyd macro 100 MG Caps  Commonly known as:  MACROBID     traZODone 100 MG Tabs  Commonly known as:  DESYREL            Allergies  Allergies   Allergen Reactions   • Ceftriaxone      Has tolerated Merrem, Zosyn, and Unasyn   • Ezetimibe    • Flagyl [Metronidazole]    • Infliximab    • Levaquin    • Lovastatin Shortness of Breath   • Simvastatin Shortness of Breath   • Vancomycin        DIET  Orders Placed This Encounter   Procedures   • Diet Order Diabetic     Standing Status:   Standing     Number of Occurrences:   1     Order Specific Question:   Diet:     Answer:   Diabetic [3]       ACTIVITY  As tolerated.  Weight bearing as tolerated    LINES, DRAINS, AND WOUNDS  This is an automated list. Peripheral IVs will be removed prior to discharge.  Peripheral IV 06/07/19 22 G Left Wrist (Active)   Site Assessment Clean;Dry;Intact 6/12/2019 11:00 PM   Dressing Type Transparent 6/12/2019 11:00 PM   Line Status Saline locked 6/12/2019 11:00 PM   Dressing Status Clean;Dry;Intact 6/12/2019 11:00 PM   Dressing Intervention Dressing changed per protocol 6/12/2019 11:00 PM   Date Primary Tubing Changed 06/12/19 6/11/2019  8:00 PM   Date Secondary Tubing Changed 06/10/19 6/10/2019  7:36 AM   NEXT Primary Tubing Change  06/11/19 6/10/2019  7:36 AM   NEXT Secondary Tubing Change  06/11/19 6/10/2019  7:36 AM   Infiltration Grading ([facility]) 0 6/12/2019  8:00 AM   Phlebitis Scale ([facility] Only) 0 6/12/2019  8:00 AM     Ileostomy Standard (nasrin Clement) RLQ (Active)   Stomal Appliance 1 piece 6/10/2019  5:40 PM   Site Assessment Clean;Pink;Red 6/10/2019  5:40 PM   Peristomal Assessment Clean;Intact;Pink 6/10/2019  5:40 PM   Treatment Placement checked  6/11/2019  5:22 AM   Output (mL) 400 mL 6/13/2019 12:00 AM       Fecal Ostomy Group RUQ Colostomy (Active)       Suprapubic Catheter Locking loop 14 Fr. (Active)   Site Assessment Painful;Red;Dry 6/10/2019  5:40 PM   Dressing Status Clean;Dry;Intact 6/10/2019  5:40 PM   Dressing Type Dry dressing 6/10/2019  5:40 PM   Collection Container Standard drainage bag 6/10/2019  5:40 PM   Output (mL) 1000 mL 6/13/2019  5:00 AM      Wound 04/03/19 Pressure Injury Sacrum pressure ulcer (Active)       Wound 04/09/19 Full Thickness Wound 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 (Active)   Wound Image   6/12/2019  3:00 PM   Site Assessment Painful;Red 6/12/2019  7:00 PM   Nisa-wound Assessment Pink;Edema 6/12/2019  7:00 PM   Margins Epibole (rolled edges) 4/11/2019 11:00 PM   Closure Secondary intention 4/11/2019 11:00 PM   Drainage Amount CESARIO 4/13/2019  9:00 AM   Drainage Description Yellow;Tan 4/11/2019 11:00 PM   Non-staged Wound Description Full thickness 4/13/2019  9:00 AM   Treatments Cleansed;Site care 4/11/2019 11:00 PM   Cleansing Approved Wound Cleanser 4/11/2019 11:00 PM   Periwound Protectant Antifungal Therapy 4/11/2019 11:00 PM   Dressing Options Open to Air 6/12/2019  7:00 PM   Dressing Changed Changed 4/11/2019 11:00 PM   Dressing Status Clean;Dry;Intact 4/11/2019 11:00 PM   Dressing Change Frequency As Needed 4/11/2019 11:00 PM   NEXT Dressing Change  04/12/19 4/11/2019 11:00 PM   WOUND NURSE ONLY - Odor None 4/9/2019 11:00 AM       Wound 05/29/19 Other (comment) Abdomen skin breakdown surrounding stoma (Active)   Wound Image   6/12/2019  3:00 PM   Site Assessment CESARIO 6/12/2019  7:00 PM   Nisa-wound Assessment Excoriated;Maceration 6/12/2019  7:00 PM   Margins Attached edges 6/12/2019  7:00 PM   Tunneling 0 cm 6/5/2019  2:00 PM   Undermining 0 cm 6/5/2019  2:00 PM   Closure Secondary intention 6/6/2019  8:00 AM   Drainage Amount Small 6/10/2019  3:00 PM   Drainage Description Serosanguineous 6/10/2019  3:00 PM    Non-staged Wound Description Partial thickness 6/6/2019  8:00 AM   Treatments Cleansed 6/10/2019  3:00 PM   Cleansing Not Applicable 6/10/2019  3:00 PM   Periwound Protectant Skin Protectant wipes to Periwound 6/10/2019  3:00 PM   Dressing Options Dry Gauze 6/11/2019  8:00 AM   Dressing Cleansing/Solutions Normal Saline 6/11/2019  8:00 AM   Dressing Changed New 6/10/2019  3:00 PM   Dressing Status Clean;Dry;Intact 6/11/2019  8:00 PM   Dressing Change Frequency As Needed 6/11/2019  8:00 PM   NEXT Weekly Photo (Inpatient Only) 06/12/19 6/10/2019  7:26 AM   WOUND NURSE ONLY - Time Spent with Patient (mins) 30 5/31/2019  2:00 PM       Wound 05/29/19 Other (comment) Groin;Perineum Moisture related (Active)   Site Assessment Red;Excoriated;Painful 6/12/2019  7:00 PM   Nisa-wound Assessment Red 6/12/2019  7:00 PM   Drainage Amount None 6/12/2019  7:00 PM   Treatments Site care 6/12/2019  7:00 PM   Cleansing Not Applicable 6/11/2019  8:00 PM   Periwound Protectant Antifungal Therapy;Moisture Barrier 6/12/2019  7:00 PM   Dressing Options Open to Air 6/12/2019  7:00 PM   NEXT Weekly Photo (Inpatient Only) 06/12/19 6/10/2019  7:26 AM       Wound 05/29/19 Other (comment) Buttocks Moisture related;  (Active)   Wound Image   6/10/2019  3:00 PM   Site Assessment Red;Excoriated;Fragile;Painful 6/12/2019  7:00 PM   Nisa-wound Assessment Excoriated 6/12/2019  7:00 PM   Drainage Amount None 6/12/2019  7:00 PM   Treatments Site care 6/11/2019  8:00 PM   Cleansing Approved Wound Cleanser 6/10/2019  3:00 PM   Periwound Protectant Hydrocolloid to Periwound 6/10/2019  3:00 PM   Dressing Options Mepilex 6/11/2019  8:00 PM   Dressing Cleansing/Solutions Normal Saline 6/11/2019  8:00 PM   Dressing Changed New 6/10/2019  3:00 PM   Dressing Status Clean;Dry;Intact 6/10/2019  3:00 PM   Dressing Change Frequency Every 48 hrs 6/10/2019  3:00 PM   NEXT Dressing Change  06/12/19 6/10/2019  3:00 PM   NEXT Weekly Photo (Inpatient Only) 06/12/19  6/10/2019  7:26 AM       Wound 05/29/19 Other (comment) Leg (Active)   Site Assessment Pale;Pink 6/10/2019  3:00 PM   Nisa-wound Assessment Edema 6/11/2019  8:00 AM   Margins Undefined edges 6/10/2019  7:26 AM   Tunneling 0 cm 5/31/2019  5:00 PM   Undermining 0 cm 5/31/2019  5:00 PM   Closure Secondary intention 6/6/2019  9:00 PM   Drainage Amount None 6/11/2019  8:00 AM   Non-staged Wound Description Partial thickness 6/6/2019  9:00 PM   Treatments Moisturizing cream 6/10/2019  3:00 PM   Cleansing Approved Wound Cleanser 6/10/2019  3:00 PM   Periwound Protectant Skin Moisturizer 6/11/2019  8:00 AM   Dressing Options Tubigrip 6/10/2019  3:00 PM   Dressing Cleansing/Solutions Not Applicable 6/6/2019  9:00 PM   Dressing Changed Changed 6/4/2019  8:25 PM   Dressing Status Clean;Dry;Intact 6/6/2019  9:00 PM   Dressing Change Frequency Every Shift 6/6/2019  9:00 PM   NEXT Dressing Change  06/05/19 6/4/2019  8:25 PM   NEXT Weekly Photo (Inpatient Only) 06/12/19 6/10/2019  7:26 AM   WOUND NURSE ONLY - Odor None 5/31/2019  5:00 PM   WOUND NURSE ONLY - Exposed Structures None 5/31/2019  5:00 PM   WOUND NURSE ONLY - Tissue Type and Percentage 100% red 5/31/2019  5:00 PM       Wound 05/29/19 Other (comment) Sacrum Moisutre Fissure (Active)   Wound Image   6/12/2019  3:00 PM   Site Assessment Excoriated;Red 6/10/2019  7:26 AM   Nisa-wound Assessment Red 6/12/2019  7:00 PM   Margins Defined edges 6/8/2019  7:00 PM   Wound Length (cm) 1.2 cm 6/8/2019  7:00 PM   Wound Width (cm) 0.4 cm 6/8/2019  7:00 PM   Wound Depth (cm) 0.2 cm 6/8/2019  7:00 PM   Wound Surface Area (cm^2) 0.48 cm^2 6/8/2019  7:00 PM   Closure Secondary intention 6/8/2019  7:00 PM   Drainage Amount Small 6/10/2019  7:26 AM   Drainage Description Sanguineous 6/10/2019  7:26 AM   Non-staged Wound Description Partial thickness 6/8/2019  7:00 PM   Treatments Site care;Cleansed 6/8/2019  7:00 PM   Cleansing Approved Wound Cleanser 6/8/2019  7:00 PM   Periwound  Protectant Antifungal Therapy 6/10/2019  7:26 AM   Dressing Options Hydrocolloid Thick 6/10/2019  7:26 AM   Dressing Change Frequency Every 48 hrs 6/10/2019  7:26 AM   NEXT Dressing Change  06/10/19 6/10/2019  7:26 AM   NEXT Weekly Photo (Inpatient Only) 06/12/19 6/10/2019  7:26 AM   WOUND NURSE ONLY - Odor None 6/8/2019  7:00 PM   WOUND NURSE ONLY - Exposed Structures None 6/8/2019  7:00 PM   WOUND NURSE ONLY - Tissue Type and Percentage red 6/8/2019  7:00 PM       Wound 05/31/19 Diabetic Ulcer Toe (Comment which one) right second toe distal (Active)   Wound Image   6/12/2019  3:00 PM   Site Assessment Black;Pink 6/10/2019  3:00 PM   Nisa-wound Assessment Blanchable erythema;Edema 6/10/2019  3:00 PM   Margins Defined edges 6/10/2019  3:00 PM   Wound Length (cm) 0.5 cm 6/8/2019  7:00 PM   Wound Width (cm) 0.5 cm 6/8/2019  7:00 PM   Wound Surface Area (cm^2) 0.25 cm^2 6/8/2019  7:00 PM   Post Wound Length (cm) 0.5 cm 6/1/2019  3:00 PM    Post Wound Width (cm) 0.5 cm 6/1/2019  3:00 PM   Post Wound Depth (cm) 0.5 cm 6/1/2019  3:00 PM   Post Wound Surface Area (cm^2) 0.25 cm^2 6/1/2019  3:00 PM   Tunneling 0 cm 5/31/2019  5:00 PM   Undermining 0 cm 5/31/2019  5:00 PM   Closure Secondary intention 6/8/2019  7:00 PM   Drainage Amount Scant 6/10/2019  3:00 PM   Drainage Description Sanguineous 6/10/2019  3:00 PM   Non-staged Wound Description Full thickness 6/8/2019  7:00 PM   Treatments Site care 6/10/2019  3:00 PM   Cleansing Approved Wound Cleanser 6/10/2019  3:00 PM   Periwound Protectant Skin Protectant wipes to Periwound 6/8/2019  7:00 PM   Dressing Options Nonadhesive Foam;Hypafix Tape 6/10/2019  3:00 PM   Dressing Cleansing/Solutions Normal Saline 6/10/2019  3:00 PM   Dressing Changed New 6/10/2019  3:00 PM   Dressing Status Clean;Dry;Intact 6/10/2019  3:00 PM   Dressing Change Frequency Every 48 hrs 6/10/2019  3:00 PM   NEXT Dressing Change  06/12/19 6/10/2019  3:00 PM   NEXT Weekly Photo (Inpatient Only) 06/12/19  6/10/2019  7:26 AM   WOUND NURSE ONLY - Odor None 6/8/2019  7:00 PM   WOUND NURSE ONLY - Pulses Right;DP;1+ 6/1/2019  3:00 PM   WOUND NURSE ONLY - Exposed Structures None 6/8/2019  7:00 PM   WOUND NURSE ONLY - Tissue Type and Percentage red 6/8/2019  7:00 PM   WOUND NURSE ONLY - Time Spent with Patient (mins) 60 6/8/2019  7:00 PM       Peripheral IV 06/07/19 22 G Left Wrist (Active)   Site Assessment Clean;Dry;Intact 6/12/2019 11:00 PM   Dressing Type Transparent 6/12/2019 11:00 PM   Line Status Saline locked 6/12/2019 11:00 PM   Dressing Status Clean;Dry;Intact 6/12/2019 11:00 PM   Dressing Intervention Dressing changed per protocol 6/12/2019 11:00 PM   Date Primary Tubing Changed 06/12/19 6/11/2019  8:00 PM   Date Secondary Tubing Changed 06/10/19 6/10/2019  7:36 AM   NEXT Primary Tubing Change  06/11/19 6/10/2019  7:36 AM   NEXT Secondary Tubing Change  06/11/19 6/10/2019  7:36 AM   [facility] 0 6/12/2019  8:00 AM   Phlebitis Scale ([facility] Only) 0 6/12/2019  8:00 AM               MENTAL STATUS ON TRANSFER  Level of Consciousness: Alert  Orientation : Disoriented to Place, Disoriented to Time  Speech: Speech Clear    CONSULTATIONS  Orthopedics   Infectious disease  Urology  Cardiology    PROCEDURES  Right second toe flexor tendon release and debridement of callus  Changing of suprapubic catheter x2    LABORATORY  Lab Results   Component Value Date    SODIUM 133 (L) 06/11/2019    POTASSIUM 4.5 06/11/2019    CHLORIDE 103 06/11/2019    CO2 19 (L) 06/11/2019    GLUCOSE 133 (H) 06/11/2019    BUN 51 (H) 06/11/2019    CREATININE 2.50 (H) 06/11/2019        Lab Results   Component Value Date    WBC 11.9 (H) 06/11/2019    HEMOGLOBIN 13.3 (L) 06/11/2019    HEMATOCRIT 41.7 (L) 06/11/2019    PLATELETCT 549 (H) 06/11/2019        Total time of the discharge process exceeds 34 minutes.

## 2019-06-13 NOTE — DISCHARGE INSTRUCTIONS
Discharge Instructions    Discharged to other by medical transportation with escort. Discharged via wheelchair, hospital escort: Refused.  Special equipment needed: Not Applicable    Be sure to schedule a follow-up appointment with your primary care doctor or any specialists as instructed.     Discharge Plan:   Diet Plan: Discussed  Activity Level: Discussed  Confirmed Follow up Appointment: Appointment Scheduled  Confirmed Symptoms Management: Discussed  Medication Reconciliation Updated: Yes  Pneumococcal Vaccine Administered/Refused: Given (See MAR)  Influenza Vaccine Indication: Not indicated: Previously immunized this influenza season and > 8 years of age    I understand that a diet low in cholesterol, fat, and sodium is recommended for good health. Unless I have been given specific instructions below for another diet, I accept this instruction as my diet prescription.   Other diet:     Special Instructions: None    · Is patient discharged on Warfarin / Coumadin?   No             Urinary Tract Infection, Adult  Introduction  A urinary tract infection (UTI) is an infection of any part of the urinary tract. The urinary tract includes the:  · Kidneys.  · Ureters.  · Bladder.  · Urethra.  These organs make, store, and get rid of pee (urine) in the body.  Follow these instructions at home:  · Take over-the-counter and prescription medicines only as told by your doctor.  · If you were prescribed an antibiotic medicine, take it as told by your doctor. Do not stop taking the antibiotic even if you start to feel better.  · Avoid the following drinks:  ¨ Alcohol.  ¨ Caffeine.  ¨ Tea.  ¨ Carbonated drinks.  · Drink enough fluid to keep your pee clear or pale yellow.  · Keep all follow-up visits as told by your doctor. This is important.  · Make sure to:  ¨ Empty your bladder often and completely. Do not to hold pee for long periods of time.  ¨ Empty your bladder before and after sex.  ¨ Wipe from front to back after a  bowel movement if you are female. Use each tissue one time when you wipe.  Contact a doctor if:  · You have back pain.  · You have a fever.  · You feel sick to your stomach (nauseous).  · You throw up (vomit).  · Your symptoms do not get better after 3 days.  · Your symptoms go away and then come back.  Get help right away if:  · You have very bad back pain.  · You have very bad lower belly (abdominal) pain.  · You are throwing up and cannot keep down any medicines or water.  This information is not intended to replace advice given to you by your health care provider. Make sure you discuss any questions you have with your health care provider.  Document Released: 06/05/2009 Document Revised: 05/25/2017 Document Reviewed: 11/07/2016  © 2017 Elsevier          Atrial Flutter  Atrial flutter is a type of abnormal heart rhythm (arrhythmia). In atrial flutter, the heartbeat is fast but regular. There are two types of atrial flutter:  · Paroxysmal atrial flutter. This type starts suddenly. It usually stops on its own soon after it starts.  · Permanent atrial flutter. This type does not go away.  What are the causes?  This condition may be caused by:  · A heart condition or problem, such as:  ¨ A heart attack.  ¨ Heart failure.  ¨ A heart valve problem.  · A lung problem, such as:  ¨ A blood clot in the lungs (pulmonary embolism, or PE).  ¨ Chronic obstructive pulmonary disease.  · Poorly controlled high blood pressure (hypertension).  · Hyperthyroidism.  · Caffeine.  · Some decongestant cold medicines.  · Low levels of minerals called electrolytes in the blood.  · Cocaine.  What increases the risk?  This condition is more likely to develop in:  · Elderly adults.  · Men.  What are the signs or symptoms?  Symptoms of this condition include:  · A feeling that your heart is pounding or racing (palpitations).  · Shortness of breath.  · Chest pain.  · Feeling light-headed.  · Dizziness.  · Fainting.  How is this diagnosed?  This  condition may be diagnosed with tests, including:  · An electrocardiogram (ECG). This is a painless test that records electrical signals in the heart.  · Holter monitoring. For this test, you wear a device that records your heartbeat for 1-2 days.  · Cardiac event monitoring. For this test, you wear a device that records your heartbeat for up to 30 days.  · An echocardiogram. This is a painless test that uses sound waves to make a picture of your heart.  · Stress test. This test records your heartbeat while you exercise.  · Blood tests.  How is this treated?  This condition may be treated with:  · Treatment of any underlying conditions.  · Medicine to make your heart beat more slowly.  · Medicine to keep the condition from coming back.  · A procedure to keep the condition under control. Some procedures to do this include:  ¨ Cardioversion. During this procedure, medicines or an electrical shock are given to make the heart beat normally.  ¨ Ablation. During this procedure, the heart tissue that is causing the problem is destroyed. This procedure may be done if atrial flutter lasts a long time or happens often.  Follow these instructions at home:  · Take over-the-counter and prescription medicines only as told by your health care provider.  · Do not take any new medicines without talking to your health care provider.  · Do not use tobacco products, including cigarettes, chewing tobacco, or e-cigarettes. If you need help quitting, ask your health care provider.  · Limit alcohol intake to no more than 1 drink per day for nonpregnant women and 2 drinks per day for men. One drink equals 12 oz of beer, 5 oz of wine, or 1½ oz of hard liquor.  · Try to reduce any stress. Stress can make your symptoms worse.  Contact a health care provider if:  · Your symptoms get worse.  Get help right away if:  · You are dizzy.  · You feel like fainting or you faint.  · You have shortness of breath.  · You feel pain or pressure in your  chest.  · You suddenly feel nauseous or you suddenly vomit.  · There is a sudden change in your ability to speak, eat, or move.  · You are sweating a lot for no reason.  This information is not intended to replace advice given to you by your health care provider. Make sure you discuss any questions you have with your health care provider.  Document Released: 05/06/2010 Document Revised: 04/26/2017 Document Reviewed: 07/01/2016  [employer] Patient Education © 2017 Elsevier Inc.      Bone and Joint Infections, Adult  Introduction  Bone infections (osteomyelitis) and joint infections (septic arthritis) occur when bacteria or other germs get inside a bone or a joint. This can happen if you have an infection in another part of your body that spreads through your blood. Germs from your skin or from outside of your body can also cause this type of infection if you have a wound or a broken bone (fracture) that breaks the skin.  Anyone can get a bone infection or joint infection. You may be more likely to get this type of infection if you have a condition, such as diabetes, that lowers your ability to fight infection or increases your chances of getting an infection. Bone and joint infections can cause damage, and they can spread to other areas of your body. They need to be treated quickly.  What are the causes?  Most bone and joint infections are caused by bacteria. They can also be caused by other germs, such as viruses and funguses.  What increases the risk?  This condition is more likely to develop in:  · People who recently had surgery, especially bone or joint surgery.  · People who have a long-term (chronic) disease, such as:  ¨ HIV (human immunodeficiency virus).  ¨ Diabetes.  ¨ Rheumatoid arthritis.  ¨ Sickle cell anemia.  · Elderly people.  · People who take medicines that block or weaken the body’s defense system (immune system).  · People who have a condition that reduces their blood flow.  · People who  are on kidney dialysis.  · People who have an artificial joint.  · People who have had a joint or bone repaired with plates or screws (surgical hardware).  · People who use or abuse IV drugs.  · People who have had trauma, such as stepping on a nail.  What are the signs or symptoms?  Symptoms vary depending on the type and location of your infection. Common symptoms of bone and joint infections include:  · Fever and chills.  · Redness and warmth.  · Swelling.  · Pain and stiffness.  · Drainage of fluid or pus near the infection.  · Weight loss and fatigue.  · Decreased ability to use a hand or foot.  How is this diagnosed?  This condition may be diagnosed based on symptoms, medical history, a physical exam, and diagnostic tests. Tests can help to identify the cause of the infection. You may have various tests, such as:  · A sample of tissue, fluid, or blood taken to be examined under a microscope.  · A procedure to remove fluid from the infected joint with a needle (joint aspiration) for testing in a lab.  · Pus or discharge swabbed from a wound for testing to identify germs and to determine what type of medicine will kill them (culture and sensitivity).  · Blood tests to look for evidence of infection and inflammation (biomarkers).  · Imaging studies to determine how severe the bone or joint infection is. These may include:  ¨ X-rays.  ¨ CT scan.  ¨ MRI.  ¨ Bone scan.  How is this treated?  Treatment depends on the cause and type of infection. Antibiotic medicines are usually the first treatment for a bone or joint infection. Treatment with antibiotics may include:  · Getting IV antibiotics. This may be done in a hospital at first. You may have to continue IV antibiotics at home for several weeks. You may also have to take antibiotics by mouth for several weeks after that.  · Taking more than one kind of antibiotic. Treatment may start with a type of antibiotic that works against many different bacteria (broad  spectrum antibiotics). IV antibiotics may be changed if tests show that another type may work better.  Other treatments may include:  · Draining fluid from the joint by placing a needle into it (aspiration).  · Surgery to remove:  ¨ Dead or dying tissue from a bone or joint.  ¨ An infected artificial joint.  ¨ Infected plates or screws that were used to repair a broken bone.  Follow these instructions at home:  · Take medicines only as directed by your health care provider.  · Take your antibiotic medicine as directed by your health care provider. Finish the antibiotic even if you start to feel better.  · Follow instructions from your health care provider about how to take IV antibiotics at home.  · Ask your health care provider if you have any restrictions on your activities.  · Keep all follow-up visits as directed by your health care provider. This is important.  Contact a health care provider if:  · You have a fever or chills.  · You have redness, warmth, pain, or swelling that returns after treatment.  Get help right away if:  · You have rapid breathing or you have trouble breathing.  · You have chest pain.  · You cannot drink fluids or make urine.  · The affected arm or leg swells, changes color, or turns blue.  This information is not intended to replace advice given to you by your health care provider. Make sure you discuss any questions you have with your health care provider.  Document Released: 12/18/2006 Document Revised: 05/25/2017 Document Reviewed: 12/16/2015  © 2017 [employer]          Depression / Suicide Risk    As you are discharged from this [facility] facility, it is important to learn how to keep safe from harming yourself.    Recognize the warning signs:  · Abrupt changes in personality, positive or negative- including increase in energy   · Giving away possessions  · Change in eating patterns- significant weight changes-  positive or negative  · Change in sleeping patterns- unable to sleep  or sleeping all the time   · Unwillingness or inability to communicate  · Depression  · Unusual sadness, discouragement and loneliness  · Talk of wanting to die  · Neglect of personal appearance   · Rebelliousness- reckless behavior  · Withdrawal from people/activities they love  · Confusion- inability to concentrate     If you or a loved one observes any of these behaviors or has concerns about self-harm, here's what you can do:  · Talk about it- your feelings and reasons for harming yourself  · Remove any means that you might use to hurt yourself (examples: pills, rope, extension cords, firearm)  · Get professional help from the community (Mental Health, Substance Abuse, psychological counseling)  · Do not be alone:Call your Safe Contact- someone whom you trust who will be there for you.  · Call your local CRISIS HOTLINE 713-2751 or 863-596-1985  · Call your local Children's Mobile Crisis Response Team [place] (847) 637-2478 or www.Tipbit  · Call the toll free National Suicide Prevention Hotlines   · National Suicide Prevention Lifeline 382-860-POAP (0083)  · National Hope Line Network 800-SUICIDE (719-1775)

## 2019-06-13 NOTE — PROGRESS NOTES
Bedside report received from nurse. Assumed care of pt. Pt resting comfortably in bed.  All needs met. POC reviewed and white board updated. . Call light in reach. Bed locked in lowest position with 2 upper bed rails up.

## 2019-06-13 NOTE — PROGRESS NOTES
Assumed care of patient this am via bedside shift report from night shift RN. Patient sitting in chair upon arrival. Call bell and personal items within reach, chair exit alarm armed. Hourly rounding in place.

## 2019-06-18 ENCOUNTER — HOSPITAL ENCOUNTER (OUTPATIENT)
Dept: RADIOLOGY | Facility: MEDICAL CENTER | Age: 69
End: 2019-06-18
Attending: FAMILY MEDICINE
Payer: MEDICARE

## 2019-06-18 DIAGNOSIS — L02.519 CELLULITIS AND ABSCESS OF HAND, EXCEPT FINGERS AND THUMB: ICD-10-CM

## 2019-06-18 DIAGNOSIS — L03.119 CELLULITIS AND ABSCESS OF HAND, EXCEPT FINGERS AND THUMB: ICD-10-CM

## 2019-06-18 PROCEDURE — 36573 INSJ PICC RS&I 5 YR+: CPT

## 2019-06-18 NOTE — PROCEDURES
PICC Insertion Final 3CG    Consents confirmed, vessel patency confirmed with ultrasound. Risks and benefits of procedure explained to patient/family and education regarding central line associated bloodstream infections provided. Questions answered.     PICC placed in LUE per MD order with ultrasound guidance. 4 Fr, single lumen PICC placed in basilic vein after one attempt(s). 6 cc's of 1% lidocaine injected intradermally, 21 gauge microintroducer needle and modified Seldinger technique used. 55 cm catheter inserted with good blood return. Secured at 1 cm marker. Each lumen flushed without resistance with 10 mL 0.9% normal saline. PICC line secured with Biopatch and Tegaderm.    PICC placement is confirmed by nurse using 3CG technology. PICC line is appropriate for use at this time. Pt tolerated procedure well.  Patient condition relayed to unit RN or ordering physician via this post procedure note in the EMR.     Gramovox Power PICC ref # [id number], Lot # [id number]

## 2019-06-18 NOTE — PROGRESS NOTES
"  PICC Line Instructions    How to Care for your PICC  • Do not take a bath, swim, or use hot tubs when you have a PICC. Cover PICC line with clear plastic wrap and tape to keep it dry while showering.  • Check the PICC insertion site daily for leakage, redness, swelling, or pain.  • Flush the PICC as directed by your health care provider. Let your health care provider know right away if the PICC is difficult to flush or does not flush. Do not use force to flush the PICC.  • Do not use a syringe that is less than 10 mL to flush the PICC.  • Avoid blood pressure checks on the arm with the PICC.  • Do not take the PICC out yourself. Only a trained clinical professional should remove the PICC.  • Make sure you or anyone who access your PICC Line washes their hands before using the line.  • Make sure the hub of the line is \"scrubbed\" prior to using the line.    Dressing Changes  • Change the PICC dressing as instructed by your health care provider.  • Change your PICC dressing if it becomes loose or wet.    When to seek medical attention  • PICC is accidentally pulled all the way out.  • There is any type of drainage, redness, or swelling where the PICC enters the skin.  • Noticeable increase in arm circumference due to swelling of arm.  • You cannot flush the PICC, it is difficult to flush, or the PICC leaks around the insertion site when it is flushed.  • You hear a \"flushing\" sound when the PICC is flushed.  • You notice a hole or tear in the PICC.  • You develop chills or a fever.    "

## 2019-06-19 NOTE — DOCUMENTATION QUERY
[facility]                                                                       Query Response Note      PATIENT:               HANS MAYERS  ACCT #:                  [id number]  MRN:                     [id number]  :                      1950  ADMIT DATE:       2019 4:23 AM  DISCH DATE:        2019 3:34 PM  RESPONDING  PROVIDER #:        [id number]           QUERY TEXT:    There is conflicting documentation in the medical record.      Please clarify which condition after study the patient was found to have:        The patient's Clinical Indicators include:  Patient was thought to have right toe osteomyelitis, patient underwent flexor tendon release and debridement.    ID was consulted and this was thought to be a superficial right second toe infection.    Query created by: Racquel Arredodno on 2019 6:13 AM    RESPONSE TEXT:    Superficial right toe infection is documented in progress notes  and        QUERY TEXT:    There is conflicting documentation in the medical record.      Based on treatment, clinical findings and risk factors, can this documentation be further clarified?    ..        The patient's Clinical Indicators include:  Patient underwent flexor tendon release and debridement of right second toe.     Pressure ulcer due to hammertoe derformity is documented as well as diabetic foot ulcer.    Query created by: Racquel Arredondo on 2019 6:13 AM    RESPONSE TEXT:    Diabetic foot ulcer is documented in progress note is documented in  progress note          Electronically signed by:  NARCISA VEGA MD 2019 1:03 PM

## 2019-06-25 ENCOUNTER — DOCUMENTATION (OUTPATIENT)
Dept: INFECTIOUS DISEASES | Facility: MEDICAL CENTER | Age: 69
End: 2019-06-25

## 2019-06-25 ENCOUNTER — TELEPHONE (OUTPATIENT)
Dept: INFECTIOUS DISEASES | Facility: MEDICAL CENTER | Age: 69
End: 2019-06-25

## 2019-06-25 NOTE — TELEPHONE ENCOUNTER
Unable to call and LM for pt to return my call to schedule a follow up appointment. Pt has no phone number and has been released from SNF. Emergency Contact was attempted LM for them. CODY

## 2019-06-26 ENCOUNTER — TELEPHONE (OUTPATIENT)
Dept: INFECTIOUS DISEASES | Facility: MEDICAL CENTER | Age: 69
End: 2019-06-26

## 2019-06-26 NOTE — TELEPHONE ENCOUNTER
Received call from pt's emergency contact regarding missed appt on 06/25/19. Per contact, pt was d/c'd from SNF for refusing abx therapy and does not have a transportation to [place]. Unable to schedule FV.  -AMP

## 2019-07-16 ENCOUNTER — HOSPITAL ENCOUNTER (INPATIENT)
Facility: MEDICAL CENTER | Age: 69
LOS: 8 days | DRG: 699 | End: 2019-07-24
Attending: EMERGENCY MEDICINE | Admitting: HOSPITALIST
Payer: COMMERCIAL

## 2019-07-16 DIAGNOSIS — T83.010S SUPRAPUBIC CATHETER DYSFUNCTION, SEQUELA: ICD-10-CM

## 2019-07-16 DIAGNOSIS — T83.090A MECHANICAL COMPLICATION OF SUPRAPUBIC CATHETER, INITIAL ENCOUNTER (HCC): ICD-10-CM

## 2019-07-16 DIAGNOSIS — R33.9 URINARY RETENTION: ICD-10-CM

## 2019-07-16 DIAGNOSIS — E87.1 HYPONATREMIA: ICD-10-CM

## 2019-07-16 PROBLEM — D64.9 ANEMIA: Status: ACTIVE | Noted: 2019-07-16

## 2019-07-16 PROBLEM — T83.010A SUPRAPUBIC CATHETER DYSFUNCTION (HCC): Status: ACTIVE | Noted: 2019-07-16

## 2019-07-16 LAB
ANION GAP SERPL CALC-SCNC: 4 MMOL/L (ref 0–11.9)
ANION GAP SERPL CALC-SCNC: 8 MMOL/L (ref 0–11.9)
BASOPHILS # BLD AUTO: 0.2 % (ref 0–1.8)
BASOPHILS # BLD: 0.03 K/UL (ref 0–0.12)
BUN SERPL-MCNC: 17 MG/DL (ref 8–22)
BUN SERPL-MCNC: 95 MG/DL (ref 8–22)
CALCIUM SERPL-MCNC: 8.2 MG/DL (ref 8.5–10.5)
CALCIUM SERPL-MCNC: 8.4 MG/DL (ref 8.5–10.5)
CHLORIDE SERPL-SCNC: 106 MMOL/L (ref 96–112)
CHLORIDE SERPL-SCNC: 93 MMOL/L (ref 96–112)
CO2 SERPL-SCNC: 18 MMOL/L (ref 20–33)
CO2 SERPL-SCNC: 27 MMOL/L (ref 20–33)
CREAT SERPL-MCNC: 1.51 MG/DL (ref 0.5–1.4)
CREAT SERPL-MCNC: 2.4 MG/DL (ref 0.5–1.4)
EOSINOPHIL # BLD AUTO: 0.1 K/UL (ref 0–0.51)
EOSINOPHIL NFR BLD: 0.8 % (ref 0–6.9)
ERYTHROCYTE [DISTWIDTH] IN BLOOD BY AUTOMATED COUNT: 42.5 FL (ref 35.9–50)
GLUCOSE SERPL-MCNC: 106 MG/DL (ref 65–99)
GLUCOSE SERPL-MCNC: 74 MG/DL (ref 65–99)
HCT VFR BLD AUTO: 35.7 % (ref 42–52)
HGB BLD-MCNC: 12.6 G/DL (ref 14–18)
IMM GRANULOCYTES # BLD AUTO: 0.06 K/UL (ref 0–0.11)
IMM GRANULOCYTES NFR BLD AUTO: 0.5 % (ref 0–0.9)
LYMPHOCYTES # BLD AUTO: 2.8 K/UL (ref 1–4.8)
LYMPHOCYTES NFR BLD: 22.2 % (ref 22–41)
MCH RBC QN AUTO: 28.3 PG (ref 27–33)
MCHC RBC AUTO-ENTMCNC: 35.3 G/DL (ref 33.7–35.3)
MCV RBC AUTO: 80.2 FL (ref 81.4–97.8)
MONOCYTES # BLD AUTO: 1.03 K/UL (ref 0–0.85)
MONOCYTES NFR BLD AUTO: 8.2 % (ref 0–13.4)
NEUTROPHILS # BLD AUTO: 8.59 K/UL (ref 1.82–7.42)
NEUTROPHILS NFR BLD: 68.1 % (ref 44–72)
NRBC # BLD AUTO: 0 K/UL
NRBC BLD-RTO: 0 /100 WBC
PLATELET # BLD AUTO: 459 K/UL (ref 164–446)
PMV BLD AUTO: 9.1 FL (ref 9–12.9)
POTASSIUM SERPL-SCNC: 3.6 MMOL/L (ref 3.6–5.5)
POTASSIUM SERPL-SCNC: 6.5 MMOL/L (ref 3.6–5.5)
RBC # BLD AUTO: 4.45 M/UL (ref 4.7–6.1)
SODIUM SERPL-SCNC: 119 MMOL/L (ref 135–145)
SODIUM SERPL-SCNC: 137 MMOL/L (ref 135–145)
WBC # BLD AUTO: 12.6 K/UL (ref 4.8–10.8)

## 2019-07-16 PROCEDURE — 36415 COLL VENOUS BLD VENIPUNCTURE: CPT

## 2019-07-16 PROCEDURE — A9270 NON-COVERED ITEM OR SERVICE: HCPCS | Performed by: HOSPITALIST

## 2019-07-16 PROCEDURE — 303105 HCHG CATHETER EXTRA

## 2019-07-16 PROCEDURE — 80048 BASIC METABOLIC PNL TOTAL CA: CPT | Mod: 91

## 2019-07-16 PROCEDURE — G0378 HOSPITAL OBSERVATION PER HR: HCPCS

## 2019-07-16 PROCEDURE — 700111 HCHG RX REV CODE 636 W/ 250 OVERRIDE (IP): Performed by: EMERGENCY MEDICINE

## 2019-07-16 PROCEDURE — 99223 1ST HOSP IP/OBS HIGH 75: CPT | Performed by: HOSPITALIST

## 2019-07-16 PROCEDURE — 51702 INSERT TEMP BLADDER CATH: CPT

## 2019-07-16 PROCEDURE — 99291 CRITICAL CARE FIRST HOUR: CPT

## 2019-07-16 PROCEDURE — 99358 PROLONG SERVICE W/O CONTACT: CPT | Performed by: HOSPITALIST

## 2019-07-16 PROCEDURE — 770022 HCHG ROOM/CARE - ICU (200)

## 2019-07-16 PROCEDURE — 85025 COMPLETE CBC W/AUTO DIFF WBC: CPT

## 2019-07-16 PROCEDURE — 700105 HCHG RX REV CODE 258: Performed by: HOSPITALIST

## 2019-07-16 PROCEDURE — 94760 N-INVAS EAR/PLS OXIMETRY 1: CPT

## 2019-07-16 PROCEDURE — 96375 TX/PRO/DX INJ NEW DRUG ADDON: CPT

## 2019-07-16 PROCEDURE — 96374 THER/PROPH/DIAG INJ IV PUSH: CPT

## 2019-07-16 PROCEDURE — 302140 GU CART: Performed by: EMERGENCY MEDICINE

## 2019-07-16 PROCEDURE — 700102 HCHG RX REV CODE 250 W/ 637 OVERRIDE(OP): Performed by: HOSPITALIST

## 2019-07-16 PROCEDURE — 700111 HCHG RX REV CODE 636 W/ 250 OVERRIDE (IP): Performed by: HOSPITALIST

## 2019-07-16 RX ORDER — BUDESONIDE AND FORMOTEROL FUMARATE DIHYDRATE 160; 4.5 UG/1; UG/1
2 AEROSOL RESPIRATORY (INHALATION) 2 TIMES DAILY
Status: DISCONTINUED | OUTPATIENT
Start: 2019-07-16 | End: 2019-07-24 | Stop reason: HOSPADM

## 2019-07-16 RX ORDER — OXYCODONE HYDROCHLORIDE 10 MG/1
10 TABLET ORAL
Status: DISCONTINUED | OUTPATIENT
Start: 2019-07-16 | End: 2019-07-24 | Stop reason: HOSPADM

## 2019-07-16 RX ORDER — MORPHINE SULFATE 4 MG/ML
4 INJECTION, SOLUTION INTRAMUSCULAR; INTRAVENOUS
Status: DISCONTINUED | OUTPATIENT
Start: 2019-07-16 | End: 2019-07-24 | Stop reason: HOSPADM

## 2019-07-16 RX ORDER — BISACODYL 10 MG
10 SUPPOSITORY, RECTAL RECTAL
Status: DISCONTINUED | OUTPATIENT
Start: 2019-07-16 | End: 2019-07-24 | Stop reason: HOSPADM

## 2019-07-16 RX ORDER — POLYETHYLENE GLYCOL 3350 17 G/17G
1 POWDER, FOR SOLUTION ORAL
Status: DISCONTINUED | OUTPATIENT
Start: 2019-07-16 | End: 2019-07-24 | Stop reason: HOSPADM

## 2019-07-16 RX ORDER — TAMSULOSIN HYDROCHLORIDE 0.4 MG/1
0.4 CAPSULE ORAL
Status: DISCONTINUED | OUTPATIENT
Start: 2019-07-17 | End: 2019-07-24 | Stop reason: HOSPADM

## 2019-07-16 RX ORDER — CALCIUM CHLORIDE 100 MG/ML
1 INJECTION INTRAVENOUS; INTRAVENTRICULAR ONCE
Status: DISCONTINUED | OUTPATIENT
Start: 2019-07-16 | End: 2019-07-16

## 2019-07-16 RX ORDER — ATORVASTATIN CALCIUM 40 MG/1
80 TABLET, FILM COATED ORAL EVERY EVENING
Status: DISCONTINUED | OUTPATIENT
Start: 2019-07-16 | End: 2019-07-24 | Stop reason: HOSPADM

## 2019-07-16 RX ORDER — MORPHINE SULFATE 4 MG/ML
4 INJECTION, SOLUTION INTRAMUSCULAR; INTRAVENOUS ONCE
Status: COMPLETED | OUTPATIENT
Start: 2019-07-16 | End: 2019-07-16

## 2019-07-16 RX ORDER — OXYBUTYNIN CHLORIDE 5 MG/1
5 TABLET ORAL EVERY 12 HOURS
Status: DISCONTINUED | OUTPATIENT
Start: 2019-07-16 | End: 2019-07-24 | Stop reason: HOSPADM

## 2019-07-16 RX ORDER — OXYCODONE HYDROCHLORIDE 5 MG/1
5 TABLET ORAL
Status: DISCONTINUED | OUTPATIENT
Start: 2019-07-16 | End: 2019-07-24 | Stop reason: HOSPADM

## 2019-07-16 RX ORDER — HEPARIN SODIUM 5000 [USP'U]/ML
5000 INJECTION, SOLUTION INTRAVENOUS; SUBCUTANEOUS EVERY 8 HOURS
Status: DISCONTINUED | OUTPATIENT
Start: 2019-07-17 | End: 2019-07-24 | Stop reason: HOSPADM

## 2019-07-16 RX ORDER — SODIUM CHLORIDE 9 MG/ML
INJECTION, SOLUTION INTRAVENOUS CONTINUOUS
Status: DISCONTINUED | OUTPATIENT
Start: 2019-07-16 | End: 2019-07-16

## 2019-07-16 RX ORDER — ONDANSETRON 2 MG/ML
4 INJECTION INTRAMUSCULAR; INTRAVENOUS ONCE
Status: COMPLETED | OUTPATIENT
Start: 2019-07-16 | End: 2019-07-16

## 2019-07-16 RX ORDER — AMOXICILLIN 250 MG
2 CAPSULE ORAL 2 TIMES DAILY
Status: DISCONTINUED | OUTPATIENT
Start: 2019-07-17 | End: 2019-07-24 | Stop reason: HOSPADM

## 2019-07-16 RX ADMIN — MORPHINE SULFATE 4 MG: 4 INJECTION INTRAVENOUS at 21:44

## 2019-07-16 RX ADMIN — MORPHINE SULFATE 4 MG: 4 INJECTION INTRAVENOUS at 22:09

## 2019-07-16 RX ADMIN — ONDANSETRON 4 MG: 2 INJECTION INTRAMUSCULAR; INTRAVENOUS at 21:44

## 2019-07-16 ASSESSMENT — ENCOUNTER SYMPTOMS
SENSORY CHANGE: 0
FOCAL WEAKNESS: 0
SPEECH CHANGE: 0
PALPITATIONS: 0
NAUSEA: 0
DEPRESSION: 0
VOMITING: 0
FLANK PAIN: 0
HEMOPTYSIS: 0
DIZZINESS: 0
EYE DISCHARGE: 0
MYALGIAS: 0
SHORTNESS OF BREATH: 0
DOUBLE VISION: 0
COUGH: 0
HALLUCINATIONS: 0
BRUISES/BLEEDS EASILY: 0
HEARTBURN: 0
ABDOMINAL PAIN: 1
WEAKNESS: 1
BLURRED VISION: 0
CHILLS: 0
FEVER: 0

## 2019-07-16 ASSESSMENT — LIFESTYLE VARIABLES
DO YOU DRINK ALCOHOL: NO
SUBSTANCE_ABUSE: 0

## 2019-07-17 ENCOUNTER — APPOINTMENT (OUTPATIENT)
Dept: RADIOLOGY | Facility: MEDICAL CENTER | Age: 69
DRG: 699 | End: 2019-07-17
Attending: HOSPITALIST
Payer: COMMERCIAL

## 2019-07-17 ENCOUNTER — HOSPITAL ENCOUNTER (OUTPATIENT)
Dept: RADIOLOGY | Facility: MEDICAL CENTER | Age: 69
End: 2019-07-17

## 2019-07-17 LAB
ANION GAP SERPL CALC-SCNC: 7 MMOL/L (ref 0–11.9)
ANION GAP SERPL CALC-SCNC: 8 MMOL/L (ref 0–11.9)
ANION GAP SERPL CALC-SCNC: 9 MMOL/L (ref 0–11.9)
BASOPHILS # BLD AUTO: 0.4 % (ref 0–1.8)
BASOPHILS # BLD: 0.04 K/UL (ref 0–0.12)
BUN SERPL-MCNC: 17 MG/DL (ref 8–22)
BUN SERPL-MCNC: 17 MG/DL (ref 8–22)
BUN SERPL-MCNC: 18 MG/DL (ref 8–22)
BUN SERPL-MCNC: 19 MG/DL (ref 8–22)
BUN SERPL-MCNC: 21 MG/DL (ref 8–22)
CALCIUM SERPL-MCNC: 8.9 MG/DL (ref 8.5–10.5)
CALCIUM SERPL-MCNC: 9 MG/DL (ref 8.5–10.5)
CALCIUM SERPL-MCNC: 9.2 MG/DL (ref 8.5–10.5)
CALCIUM SERPL-MCNC: 9.2 MG/DL (ref 8.5–10.5)
CALCIUM SERPL-MCNC: 9.3 MG/DL (ref 8.5–10.5)
CHLORIDE SERPL-SCNC: 91 MMOL/L (ref 96–112)
CHLORIDE SERPL-SCNC: 91 MMOL/L (ref 96–112)
CHLORIDE SERPL-SCNC: 92 MMOL/L (ref 96–112)
CHLORIDE SERPL-SCNC: 93 MMOL/L (ref 96–112)
CHLORIDE SERPL-SCNC: 96 MMOL/L (ref 96–112)
CO2 SERPL-SCNC: 20 MMOL/L (ref 20–33)
CO2 SERPL-SCNC: 20 MMOL/L (ref 20–33)
CO2 SERPL-SCNC: 21 MMOL/L (ref 20–33)
CO2 SERPL-SCNC: 23 MMOL/L (ref 20–33)
CO2 SERPL-SCNC: 23 MMOL/L (ref 20–33)
CREAT SERPL-MCNC: 1.62 MG/DL (ref 0.5–1.4)
CREAT SERPL-MCNC: 1.72 MG/DL (ref 0.5–1.4)
CREAT SERPL-MCNC: 1.74 MG/DL (ref 0.5–1.4)
CREAT SERPL-MCNC: 1.74 MG/DL (ref 0.5–1.4)
CREAT SERPL-MCNC: 1.87 MG/DL (ref 0.5–1.4)
CREAT SERPL-MCNC: 1.94 MG/DL (ref 0.5–1.4)
CREAT SERPL-MCNC: 2.22 MG/DL (ref 0.5–1.4)
CREAT UR-MCNC: 19.4 MG/DL
EOSINOPHIL # BLD AUTO: 0.04 K/UL (ref 0–0.51)
EOSINOPHIL NFR BLD: 0.4 % (ref 0–6.9)
ERYTHROCYTE [DISTWIDTH] IN BLOOD BY AUTOMATED COUNT: 44.4 FL (ref 35.9–50)
FERRITIN SERPL-MCNC: 59.8 NG/ML (ref 22–322)
FOLATE SERPL-MCNC: 12.9 NG/ML
GLUCOSE BLD-MCNC: 104 MG/DL (ref 65–99)
GLUCOSE BLD-MCNC: 112 MG/DL (ref 65–99)
GLUCOSE BLD-MCNC: 78 MG/DL (ref 65–99)
GLUCOSE BLD-MCNC: 91 MG/DL (ref 65–99)
GLUCOSE BLD-MCNC: 93 MG/DL (ref 65–99)
GLUCOSE SERPL-MCNC: 124 MG/DL (ref 65–99)
GLUCOSE SERPL-MCNC: 138 MG/DL (ref 65–99)
GLUCOSE SERPL-MCNC: 147 MG/DL (ref 65–99)
GLUCOSE SERPL-MCNC: 75 MG/DL (ref 65–99)
GLUCOSE SERPL-MCNC: 78 MG/DL (ref 65–99)
GLUCOSE SERPL-MCNC: 78 MG/DL (ref 65–99)
GLUCOSE SERPL-MCNC: 98 MG/DL (ref 65–99)
HCT VFR BLD AUTO: 36.8 % (ref 42–52)
HGB BLD-MCNC: 12.5 G/DL (ref 14–18)
HGB RETIC QN AUTO: 31.4 PG/CELL (ref 29–35)
IMM GRANULOCYTES # BLD AUTO: 0.04 K/UL (ref 0–0.11)
IMM GRANULOCYTES NFR BLD AUTO: 0.4 % (ref 0–0.9)
IMM RETICS NFR: 11.7 % (ref 9.3–17.4)
INR PPP: 1.14 (ref 0.87–1.13)
IRON SATN MFR SERPL: 8 % (ref 15–55)
IRON SERPL-MCNC: 26 UG/DL (ref 50–180)
LYMPHOCYTES # BLD AUTO: 1.1 K/UL (ref 1–4.8)
LYMPHOCYTES NFR BLD: 11.5 % (ref 22–41)
MAGNESIUM SERPL-MCNC: 2 MG/DL (ref 1.5–2.5)
MCH RBC QN AUTO: 28 PG (ref 27–33)
MCHC RBC AUTO-ENTMCNC: 34 G/DL (ref 33.7–35.3)
MCV RBC AUTO: 82.3 FL (ref 81.4–97.8)
MONOCYTES # BLD AUTO: 0.74 K/UL (ref 0–0.85)
MONOCYTES NFR BLD AUTO: 7.7 % (ref 0–13.4)
NEUTROPHILS # BLD AUTO: 7.59 K/UL (ref 1.82–7.42)
NEUTROPHILS NFR BLD: 79.6 % (ref 44–72)
NRBC # BLD AUTO: 0 K/UL
NRBC BLD-RTO: 0 /100 WBC
OSMOLALITY SERPL: 260 MOSM/KG H2O (ref 278–298)
OSMOLALITY UR: 72 MOSM/KG H2O (ref 300–900)
PLATELET # BLD AUTO: 374 K/UL (ref 164–446)
PMV BLD AUTO: 8.6 FL (ref 9–12.9)
POTASSIUM SERPL-SCNC: 3.6 MMOL/L (ref 3.6–5.5)
POTASSIUM SERPL-SCNC: 3.7 MMOL/L (ref 3.6–5.5)
POTASSIUM SERPL-SCNC: 3.8 MMOL/L (ref 3.6–5.5)
POTASSIUM SERPL-SCNC: 3.8 MMOL/L (ref 3.6–5.5)
POTASSIUM SERPL-SCNC: 3.9 MMOL/L (ref 3.6–5.5)
POTASSIUM SERPL-SCNC: 4 MMOL/L (ref 3.6–5.5)
POTASSIUM SERPL-SCNC: 4.4 MMOL/L (ref 3.6–5.5)
PROTHROMBIN TIME: 14.9 SEC (ref 12–14.6)
RBC # BLD AUTO: 4.47 M/UL (ref 4.7–6.1)
RETICS # AUTO: 0.08 M/UL (ref 0.04–0.06)
RETICS/RBC NFR: 1.7 % (ref 0.8–2.1)
SODIUM SERPL-SCNC: 120 MMOL/L (ref 135–145)
SODIUM SERPL-SCNC: 121 MMOL/L (ref 135–145)
SODIUM SERPL-SCNC: 123 MMOL/L (ref 135–145)
SODIUM SERPL-SCNC: 123 MMOL/L (ref 135–145)
SODIUM SERPL-SCNC: 125 MMOL/L (ref 135–145)
SODIUM UR-SCNC: 14 MMOL/L
TIBC SERPL-MCNC: 343 UG/DL (ref 250–450)
VIT B12 SERPL-MCNC: 642 PG/ML (ref 211–911)
WBC # BLD AUTO: 9.6 K/UL (ref 4.8–10.8)

## 2019-07-17 PROCEDURE — 82607 VITAMIN B-12: CPT

## 2019-07-17 PROCEDURE — 82962 GLUCOSE BLOOD TEST: CPT

## 2019-07-17 PROCEDURE — 770022 HCHG ROOM/CARE - ICU (200)

## 2019-07-17 PROCEDURE — BT101ZZ FLUOROSCOPY OF BLADDER USING LOW OSMOLAR CONTRAST: ICD-10-PCS | Performed by: RADIOLOGY

## 2019-07-17 PROCEDURE — 700111 HCHG RX REV CODE 636 W/ 250 OVERRIDE (IP)

## 2019-07-17 PROCEDURE — 99222 1ST HOSP IP/OBS MODERATE 55: CPT | Performed by: INTERNAL MEDICINE

## 2019-07-17 PROCEDURE — 0T9B30Z DRAINAGE OF BLADDER WITH DRAINAGE DEVICE, PERCUTANEOUS APPROACH: ICD-10-PCS | Performed by: RADIOLOGY

## 2019-07-17 PROCEDURE — 700111 HCHG RX REV CODE 636 W/ 250 OVERRIDE (IP): Mod: JG | Performed by: INTERNAL MEDICINE

## 2019-07-17 PROCEDURE — 700105 HCHG RX REV CODE 258: Performed by: RADIOLOGY

## 2019-07-17 PROCEDURE — 700105 HCHG RX REV CODE 258: Performed by: HOSPITALIST

## 2019-07-17 PROCEDURE — 700111 HCHG RX REV CODE 636 W/ 250 OVERRIDE (IP): Performed by: RADIOLOGY

## 2019-07-17 PROCEDURE — 700102 HCHG RX REV CODE 250 W/ 637 OVERRIDE(OP): Performed by: INTERNAL MEDICINE

## 2019-07-17 PROCEDURE — 302094 BELT OSTOMY (HOLLISTER): Performed by: HOSPITALIST

## 2019-07-17 PROCEDURE — 80048 BASIC METABOLIC PNL TOTAL CA: CPT

## 2019-07-17 PROCEDURE — 302098 PASTE RING (FLAT): Performed by: HOSPITALIST

## 2019-07-17 PROCEDURE — 700101 HCHG RX REV CODE 250

## 2019-07-17 PROCEDURE — 700102 HCHG RX REV CODE 250 W/ 637 OVERRIDE(OP)

## 2019-07-17 PROCEDURE — 700102 HCHG RX REV CODE 250 W/ 637 OVERRIDE(OP): Performed by: HOSPITALIST

## 2019-07-17 PROCEDURE — 82570 ASSAY OF URINE CREATININE: CPT

## 2019-07-17 PROCEDURE — 700111 HCHG RX REV CODE 636 W/ 250 OVERRIDE (IP): Performed by: HOSPITALIST

## 2019-07-17 PROCEDURE — A9270 NON-COVERED ITEM OR SERVICE: HCPCS | Performed by: HOSPITALIST

## 2019-07-17 PROCEDURE — 85046 RETICYTE/HGB CONCENTRATE: CPT

## 2019-07-17 PROCEDURE — 700105 HCHG RX REV CODE 258: Performed by: INTERNAL MEDICINE

## 2019-07-17 PROCEDURE — A9270 NON-COVERED ITEM OR SERVICE: HCPCS | Performed by: INTERNAL MEDICINE

## 2019-07-17 PROCEDURE — 99233 SBSQ HOSP IP/OBS HIGH 50: CPT | Performed by: HOSPITALIST

## 2019-07-17 PROCEDURE — 83735 ASSAY OF MAGNESIUM: CPT

## 2019-07-17 PROCEDURE — 85025 COMPLETE CBC W/AUTO DIFF WBC: CPT

## 2019-07-17 PROCEDURE — 82746 ASSAY OF FOLIC ACID SERUM: CPT

## 2019-07-17 PROCEDURE — 99291 CRITICAL CARE FIRST HOUR: CPT | Performed by: INTERNAL MEDICINE

## 2019-07-17 PROCEDURE — 05HY33Z INSERTION OF INFUSION DEVICE INTO UPPER VEIN, PERCUTANEOUS APPROACH: ICD-10-PCS | Performed by: HOSPITALIST

## 2019-07-17 PROCEDURE — 302106 OSTOMY POWDER: Performed by: HOSPITALIST

## 2019-07-17 PROCEDURE — 83935 ASSAY OF URINE OSMOLALITY: CPT

## 2019-07-17 PROCEDURE — 51102 DRAIN BL W/CATH INSERTION: CPT

## 2019-07-17 PROCEDURE — 82728 ASSAY OF FERRITIN: CPT

## 2019-07-17 PROCEDURE — 83550 IRON BINDING TEST: CPT

## 2019-07-17 PROCEDURE — C1751 CATH, INF, PER/CENT/MIDLINE: HCPCS

## 2019-07-17 PROCEDURE — 84300 ASSAY OF URINE SODIUM: CPT

## 2019-07-17 PROCEDURE — 85610 PROTHROMBIN TIME: CPT

## 2019-07-17 PROCEDURE — 83540 ASSAY OF IRON: CPT

## 2019-07-17 PROCEDURE — 83930 ASSAY OF BLOOD OSMOLALITY: CPT

## 2019-07-17 PROCEDURE — A9270 NON-COVERED ITEM OR SERVICE: HCPCS

## 2019-07-17 RX ORDER — ACETAMINOPHEN 325 MG/1
650 TABLET ORAL ONCE
Status: COMPLETED | OUTPATIENT
Start: 2019-07-17 | End: 2019-07-17

## 2019-07-17 RX ORDER — ATORVASTATIN CALCIUM 40 MG/1
TABLET, FILM COATED ORAL
Status: COMPLETED
Start: 2019-07-17 | End: 2019-07-17

## 2019-07-17 RX ORDER — MIDAZOLAM HYDROCHLORIDE 1 MG/ML
INJECTION INTRAMUSCULAR; INTRAVENOUS
Status: COMPLETED
Start: 2019-07-17 | End: 2019-07-17

## 2019-07-17 RX ORDER — DIPHENHYDRAMINE HCL 25 MG
25 TABLET ORAL ONCE
Status: COMPLETED | OUTPATIENT
Start: 2019-07-17 | End: 2019-07-17

## 2019-07-17 RX ORDER — MIDAZOLAM HYDROCHLORIDE 1 MG/ML
.5-2 INJECTION INTRAMUSCULAR; INTRAVENOUS PRN
Status: ACTIVE | OUTPATIENT
Start: 2019-07-17 | End: 2019-07-17

## 2019-07-17 RX ORDER — DIPHENHYDRAMINE HYDROCHLORIDE 50 MG/ML
25 INJECTION INTRAMUSCULAR; INTRAVENOUS ONCE
Status: COMPLETED | OUTPATIENT
Start: 2019-07-17 | End: 2019-07-17

## 2019-07-17 RX ORDER — POTASSIUM CHLORIDE 20 MEQ/1
40 TABLET, EXTENDED RELEASE ORAL ONCE
Status: COMPLETED | OUTPATIENT
Start: 2019-07-17 | End: 2019-07-17

## 2019-07-17 RX ORDER — LIDOCAINE HYDROCHLORIDE 20 MG/ML
INJECTION, SOLUTION EPIDURAL; INFILTRATION; INTRACAUDAL; PERINEURAL
Status: COMPLETED
Start: 2019-07-17 | End: 2019-07-17

## 2019-07-17 RX ORDER — ONDANSETRON 2 MG/ML
4 INJECTION INTRAMUSCULAR; INTRAVENOUS PRN
Status: ACTIVE | OUTPATIENT
Start: 2019-07-17 | End: 2019-07-17

## 2019-07-17 RX ORDER — DEXTROSE MONOHYDRATE 50 MG/ML
INJECTION, SOLUTION INTRAVENOUS CONTINUOUS
Status: DISCONTINUED | OUTPATIENT
Start: 2019-07-17 | End: 2019-07-18

## 2019-07-17 RX ORDER — SODIUM CHLORIDE 9 MG/ML
500 INJECTION, SOLUTION INTRAVENOUS
Status: ACTIVE | OUTPATIENT
Start: 2019-07-17 | End: 2019-07-17

## 2019-07-17 RX ADMIN — BUDESONIDE AND FORMOTEROL FUMARATE DIHYDRATE 2 PUFF: 160; 4.5 AEROSOL RESPIRATORY (INHALATION) at 06:15

## 2019-07-17 RX ADMIN — ATORVASTATIN CALCIUM 80 MG: 40 TABLET, FILM COATED ORAL at 19:50

## 2019-07-17 RX ADMIN — MIDAZOLAM 2 MG: 1 INJECTION INTRAMUSCULAR; INTRAVENOUS at 16:35

## 2019-07-17 RX ADMIN — MIDAZOLAM HYDROCHLORIDE 2 MG: 1 INJECTION, SOLUTION INTRAMUSCULAR; INTRAVENOUS at 16:32

## 2019-07-17 RX ADMIN — SENNOSIDES, DOCUSATE SODIUM 2 TABLET: 50; 8.6 TABLET, FILM COATED ORAL at 18:21

## 2019-07-17 RX ADMIN — MIDAZOLAM 2 MG: 1 INJECTION INTRAMUSCULAR; INTRAVENOUS at 16:32

## 2019-07-17 RX ADMIN — BUDESONIDE AND FORMOTEROL FUMARATE DIHYDRATE 2 PUFF: 160; 4.5 AEROSOL RESPIRATORY (INHALATION) at 18:21

## 2019-07-17 RX ADMIN — DIPHENHYDRAMINE HCL 25 MG: 25 TABLET ORAL at 11:38

## 2019-07-17 RX ADMIN — FENTANYL CITRATE 50 MCG: 0.05 INJECTION, SOLUTION INTRAMUSCULAR; INTRAVENOUS at 16:32

## 2019-07-17 RX ADMIN — DEXTROSE MONOHYDRATE: 50 INJECTION, SOLUTION INTRAVENOUS at 03:53

## 2019-07-17 RX ADMIN — LIDOCAINE HYDROCHLORIDE 10 ML: 20 INJECTION, SOLUTION EPIDURAL; INFILTRATION; INTRACAUDAL; PERINEURAL at 16:36

## 2019-07-17 RX ADMIN — HEPARIN SODIUM 5000 UNITS: 5000 INJECTION, SOLUTION INTRAVENOUS; SUBCUTANEOUS at 14:00

## 2019-07-17 RX ADMIN — OXYCODONE HYDROCHLORIDE 10 MG: 10 TABLET ORAL at 03:39

## 2019-07-17 RX ADMIN — HEPARIN SODIUM 5000 UNITS: 5000 INJECTION, SOLUTION INTRAVENOUS; SUBCUTANEOUS at 22:13

## 2019-07-17 RX ADMIN — TAMSULOSIN HYDROCHLORIDE 0.4 MG: 0.4 CAPSULE ORAL at 10:12

## 2019-07-17 RX ADMIN — SENNOSIDES, DOCUSATE SODIUM 2 TABLET: 50; 8.6 TABLET, FILM COATED ORAL at 06:15

## 2019-07-17 RX ADMIN — SODIUM CHLORIDE 1500 MG: 9 INJECTION, SOLUTION INTRAVENOUS at 14:18

## 2019-07-17 RX ADMIN — FENTANYL CITRATE 50 MCG: 50 INJECTION, SOLUTION INTRAMUSCULAR; INTRAVENOUS at 16:39

## 2019-07-17 RX ADMIN — ACETAMINOPHEN 650 MG: 325 TABLET, FILM COATED ORAL at 11:38

## 2019-07-17 RX ADMIN — METOPROLOL TARTRATE 12.5 MG: 25 TABLET, FILM COATED ORAL at 06:15

## 2019-07-17 RX ADMIN — HEPARIN SODIUM 5000 UNITS: 5000 INJECTION, SOLUTION INTRAVENOUS; SUBCUTANEOUS at 06:13

## 2019-07-17 RX ADMIN — OXYBUTYNIN CHLORIDE 5 MG: 5 TABLET ORAL at 06:15

## 2019-07-17 RX ADMIN — FENTANYL CITRATE 50 MCG: 50 INJECTION, SOLUTION INTRAMUSCULAR; INTRAVENOUS at 16:35

## 2019-07-17 RX ADMIN — FENTANYL CITRATE 50 MCG: 50 INJECTION, SOLUTION INTRAMUSCULAR; INTRAVENOUS at 16:32

## 2019-07-17 RX ADMIN — FENTANYL CITRATE 50 MCG: 50 INJECTION, SOLUTION INTRAMUSCULAR; INTRAVENOUS at 16:42

## 2019-07-17 RX ADMIN — OXYBUTYNIN CHLORIDE 5 MG: 5 TABLET ORAL at 18:21

## 2019-07-17 RX ADMIN — ATORVASTATIN CALCIUM 80 MG: 40 TABLET, FILM COATED ORAL at 03:39

## 2019-07-17 RX ADMIN — SODIUM CHLORIDE 25 MG: 9 INJECTION, SOLUTION INTRAVENOUS at 11:49

## 2019-07-17 RX ADMIN — SODIUM CHLORIDE 500 ML: 9 INJECTION, SOLUTION INTRAVENOUS at 17:38

## 2019-07-17 RX ADMIN — POTASSIUM CHLORIDE 40 MEQ: 20 TABLET, EXTENDED RELEASE ORAL at 10:12

## 2019-07-17 ASSESSMENT — ENCOUNTER SYMPTOMS
SHORTNESS OF BREATH: 0
DOUBLE VISION: 0
ABDOMINAL PAIN: 1
BLURRED VISION: 0
DEPRESSION: 0
FEVER: 0
COUGH: 0
VOMITING: 0
WEAKNESS: 1
FOCAL WEAKNESS: 0
NAUSEA: 0
CHILLS: 1

## 2019-07-17 ASSESSMENT — LIFESTYLE VARIABLES: EVER_SMOKED: YES

## 2019-07-17 NOTE — CARE PLAN
Problem: Communication  Goal: The ability to communicate needs accurately and effectively will improve  Outcome: PROGRESSING AS EXPECTED  Pt. Updated about plan of care/treatment, answered all questions    Problem: Safety  Goal: Will remain free from falls  Outcome: PROGRESSING AS EXPECTED  Pt. Educated about using call light for assistance to prevent falls, call light within reach.

## 2019-07-17 NOTE — ED NOTES
"Chief Complaint   Patient presents with   • Suprapubic Pain     Pt's suprapubic catheter fell out this afternoon, [facility] not able to replace it. Pt was also to be found hyponatremic has hx of hyponatremia       BIB EMS to R6, pt on monitor and in gown, labs drawn and sent. Pt consists of above complaint. EMS transfer from [facility].     Medications given en route: none    BP (!) 164/70   Pulse 74   Resp 16   Ht 1.803 m (5' 11\")   Wt 108.9 kg (240 lb)   SpO2 98%   BMI 33.47 kg/m²   "

## 2019-07-17 NOTE — ED NOTES
Anuja from Lab called with critical result of Sodium 117 at 2328. Critical lab result read back to Anuja.   Dr. Dunn notified of critical lab result at 2328.  Critical lab result read back by Dr. Dunn.

## 2019-07-17 NOTE — PROGRESS NOTES
2 RN skin check complete with Amparo RN  Devices in place Humphrey, colostomy, BP cuff, tele leads, O2 sensor, .  Skin assessed under devices yes.  Confirmed pressure ulcers found on none.  New potential pressure ulcers noted on Redness on sacrum, R second toe black. Wound consult placed yes.  The following interventions in place Q2 turning, heels elevated, rotating devices, mepilex, barrier cream, skin checks

## 2019-07-17 NOTE — PROGRESS NOTES
IV Iron Per Pharmacy Note    Patient Lean Body Weight:  75 kg  Reason for Iron Replacement: Iron Deficiency Anemia      Lab Results   Component Value Date/Time    WBC 12.6 (H) 07/16/2019 08:04 PM    RBC 4.45 (L) 07/16/2019 08:04 PM    HEMOGLOBIN 12.6 (L) 07/16/2019 08:04 PM    HEMATOCRIT 35.7 (L) 07/16/2019 08:04 PM    MCV 80.2 (L) 07/16/2019 08:04 PM    MCH 28.3 07/16/2019 08:04 PM    MCHC 35.3 07/16/2019 08:04 PM    MPV 9.1 07/16/2019 08:04 PM       Recent Labs      07/17/19   0007  07/17/19   0400   FERRITIN   --   59.8   FOLATE   --   12.9   IRON  26*   --          Recent Labs      07/17/19   0815   CREATININE  1.87*          Assessment/Plan:  1. IV Iron Indicated.   2. Give Iron Dextran 25 mg IV test dose following diphenhydramine/acetaminophen premeds over 30 minutes per protocol.  3. If no reaction (Anaphylaxis, Hypotension/Hypertension, N/V/D, Chest pain/Back Pain, Urticaria/Pruritis) in the next hour, proceed to full dose. Nursing to call the pharmacy IV room at ext. [phone] for full dose.  4. Full dose: Iron Dextran 1500 mg IV over 4 hours. Continue to monitor for delayed ADR including: Arthralgia/myalgia, Headache/backache, chills/dizziness/malaise, moderate to high fever and n/v.      Donna Peck, Pharm.D., BCPS

## 2019-07-17 NOTE — PROGRESS NOTES
9940 Dr. Rowe at bedside updated about Na level, discussed with pt. Code status, wants to be DNR/DNI

## 2019-07-17 NOTE — ASSESSMENT & PLAN NOTE
Dr Caballero following from nephrology   Clearly has polydypsia: drinks >4 gallons of water and tea by his admission, >6 gallons per his friend Nathaly  Has been admitted multiple times and told to restrict fluids on DC but is noncompliant  Needs placement, accepted to [facility] but held 2/2 allergic reaction  Has remained stable on 2 litres of free water restriction, with some allowance in addition for salty fluids (broth)

## 2019-07-17 NOTE — PROCEDURES
Vascular Access Team    Date of Insertion: 7/17/19  Arm Circumference: n/a  Line Length: 10  Line Size: 20  Vein Occupancy %: 32  Reason for Midline: access  Labs: on 7/16/19 WBC 12.6, , on 7/17/19 BUN 18, Cr 1.87, GFR 36, INR na    Orders confirmed, vessel patency confirmed with ultrasound. Risks and benefits of procedure explained to patient and education regarding line associated bloodstream infections provided. Questions answered.     PowerGlide Midline placed in LUE per MD order with ultrasound guidance. 20g, 10 cm line placed in basilic vein after 2 attempt(s).  Catheter inserted with good blood return. Secured with 0cm external from insertion site.  Flushed without resistance with 10 mL 0.9% normal saline. Midline secured with Biopatch and Tegaderm.     Midline placement is confirmed by nurse using ultrasound and ability to flush and draw blood. Midline is appropriate for use at this time.  No X-ray is needed for placement confirmation. Pt tolerated procedure well.  Patient condition relayed to unit RN or ordering physician via this post procedure note in the EMR.    Ultrasound images uploaded to PACS and viewable in the EMR - yes  Ultrasound imaged printed and placed in paper chart - no      BARD PowerGlide Midline ref # [id number], Lot # [id number]

## 2019-07-17 NOTE — PROGRESS NOTES
Attempted to draw back off of midline. No blood noted. Attempted to trouble shoot the line based off of VAT recommendations, still no blood. Pt refusing this RN to attempt stick.  Phlebotomist called and will come draw.

## 2019-07-17 NOTE — ASSESSMENT & PLAN NOTE
History of urethral strictures and BPH  Previously placed suprapubic catheter fell out prior to admission  Suprapubic catheter replaced by IR on 7/17

## 2019-07-17 NOTE — WOUND TEAM
"In to see patient for established ileostomy.  Per patient and RN, appliance was changed this morning.  Attempted to remove and change appliance but patient does not stopped me saying \"no more.\" says he changes it every 3 days if not leaking.  Appliance currently intact.  2 piece 2 1/4 with hypafix tape boarder.  1 piece convex with belt and crusting supplies left at bedside.  Will check on patient again tomorrow for ostomy care.         [facility]  Inpatient Services  Initial Wound and Skin Care Evaluation    Admission Date: 7/16/2019     HPI, PMH, SH: Reviewed    Unit where seen by Wound Team: S130/00     WOUND CONSULT RELATED TO:  establised ostomy.  Asked by RN to look at penis, suprapubic site and left great toe.      SUBJECTIVE:  \"No more please\"      Self Report / Pain Level:  Pain with cleansing, overall very tender        OBJECTIVE:  In bed, all wounds open to air.  Great toe with dried crust easily removed to reveal scarred tissue under.  Patient has chronic suprapubic catheter placed 06/19 related to BPH.  Apparently it fell out yesterday.  Penis with erosion from chronic harrington prior to.  Going to have another suprapubic placed today.     WOUND TYPE, LOCATION, CHARACTERISTICS (Pressure Injuries: location, stage, POA or date identified)      Wound 07/16/19 Full Thickness Wound Penis erosion related to harrington (Active)   Site Assessment Pink    Nisa-wound Assessment Pale    Margins Unattached edges    Closure None    Drainage Amount Moderate    Drainage Description Yellow;Purulent    Non-staged Wound Description Full thickness    Treatments Cleansed;Site care    Cleansing Normal Saline Irrigation    Periwound Protectant Not Applicable    Dressing Options Petrolatum Gauze (yellow)    Dressing Cleansing/Solutions Not Applicable    Dressing Changed New    Dressing Status Clean;Dry;Intact    Dressing Change Frequency Daily    NEXT Dressing Change  07/18/19    NEXT Weekly Photo (Inpatient Only) " 07/24/19    WOUND NURSE ONLY - Odor None    WOUND NURSE ONLY - Exposed Structures None    WOUND NURSE ONLY - Tissue Type and Percentage 100% pale/pink    WOUND NURSE ONLY - Time Spent with Patient (mins) 60      Vascular:    Dorsal Pedal pulses:  palpable   Posterior tib pulses:   not assessed     BERNICE:      NA    Lab Values:    WBC:       WBC   Date/Time Value Ref Range Status   07/16/2019 08:04 PM 12.6 (H) 4.8 - 10.8 K/uL Final     A1C:      Lab Results   Component Value Date/Time    HBA1C 6.5 (H) 05/31/2019 12:21 AM      Culture:   Not indicated     INTERVENTIONS BY WOUND TEAM:  Penis and suprapubic site cleansed with NS and gauze.  Previous suprapubic site covered with adhesive foam.  Penis cleansed and wrapped with xeroform gauze.      Dressing selection:  Xeroform gauze          Interdisciplinary consultation: Patient, Bedside RN (Javier)     EVALUATION: having another suprapubic placed today.  Wrap with xeroform as barrier and moisture.      Factors affecting wound healing: harrington   Goals: Steady decrease in wound area and depth weekly.    NURSING PLAN OF CARE ORDERS (X):    Dressing changes: See Dressing Care orders: X  Skin care: See Skin Care orders: X  Rectal tube care: See Rectal Tube Care orders:   Other orders:    RSKIN: CURRENT (X) ORDERED (O):   Q shift Malik:  X  Q shift pressure point assessments:  X  Pressure redistribution mattress            FARNAZ        ICU bed   Bariatric FARNAZ         Bariatric foam           Heel float boots          Float Heels off Bed with Pillows   X            Barrier wipes         Barrier Cream         Barrier paste          Sacral silicone dressing       X  Silicone O2 tubing         Anchorfast         Cannula fixation Device (Tender )          Gray Foam Ear protectors           Trach with Optifoam split foam                 Waffle cushion        Waffle Overlay         Rectal tube or BMS         Antifungal tx      Interdry          Reposition q 2 hours      X  Up to chair         Ambulate      PT/OT        Dietician        Diabetes Education      PO     TF     TPN     NPO X  # days   Other        WOUND TEAM PLAN OF CARE (X):   NPWT change 3 x week:        Dressing changes by wound team:       Follow up as needed:     X  Other (explain):     Anticipated discharge plans (X):   SNF:         X back to SNF  Home Care:           Outpatient Wound Center:            Self Care:            Other:

## 2019-07-17 NOTE — PROGRESS NOTES
IR RN note:    Site Marked and Confirmed with MD, patient and RN pre procedure   Suprapubic catheter placement by MD Beyer assisted by RT Fahad,Lower mid abdomen access site;  End Tidal CO2 range 32-37 during procedure   Suprapubic catheter placed   BS Flexima APDL Locking pigtail drainage catheter 14 fr x 25 cm REF# [id number] LOT# [id number]   Patient tolerated procedure, hemodynamically stable; pt awake and talking post procedure; report given to ICU RN Ny;   patient transported to room via IR RN/ IR Tech monitored then transferred care to report RN

## 2019-07-17 NOTE — ED NOTES
16fr Humphrey inserted using aseptic technique, urine drainage noted, tubing secured to thigh and drainage bag secured below bladder level.

## 2019-07-17 NOTE — H&P
Hospital Medicine History & Physical Note    Date of Service  7/16/2019    Primary Care Physician  Nicolette Yeager M.D. (Inactive)    Consultants  Urology  Nephrology  pma    Code Status  full    Chief Complaint  Displaced catheter     History of Presenting Illness  69 y.o. male who presented 7/16/2019 with past medical history of arthritis, CHF, COPD, Crohn's, diabetes who presents from [facility] complaining of suprapubic catheter displacement.  This occurred about 8 hours prior to arrival to [facility].  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  He has had that placed since 6/10/2019. otherwise he has no complaints at the time my examination.  He was found to be profoundly hyponatremic on repeat labs here his sodium is normal.  His kidney function is also mildly elevated from the outside hospital labs.   He will be admitted to the hospital for further management of his symptoms.      Upon review of outside hospital records patient has a WBC count 14.6 hemoglobin 11.9 platelet count 473 sodium 112 potassium 3.8 chloride 81 CO2 19 anion gap 16 glucose 67 BUN 18 creatinine 1.75 LFTs unremarkable PT 10.6 INR 1.0 urinalysis 1+ leuk esterase    CT abdomen and pelvis there is diastasis of ventral abdominal wall musculature and muscle wall atrophy with patulous abdominal wall with convex appearance of the peritoneum and fascia but no ventral abdominal wall hernia defect.  Multiple visceral strictures particularly bowel are present within the patulous abdominal wall structures as well as the ureters there is right lower quadrant colostomy but no ostomy hernia.  Interval development of mild to moderate bilateral hydronephrosis as well as hydroureter the ureters are dilated distally below the level of the urethral tethering no obstructive colliculi or masses however the prostate is prominent urinary bladder is at least mildly dilated previous  cholecystectomy IVC filter in place    Review of Systems  Review of Systems   Constitutional: Positive for malaise/fatigue. Negative for chills and fever.   HENT: Negative for congestion, hearing loss and tinnitus.    Eyes: Negative for blurred vision, double vision and discharge.   Respiratory: Negative for cough, hemoptysis and shortness of breath.    Cardiovascular: Negative for chest pain, palpitations and leg swelling.   Gastrointestinal: Positive for abdominal pain. Negative for heartburn, nausea and vomiting.   Genitourinary: Negative for dysuria and flank pain.   Musculoskeletal: Negative for joint pain and myalgias.   Skin: Negative for rash.   Neurological: Positive for weakness. Negative for dizziness, sensory change, speech change and focal weakness.   Endo/Heme/Allergies: Negative for environmental allergies. Does not bruise/bleed easily.   Psychiatric/Behavioral: Negative for depression, hallucinations and substance abuse.       Past Medical History   has a past medical history of Arthritis; Benign prostate hyperplasia; Colostomy in place (HCC); COPD (chronic obstructive pulmonary disease) (HCC); Diabetes (HCC); and Renal disorder.    Surgical History   has a past surgical history that includes toe amputation (Right, 10/25/2017); pr ercp,diagnostic (3/29/2019); and carlos by laparoscopy (N/A, 3/31/2019).     Family History  family history includes No Known Problems in his father and mother.     Social History   reports that he has quit smoking. He has quit using smokeless tobacco.    Allergies  Allergies   Allergen Reactions   • Ceftriaxone      Has tolerated Merrem, Zosyn, and Unasyn   • Ezetimibe    • Flagyl [Metronidazole]    • Infliximab    • Levaquin    • Lovastatin Shortness of Breath   • Simvastatin Shortness of Breath   • Vancomycin        Medications  Prior to Admission Medications   Prescriptions Last Dose Informant Patient Reported? Taking?   atorvastatin (LIPITOR) 80 MG tablet 7/15/2019 at  PM Patient No No   Sig: Take 1 Tab by mouth every evening.   budesonide-formoterol (SYMBICORT) 160-4.5 MCG/ACT Aerosol 7/16/2019 at AM Patient No No   Sig: Inhale 2 Puffs by mouth 2 Times a Day.   insulin regular (HUMULIN R) 100 Unit/mL Solution 7/16/2019 at AM Patient Yes Yes   Sig: Inject 30-40 Units as instructed 2 Times a Day. 40 units every morning  30 units every night   metoprolol (LOPRESSOR) 25 MG Tab 7/16/2019 at AM Patient No No   Sig: Take 0.5 Tabs by mouth 2 Times a Day.   oxybutynin (DITROPAN) 5 MG Tab 7/16/2019 at AM Patient No No   Sig: Take 1 Tab by mouth every 12 hours.   tamsulosin (FLOMAX) 0.4 MG capsule 7/16/2019 at AM Patient No No   Sig: Take 1 Cap by mouth ONE-HALF HOUR AFTER BREAKFAST.   therapeutic multivitamin-minerals (THERAGRAN-M) Tab 7/16/2019 at AM Patient No No   Sig: Take 1 Tab by mouth every day.      Facility-Administered Medications: None       Physical Exam  Temp:  [36.3 °C (97.3 °F)] 36.3 °C (97.3 °F)  Pulse:  [70-94] 94  Resp:  [16] 16  BP: (164)/(70) 164/70  SpO2:  [97 %-100 %] 97 %    Physical Exam   Constitutional: He is oriented to person, place, and time. He appears well-developed and well-nourished.   HENT:   Head: Normocephalic and atraumatic.   Dry oral mucosa   Eyes: Pupils are equal, round, and reactive to light. Conjunctivae and EOM are normal.   Neck: Normal range of motion. Neck supple. No JVD present.   Cardiovascular: Normal rate, regular rhythm, normal heart sounds and intact distal pulses.    No murmur heard.  Pulmonary/Chest: Effort normal and breath sounds normal. No respiratory distress. He exhibits no tenderness.   Abdominal: Soft. Bowel sounds are normal. He exhibits distension. There is no tenderness.   Genitourinary:   Genitourinary Comments: Catheter in place   Musculoskeletal: Normal range of motion. He exhibits edema.   Neurological: He is alert and oriented to person, place, and time. No cranial nerve deficit. He exhibits normal muscle tone.   Skin:  Skin is warm and dry. No erythema.   Dry cracked skin   Psychiatric: He has a normal mood and affect. His behavior is normal. Judgment and thought content normal.   Nursing note and vitals reviewed.      Laboratory:      Recent Labs      07/16/19 2004   SODIUM  137   POTASSIUM  6.5*   CHLORIDE  106   CO2  27   GLUCOSE  106*   BUN  95*   CREATININE  2.40*   CALCIUM  8.4*     Recent Labs      07/16/19 2004   GLUCOSE  106*                 No results for input(s): TROPONINI in the last 72 hours.    Urinalysis:    No results found     Imaging:  No orders to display         Assessment/Plan:  I anticipate this patient is appropriate for observation status at this time.    * Hyponatremia- (present on admission)   Assessment & Plan    Dr Caballero consulted from nephro  Labs drawn at 12 pm today now repeat labs Sodium 112 to 119 and is correcting slightly fast   Serial BMP ordered Q2H based on Nephrology reccomendations   If overcorrects may need to add D5W   Admit to ICU for closer monitoring  Urine and serum osmolality ordered  Urine lytes  Neuro checks     Obstructive uropathy- (present on admission)   Assessment & Plan    Due to uretral stricutres and BPH   Urology has been consulted will eval in am   S/p catheter placement      Leukocytosis- (present on admission)   Assessment & Plan    This patient has chronic leukocytosis   No evidence of infection   Cont to montior      Suprapubic catheter dysfunction (HCC)   Assessment & Plan    Displaced and removed   Urology consulted for evaluation by ERP      Anemia   Assessment & Plan    No evidence of bleeding lab workup ordered     CRF (chronic renal failure), stage 3 (moderate) (HCC)- (present on admission)   Assessment & Plan    At baseline renal function   Cont to montior      HTN (hypertension)- (present on admission)   Assessment & Plan    Resume home BB      BPH (benign prostatic hyperplasia)- (present on admission)   Assessment & Plan    Hx of resume home flomax      Crohn's disease (HCC)- (present on admission)   Assessment & Plan    S/p ostomy placement      Type 2 diabetes mellitus, with long-term current use of insulin (HCC)- (present on admission)   Assessment & Plan    SSI ordered  accu checks          VTE prophylaxis: heparin    I spent a total of 31 minutes of non face to face time performing additional research, reviewing old medical records, provided on going management by following up on labs, placing orders, discussing plan of care with other healthcare providers and nursing staff. Start time: 10:11 pm. End time: 10:52 pm

## 2019-07-17 NOTE — CARE PLAN
Problem: Safety  Goal: Will remain free from falls  Outcome: PROGRESSING AS EXPECTED  Pt understands fall risk. Appropriate interventions in place.     Problem: Bowel/Gastric:  Goal: Normal bowel function is maintained or improved  Outcome: PROGRESSING AS EXPECTED  Colostomy care in place. Pt stooling appropriately

## 2019-07-17 NOTE — OR SURGEON
Immediate Post- Operative Note        PostOp Diagnosis: SP catheter displacement.       Procedure(s): SP catheter replacement.       Estimated Blood Loss: Less than 5 ml        Complications: None            7/17/2019     4:48 PM     Calvin Beyer

## 2019-07-17 NOTE — CONSULTS
"[facility] -  CONSULTATION NOTE               Author: Russell Caballero M.D. Date & Time: 7/17/2019  10:37 AM       REASON FOR CONSULTATION:   - BRI    CHIEF COMPLAINT:   -  \"I'm hurting and my catheter fell out\"    HISTORY OF PRESENT ILLNESS:    68 yo male PMH CKD Stage 3, HTN, type 2 DM, BPH, HLD, and COPD presents to ED as transfer from OSH after being found to have hyponatremia.  He has a known hx of BPH causing REED and required a suprapubic catheter placement early this year.  He states yesterday afternoon it fell out on its own, he denies trauma or pulling on it prior to it displacing.  [facility].  At his local ED they were unable to replace it and lab work showed significant hyponatremia at 112 and so he was [place] [facility].  By the time he arrived here and had repeat labs it was 12 hrs from initial and his SNa ~ 119.  ED docs here were able to get a penile FC and he was admitted to ICU.  Repeat SNa ~ 121 after being here and he was started on D5W overnight to maintain him at current range.  He admits to being told about hyponatremia in past due to low solute intake according to him.  Further inquiry also reveals that he drinks 4-6 gallons of water daily, he says he just sits there and will drink water while on his recliner.  He is currently NPO for IR to replace his suprapubic catheter.  SNa stable in the low 120s overnight and this AM.  His other labs show Ashley ~ 14 and UOsm ~ 72.  No F/C/N/V/CP/SOB.  No melena, hematochezia, hematemesis.  No HA, visual changes.    REVIEW OF SYSTEMS:    - As per HPI, otherwise review of systems negative per AMA/CMS criteria  - General:  As per HPI, otherwise negative per AMA/CMS criteria  - HEENT:  No ocular pain; no nasal discharge  - CV:  As per HPI, otherwise negative per AMA/CMS criteria  - Lungs:  As per HPI, otherwise negative per AMA/CMS criteria  - GI:  As per HPI, otherwise negative per " "AMA/CMS criteria  - MSK:  No joint pain; No trauma  - Skin: No rashes; No lesions  - Neuro: No paresthesia; No LOC  - Psych: No depression; No anxiety    PAST MEDICAL HISTORY:   - CKD 3  - HTN  - type 2 DM  - BPH  - HLD  - COPD  - Arthritis    PAST SURGICAL HISTORY:   - Right toe amputation  - ERCP  - Cholecystectomy  - Colostomy    FAMILY HISTORY:   - Reviewed and non contributory to current illness    SOCIAL HISTORY:   - No tobacco, former user  - No EtOH  - No illicits    HOME MEDICATIONS:   - Reviewed and documented in chart    ALLERGIES:  Ceftriaxone; Ezetimibe; Flagyl [metronidazole]; Infliximab; Levaquin; Lovastatin; Simvastatin; and Vancomycin    PHYSICAL EXAM:  VS:  BP (!) 164/70   Pulse 72   Temp 36 °C (96.8 °F) (Temporal)   Resp (!) 29   Ht 1.803 m (5' 11\")   Wt 111.9 kg (246 lb 11.1 oz)   SpO2 95%   BMI 34.41 kg/m²   GENERAL:  WDWN NAD  HEENT:  NC/AT, no scleral icterus; conjunctiva normal  NECK:  Supple; Non tender  CV:  RRR, no m/r/g  LUNGS:  CTAB, no W/R  ABDOMEN:  SND, TTP suprapubic region  EXTREMETIES:  No C/C/E  SKIN:  Warm and dry  NEURO:  A&O, no focal deficits  PSYCH:  Cooperative, appropriate mood and affect    LABS:  Recent Results (from the past 24 hour(s))   BASIC METABOLIC PANEL    Collection Time: 07/16/19  8:04 PM   Result Value Ref Range    Sodium 137 135 - 145 mmol/L    Potassium 6.5 (H) 3.6 - 5.5 mmol/L    Chloride 106 96 - 112 mmol/L    Co2 27 20 - 33 mmol/L    Glucose 106 (H) 65 - 99 mg/dL    Bun 95 (HH) 8 - 22 mg/dL    Creatinine 2.40 (H) 0.50 - 1.40 mg/dL    Calcium 8.4 (L) 8.5 - 10.5 mg/dL    Anion Gap 4.0 0.0 - 11.9   ESTIMATED GFR    Collection Time: 07/16/19  8:04 PM   Result Value Ref Range    GFR If  33 (A) >60 mL/min/1.73 m 2    GFR If Non  27 (A) >60 mL/min/1.73 m 2   CBC WITH DIFFERENTIAL    Collection Time: 07/16/19  8:04 PM   Result Value Ref Range    WBC 12.6 (H) 4.8 - 10.8 K/uL    RBC 4.45 (L) 4.70 - 6.10 M/uL    Hemoglobin 12.6 " (L) 14.0 - 18.0 g/dL    Hematocrit 35.7 (L) 42.0 - 52.0 %    MCV 80.2 (L) 81.4 - 97.8 fL    MCH 28.3 27.0 - 33.0 pg    MCHC 35.3 33.7 - 35.3 g/dL    RDW 42.5 35.9 - 50.0 fL    Platelet Count 459 (H) 164 - 446 K/uL    MPV 9.1 9.0 - 12.9 fL    Neutrophils-Polys 68.10 44.00 - 72.00 %    Lymphocytes 22.20 22.00 - 41.00 %    Monocytes 8.20 0.00 - 13.40 %    Eosinophils 0.80 0.00 - 6.90 %    Basophils 0.20 0.00 - 1.80 %    Immature Granulocytes 0.50 0.00 - 0.90 %    Nucleated RBC 0.00 /100 WBC    Neutrophils (Absolute) 8.59 (H) 1.82 - 7.42 K/uL    Lymphs (Absolute) 2.80 1.00 - 4.80 K/uL    Monos (Absolute) 1.03 (H) 0.00 - 0.85 K/uL    Eos (Absolute) 0.10 0.00 - 0.51 K/uL    Baso (Absolute) 0.03 0.00 - 0.12 K/uL    Immature Granulocytes (abs) 0.06 0.00 - 0.11 K/uL    NRBC (Absolute) 0.00 K/uL   BASIC METABOLIC PANEL    Collection Time: 07/16/19 10:55 PM   Result Value Ref Range    Sodium 119 (LL) 135 - 145 mmol/L    Potassium 3.6 3.6 - 5.5 mmol/L    Chloride 93 (L) 96 - 112 mmol/L    Co2 18 (L) 20 - 33 mmol/L    Glucose 74 65 - 99 mg/dL    Bun 17 8 - 22 mg/dL    Creatinine 1.51 (H) 0.50 - 1.40 mg/dL    Calcium 8.2 (L) 8.5 - 10.5 mg/dL    Anion Gap 8.0 0.0 - 11.9   ESTIMATED GFR    Collection Time: 07/16/19 10:55 PM   Result Value Ref Range    GFR If  56 (A) >60 mL/min/1.73 m 2    GFR If Non  46 (A) >60 mL/min/1.73 m 2   Basic Metabolic Panel    Collection Time: 07/17/19 12:07 AM   Result Value Ref Range    Sodium 120 (L) 135 - 145 mmol/L    Potassium 3.8 3.6 - 5.5 mmol/L    Chloride 91 (L) 96 - 112 mmol/L    Co2 21 20 - 33 mmol/L    Glucose 75 65 - 99 mg/dL    Bun 17 8 - 22 mg/dL    Creatinine 1.62 (H) 0.50 - 1.40 mg/dL    Calcium 9.2 8.5 - 10.5 mg/dL    Anion Gap 8.0 0.0 - 11.9   IRON/TOTAL IRON BIND    Collection Time: 07/17/19 12:07 AM   Result Value Ref Range    Iron 26 (L) 50 - 180 ug/dL    Total Iron Binding 343 250 - 450 ug/dL    % Saturation 8 (L) 15 - 55 %   ESTIMATED GFR     Collection Time: 07/17/19 12:07 AM   Result Value Ref Range    GFR If  51 (A) >60 mL/min/1.73 m 2    GFR If Non  43 (A) >60 mL/min/1.73 m 2   Basic Metabolic Panel    Collection Time: 07/17/19  2:30 AM   Result Value Ref Range    Sodium 121 (L) 135 - 145 mmol/L    Potassium 3.9 3.6 - 5.5 mmol/L    Chloride 92 (L) 96 - 112 mmol/L    Co2 20 20 - 33 mmol/L    Glucose 78 65 - 99 mg/dL    Bun 18 8 - 22 mg/dL    Creatinine 1.72 (H) 0.50 - 1.40 mg/dL    Calcium 9.3 8.5 - 10.5 mg/dL    Anion Gap 9.0 0.0 - 11.9   ESTIMATED GFR    Collection Time: 07/17/19  2:30 AM   Result Value Ref Range    GFR If  48 (A) >60 mL/min/1.73 m 2    GFR If Non African American 40 (A) >60 mL/min/1.73 m 2   ACCU-CHEK GLUCOSE    Collection Time: 07/17/19  3:22 AM   Result Value Ref Range    Glucose - Accu-Ck 78 65 - 99 mg/dL   OSMOLALITY URINE    Collection Time: 07/17/19  3:45 AM   Result Value Ref Range    Osmolality Urine 72 (L) 300 - 900 mOsm/kg H2O   URINE SODIUM RANDOM    Collection Time: 07/17/19  3:45 AM   Result Value Ref Range    Sodium, Urine -per volume 14 mmol/L   URINE CREATININE RANDOM    Collection Time: 07/17/19  3:45 AM   Result Value Ref Range    Creatinine, Random Urine 19.40 mg/dL   FERRITIN    Collection Time: 07/17/19  4:00 AM   Result Value Ref Range    Ferritin 59.8 22.0 - 322.0 ng/mL   VITAMIN B12    Collection Time: 07/17/19  4:00 AM   Result Value Ref Range    Vitamin B12 -True Cobalamin 642 211 - 911 pg/mL   FOLATE    Collection Time: 07/17/19  4:00 AM   Result Value Ref Range    Folate -Folic Acid 12.9 >4.0 ng/mL   RETICULOCYTES COUNT    Collection Time: 07/17/19  4:00 AM   Result Value Ref Range    Reticulocyte Count 1.7 0.8 - 2.1 %    Retic, Absolute 0.08 (H) 0.04 - 0.06 M/uL    Imm. Reticulocyte Fraction 11.7 9.3 - 17.4 %    Retic Hgb Equivalent 31.4 29.0 - 35.0 pg/cell   OSMOLALITY SERUM    Collection Time: 07/17/19  4:00 AM   Result Value Ref Range    Osmolality  Serum 260 (L) 278 - 298 mOsm/kg H2O   Basic Metabolic Panel    Collection Time: 07/17/19  4:00 AM   Result Value Ref Range    Sodium 123 (L) 135 - 145 mmol/L    Potassium 3.7 3.6 - 5.5 mmol/L    Chloride 93 (L) 96 - 112 mmol/L    Co2 21 20 - 33 mmol/L    Glucose 78 65 - 99 mg/dL    Bun 18 8 - 22 mg/dL    Creatinine 1.74 (H) 0.50 - 1.40 mg/dL    Calcium 9.0 8.5 - 10.5 mg/dL    Anion Gap 9.0 0.0 - 11.9   ESTIMATED GFR    Collection Time: 07/17/19  4:00 AM   Result Value Ref Range    GFR If  47 (A) >60 mL/min/1.73 m 2    GFR If Non  39 (A) >60 mL/min/1.73 m 2   Basic Metabolic Panel    Collection Time: 07/17/19  6:05 AM   Result Value Ref Range    Sodium 121 (L) 135 - 145 mmol/L    Potassium 3.6 3.6 - 5.5 mmol/L    Chloride 91 (L) 96 - 112 mmol/L    Co2 23 20 - 33 mmol/L    Glucose 138 (H) 65 - 99 mg/dL    Bun 17 8 - 22 mg/dL    Creatinine 1.74 (H) 0.50 - 1.40 mg/dL    Calcium 9.0 8.5 - 10.5 mg/dL    Anion Gap 7.0 0.0 - 11.9   ESTIMATED GFR    Collection Time: 07/17/19  6:05 AM   Result Value Ref Range    GFR If  47 (A) >60 mL/min/1.73 m 2    GFR If Non  39 (A) >60 mL/min/1.73 m 2   ACCU-CHEK GLUCOSE    Collection Time: 07/17/19  6:13 AM   Result Value Ref Range    Glucose - Accu-Ck 91 65 - 99 mg/dL   Basic Metabolic Panel    Collection Time: 07/17/19  8:15 AM   Result Value Ref Range    Sodium 121 (L) 135 - 145 mmol/L    Potassium 3.8 3.6 - 5.5 mmol/L    Chloride 92 (L) 96 - 112 mmol/L    Co2 21 20 - 33 mmol/L    Glucose 124 (H) 65 - 99 mg/dL    Bun 18 8 - 22 mg/dL    Creatinine 1.87 (H) 0.50 - 1.40 mg/dL    Calcium 9.2 8.5 - 10.5 mg/dL    Anion Gap 8.0 0.0 - 11.9   MAGNESIUM    Collection Time: 07/17/19  8:15 AM   Result Value Ref Range    Magnesium 2.0 1.5 - 2.5 mg/dL   ESTIMATED GFR    Collection Time: 07/17/19  8:15 AM   Result Value Ref Range    GFR If  44 (A) >60 mL/min/1.73 m 2    GFR If Non  36 (A) >60 mL/min/1.73 m  2       (click the triangle to expand results)    IMAGING:  OUTSIDE IMAGES-CT ABDOMEN /PELVIS   Final Result      IR-DRAIN-BLADDER SUPRAPUB W/CATH    (Results Pending)   IR-MIDLINE CATHETER INSERTION WO GUIDANCE > AGE 3    (Results Pending)       IMPRESSION:  - CKD Stage 3    * BCr ~ 1.5-1.9    * Etiology 2/2 BPH/obstruction  - Hyponatremia    * Etiology due to polydipsia    * UOsm c/w with kidneys trying to maximally dilute    * Ashley suggesting possibility of pre-renal component as well  - HTN    * Goal BP < 140/90  - type 2 DM  - HLD  - BPH    * Chronic suprapubic catheter, displaced currently    * IR to replace today    PLAN:  - No compelling indication for RRT  - Wean down D5W 50 mL/hr  - Restrict free water < 1L  - Solute rich fluids no restrictions  - Dose all meds per eGFR < 60  - Counseled he needs to restrict free water intake as OP to prevent future episodes such as this  - IR to replace suprapubic catheter today  - SNA Q6 ok at this time and goal to keep SNa ~ 125 or so today    Thank you for the consultation!

## 2019-07-17 NOTE — PROGRESS NOTES
"Hospital Medicine Daily Progress Note    Date of Service  7/17/2019    Chief Complaint      Hospital Course    69 y.o. male admitted 7/16/2019 with Hx of chronic SP cath, Crohn's, Dm, CHF.  He has a chronic SP cath due to Hx of urethral stricture.  Cath was placed in June of this year.  On evaluation at outlying facility he was found ot have a Na of 112, CO2 19, Creat 1.75.         Interval Problem Update  Pt states he's \"OK\", didn't sleep well but otherwise has no complaints  Mildly confused  Sinus/sinus nikki  SBP 90-110s  + output from ostomy  Tolerating po  UOP 1600ml/12hrs    Consultants/Specialty  Pulmonology  Nephrology      Code Status  full    Disposition  Cont in ICU while correcting Na    Review of Systems  Review of Systems   Unable to perform ROS: Mental status change        Physical Exam  Temp:  [35.8 °C (96.4 °F)-36.3 °C (97.3 °F)] 35.8 °C (96.4 °F)  Pulse:  [62-94] 62  Resp:  [12-22] 16  BP: (164)/(70) 164/70  SpO2:  [94 %-100 %] 99 %    Physical Exam   Constitutional: He appears well-developed and well-nourished. No distress.   HENT:   Head: Normocephalic and atraumatic.   Nose: Nose normal.   Mouth/Throat: Oropharynx is clear and moist.   Eyes: Conjunctivae are normal.   Neck: No JVD present.   Cardiovascular: Normal rate.  Exam reveals no gallop.    Murmur heard.  Pulmonary/Chest: Effort normal. No stridor. No respiratory distress. He has no wheezes. He has no rales.   Abdominal: Soft. There is no tenderness. There is no rebound and no guarding.   Ostomy noted  SP cath site noted   Musculoskeletal: He exhibits edema.   Neurological: He is alert.   Skin: Skin is warm and dry. No rash noted. He is not diaphoretic.   Psychiatric: He has a normal mood and affect. Thought content normal.   Nursing note and vitals reviewed.      Fluids    Intake/Output Summary (Last 24 hours) at 07/17/19 0561  Last data filed at 07/17/19 0500   Gross per 24 hour   Intake           111.67 ml   Output             1400 ml "   Net         -1288.33 ml       Laboratory  Recent Labs      07/16/19 2004   WBC  12.6*   RBC  4.45*   HEMOGLOBIN  12.6*   HEMATOCRIT  35.7*   MCV  80.2*   MCH  28.3   MCHC  35.3   RDW  42.5   PLATELETCT  459*   MPV  9.1     Recent Labs      07/17/19   0007  07/17/19   0230  07/17/19   0400   SODIUM  120*  121*  123*   POTASSIUM  3.8  3.9  3.7   CHLORIDE  91*  92*  93*   CO2  21  20  21   GLUCOSE  75  78  78   BUN  17  18  18   CREATININE  1.62*  1.72*  1.74*   CALCIUM  9.2  9.3  9.0                   Imaging  IR-MIDLINE CATHETER INSERTION WO GUIDANCE > AGE 3   Final Result                  Ultrasound-guided midline placement performed by qualified nursing staff    as above.          OUTSIDE IMAGES-CT ABDOMEN /PELVIS   Final Result      IR-DRAIN-BLADDER SUPRAPUB W/CATH    (Results Pending)        Assessment/Plan  * Hyponatremia- (present on admission)   Assessment & Plan    Dr Caballero consulted from nephro  Unclear if this is hypervolemic from urinary obstruction due to catheter dysfunction, now catheter in place and draining  tittrate D5w to keep Na 120-125 overnight  q6 BMPs  Nephrology consulted     Obstructive uropathy- (present on admission)   Assessment & Plan    Due to uretral stricutres and BPH   Urology has been consulted   At present has a harrington  IR consulted for replacement of SP cath     Leukocytosis- (present on admission)   Assessment & Plan    This patient has chronic leukocytosis   No evidence of infection   Cont to montior      Suprapubic catheter dysfunction (HCC)   Assessment & Plan    Displaced and removed   Urology consulted for evaluation by ERP      Anemia   Assessment & Plan    No evidence of bleeding lab workup ordered     CRF (chronic renal failure), stage 3 (moderate) (HCC)- (present on admission)   Assessment & Plan    At baseline renal function   Cont to montior      HTN (hypertension)- (present on admission)   Assessment & Plan    Resume home BB      BPH (benign prostatic hyperplasia)-  (present on admission)   Assessment & Plan    Hx of resume home flomax     Crohn's disease (HCC)- (present on admission)   Assessment & Plan    S/p ostomy placement      Type 2 diabetes mellitus, with long-term current use of insulin (HCC)- (present on admission)   Assessment & Plan    SSI ordered  accu checks           VTE prophylaxis: heparin

## 2019-07-17 NOTE — PROGRESS NOTES
Critical Care Progress Note    Date of admission  7/16/2019    Chief Complaint  69 y.o. male admitted 7/16/2019 with life threatening hyperkalemia    Hospital Course    69 y.o. male who presented 7/16/2019 with acute on chronic hyponatremia      Interval Problem Update  Reviewed last 24 hour events:  Tm 97.3  -1.4L over last 24hr  No cxr this am  Na 112 @ OSH, 137 on arrival, 121 now  K 3.6    D5w @ 100    98% on 2L   Last bm 7/16    Addendum  Repeat BMP showing NA slightly higher at 123 goal is 120 122 at 8 to 10 mEq per 24 hours.  Continue with D5W at this time, continue to closely follow glucose as well, aspiration seizure precautions.  Patient remains with critical hyponatremia slightly overcorrected at this time and requiring ongoing serial BMPs and careful titration to attempt to minimize severe and irreversible neurologic damage.    Review of Systems  Review of Systems   Constitutional: Positive for chills. Negative for fever.   HENT: Negative for congestion.    Eyes: Negative for blurred vision and double vision.   Respiratory: Negative for cough and shortness of breath.    Cardiovascular: Negative for chest pain.   Gastrointestinal: Positive for abdominal pain (Suprapubic). Negative for nausea and vomiting.   Neurological: Positive for weakness. Negative for focal weakness.   Psychiatric/Behavioral: Negative for depression.   All other systems reviewed and are negative.       Vital Signs for last 24 hours   Temp:  [35.8 °C (96.4 °F)-36.3 °C (97.3 °F)] 36.1 °C (97 °F)  Pulse:  [60-94] 60  Resp:  [12-22] 12  BP: (164)/(70) 164/70  SpO2:  [94 %-100 %] 98 %    Hemodynamic parameters for last 24 hours       Respiratory Information for the last 24 hours       Physical Exam   Physical Exam   Constitutional: He appears well-developed and well-nourished.   HENT:   Head: Normocephalic and atraumatic.   Eyes: Pupils are equal, round, and reactive to light. Conjunctivae are normal.   Neck: No tracheal deviation  present.   Cardiovascular: Normal rate and intact distal pulses.    Pulmonary/Chest: He has no wheezes. He has no rales.   Abdominal: Soft. He exhibits no distension. There is tenderness (In suprapubic region). There is no rebound and no guarding.   Musculoskeletal: He exhibits edema.   Neurological: No cranial nerve deficit.   Skin: Skin is warm and dry.   Psychiatric: He has a normal mood and affect.   Nursing note and vitals reviewed.      Medications  Current Facility-Administered Medications   Medication Dose Route Frequency Provider Last Rate Last Dose   • D5W infusion   Intravenous Continuous Mao Dunn M.D. 100 mL/hr at 07/17/19 0353     • senna-docusate (PERICOLACE or SENOKOT S) 8.6-50 MG per tablet 2 Tab  2 Tab Oral BID Mao Dunn M.D.   2 Tab at 07/17/19 0615    And   • polyethylene glycol/lytes (MIRALAX) PACKET 1 Packet  1 Packet Oral QDAY PRN Mao Dunn M.D.        And   • magnesium hydroxide (MILK OF MAGNESIA) suspension 30 mL  30 mL Oral QDAY PRN Mao Dunn M.D.        And   • bisacodyl (DULCOLAX) suppository 10 mg  10 mg Rectal QDAY PRN Mao Dunn M.D.       • heparin injection 5,000 Units  5,000 Units Subcutaneous Q8HRS Mao Dunn M.D.   5,000 Units at 07/17/19 0613   • Pharmacy Consult Request ...Pain Management Review 1 Each  1 Each Other PHARMACY TO DOSE Mao Dunn M.D.        And   • oxyCODONE immediate-release (ROXICODONE) tablet 5 mg  5 mg Oral Q3HRS PRN Mao Dunn M.D.        And   • oxyCODONE immediate release (ROXICODONE) tablet 10 mg  10 mg Oral Q3HRS PRN Mao Dunn M.D.   10 mg at 07/17/19 0339    And   • morphine (pf) 4 mg/ml injection 4 mg  4 mg Intravenous Q3HRS PRN Mao Dunn M.D.       • atorvastatin (LIPITOR) tablet 80 mg  80 mg Oral Q EVENING Mao Dunn M.D.   80 mg at 07/17/19 0339   • budesonide-formoterol (SYMBICORT) 160-4.5 MCG/ACT inhaler 2 Puff  2 Puff Inhalation BID Mao Dunn M.D.   2 Puff  at 07/17/19 0615   • metoprolol (LOPRESSOR) tablet 12.5 mg  12.5 mg Oral BID Mao Dunn M.D.   12.5 mg at 07/17/19 0615   • oxybutynin (DITROPAN) tablet 5 mg  5 mg Oral Q12HRS Mao Dunn M.D.   5 mg at 07/17/19 0615   • tamsulosin (FLOMAX) capsule 0.4 mg  0.4 mg Oral AFTER BREAKFAST Mao Dunn M.D.       • Respiratory Care per Protocol   Nebulization Continuous RT Mao Dunn M.D.       • insulin regular (HUMULIN R) injection 1-6 Units  1-6 Units Subcutaneous Q6HRS Mao Dunn M.D.   Stopped at 07/17/19 0326    And   • glucose 4 g chewable tablet 16 g  16 g Oral Q15 MIN PRN Mao Dunn M.D.        And   • DEXTROSE 10% BOLUS 250 mL  250 mL Intravenous Q15 MIN PRN Mao Dunn M.D.           Fluids    Intake/Output Summary (Last 24 hours) at 07/17/19 0717  Last data filed at 07/17/19 0600   Gross per 24 hour   Intake           211.67 ml   Output             1700 ml   Net         -1488.33 ml       Laboratory          Recent Labs      07/17/19   0230  07/17/19   0400  07/17/19   0605   SODIUM  121*  123*  121*   POTASSIUM  3.9  3.7  3.6   CHLORIDE  92*  93*  91*   CO2  20  21  23   BUN  18 18  17   CREATININE  1.72*  1.74*  1.74*   CALCIUM  9.3  9.0  9.0     Recent Labs      07/17/19   0230  07/17/19   0400  07/17/19   0605   GLUCOSE  78  78  138*     Recent Labs      07/16/19 2004   WBC  12.6*   NEUTSPOLYS  68.10   LYMPHOCYTES  22.20   MONOCYTES  8.20   EOSINOPHILS  0.80   BASOPHILS  0.20     Recent Labs      07/16/19 2004 07/17/19   0007  07/17/19   0400   RBC  4.45*   --    --    HEMOGLOBIN  12.6*   --    --    HEMATOCRIT  35.7*   --    --    PLATELETCT  459*   --    --    IRON   --   26*   --    FERRITIN   --    --   59.8   TOTIRONBC   --   343   --        Imaging  X-Ray:  No film today    Assessment/Plan  * Hyponatremia- (present on admission)   Assessment & Plan    Life-threatening and critical hyponatremia  112 at OSH with a goal of 120 today  I will continue  every 2 hour NA levels  At present we will continue D5W and will adjust pending results of serial sodium  Monitor for seizure     Obstructive uropathy- (present on admission)   Assessment & Plan    Will need replacement of suprapubic catheter  Humphrey currently in place  Continue to monitor urine output and renal function  Possible urology consultation     Leukocytosis- (present on admission)   Assessment & Plan    Continue to trend  Low threshold for antibiotics     Suprapubic catheter dysfunction (HCC)   Assessment & Plan     urology versus IR     Anemia   Assessment & Plan    Stable not an active issue     CRF (chronic renal failure), stage 3 (moderate) (HCC)- (present on admission)   Assessment & Plan    Baseline CR approximately 1.7  Renally dose medications and minimize nephrotoxic substances  Monitor urine output  Maintain Humphrey until suprapubic catheter can be replaced     HTN (hypertension)- (present on admission)   Assessment & Plan    Continue metoprolol     Type 2 diabetes mellitus, with long-term current use of insulin (HCC)- (present on admission)   Assessment & Plan    Insulin sliding scale          VTE:  Heparin  Ulcer: Not Indicated  Lines: Humphrey Catheter  Ongoing indication addressed    I have performed a physical exam and reviewed and updated ROS and Plan today (7/17/2019). In review of yesterday's note (7/16/2019), there are no changes except as documented above.     Discussed patient condition and risk of morbidity and/or mortality with Hospitalist, RN, RT, Pharmacy and Patient  The patient remains critically ill.  Critical care time = 35 minutes in directly providing and coordinating critical care and extensive data review.  No time overlap and excludes procedures.

## 2019-07-17 NOTE — ASSESSMENT & PLAN NOTE
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

## 2019-07-17 NOTE — PROGRESS NOTES
2 RN skin check complete with PREET Drake.  Devices in place leads, harrington, SCDs, NIBP.   Skin assessed under the following devices All aforementioned .   Preventative measures in place including pillows in use to float heels and offload sacrum. Preventative sacral mepilex.  Following areas of concern:   · Open wound on urethral meautus- consult placed, pictures taken. Soap and water harrington care  Excoriation around ostomy, changed PRN and ostomy care in place   R second toe dark, MD aware, wound following.  Redness on sacrum and elbows, mepilex in place.    Wound consult placedYES/NO: yes    Wound reported YES/NO: yes  Appropriate LDAs opened YES/NO: yes

## 2019-07-17 NOTE — ED PROVIDER NOTES
"ED Provider Note    Scribed for Isaac Lilly M.D. by Xochitl Szymanski. 7/16/2019, 8:10 PM.    Primary care provider: Nicolette Yeager M.D. (Inactive)  Means of arrival: Ambulance  History obtained from: Patient  History limited by: None     CHIEF COMPLAINT  Chief Complaint   Patient presents with   • Suprapubic Pain     Pt's suprapubic catheter fell out this afternoon, [facility] not able to replace it. Pt was also to be found hyponatremic has hx of hyponatremia       HPI  Shola Hoyt is a 69 y.o. male with a history of Diabetes and Benign prostate hyperplasia who presents to the Emergency Department transferred from [facility] by ambulance complaining of suprapubic catheter displacement onset 8 hours prior to my exam. Per patient, [facility] and states that it had \"just fallen out\" earlier today. He states that [facility] was unable to replace it. The patient notes he is unaware of the reason for his catheter placement. He states he is able to urinate but reports associated urinary incontinence. The patient denies any pain, fever, chills, nausea, or vomiting. Per nurse, patient has a history of hyponatremia with no history of seizures.     Review of past medical records show that patient had an ED visit earlier last month for UTI, possible urosepsis. IR placed suprapubic catheter on 06/10/19. Patient had indwelling catheter in April.    REVIEW OF SYSTEMS  Pertinent positives include urinary incontinence. Pertinent negatives include no pain, fever, chills, nausea, or vomiting. All other systems negative.    PAST MEDICAL HISTORY   has a past medical history of Arthritis; Benign prostate hyperplasia; Colostomy in place (HCC); COPD (chronic obstructive pulmonary disease) (HCC); Diabetes (HCC); and Renal disorder.    SURGICAL HISTORY   has a past surgical history that includes toe amputation (Right, 10/25/2017); ercp,diagnostic " "(3/29/2019); and carlos by laparoscopy (N/A, 3/31/2019).    SOCIAL HISTORY  Social History   Substance Use Topics   • Smoking status: Former Smoker   • Smokeless tobacco: Former User   • Alcohol use Not on file      History   Drug use: Unknown       FAMILY HISTORY  Family History   Problem Relation Age of Onset   • No Known Problems Mother    • No Known Problems Father        CURRENT MEDICATIONS  No current facility-administered medications on file prior to encounter.      Current Outpatient Prescriptions on File Prior to Encounter   Medication Sig Dispense Refill   • budesonide-formoterol (SYMBICORT) 160-4.5 MCG/ACT Aerosol Inhale 2 Puffs by mouth 2 Times a Day.     • atorvastatin (LIPITOR) 80 MG tablet Take 1 Tab by mouth every evening. 30 Tab    • metoprolol (LOPRESSOR) 25 MG Tab Take 0.5 Tabs by mouth 2 Times a Day. 60 Tab    • tamsulosin (FLOMAX) 0.4 MG capsule Take 1 Cap by mouth ONE-HALF HOUR AFTER BREAKFAST. 30 Cap    • therapeutic multivitamin-minerals (THERAGRAN-M) Tab Take 1 Tab by mouth every day. 30 Tab 11   • oxybutynin (DITROPAN) 5 MG Tab Take 1 Tab by mouth every 12 hours. 90 Tab        ALLERGIES  Allergies   Allergen Reactions   • Ceftriaxone      Has tolerated Merrem, Zosyn, and Unasyn   • Ezetimibe    • Flagyl [Metronidazole]    • Infliximab    • Levaquin    • Lovastatin Shortness of Breath   • Simvastatin Shortness of Breath   • Vancomycin        PHYSICAL EXAM  VITAL SIGNS: BP (!) 164/70   Pulse 74   Resp 16   Ht 1.803 m (5' 11\")   Wt 108.9 kg (240 lb)   SpO2 98%   BMI 33.47 kg/m²     Constitutional: Well developed, Well nourished, mild distress.   HENT: Normocephalic, Atraumatic, Oropharynx moist.   Eyes: Conjunctiva normal, No discharge.   Cardiovascular: Normal heart rate, Normal rhythm, No murmurs, equal pulses.   Pulmonary: Normal breath sounds, No respiratory distress, No wheezing, No rales, No rhonchi.  Chest: No chest wall tenderness or deformity.   Abdomen: Soft, No masses, no " rebound, no guarding. Tenderness to suprapubic region, Area of previous suprapubic catheter placed with slight bleeding and urine, Hole appears to be close.  Patient's penis appears to be healed there is some mild hypospadias.  Back: No CVA tenderness.   Musculoskeletal: No major deformities noted, No tenderness.   Skin: Warm, Dry, No erythema, No rash.   Neurologic: Alert & oriented x 3, Normal motor function,  No focal deficits noted.   Psychiatric: Affect normal, Judgment normal, Mood normal.     LABS  Results for orders placed or performed during the hospital encounter of 07/16/19   BASIC METABOLIC PANEL   Result Value Ref Range    Sodium 137 135 - 145 mmol/L    Potassium 6.5 (H) 3.6 - 5.5 mmol/L    Chloride 106 96 - 112 mmol/L    Co2 27 20 - 33 mmol/L    Glucose 106 (H) 65 - 99 mg/dL    Bun 95 (HH) 8 - 22 mg/dL    Creatinine 2.40 (H) 0.50 - 1.40 mg/dL    Calcium 8.4 (L) 8.5 - 10.5 mg/dL    Anion Gap 4.0 0.0 - 11.9   ESTIMATED GFR   Result Value Ref Range    GFR If  33 (A) >60 mL/min/1.73 m 2    GFR If Non  27 (A) >60 mL/min/1.73 m 2   CBC WITH DIFFERENTIAL   Result Value Ref Range    WBC 12.6 (H) 4.8 - 10.8 K/uL    RBC 4.45 (L) 4.70 - 6.10 M/uL    Hemoglobin 12.6 (L) 14.0 - 18.0 g/dL    Hematocrit 35.7 (L) 42.0 - 52.0 %    MCV 80.2 (L) 81.4 - 97.8 fL    MCH 28.3 27.0 - 33.0 pg    MCHC 35.3 33.7 - 35.3 g/dL    RDW 42.5 35.9 - 50.0 fL    Platelet Count 459 (H) 164 - 446 K/uL    MPV 9.1 9.0 - 12.9 fL    Neutrophils-Polys 68.10 44.00 - 72.00 %    Lymphocytes 22.20 22.00 - 41.00 %    Monocytes 8.20 0.00 - 13.40 %    Eosinophils 0.80 0.00 - 6.90 %    Basophils 0.20 0.00 - 1.80 %    Immature Granulocytes 0.50 0.00 - 0.90 %    Nucleated RBC 0.00 /100 WBC    Neutrophils (Absolute) 8.59 (H) 1.82 - 7.42 K/uL    Lymphs (Absolute) 2.80 1.00 - 4.80 K/uL    Monos (Absolute) 1.03 (H) 0.00 - 0.85 K/uL    Eos (Absolute) 0.10 0.00 - 0.51 K/uL    Baso (Absolute) 0.03 0.00 - 0.12 K/uL    Immature  Granulocytes (abs) 0.06 0.00 - 0.11 K/uL    NRBC (Absolute) 0.00 K/uL   BASIC METABOLIC PANEL   Result Value Ref Range    Sodium 119 (LL) 135 - 145 mmol/L    Potassium 3.6 3.6 - 5.5 mmol/L    Chloride 93 (L) 96 - 112 mmol/L    Co2 18 (L) 20 - 33 mmol/L    Glucose 74 65 - 99 mg/dL    Bun 17 8 - 22 mg/dL    Creatinine 1.51 (H) 0.50 - 1.40 mg/dL    Calcium 8.2 (L) 8.5 - 10.5 mg/dL    Anion Gap 8.0 0.0 - 11.9   ESTIMATED GFR   Result Value Ref Range    GFR If  56 (A) >60 mL/min/1.73 m 2    GFR If Non  46 (A) >60 mL/min/1.73 m 2   RETICULOCYTES COUNT   Result Value Ref Range    Reticulocyte Count 1.7 0.8 - 2.1 %    Retic, Absolute 0.08 (H) 0.04 - 0.06 M/uL    Imm. Reticulocyte Fraction 11.7 9.3 - 17.4 %    Retic Hgb Equivalent 31.4 29.0 - 35.0 pg/cell   URINE SODIUM RANDOM   Result Value Ref Range    Sodium, Urine -per volume 14 mmol/L   URINE CREATININE RANDOM   Result Value Ref Range    Creatinine, Random Urine 19.40 mg/dL   Basic Metabolic Panel   Result Value Ref Range    Sodium 120 (L) 135 - 145 mmol/L    Potassium 3.8 3.6 - 5.5 mmol/L    Chloride 91 (L) 96 - 112 mmol/L    Co2 21 20 - 33 mmol/L    Glucose 75 65 - 99 mg/dL    Bun 17 8 - 22 mg/dL    Creatinine 1.62 (H) 0.50 - 1.40 mg/dL    Calcium 9.2 8.5 - 10.5 mg/dL    Anion Gap 8.0 0.0 - 11.9   IRON/TOTAL IRON BIND   Result Value Ref Range    Iron 26 (L) 50 - 180 ug/dL    Total Iron Binding 343 250 - 450 ug/dL    % Saturation 8 (L) 15 - 55 %   ESTIMATED GFR   Result Value Ref Range    GFR If  51 (A) >60 mL/min/1.73 m 2    GFR If Non  43 (A) >60 mL/min/1.73 m 2   ACCU-CHEK GLUCOSE   Result Value Ref Range    Glucose - Accu-Ck 78 65 - 99 mg/dL     All labs reviewed by me.    COURSE & MEDICAL DECISION MAKING  Pertinent Labs & Imaging studies reviewed. (See chart for details)    8:10 PM - Chart review shows that patient had an acute kidney injury and has an impella device. Patient had an ED visit earlier  last month for UTI, possible urosepsis.      Review of [facility] transfer records show:  Impression: 1. Again, diastases of ventral abdominal wall musculature and muscle wall atrophy with patulous abdominal wall with convex appearance of the peritoneum and fascia, but no ventral abdominal wall hernia defect. Multiple visceral structures, particularly bowel , are present within the patulous abdominal wall as well as the ureters been somewhat tethered ventrally. There is right lower quadrant colostomy but no ostomy hernia. No evidence of bowel obstruction.   2. Interval development of mild-to-moderate bilateral hydronephrosis as well as hydroureter, the ureters are dilated distally below the level of ureter tethering. No obstructing calculi or masses. However, the prostate is prominent and the urinary bladder is at least mildly dilated.   3. Previous cholecystectomy. Presumably previous sphincterotomy with stable pneumobilia. No evidence of biliary obstruction.   4. IVC filter in place.     WBC: 14.6  Hemoglobin: 11.9  Hematocrit: 34.6  Platelet count: 473  Sodium: 112  Potassium: 3.8  Chloride: 81  CO2 Bicarbonate: 19  Glucose: 67  BUN: 18  Creatinine Serum: 1.75  AST/SGOT: 25  ALT/SGPT: 27  Bilirubin total: 0.4  Alkaline Phosphatase: 104  INR: 1.0  Leukocyte esterase: 1+  Nitrites: Negative  WBC: 0-5  RBC: 6-10    8:22 PM - Patient seen and examined at bedside. Discussed with patient I will order labs to further evaluate and call urology; patient consents to plan of care. Patient consents to plan of care. Patient will be treated with Zofran 4 mg injection and Morphine 4 mg injection. Ordered BMP and Estimated GFR to evaluate his symptoms. The differential diagnoses include but are not limited to: Urinary retention    8:27 PM - Review of previous charts show that IR placed suprapubic catheter on 06/10/19. Patient had indwelling catheter in April.     8:32 PM - Paged Urology.     8:48 PM - I discussed  the patient's case and the above findings with Dr. Alex (Urology) who states it is okay to place a harrington catheter.     10:02 PM - Per nurse, harrington catheter has been placed but patient complains of pain. Patient will be treated with Morphine 4 mg injection.     10:03 PM - Paged Hospitalist.    10:17 PM - I discussed the patient's case and the above findings with Dr. Dunn (Hospitalist) who agrees to admit and transfer care of patient.  Repeat labs look like the patient's sodium had corrected.  We sent a second BMP which appears which showed the sodium is only come up to 119.  We will hold off on giving IV fluids.  I think the other lab was done in error.    Medical Decision Making: At this point time patient appears to have had a suprapubic catheter that is been displaced.  He did have some urinary retention therefore a new Harrington catheter is been placed.  Patient also had some hyponatremia this is come up with fluids given at outside facility.  We have not given the patient fluids here.  At this point time patient will be admitted so that he can get a suprapubic catheter placed in the morning and careful monitoring of the patient's hyponatremia.    DISPOSITION:  Patient will be admitted to Dr. Dunn (Hospitalist) in guarded condition.     FINAL IMPRESSION  1. Hyponatremia    2. Mechanical complication of suprapubic catheter, initial encounter ([facility])    3. Urinary retention          Xochitl WINTER (Scribzia), am scribing for, and in the presence of, Isaac Lilly M.D.    Electronically signed by: Xochitl Szymanski (Scribe), 7/16/2019    Isaac WINTER M.D. personally performed the services described in this documentation, as scribed by Xochitl Szymanski in my presence, and it is both accurate and complete.    C    The note accurately reflects work and decisions made by me.  Isaac Lilly  7/17/2019  4:36 AM

## 2019-07-17 NOTE — CONSULTS
Critical Care Consultation    Date of consult: 7/17/2019    Referring Physician  No att. providers found    Reason for Consultation  Life threatening electrolyte problems    History of Presenting Illness  69 y.o. male who presented 7/16/2019 with acute on chronic hyponatremia     Code Status  Full Code    Review of Systems  Review of Systems   Unable to perform ROS: Mental acuity   Constitutional: Positive for chills.       Past Medical History   has a past medical history of Arthritis; Benign prostate hyperplasia; Colostomy in place (HCC); COPD (chronic obstructive pulmonary disease) (HCC); Diabetes (HCC); and Renal disorder.    Surgical History   has a past surgical history that includes toe amputation (Right, 10/25/2017); pr ercp,diagnostic (3/29/2019); and carlos by laparoscopy (N/A, 3/31/2019).    Family History  family history includes No Known Problems in his father and mother.    Social History   reports that he has quit smoking. He has quit using smokeless tobacco.    Medications  Home Medications     Reviewed by Keyla Wood (Pharmacy Tech) on 07/16/19 at 2032  Med List Status: Complete   Medication Last Dose Status   atorvastatin (LIPITOR) 80 MG tablet 7/15/2019 Active   budesonide-formoterol (SYMBICORT) 160-4.5 MCG/ACT Aerosol 7/16/2019 Active   insulin regular (HUMULIN R) 100 Unit/mL Solution 7/16/2019 Active   metoprolol (LOPRESSOR) 25 MG Tab 7/16/2019 Active   oxybutynin (DITROPAN) 5 MG Tab 7/16/2019 Active   tamsulosin (FLOMAX) 0.4 MG capsule 7/16/2019 Active   therapeutic multivitamin-minerals (THERAGRAN-M) Tab 7/16/2019 Active              Current Facility-Administered Medications   Medication Dose Route Frequency Provider Last Rate Last Dose   • D5W infusion   Intravenous Continuous Mao Dunn M.D. 100 mL/hr at 07/17/19 0353     • senna-docusate (PERICOLACE or SENOKOT S) 8.6-50 MG per tablet 2 Tab  2 Tab Oral BID Mao Dunn M.D.   2 Tab at 07/17/19 0615    And   •  polyethylene glycol/lytes (MIRALAX) PACKET 1 Packet  1 Packet Oral QDAY PRN Mao Dunn M.D.        And   • magnesium hydroxide (MILK OF MAGNESIA) suspension 30 mL  30 mL Oral QDAY PRN Mao Dunn M.D.        And   • bisacodyl (DULCOLAX) suppository 10 mg  10 mg Rectal QDAY PRN Mao Dunn M.D.       • heparin injection 5,000 Units  5,000 Units Subcutaneous Q8HRS Mao Dunn M.D.   5,000 Units at 07/17/19 0613   • Pharmacy Consult Request ...Pain Management Review 1 Each  1 Each Other PHARMACY TO DOSE Mao Dunn M.D.        And   • oxyCODONE immediate-release (ROXICODONE) tablet 5 mg  5 mg Oral Q3HRS PRN Mao Dunn M.D.        And   • oxyCODONE immediate release (ROXICODONE) tablet 10 mg  10 mg Oral Q3HRS PRN Mao Dunn M.D.   10 mg at 07/17/19 0339    And   • morphine (pf) 4 mg/ml injection 4 mg  4 mg Intravenous Q3HRS PRN Mao Dunn M.D.       • atorvastatin (LIPITOR) tablet 80 mg  80 mg Oral Q EVENING Mao Dunn M.D.   80 mg at 07/17/19 0339   • budesonide-formoterol (SYMBICORT) 160-4.5 MCG/ACT inhaler 2 Puff  2 Puff Inhalation BID Mao Dunn M.D.   2 Puff at 07/17/19 0615   • metoprolol (LOPRESSOR) tablet 12.5 mg  12.5 mg Oral BID Mao Dunn M.D.   12.5 mg at 07/17/19 0615   • oxybutynin (DITROPAN) tablet 5 mg  5 mg Oral Q12HRS Mao Dunn M.D.   5 mg at 07/17/19 0615   • tamsulosin (FLOMAX) capsule 0.4 mg  0.4 mg Oral AFTER BREAKFAST Mao Dunn M.D.       • Respiratory Care per Protocol   Nebulization Continuous RT Mohammad Y Hanif, M.D.       • insulin regular (HUMULIN R) injection 1-6 Units  1-6 Units Subcutaneous Q6HRS Mao Dunn M.D.   Stopped at 07/17/19 0326    And   • glucose 4 g chewable tablet 16 g  16 g Oral Q15 MIN PRN Mao Dunn M.D.        And   • DEXTROSE 10% BOLUS 250 mL  250 mL Intravenous Q15 MIN PRN Mao Dunn M.D.           Allergies  Allergies   Allergen Reactions   • Ceftriaxone       Has tolerated Merrem, Zosyn, and Unasyn   • Ezetimibe    • Flagyl [Metronidazole]    • Infliximab    • Levaquin    • Lovastatin Shortness of Breath   • Simvastatin Shortness of Breath   • Vancomycin        Vital Signs last 24 hours  Temp:  [35.8 °C (96.4 °F)-36.3 °C (97.3 °F)] 36.1 °C (97 °F)  Pulse:  [60-94] 60  Resp:  [12-22] 12  BP: (164)/(70) 164/70  SpO2:  [94 %-100 %] 98 %    Physical Exam  Physical Exam   Constitutional: He is oriented to person, place, and time. He appears well-developed and well-nourished.   Sleepy and lethargic at time of my exam and unable to give a useful history.   HENT:   Head: Normocephalic and atraumatic.   Eyes: Pupils are equal, round, and reactive to light. Conjunctivae and EOM are normal.   Cardiovascular: Normal rate and regular rhythm.    Pulmonary/Chest: Effort normal and breath sounds normal.   Abdominal: Soft. Bowel sounds are normal.   Musculoskeletal: Normal range of motion.   Neurological: He is alert and oriented to person, place, and time.   Skin: Skin is warm and dry.       Fluids    Intake/Output Summary (Last 24 hours) at 07/17/19 0630  Last data filed at 07/17/19 0600   Gross per 24 hour   Intake           211.67 ml   Output             1700 ml   Net         -1488.33 ml       Laboratory  Recent Results (from the past 48 hour(s))   BASIC METABOLIC PANEL    Collection Time: 07/16/19  8:04 PM   Result Value Ref Range    Sodium 137 135 - 145 mmol/L    Potassium 6.5 (H) 3.6 - 5.5 mmol/L    Chloride 106 96 - 112 mmol/L    Co2 27 20 - 33 mmol/L    Glucose 106 (H) 65 - 99 mg/dL    Bun 95 (HH) 8 - 22 mg/dL    Creatinine 2.40 (H) 0.50 - 1.40 mg/dL    Calcium 8.4 (L) 8.5 - 10.5 mg/dL    Anion Gap 4.0 0.0 - 11.9   ESTIMATED GFR    Collection Time: 07/16/19  8:04 PM   Result Value Ref Range    GFR If  33 (A) >60 mL/min/1.73 m 2    GFR If Non  27 (A) >60 mL/min/1.73 m 2   CBC WITH DIFFERENTIAL    Collection Time: 07/16/19  8:04 PM   Result Value  Ref Range    WBC 12.6 (H) 4.8 - 10.8 K/uL    RBC 4.45 (L) 4.70 - 6.10 M/uL    Hemoglobin 12.6 (L) 14.0 - 18.0 g/dL    Hematocrit 35.7 (L) 42.0 - 52.0 %    MCV 80.2 (L) 81.4 - 97.8 fL    MCH 28.3 27.0 - 33.0 pg    MCHC 35.3 33.7 - 35.3 g/dL    RDW 42.5 35.9 - 50.0 fL    Platelet Count 459 (H) 164 - 446 K/uL    MPV 9.1 9.0 - 12.9 fL    Neutrophils-Polys 68.10 44.00 - 72.00 %    Lymphocytes 22.20 22.00 - 41.00 %    Monocytes 8.20 0.00 - 13.40 %    Eosinophils 0.80 0.00 - 6.90 %    Basophils 0.20 0.00 - 1.80 %    Immature Granulocytes 0.50 0.00 - 0.90 %    Nucleated RBC 0.00 /100 WBC    Neutrophils (Absolute) 8.59 (H) 1.82 - 7.42 K/uL    Lymphs (Absolute) 2.80 1.00 - 4.80 K/uL    Monos (Absolute) 1.03 (H) 0.00 - 0.85 K/uL    Eos (Absolute) 0.10 0.00 - 0.51 K/uL    Baso (Absolute) 0.03 0.00 - 0.12 K/uL    Immature Granulocytes (abs) 0.06 0.00 - 0.11 K/uL    NRBC (Absolute) 0.00 K/uL   BASIC METABOLIC PANEL    Collection Time: 07/16/19 10:55 PM   Result Value Ref Range    Sodium 119 (LL) 135 - 145 mmol/L    Potassium 3.6 3.6 - 5.5 mmol/L    Chloride 93 (L) 96 - 112 mmol/L    Co2 18 (L) 20 - 33 mmol/L    Glucose 74 65 - 99 mg/dL    Bun 17 8 - 22 mg/dL    Creatinine 1.51 (H) 0.50 - 1.40 mg/dL    Calcium 8.2 (L) 8.5 - 10.5 mg/dL    Anion Gap 8.0 0.0 - 11.9   ESTIMATED GFR    Collection Time: 07/16/19 10:55 PM   Result Value Ref Range    GFR If  56 (A) >60 mL/min/1.73 m 2    GFR If Non  46 (A) >60 mL/min/1.73 m 2   Basic Metabolic Panel    Collection Time: 07/17/19 12:07 AM   Result Value Ref Range    Sodium 120 (L) 135 - 145 mmol/L    Potassium 3.8 3.6 - 5.5 mmol/L    Chloride 91 (L) 96 - 112 mmol/L    Co2 21 20 - 33 mmol/L    Glucose 75 65 - 99 mg/dL    Bun 17 8 - 22 mg/dL    Creatinine 1.62 (H) 0.50 - 1.40 mg/dL    Calcium 9.2 8.5 - 10.5 mg/dL    Anion Gap 8.0 0.0 - 11.9   IRON/TOTAL IRON BIND    Collection Time: 07/17/19 12:07 AM   Result Value Ref Range    Iron 26 (L) 50 - 180 ug/dL     Total Iron Binding 343 250 - 450 ug/dL    % Saturation 8 (L) 15 - 55 %   ESTIMATED GFR    Collection Time: 07/17/19 12:07 AM   Result Value Ref Range    GFR If  51 (A) >60 mL/min/1.73 m 2    GFR If Non  43 (A) >60 mL/min/1.73 m 2   Basic Metabolic Panel    Collection Time: 07/17/19  2:30 AM   Result Value Ref Range    Sodium 121 (L) 135 - 145 mmol/L    Potassium 3.9 3.6 - 5.5 mmol/L    Chloride 92 (L) 96 - 112 mmol/L    Co2 20 20 - 33 mmol/L    Glucose 78 65 - 99 mg/dL    Bun 18 8 - 22 mg/dL    Creatinine 1.72 (H) 0.50 - 1.40 mg/dL    Calcium 9.3 8.5 - 10.5 mg/dL    Anion Gap 9.0 0.0 - 11.9   ESTIMATED GFR    Collection Time: 07/17/19  2:30 AM   Result Value Ref Range    GFR If  48 (A) >60 mL/min/1.73 m 2    GFR If Non African American 40 (A) >60 mL/min/1.73 m 2   ACCU-CHEK GLUCOSE    Collection Time: 07/17/19  3:22 AM   Result Value Ref Range    Glucose - Accu-Ck 78 65 - 99 mg/dL   OSMOLALITY URINE    Collection Time: 07/17/19  3:45 AM   Result Value Ref Range    Osmolality Urine 72 (L) 300 - 900 mOsm/kg H2O   URINE SODIUM RANDOM    Collection Time: 07/17/19  3:45 AM   Result Value Ref Range    Sodium, Urine -per volume 14 mmol/L   URINE CREATININE RANDOM    Collection Time: 07/17/19  3:45 AM   Result Value Ref Range    Creatinine, Random Urine 19.40 mg/dL   FERRITIN    Collection Time: 07/17/19  4:00 AM   Result Value Ref Range    Ferritin 59.8 22.0 - 322.0 ng/mL   VITAMIN B12    Collection Time: 07/17/19  4:00 AM   Result Value Ref Range    Vitamin B12 -True Cobalamin 642 211 - 911 pg/mL   FOLATE    Collection Time: 07/17/19  4:00 AM   Result Value Ref Range    Folate -Folic Acid 12.9 >4.0 ng/mL   RETICULOCYTES COUNT    Collection Time: 07/17/19  4:00 AM   Result Value Ref Range    Reticulocyte Count 1.7 0.8 - 2.1 %    Retic, Absolute 0.08 (H) 0.04 - 0.06 M/uL    Imm. Reticulocyte Fraction 11.7 9.3 - 17.4 %    Retic Hgb Equivalent 31.4 29.0 - 35.0 pg/cell    OSMOLALITY SERUM    Collection Time: 07/17/19  4:00 AM   Result Value Ref Range    Osmolality Serum 260 (L) 278 - 298 mOsm/kg H2O   Basic Metabolic Panel    Collection Time: 07/17/19  4:00 AM   Result Value Ref Range    Sodium 123 (L) 135 - 145 mmol/L    Potassium 3.7 3.6 - 5.5 mmol/L    Chloride 93 (L) 96 - 112 mmol/L    Co2 21 20 - 33 mmol/L    Glucose 78 65 - 99 mg/dL    Bun 18 8 - 22 mg/dL    Creatinine 1.74 (H) 0.50 - 1.40 mg/dL    Calcium 9.0 8.5 - 10.5 mg/dL    Anion Gap 9.0 0.0 - 11.9   ESTIMATED GFR    Collection Time: 07/17/19  4:00 AM   Result Value Ref Range    GFR If  47 (A) >60 mL/min/1.73 m 2    GFR If Non  39 (A) >60 mL/min/1.73 m 2       Imaging      Assessment/Plan  * Hyponatremia- (present on admission)   Assessment & Plan    Etiology of chronic hyponatremia unknown to me at this point.  Will need further review of chart.  Are usually not symptomatic.  Nephrology consulted target sodium was 120 for the first 24 hours at goal is been achieved continue to monitor closely and avoid overcorrection.     Obstructive uropathy- (present on admission)   Assessment & Plan    Urology consult     Leukocytosis- (present on admission)   Assessment & Plan    No obvious clinical evidence of new infection.  Close observation and watchful waiting     Suprapubic catheter dysfunction (HCC)   Assessment & Plan     urology consult     Anemia   Assessment & Plan    Stable not an active issue     CRF (chronic renal failure), stage 3 (moderate) (HCC)- (present on admission)   Assessment & Plan    See nephrology consult creatinine 1.7 is near baseline continue to monitor closely     HTN (hypertension)- (present on admission)   Assessment & Plan    Start outpatient meds for hypertension     Type 2 diabetes mellitus, with long-term current use of insulin (HCC)- (present on admission)   Assessment & Plan    Insulin sliding scale         Discussed patient condition and risk of morbidity and/or  mortality with Hospitalist, RN, RT, Pharmacy and Charge nurse / hot rounds.    .

## 2019-07-18 LAB
ANION GAP SERPL CALC-SCNC: 9 MMOL/L (ref 0–11.9)
ANION GAP SERPL CALC-SCNC: 9 MMOL/L (ref 0–11.9)
BASOPHILS # BLD AUTO: 0.4 % (ref 0–1.8)
BASOPHILS # BLD: 0.04 K/UL (ref 0–0.12)
BUN SERPL-MCNC: 21 MG/DL (ref 8–22)
BUN SERPL-MCNC: 24 MG/DL (ref 8–22)
CALCIUM SERPL-MCNC: 8.6 MG/DL (ref 8.5–10.5)
CALCIUM SERPL-MCNC: 8.7 MG/DL (ref 8.5–10.5)
CHLORIDE SERPL-SCNC: 95 MMOL/L (ref 96–112)
CHLORIDE SERPL-SCNC: 96 MMOL/L (ref 96–112)
CO2 SERPL-SCNC: 20 MMOL/L (ref 20–33)
CO2 SERPL-SCNC: 20 MMOL/L (ref 20–33)
CREAT SERPL-MCNC: 2.24 MG/DL (ref 0.5–1.4)
CREAT SERPL-MCNC: 2.27 MG/DL (ref 0.5–1.4)
EOSINOPHIL # BLD AUTO: 0.09 K/UL (ref 0–0.51)
EOSINOPHIL NFR BLD: 0.9 % (ref 0–6.9)
ERYTHROCYTE [DISTWIDTH] IN BLOOD BY AUTOMATED COUNT: 45.4 FL (ref 35.9–50)
GLUCOSE BLD-MCNC: 115 MG/DL (ref 65–99)
GLUCOSE BLD-MCNC: 116 MG/DL (ref 65–99)
GLUCOSE SERPL-MCNC: 120 MG/DL (ref 65–99)
GLUCOSE SERPL-MCNC: 132 MG/DL (ref 65–99)
HCT VFR BLD AUTO: 37.7 % (ref 42–52)
HGB BLD-MCNC: 12.4 G/DL (ref 14–18)
IMM GRANULOCYTES # BLD AUTO: 0.05 K/UL (ref 0–0.11)
IMM GRANULOCYTES NFR BLD AUTO: 0.5 % (ref 0–0.9)
LYMPHOCYTES # BLD AUTO: 1.69 K/UL (ref 1–4.8)
LYMPHOCYTES NFR BLD: 17.8 % (ref 22–41)
MAGNESIUM SERPL-MCNC: 2.3 MG/DL (ref 1.5–2.5)
MCH RBC QN AUTO: 27.3 PG (ref 27–33)
MCHC RBC AUTO-ENTMCNC: 32.9 G/DL (ref 33.7–35.3)
MCV RBC AUTO: 83 FL (ref 81.4–97.8)
MONOCYTES # BLD AUTO: 0.79 K/UL (ref 0–0.85)
MONOCYTES NFR BLD AUTO: 8.3 % (ref 0–13.4)
NEUTROPHILS # BLD AUTO: 6.84 K/UL (ref 1.82–7.42)
NEUTROPHILS NFR BLD: 72.1 % (ref 44–72)
NRBC # BLD AUTO: 0 K/UL
NRBC BLD-RTO: 0 /100 WBC
PLATELET # BLD AUTO: 432 K/UL (ref 164–446)
PMV BLD AUTO: 8.7 FL (ref 9–12.9)
POTASSIUM SERPL-SCNC: 4.3 MMOL/L (ref 3.6–5.5)
POTASSIUM SERPL-SCNC: 4.4 MMOL/L (ref 3.6–5.5)
RBC # BLD AUTO: 4.54 M/UL (ref 4.7–6.1)
SODIUM SERPL-SCNC: 124 MMOL/L (ref 135–145)
SODIUM SERPL-SCNC: 125 MMOL/L (ref 135–145)
WBC # BLD AUTO: 9.5 K/UL (ref 4.8–10.8)

## 2019-07-18 PROCEDURE — A9270 NON-COVERED ITEM OR SERVICE: HCPCS | Performed by: HOSPITALIST

## 2019-07-18 PROCEDURE — 99233 SBSQ HOSP IP/OBS HIGH 50: CPT | Performed by: HOSPITALIST

## 2019-07-18 PROCEDURE — 99233 SBSQ HOSP IP/OBS HIGH 50: CPT | Performed by: INTERNAL MEDICINE

## 2019-07-18 PROCEDURE — 82962 GLUCOSE BLOOD TEST: CPT | Mod: 91

## 2019-07-18 PROCEDURE — 770001 HCHG ROOM/CARE - MED/SURG/GYN PRIV*

## 2019-07-18 PROCEDURE — 83735 ASSAY OF MAGNESIUM: CPT

## 2019-07-18 PROCEDURE — 85025 COMPLETE CBC W/AUTO DIFF WBC: CPT

## 2019-07-18 PROCEDURE — 700111 HCHG RX REV CODE 636 W/ 250 OVERRIDE (IP): Performed by: HOSPITALIST

## 2019-07-18 PROCEDURE — 700102 HCHG RX REV CODE 250 W/ 637 OVERRIDE(OP): Performed by: HOSPITALIST

## 2019-07-18 PROCEDURE — 80048 BASIC METABOLIC PNL TOTAL CA: CPT

## 2019-07-18 RX ORDER — HYDROXYZINE HYDROCHLORIDE 25 MG/1
25 TABLET, FILM COATED ORAL 3 TIMES DAILY PRN
Status: DISCONTINUED | OUTPATIENT
Start: 2019-07-18 | End: 2019-07-24 | Stop reason: HOSPADM

## 2019-07-18 RX ADMIN — INSULIN HUMAN 1 UNITS: 100 INJECTION, SOLUTION PARENTERAL at 21:32

## 2019-07-18 RX ADMIN — OXYCODONE HYDROCHLORIDE 10 MG: 10 TABLET ORAL at 21:27

## 2019-07-18 RX ADMIN — ATORVASTATIN CALCIUM 80 MG: 40 TABLET, FILM COATED ORAL at 21:27

## 2019-07-18 RX ADMIN — OXYCODONE HYDROCHLORIDE 5 MG: 5 TABLET ORAL at 10:45

## 2019-07-18 RX ADMIN — OXYBUTYNIN CHLORIDE 5 MG: 5 TABLET ORAL at 21:28

## 2019-07-18 RX ADMIN — BUDESONIDE AND FORMOTEROL FUMARATE DIHYDRATE 2 PUFF: 160; 4.5 AEROSOL RESPIRATORY (INHALATION) at 06:02

## 2019-07-18 RX ADMIN — TAMSULOSIN HYDROCHLORIDE 0.4 MG: 0.4 CAPSULE ORAL at 07:42

## 2019-07-18 RX ADMIN — OXYBUTYNIN CHLORIDE 5 MG: 5 TABLET ORAL at 06:03

## 2019-07-18 RX ADMIN — HEPARIN SODIUM 5000 UNITS: 5000 INJECTION, SOLUTION INTRAVENOUS; SUBCUTANEOUS at 21:35

## 2019-07-18 RX ADMIN — OXYCODONE HYDROCHLORIDE 5 MG: 5 TABLET ORAL at 10:46

## 2019-07-18 RX ADMIN — HEPARIN SODIUM 5000 UNITS: 5000 INJECTION, SOLUTION INTRAVENOUS; SUBCUTANEOUS at 06:03

## 2019-07-18 RX ADMIN — HYDROXYZINE HYDROCHLORIDE 25 MG: 25 TABLET, FILM COATED ORAL at 09:54

## 2019-07-18 RX ADMIN — BUDESONIDE AND FORMOTEROL FUMARATE DIHYDRATE 2 PUFF: 160; 4.5 AEROSOL RESPIRATORY (INHALATION) at 21:39

## 2019-07-18 ASSESSMENT — ENCOUNTER SYMPTOMS
DOUBLE VISION: 0
BLURRED VISION: 0
DEPRESSION: 0
FOCAL WEAKNESS: 0
SORE THROAT: 0
HEADACHES: 0
FEVER: 0
SHORTNESS OF BREATH: 0
ABDOMINAL PAIN: 1
DIZZINESS: 0
PALPITATIONS: 0
WEAKNESS: 1
VOMITING: 0
LOSS OF CONSCIOUSNESS: 0
NAUSEA: 0
ABDOMINAL PAIN: 0
BACK PAIN: 0
DIARRHEA: 0
COUGH: 0
CHILLS: 0

## 2019-07-18 NOTE — PROGRESS NOTES
Critical Care Progress Note    Date of admission  7/16/2019    Chief Complaint  69 y.o. male admitted 7/16/2019 with life threatening hyperkalemia    Hospital Course    69 y.o. male who presented 7/16/2019 with acute on chronic hyponatremia      Interval Problem Update  Reviewed last 24 hour events:  Tm 98.2  -1.7L over last 24hr, -3.2 L since admit  No cxr this am  Echo 6/2019 EF 45%  Suprapubic catheter placed overnight  Patient admits to significant free water intake  Na 125    D5w @ 50    94% on RA  Last bm 7/17      Review of Systems  Review of Systems   Constitutional: Negative for chills and fever.   HENT: Negative for congestion.    Eyes: Negative for blurred vision and double vision.   Respiratory: Negative for cough and shortness of breath.    Cardiovascular: Negative for chest pain.   Gastrointestinal: Positive for abdominal pain (Suprapubic). Negative for nausea and vomiting.   Neurological: Positive for weakness. Negative for focal weakness.   Psychiatric/Behavioral: Negative for depression.   All other systems reviewed and are negative.       Vital Signs for last 24 hours   Temp:  [36 °C (96.8 °F)-36.8 °C (98.2 °F)] 36.8 °C (98.2 °F)  Pulse:  [] 55  Resp:  [9-29] 12  BP: (113-137)/(56-82) 114/58  SpO2:  [92 %-100 %] 95 %    Hemodynamic parameters for last 24 hours       Respiratory Information for the last 24 hours       Physical Exam   Physical Exam   Constitutional: He appears well-developed and well-nourished.   HENT:   Head: Normocephalic and atraumatic.   Eyes: Pupils are equal, round, and reactive to light. Conjunctivae are normal.   Neck: No tracheal deviation present.   Cardiovascular: Normal rate and intact distal pulses.    Pulmonary/Chest: He has no wheezes. He has no rales.   Abdominal: Soft. He exhibits no distension. There is tenderness (In suprapubic region). There is no rebound and no guarding.   Musculoskeletal: He exhibits edema.   Neurological: No cranial nerve deficit.   Skin:  Skin is warm and dry.   Psychiatric: He has a normal mood and affect.   Nursing note and vitals reviewed.      Medications  Current Facility-Administered Medications   Medication Dose Route Frequency Provider Last Rate Last Dose   • D5W infusion   Intravenous Continuous Russell Caballero M.D. 50 mL/hr at 07/17/19 0919     • insulin regular (HUMULIN R) injection 1-6 Units  1-6 Units Subcutaneous 4X/DAY GORDON Neal M.D.   Stopped at 07/17/19 2100    And   • glucose 4 g chewable tablet 16 g  16 g Oral Q15 MIN PRN Isaías Neal M.D.        And   • DEXTROSE 10% BOLUS 250 mL  250 mL Intravenous Q15 MIN JASKARAN Neal M.D.       • senna-docusate (PERICOLACE or SENOKOT S) 8.6-50 MG per tablet 2 Tab  2 Tab Oral BID Mao Dunn M.D.   Stopped at 07/18/19 0600    And   • polyethylene glycol/lytes (MIRALAX) PACKET 1 Packet  1 Packet Oral QDAY PRN Mao Dunn M.D.        And   • magnesium hydroxide (MILK OF MAGNESIA) suspension 30 mL  30 mL Oral QDAY PRN Mao Dunn M.D.        And   • bisacodyl (DULCOLAX) suppository 10 mg  10 mg Rectal QDAY PRIRINEO Dunn M.D.       • heparin injection 5,000 Units  5,000 Units Subcutaneous Q8HRS Mao Dunn M.D.   5,000 Units at 07/18/19 0603   • Pharmacy Consult Request ...Pain Management Review 1 Each  1 Each Other PHARMACY TO DOSE Mao Dunn M.D.        And   • oxyCODONE immediate-release (ROXICODONE) tablet 5 mg  5 mg Oral Q3HRS PRN Mao Dunn M.D.        And   • oxyCODONE immediate release (ROXICODONE) tablet 10 mg  10 mg Oral Q3HRS PRIRINEO Dunn M.D.   10 mg at 07/17/19 0339    And   • morphine (pf) 4 mg/ml injection 4 mg  4 mg Intravenous Q3HRS PRN Mao Dunn M.D.       • atorvastatin (LIPITOR) tablet 80 mg  80 mg Oral Q EVENING Mao Dunn M.D.   80 mg at 07/17/19 1950   • budesonide-formoterol (SYMBICORT) 160-4.5 MCG/ACT inhaler 2 Puff  2 Puff Inhalation BID Mao Dunn M.D.   2 Puff at 07/18/19  0602   • metoprolol (LOPRESSOR) tablet 12.5 mg  12.5 mg Oral BID Mao Dunn M.D.   Stopped at 07/17/19 1800   • oxybutynin (DITROPAN) tablet 5 mg  5 mg Oral Q12HRS Mao Dunn M.D.   5 mg at 07/18/19 0603   • tamsulosin (FLOMAX) capsule 0.4 mg  0.4 mg Oral AFTER BREAKFAST Mao Dunn M.D.   0.4 mg at 07/17/19 1012   • Respiratory Care per Protocol   Nebulization Continuous RT Mao Dunn M.D.           Fluids    Intake/Output Summary (Last 24 hours) at 07/18/19 0715  Last data filed at 07/18/19 0600   Gross per 24 hour   Intake          2915.83 ml   Output             4655 ml   Net         -1739.17 ml       Laboratory          Recent Labs      07/17/19   0815  07/17/19   1023  07/17/19 2000 07/18/19   0155   SODIUM  121*  123*  125*  125*   POTASSIUM  3.8  4.0  4.4  4.3   CHLORIDE  92*  92*  96  96   CO2  21  23  20  20   BUN  18  19  21  21   CREATININE  1.87*  1.94*  2.22*  2.27*   MAGNESIUM  2.0   --    --   2.3   CALCIUM  9.2  9.0  8.9  8.7     Recent Labs      07/17/19   1023  07/17/19 2000 07/18/19   0155   GLUCOSE  147*  98  132*     Recent Labs      07/16/19 2004 07/17/19   1335  07/18/19   0155   WBC  12.6*  9.6  9.5   NEUTSPOLYS  68.10  79.60*  72.10*   LYMPHOCYTES  22.20  11.50*  17.80*   MONOCYTES  8.20  7.70  8.30   EOSINOPHILS  0.80  0.40  0.90   BASOPHILS  0.20  0.40  0.40     Recent Labs      07/16/19 2004 07/17/19   0007  07/17/19   0400  07/17/19 1023 07/17/19   1335 07/18/19   0155   RBC  4.45*   --    --    --   4.47*  4.54*   HEMOGLOBIN  12.6*   --    --    --   12.5*  12.4*   HEMATOCRIT  35.7*   --    --    --   36.8*  37.7*   PLATELETCT  459*   --    --    --   374  432   PROTHROMBTM   --    --    --   14.9*   --    --    INR   --    --    --   1.14*   --    --    IRON   --   26*   --    --    --    --    FERRITIN   --    --   59.8   --    --    --    TOTIRONBC   --   343   --    --    --    --        Imaging  X-Ray:  No film today    Assessment/Plan  *  Hyponatremia- (present on admission)   Assessment & Plan    Improving at goal rate  DC D5W  BMP every 6 hours  Okay to level of 133-135       Obstructive uropathy- (present on admission)   Assessment & Plan    Suprapubic catheter replaced overnight  Monitor urine output     Leukocytosis- (present on admission)   Assessment & Plan    Resolved     Anemia   Assessment & Plan    Stable not an active issue     CRF (chronic renal failure), stage 3 (moderate) ([facility])- (present on admission)   Assessment & Plan    Baseline CR approximately 1.7  Renally dose medications and minimize nephrotoxic substances  Monitor urine output  Suprapubic catheter replaced overnight     HTN (hypertension)- (present on admission)   Assessment & Plan    Continue metoprolol     Type 2 diabetes mellitus, with long-term current use of insulin (HCC)- (present on admission)   Assessment & Plan    Insulin sliding scale          VTE:  Heparin  Ulcer: Not Indicated  Lines: Humphrey Catheter  Ongoing indication addressed    I have performed a physical exam and reviewed and updated ROS and Plan today (7/18/2019). In review of yesterday's note (7/17/2019), there are no changes except as documented above.     Discussed patient condition and risk of morbidity and/or mortality with Hospitalist, RN, RT, Pharmacy and Patient

## 2019-07-18 NOTE — WOUND TEAM
"[facility]   Inpatient Services   Established Ostomy Management/ troubleshooting  HPI: Reviewed  PMH: Reviewed   SH: Reviewed   Reason for Ostomy nurse consult:  Established ileostomy       Subjective:  \"sometimes I change it every day sometimes every 3.\"     Objective:  In bed, previously refused ostomy care, states it needs to be changed today.    Ostomy type:  Ileostomy   Stoma location:  RUQ        Stoma assessment:    Appearance:  Pink/red      Size:  1-1/14    Protrusion:  Less then 1   Output:  Moderate, constant, loose/watery green    MC jxn:  Green     Peristomal skin:  Red/irritated.  Patient states he reacts to tape on appliance      Ostomy Appliance (type and size):  2\" convex with paste ring and belt.  Per patient request 2\" hypafix tape      Interventions and Education:  Removed previous appliance, crusted x 2 with no sting and skin prep.  Barrier cut and paste ring applied, barrier applied to skin, closed end and belt applied.  hypafix tape to window frame.       Evaluation:  Patient with established ileostomy with combination of apparent leaking as well as possible irritation to the tape.  Wound team to follow up and check/change.          Plan:  Follow up while IP.  Change PRN.      Anticipated discharge needs:  Back to SNF.    "

## 2019-07-18 NOTE — PROGRESS NOTES
[facility] -  PROGRESS NOTE               Author: Russell Caballero M.D. Date & Time: 7/18/2019  10:31 AM     HPI:  68 yo male PMH CKD Stage 3, HTN, type 2 DM, BPH, HLD, and COPD presents to ED as transfer from OSH after being found to have hyponatremia.  He has a known hx of BPH causing REED and required a suprapubic catheter placement early this year.  He states yesterday afternoon it fell out on its own, he denies trauma or pulling on it prior to it displacing.  [facility]  At his local ED they were unable to replace it and lab work showed significant hyponatremia at 112 and so [place] [facility] [place].  By the time he arrived here and had repeat labs it was 12 hrs from initial and his SNa ~ 119.  ED docs here were able to get a penile FC and he was admitted to ICU.  Repeat SNa ~ 121 after being here and he was started on D5W overnight to maintain him at current range.  He admits to being told about hyponatremia in past due to low solute intake according to him.  Further inquiry also reveals that he drinks 4-6 gallons of water daily, he says he just sits there and will drink water while on his recliner.  He is currently NPO for IR to replace his suprapubic catheter.  SNa stable in the low 120s overnight and this AM.  His other labs show Ashley ~ 14 and UOsm ~ 72.  No F/C/N/V/CP/SOB.  No melena, hematochezia, hematemesis.  No HA, visual changes.      DAILY NEPHROLOGY SUMMARY:  7/17/19 - Consult done  7/18/19 - ASMITA MEJIA catheter replaced; SNa trended up      REVIEW OF SYSTEMS:    - As per HPI, otherwise review of systems negative per AMA/CMS criteria  - General:  As per HPI, otherwise negative per AMA/CMS criteria  - HEENT:  No ocular pain; no nasal discharge  - CV:  As per HPI, otherwise negative per AMA/CMS criteria  - Lungs:  As per HPI, otherwise negative per AMA/CMS criteria  - GI:  As per HPI, otherwise negative per AMA/CMS criteria  -  "MSK:  No joint pain; No trauma  - Skin: No rashes; No lesions  - Neuro: No paresthesia; No LOC  - Psych: No depression; No anxiety      PAST FAMILY HISTORY: Reviewed and Unchanged  SOCIAL HISTORY: Reviewed and Unchanged  CURRENT MEDICATIONS: Reviewed  IMAGING STUDIES: Reviewed      PHYSICAL EXAM:  VS:  /58   Pulse 67   Temp 36.9 °C (98.4 °F) (Temporal)   Resp 12   Ht 1.803 m (5' 11\")   Wt 111.1 kg (244 lb 14.9 oz)   SpO2 93%   BMI 34.16 kg/m²   GENERAL:  WDWN NAD  HEENT:  NC/AT, no scleral icterus; conjunctiva normal  NECK:  Supple; Non tender  CV:  RRR, no m/r/g  LUNGS:  CTAB, no W/R  ABDOMEN:  SND, TTP suprapubic region  EXTREMETIES:  No C/C/E  SKIN:  Warm and dry  NEURO:  A&O, no focal deficits  PSYCH:  Cooperative, appropriate mood and affect    Fluids:  In: 2915.8 [P.O.:1000; I.V.:1165.8]  Out: 4655     LABS:  Recent Results (from the past 24 hour(s))   ACCU-CHEK GLUCOSE    Collection Time: 07/17/19 11:41 AM   Result Value Ref Range    Glucose - Accu-Ck 112 (H) 65 - 99 mg/dL   CBC with Differential    Collection Time: 07/17/19  1:35 PM   Result Value Ref Range    WBC 9.6 4.8 - 10.8 K/uL    RBC 4.47 (L) 4.70 - 6.10 M/uL    Hemoglobin 12.5 (L) 14.0 - 18.0 g/dL    Hematocrit 36.8 (L) 42.0 - 52.0 %    MCV 82.3 81.4 - 97.8 fL    MCH 28.0 27.0 - 33.0 pg    MCHC 34.0 33.7 - 35.3 g/dL    RDW 44.4 35.9 - 50.0 fL    Platelet Count 374 164 - 446 K/uL    MPV 8.6 (L) 9.0 - 12.9 fL    Neutrophils-Polys 79.60 (H) 44.00 - 72.00 %    Lymphocytes 11.50 (L) 22.00 - 41.00 %    Monocytes 7.70 0.00 - 13.40 %    Eosinophils 0.40 0.00 - 6.90 %    Basophils 0.40 0.00 - 1.80 %    Immature Granulocytes 0.40 0.00 - 0.90 %    Nucleated RBC 0.00 /100 WBC    Neutrophils (Absolute) 7.59 (H) 1.82 - 7.42 K/uL    Lymphs (Absolute) 1.10 1.00 - 4.80 K/uL    Monos (Absolute) 0.74 0.00 - 0.85 K/uL    Eos (Absolute) 0.04 0.00 - 0.51 K/uL    Baso (Absolute) 0.04 0.00 - 0.12 K/uL    Immature Granulocytes (abs) 0.04 0.00 - 0.11 K/uL    NRBC " (Absolute) 0.00 K/uL   ACCU-CHEK GLUCOSE    Collection Time: 07/17/19  6:26 PM   Result Value Ref Range    Glucose - Accu-Ck 93 65 - 99 mg/dL   Basic Metabolic Panel    Collection Time: 07/17/19  8:00 PM   Result Value Ref Range    Sodium 125 (L) 135 - 145 mmol/L    Potassium 4.4 3.6 - 5.5 mmol/L    Chloride 96 96 - 112 mmol/L    Co2 20 20 - 33 mmol/L    Glucose 98 65 - 99 mg/dL    Bun 21 8 - 22 mg/dL    Creatinine 2.22 (H) 0.50 - 1.40 mg/dL    Calcium 8.9 8.5 - 10.5 mg/dL    Anion Gap 9.0 0.0 - 11.9   ESTIMATED GFR    Collection Time: 07/17/19  8:00 PM   Result Value Ref Range    GFR If  36 (A) >60 mL/min/1.73 m 2    GFR If Non African American 30 (A) >60 mL/min/1.73 m 2   ACCU-CHEK GLUCOSE    Collection Time: 07/17/19  8:09 PM   Result Value Ref Range    Glucose - Accu-Ck 104 (H) 65 - 99 mg/dL   MAGNESIUM    Collection Time: 07/18/19  1:55 AM   Result Value Ref Range    Magnesium 2.3 1.5 - 2.5 mg/dL   CBC WITH DIFFERENTIAL    Collection Time: 07/18/19  1:55 AM   Result Value Ref Range    WBC 9.5 4.8 - 10.8 K/uL    RBC 4.54 (L) 4.70 - 6.10 M/uL    Hemoglobin 12.4 (L) 14.0 - 18.0 g/dL    Hematocrit 37.7 (L) 42.0 - 52.0 %    MCV 83.0 81.4 - 97.8 fL    MCH 27.3 27.0 - 33.0 pg    MCHC 32.9 (L) 33.7 - 35.3 g/dL    RDW 45.4 35.9 - 50.0 fL    Platelet Count 432 164 - 446 K/uL    MPV 8.7 (L) 9.0 - 12.9 fL    Neutrophils-Polys 72.10 (H) 44.00 - 72.00 %    Lymphocytes 17.80 (L) 22.00 - 41.00 %    Monocytes 8.30 0.00 - 13.40 %    Eosinophils 0.90 0.00 - 6.90 %    Basophils 0.40 0.00 - 1.80 %    Immature Granulocytes 0.50 0.00 - 0.90 %    Nucleated RBC 0.00 /100 WBC    Neutrophils (Absolute) 6.84 1.82 - 7.42 K/uL    Lymphs (Absolute) 1.69 1.00 - 4.80 K/uL    Monos (Absolute) 0.79 0.00 - 0.85 K/uL    Eos (Absolute) 0.09 0.00 - 0.51 K/uL    Baso (Absolute) 0.04 0.00 - 0.12 K/uL    Immature Granulocytes (abs) 0.05 0.00 - 0.11 K/uL    NRBC (Absolute) 0.00 K/uL   Basic Metabolic Panel    Collection Time: 07/18/19   1:55 AM   Result Value Ref Range    Sodium 125 (L) 135 - 145 mmol/L    Potassium 4.3 3.6 - 5.5 mmol/L    Chloride 96 96 - 112 mmol/L    Co2 20 20 - 33 mmol/L    Glucose 132 (H) 65 - 99 mg/dL    Bun 21 8 - 22 mg/dL    Creatinine 2.27 (H) 0.50 - 1.40 mg/dL    Calcium 8.7 8.5 - 10.5 mg/dL    Anion Gap 9.0 0.0 - 11.9   ESTIMATED GFR    Collection Time: 07/18/19  1:55 AM   Result Value Ref Range    GFR If African American 35 (A) >60 mL/min/1.73 m 2    GFR If Non  29 (A) >60 mL/min/1.73 m 2   ACCU-CHEK GLUCOSE    Collection Time: 07/18/19  6:07 AM   Result Value Ref Range    Glucose - Accu-Ck 115 (H) 65 - 99 mg/dL   Basic Metabolic Panel    Collection Time: 07/18/19  7:34 AM   Result Value Ref Range    Sodium 124 (L) 135 - 145 mmol/L    Potassium 4.4 3.6 - 5.5 mmol/L    Chloride 95 (L) 96 - 112 mmol/L    Co2 20 20 - 33 mmol/L    Glucose 120 (H) 65 - 99 mg/dL    Bun 24 (H) 8 - 22 mg/dL    Creatinine 2.24 (H) 0.50 - 1.40 mg/dL    Calcium 8.6 8.5 - 10.5 mg/dL    Anion Gap 9.0 0.0 - 11.9   ESTIMATED GFR    Collection Time: 07/18/19  7:34 AM   Result Value Ref Range    GFR If African American 35 (A) >60 mL/min/1.73 m 2    GFR If Non  29 (A) >60 mL/min/1.73 m 2       (click the triangle to expand results)      IMPRESSION:  - CKD Stage 3    * BCr ~ 1.5-1.9    * Etiology 2/2 BPH/obstruction  - Hyponatremia    * Etiology likely due to polydipsia    * UOsm c/w with kidneys trying to maximally dilute    * Ashley suggesting possibility of pre-renal component as well  - HTN    * Goal BP < 140/90  - type 2 DM  - HLD  - BPH    * Chronic suprapubic catheter, displaced currently    * IR to replace today     PLAN:  - No compelling indication for RRT  - D/C IVFs  - Restrict free water < 1L  - No solute restrictions  - Dose all meds per eGFR < 60  - Counseled he needs to restrict free water intake as OP to prevent future episodes such as this  - Labs ok for QDay now  - Repeat urine lytes, UOsm in AM

## 2019-07-18 NOTE — DISCHARGE PLANNING
Notified by Dr. Flores of probable discharge to home ([place]) tomorrow. Met with pt., he has no family. Called his friend, Nathaly, she works until 6pm tomorrow and will not be able to get here until 8pm. Will need administration approval for [employer] unless alternative transportation can be arranged.

## 2019-07-18 NOTE — CARE PLAN
Problem: Bowel/Gastric:  Goal: Normal bowel function is maintained or improved  Outcome: PROGRESSING SLOWER THAN EXPECTED  Large amount of liquid BM from colostomy    Problem: Mobility  Goal: Risk for activity intolerance will decrease  Outcome: PROGRESSING AS EXPECTED  Pt up ambulating in hallway, up to chair x 2 h at a time, using walker with min assist.

## 2019-07-19 PROBLEM — E83.42 HYPOMAGNESEMIA: Status: ACTIVE | Noted: 2019-07-19

## 2019-07-19 LAB
ANION GAP SERPL CALC-SCNC: 10 MMOL/L (ref 0–11.9)
BASOPHILS # BLD AUTO: 0.6 % (ref 0–1.8)
BASOPHILS # BLD: 0.07 K/UL (ref 0–0.12)
BUN SERPL-MCNC: 25 MG/DL (ref 8–22)
CALCIUM SERPL-MCNC: 9.1 MG/DL (ref 8.5–10.5)
CHLORIDE SERPL-SCNC: 97 MMOL/L (ref 96–112)
CHLORIDE UR-SCNC: 19 MMOL/L
CO2 SERPL-SCNC: 16 MMOL/L (ref 20–33)
CREAT SERPL-MCNC: 2.12 MG/DL (ref 0.5–1.4)
CREAT UR-MCNC: 56 MG/DL
EOSINOPHIL # BLD AUTO: 0.22 K/UL (ref 0–0.51)
EOSINOPHIL NFR BLD: 2 % (ref 0–6.9)
ERYTHROCYTE [DISTWIDTH] IN BLOOD BY AUTOMATED COUNT: 47.5 FL (ref 35.9–50)
GLUCOSE BLD-MCNC: 120 MG/DL (ref 65–99)
GLUCOSE BLD-MCNC: 129 MG/DL (ref 65–99)
GLUCOSE BLD-MCNC: 168 MG/DL (ref 65–99)
GLUCOSE BLD-MCNC: 176 MG/DL (ref 65–99)
GLUCOSE BLD-MCNC: 189 MG/DL (ref 65–99)
GLUCOSE SERPL-MCNC: 128 MG/DL (ref 65–99)
HCT VFR BLD AUTO: 41.2 % (ref 42–52)
HGB BLD-MCNC: 13.1 G/DL (ref 14–18)
IMM GRANULOCYTES # BLD AUTO: 0.07 K/UL (ref 0–0.11)
IMM GRANULOCYTES NFR BLD AUTO: 0.6 % (ref 0–0.9)
LYMPHOCYTES # BLD AUTO: 3.05 K/UL (ref 1–4.8)
LYMPHOCYTES NFR BLD: 27.1 % (ref 22–41)
MAGNESIUM SERPL-MCNC: 1.9 MG/DL (ref 1.5–2.5)
MCH RBC QN AUTO: 27.3 PG (ref 27–33)
MCHC RBC AUTO-ENTMCNC: 31.8 G/DL (ref 33.7–35.3)
MCV RBC AUTO: 86 FL (ref 81.4–97.8)
MONOCYTES # BLD AUTO: 1.21 K/UL (ref 0–0.85)
MONOCYTES NFR BLD AUTO: 10.7 % (ref 0–13.4)
NEUTROPHILS # BLD AUTO: 6.64 K/UL (ref 1.82–7.42)
NEUTROPHILS NFR BLD: 59 % (ref 44–72)
NRBC # BLD AUTO: 0 K/UL
NRBC BLD-RTO: 0 /100 WBC
OSMOLALITY SERPL: 272 MOSM/KG H2O (ref 278–298)
OSMOLALITY UR: 178 MOSM/KG H2O (ref 300–900)
PLATELET # BLD AUTO: 489 K/UL (ref 164–446)
PMV BLD AUTO: 8.7 FL (ref 9–12.9)
POTASSIUM SERPL-SCNC: 4.9 MMOL/L (ref 3.6–5.5)
POTASSIUM UR-SCNC: 18.2 MMOL/L
PROT UR-MCNC: 124.3 MG/DL (ref 0–15)
RBC # BLD AUTO: 4.79 M/UL (ref 4.7–6.1)
SODIUM SERPL-SCNC: 123 MMOL/L (ref 135–145)
SODIUM UR-SCNC: 16 MMOL/L
WBC # BLD AUTO: 11.3 K/UL (ref 4.8–10.8)

## 2019-07-19 PROCEDURE — 99233 SBSQ HOSP IP/OBS HIGH 50: CPT | Performed by: INTERNAL MEDICINE

## 2019-07-19 PROCEDURE — 83735 ASSAY OF MAGNESIUM: CPT

## 2019-07-19 PROCEDURE — 82962 GLUCOSE BLOOD TEST: CPT

## 2019-07-19 PROCEDURE — 700102 HCHG RX REV CODE 250 W/ 637 OVERRIDE(OP): Performed by: HOSPITALIST

## 2019-07-19 PROCEDURE — 770001 HCHG ROOM/CARE - MED/SURG/GYN PRIV*

## 2019-07-19 PROCEDURE — 83930 ASSAY OF BLOOD OSMOLALITY: CPT

## 2019-07-19 PROCEDURE — 84156 ASSAY OF PROTEIN URINE: CPT

## 2019-07-19 PROCEDURE — 85025 COMPLETE CBC W/AUTO DIFF WBC: CPT

## 2019-07-19 PROCEDURE — 700105 HCHG RX REV CODE 258: Performed by: INTERNAL MEDICINE

## 2019-07-19 PROCEDURE — 82570 ASSAY OF URINE CREATININE: CPT

## 2019-07-19 PROCEDURE — 99233 SBSQ HOSP IP/OBS HIGH 50: CPT | Performed by: HOSPITALIST

## 2019-07-19 PROCEDURE — 80048 BASIC METABOLIC PNL TOTAL CA: CPT

## 2019-07-19 PROCEDURE — 82436 ASSAY OF URINE CHLORIDE: CPT

## 2019-07-19 PROCEDURE — A9270 NON-COVERED ITEM OR SERVICE: HCPCS | Performed by: HOSPITALIST

## 2019-07-19 PROCEDURE — 84133 ASSAY OF URINE POTASSIUM: CPT

## 2019-07-19 PROCEDURE — 700111 HCHG RX REV CODE 636 W/ 250 OVERRIDE (IP): Performed by: HOSPITALIST

## 2019-07-19 PROCEDURE — 84300 ASSAY OF URINE SODIUM: CPT

## 2019-07-19 PROCEDURE — 83935 ASSAY OF URINE OSMOLALITY: CPT

## 2019-07-19 RX ORDER — SODIUM CHLORIDE 9 MG/ML
1000 INJECTION, SOLUTION INTRAVENOUS ONCE
Status: COMPLETED | OUTPATIENT
Start: 2019-07-19 | End: 2019-07-19

## 2019-07-19 RX ADMIN — METOPROLOL TARTRATE 12.5 MG: 25 TABLET, FILM COATED ORAL at 16:31

## 2019-07-19 RX ADMIN — INSULIN HUMAN 1 UNITS: 100 INJECTION, SOLUTION PARENTERAL at 10:48

## 2019-07-19 RX ADMIN — METOPROLOL TARTRATE 12.5 MG: 25 TABLET, FILM COATED ORAL at 06:15

## 2019-07-19 RX ADMIN — OXYBUTYNIN CHLORIDE 5 MG: 5 TABLET ORAL at 16:32

## 2019-07-19 RX ADMIN — HEPARIN SODIUM 5000 UNITS: 5000 INJECTION, SOLUTION INTRAVENOUS; SUBCUTANEOUS at 22:08

## 2019-07-19 RX ADMIN — OXYCODONE HYDROCHLORIDE 10 MG: 10 TABLET ORAL at 16:31

## 2019-07-19 RX ADMIN — HEPARIN SODIUM 5000 UNITS: 5000 INJECTION, SOLUTION INTRAVENOUS; SUBCUTANEOUS at 06:17

## 2019-07-19 RX ADMIN — ATORVASTATIN CALCIUM 80 MG: 40 TABLET, FILM COATED ORAL at 16:31

## 2019-07-19 RX ADMIN — HEPARIN SODIUM 5000 UNITS: 5000 INJECTION, SOLUTION INTRAVENOUS; SUBCUTANEOUS at 13:38

## 2019-07-19 RX ADMIN — BUDESONIDE AND FORMOTEROL FUMARATE DIHYDRATE 2 PUFF: 160; 4.5 AEROSOL RESPIRATORY (INHALATION) at 06:20

## 2019-07-19 RX ADMIN — SODIUM CHLORIDE 1000 ML: 9 INJECTION, SOLUTION INTRAVENOUS at 10:06

## 2019-07-19 RX ADMIN — OXYBUTYNIN CHLORIDE 5 MG: 5 TABLET ORAL at 06:16

## 2019-07-19 RX ADMIN — BUDESONIDE AND FORMOTEROL FUMARATE DIHYDRATE 2 PUFF: 160; 4.5 AEROSOL RESPIRATORY (INHALATION) at 16:32

## 2019-07-19 RX ADMIN — TAMSULOSIN HYDROCHLORIDE 0.4 MG: 0.4 CAPSULE ORAL at 07:33

## 2019-07-19 ASSESSMENT — ENCOUNTER SYMPTOMS
DIARRHEA: 0
FOCAL WEAKNESS: 0
LOSS OF CONSCIOUSNESS: 0
NAUSEA: 0
DIZZINESS: 0
SHORTNESS OF BREATH: 0
ABDOMINAL PAIN: 0
PALPITATIONS: 0
HEADACHES: 0
DEPRESSION: 0
VOMITING: 0
WEAKNESS: 0
SORE THROAT: 0
DOUBLE VISION: 0
FEVER: 0
BACK PAIN: 0
BLURRED VISION: 0
CHILLS: 0
COUGH: 0

## 2019-07-19 NOTE — WOUND TEAM
Ostomy RN by to check on ileostomy appliance. It is intact. Patient requests ostomy care tomorrow. Patient up on board for change tomorrow

## 2019-07-19 NOTE — PROGRESS NOTES
[facility] -  PROGRESS NOTE               Author: Russell Caballero M.D. Date & Time: 7/19/2019  9:54 AM     HPI:  70 yo male PMH CKD Stage 3, HTN, type 2 DM, BPH, HLD, and COPD presents to ED as transfer from OSH after being found to have hyponatremia.  He has a known hx of BPH causing REED and required a suprapubic catheter placement early this year.  He states yesterday afternoon it fell out on its own, he denies trauma or pulling on it prior to it displacing.  [facility].  At his local ED they were unable to replace it and lab work showed significant hyponatremia at 112 and so he was sent to [facility] for further care.  By the time he arrived here and had repeat labs it was 12 hrs from initial and his SNa ~ 119.  ED docs here were able to get a penile FC and he was admitted to ICU.  Repeat SNa ~ 121 after being here and he was started on D5W overnight to maintain him at current range.  He admits to being told about hyponatremia in past due to low solute intake according to him.  Further inquiry also reveals that he drinks 4-6 gallons of water daily, he says he just sits there and will drink water while on his recliner.  He is currently NPO for IR to replace his suprapubic catheter.  SNa stable in the low 120s overnight and this AM.  His other labs show Ashley ~ 14 and UOsm ~ 72.  No F/C/N/V/CP/SOB.  No melena, hematochezia, hematemesis.  No HA, visual changes.      DAILY NEPHROLOGY SUMMARY:  7/17/19 - Consult done  7/18/19 - ROBERTO SP catheter replaced; SNa trended up  7/19/19 - NAEO, no complaints, sitting in chair      REVIEW OF SYSTEMS:    - As per HPI, otherwise review of systems negative per AMA/CMS criteria  - General:  As per HPI, otherwise negative per AMA/CMS criteria  - HEENT:  No ocular pain; no nasal discharge  - CV:  As per HPI, otherwise negative per AMA/CMS criteria  - Lungs:  As per HPI, otherwise negative per AMA/CMS criteria  - GI:  As per  "HPI, otherwise negative per AMA/CMS criteria  - MSK:  No joint pain; No trauma  - Skin: No rashes; No lesions  - Neuro: No paresthesia; No LOC  - Psych: No depression; No anxiety      PAST FAMILY HISTORY: Reviewed and Unchanged  SOCIAL HISTORY: Reviewed and Unchanged  CURRENT MEDICATIONS: Reviewed  IMAGING STUDIES: Reviewed      PHYSICAL EXAM:  VS:  /58   Pulse 73   Temp 36.5 °C (97.7 °F) (Temporal)   Resp 16   Ht 1.803 m (5' 11\")   Wt 111.1 kg (244 lb 14.9 oz)   SpO2 98%   BMI 34.16 kg/m²   GENERAL:  WDWN NAD  HEENT:  NC/AT, no scleral icterus; conjunctiva normal  NECK:  Supple; Non tender  CV:  RRR, no m/r/g  LUNGS:  CTAB, no W/R  ABDOMEN:  SND, TTP suprapubic region  EXTREMETIES:  No C/C/E  SKIN:  Warm and dry  NEURO:  A&O, no focal deficits  PSYCH:  Cooperative, appropriate mood and affect    Fluids:  In: 4741.7 [P.O.:4560; I.V.:181.7]  Out: 5560     LABS:  Recent Results (from the past 24 hour(s))   ACCU-CHEK GLUCOSE    Collection Time: 07/18/19 10:25 AM   Result Value Ref Range    Glucose - Accu-Ck 116 (H) 65 - 99 mg/dL   ACCU-CHEK GLUCOSE    Collection Time: 07/18/19  9:31 PM   Result Value Ref Range    Glucose - Accu-Ck 176 (H) 65 - 99 mg/dL   OSMOLALITY SERUM    Collection Time: 07/19/19  6:04 AM   Result Value Ref Range    Osmolality Serum 272 (L) 278 - 298 mOsm/kg H2O   MAGNESIUM    Collection Time: 07/19/19  6:04 AM   Result Value Ref Range    Magnesium 1.9 1.5 - 2.5 mg/dL   CBC WITH DIFFERENTIAL    Collection Time: 07/19/19  6:04 AM   Result Value Ref Range    WBC 11.3 (H) 4.8 - 10.8 K/uL    RBC 4.79 4.70 - 6.10 M/uL    Hemoglobin 13.1 (L) 14.0 - 18.0 g/dL    Hematocrit 41.2 (L) 42.0 - 52.0 %    MCV 86.0 81.4 - 97.8 fL    MCH 27.3 27.0 - 33.0 pg    MCHC 31.8 (L) 33.7 - 35.3 g/dL    RDW 47.5 35.9 - 50.0 fL    Platelet Count 489 (H) 164 - 446 K/uL    MPV 8.7 (L) 9.0 - 12.9 fL    Neutrophils-Polys 59.00 44.00 - 72.00 %    Lymphocytes 27.10 22.00 - 41.00 %    Monocytes 10.70 0.00 - 13.40 %    " Eosinophils 2.00 0.00 - 6.90 %    Basophils 0.60 0.00 - 1.80 %    Immature Granulocytes 0.60 0.00 - 0.90 %    Nucleated RBC 0.00 /100 WBC    Neutrophils (Absolute) 6.64 1.82 - 7.42 K/uL    Lymphs (Absolute) 3.05 1.00 - 4.80 K/uL    Monos (Absolute) 1.21 (H) 0.00 - 0.85 K/uL    Eos (Absolute) 0.22 0.00 - 0.51 K/uL    Baso (Absolute) 0.07 0.00 - 0.12 K/uL    Immature Granulocytes (abs) 0.07 0.00 - 0.11 K/uL    NRBC (Absolute) 0.00 K/uL   Basic Metabolic Panel    Collection Time: 07/19/19  6:04 AM   Result Value Ref Range    Sodium 123 (L) 135 - 145 mmol/L    Potassium 4.9 3.6 - 5.5 mmol/L    Chloride 97 96 - 112 mmol/L    Co2 16 (L) 20 - 33 mmol/L    Glucose 128 (H) 65 - 99 mg/dL    Bun 25 (H) 8 - 22 mg/dL    Creatinine 2.12 (H) 0.50 - 1.40 mg/dL    Calcium 9.1 8.5 - 10.5 mg/dL    Anion Gap 10.0 0.0 - 11.9   ESTIMATED GFR    Collection Time: 07/19/19  6:04 AM   Result Value Ref Range    GFR If  38 (A) >60 mL/min/1.73 m 2    GFR If Non  31 (A) >60 mL/min/1.73 m 2   URINE SODIUM RANDOM    Collection Time: 07/19/19  6:26 AM   Result Value Ref Range    Sodium, Urine -per volume 16 mmol/L   URINE POTASSIUM RANDOM    Collection Time: 07/19/19  6:26 AM   Result Value Ref Range    Potassium 18.2 mmol/L   URINE CHLORIDE RANDOM    Collection Time: 07/19/19  6:26 AM   Result Value Ref Range    Chloride, Urine-per volume 19 mmol/L   URINE CREATININE RANDOM    Collection Time: 07/19/19  6:26 AM   Result Value Ref Range    Creatinine, Random Urine 56.00 mg/dL   URINE PROTEIN RANDOM    Collection Time: 07/19/19  6:26 AM   Result Value Ref Range    Total Protein, Urine 124.3 (H) 0.0 - 15.0 mg/dL   OSMOLALITY URINE    Collection Time: 07/19/19  6:26 AM   Result Value Ref Range    Osmolality Urine 178 (L) 300 - 900 mOsm/kg H2O   ACCU-CHEK GLUCOSE    Collection Time: 07/19/19  7:32 AM   Result Value Ref Range    Glucose - Accu-Ck 189 (H) 65 - 99 mg/dL       (click the triangle to expand  results)      IMPRESSION:  - CKD Stage 3    * BCr ~ 1.5-1.9    * Etiology 2/2 BPH/obstruction  - Hyponatremia    * Etiology likely due to polydipsia    * UOsm c/w with kidneys trying to maximally dilute    * Ashley suggesting possibility of pre-renal component as well    * His mental status waxes and wanes making it difficult to see if he will be able to follow through on fluid restriction  - HTN    * Goal BP < 140/90  - type 2 DM  - HLD  - BPH    * Chronic suprapubic catheter, displaced currently    * IR to replace today     PLAN:  - No compelling indication for RRT  - Restrict free water < 1L  - NS 1 L bolus today  - No solute restrictions  - Dose all meds per eGFR < 60  - Counseled he needs to restrict free water intake as OP to prevent future episodes such as this  - Repeat urine lytes, UOsm in AM  - Will arrange for FU in our [place] clinic within 1 month of d/c to further manage his hyponatremia, may need salt tabs if unable to restrict free water

## 2019-07-19 NOTE — DIETARY
"Nutrition services: Day 3 of admit.  Shola Hoyt is a 69 y.o. male with admitting DX of Hyperkalemia, Suprapubic Catheter Dysfunction, Hyponatremia  Consult received for Diet Education.  D/W MD.  Request to speak with pt re: fluid restriction, sources of free water.  MD requesting salty fluids.      Assessment:  Height: 180.3 cm (5' 11\")  Weight: 111.1 kg (244 lb 14.9 oz)  Body mass index is 34.16 kg/m²., BMI classification: Obesity Class I  Diet/Intake: Regular, 1000 ml fluid restriction, salty fluids do not count, please send powerade with meals    Evaluation:   1. Pt with a history of drinking up to 4-6 gallons of water per day.    2. Labs today: Na 123, K+ 4.9, Glu 128, BUN 25, Creat 2.12.  3. Diet liberalized from Renal to Regular with a fluid restriction.  Sodium intake encouraged per MD with a limit on free water intake.  4. History includes Colostomy, COPD, Diabetes, Chronic Renal Failure Stage 3.  5. Discussed sources of free water with pt as requested.  He stated understanding.  6. Good PO intake recorded with pt often eating %.    Malnutrition Risk: No criteria noted at this time.    Recommendations/Plan:  1. Regular diet, 1000 ml fluid restriction, salty fluids not included in restriction.  2. Features of fluid restriction discussed with pt.   3. Encourage continued intake of at least 50% of meals.  4. Document intake of all PO as % taken in ADL's to provide interdisciplinary communication across all shifts.   5. Monitor weight.  6. Nutrition rep will continue to see patient for ongoing meal and snack preferences.           "

## 2019-07-19 NOTE — PROGRESS NOTES
[facility] Medicine Daily Progress Note    Date of Service  7/19/2019    Chief Complaint      Hospital Course    69 y.o. male admitted 7/16/2019 with Hx of chronic SP cath, Crohn's, Dm, CHF.  He has a chronic SP cath due to Hx of urethral stricture.  Cath was placed in June of this year.  On evaluation at outlying facility he was found ot have a Na of 112, CO2 19, Creat 1.75.         Interval Problem Update  Feeling better.  Does cont to c/o problems sleeping due to the noise in the unit.    Sinus/sinus nikki  SBP 80-110s  + output from ostomy  Tolerating po  UOP 2200ml/12hrs    Consultants/Specialty  Pulmonology  Nephrology      Code Status  full    Disposition  Cont in ICU while correcting Na  Plan DC home vs placement; concerned pt may not be able to be compliant with home diet and fluid restrictions given cognitive deficits.  Will DW his friend and care giver Nathaly    Review of Systems  Review of Systems   Unable to perform ROS: Other   Constitutional: Negative for chills and fever.   HENT: Negative for nosebleeds and sore throat.    Eyes: Negative for blurred vision and double vision.   Respiratory: Negative for cough and shortness of breath.    Cardiovascular: Negative for chest pain, palpitations and leg swelling.   Gastrointestinal: Negative for abdominal pain, diarrhea, nausea and vomiting.   Genitourinary: Negative for dysuria and urgency.   Musculoskeletal: Negative for back pain.   Skin: Negative for rash.   Neurological: Negative for dizziness, loss of consciousness and headaches.        Physical Exam  Temp:  [36.7 °C (98 °F)-37.5 °C (99.5 °F)] 37.5 °C (99.5 °F)  Pulse:  [60-97] 85  Resp:  [11-32] 16  SpO2:  [93 %-98 %] 96 %    Physical Exam   Constitutional: He is oriented to person, place, and time. He appears well-developed and well-nourished. No distress.   HENT:   Head: Normocephalic and atraumatic.   Nose: Nose normal.   Mouth/Throat: Oropharynx is clear and moist.   Eyes: Conjunctivae are normal.    Neck: No JVD present.   Cardiovascular: Normal rate.  Exam reveals no gallop.    Murmur heard.  Pulmonary/Chest: Effort normal. No stridor. No respiratory distress. He has no wheezes. He has no rales.   Abdominal: Soft. There is no tenderness. There is no rebound and no guarding.   Ostomy noted  SP cath site noted   Musculoskeletal: He exhibits edema.   Neurological: He is alert and oriented to person, place, and time.   Subtle cognitive deficits with repeated questions and dificulty remembering and understanding   Skin: Skin is warm and dry. No rash noted. He is not diaphoretic.   Psychiatric: He has a normal mood and affect. His behavior is normal. Judgment and thought content normal.   Nursing note and vitals reviewed.      Fluids    Intake/Output Summary (Last 24 hours) at 07/19/19 0602  Last data filed at 07/19/19 0200   Gross per 24 hour   Intake          3841.67 ml   Output             4450 ml   Net          -608.33 ml       Laboratory  Recent Labs      07/16/19 2004 07/17/19   1335  07/18/19   0155   WBC  12.6*  9.6  9.5   RBC  4.45*  4.47*  4.54*   HEMOGLOBIN  12.6*  12.5*  12.4*   HEMATOCRIT  35.7*  36.8*  37.7*   MCV  80.2*  82.3  83.0   MCH  28.3  28.0  27.3   MCHC  35.3  34.0  32.9*   RDW  42.5  44.4  45.4   PLATELETCT  459*  374  432   MPV  9.1  8.6*  8.7*     Recent Labs      07/17/19 2000 07/18/19   0155  07/18/19   0734   SODIUM  125*  125*  124*   POTASSIUM  4.4  4.3  4.4   CHLORIDE  96  96  95*   CO2  20  20  20   GLUCOSE  98  132*  120*   BUN  21  21  24*   CREATININE  2.22*  2.27*  2.24*   CALCIUM  8.9  8.7  8.6     Recent Labs      07/17/19   1023   INR  1.14*               Imaging  IR-DRAIN-BLADDER SUPRAPUB W/CATH   Final Result      1. Fluoroscopic guided replacement of a 14 [place] locking loop suprapubic bladder catheter.      2.     Cystogram documenting catheter placement.      3.     The catheter can be upsized and exchanged for a Humphrey-type balloon catheter at the patient's  convenience.      IR-MIDLINE CATHETER INSERTION WO GUIDANCE > AGE 3   Final Result                  Ultrasound-guided midline placement performed by qualified nursing staff    as above.          OUTSIDE IMAGES-CT ABDOMEN /PELVIS   Final Result           Assessment/Plan  * Hyponatremia- (present on admission)   Assessment & Plan    Dr Caballero consulted from nephro  Unclear if this is hypervolemic from urinary obstruction due to catheter dysfunction, now catheter in place and draining  Let pt run without intervention and monitor  ?polydypsia; by pt's own estimation drinks 2.5-3 gallons of water daily.  I had a long conversation with him RE appropriate amount of fluid intake  Qday BMP  Nephrology consulted     Obstructive uropathy- (present on admission)   Assessment & Plan    Due to uretral stricutres and BPH   Urology has been consulted   Has SP cath back in place with good UOP     Leukocytosis- (present on admission)   Assessment & Plan    This patient has chronic leukocytosis   No evidence of infection   Cont to montior      Suprapubic catheter dysfunction (HCC)   Assessment & Plan    Displaced and removed   Urology consulted for evaluation by ERP      Anemia   Assessment & Plan    No evidence of bleeding lab workup ordered     CRF (chronic renal failure), stage 3 (moderate) ([facility])- (present on admission)   Assessment & Plan    At baseline renal function   Cont to montior      HTN (hypertension)- (present on admission)   Assessment & Plan    Resume home BB      BPH (benign prostatic hyperplasia)- (present on admission)   Assessment & Plan    Hx of resume home flomax     Crohn's disease ([facility])- (present on admission)   Assessment & Plan    S/p ostomy placement      Type 2 diabetes mellitus, with long-term current use of insulin ([facility])- (present on admission)   Assessment & Plan    SSI ordered  accu checks           VTE prophylaxis: heparin

## 2019-07-19 NOTE — PROGRESS NOTES
Critical Care Progress Note    Date of admission  7/16/2019    Chief Complaint  69 y.o. male admitted 7/16/2019 with life threatening hyperkalemia    Hospital Course    69 y.o. male who presented 7/16/2019 with acute on chronic hyponatremia      Interval Problem Update  Reviewed last 24 hour events:  Tm 99.5  -818cc over last 24hr, -4 L since admit  No cxr this am  Echo 6/2019 EF 45%  Suprapubic catheter in place  Na 123  Cr 2.2->2.1    96% on RA  Last bm 7/18      Review of Systems  Review of Systems   Constitutional: Negative for chills and fever.   HENT: Negative for congestion.    Eyes: Negative for blurred vision and double vision.   Respiratory: Negative for cough and shortness of breath.    Cardiovascular: Negative for chest pain.   Gastrointestinal: Negative for abdominal pain, nausea and vomiting.   Neurological: Negative for focal weakness and weakness.   Psychiatric/Behavioral: Negative for depression.   All other systems reviewed and are negative.       Vital Signs for last 24 hours   Temp:  [36.7 °C (98 °F)-37.5 °C (99.5 °F)] 37.5 °C (99.5 °F)  Pulse:  [67-97] 85  Resp:  [12-32] 16  SpO2:  [93 %-98 %] 96 %    Hemodynamic parameters for last 24 hours       Respiratory Information for the last 24 hours       Physical Exam   Physical Exam   Constitutional: He appears well-developed and well-nourished.   HENT:   Head: Normocephalic and atraumatic.   Eyes: Pupils are equal, round, and reactive to light. Conjunctivae are normal.   Neck: No tracheal deviation present.   Cardiovascular: Normal rate and intact distal pulses.    Pulmonary/Chest: He has no wheezes. He has no rales.   Abdominal: Soft. He exhibits no distension. There is tenderness (In suprapubic region). There is no rebound and no guarding.   Musculoskeletal: He exhibits edema.   Neurological: No cranial nerve deficit.   Skin: Skin is warm and dry.   Psychiatric: He has a normal mood and affect.   Nursing note and vitals  reviewed.      Medications  Current Facility-Administered Medications   Medication Dose Route Frequency Provider Last Rate Last Dose   • hydrOXYzine HCl (ATARAX) tablet 25 mg  25 mg Oral TID PRN Dorian Gentile D.O.   25 mg at 07/18/19 0954   • insulin regular (HUMULIN R) injection 1-6 Units  1-6 Units Subcutaneous 4X/DAY GORDON Neal M.D.   1 Units at 07/18/19 2132    And   • glucose 4 g chewable tablet 16 g  16 g Oral Q15 MIN PRN Isaías Neal M.D.        And   • DEXTROSE 10% BOLUS 250 mL  250 mL Intravenous Q15 MIN PRN Isaías Neal M.D.       • senna-docusate (PERICOLACE or SENOKOT S) 8.6-50 MG per tablet 2 Tab  2 Tab Oral BID Mao Dunn M.D.   Stopped at 07/18/19 0600    And   • polyethylene glycol/lytes (MIRALAX) PACKET 1 Packet  1 Packet Oral QDAY PRN Mao Dunn M.D.        And   • magnesium hydroxide (MILK OF MAGNESIA) suspension 30 mL  30 mL Oral QDAY PRN Mao Dunn M.D.        And   • bisacodyl (DULCOLAX) suppository 10 mg  10 mg Rectal QDAY PRN Mao Dunn M.D.       • heparin injection 5,000 Units  5,000 Units Subcutaneous Q8HRS Mao Dunn M.D.   5,000 Units at 07/19/19 0617   • Pharmacy Consult Request ...Pain Management Review 1 Each  1 Each Other PHARMACY TO DOSE Mao Dunn M.D.        And   • oxyCODONE immediate-release (ROXICODONE) tablet 5 mg  5 mg Oral Q3HRS PRN Mao Dunn M.D.   5 mg at 07/18/19 1046    And   • oxyCODONE immediate release (ROXICODONE) tablet 10 mg  10 mg Oral Q3HRS PRN Mao Dunn M.D.   10 mg at 07/18/19 2127    And   • morphine (pf) 4 mg/ml injection 4 mg  4 mg Intravenous Q3HRS PRN Mohammad Y Hanif, M.D.       • atorvastatin (LIPITOR) tablet 80 mg  80 mg Oral Q EVENING Mao Dunn M.D.   80 mg at 07/18/19 2127   • budesonide-formoterol (SYMBICORT) 160-4.5 MCG/ACT inhaler 2 Puff  2 Puff Inhalation BID Mao Dunn M.D.   2 Puff at 07/19/19 0620   • metoprolol (LOPRESSOR) tablet 12.5 mg  12.5  mg Oral BID Mao Dunn M.D.   12.5 mg at 07/19/19 0615   • oxybutynin (DITROPAN) tablet 5 mg  5 mg Oral Q12HRS Mao Dunn M.D.   5 mg at 07/19/19 0616   • tamsulosin (FLOMAX) capsule 0.4 mg  0.4 mg Oral AFTER BREAKFAST Mao Dunn M.D.   0.4 mg at 07/18/19 0742   • Respiratory Care per Protocol   Nebulization Continuous RT Mao Dunn M.D.           Fluids    Intake/Output Summary (Last 24 hours) at 07/19/19 0707  Last data filed at 07/19/19 0600   Gross per 24 hour   Intake          4741.67 ml   Output             5560 ml   Net          -818.33 ml       Laboratory          Recent Labs      07/17/19   0815   07/18/19   0155  07/18/19   0734  07/19/19   0604   SODIUM  121*   < >  125*  124*  123*   POTASSIUM  3.8   < >  4.3  4.4  4.9   CHLORIDE  92*   < >  96  95*  97   CO2  21   < >  20  20  16*   BUN  18   < >  21  24*  25*   CREATININE  1.87*   < >  2.27*  2.24*  2.12*   MAGNESIUM  2.0   --   2.3   --   1.9   CALCIUM  9.2   < >  8.7  8.6  9.1    < > = values in this interval not displayed.     Recent Labs      07/18/19   0155  07/18/19   0734  07/19/19   0604   GLUCOSE  132*  120*  128*     Recent Labs      07/17/19   1335  07/18/19   0155  07/19/19   0604   WBC  9.6  9.5  11.3*   NEUTSPOLYS  79.60*  72.10*  59.00   LYMPHOCYTES  11.50*  17.80*  27.10   MONOCYTES  7.70  8.30  10.70   EOSINOPHILS  0.40  0.90  2.00   BASOPHILS  0.40  0.40  0.60     Recent Labs      07/17/19   0007  07/17/19   0400  07/17/19   1023  07/17/19   1335  07/18/19   0155  07/19/19   0604   RBC   --    --    --   4.47*  4.54*  4.79   HEMOGLOBIN   --    --    --   12.5*  12.4*  13.1*   HEMATOCRIT   --    --    --   36.8*  37.7*  41.2*   PLATELETCT   --    --    --   374  432  489*   PROTHROMBTM   --    --   14.9*   --    --    --    INR   --    --   1.14*   --    --    --    IRON  26*   --    --    --    --    --    FERRITIN   --   59.8   --    --    --    --    TOTIRONBC  343   --    --    --    --    --         Imaging  X-Ray:  No film today    Assessment/Plan  * Hyponatremia- (present on admission)   Assessment & Plan    Trending back down  Patient still taking too much free water  Free water restriction  Continue to fall     Obstructive uropathy- (present on admission)   Assessment & Plan    Suprapubic catheter replaced overnight  Monitor urine output     Leukocytosis- (present on admission)   Assessment & Plan    Resolved     Hypomagnesemia   Assessment & Plan    Replace to keep greater than 2     Anemia   Assessment & Plan    Stable not an active issue     CRF (chronic renal failure), stage 3 (moderate) (HCC)- (present on admission)   Assessment & Plan    Baseline CR approximately 1.7  Renally dose medications and minimize nephrotoxic substances  Monitor urine output  Suprapubic catheter in place     HTN (hypertension)- (present on admission)   Assessment & Plan    Continue metoprolol     Type 2 diabetes mellitus, with long-term current use of insulin (HCC)- (present on admission)   Assessment & Plan    Insulin sliding scale          VTE:  Heparin  Ulcer: Not Indicated  Lines: Humphrey Catheter  Ongoing indication addressed    I have performed a physical exam and reviewed and updated ROS and Plan today (7/19/2019). In review of yesterday's note (7/18/2019), there are no changes except as documented above.     Discussed patient condition and risk of morbidity and/or mortality with Hospitalist, RN, RT, Pharmacy and Patient

## 2019-07-19 NOTE — CARE PLAN
Problem: Knowledge Deficit  Goal: Knowledge of disease process/condition, treatment plan, diagnostic tests, and medications will improve  Outcome: PROGRESSING SLOWER THAN EXPECTED  Patient alert and oriented to person and place. Reinforcement of all education needed.    Problem: Urinary Elimination:  Goal: Ability to reestablish a normal urinary elimination pattern will improve  Outcome: PROGRESSING SLOWER THAN EXPECTED  Patient has chronic suprapubic catheter.

## 2019-07-19 NOTE — PROGRESS NOTES
Late entry:    This RN responded to chair alarm going off. Patient getting out of chair without assistance. Fall education provided. Patient argumentative.

## 2019-07-20 LAB
ANION GAP SERPL CALC-SCNC: 10 MMOL/L (ref 0–11.9)
ANION GAP SERPL CALC-SCNC: 9 MMOL/L (ref 0–11.9)
BASOPHILS # BLD AUTO: 0.4 % (ref 0–1.8)
BASOPHILS # BLD: 0.05 K/UL (ref 0–0.12)
BUN SERPL-MCNC: 20 MG/DL (ref 8–22)
BUN SERPL-MCNC: 24 MG/DL (ref 8–22)
CALCIUM SERPL-MCNC: 8.7 MG/DL (ref 8.5–10.5)
CALCIUM SERPL-MCNC: 8.8 MG/DL (ref 8.5–10.5)
CHLORIDE SERPL-SCNC: 107 MMOL/L (ref 96–112)
CHLORIDE SERPL-SCNC: 110 MMOL/L (ref 96–112)
CO2 SERPL-SCNC: 15 MMOL/L (ref 20–33)
CO2 SERPL-SCNC: 18 MMOL/L (ref 20–33)
CREAT SERPL-MCNC: 2.03 MG/DL (ref 0.5–1.4)
CREAT SERPL-MCNC: 2.08 MG/DL (ref 0.5–1.4)
EOSINOPHIL # BLD AUTO: 0.22 K/UL (ref 0–0.51)
EOSINOPHIL NFR BLD: 1.8 % (ref 0–6.9)
ERYTHROCYTE [DISTWIDTH] IN BLOOD BY AUTOMATED COUNT: 48.3 FL (ref 35.9–50)
GLUCOSE BLD-MCNC: 127 MG/DL (ref 65–99)
GLUCOSE BLD-MCNC: 167 MG/DL (ref 65–99)
GLUCOSE SERPL-MCNC: 128 MG/DL (ref 65–99)
GLUCOSE SERPL-MCNC: 139 MG/DL (ref 65–99)
HCT VFR BLD AUTO: 41.7 % (ref 42–52)
HGB BLD-MCNC: 12.8 G/DL (ref 14–18)
IMM GRANULOCYTES # BLD AUTO: 0.06 K/UL (ref 0–0.11)
IMM GRANULOCYTES NFR BLD AUTO: 0.5 % (ref 0–0.9)
LYMPHOCYTES # BLD AUTO: 2.98 K/UL (ref 1–4.8)
LYMPHOCYTES NFR BLD: 24.5 % (ref 22–41)
MAGNESIUM SERPL-MCNC: 1.9 MG/DL (ref 1.5–2.5)
MAGNESIUM SERPL-MCNC: 2 MG/DL (ref 1.5–2.5)
MCH RBC QN AUTO: 26.9 PG (ref 27–33)
MCHC RBC AUTO-ENTMCNC: 30.7 G/DL (ref 33.7–35.3)
MCV RBC AUTO: 87.6 FL (ref 81.4–97.8)
MONOCYTES # BLD AUTO: 1.01 K/UL (ref 0–0.85)
MONOCYTES NFR BLD AUTO: 8.3 % (ref 0–13.4)
NEUTROPHILS # BLD AUTO: 7.82 K/UL (ref 1.82–7.42)
NEUTROPHILS NFR BLD: 64.5 % (ref 44–72)
NRBC # BLD AUTO: 0 K/UL
NRBC BLD-RTO: 0 /100 WBC
PLATELET # BLD AUTO: 482 K/UL (ref 164–446)
PMV BLD AUTO: 8.6 FL (ref 9–12.9)
POTASSIUM SERPL-SCNC: 4.5 MMOL/L (ref 3.6–5.5)
POTASSIUM SERPL-SCNC: 4.7 MMOL/L (ref 3.6–5.5)
RBC # BLD AUTO: 4.76 M/UL (ref 4.7–6.1)
SODIUM SERPL-SCNC: 131 MMOL/L (ref 135–145)
SODIUM SERPL-SCNC: 138 MMOL/L (ref 135–145)
WBC # BLD AUTO: 12.1 K/UL (ref 4.8–10.8)

## 2019-07-20 PROCEDURE — 700102 HCHG RX REV CODE 250 W/ 637 OVERRIDE(OP): Performed by: HOSPITALIST

## 2019-07-20 PROCEDURE — 80048 BASIC METABOLIC PNL TOTAL CA: CPT

## 2019-07-20 PROCEDURE — A9270 NON-COVERED ITEM OR SERVICE: HCPCS | Performed by: HOSPITALIST

## 2019-07-20 PROCEDURE — 99233 SBSQ HOSP IP/OBS HIGH 50: CPT | Performed by: INTERNAL MEDICINE

## 2019-07-20 PROCEDURE — 83735 ASSAY OF MAGNESIUM: CPT | Mod: 91

## 2019-07-20 PROCEDURE — 82962 GLUCOSE BLOOD TEST: CPT | Mod: 91

## 2019-07-20 PROCEDURE — 770001 HCHG ROOM/CARE - MED/SURG/GYN PRIV*

## 2019-07-20 PROCEDURE — 700111 HCHG RX REV CODE 636 W/ 250 OVERRIDE (IP): Performed by: HOSPITALIST

## 2019-07-20 PROCEDURE — 99233 SBSQ HOSP IP/OBS HIGH 50: CPT | Performed by: HOSPITALIST

## 2019-07-20 PROCEDURE — 85025 COMPLETE CBC W/AUTO DIFF WBC: CPT

## 2019-07-20 RX ORDER — MAGNESIUM SULFATE HEPTAHYDRATE 40 MG/ML
2 INJECTION, SOLUTION INTRAVENOUS ONCE
Status: COMPLETED | OUTPATIENT
Start: 2019-07-20 | End: 2019-07-20

## 2019-07-20 RX ADMIN — ATORVASTATIN CALCIUM 80 MG: 40 TABLET, FILM COATED ORAL at 16:56

## 2019-07-20 RX ADMIN — OXYBUTYNIN CHLORIDE 5 MG: 5 TABLET ORAL at 06:06

## 2019-07-20 RX ADMIN — MAGNESIUM SULFATE IN WATER 2 G: 40 INJECTION, SOLUTION INTRAVENOUS at 15:31

## 2019-07-20 RX ADMIN — BUDESONIDE AND FORMOTEROL FUMARATE DIHYDRATE 2 PUFF: 160; 4.5 AEROSOL RESPIRATORY (INHALATION) at 06:08

## 2019-07-20 RX ADMIN — METOPROLOL TARTRATE 12.5 MG: 25 TABLET, FILM COATED ORAL at 06:06

## 2019-07-20 RX ADMIN — OXYBUTYNIN CHLORIDE 5 MG: 5 TABLET ORAL at 16:56

## 2019-07-20 RX ADMIN — HYDROXYZINE HYDROCHLORIDE 25 MG: 25 TABLET, FILM COATED ORAL at 22:41

## 2019-07-20 RX ADMIN — OXYCODONE HYDROCHLORIDE 10 MG: 10 TABLET ORAL at 22:41

## 2019-07-20 RX ADMIN — OXYCODONE HYDROCHLORIDE 10 MG: 10 TABLET ORAL at 11:52

## 2019-07-20 RX ADMIN — TAMSULOSIN HYDROCHLORIDE 0.4 MG: 0.4 CAPSULE ORAL at 08:30

## 2019-07-20 RX ADMIN — INSULIN HUMAN 1 UNITS: 100 INJECTION, SOLUTION PARENTERAL at 12:00

## 2019-07-20 RX ADMIN — HEPARIN SODIUM 5000 UNITS: 5000 INJECTION, SOLUTION INTRAVENOUS; SUBCUTANEOUS at 06:10

## 2019-07-20 RX ADMIN — HEPARIN SODIUM 5000 UNITS: 5000 INJECTION, SOLUTION INTRAVENOUS; SUBCUTANEOUS at 14:41

## 2019-07-20 RX ADMIN — OXYCODONE HYDROCHLORIDE 10 MG: 10 TABLET ORAL at 19:42

## 2019-07-20 RX ADMIN — METOPROLOL TARTRATE 12.5 MG: 25 TABLET, FILM COATED ORAL at 16:56

## 2019-07-20 RX ADMIN — HEPARIN SODIUM 5000 UNITS: 5000 INJECTION, SOLUTION INTRAVENOUS; SUBCUTANEOUS at 22:40

## 2019-07-20 RX ADMIN — BUDESONIDE AND FORMOTEROL FUMARATE DIHYDRATE 2 PUFF: 160; 4.5 AEROSOL RESPIRATORY (INHALATION) at 16:56

## 2019-07-20 ASSESSMENT — COGNITIVE AND FUNCTIONAL STATUS - GENERAL
CLIMB 3 TO 5 STEPS WITH RAILING: A LOT
DRESSING REGULAR LOWER BODY CLOTHING: A LITTLE
MOVING TO AND FROM BED TO CHAIR: A LITTLE
STANDING UP FROM CHAIR USING ARMS: A LITTLE
DRESSING REGULAR UPPER BODY CLOTHING: A LITTLE
MOVING FROM LYING ON BACK TO SITTING ON SIDE OF FLAT BED: A LITTLE
HELP NEEDED FOR BATHING: A LITTLE
SUGGESTED CMS G CODE MODIFIER MOBILITY: CK
WALKING IN HOSPITAL ROOM: A LOT
MOBILITY SCORE: 17
SUGGESTED CMS G CODE MODIFIER DAILY ACTIVITY: CJ
DAILY ACTIVITIY SCORE: 21

## 2019-07-20 ASSESSMENT — ENCOUNTER SYMPTOMS
ABDOMINAL PAIN: 0
DEPRESSION: 0
SHORTNESS OF BREATH: 0
CHILLS: 0
LOSS OF CONSCIOUSNESS: 0
FEVER: 0
HEADACHES: 0
BLURRED VISION: 0
DIARRHEA: 0
NAUSEA: 0
WEAKNESS: 0
BACK PAIN: 0
FOCAL WEAKNESS: 0
PALPITATIONS: 0
DOUBLE VISION: 0
COUGH: 0
DIZZINESS: 0
VOMITING: 0
SORE THROAT: 0

## 2019-07-20 ASSESSMENT — PAIN SCALES - WONG BAKER: WONGBAKER_NUMERICALRESPONSE: DOESN'T HURT AT ALL

## 2019-07-20 ASSESSMENT — PATIENT HEALTH QUESTIONNAIRE - PHQ9
2. FEELING DOWN, DEPRESSED, IRRITABLE, OR HOPELESS: NOT AT ALL
1. LITTLE INTEREST OR PLEASURE IN DOING THINGS: NOT AT ALL
SUM OF ALL RESPONSES TO PHQ9 QUESTIONS 1 AND 2: 0

## 2019-07-20 NOTE — PROGRESS NOTES
Critical Care Progress Note    Date of admission  7/16/2019    Chief Complaint  69 y.o. male admitted 7/16/2019 with life threatening hyperkalemia    Hospital Course    69 y.o. male who presented 7/16/2019 with acute on chronic hyponatremia      Interval Problem Update  Reviewed last 24 hour events:  Tm 98.7  -818cc over last 24hr, -4 L since admit  No cxr this am  Echo 6/2019 EF 45%  Suprapubic catheter in place  Na 123->138    98% on RA  Last bm 7/20      Review of Systems  Review of Systems   Constitutional: Negative for chills and fever.   HENT: Negative for congestion.    Eyes: Negative for blurred vision and double vision.   Respiratory: Negative for cough and shortness of breath.    Cardiovascular: Negative for chest pain.   Gastrointestinal: Negative for abdominal pain, nausea and vomiting.   Neurological: Negative for focal weakness and weakness.   Psychiatric/Behavioral: Negative for depression.   All other systems reviewed and are negative.       Vital Signs for last 24 hours   Temp:  [36.5 °C (97.7 °F)-37.1 °C (98.7 °F)] 37.1 °C (98.7 °F)  Pulse:  [] 82  Resp:  [11-29] 20  SpO2:  [92 %-99 %] 98 %    Hemodynamic parameters for last 24 hours       Respiratory Information for the last 24 hours       Physical Exam   Physical Exam   Constitutional: He appears well-developed and well-nourished.   HENT:   Head: Normocephalic and atraumatic.   Eyes: Pupils are equal, round, and reactive to light. Conjunctivae are normal.   Neck: No tracheal deviation present.   Cardiovascular: Normal rate and intact distal pulses.    Pulmonary/Chest: He has no wheezes. He has no rales.   Abdominal: Soft. He exhibits no distension. There is tenderness (In suprapubic region). There is no rebound and no guarding.   Musculoskeletal: He exhibits edema.   Neurological: No cranial nerve deficit.   Skin: Skin is warm and dry.   Psychiatric:   Confused today   Nursing note and vitals reviewed.      Medications  Current  Facility-Administered Medications   Medication Dose Route Frequency Provider Last Rate Last Dose   • hydrOXYzine HCl (ATARAX) tablet 25 mg  25 mg Oral TID PRN Dorian Gentile D.O.   25 mg at 07/18/19 0954   • insulin regular (HUMULIN R) injection 1-6 Units  1-6 Units Subcutaneous 4X/DAY GORDON Neal M.D.   Stopped at 07/19/19 1700    And   • glucose 4 g chewable tablet 16 g  16 g Oral Q15 MIN PRN Isaías Neal M.D.        And   • DEXTROSE 10% BOLUS 250 mL  250 mL Intravenous Q15 MIN PRN Isaías Neal M.D.       • senna-docusate (PERICOLACE or SENOKOT S) 8.6-50 MG per tablet 2 Tab  2 Tab Oral BID Mao Dunn M.D.   Stopped at 07/18/19 0600    And   • polyethylene glycol/lytes (MIRALAX) PACKET 1 Packet  1 Packet Oral QDAY PRN Mao Dunn M.D.        And   • magnesium hydroxide (MILK OF MAGNESIA) suspension 30 mL  30 mL Oral QDAY PRN Mao Dunn M.D.        And   • bisacodyl (DULCOLAX) suppository 10 mg  10 mg Rectal QDAY PRN Mao Dunn M.D.       • heparin injection 5,000 Units  5,000 Units Subcutaneous Q8HRS Mao Dunn M.D.   5,000 Units at 07/20/19 0610   • Pharmacy Consult Request ...Pain Management Review 1 Each  1 Each Other PHARMACY TO DOSE Mao Dunn M.D.        And   • oxyCODONE immediate-release (ROXICODONE) tablet 5 mg  5 mg Oral Q3HRS PRN Mao Dunn M.D.   5 mg at 07/18/19 1046    And   • oxyCODONE immediate release (ROXICODONE) tablet 10 mg  10 mg Oral Q3HRS PRIRINEO Dunn M.D.   10 mg at 07/19/19 1631    And   • morphine (pf) 4 mg/ml injection 4 mg  4 mg Intravenous Q3HRS PRN Mao Dunn M.D.       • atorvastatin (LIPITOR) tablet 80 mg  80 mg Oral Q EVENING Mao Dunn M.D.   80 mg at 07/19/19 1631   • budesonide-formoterol (SYMBICORT) 160-4.5 MCG/ACT inhaler 2 Puff  2 Puff Inhalation BID Mao Dunn M.D.   2 Puff at 07/20/19 0608   • metoprolol (LOPRESSOR) tablet 12.5 mg  12.5 mg Oral BID Mao Dunn M.D.    12.5 mg at 07/20/19 0606   • oxybutynin (DITROPAN) tablet 5 mg  5 mg Oral Q12HRS Mao Dunn M.D.   5 mg at 07/20/19 0606   • tamsulosin (FLOMAX) capsule 0.4 mg  0.4 mg Oral AFTER BREAKFAST Mao Dunn M.D.   0.4 mg at 07/19/19 0733   • Respiratory Care per Protocol   Nebulization Continuous RT Mao Dunn M.D.           Fluids    Intake/Output Summary (Last 24 hours) at 07/20/19 0703  Last data filed at 07/20/19 0600   Gross per 24 hour   Intake             4480 ml   Output             6570 ml   Net            -2090 ml       Laboratory          Recent Labs      07/17/19   0815   07/18/19   0155  07/18/19   0734  07/19/19   0604   SODIUM  121*   < >  125*  124*  123*   POTASSIUM  3.8   < >  4.3  4.4  4.9   CHLORIDE  92*   < >  96  95*  97   CO2  21   < >  20  20  16*   BUN  18   < >  21  24*  25*   CREATININE  1.87*   < >  2.27*  2.24*  2.12*   MAGNESIUM  2.0   --   2.3   --   1.9   CALCIUM  9.2   < >  8.7  8.6  9.1    < > = values in this interval not displayed.     Recent Labs      07/18/19   0155  07/18/19   0734  07/19/19   0604   GLUCOSE  132*  120*  128*     Recent Labs      07/17/19   1335  07/18/19   0155  07/19/19   0604   WBC  9.6  9.5  11.3*   NEUTSPOLYS  79.60*  72.10*  59.00   LYMPHOCYTES  11.50*  17.80*  27.10   MONOCYTES  7.70  8.30  10.70   EOSINOPHILS  0.40  0.90  2.00   BASOPHILS  0.40  0.40  0.60     Recent Labs      07/17/19   1023  07/17/19   1335  07/18/19   0155  07/19/19   0604   RBC   --   4.47*  4.54*  4.79   HEMOGLOBIN   --   12.5*  12.4*  13.1*   HEMATOCRIT   --   36.8*  37.7*  41.2*   PLATELETCT   --   374  432  489*   PROTHROMBTM  14.9*   --    --    --    INR  1.14*   --    --    --        Imaging  X-Ray:  No film today    Assessment/Plan  * Hyponatremia- (present on admission)   Assessment & Plan    Trending back down  Patient still taking too much free water  Up 15meq overnight  Change free water rest to 2L from 1L  Repeat lab this afternoon     Obstructive  uropathy- (present on admission)   Assessment & Plan    Suprapubic catheter replaced overnight  Monitor urine output     Leukocytosis- (present on admission)   Assessment & Plan    Resolved     Hypomagnesemia   Assessment & Plan    Replace to keep greater than 2     Anemia   Assessment & Plan    Stable not an active issue     CRF (chronic renal failure), stage 3 (moderate) (HCC)- (present on admission)   Assessment & Plan    Baseline CR approximately 1.7  Renally dose medications and minimize nephrotoxic substances  Monitor urine output  Suprapubic catheter in place     HTN (hypertension)- (present on admission)   Assessment & Plan    Continue metoprolol     Type 2 diabetes mellitus, with long-term current use of insulin (HCC)- (present on admission)   Assessment & Plan    Insulin sliding scale          VTE:  Heparin  Ulcer: Not Indicated  Lines: Humphrey Catheter  Ongoing indication addressed    I have performed a physical exam and reviewed and updated ROS and Plan today (7/20/2019). In review of yesterday's note (7/19/2019), there are no changes except as documented above.     Discussed patient condition and risk of morbidity and/or mortality with Hospitalist, RN, RT, Pharmacy and Patient

## 2019-07-20 NOTE — CARE PLAN
Problem: Safety  Goal: Will remain free from falls  Outcome: PROGRESSING AS EXPECTED  Slipper socks in use. Bed and chair alarms in use. Call light within reach. Fall risk band in place. Patient impulsive and attempts to mobilize without assistance. Education provided.    Problem: Bowel/Gastric:  Goal: Will not experience complications related to bowel motility  Outcome: PROGRESSING AS EXPECTED  Patient has ileostomy. See I&O flowsheet for output.

## 2019-07-20 NOTE — PROGRESS NOTES
[facility] -  PROGRESS NOTE               Author: Russell Caballero M.D. Date & Time: 7/20/2019  9:57 AM     HPI:  68 yo male PMH CKD Stage 3, HTN, type 2 DM, BPH, HLD, and COPD presents to ED as transfer from OSH after being found to have hyponatremia.  He has a known hx of BPH causing REED and required a suprapubic catheter placement early this year.  He states yesterday afternoon it fell out on its own, he denies trauma or pulling on it prior to it displacing.  [facility] about [facility].  At his local ED they were unable to replace it and lab work showed significant hyponatremia at 112 and so he was sent to [facility] for further care.  By the time he arrived here and had repeat labs it was 12 hrs from initial and his SNa ~ 119.  ED docs here were able to get a penile FC and he was admitted to ICU.  Repeat SNa ~ 121 after being here and he was started on D5W overnight to maintain him at current range.  He admits to being told about hyponatremia in past due to low solute intake according to him.  Further inquiry also reveals that he drinks 4-6 gallons of water daily, he says he just sits there and will drink water while on his recliner.  He is currently NPO for IR to replace his suprapubic catheter.  SNa stable in the low 120s overnight and this AM.  His other labs show Ashley ~ 14 and UOsm ~ 72.  No F/C/N/V/CP/SOB.  No melena, hematochezia, hematemesis.  No HA, visual changes.      DAILY NEPHROLOGY SUMMARY:  7/17/19 - Consult done  7/18/19 - NAEO, SP catheter replaced; SNa trended up  7/19/19 - NAEO, no complaints, sitting in chair  7/20/19 - NAEO, no complaints, SNa improved with IVF      REVIEW OF SYSTEMS:    - As per HPI, otherwise review of systems negative per AMA/CMS criteria  - General:  As per HPI, otherwise negative per AMA/CMS criteria  - HEENT:  No ocular pain; no nasal discharge  - CV:  As per HPI, otherwise negative per AMA/CMS criteria  - Lungs:  As per HPI,  "otherwise negative per AMA/CMS criteria  - GI:  As per HPI, otherwise negative per AMA/CMS criteria  - MSK:  No joint pain; No trauma  - Skin: No rashes; No lesions  - Neuro: No paresthesia; No LOC  - Psych: No depression; No anxiety      PAST FAMILY HISTORY: Reviewed and Unchanged  SOCIAL HISTORY: Reviewed and Unchanged  CURRENT MEDICATIONS: Reviewed  IMAGING STUDIES: Reviewed      PHYSICAL EXAM:  VS:  /58   Pulse 71   Temp 36.9 °C (98.5 °F) (Temporal)   Resp 20   Ht 1.803 m (5' 11\")   Wt 111.1 kg (244 lb 14.9 oz)   SpO2 98%   BMI 34.16 kg/m²   GENERAL:  WDWN NAD  HEENT:  NC/AT, no scleral icterus; conjunctiva normal  NECK:  Supple; Non tender  CV:  RRR, no m/r/g  LUNGS:  CTAB, no W/R  ABDOMEN:  SND, TTP suprapubic region  EXTREMETIES:  No C/C/E  SKIN:  Warm and dry  NEURO:  A&O, no focal deficits  PSYCH:  Cooperative, appropriate mood and affect    Fluids:  In: 4480 [P.O.:3480]  Out: 6570     LABS:  Recent Results (from the past 24 hour(s))   ACCU-CHEK GLUCOSE    Collection Time: 07/19/19 10:47 AM   Result Value Ref Range    Glucose - Accu-Ck 168 (H) 65 - 99 mg/dL   ACCU-CHEK GLUCOSE    Collection Time: 07/19/19  4:30 PM   Result Value Ref Range    Glucose - Accu-Ck 120 (H) 65 - 99 mg/dL   ACCU-CHEK GLUCOSE    Collection Time: 07/19/19  8:57 PM   Result Value Ref Range    Glucose - Accu-Ck 129 (H) 65 - 99 mg/dL   CBC WITH DIFFERENTIAL    Collection Time: 07/20/19  6:35 AM   Result Value Ref Range    WBC 12.1 (H) 4.8 - 10.8 K/uL    RBC 4.76 4.70 - 6.10 M/uL    Hemoglobin 12.8 (L) 14.0 - 18.0 g/dL    Hematocrit 41.7 (L) 42.0 - 52.0 %    MCV 87.6 81.4 - 97.8 fL    MCH 26.9 (L) 27.0 - 33.0 pg    MCHC 30.7 (L) 33.7 - 35.3 g/dL    RDW 48.3 35.9 - 50.0 fL    Platelet Count 482 (H) 164 - 446 K/uL    MPV 8.6 (L) 9.0 - 12.9 fL    Neutrophils-Polys 64.50 44.00 - 72.00 %    Lymphocytes 24.50 22.00 - 41.00 %    Monocytes 8.30 0.00 - 13.40 %    Eosinophils 1.80 0.00 - 6.90 %    Basophils 0.40 0.00 - 1.80 %    " Immature Granulocytes 0.50 0.00 - 0.90 %    Nucleated RBC 0.00 /100 WBC    Neutrophils (Absolute) 7.82 (H) 1.82 - 7.42 K/uL    Lymphs (Absolute) 2.98 1.00 - 4.80 K/uL    Monos (Absolute) 1.01 (H) 0.00 - 0.85 K/uL    Eos (Absolute) 0.22 0.00 - 0.51 K/uL    Baso (Absolute) 0.05 0.00 - 0.12 K/uL    Immature Granulocytes (abs) 0.06 0.00 - 0.11 K/uL    NRBC (Absolute) 0.00 K/uL   Basic Metabolic Panel    Collection Time: 07/20/19  6:35 AM   Result Value Ref Range    Sodium 138 135 - 145 mmol/L    Potassium 4.7 3.6 - 5.5 mmol/L    Chloride 110 96 - 112 mmol/L    Co2 18 (L) 20 - 33 mmol/L    Glucose 128 (H) 65 - 99 mg/dL    Bun 24 (H) 8 - 22 mg/dL    Creatinine 2.08 (H) 0.50 - 1.40 mg/dL    Calcium 8.8 8.5 - 10.5 mg/dL    Anion Gap 10.0 0.0 - 11.9   MAGNESIUM    Collection Time: 07/20/19  6:35 AM   Result Value Ref Range    Magnesium 2.0 1.5 - 2.5 mg/dL   ESTIMATED GFR    Collection Time: 07/20/19  6:35 AM   Result Value Ref Range    GFR If  39 (A) >60 mL/min/1.73 m 2    GFR If Non  32 (A) >60 mL/min/1.73 m 2       (click the triangle to expand results)      IMPRESSION:  - CKD Stage 3    * BCr ~ 1.5-1.9    * Etiology 2/2 BPH/obstruction  - Hyponatremia    * Etiology likely due to polydipsia with superimposed hypovolemic hyponatremia    * Responded to IVF bolus and now labs normal    * His mental status waxes and wanes making it difficult to see if he will be able to follow through on fluid restriction  - HTN    * Goal BP < 140/90  - type 2 DM  - HLD  - BPH    * Chronic suprapubic catheter, displaced currently     PLAN:  - No compelling indication for RRT  - Restrict free water < 1L  - No solute restrictions  - Dose all meds per eGFR < 60  - Counseled he needs to restrict free water intake as OP to prevent future episodes such as this  - Will arrange for FU in our [place] clinic within 1 month of d/c to further manage his hyponatremia, may need salt tabs if unable to restrict free  water  - Ok to transition to OP care from renal standpoint

## 2019-07-20 NOTE — PROGRESS NOTES
Dr. Flores notified of afternoon labs, Na 131 and Mg 1.9. Continuing with 2L fluid restriction, keep Na between 125 and 135, redraw with morning labs tomorrow. Orders placed for mag repletion.

## 2019-07-20 NOTE — PROGRESS NOTES
"Hospital Medicine Daily Progress Note    Date of Service  7/20/2019    Chief Complaint      Hospital Course    69 y.o. male admitted 7/16/2019 with Hx of chronic SP cath, Crohn's, Dm, CHF.  He has a chronic SP cath due to Hx of urethral stricture.  Cath was placed in June of this year.  On evaluation at outlying facility he was found ot have a Na of 112, CO2 19, Creat 1.75.         Interval Problem Update  Feels \"fine\".  No complaints  Overnight taking constant broth  Sinus/sinus nikki  -150s  + output from ostomy  Tolerating po  UOP 1900ml/12hrs    Consultants/Specialty  Pulmonology  Nephrology      Code Status  full    Disposition  Problematic.    Dw pt's friend and helper Nathaly by phone.  She reports he has been in the hospital multiple times for low Na.  He drinks 6 gallons of tea and water at home despite being told multiple times to restrict his fluid intake.  She feels he is unsafe at home.  Reports previously when hospitalized he has refused or not been approved for SNF though she feels he needs it.    Review of Systems  Review of Systems   Unable to perform ROS: Other   Constitutional: Negative for chills and fever.   HENT: Negative for nosebleeds and sore throat.    Eyes: Negative for blurred vision and double vision.   Respiratory: Negative for cough and shortness of breath.    Cardiovascular: Negative for chest pain, palpitations and leg swelling.   Gastrointestinal: Negative for abdominal pain, diarrhea, nausea and vomiting.   Genitourinary: Negative for dysuria and urgency.   Musculoskeletal: Negative for back pain.   Skin: Negative for rash.   Neurological: Negative for dizziness, loss of consciousness and headaches.        Physical Exam  Temp:  [36.5 °C (97.7 °F)-36.7 °C (98 °F)] 36.7 °C (98 °F)  Pulse:  [] 62  Resp:  [14-29] 29  SpO2:  [92 %-99 %] 96 %    Physical Exam   Constitutional: He is oriented to person, place, and time. He appears well-developed and well-nourished. No " distress.   HENT:   Head: Normocephalic and atraumatic.   Nose: Nose normal.   Mouth/Throat: Oropharynx is clear and moist.   Eyes: Conjunctivae are normal.   Neck: No JVD present.   Cardiovascular: Normal rate.  Exam reveals no gallop.    Murmur heard.  Pulmonary/Chest: Effort normal. No stridor. No respiratory distress. He has no wheezes. He has no rales.   Abdominal: Soft. There is no tenderness. There is no rebound and no guarding.   Ostomy noted  SP cath site noted   Musculoskeletal: He exhibits no edema.   Neurological: He is alert and oriented to person, place, and time.   Subtle cognitive deficits with repeated questions and dificulty remembering and understanding unchanged from yesterday   Skin: Skin is warm and dry. No rash noted. He is not diaphoretic.   Psychiatric: He has a normal mood and affect. His behavior is normal. Judgment and thought content normal.   Nursing note and vitals reviewed.      Fluids    Intake/Output Summary (Last 24 hours) at 07/20/19 0557  Last data filed at 07/20/19 0200   Gross per 24 hour   Intake             4420 ml   Output             5280 ml   Net             -860 ml       Laboratory  Recent Labs      07/17/19   1335  07/18/19   0155  07/19/19   0604   WBC  9.6  9.5  11.3*   RBC  4.47*  4.54*  4.79   HEMOGLOBIN  12.5*  12.4*  13.1*   HEMATOCRIT  36.8*  37.7*  41.2*   MCV  82.3  83.0  86.0   MCH  28.0  27.3  27.3   MCHC  34.0  32.9*  31.8*   RDW  44.4  45.4  47.5   PLATELETCT  374  432  489*   MPV  8.6*  8.7*  8.7*     Recent Labs      07/18/19   0155  07/18/19   0734  07/19/19   0604   SODIUM  125*  124*  123*   POTASSIUM  4.3  4.4  4.9   CHLORIDE  96  95*  97   CO2  20  20  16*   GLUCOSE  132*  120*  128*   BUN  21  24*  25*   CREATININE  2.27*  2.24*  2.12*   CALCIUM  8.7  8.6  9.1     Recent Labs      07/17/19   1023   INR  1.14*               Imaging  IR-DRAIN-BLADDER SUPRAPUB W/CATH   Final Result      1. Fluoroscopic guided replacement of a 14 [place]ian locking loop  suprapubic bladder catheter.      2.     Cystogram documenting catheter placement.      3.     The catheter can be upsized and exchanged for a Humphrey-type balloon catheter at the patient's convenience.      IR-MIDLINE CATHETER INSERTION WO GUIDANCE > AGE 3   Final Result                  Ultrasound-guided midline placement performed by qualified nursing staff    as above.          OUTSIDE IMAGES-CT ABDOMEN /PELVIS   Final Result           Assessment/Plan  * Hyponatremia- (present on admission)   Assessment & Plan    Dr Caballero consulted from nephro  Clearly has polydypsia: drinks >4 gallons of water and tea by his admission, >6 gallons per his friend Nathaly  Has been admitted multiple times and told to restrict fluids on DC but is non compliant  Needs placement  Nephrology consulted  Overnight corrected to 138 on 1 litre fluid restriction.  Per RN was drinking large amounts of broth  Increase fluid restriction to 2 litres  Dietary consulted to educate pt on high salt diet and fluid restriction     Obstructive uropathy- (present on admission)   Assessment & Plan    Due to uretral stricutres and BPH   Urology has been consulted   Has SP cath back in place with good UOP     Leukocytosis- (present on admission)   Assessment & Plan    This patient has chronic leukocytosis   No evidence of infection   Cont to montior      Hypomagnesemia   Assessment & Plan    Repeat Mg in am     Suprapubic catheter dysfunction (HCC)   Assessment & Plan    Displaced and removed   Urology consulted for evaluation by ERP      Anemia   Assessment & Plan    No evidence of bleeding lab workup ordered     CRF (chronic renal failure), stage 3 (moderate) (HCC)- (present on admission)   Assessment & Plan    At baseline renal function   Cont to montior      HTN (hypertension)- (present on admission)   Assessment & Plan    Resume home BB      BPH (benign prostatic hyperplasia)- (present on admission)   Assessment & Plan    Hx of resume home flomax      Crohn's disease (HCC)- (present on admission)   Assessment & Plan    S/p ostomy placement      Type 2 diabetes mellitus, with long-term current use of insulin (HCC)- (present on admission)   Assessment & Plan    SSI ordered  accu checks           VTE prophylaxis: heparin

## 2019-07-21 LAB
ANION GAP SERPL CALC-SCNC: 10 MMOL/L (ref 0–11.9)
BASOPHILS # BLD AUTO: 0.3 % (ref 0–1.8)
BASOPHILS # BLD: 0.04 K/UL (ref 0–0.12)
BUN SERPL-MCNC: 20 MG/DL (ref 8–22)
CALCIUM SERPL-MCNC: 8.8 MG/DL (ref 8.5–10.5)
CHLORIDE SERPL-SCNC: 106 MMOL/L (ref 96–112)
CO2 SERPL-SCNC: 16 MMOL/L (ref 20–33)
CREAT SERPL-MCNC: 2.01 MG/DL (ref 0.5–1.4)
EOSINOPHIL # BLD AUTO: 0.31 K/UL (ref 0–0.51)
EOSINOPHIL NFR BLD: 2.2 % (ref 0–6.9)
ERYTHROCYTE [DISTWIDTH] IN BLOOD BY AUTOMATED COUNT: 49.2 FL (ref 35.9–50)
GLUCOSE SERPL-MCNC: 131 MG/DL (ref 65–99)
HCT VFR BLD AUTO: 38.5 % (ref 42–52)
HGB BLD-MCNC: 11.8 G/DL (ref 14–18)
IMM GRANULOCYTES # BLD AUTO: 0.06 K/UL (ref 0–0.11)
IMM GRANULOCYTES NFR BLD AUTO: 0.4 % (ref 0–0.9)
LYMPHOCYTES # BLD AUTO: 2.85 K/UL (ref 1–4.8)
LYMPHOCYTES NFR BLD: 20.5 % (ref 22–41)
MAGNESIUM SERPL-MCNC: 2.1 MG/DL (ref 1.5–2.5)
MCH RBC QN AUTO: 27.1 PG (ref 27–33)
MCHC RBC AUTO-ENTMCNC: 30.6 G/DL (ref 33.7–35.3)
MCV RBC AUTO: 88.5 FL (ref 81.4–97.8)
MONOCYTES # BLD AUTO: 1.23 K/UL (ref 0–0.85)
MONOCYTES NFR BLD AUTO: 8.9 % (ref 0–13.4)
NEUTROPHILS # BLD AUTO: 9.4 K/UL (ref 1.82–7.42)
NEUTROPHILS NFR BLD: 67.7 % (ref 44–72)
NRBC # BLD AUTO: 0 K/UL
NRBC BLD-RTO: 0 /100 WBC
PLATELET # BLD AUTO: 442 K/UL (ref 164–446)
PMV BLD AUTO: 8.6 FL (ref 9–12.9)
POTASSIUM SERPL-SCNC: 4.4 MMOL/L (ref 3.6–5.5)
RBC # BLD AUTO: 4.35 M/UL (ref 4.7–6.1)
SODIUM SERPL-SCNC: 132 MMOL/L (ref 135–145)
WBC # BLD AUTO: 13.9 K/UL (ref 4.8–10.8)

## 2019-07-21 PROCEDURE — 770001 HCHG ROOM/CARE - MED/SURG/GYN PRIV*

## 2019-07-21 PROCEDURE — A6250 SKIN SEAL PROTECT MOISTURIZR: HCPCS

## 2019-07-21 PROCEDURE — 700102 HCHG RX REV CODE 250 W/ 637 OVERRIDE(OP): Performed by: HOSPITALIST

## 2019-07-21 PROCEDURE — 99233 SBSQ HOSP IP/OBS HIGH 50: CPT | Performed by: INTERNAL MEDICINE

## 2019-07-21 PROCEDURE — A9270 NON-COVERED ITEM OR SERVICE: HCPCS | Performed by: HOSPITALIST

## 2019-07-21 PROCEDURE — 85025 COMPLETE CBC W/AUTO DIFF WBC: CPT

## 2019-07-21 PROCEDURE — 302098 PASTE RING (FLAT): Performed by: HOSPITALIST

## 2019-07-21 PROCEDURE — 700111 HCHG RX REV CODE 636 W/ 250 OVERRIDE (IP): Performed by: HOSPITALIST

## 2019-07-21 PROCEDURE — 99232 SBSQ HOSP IP/OBS MODERATE 35: CPT | Performed by: HOSPITALIST

## 2019-07-21 PROCEDURE — 83735 ASSAY OF MAGNESIUM: CPT

## 2019-07-21 PROCEDURE — 80048 BASIC METABOLIC PNL TOTAL CA: CPT

## 2019-07-21 RX ADMIN — BUDESONIDE AND FORMOTEROL FUMARATE DIHYDRATE 2 PUFF: 160; 4.5 AEROSOL RESPIRATORY (INHALATION) at 17:20

## 2019-07-21 RX ADMIN — TAMSULOSIN HYDROCHLORIDE 0.4 MG: 0.4 CAPSULE ORAL at 08:14

## 2019-07-21 RX ADMIN — BUDESONIDE AND FORMOTEROL FUMARATE DIHYDRATE 2 PUFF: 160; 4.5 AEROSOL RESPIRATORY (INHALATION) at 08:14

## 2019-07-21 RX ADMIN — HEPARIN SODIUM 5000 UNITS: 5000 INJECTION, SOLUTION INTRAVENOUS; SUBCUTANEOUS at 21:52

## 2019-07-21 RX ADMIN — METOPROLOL TARTRATE 12.5 MG: 25 TABLET, FILM COATED ORAL at 05:44

## 2019-07-21 RX ADMIN — METOPROLOL TARTRATE 12.5 MG: 25 TABLET, FILM COATED ORAL at 17:21

## 2019-07-21 RX ADMIN — HEPARIN SODIUM 5000 UNITS: 5000 INJECTION, SOLUTION INTRAVENOUS; SUBCUTANEOUS at 14:26

## 2019-07-21 RX ADMIN — OXYBUTYNIN CHLORIDE 5 MG: 5 TABLET ORAL at 17:20

## 2019-07-21 RX ADMIN — OXYCODONE HYDROCHLORIDE 10 MG: 10 TABLET ORAL at 19:46

## 2019-07-21 RX ADMIN — ATORVASTATIN CALCIUM 80 MG: 40 TABLET, FILM COATED ORAL at 17:20

## 2019-07-21 RX ADMIN — HEPARIN SODIUM 5000 UNITS: 5000 INJECTION, SOLUTION INTRAVENOUS; SUBCUTANEOUS at 05:45

## 2019-07-21 RX ADMIN — OXYBUTYNIN CHLORIDE 5 MG: 5 TABLET ORAL at 05:45

## 2019-07-21 RX ADMIN — OXYCODONE HYDROCHLORIDE 10 MG: 10 TABLET ORAL at 05:45

## 2019-07-21 ASSESSMENT — ENCOUNTER SYMPTOMS
PALPITATIONS: 0
SHORTNESS OF BREATH: 0
DIARRHEA: 0
LOSS OF CONSCIOUSNESS: 0
DEPRESSION: 0
HEADACHES: 0
ABDOMINAL PAIN: 0
WEAKNESS: 0
COUGH: 0
DIZZINESS: 0
DOUBLE VISION: 0
VOMITING: 0
BLURRED VISION: 0
FOCAL WEAKNESS: 0
NAUSEA: 0
SORE THROAT: 0
CHILLS: 0
BACK PAIN: 0
FEVER: 0

## 2019-07-21 ASSESSMENT — PAIN SCALES - WONG BAKER
WONGBAKER_NUMERICALRESPONSE: DOESN'T HURT AT ALL
WONGBAKER_NUMERICALRESPONSE: HURTS A LITTLE MORE

## 2019-07-21 NOTE — CARE PLAN
Problem: Communication  Goal: The ability to communicate needs accurately and effectively will improve  Outcome: PROGRESSING AS EXPECTED      Problem: Safety  Goal: Will remain free from injury  Outcome: PROGRESSING AS EXPECTED    Intervention: Educate patient and significant other/support system about adaptive mobility strategies and safe transfers  Bed locked in lowest position, call light within reach, and bed/chair alarm set.     Goal: Will remain free from falls  Outcome: PROGRESSING AS EXPECTED      Problem: Pain Management  Goal: Pain level will decrease to patient's comfort goal  Outcome: PROGRESSING AS EXPECTED

## 2019-07-21 NOTE — DISCHARGE PLANNING
Anticipated Discharge Disposition: Home vs SNF     Action: This SW received call from b/s RN stating that Dr. Flores spoke with caregiver/POA who is stating she is no longer able to care for pt as he has some confusion & she is unable to be there 24/7. She works.  SW will tiger khai TAVERAS to see about SNF order.  Pt confused and unable to give choice, SW will contact POA for SNF Choice. Will leave report for unit CM.    Barriers to Discharge: SNF accceptance    Plan: Refer when SNF order in.

## 2019-07-21 NOTE — WOUND TEAM
"[facility]   Inpatient Services   Established Ostomy Management/ troubleshooting  HPI: Reviewed  PMH: Reviewed   SH: Reviewed   Reason for Ostomy nurse consult:  Leaking appliance. Patient refused ostomy care yesterday.  Subjective: \"They go in and steal the clips out of the box so I don't have any.\"  Objective: Patient is confused  Ostomy type:  Ileostomy  Stoma location: RLQ  Stoma assessment:    Appearance:  Red   Size: 1.25\"   Protrusion: barely budded    jxn: resolved   Peristomal skin: red, macerated, dusky, painful. Patient has tape adhesive allergy    Ostomy Appliance (type and size): 2\" convex appliance with 2\" paste ring and ostomy belt  Assessment: Leaking  Interventions:  Remove old appliance. Cleaned peristomal skin with washcloth and water, patted dry. Crusted with No Sting skin protectant and stoma powder. Cut out barrier, applied paste ring and placed appliance around stoma. Gently rubbed barrier to help it adhere. Smoothed down the edges of barrier. Closed bottom of pouch. Attached and adjusted ostomy belt.   Pt education: None, established ostomy, patient confused  Plan: Ostomy nurses to continue to follow for ostomy needs until peristomal skin improves.  Anticipated discharge needs: established, or may go to [facility]    [facility]  Inpatient Services  Wound and Skin Care Progress Note    HPI, PMH, SH: Reviewed    WOUND TEAM FOLLOW UP: Skin breakdown under pannus, around suprapubic catheter.  Unit where seen by Wound Team: S130      SUBJECTIVE: \"How long do you think you're going to be at this assignment?\"    Self Report / Pain Level: When removing ostomy appliance, when assessing suprapubic catheter site.    OBJECTIVE: Confused.    WOUND TYPE, LOCATION, CHARACTERISTICS:         Wound 07/21/19 Partial Thickness Wound Abdomen under pannus fold moisture associated skin breakdown (Active)   Site Assessment Red;Other (Comment)    Nisa-wound Assessment Intact    Margins " Attached edges    Wound Length (cm) 3 cm    Wound Width (cm) 6 cm    Wound Depth (cm) 0.1 cm    Wound Surface Area (cm^2) 18 cm^2    Tunneling 0 cm    Undermining 0 cm    Closure None    Drainage Amount None    Non-staged Wound Description Partial thickness    Treatments Cleansed;Site care    Periwound Protectant Not Applicable    Dressing Options Open to Air    Dressing Cleansing/Solutions Not Applicable    NEXT Weekly Photo (Inpatient Only) 07/24/19    WOUND NURSE ONLY - Odor None    WOUND NURSE ONLY - Exposed Structures None    WOUND NURSE ONLY - Tissue Type and Percentage 100% red peeling         INTERVENTIONS BY WOUND TEAM: Ostomy care performed read above. Assessed under pannus. Most of the MASD is on the right side, mild. Suprapubic catheter site with some tan drainage and hypergranular tissue. Cleaned under pannus and around suprapubic catheter site. Recommended a silicone adhesive foam to protect catheter site and absorb drainage. Ordered InterDry for under pannus. Skin care order placed.    Interdisciplinary consultation: Patient and nursing.    EVALUATION AND PROGRESS OF WOUND(S): interDry jacques excessive moisture out from under skin folds, also infused with silver which will treat any yeast growth in that area.     Rationale for changes in Plan of Care: New skin breakdown    Factors affecting wound healing: confusion, excessive moisture, type 2 diabetes, CKD  Goals: MASD will resolve in 1 week.     NURSING PLAN OF CARE:    Dressing changes: Continue previous Dressing Care orders:        See new Dressing Care orders:     X  Skin care: See Skin Care orders:  X      Rectal tube care: See Rectal Tube Care orders:      Other orders:           WOUND TEAM PLAN OF CARE (X):   NPWT change 3 x week:        Dressing changes:       Follow up as needed:  X     Other:

## 2019-07-21 NOTE — PROGRESS NOTES
Critical Care Progress Note    Date of admission  7/16/2019    Chief Complaint  69 y.o. male admitted 7/16/2019 with life threatening hyperkalemia    Hospital Course    69 y.o. male who presented 7/16/2019 with acute on chronic hyponatremia      Interval Problem Update  Reviewed last 24 hour events:  Tm 98.5  -2L over last 24hr, -8.1L since admit  No cxr this am  Echo 6/2019 EF 45%  Suprapubic catheter in place  Na 123->138-> 131->132    99% on RA  Last bm (ostomy ) 7/21      Review of Systems  Review of Systems   Constitutional: Negative for chills and fever.   HENT: Negative for congestion.    Eyes: Negative for blurred vision and double vision.   Respiratory: Negative for cough and shortness of breath.    Cardiovascular: Negative for chest pain.   Gastrointestinal: Negative for abdominal pain, nausea and vomiting.   Neurological: Negative for focal weakness and weakness.   Psychiatric/Behavioral: Negative for depression.   All other systems reviewed and are negative.       Vital Signs for last 24 hours   Temp:  [36.5 °C (97.7 °F)-36.9 °C (98.5 °F)] 36.5 °C (97.7 °F)  Pulse:  [63-86] 64  Resp:  [16-20] 16  BP: (144)/(61) 144/61  SpO2:  [96 %-99 %] 99 %    Hemodynamic parameters for last 24 hours       Respiratory Information for the last 24 hours       Physical Exam   Physical Exam   Constitutional: He appears well-developed and well-nourished.   HENT:   Head: Normocephalic and atraumatic.   Eyes: Pupils are equal, round, and reactive to light. Conjunctivae are normal.   Neck: No tracheal deviation present.   Cardiovascular: Normal rate and intact distal pulses.    Pulmonary/Chest: He has no wheezes. He has no rales.   Abdominal: Soft. He exhibits no distension. Tenderness: In suprapubic region, now with some discharge. There is no rebound and no guarding.   Ostomy in place   Musculoskeletal: He exhibits edema.   Neurological: No cranial nerve deficit.   Skin: Skin is warm and dry.   Psychiatric:   Mentation wax  and wanes   Nursing note and vitals reviewed.      Medications  Current Facility-Administered Medications   Medication Dose Route Frequency Provider Last Rate Last Dose   • hydrOXYzine HCl (ATARAX) tablet 25 mg  25 mg Oral TID PRN Dorian Gentile D.O.   25 mg at 07/20/19 2241   • insulin regular (HUMULIN R) injection 1-6 Units  1-6 Units Subcutaneous 4X/DAY GORDON Neal M.D.   Stopped at 07/20/19 1700    And   • glucose 4 g chewable tablet 16 g  16 g Oral Q15 MIN PRN Isaías Neal M.D.        And   • DEXTROSE 10% BOLUS 250 mL  250 mL Intravenous Q15 MIN PRN Isaías Neal M.D.       • senna-docusate (PERICOLACE or SENOKOT S) 8.6-50 MG per tablet 2 Tab  2 Tab Oral BID Mao Dunn M.D.   Stopped at 07/18/19 0600    And   • polyethylene glycol/lytes (MIRALAX) PACKET 1 Packet  1 Packet Oral QDAY PRN Mao Dunn M.D.        And   • magnesium hydroxide (MILK OF MAGNESIA) suspension 30 mL  30 mL Oral QDAY PRN Mao Dunn M.D.        And   • bisacodyl (DULCOLAX) suppository 10 mg  10 mg Rectal QDAY PRN Mao Dunn M.D.       • heparin injection 5,000 Units  5,000 Units Subcutaneous Q8HRS Mao Dunn M.D.   5,000 Units at 07/21/19 0545   • Pharmacy Consult Request ...Pain Management Review 1 Each  1 Each Other PHARMACY TO DOSE Mao Dunn M.D.        And   • oxyCODONE immediate-release (ROXICODONE) tablet 5 mg  5 mg Oral Q3HRS PRN Mao Dunn M.D.   5 mg at 07/18/19 1046    And   • oxyCODONE immediate release (ROXICODONE) tablet 10 mg  10 mg Oral Q3HRS PRN Mao Dunn M.D.   10 mg at 07/21/19 0545    And   • morphine (pf) 4 mg/ml injection 4 mg  4 mg Intravenous Q3HRS PRIRINEO Dunn M.D.       • atorvastatin (LIPITOR) tablet 80 mg  80 mg Oral Q EVENING Mao Dunn M.D.   80 mg at 07/20/19 1656   • budesonide-formoterol (SYMBICORT) 160-4.5 MCG/ACT inhaler 2 Puff  2 Puff Inhalation BID Mao Dunn M.D.   2 Puff at 07/20/19 1656   •  metoprolol (LOPRESSOR) tablet 12.5 mg  12.5 mg Oral BID Mao Dunn M.D.   12.5 mg at 07/21/19 0544   • oxybutynin (DITROPAN) tablet 5 mg  5 mg Oral Q12HRS Mao Dunn M.D.   5 mg at 07/21/19 0545   • tamsulosin (FLOMAX) capsule 0.4 mg  0.4 mg Oral AFTER BREAKFAST Mao Dunn M.D.   0.4 mg at 07/20/19 0830   • Respiratory Care per Protocol   Nebulization Continuous RT Mao Dunn M.D.           Fluids    Intake/Output Summary (Last 24 hours) at 07/21/19 0716  Last data filed at 07/21/19 0600   Gross per 24 hour   Intake             1810 ml   Output             3850 ml   Net            -2040 ml       Laboratory          Recent Labs      07/19/19   0604  07/20/19   0635  07/20/19   1455  07/21/19   0617   SODIUM  123*  138  131*   --    POTASSIUM  4.9  4.7  4.5   --    CHLORIDE  97  110  107   --    CO2  16*  18*  15*   --    BUN  25*  24*  20   --    CREATININE  2.12*  2.08*  2.03*   --    MAGNESIUM  1.9  2.0  1.9  2.1   CALCIUM  9.1  8.8  8.7   --      Recent Labs      07/19/19   0604  07/20/19   0635  07/20/19   1455   GLUCOSE  128*  128*  139*     Recent Labs      07/19/19   0604  07/20/19   0635  07/21/19   0617   WBC  11.3*  12.1*  13.9*   NEUTSPOLYS  59.00  64.50  67.70   LYMPHOCYTES  27.10  24.50  20.50*   MONOCYTES  10.70  8.30  8.90   EOSINOPHILS  2.00  1.80  2.20   BASOPHILS  0.60  0.40  0.30     Recent Labs      07/19/19   0604  07/20/19   0635  07/21/19   0617   RBC  4.79  4.76  4.35*   HEMOGLOBIN  13.1*  12.8*  11.8*   HEMATOCRIT  41.2*  41.7*  38.5*   PLATELETCT  489*  482*  442       Imaging  X-Ray:  No film today    Assessment/Plan  * Hyponatremia- (present on admission)   Assessment & Plan    Sodium holding in low 130s  Continue to follow  Remains on 2 L fluid restriction     Obstructive uropathy- (present on admission)   Assessment & Plan    Suprapubic catheter replaced overnight  Monitor urine output     Leukocytosis- (present on admission)   Assessment & Plan    Interval  slight trend upward  Continue to monitor  Continue to evaluate suprapubic cath site     Hypomagnesemia   Assessment & Plan    Replace to keep greater than 2     Suprapubic catheter dysfunction (HCC)   Assessment & Plan    Some irritation around insertion site with small amount of discharge  Wound care  Closely follow for evidence of increasing infection     Anemia   Assessment & Plan    Stable not an active issue     CRF (chronic renal failure), stage 3 (moderate) (HCC)- (present on admission)   Assessment & Plan    Baseline CR approximately 1.7  Renally dose medications and minimize nephrotoxic substances  Monitor urine output  Suprapubic catheter in place     HTN (hypertension)- (present on admission)   Assessment & Plan    Continue metoprolol     Type 2 diabetes mellitus, with long-term current use of insulin (HCC)- (present on admission)   Assessment & Plan    Insulin sliding scale          VTE:  Heparin  Ulcer: Not Indicated  Lines: Humphrey Catheter  Ongoing indication addressed    I have performed a physical exam and reviewed and updated ROS and Plan today (7/21/2019). In review of yesterday's note (7/20/2019), there are no changes except as documented above.     Discussed patient condition and risk of morbidity and/or mortality with Hospitalist, RN, RT, Pharmacy and Patient

## 2019-07-21 NOTE — PROGRESS NOTES
"Hospital Medicine Daily Progress Note    Date of Service  7/21/2019    Chief Complaint      Hospital Course    69 y.o. male admitted 7/16/2019 with Hx of chronic SP cath, Crohn's, Dm, CHF.  He has a chronic SP cath due to Hx of urethral stricture.  Cath was placed in June of this year.  On evaluation at outlying facility he was found ot have a Na of 112, CO2 19, Creat 1.75.         Interval Problem Update  \"I'm OK\"    Sinus/sinus nikki  -150s  + output from ostomy  Tolerating po  UOP 1000ml/12hrs    Consultants/Specialty  Pulmonology  Nephrology      Code Status  full    Disposition  Problematic.    Dw pt's friend and helper Nathaly by phone.  She reports he has been in the hospital multiple times for low Na.  He drinks 6 gallons of tea and water at home despite being told multiple times to restrict his fluid intake.  She feels he is unsafe at home.  Reports previously when hospitalized he has refused or not been approved for SNF though she feels he needs it.    Review of Systems  Review of Systems   Unable to perform ROS: Other   Constitutional: Negative for chills and fever.   HENT: Negative for nosebleeds and sore throat.    Eyes: Negative for blurred vision and double vision.   Respiratory: Negative for cough and shortness of breath.    Cardiovascular: Negative for chest pain, palpitations and leg swelling.   Gastrointestinal: Negative for abdominal pain, diarrhea, nausea and vomiting.   Genitourinary: Negative for dysuria and urgency.   Musculoskeletal: Negative for back pain.   Skin: Negative for rash.   Neurological: Negative for dizziness, loss of consciousness and headaches.        Physical Exam  Temp:  [36.5 °C (97.7 °F)-36.9 °C (98.5 °F)] 36.5 °C (97.7 °F)  Pulse:  [61-86] 64  Resp:  [16-20] 16  SpO2:  [96 %-99 %] 99 %    Physical Exam   Constitutional: He is oriented to person, place, and time. He appears well-developed and well-nourished. No distress.   HENT:   Head: Normocephalic and atraumatic. "   Nose: Nose normal.   Mouth/Throat: Oropharynx is clear and moist.   Eyes: Conjunctivae are normal. Right eye exhibits no discharge. Left eye exhibits no discharge.   Neck: Neck supple. No JVD present.   Cardiovascular: Normal rate.  Exam reveals no gallop.    Murmur heard.  Pulmonary/Chest: Effort normal. No stridor. No respiratory distress. He has no wheezes. He has no rales.   Abdominal: Soft. There is no tenderness. There is no rebound and no guarding.   Ostomy noted  SP cath site noted   Musculoskeletal: He exhibits no edema.   Neurological: He is alert and oriented to person, place, and time.   Subtle cognitive deficits with repeated questions and dificulty remembering and understanding unchanged from yesterday   Skin: Skin is warm and dry. No rash noted. He is not diaphoretic.   Psychiatric: He has a normal mood and affect. His behavior is normal. Judgment and thought content normal.   Nursing note and vitals reviewed.      Fluids    Intake/Output Summary (Last 24 hours) at 07/21/19 0544  Last data filed at 07/21/19 0400   Gross per 24 hour   Intake             1810 ml   Output             4150 ml   Net            -2340 ml       Laboratory  Recent Labs      07/19/19   0604  07/20/19   0635   WBC  11.3*  12.1*   RBC  4.79  4.76   HEMOGLOBIN  13.1*  12.8*   HEMATOCRIT  41.2*  41.7*   MCV  86.0  87.6   MCH  27.3  26.9*   MCHC  31.8*  30.7*   RDW  47.5  48.3   PLATELETCT  489*  482*   MPV  8.7*  8.6*     Recent Labs      07/19/19   0604  07/20/19   0635  07/20/19   1455   SODIUM  123*  138  131*   POTASSIUM  4.9  4.7  4.5   CHLORIDE  97  110  107   CO2  16*  18*  15*   GLUCOSE  128*  128*  139*   BUN  25*  24*  20   CREATININE  2.12*  2.08*  2.03*   CALCIUM  9.1  8.8  8.7                   Imaging  IR-DRAIN-BLADDER SUPRAPUB W/CATH   Final Result      1. Fluoroscopic guided replacement of a 14 Persian locking loop suprapubic bladder catheter.      2.     Cystogram documenting catheter placement.      3.     The  catheter can be upsized and exchanged for a Humphrey-type balloon catheter at the patient's convenience.      IR-MIDLINE CATHETER INSERTION WO GUIDANCE > AGE 3   Final Result                  Ultrasound-guided midline placement performed by qualified nursing staff    as above.          OUTSIDE IMAGES-CT ABDOMEN /PELVIS   Final Result           Assessment/Plan  * Hyponatremia- (present on admission)   Assessment & Plan    Dr Caballero consulted from nephro  Clearly has polydypsia: drinks >4 gallons of water and tea by his admission, >6 gallons per his friend Nathaly  Has been admitted multiple times and told to restrict fluids on DC but is non compliant  Needs placement  Nephrology consulted  Overnight corrected to 138 on 1 litre fluid restriction.  Per RN was drinking large amounts of broth  Increase fluid restriction to 2 litres  Dietary consulted to educate pt on high salt diet and fluid restriction     Obstructive uropathy- (present on admission)   Assessment & Plan    Due to uretral stricutres and BPH   Urology has been consulted   Has SP cath back in place with good UOP     Leukocytosis- (present on admission)   Assessment & Plan    This patient has chronic leukocytosis   No evidence of infection   Cont to montior      Hypomagnesemia   Assessment & Plan    Repeat Mg in am     Suprapubic catheter dysfunction (HCC)   Assessment & Plan    Displaced and removed   Urology consulted for evaluation by ERP      Anemia   Assessment & Plan    No evidence of bleeding lab workup ordered     CRF (chronic renal failure), stage 3 (moderate) (HCC)- (present on admission)   Assessment & Plan    At baseline renal function   Cont to montior      HTN (hypertension)- (present on admission)   Assessment & Plan    Resume home BB      BPH (benign prostatic hyperplasia)- (present on admission)   Assessment & Plan    Hx of resume home flomax     Crohn's disease (HCC)- (present on admission)   Assessment & Plan    S/p ostomy placement      Type  2 diabetes mellitus, with long-term current use of insulin (HCC)- (present on admission)   Assessment & Plan    SSI ordered  accu checks           VTE prophylaxis: heparin

## 2019-07-21 NOTE — PROGRESS NOTES
[facility] -  PROGRESS NOTE               Author: Russell Caballero M.D. Date & Time: 7/21/2019  10:40 AM     HPI:  68 yo male PMH CKD Stage 3, HTN, type 2 DM, BPH, HLD, and COPD presents to ED as transfer from OSH after being found to have hyponatremia.  He has a known hx of BPH causing REED and required a suprapubic catheter placement early this year.  He states yesterday afternoon it fell out on its own, he denies trauma or pulling on it prior to it displacing.  [facility].  At his local ED they were unable to replace it and lab work showed significant hyponatremia at 112 and so he was sent to [facility] for further care.  By the time he arrived here and had repeat labs it was 12 hrs from initial and his SNa ~ 119.  ED docs here were able to get a penile FC and he was admitted to ICU.  Repeat SNa ~ 121 after being here and he was started on D5W overnight to maintain him at current range.  He admits to being told about hyponatremia in past due to low solute intake according to him.  Further inquiry also reveals that he drinks 4-6 gallons of water daily, he says he just sits there and will drink water while on his recliner.  He is currently NPO for IR to replace his suprapubic catheter.  SNa stable in the low 120s overnight and this AM.  His other labs show Ashley ~ 14 and UOsm ~ 72.  No F/C/N/V/CP/SOB.  No melena, hematochezia, hematemesis.  No HA, visual changes.      DAILY NEPHROLOGY SUMMARY:  7/17/19 - Consult done  7/18/19 - NAEO, SP catheter replaced; SNa trended up  7/19/19 - NAEO, no complaints, sitting in chair  7/20/19 - NAEO, no complaints, SNa improved with IVF  7/21/19 - NAEO, sitting in bedside chair      REVIEW OF SYSTEMS:    - As per HPI, otherwise review of systems negative per AMA/CMS criteria  - General:  As per HPI, otherwise negative per AMA/CMS criteria  - HEENT:  No ocular pain; no nasal discharge  - CV:  As per HPI, otherwise negative  "per AMA/CMS criteria  - Lungs:  As per HPI, otherwise negative per AMA/CMS criteria  - GI:  As per HPI, otherwise negative per AMA/CMS criteria  - MSK:  No joint pain; No trauma  - Skin: No rashes; No lesions  - Neuro: No paresthesia; No LOC  - Psych: No depression; No anxiety      PAST FAMILY HISTORY: Reviewed and Unchanged  SOCIAL HISTORY: Reviewed and Unchanged  CURRENT MEDICATIONS: Reviewed  IMAGING STUDIES: Reviewed      PHYSICAL EXAM:  VS:  /61   Pulse 73   Temp 37 °C (98.6 °F) (Temporal)   Resp 16   Ht 1.803 m (5' 11\")   Wt 111.1 kg (244 lb 14.9 oz)   SpO2 93%   BMI 34.16 kg/m²   GENERAL:  WDWN NAD  HEENT:  NC/AT, no scleral icterus; conjunctiva normal  NECK:  Supple; Non tender  CV:  RRR, no m/r/g  LUNGS:  CTAB, no W/R  ABDOMEN:  SND, TTP suprapubic region  EXTREMETIES:  No C/C/E  SKIN:  Warm and dry  NEURO:  A&O, no focal deficits  PSYCH:  Cooperative, appropriate mood and affect    Fluids:  In: 1810 [P.O.:1760; I.V.:50]  Out: 3850     LABS:  Recent Results (from the past 24 hour(s))   ACCU-CHEK GLUCOSE    Collection Time: 07/20/19 11:55 AM   Result Value Ref Range    Glucose - Accu-Ck 167 (H) 65 - 99 mg/dL   Basic Metabolic Panel    Collection Time: 07/20/19  2:55 PM   Result Value Ref Range    Sodium 131 (L) 135 - 145 mmol/L    Potassium 4.5 3.6 - 5.5 mmol/L    Chloride 107 96 - 112 mmol/L    Co2 15 (L) 20 - 33 mmol/L    Glucose 139 (H) 65 - 99 mg/dL    Bun 20 8 - 22 mg/dL    Creatinine 2.03 (H) 0.50 - 1.40 mg/dL    Calcium 8.7 8.5 - 10.5 mg/dL    Anion Gap 9.0 0.0 - 11.9   MAGNESIUM    Collection Time: 07/20/19  2:55 PM   Result Value Ref Range    Magnesium 1.9 1.5 - 2.5 mg/dL   ESTIMATED GFR    Collection Time: 07/20/19  2:55 PM   Result Value Ref Range    GFR If  40 (A) >60 mL/min/1.73 m 2    GFR If Non  33 (A) >60 mL/min/1.73 m 2   ACCU-CHEK GLUCOSE    Collection Time: 07/20/19  4:55 PM   Result Value Ref Range    Glucose - Accu-Ck 127 (H) 65 - 99 mg/dL "   MAGNESIUM    Collection Time: 07/21/19  6:17 AM   Result Value Ref Range    Magnesium 2.1 1.5 - 2.5 mg/dL   CBC WITH DIFFERENTIAL    Collection Time: 07/21/19  6:17 AM   Result Value Ref Range    WBC 13.9 (H) 4.8 - 10.8 K/uL    RBC 4.35 (L) 4.70 - 6.10 M/uL    Hemoglobin 11.8 (L) 14.0 - 18.0 g/dL    Hematocrit 38.5 (L) 42.0 - 52.0 %    MCV 88.5 81.4 - 97.8 fL    MCH 27.1 27.0 - 33.0 pg    MCHC 30.6 (L) 33.7 - 35.3 g/dL    RDW 49.2 35.9 - 50.0 fL    Platelet Count 442 164 - 446 K/uL    MPV 8.6 (L) 9.0 - 12.9 fL    Neutrophils-Polys 67.70 44.00 - 72.00 %    Lymphocytes 20.50 (L) 22.00 - 41.00 %    Monocytes 8.90 0.00 - 13.40 %    Eosinophils 2.20 0.00 - 6.90 %    Basophils 0.30 0.00 - 1.80 %    Immature Granulocytes 0.40 0.00 - 0.90 %    Nucleated RBC 0.00 /100 WBC    Neutrophils (Absolute) 9.40 (H) 1.82 - 7.42 K/uL    Lymphs (Absolute) 2.85 1.00 - 4.80 K/uL    Monos (Absolute) 1.23 (H) 0.00 - 0.85 K/uL    Eos (Absolute) 0.31 0.00 - 0.51 K/uL    Baso (Absolute) 0.04 0.00 - 0.12 K/uL    Immature Granulocytes (abs) 0.06 0.00 - 0.11 K/uL    NRBC (Absolute) 0.00 K/uL   Basic Metabolic Panel    Collection Time: 07/21/19  6:17 AM   Result Value Ref Range    Sodium 132 (L) 135 - 145 mmol/L    Potassium 4.4 3.6 - 5.5 mmol/L    Chloride 106 96 - 112 mmol/L    Co2 16 (L) 20 - 33 mmol/L    Glucose 131 (H) 65 - 99 mg/dL    Bun 20 8 - 22 mg/dL    Creatinine 2.01 (H) 0.50 - 1.40 mg/dL    Calcium 8.8 8.5 - 10.5 mg/dL    Anion Gap 10.0 0.0 - 11.9   ESTIMATED GFR    Collection Time: 07/21/19  6:17 AM   Result Value Ref Range    GFR If  40 (A) >60 mL/min/1.73 m 2    GFR If Non  33 (A) >60 mL/min/1.73 m 2       (click the triangle to expand results)      IMPRESSION:  - CKD Stage 3    * BCr ~ 1.5-1.9    * Etiology 2/2 BPH/obstruction  - Hyponatremia    * Etiology likely due to polydipsia with superimposed hypovolemic hyponatremia    * Responded to IVF bolus and now labs normal    * His mental status  waxes and wanes making it difficult to see if he will be able to follow through on fluid restriction  - HTN    * Goal BP < 140/90  - type 2 DM  - HLD  - BPH    * Chronic suprapubic catheter, displaced currently     PLAN:  - No compelling indication for RRT  - Restrict free water < 1L  - No solute restrictions  - Dose all meds per eGFR < 60  - Counseled he needs to restrict free water intake as OP to prevent future episodes such as this  - Will arrange for FU in our [place] clinic within 1 month of d/c to further manage his hyponatremia, may need salt tabs if unable to restrict free water  - Ok to transition to OP care from renal standpoint

## 2019-07-21 NOTE — DISCHARGE PLANNING
Received Choice form at 1500  Agency/Facility Name: [facility]   Referral sent per Choice form at 1600

## 2019-07-21 NOTE — DISCHARGE PLANNING
Anticipated Discharge Disposition: SNF    Action: Called OSVALDO Nathaly Jaison for choice.  Pt confused. [facility] referral in [facility] choice via phone.  Nathaly works 8-5 M-F and pt lives on his own in [place] [facility].  Nathaly is a long time friend but unable to care for pt.  Pt needs to be I to go home alone with some services possibly in the home.    Barriers to Discharge: SNF acceptance    Plan: Faxed SNF CF to [facility], SNF prior to DC home.  Please notify Nathaly if pt is refusing to go.

## 2019-07-21 NOTE — PROGRESS NOTES
Lab called regarding BMP that was ordered this am. Coy laureano collected this am. Lab will run BMP at this time.

## 2019-07-21 NOTE — CARE PLAN
Problem: Safety  Goal: Will remain free from falls  Outcome: PROGRESSING AS EXPECTED  Pt calls intermittently, otherwise can be impulsive. Chair alarm and bed alarm in place      Problem: Pain Management  Goal: Pain level will decrease to patient's comfort goal  Outcome: PROGRESSING AS EXPECTED  Pt c/o pain at SPT site especially during site care and mobility. Relieved with oxycodone prn

## 2019-07-22 LAB
ANION GAP SERPL CALC-SCNC: 8 MMOL/L (ref 0–11.9)
APPEARANCE UR: ABNORMAL
BACTERIA #/AREA URNS HPF: ABNORMAL /HPF
BASOPHILS # BLD AUTO: 0.4 % (ref 0–1.8)
BASOPHILS # BLD: 0.05 K/UL (ref 0–0.12)
BILIRUB UR QL STRIP.AUTO: NEGATIVE
BUN SERPL-MCNC: 21 MG/DL (ref 8–22)
CALCIUM SERPL-MCNC: 9 MG/DL (ref 8.5–10.5)
CHLORIDE SERPL-SCNC: 105 MMOL/L (ref 96–112)
CO2 SERPL-SCNC: 20 MMOL/L (ref 20–33)
COLOR UR: YELLOW
CREAT SERPL-MCNC: 2.03 MG/DL (ref 0.5–1.4)
EOSINOPHIL # BLD AUTO: 0.32 K/UL (ref 0–0.51)
EOSINOPHIL NFR BLD: 2.4 % (ref 0–6.9)
EPI CELLS #/AREA URNS HPF: NEGATIVE /HPF
ERYTHROCYTE [DISTWIDTH] IN BLOOD BY AUTOMATED COUNT: 49.2 FL (ref 35.9–50)
GLUCOSE SERPL-MCNC: 111 MG/DL (ref 65–99)
GLUCOSE UR STRIP.AUTO-MCNC: NEGATIVE MG/DL
HCT VFR BLD AUTO: 39.2 % (ref 42–52)
HGB BLD-MCNC: 12 G/DL (ref 14–18)
HYALINE CASTS #/AREA URNS LPF: ABNORMAL /LPF
IMM GRANULOCYTES # BLD AUTO: 0.06 K/UL (ref 0–0.11)
IMM GRANULOCYTES NFR BLD AUTO: 0.4 % (ref 0–0.9)
KETONES UR STRIP.AUTO-MCNC: NEGATIVE MG/DL
LEUKOCYTE ESTERASE UR QL STRIP.AUTO: ABNORMAL
LYMPHOCYTES # BLD AUTO: 2.84 K/UL (ref 1–4.8)
LYMPHOCYTES NFR BLD: 21.2 % (ref 22–41)
MAGNESIUM SERPL-MCNC: 1.9 MG/DL (ref 1.5–2.5)
MCH RBC QN AUTO: 27.1 PG (ref 27–33)
MCHC RBC AUTO-ENTMCNC: 30.6 G/DL (ref 33.7–35.3)
MCV RBC AUTO: 88.7 FL (ref 81.4–97.8)
MICRO URNS: ABNORMAL
MONOCYTES # BLD AUTO: 0.98 K/UL (ref 0–0.85)
MONOCYTES NFR BLD AUTO: 7.3 % (ref 0–13.4)
NEUTROPHILS # BLD AUTO: 9.16 K/UL (ref 1.82–7.42)
NEUTROPHILS NFR BLD: 68.3 % (ref 44–72)
NITRITE UR QL STRIP.AUTO: POSITIVE
NRBC # BLD AUTO: 0 K/UL
NRBC BLD-RTO: 0 /100 WBC
PH UR STRIP.AUTO: 5.5 [PH]
PLATELET # BLD AUTO: 446 K/UL (ref 164–446)
PMV BLD AUTO: 8.5 FL (ref 9–12.9)
POTASSIUM SERPL-SCNC: 4.3 MMOL/L (ref 3.6–5.5)
PROT UR QL STRIP: 100 MG/DL
RBC # BLD AUTO: 4.42 M/UL (ref 4.7–6.1)
RBC # URNS HPF: ABNORMAL /HPF
RBC UR QL AUTO: ABNORMAL
SODIUM SERPL-SCNC: 133 MMOL/L (ref 135–145)
SP GR UR STRIP.AUTO: 1.02
UROBILINOGEN UR STRIP.AUTO-MCNC: 0.2 MG/DL
WBC # BLD AUTO: 13.4 K/UL (ref 4.8–10.8)
WBC #/AREA URNS HPF: ABNORMAL /HPF

## 2019-07-22 PROCEDURE — 700111 HCHG RX REV CODE 636 W/ 250 OVERRIDE (IP): Performed by: HOSPITALIST

## 2019-07-22 PROCEDURE — A9270 NON-COVERED ITEM OR SERVICE: HCPCS | Performed by: HOSPITALIST

## 2019-07-22 PROCEDURE — 700102 HCHG RX REV CODE 250 W/ 637 OVERRIDE(OP): Performed by: HOSPITALIST

## 2019-07-22 PROCEDURE — 700105 HCHG RX REV CODE 258: Performed by: HOSPITALIST

## 2019-07-22 PROCEDURE — 87186 SC STD MICRODIL/AGAR DIL: CPT | Mod: 91

## 2019-07-22 PROCEDURE — 83735 ASSAY OF MAGNESIUM: CPT

## 2019-07-22 PROCEDURE — 87086 URINE CULTURE/COLONY COUNT: CPT

## 2019-07-22 PROCEDURE — 770001 HCHG ROOM/CARE - MED/SURG/GYN PRIV*

## 2019-07-22 PROCEDURE — 81001 URINALYSIS AUTO W/SCOPE: CPT

## 2019-07-22 PROCEDURE — 99232 SBSQ HOSP IP/OBS MODERATE 35: CPT | Performed by: HOSPITALIST

## 2019-07-22 PROCEDURE — 99233 SBSQ HOSP IP/OBS HIGH 50: CPT | Performed by: INTERNAL MEDICINE

## 2019-07-22 PROCEDURE — 85025 COMPLETE CBC W/AUTO DIFF WBC: CPT

## 2019-07-22 PROCEDURE — 80048 BASIC METABOLIC PNL TOTAL CA: CPT

## 2019-07-22 PROCEDURE — 87077 CULTURE AEROBIC IDENTIFY: CPT

## 2019-07-22 RX ORDER — BUDESONIDE AND FORMOTEROL FUMARATE DIHYDRATE 160; 4.5 UG/1; UG/1
2 AEROSOL RESPIRATORY (INHALATION) 2 TIMES DAILY
Start: 2019-07-22

## 2019-07-22 RX ORDER — ATORVASTATIN CALCIUM 80 MG/1
80 TABLET, FILM COATED ORAL EVERY EVENING
Qty: 30 TAB
Start: 2019-07-22

## 2019-07-22 RX ORDER — HYDROXYZINE HYDROCHLORIDE 25 MG/1
25 TABLET, FILM COATED ORAL 3 TIMES DAILY PRN
Qty: 30 TAB | Refills: 0 | Status: SHIPPED | OUTPATIENT
Start: 2019-07-22

## 2019-07-22 RX ADMIN — OXYBUTYNIN CHLORIDE 5 MG: 5 TABLET ORAL at 05:15

## 2019-07-22 RX ADMIN — AMPICILLIN AND SULBACTAM 3 G: 1; 2 INJECTION, POWDER, FOR SOLUTION INTRAMUSCULAR; INTRAVENOUS at 19:38

## 2019-07-22 RX ADMIN — HEPARIN SODIUM 5000 UNITS: 5000 INJECTION, SOLUTION INTRAVENOUS; SUBCUTANEOUS at 05:15

## 2019-07-22 RX ADMIN — OXYBUTYNIN CHLORIDE 5 MG: 5 TABLET ORAL at 18:39

## 2019-07-22 RX ADMIN — OXYCODONE HYDROCHLORIDE 10 MG: 10 TABLET ORAL at 23:19

## 2019-07-22 RX ADMIN — SENNOSIDES, DOCUSATE SODIUM 2 TABLET: 50; 8.6 TABLET, FILM COATED ORAL at 05:14

## 2019-07-22 RX ADMIN — OXYCODONE HYDROCHLORIDE 10 MG: 10 TABLET ORAL at 19:38

## 2019-07-22 RX ADMIN — OXYCODONE HYDROCHLORIDE 10 MG: 10 TABLET ORAL at 09:01

## 2019-07-22 RX ADMIN — HEPARIN SODIUM 5000 UNITS: 5000 INJECTION, SOLUTION INTRAVENOUS; SUBCUTANEOUS at 14:46

## 2019-07-22 RX ADMIN — TAMSULOSIN HYDROCHLORIDE 0.4 MG: 0.4 CAPSULE ORAL at 08:55

## 2019-07-22 RX ADMIN — METOPROLOL TARTRATE 12.5 MG: 25 TABLET, FILM COATED ORAL at 05:14

## 2019-07-22 RX ADMIN — OXYCODONE HYDROCHLORIDE 10 MG: 10 TABLET ORAL at 14:36

## 2019-07-22 RX ADMIN — OXYCODONE HYDROCHLORIDE 10 MG: 10 TABLET ORAL at 05:14

## 2019-07-22 RX ADMIN — ATORVASTATIN CALCIUM 80 MG: 40 TABLET, FILM COATED ORAL at 18:38

## 2019-07-22 RX ADMIN — METOPROLOL TARTRATE 12.5 MG: 25 TABLET, FILM COATED ORAL at 18:38

## 2019-07-22 RX ADMIN — BUDESONIDE AND FORMOTEROL FUMARATE DIHYDRATE 2 PUFF: 160; 4.5 AEROSOL RESPIRATORY (INHALATION) at 08:57

## 2019-07-22 RX ADMIN — AMPICILLIN AND SULBACTAM 3 G: 1; 2 INJECTION, POWDER, FOR SOLUTION INTRAMUSCULAR; INTRAVENOUS at 23:17

## 2019-07-22 RX ADMIN — HEPARIN SODIUM 5000 UNITS: 5000 INJECTION, SOLUTION INTRAVENOUS; SUBCUTANEOUS at 22:32

## 2019-07-22 RX ADMIN — BUDESONIDE AND FORMOTEROL FUMARATE DIHYDRATE 2 PUFF: 160; 4.5 AEROSOL RESPIRATORY (INHALATION) at 18:39

## 2019-07-22 ASSESSMENT — ENCOUNTER SYMPTOMS
COUGH: 0
DOUBLE VISION: 0
VOMITING: 0
SHORTNESS OF BREATH: 0
DIAPHORESIS: 0
NAUSEA: 0
BACK PAIN: 0
FEVER: 0
MYALGIAS: 0
DIARRHEA: 0
PHOTOPHOBIA: 0
BLURRED VISION: 0
CHILLS: 0
ABDOMINAL PAIN: 0
STRIDOR: 0
PALPITATIONS: 0
BRUISES/BLEEDS EASILY: 0
NECK PAIN: 0
NERVOUS/ANXIOUS: 0
HEADACHES: 0
LOSS OF CONSCIOUSNESS: 0
DIZZINESS: 0
FOCAL WEAKNESS: 0
SEIZURES: 0
SORE THROAT: 0
HEMOPTYSIS: 0

## 2019-07-22 NOTE — PROGRESS NOTES
"Attempted to ambulate patient as he has been sitting in chair and refusing to move since beginning of shift.  Patient states \"I want my clothes and I am going to hit the road.\"  This RN explained that patient is unable to leave yet and is not safe to go home alone. Patient upset and wanting to talk to \"the other nurse\".  Charge RN notified and explained to patient that he is not safe to go home alone.  Patient angry and refusing to talk to both RNs.  Will not move from chair, unable to observe skin on backside.  "

## 2019-07-22 NOTE — DISCHARGE PLANNING
Received Transport Form @ 1231 from  Alicia TORRES   Spoke to Erasmo @ [facility]    Transport is scheduled for 7/22@1630  going to [facility] [place] [facility] [address], [place] [address] .    [facility] providing transport via [employer]    Alicia TORRES notified of transport time    Transport Canceled for today per Alicia TORRES. [facility] left a voicemail for admissions @ [facility]

## 2019-07-22 NOTE — PROGRESS NOTES
[facility] -  PROGRESS NOTE               Author: Calvin Dunn M.D. Date & Time: 7/22/2019  8:27 AM     HPI:  70 yo male PMH CKD Stage 3, HTN, type 2 DM, BPH, HLD, and COPD presents to ED as transfer from OSH after being found to have hyponatremia.  He has a known hx of BPH causing REED and required a suprapubic catheter placement early this year.  He states yesterday afternoon it fell out on its own, he denies trauma or pulling on it prior to it displacing.  [facility].  At his local ED they were unable to replace it and lab work showed significant hyponatremia at 112 and so he was sent to [facility] [place] for further care.  By the time he arrived here and had repeat labs it was 12 hrs from initial and his SNa ~ 119.  ED docs here were able to get a penile FC and he was admitted to ICU.  Repeat SNa ~ 121 after being here and he was started on D5W overnight to maintain him at current range.  He admits to being told about hyponatremia in past due to low solute intake according to him.  Further inquiry also reveals that he drinks 4-6 gallons of water daily, he says he just sits there and will drink water while on his recliner.  He is currently NPO for IR to replace his suprapubic catheter.  SNa stable in the low 120s overnight and this AM.  His other labs show Ashley ~ 14 and UOsm ~ 72.  No F/C/N/V/CP/SOB.  No melena, hematochezia, hematemesis.  No HA, visual changes.      DAILY NEPHROLOGY SUMMARY:  7/17/19 - Consult done  7/18/19 - NAEO, SP catheter replaced; SNa trended up  7/19/19 - NAEO, no complaints, sitting in chair  7/20/19 - NAEO, no complaints, SNa improved with IVF  7/21/19 - NAEO, sitting in bedside chair  7/22: no acute events. Sna stable      REVIEW OF SYSTEMS:    - As per HPI, otherwise review of systems negative per AMA/CMS criteria  - General:  As per HPI, otherwise negative per AMA/CMS criteria  - HEENT:  No ocular pain; no nasal discharge  - CV:   "As per HPI, otherwise negative per AMA/CMS criteria  - Lungs:  As per HPI, otherwise negative per AMA/CMS criteria  - GI:  As per HPI, otherwise negative per AMA/CMS criteria  - MSK:  No joint pain; No trauma  - Skin: No rashes; No lesions  - Neuro: No paresthesia; No LOC  - Psych: No depression; No anxiety      PAST FAMILY HISTORY: Reviewed and Unchanged  SOCIAL HISTORY: Reviewed and Unchanged  CURRENT MEDICATIONS: Reviewed  IMAGING STUDIES: Reviewed      PHYSICAL EXAM:  VS:  /61   Pulse 72   Temp 36.2 °C (97.2 °F) (Temporal)   Resp 14   Ht 1.803 m (5' 11\")   Wt 111.1 kg (244 lb 14.9 oz)   SpO2 95%   BMI 34.16 kg/m²   GENERAL:  WDWN NAD  HEENT:  NC/AT, no scleral icterus; conjunctiva normal  NECK:  Supple; Non tender  CV:  RRR, no m/r/g  LUNGS:  CTAB, no W/R  ABDOMEN:  SND, TTP suprapubic region  EXTREMETIES:  No C/C/E  SKIN:  Warm and dry  NEURO:  A&O, no focal deficits  PSYCH:  Cooperative, appropriate mood and affect    Fluids:  In: 1740 [P.O.:1740]  Out: 2900     LABS:  Recent Results (from the past 24 hour(s))   CBC WITH DIFFERENTIAL    Collection Time: 07/22/19  5:55 AM   Result Value Ref Range    WBC 13.4 (H) 4.8 - 10.8 K/uL    RBC 4.42 (L) 4.70 - 6.10 M/uL    Hemoglobin 12.0 (L) 14.0 - 18.0 g/dL    Hematocrit 39.2 (L) 42.0 - 52.0 %    MCV 88.7 81.4 - 97.8 fL    MCH 27.1 27.0 - 33.0 pg    MCHC 30.6 (L) 33.7 - 35.3 g/dL    RDW 49.2 35.9 - 50.0 fL    Platelet Count 446 164 - 446 K/uL    MPV 8.5 (L) 9.0 - 12.9 fL    Neutrophils-Polys 68.30 44.00 - 72.00 %    Lymphocytes 21.20 (L) 22.00 - 41.00 %    Monocytes 7.30 0.00 - 13.40 %    Eosinophils 2.40 0.00 - 6.90 %    Basophils 0.40 0.00 - 1.80 %    Immature Granulocytes 0.40 0.00 - 0.90 %    Nucleated RBC 0.00 /100 WBC    Neutrophils (Absolute) 9.16 (H) 1.82 - 7.42 K/uL    Lymphs (Absolute) 2.84 1.00 - 4.80 K/uL    Monos (Absolute) 0.98 (H) 0.00 - 0.85 K/uL    Eos (Absolute) 0.32 0.00 - 0.51 K/uL    Baso (Absolute) 0.05 0.00 - 0.12 K/uL    Immature " Granulocytes (abs) 0.06 0.00 - 0.11 K/uL    NRBC (Absolute) 0.00 K/uL   Basic Metabolic Panel    Collection Time: 07/22/19  5:55 AM   Result Value Ref Range    Sodium 133 (L) 135 - 145 mmol/L    Potassium 4.3 3.6 - 5.5 mmol/L    Chloride 105 96 - 112 mmol/L    Co2 20 20 - 33 mmol/L    Glucose 111 (H) 65 - 99 mg/dL    Bun 21 8 - 22 mg/dL    Creatinine 2.03 (H) 0.50 - 1.40 mg/dL    Calcium 9.0 8.5 - 10.5 mg/dL    Anion Gap 8.0 0.0 - 11.9   MAGNESIUM    Collection Time: 07/22/19  5:55 AM   Result Value Ref Range    Magnesium 1.9 1.5 - 2.5 mg/dL   ESTIMATED GFR    Collection Time: 07/22/19  5:55 AM   Result Value Ref Range    GFR If  40 (A) >60 mL/min/1.73 m 2    GFR If Non  33 (A) >60 mL/min/1.73 m 2       (click the triangle to expand results)      IMPRESSION:  - CKD Stage 3    * BCr ~ 1.5-1.9    * Etiology 2/2 BPH/obstruction  - Hyponatremia    * Etiology likely due to polydipsia and low solute    * His mental status waxes and wanes making it difficult to see if he will be able to follow through on fluid restriction  - HTN    * Goal BP < 140/90  - type 2 DM  - HLD  - BPH    * Chronic suprapubic catheter, displaced currently     PLAN:  - Restrict free water < 1.5L  - No solute restrictions  - Dose all meds per eGFR < 60  - Counseled he needs to restrict free water intake as OP to prevent future episodes such as this  - Will arrange for FU in our [place] clinic within 1 month of d/c to further manage his hyponatremia, may need salt tabs if unable to restrict free water  - Ok to transition to OP care from renal standpoint    Thank you

## 2019-07-22 NOTE — PROGRESS NOTES
Critical Care Progress Note    Date of admission  7/16/2019    Chief Complaint  69 y.o. male admitted 7/16/2019 with a dislodged suprapubic catheter and hyponatremia.    Hospital Course    This gentleman was admitted to the ICU with critical, life-threatening hyponatremia.      Interval Problem Update  Reviewed last 24 hour events:      This gentleman is pleasantly confused.  He is disoriented to time and does not know why he is in the hospital.  He denies angina, palpitations or syncope.  He denies cough, sputum production, hemoptysis or dyspnea.  He denies abdominal pain, nausea or vomiting.  He has lower extremity edema.      Review of Systems  Review of Systems   Constitutional: Negative for chills, diaphoresis and fever.   HENT: Negative for ear discharge and nosebleeds.    Eyes: Negative for blurred vision, double vision and photophobia.   Respiratory: Negative for cough, hemoptysis, shortness of breath and stridor.    Cardiovascular: Positive for leg swelling. Negative for chest pain and palpitations.   Gastrointestinal: Negative for abdominal pain, nausea and vomiting.   Genitourinary: Negative for dysuria, hematuria and urgency.   Musculoskeletal: Negative for myalgias and neck pain.   Skin: Negative for rash.   Neurological: Negative for focal weakness, seizures, loss of consciousness and headaches.   Endo/Heme/Allergies: Does not bruise/bleed easily.   Psychiatric/Behavioral: Negative for suicidal ideas. The patient is not nervous/anxious.         Vital Signs for last 24 hours   Temp:  [36.7 °C (98.1 °F)-37 °C (98.6 °F)] 36.9 °C (98.5 °F)  Pulse:  [73-92] 92  Resp:  [14] 14  BP: (144)/(61) 144/61  SpO2:  [93 %-98 %] 95 %    Hemodynamic parameters for last 24 hours       Respiratory Information for the last 24 hours       Physical Exam   Physical Exam   Constitutional: He appears well-developed. No distress.   HENT:   Head: Normocephalic and atraumatic.   Right Ear: External ear normal.   Left Ear:  External ear normal.   Nose: Nose normal.   Mouth/Throat: Oropharynx is clear and moist.   Eyes: Pupils are equal, round, and reactive to light. Conjunctivae are normal. Right eye exhibits no discharge. Left eye exhibits no discharge.   Neck: Normal range of motion. Neck supple. No JVD present. No tracheal deviation present.   Cardiovascular: Intact distal pulses.  Exam reveals no gallop and no friction rub.    No murmur heard.  Irregularly irregular rhythm   Pulmonary/Chest: Effort normal. No stridor. No respiratory distress. He has no wheezes. He has no rales.   Abdominal: Soft. Bowel sounds are normal. He exhibits no distension. There is no tenderness. There is no rebound.   Colostomy in place.  Suprapubic catheter in place.   Musculoskeletal: Normal range of motion. He exhibits edema. He exhibits no tenderness.   No clubbing or cyanosis   Neurological: He is alert. No cranial nerve deficit. Coordination normal.   No focal weakness.  He is oriented to his name.  He knows he is in the hospital.  He does not know the date and he does not know why he is in the hospital.   Skin: Skin is warm and dry. No rash noted. He is not diaphoretic.       Medications  Current Facility-Administered Medications   Medication Dose Route Frequency Provider Last Rate Last Dose   • hydrOXYzine HCl (ATARAX) tablet 25 mg  25 mg Oral TID PRN LINDSEY FranksORachael   25 mg at 07/20/19 2241   • senna-docusate (PERICOLACE or SENOKOT S) 8.6-50 MG per tablet 2 Tab  2 Tab Oral BID Mao Dunn M.D.   Stopped at 07/18/19 0600    And   • polyethylene glycol/lytes (MIRALAX) PACKET 1 Packet  1 Packet Oral QDAY PRN Mao Dunn M.D.        And   • magnesium hydroxide (MILK OF MAGNESIA) suspension 30 mL  30 mL Oral QDAY JASKARAN Dunn M.D.        And   • bisacodyl (DULCOLAX) suppository 10 mg  10 mg Rectal QDAY PRIRINEO Dunn M.D.       • heparin injection 5,000 Units  5,000 Units Subcutaneous Q8HRS Mao Dunn  M.D.   5,000 Units at 07/21/19 2152   • Pharmacy Consult Request ...Pain Management Review 1 Each  1 Each Other PHARMACY TO DOSE Mao Dunn M.D.        And   • oxyCODONE immediate-release (ROXICODONE) tablet 5 mg  5 mg Oral Q3HRS PRN Mao Dunn M.D.   5 mg at 07/18/19 1046    And   • oxyCODONE immediate release (ROXICODONE) tablet 10 mg  10 mg Oral Q3HRS PRN Mao Dunn M.D.   10 mg at 07/21/19 1946    And   • morphine (pf) 4 mg/ml injection 4 mg  4 mg Intravenous Q3HRS PRN Mao Dunn M.D.       • atorvastatin (LIPITOR) tablet 80 mg  80 mg Oral Q EVENING Mao Dunn M.D.   80 mg at 07/21/19 1720   • budesonide-formoterol (SYMBICORT) 160-4.5 MCG/ACT inhaler 2 Puff  2 Puff Inhalation BID Mao Dunn M.D.   2 Puff at 07/21/19 1720   • metoprolol (LOPRESSOR) tablet 12.5 mg  12.5 mg Oral BID Mao Dunn M.D.   12.5 mg at 07/21/19 1721   • oxybutynin (DITROPAN) tablet 5 mg  5 mg Oral Q12HRS Mao Dunn M.D.   5 mg at 07/21/19 1720   • tamsulosin (FLOMAX) capsule 0.4 mg  0.4 mg Oral AFTER BREAKFAST Mao Dunn M.D.   0.4 mg at 07/21/19 0814   • Respiratory Care per Protocol   Nebulization Continuous RT Mao Dunn M.D.           Fluids    Intake/Output Summary (Last 24 hours) at 07/22/19 0400  Last data filed at 07/22/19 0000   Gross per 24 hour   Intake             1740 ml   Output             2900 ml   Net            -1160 ml       Laboratory          Recent Labs      07/20/19   0635  07/20/19   1455  07/21/19   0617   SODIUM  138  131*  132*   POTASSIUM  4.7  4.5  4.4   CHLORIDE  110  107  106   CO2  18*  15*  16*   BUN  24*  20  20   CREATININE  2.08*  2.03*  2.01*   MAGNESIUM  2.0  1.9  2.1   CALCIUM  8.8  8.7  8.8     Recent Labs      07/20/19   0635  07/20/19   1455  07/21/19   0617   GLUCOSE  128*  139*  131*     Recent Labs      07/19/19   0604  07/20/19   0635  07/21/19   0617   WBC  11.3*  12.1*  13.9*   NEUTSPOLYS  59.00  64.50  67.70   LYMPHOCYTES   27.10  24.50  20.50*   MONOCYTES  10.70  8.30  8.90   EOSINOPHILS  2.00  1.80  2.20   BASOPHILS  0.60  0.40  0.30     Recent Labs      07/19/19   0604  07/20/19   0635  07/21/19   0617   RBC  4.79  4.76  4.35*   HEMOGLOBIN  13.1*  12.8*  11.8*   HEMATOCRIT  41.2*  41.7*  38.5*   PLATELETCT  489*  482*  442       Imaging  None    Assessment/Plan  * Hyponatremia- (present on admission)   Assessment & Plan    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  Hyponatremia is improved  Continue fluid restriction, 1 L daily     Obstructive uropathy- (present on admission)   Assessment & Plan    History of urethral strictures and BPH  Previously placed suprapubic catheter fell out prior to admission  Suprapubic catheter replaced by IR on 7/17     Suprapubic catheter dysfunction (HCC)   Assessment & Plan    Suprapubic catheter replaced by IR on 7/17  Monitor site for evidence of infection     CRF (chronic renal failure), stage 3 (moderate) (HCC)- (present on admission)   Assessment & Plan    Monitor renal function and urine output  Avoid nephrotoxins  Renal dose medications     Leukocytosis- (present on admission)   Assessment & Plan    He is afebrile  He does not appear toxic  Continue to monitor off antibiotics     Essential hypertension- (present on admission)   Assessment & Plan    Monitor blood pressure  Continue metoprolol, 12.5 mg twice daily     Hypomagnesemia   Assessment & Plan    Replete magnesium     BPH (benign prostatic hyperplasia)- (present on admission)   Assessment & Plan    Suprapubic catheter in place  Continue Flomax     Type 2 diabetes mellitus, with long-term current use of insulin (HCC)- (present on admission)   Assessment & Plan    Monitor blood glucose  Last glycohemoglobin 6.5          VTE:  Heparin  Ulcer: Not Indicated  Lines: None    I have performed a physical exam and reviewed and updated ROS and Plan today (7/22/2019). In review of yesterday's note (7/21/2019), there are no  changes except as documented above.     Discussed patient condition and risk of morbidity and/or mortality with Hospitalist, RN, RT, Pharmacy, Charge nurse / hot rounds and QA team     Gunnar Reyes MD  Pulmonary and Critical Care Medicine

## 2019-07-22 NOTE — DISCHARGE PLANNING
Agency/Facility Name: [facility]  Outcome: Pt accpeted.     Agency/Facility Name: [facility]  Outcome: referral pending. Needs additional information. CCA notified RNCM    Alicia RNCM notified.

## 2019-07-22 NOTE — PROGRESS NOTES
[facility] Medicine Daily Progress Note    Date of Service  7/22/2019    Chief Complaint      Hospital Course    69 y.o. male admitted 7/16/2019 with Hx of chronic SP cath, Crohn's, Dm, CHF.  He has a chronic SP cath due to Hx of urethral stricture.  Cath was placed in June of this year.  On evaluation at outlying facility he was found ot have a Na of 112, CO2 19, Creat 1.75.         Interval Problem Update  Pt is without complaints today    Sinus/sinus nikki  -150s  + output from ostomy  Tolerating po  UOP 1500ml/12hrs    Consultants/Specialty  Pulmonology  Nephrology      Code Status  full    Disposition  Problematic.    Dw pt's friend and helper Nathaly by phone.  She reports he has been in the hospital multiple times for low Na.  He drinks 6 gallons of tea and water at home despite being told multiple times to restrict his fluid intake.  She feels he is unsafe at home.  Reports previously when hospitalized he has refused or not been approved for SNF though she feels he needs it.    Review of Systems  Review of Systems   Unable to perform ROS: Other   Constitutional: Negative for chills and fever.   HENT: Negative for nosebleeds and sore throat.    Eyes: Negative for blurred vision and double vision.   Respiratory: Negative for cough and shortness of breath.    Cardiovascular: Negative for chest pain, palpitations and leg swelling.   Gastrointestinal: Negative for abdominal pain, diarrhea, nausea and vomiting.   Genitourinary: Negative for dysuria and urgency.   Musculoskeletal: Negative for back pain.   Skin: Negative for rash.   Neurological: Negative for dizziness, loss of consciousness and headaches.        Physical Exam  Temp:  [36.2 °C (97.2 °F)-37 °C (98.6 °F)] 36.2 °C (97.2 °F)  Pulse:  [72-92] 72  Resp:  [14] 14  SpO2:  [93 %-98 %] 95 %    Physical Exam   Constitutional: He is oriented to person, place, and time. He appears well-developed and well-nourished. No distress.   HENT:   Head: Normocephalic  and atraumatic.   Nose: Nose normal.   Mouth/Throat: Oropharynx is clear and moist.   Eyes: Conjunctivae are normal. Right eye exhibits no discharge. Left eye exhibits no discharge.   Neck: Neck supple. No JVD present.   Cardiovascular: Normal rate.  Exam reveals no gallop.    Murmur heard.  Pulmonary/Chest: Effort normal. No stridor. No respiratory distress. He has no wheezes. He has no rales.   Abdominal: Soft. There is no tenderness. There is no rebound and no guarding.   Ostomy noted  SP cath site noted   Genitourinary:   Genitourinary Comments: Urine cloudy   Musculoskeletal: He exhibits no edema.   Neurological: He is alert and oriented to person, place, and time.   Subtle cognitive deficits with repeated questions and dificulty remembering and understanding unchanged from yesterday   Skin: Skin is warm and dry. No rash noted. He is not diaphoretic.   Psychiatric: He has a normal mood and affect. His behavior is normal. Judgment and thought content normal.   Nursing note and vitals reviewed.      Fluids    Intake/Output Summary (Last 24 hours) at 07/22/19 0546  Last data filed at 07/22/19 0000   Gross per 24 hour   Intake             1740 ml   Output             2900 ml   Net            -1160 ml       Laboratory  Recent Labs      07/19/19   0604  07/20/19   0635  07/21/19   0617   WBC  11.3*  12.1*  13.9*   RBC  4.79  4.76  4.35*   HEMOGLOBIN  13.1*  12.8*  11.8*   HEMATOCRIT  41.2*  41.7*  38.5*   MCV  86.0  87.6  88.5   MCH  27.3  26.9*  27.1   MCHC  31.8*  30.7*  30.6*   RDW  47.5  48.3  49.2   PLATELETCT  489*  482*  442   MPV  8.7*  8.6*  8.6*     Recent Labs      07/20/19   0635  07/20/19   1455  07/21/19   0617   SODIUM  138  131*  132*   POTASSIUM  4.7  4.5  4.4   CHLORIDE  110  107  106   CO2  18*  15*  16*   GLUCOSE  128*  139*  131*   BUN  24*  20  20   CREATININE  2.08*  2.03*  2.01*   CALCIUM  8.8  8.7  8.8                   Imaging  IR-DRAIN-BLADDER SUPRAPUB W/CATH   Final Result      1.  Fluoroscopic guided replacement of a 14 Ukrainian locking loop suprapubic bladder catheter.      2.     Cystogram documenting catheter placement.      3.     The catheter can be upsized and exchanged for a Humphrey-type balloon catheter at the patient's convenience.      IR-MIDLINE CATHETER INSERTION WO GUIDANCE > AGE 3   Final Result                  Ultrasound-guided midline placement performed by qualified nursing staff    as above.          OUTSIDE IMAGES-CT ABDOMEN /PELVIS   Final Result           Assessment/Plan  * Hyponatremia- (present on admission)   Assessment & Plan    Dr Caballero consulted from nephro  Clearly has polydypsia: drinks >4 gallons of water and tea by his admission, >6 gallons per his friend Nathaly  Has been admitted multiple times and told to restrict fluids on DC but is non compliant  Needs placement  Nephrology consulted  Has remained stable on 2 litres of free water restriction, with some allowance in addition for salty fluids (broth)   Pt will need placement as he has been repeatedly noncompliant with fluid restriction as an outpt per his care giver     Obstructive uropathy- (present on admission)   Assessment & Plan    Due to uretral stricutres and BPH   Urology has been consulted   Has SP cath back in place with good UOP     Suprapubic catheter dysfunction (HCC)   Assessment & Plan    Displaced and removed   Urology consulted for evaluation by ERP      CRF (chronic renal failure), stage 3 (moderate) (HCC)- (present on admission)   Assessment & Plan    At baseline renal function   Cont to montior      Leukocytosis- (present on admission)   Assessment & Plan    This patient has chronic leukocytosis   No evidence of infection   Cont to montior      Essential hypertension- (present on admission)   Assessment & Plan    Resume home BB      Hypomagnesemia   Assessment & Plan    Repeat Mg in am     Anemia   Assessment & Plan    No evidence of bleeding lab workup ordered     BPH (benign prostatic  hyperplasia)- (present on admission)   Assessment & Plan    Hx of resume home flomax     Crohn's disease (HCC)- (present on admission)   Assessment & Plan    S/p ostomy placement      Type 2 diabetes mellitus, with long-term current use of insulin (HCC)- (present on admission)   Assessment & Plan    SSI ordered  accu checks           VTE prophylaxis: heparin

## 2019-07-22 NOTE — CARE PLAN
Problem: Communication  Goal: The ability to communicate needs accurately and effectively will improve  Outcome: PROGRESSING AS EXPECTED      Problem: Safety  Goal: Will remain free from injury  Outcome: PROGRESSING AS EXPECTED  Bed locked in lowest position, call light within reach, and bed/chair alarm on.   Goal: Will remain free from falls  Outcome: PROGRESSING AS EXPECTED      Problem: Pain Management  Goal: Pain level will decrease to patient's comfort goal  Outcome: PROGRESSING SLOWER THAN EXPECTED

## 2019-07-22 NOTE — PROGRESS NOTES
Patient complaining of pain at suprapubic catheter site.  Site red with drainage.  Urine cloudy with sediment.  Dr. Flores notified, asked for UA and to cancel transfer to SNF for now.  Alicia OVIEDOCM notified.

## 2019-07-22 NOTE — PROGRESS NOTES
2 RN skin check complete.   Devices in place pulse oximeter, suprapubic catheter, non slip socks, ULE midline, RUQ ileostomy.  Skin assessed under devices yes.  Confirmed pressure ulcers found on penis (previously documented), moisture associated breakdown to underside of pannus, redness to buttocks.  New potential pressure ulcers noted on sacrum (moisture fissure). Wound consult placed, photo uploaded.  The following interventions in place encouraging repositioning (patient refusing), moisture wicking interdry for pannus, frequent ostomy care, barrier cream, rotating medical devices.  Adhesive silicone placed around suprapubic catheter per wound care note.

## 2019-07-22 NOTE — DISCHARGE PLANNING
Pt has been accepted to [facility] and transport arranged for 4:30 pm. Pt is agreeable to transfer. Bedside RN aware.

## 2019-07-22 NOTE — DISCHARGE SUMMARY
Discharge Summary    CHIEF COMPLAINT ON ADMISSION  Chief Complaint   Patient presents with   • Suprapubic Pain     Pt's suprapubic catheter fell out this afternoon, [facility] not able to replace it. 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    This is a 69 y.o. male who presented for evaluation of a displaced suprapubic catheter.  This had recently been placed about a month ago for chronic urine retention.  During the course of his evaluation in the emergency room at [facility], the patient's sodium was found to be low at 112.  He has a history of polydipsia.  Per his own admission he drinks 4 gallons of water and ice tea at home.  In discussions with his good friend she feels it more like 6 gallons.  He has had several admissions prior for hyponatremia.  He has been effectively treated with fluid restricted diet however when he goes home per Nathaly, he is braces of fluid restriction and gets hyponatremic.  This usually causes him to be confused and get readmitted to the hospital.    At our facility, the patient's suprapubic catheter was replaced.  He also went underwent work-up to confirm that his hyponatremia was indeed polydipsia and and not related to SIADH or another process.  His work-up did indicate polydipsia.  He was treated with fluid restriction down to 1 L and this overcorrected to 138.  He was then placed on a 2 L fluid restriction I am not including salty broths, and on this he maintained sodium is in the low 130s.  Of note the patient would left to his own devices will take in large amounts of broth.  We are therefore discharging him on a 2 L fluid restriction, with further allowance up to a  liter for salty liquids.    I had a phone call conversation with Nathaly the patient's good friend of many years who helps him with his medications etc.  She feels he is unsafe at home and reports that he frequently is noncompliant with his water restriction as noted above, in addition to having other issues of safety concern to her.  She felt he needs placement, but reports other facilities have been unable to get him placed.  Therefore, he is discharged in good and stable condition to skilled nursing facility.    The patient met 2-midnight criteria for an inpatient stay at the time of discharge.      FOLLOW UP ITEMS POST DISCHARGE  I would recommend repeating the patient's BMP in the next 4 to 5 days once he has been stable on fluid restriction and diet at the receiving facility.    DISCHARGE DIAGNOSES  Principal Problem:    Hyponatremia POA: Yes  Active Problems:    Obstructive uropathy POA: Yes    Essential hypertension POA: Yes    Leukocytosis POA: Yes    CRF (chronic renal failure), stage 3 (moderate) ([facility]) POA: Yes    Suprapubic catheter dysfunction (HCC) POA: Unknown    Type 2 diabetes mellitus, with long-term current use of insulin (HCC) POA: Yes    Crohn's disease (HCC) POA: Yes    BPH (benign prostatic hyperplasia) POA: Yes    Anemia POA: Unknown    Hypomagnesemia POA: Unknown  Resolved Problems:    * No resolved hospital problems. *      FOLLOW UP  No future appointments.  No follow-up provider specified.    MEDICATIONS ON DISCHARGE     Medication List      START taking these medications      Instructions   hydrOXYzine HCl 25 MG Tabs  Commonly known as:  ATARAX   Take 1 Tab by mouth 3 times a day as needed for Itching.  Dose:  25 mg        CONTINUE taking these medications      Instructions   atorvastatin 80 MG tablet  Commonly known as:  LIPITOR   Take 1 Tab by mouth every evening.  Dose:  80 mg     budesonide-formoterol 160-4.5 MCG/ACT Aero  Commonly known as:  SYMBICORT   Inhale 2 Puffs by mouth 2  Times a Day.  Dose:  2 Puff     metoprolol 25 MG Tabs  Commonly known as:  LOPRESSOR   Take 0.5 Tabs by mouth 2 Times a Day.  Dose:  12.5 mg     oxybutynin 5 MG Tabs  Commonly known as:  DITROPAN   Take 1 Tab by mouth every 12 hours.  Dose:  5 mg     therapeutic multivitamin-minerals Tabs   Take 1 Tab by mouth every day.  Dose:  1 Tab        STOP taking these medications    insulin regular 100 Unit/mL Soln  Commonly known as:  HUMULIN R     tamsulosin 0.4 MG capsule  Commonly known as:  FLOMAX            Allergies  Allergies   Allergen Reactions   • Ceftriaxone      Has tolerated Merrem, Zosyn, and Unasyn   • Ezetimibe    • Flagyl [Metronidazole]    • Infliximab    • Levaquin    • Lovastatin Shortness of Breath   • Simvastatin Shortness of Breath   • Tape Itching     Cloth tape OK per patient   • Vancomycin        DIET  Orders Placed This Encounter   Procedures   • Diet Order Regular     Standing Status:   Standing     Number of Occurrences:   1     Order Specific Question:   Diet:     Answer:   Regular [1]     Order Specific Question:   Consistency/Fluid modifications:     Answer:   2000 ml Fluid Restriction [11]     Comments:   salty fluids do not count. please send powerade with meals       ACTIVITY  As tolerated.  Weight bearing as tolerated    LINES, DRAINS, AND WOUNDS  This is an automated list. Peripheral IVs will be removed prior to discharge.  Midline IV 07/17/19 Left Upper arm (Active)   Site Assessment Clean;Dry;Intact 7/22/2019  8:00 AM   Dressing Type Biopatch;Occlusive;Securing device;Transparent 7/22/2019  8:00 AM   Line Status Saline locked 7/22/2019  8:00 AM   Dressing Status Clean;Dry;Intact 7/22/2019  8:00 AM   Dressing Intervention N/A 7/22/2019  8:00 AM   Dressing Change Due 07/24/19 7/17/2019  8:00 PM   Date Primary Tubing Changed 07/17/19 7/17/2019  8:00 PM   NEXT Primary Tubing Change  07/20/19 7/17/2019  8:00 PM   Infiltration Grading ([facility], [facility]) 0 7/22/2019  8:00 AM   Phlebitis Scale  ([facility] Only) 0 7/22/2019  8:00 AM     Ileostomy RUQ (Active)   Stomal Appliance 1 piece 7/22/2019  8:00 AM   Site Assessment Red 7/22/2019  8:00 AM   Peristomal Assessment Red;Painful 7/22/2019  8:00 AM   Treatment Bag change;Site care 7/21/2019  9:00 AM   Output (mL) 300 mL 7/22/2019  6:00 AM       Suprapubic Catheter Locking loop 14 Fr. (Active)   Site Assessment Edema;Painful;Swelling 7/22/2019  8:00 AM   Dressing Status New drainage 7/22/2019  8:00 AM   Dressing Type Other (Comment) 7/21/2019  8:00 PM   Collection Container Other (Comment) 7/22/2019  8:00 AM   Output (mL) 600 mL 7/22/2019  6:00 AM      Wound 07/16/19 Full Thickness Wound Penis erosion related to harrington (Active)   Site Assessment Pink 7/22/2019  4:00 AM   Nisa-wound Assessment Pale 7/22/2019  4:00 AM   Margins Undefined edges 7/22/2019  4:00 AM   Closure None 7/22/2019  4:00 AM   Drainage Amount Small 7/22/2019  4:00 AM   Drainage Description Yellow;Purulent 7/22/2019  4:00 AM   Non-staged Wound Description Full thickness 7/22/2019  4:00 AM   Treatments Cleansed;Site care 7/21/2019  8:00 PM   Cleansing Normal Saline Irrigation 7/22/2019  4:00 AM   Periwound Protectant Not Applicable 7/21/2019  8:00 AM   Dressing Options Petrolatum Gauze (yellow) 7/22/2019  4:00 AM   Dressing Cleansing/Solutions Not Applicable 7/22/2019  4:00 AM   Dressing Changed Reinforced 7/21/2019  8:00 PM   Dressing Status Clean;Dry;Intact 7/22/2019  4:00 AM   Dressing Change Frequency Daily 7/22/2019  4:00 AM   NEXT Dressing Change  07/19/19 7/18/2019  8:00 AM   NEXT Weekly Photo (Inpatient Only) 07/24/19 7/17/2019 10:40 AM   WOUND NURSE ONLY - Odor None 7/17/2019 10:40 AM   WOUND NURSE ONLY - Exposed Structures None 7/17/2019 10:40 AM   WOUND NURSE ONLY - Tissue Type and Percentage 100% pale/pink 7/17/2019 10:40 AM   WOUND NURSE ONLY - Time Spent with Patient (mins) 60 7/17/2019 10:40 AM       Wound 07/21/19 Partial Thickness Wound Abdomen under pannus fold moisture  associated skin breakdown (Active)   Site Assessment Red;Other (Comment) 7/21/2019  9:00 AM   Nisa-wound Assessment Intact 7/22/2019  4:00 AM   Margins Attached edges 7/22/2019  4:00 AM   Wound Length (cm) 3 cm 7/21/2019  9:00 AM   Wound Width (cm) 6 cm 7/21/2019  9:00 AM   Wound Depth (cm) 0.1 cm 7/21/2019  9:00 AM   Wound Surface Area (cm^2) 18 cm^2 7/21/2019  9:00 AM   Tunneling 0 cm 7/21/2019  9:00 AM   Undermining 0 cm 7/21/2019  9:00 AM   Closure None 7/22/2019  4:00 AM   Drainage Amount None 7/22/2019  4:00 AM   Non-staged Wound Description Partial thickness 7/22/2019  4:00 AM   Treatments Cleansed;Site care 7/21/2019  9:00 AM   Periwound Protectant Not Applicable 7/21/2019  9:00 AM   Dressing Options Open to Air 7/22/2019  4:00 AM   Dressing Cleansing/Solutions Not Applicable 7/21/2019  9:00 AM   NEXT Weekly Photo (Inpatient Only) 07/24/19 7/21/2019  9:00 AM   WOUND NURSE ONLY - Odor None 7/21/2019  9:00 AM   WOUND NURSE ONLY - Exposed Structures None 7/21/2019  9:00 AM   WOUND NURSE ONLY - Tissue Type and Percentage 100% red peeling 7/21/2019  9:00 AM   WOUND NURSE ONLY - Time Spent with Patient (mins) 30 7/21/2019  9:00 AM                  MENTAL STATUS ON TRANSFER  Level of Consciousness: Confused  Orientation : Oriented x 4  Speech: Speech Clear    CONSULTATIONS  Nephrology    PROCEDURES  None    LABORATORY  Lab Results   Component Value Date    SODIUM 133 (L) 07/22/2019    POTASSIUM 4.3 07/22/2019    CHLORIDE 105 07/22/2019    CO2 20 07/22/2019    GLUCOSE 111 (H) 07/22/2019    BUN 21 07/22/2019    CREATININE 2.03 (H) 07/22/2019        Lab Results   Component Value Date    WBC 13.4 (H) 07/22/2019    HEMOGLOBIN 12.0 (L) 07/22/2019    HEMATOCRIT 39.2 (L) 07/22/2019    PLATELETCT 446 07/22/2019        Total time of the discharge process exceeds 33 minutes.  The majority of that time spent with the patient reviewing discharge planning and specifically appropriate diet and fluid intake.  I done this with him  every day that I care for him, his recall day-to-day is limited but he does retain.  I would continue to emphasize this and hopefully we can help him to be compliant so that he may be discharged home safely eventually

## 2019-07-23 PROBLEM — L03.311 CELLULITIS OF ABDOMINAL WALL: Status: ACTIVE | Noted: 2019-07-23

## 2019-07-23 PROBLEM — T78.40XA ALLERGIC DRUG REACTION: Status: ACTIVE | Noted: 2019-07-23

## 2019-07-23 LAB
ANION GAP SERPL CALC-SCNC: 7 MMOL/L (ref 0–11.9)
BASOPHILS # BLD AUTO: 0.5 % (ref 0–1.8)
BASOPHILS # BLD: 0.06 K/UL (ref 0–0.12)
BUN SERPL-MCNC: 25 MG/DL (ref 8–22)
CALCIUM SERPL-MCNC: 8.8 MG/DL (ref 8.5–10.5)
CHLORIDE SERPL-SCNC: 107 MMOL/L (ref 96–112)
CO2 SERPL-SCNC: 18 MMOL/L (ref 20–33)
CREAT SERPL-MCNC: 2.08 MG/DL (ref 0.5–1.4)
EOSINOPHIL # BLD AUTO: 0.29 K/UL (ref 0–0.51)
EOSINOPHIL NFR BLD: 2.3 % (ref 0–6.9)
ERYTHROCYTE [DISTWIDTH] IN BLOOD BY AUTOMATED COUNT: 48.3 FL (ref 35.9–50)
GLUCOSE SERPL-MCNC: 143 MG/DL (ref 65–99)
HCT VFR BLD AUTO: 37.9 % (ref 42–52)
HGB BLD-MCNC: 11.9 G/DL (ref 14–18)
IMM GRANULOCYTES # BLD AUTO: 0.05 K/UL (ref 0–0.11)
IMM GRANULOCYTES NFR BLD AUTO: 0.4 % (ref 0–0.9)
LYMPHOCYTES # BLD AUTO: 3.07 K/UL (ref 1–4.8)
LYMPHOCYTES NFR BLD: 24 % (ref 22–41)
MAGNESIUM SERPL-MCNC: 1.8 MG/DL (ref 1.5–2.5)
MCH RBC QN AUTO: 27.2 PG (ref 27–33)
MCHC RBC AUTO-ENTMCNC: 31.4 G/DL (ref 33.7–35.3)
MCV RBC AUTO: 86.7 FL (ref 81.4–97.8)
MONOCYTES # BLD AUTO: 0.83 K/UL (ref 0–0.85)
MONOCYTES NFR BLD AUTO: 6.5 % (ref 0–13.4)
NEUTROPHILS # BLD AUTO: 8.49 K/UL (ref 1.82–7.42)
NEUTROPHILS NFR BLD: 66.3 % (ref 44–72)
NRBC # BLD AUTO: 0 K/UL
NRBC BLD-RTO: 0 /100 WBC
PLATELET # BLD AUTO: 416 K/UL (ref 164–446)
PMV BLD AUTO: 8.4 FL (ref 9–12.9)
POTASSIUM SERPL-SCNC: 4.6 MMOL/L (ref 3.6–5.5)
RBC # BLD AUTO: 4.37 M/UL (ref 4.7–6.1)
SODIUM SERPL-SCNC: 132 MMOL/L (ref 135–145)
WBC # BLD AUTO: 12.8 K/UL (ref 4.8–10.8)

## 2019-07-23 PROCEDURE — A9270 NON-COVERED ITEM OR SERVICE: HCPCS | Performed by: HOSPITALIST

## 2019-07-23 PROCEDURE — 99222 1ST HOSP IP/OBS MODERATE 55: CPT | Performed by: INTERNAL MEDICINE

## 2019-07-23 PROCEDURE — 80048 BASIC METABOLIC PNL TOTAL CA: CPT

## 2019-07-23 PROCEDURE — 770022 HCHG ROOM/CARE - ICU (200)

## 2019-07-23 PROCEDURE — 700111 HCHG RX REV CODE 636 W/ 250 OVERRIDE (IP): Performed by: HOSPITALIST

## 2019-07-23 PROCEDURE — 83735 ASSAY OF MAGNESIUM: CPT

## 2019-07-23 PROCEDURE — 302098 PASTE RING (FLAT): Performed by: HOSPITALIST

## 2019-07-23 PROCEDURE — 700111 HCHG RX REV CODE 636 W/ 250 OVERRIDE (IP): Performed by: INTERNAL MEDICINE

## 2019-07-23 PROCEDURE — 85025 COMPLETE CBC W/AUTO DIFF WBC: CPT

## 2019-07-23 PROCEDURE — 700105 HCHG RX REV CODE 258: Performed by: INTERNAL MEDICINE

## 2019-07-23 PROCEDURE — 99232 SBSQ HOSP IP/OBS MODERATE 35: CPT | Performed by: HOSPITALIST

## 2019-07-23 PROCEDURE — 700102 HCHG RX REV CODE 250 W/ 637 OVERRIDE(OP): Performed by: HOSPITALIST

## 2019-07-23 PROCEDURE — 700105 HCHG RX REV CODE 258: Performed by: HOSPITALIST

## 2019-07-23 PROCEDURE — 99291 CRITICAL CARE FIRST HOUR: CPT | Performed by: INTERNAL MEDICINE

## 2019-07-23 PROCEDURE — 700111 HCHG RX REV CODE 636 W/ 250 OVERRIDE (IP)

## 2019-07-23 RX ORDER — DIPHENHYDRAMINE HYDROCHLORIDE 50 MG/ML
INJECTION INTRAMUSCULAR; INTRAVENOUS
Status: ACTIVE
Start: 2019-07-23 | End: 2019-07-24

## 2019-07-23 RX ORDER — DIPHENHYDRAMINE HYDROCHLORIDE 50 MG/ML
25 INJECTION INTRAMUSCULAR; INTRAVENOUS EVERY 6 HOURS
Status: DISCONTINUED | OUTPATIENT
Start: 2019-07-23 | End: 2019-07-24

## 2019-07-23 RX ORDER — METHYLPREDNISOLONE SODIUM SUCCINATE 125 MG/2ML
62.5 INJECTION, POWDER, LYOPHILIZED, FOR SOLUTION INTRAMUSCULAR; INTRAVENOUS EVERY 6 HOURS
Status: DISCONTINUED | OUTPATIENT
Start: 2019-07-23 | End: 2019-07-24

## 2019-07-23 RX ORDER — METHYLPREDNISOLONE SODIUM SUCCINATE 125 MG/2ML
125 INJECTION, POWDER, LYOPHILIZED, FOR SOLUTION INTRAMUSCULAR; INTRAVENOUS ONCE
Status: COMPLETED | OUTPATIENT
Start: 2019-07-23 | End: 2019-07-23

## 2019-07-23 RX ORDER — LORAZEPAM 2 MG/ML
1 INJECTION INTRAMUSCULAR ONCE
Status: COMPLETED | OUTPATIENT
Start: 2019-07-23 | End: 2019-07-23

## 2019-07-23 RX ORDER — DIPHENHYDRAMINE HYDROCHLORIDE 50 MG/ML
INJECTION INTRAMUSCULAR; INTRAVENOUS
Status: COMPLETED
Start: 2019-07-23 | End: 2019-07-23

## 2019-07-23 RX ORDER — DIPHENHYDRAMINE HYDROCHLORIDE 50 MG/ML
25 INJECTION INTRAMUSCULAR; INTRAVENOUS ONCE
Status: COMPLETED | OUTPATIENT
Start: 2019-07-23 | End: 2019-07-23

## 2019-07-23 RX ORDER — LORAZEPAM 2 MG/ML
INJECTION INTRAMUSCULAR
Status: COMPLETED
Start: 2019-07-23 | End: 2019-07-23

## 2019-07-23 RX ORDER — LORAZEPAM 2 MG/ML
1 INJECTION INTRAMUSCULAR
Status: DISCONTINUED | OUTPATIENT
Start: 2019-07-23 | End: 2019-07-24 | Stop reason: HOSPADM

## 2019-07-23 RX ADMIN — PIPERACILLIN AND TAZOBACTAM 4.5 G: 4; .5 INJECTION, POWDER, LYOPHILIZED, FOR SOLUTION INTRAVENOUS; PARENTERAL at 12:11

## 2019-07-23 RX ADMIN — DIPHENHYDRAMINE HYDROCHLORIDE 25 MG: 50 INJECTION INTRAMUSCULAR; INTRAVENOUS at 13:19

## 2019-07-23 RX ADMIN — SENNOSIDES, DOCUSATE SODIUM 2 TABLET: 50; 8.6 TABLET, FILM COATED ORAL at 05:07

## 2019-07-23 RX ADMIN — OXYBUTYNIN CHLORIDE 5 MG: 5 TABLET ORAL at 05:09

## 2019-07-23 RX ADMIN — LORAZEPAM 1 MG: 2 INJECTION INTRAMUSCULAR; INTRAVENOUS at 13:23

## 2019-07-23 RX ADMIN — LORAZEPAM 1 MG: 2 INJECTION INTRAMUSCULAR at 13:23

## 2019-07-23 RX ADMIN — HEPARIN SODIUM 5000 UNITS: 5000 INJECTION, SOLUTION INTRAVENOUS; SUBCUTANEOUS at 14:33

## 2019-07-23 RX ADMIN — METHYLPREDNISOLONE SODIUM SUCCINATE 125 MG: 125 INJECTION, POWDER, FOR SOLUTION INTRAMUSCULAR; INTRAVENOUS at 13:19

## 2019-07-23 RX ADMIN — AMPICILLIN AND SULBACTAM 3 G: 1; 2 INJECTION, POWDER, FOR SOLUTION INTRAMUSCULAR; INTRAVENOUS at 05:08

## 2019-07-23 RX ADMIN — OXYCODONE HYDROCHLORIDE 10 MG: 10 TABLET ORAL at 05:07

## 2019-07-23 RX ADMIN — METOPROLOL TARTRATE 12.5 MG: 25 TABLET, FILM COATED ORAL at 05:07

## 2019-07-23 RX ADMIN — HEPARIN SODIUM 5000 UNITS: 5000 INJECTION, SOLUTION INTRAVENOUS; SUBCUTANEOUS at 05:07

## 2019-07-23 RX ADMIN — BUDESONIDE AND FORMOTEROL FUMARATE DIHYDRATE 2 PUFF: 160; 4.5 AEROSOL RESPIRATORY (INHALATION) at 08:40

## 2019-07-23 RX ADMIN — OXYCODONE HYDROCHLORIDE 10 MG: 10 TABLET ORAL at 12:09

## 2019-07-23 RX ADMIN — DIPHENHYDRAMINE HYDROCHLORIDE 25 MG: 50 INJECTION INTRAMUSCULAR; INTRAVENOUS at 23:16

## 2019-07-23 RX ADMIN — METHYLPREDNISOLONE SODIUM SUCCINATE 62.5 MG: 125 INJECTION, POWDER, FOR SOLUTION INTRAMUSCULAR; INTRAVENOUS at 23:16

## 2019-07-23 RX ADMIN — TAMSULOSIN HYDROCHLORIDE 0.4 MG: 0.4 CAPSULE ORAL at 08:39

## 2019-07-23 RX ADMIN — OXYCODONE HYDROCHLORIDE 10 MG: 10 TABLET ORAL at 08:39

## 2019-07-23 RX ADMIN — HEPARIN SODIUM 5000 UNITS: 5000 INJECTION, SOLUTION INTRAVENOUS; SUBCUTANEOUS at 20:32

## 2019-07-23 ASSESSMENT — ENCOUNTER SYMPTOMS
EYE PAIN: 0
BRUISES/BLEEDS EASILY: 0
EYE REDNESS: 0
MYALGIAS: 0
NERVOUS/ANXIOUS: 0
SENSORY CHANGE: 0
SPUTUM PRODUCTION: 0
EYE DISCHARGE: 0
DIAPHORESIS: 0
ORTHOPNEA: 0
COUGH: 0
SHORTNESS OF BREATH: 0
FOCAL WEAKNESS: 0
BLOOD IN STOOL: 0
SPEECH CHANGE: 0
SORE THROAT: 0
HEARTBURN: 0
FLANK PAIN: 0
DIZZINESS: 0
ABDOMINAL PAIN: 0
HALLUCINATIONS: 0

## 2019-07-23 NOTE — PROGRESS NOTES
Pt still refusing to move from chair.  Educated multiple times today regarding necessity of repositioning for skin care and prevention of pressure ulcers.

## 2019-07-23 NOTE — CARE PLAN
Problem: Communication  Goal: The ability to communicate needs accurately and effectively will improve  Outcome: PROGRESSING AS EXPECTED      Problem: Safety  Goal: Will remain free from injury  Outcome: PROGRESSING AS EXPECTED    Goal: Will remain free from falls  Outcome: PROGRESSING AS EXPECTED  Bed locked in lowest position, call light within reach, and bed/chair alarm set.     Problem: Pain Management  Goal: Pain level will decrease to patient's comfort goal  Outcome: PROGRESSING AS EXPECTED

## 2019-07-23 NOTE — PROGRESS NOTES
Notified by CCT that patient's leads showing ST elevation in 3 leads.  STAT EKG ordered, Dr. Reyes notified.  Patient not agreeable to EKG.  Situation explained, patient again making fists and swearing at staff.

## 2019-07-23 NOTE — PROGRESS NOTES
Hospital Medicine Daily Progress Note    Date of Service  7/23/2019    Chief Complaint  Displaced catheter     Hospital Course    69 y.o. male admitted 7/16/2019 with Hx of chronic SP cath, Crohn's, Dm, CHF.  He has a chronic SP cath due to Hx of urethral stricture.  Cath was placed in June of this year.  On evaluation at outlying facility he was found ot have a Na of 112, CO2 19, Creat 1.75.         Interval Problem Update  Should be able to go, called ID  Confused overnight  Refuses mobilization  Afib 60s-80s  BP fine  Afeb  Reg diet, quickly goes i to ostomy, eating 4 meals a night  Fluid restriction  RUQ ileostomy  SP catheter 1400, sediment  LUE midline  RA  Unasyn started yesterday  WBC 13K  Na 132  Mag 1.8, will give 2g  Had a rxn to zosyn    Consultants/Specialty  Pulmonology  Nephrology  ID  Urology    Code Status  FULL    Disposition  SNF    D/W tx team on rounds    Review of Systems  Review of Systems   Unable to perform ROS: Mental acuity        Physical Exam  Temp:  [36.7 °C (98 °F)-36.8 °C (98.3 °F)] 36.7 °C (98 °F)  Pulse:  [68-81] 68  Resp:  [16] 16  SpO2:  [92 %-98 %] 98 %    Physical Exam   Constitutional: He appears well-developed and well-nourished.   HENT:   Head: Normocephalic and atraumatic.   Cardiovascular: Normal rate, regular rhythm and normal heart sounds.    No murmur heard.  Pulmonary/Chest: Effort normal and breath sounds normal. No respiratory distress. He has no wheezes. He has no rales.   Abdominal: Soft. Bowel sounds are normal. He exhibits no distension. There is no tenderness.   Ileostomy draining     Genitourinary:   Genitourinary Comments: SP site red right around tubing, doesn't extend further; is painful to touch   Musculoskeletal: Normal range of motion. He exhibits no edema.   Neurological:   Sedated after ativan and benadryl   Skin: Skin is warm and dry. No rash noted. No erythema.   Nursing note and vitals reviewed.      Fluids    Intake/Output Summary (Last 24 hours) at  07/23/19 0745  Last data filed at 07/23/19 0600   Gross per 24 hour   Intake             1540 ml   Output             3600 ml   Net            -2060 ml       Laboratory  Recent Labs      07/21/19   0617 07/22/19   0555  07/23/19   0515   WBC  13.9*  13.4*  12.8*   RBC  4.35*  4.42*  4.37*   HEMOGLOBIN  11.8*  12.0*  11.9*   HEMATOCRIT  38.5*  39.2*  37.9*   MCV  88.5  88.7  86.7   MCH  27.1  27.1  27.2   MCHC  30.6*  30.6*  31.4*   RDW  49.2  49.2  48.3   PLATELETCT  442  446  416   MPV  8.6*  8.5*  8.4*     Recent Labs      07/21/19 0617 07/22/19   0555  07/23/19   0515   SODIUM  132*  133*  132*   POTASSIUM  4.4  4.3  4.6   CHLORIDE  106  105  107   CO2  16*  20  18*   GLUCOSE  131*  111*  143*   BUN  20  21  25*   CREATININE  2.01*  2.03*  2.08*   CALCIUM  8.8  9.0  8.8                   Imaging  IR-DRAIN-BLADDER SUPRAPUB W/CATH   Final Result      1. Fluoroscopic guided replacement of a 14 [place] locking loop suprapubic bladder catheter.      2.     Cystogram documenting catheter placement.      3.     The catheter can be upsized and exchanged for a Humphrey-type balloon catheter at the patient's convenience.      IR-MIDLINE CATHETER INSERTION WO GUIDANCE > AGE 3   Final Result                  Ultrasound-guided midline placement performed by qualified nursing staff    as above.          OUTSIDE IMAGES-CT ABDOMEN /PELVIS   Final Result           Assessment/Plan  * Allergic drug reaction   Assessment & Plan    Received benadryl and solumedrol; resolved  Was short of breath and itchy  Holding off on all antibiotics, will list zosyn as allergy     Hyponatremia- (present on admission)   Assessment & Plan    Dr Caballero following from nephrology   Clearly has polydypsia: drinks >4 gallons of water and tea by his admission, >6 gallons per his friend Nathaly  Has been admitted multiple times and told to restrict fluids on DC but is noncompliant  Needs placement, accepted to [facility] but held 2/2 allergic reaction  Has  remained stable on 2 litres of free water restriction, with some allowance in addition for salty fluids (broth)        Obstructive uropathy- (present on admission)   Assessment & Plan    2/2 uretral stricutres and BPH   Has SP cath back in place with good UOP     Suprapubic catheter dysfunction (HCC)   Assessment & Plan    Displaced and was removed/replaced  Urology consulted for evaluation by ERP      CRF (chronic renal failure), stage 3 (moderate) ([facility])- (present on admission)   Assessment & Plan    At baseline renal function   Continue to montior      Leukocytosis- (present on admission)   Assessment & Plan    Chronic mild leukocytosis   No evidence of infection   Continue to montior      Essential hypertension- (present on admission)   Assessment & Plan    On home metoprolol, controlled     Hypomagnesemia- (present on admission)   Assessment & Plan    Replaced     Anemia- (present on admission)   Assessment & Plan    No evidence of bleeding; mild     BPH (benign prostatic hyperplasia)- (present on admission)   Assessment & Plan    Chronic; on home flomax     Crohn's disease ([facility])- (present on admission)   Assessment & Plan    S/p ostomy placement      Type 2 diabetes mellitus, with long-term current use of insulin ([facility])- (present on admission)   Assessment & Plan    SSI ordered  accu checks           VTE prophylaxis: heparin

## 2019-07-23 NOTE — PROGRESS NOTES
2 RN skin check complete.   Devices in place pulse oximeter, suprapubic catheter, non slip socks, ULE midline, RUQ ileostomy.  Skin assessed under devices yes.  Confirmed pressure ulcers found on penis (previously documented), moisture associated breakdown to underside of pannus, redness to buttocks.  New potential pressure ulcers noted on sacrum (moisture fissure). Wound consult placed, photo uploaded.   The following interventions in place encouraging repositioning (patient refusing), moisture wicking interdry for pannus, frequent ostomy care, barrier cream, rotating medical devices.  Refusing adhesive silicone placed around suprapubic catheter per wound care note.

## 2019-07-23 NOTE — PROGRESS NOTES
[facility] -  PROGRESS NOTE               Author: Calvin Dunn M.D. Date & Time: 7/23/2019  8:11 AM     HPI:  70 yo male PMH CKD Stage 3, HTN, type 2 DM, BPH, HLD, and COPD presents to ED as transfer from OSH after being found to have hyponatremia.  He has a known hx of BPH causing REED and required a suprapubic catheter placement early this year.  He states yesterday afternoon it fell out on its own, he denies trauma or pulling on it prior to it displacing.  [facility].  At his local ED they were unable to replace it and lab work showed significant hyponatremia at 112 and so he was sent to [facility] [place] [facility] [place].  By the time he arrived here and had repeat labs it was 12 hrs from initial and his SNa ~ 119.  ED docs here were able to get a penile FC and he was admitted to ICU.  Repeat SNa ~ 121 after being here and he was started on D5W overnight to maintain him at current range.  He admits to being told about hyponatremia in past due to low solute intake according to him.  Further inquiry also reveals that he drinks 4-6 gallons of water daily, he says he just sits there and will drink water while on his recliner.  He is currently NPO for IR to replace his suprapubic catheter.  SNa stable in the low 120s overnight and this AM.  His other labs show Ashley ~ 14 and UOsm ~ 72.  No F/C/N/V/CP/SOB.  No melena, hematochezia, hematemesis.  No HA, visual changes.      DAILY NEPHROLOGY SUMMARY:  7/17/19 - Consult done  7/18/19 - NAEO, SP catheter replaced; SNa trended up  7/19/19 - NAEO, no complaints, sitting in chair  7/20/19 - NAEO, no complaints, SNa improved with IVF  7/21/19 - NAEO, sitting in bedside chair  7/22: no acute events. Sna stable  7/23: Discharge cancelled due cellulitis around suprapubic cath site. Abx changed to zosyn.       REVIEW OF SYSTEMS:    - As per HPI, otherwise review of systems negative per AMA/CMS criteria  - General:  As per HPI,  "otherwise negative per AMA/CMS criteria  - HEENT:  No ocular pain; no nasal discharge  - CV:  As per HPI, otherwise negative per AMA/CMS criteria  - Lungs:  As per HPI, otherwise negative per AMA/CMS criteria  - GI:  As per HPI, otherwise negative per AMA/CMS criteria  - MSK:  No joint pain; No trauma  - Skin: No rashes; No lesions  - Neuro: No paresthesia; No LOC  - Psych: No depression; No anxiety      PAST FAMILY HISTORY: Reviewed and Unchanged  SOCIAL HISTORY: Reviewed and Unchanged  CURRENT MEDICATIONS: Reviewed  IMAGING STUDIES: Reviewed      PHYSICAL EXAM:  VS:  /61   Pulse 68   Temp 36.7 °C (98 °F) (Temporal)   Resp 16   Ht 1.803 m (5' 11\")   Wt 111.1 kg (244 lb 14.9 oz)   SpO2 98%   BMI 34.16 kg/m²   GENERAL:  WDWN NAD  HEENT:  NC/AT, no scleral icterus; conjunctiva normal  NECK:  Supple; Non tender  CV:  RRR, no m/r/g  LUNGS:  CTAB, no W/R  ABDOMEN:  SND, TTP suprapubic region  EXTREMETIES:  +BL edmea  SKIN:  Warm and dry  NEURO:  A&O, no focal deficits  PSYCH:  Cooperative, appropriate mood and affect    Fluids:  In: 1540 [P.O.:1240]  Out: 3600     LABS:  Recent Results (from the past 24 hour(s))   URINALYSIS    Collection Time: 07/22/19  1:45 PM   Result Value Ref Range    Color Yellow     Character Cloudy (A)     Specific Gravity 1.020 <1.035    Ph 5.5 5.0 - 8.0    Glucose Negative Negative mg/dL    Ketones Negative Negative mg/dL    Protein 100 (A) Negative mg/dL    Bilirubin Negative Negative    Urobilinogen, Urine 0.2 Negative    Nitrite Positive (A) Negative    Leukocyte Esterase Moderate (A) Negative    Occult Blood Large (A) Negative    Micro Urine Req Microscopic    URINE MICROSCOPIC (W/UA)    Collection Time: 07/22/19  1:45 PM   Result Value Ref Range    WBC Packed (A) /hpf    RBC 5-10 (A) /hpf    Bacteria Many (A) None /hpf    Epithelial Cells Negative /hpf    Hyaline Cast 6-10 (A) /lpf   MAGNESIUM    Collection Time: 07/23/19  5:15 AM   Result Value Ref Range    Magnesium 1.8 1.5 " - 2.5 mg/dL   CBC WITH DIFFERENTIAL    Collection Time: 07/23/19  5:15 AM   Result Value Ref Range    WBC 12.8 (H) 4.8 - 10.8 K/uL    RBC 4.37 (L) 4.70 - 6.10 M/uL    Hemoglobin 11.9 (L) 14.0 - 18.0 g/dL    Hematocrit 37.9 (L) 42.0 - 52.0 %    MCV 86.7 81.4 - 97.8 fL    MCH 27.2 27.0 - 33.0 pg    MCHC 31.4 (L) 33.7 - 35.3 g/dL    RDW 48.3 35.9 - 50.0 fL    Platelet Count 416 164 - 446 K/uL    MPV 8.4 (L) 9.0 - 12.9 fL    Neutrophils-Polys 66.30 44.00 - 72.00 %    Lymphocytes 24.00 22.00 - 41.00 %    Monocytes 6.50 0.00 - 13.40 %    Eosinophils 2.30 0.00 - 6.90 %    Basophils 0.50 0.00 - 1.80 %    Immature Granulocytes 0.40 0.00 - 0.90 %    Nucleated RBC 0.00 /100 WBC    Neutrophils (Absolute) 8.49 (H) 1.82 - 7.42 K/uL    Lymphs (Absolute) 3.07 1.00 - 4.80 K/uL    Monos (Absolute) 0.83 0.00 - 0.85 K/uL    Eos (Absolute) 0.29 0.00 - 0.51 K/uL    Baso (Absolute) 0.06 0.00 - 0.12 K/uL    Immature Granulocytes (abs) 0.05 0.00 - 0.11 K/uL    NRBC (Absolute) 0.00 K/uL   Basic Metabolic Panel    Collection Time: 07/23/19  5:15 AM   Result Value Ref Range    Sodium 132 (L) 135 - 145 mmol/L    Potassium 4.6 3.6 - 5.5 mmol/L    Chloride 107 96 - 112 mmol/L    Co2 18 (L) 20 - 33 mmol/L    Glucose 143 (H) 65 - 99 mg/dL    Bun 25 (H) 8 - 22 mg/dL    Creatinine 2.08 (H) 0.50 - 1.40 mg/dL    Calcium 8.8 8.5 - 10.5 mg/dL    Anion Gap 7.0 0.0 - 11.9   ESTIMATED GFR    Collection Time: 07/23/19  5:15 AM   Result Value Ref Range    GFR If  39 (A) >60 mL/min/1.73 m 2    GFR If Non  32 (A) >60 mL/min/1.73 m 2       (click the triangle to expand results)      IMPRESSION:  # CKD Stage 3    * BCr ~ 1.5-1.9    * Etiology 2/2 BPH/obstruction  # Hyponatremia    * Etiology likely due to polydipsia and low solute    * His mental status waxes and wanes making it difficult to see if he will be able to follow through on fluid restriction  # HTN    * Goal BP < 140/90  # type 2 DM  # HLD  # BPH    * Chronic suprapubic  catheter, displaced currently  # cellulitis at suprapubic cath site  # Venous insufficiency: likely does not represent intravascular volume      PLAN:  - Restrict free water < 1.5L  - No solute restrictions  -no diuretics for mild LE edema/vascular insufficiency for now  - Dose all meds per eGFR < 60  - Counseled he needs to restrict free water intake as OP to prevent future episodes such as this  - Will arrange for FU in our [place] clinic within 1 month of d/c to further manage his hyponatremia,   - Ok to transition to OP care from renal standpoint    Thank you

## 2019-07-23 NOTE — PROGRESS NOTES
Patient AOx0, agitated, attempting to get out of bed.  Attempted to de-escalate, patient swearing and making fists.  Security to bedside.  Verbally de-escalated patient enough to convince him to get back in bed.  Patient now sleeping.

## 2019-07-23 NOTE — PROGRESS NOTES
Critical Care Progress Note    Date of admission  7/16/2019    Chief Complaint  69 y.o. male admitted 7/16/2019 with a dislodged suprapubic catheter and hyponatremia.    Hospital Course    This gentleman was admitted to the ICU with critical, life-threatening hyponatremia.      Interval Problem Update  Reviewed last 24 hour events:      Change Unasyn to Zosyn  Culture urine  Refuses to mobilize  AF  Afebrile  Good appetite  1.5 L fluid restriction      He is complaining of some pain around his suprapubic catheter site.  It is about the same.  He has no nausea or vomiting.  He denies angina, palpitations or syncope.  He has lower extremity edema which is about the same.  He has no cough, sputum production, wheezing, hemoptysis or dyspnea.      Review of Systems  Review of Systems   Constitutional: Positive for malaise/fatigue. Negative for diaphoresis.   HENT: Negative for congestion, ear pain and sore throat.    Eyes: Negative for pain, discharge and redness.   Respiratory: Negative for cough, sputum production and shortness of breath.    Cardiovascular: Positive for leg swelling. Negative for chest pain and orthopnea.   Gastrointestinal: Negative for abdominal pain, blood in stool and heartburn.   Genitourinary: Negative for dysuria, flank pain and hematuria.   Musculoskeletal: Negative for joint pain and myalgias.   Skin: Negative for itching.   Neurological: Negative for dizziness, sensory change, speech change and focal weakness.   Endo/Heme/Allergies: Does not bruise/bleed easily.   Psychiatric/Behavioral: Negative for hallucinations and suicidal ideas. The patient is not nervous/anxious.         Vital Signs for last 24 hours   Temp:  [36.7 °C (98 °F)-36.8 °C (98.3 °F)] 36.7 °C (98 °F)  Pulse:  [68-81] 68  Resp:  [16] 16  SpO2:  [92 %-96 %] 92 %    Hemodynamic parameters for last 24 hours       Respiratory Information for the last 24 hours       Physical Exam   Physical Exam   Constitutional: He appears  well-developed. No distress.   HENT:   Head: Normocephalic.   Right Ear: External ear normal.   Left Ear: External ear normal.   Mouth/Throat: No oropharyngeal exudate.   Eyes: Pupils are equal, round, and reactive to light. EOM are normal. Right eye exhibits no discharge. Left eye exhibits no discharge. No scleral icterus.   Neck: Neck supple. No tracheal deviation present.   Cardiovascular: Intact distal pulses.  Exam reveals no gallop.    No murmur heard.  Irregularly irregular rhythm   Pulmonary/Chest: Effort normal. No respiratory distress. He has no rales. He exhibits no tenderness.   Abdominal: Soft. Bowel sounds are normal. He exhibits no distension. There is no tenderness. There is no guarding.   Colostomy in place.  Suprapubic catheter in place.  There is erythema with increased warmth and tenderness around the suprapubic catheter site   Musculoskeletal: Normal range of motion. He exhibits edema. He exhibits no tenderness.   No cyanosis or clubbing   Neurological: He is alert. No cranial nerve deficit. Coordination normal.   No focal weakness.  He remains confused.   Skin: Skin is warm and dry. He is not diaphoretic. No pallor.       Medications  Current Facility-Administered Medications   Medication Dose Route Frequency Provider Last Rate Last Dose   • ampicillin/sulbactam (UNASYN) 3 g in  mL IVPB  3 g Intravenous Q6HRS Dorian Gentile D.ORachael   Stopped at 07/22/19 2347   • hydrOXYzine HCl (ATARAX) tablet 25 mg  25 mg Oral TID PRN HUMBLE Franks.O.   25 mg at 07/20/19 2241   • senna-docusate (PERICOLACE or SENOKOT S) 8.6-50 MG per tablet 2 Tab  2 Tab Oral BID Mao Dunn M.D.   Stopped at 07/22/19 1800    And   • polyethylene glycol/lytes (MIRALAX) PACKET 1 Packet  1 Packet Oral QDAY PRN Mao Dunn M.D.        And   • magnesium hydroxide (MILK OF MAGNESIA) suspension 30 mL  30 mL Oral QDAY PRN Mao Dunn M.D.        And   • bisacodyl (DULCOLAX) suppository 10 mg   10 mg Rectal QDAY PRN Mao Dunn M.D.       • heparin injection 5,000 Units  5,000 Units Subcutaneous Q8HRS Mao Dunn M.D.   5,000 Units at 07/22/19 2232   • Pharmacy Consult Request ...Pain Management Review 1 Each  1 Each Other PHARMACY TO DOSE Mao Dunn M.D.        And   • oxyCODONE immediate-release (ROXICODONE) tablet 5 mg  5 mg Oral Q3HRS PRN Mao Dunn M.D.   5 mg at 07/18/19 1046    And   • oxyCODONE immediate release (ROXICODONE) tablet 10 mg  10 mg Oral Q3HRS PRN Mao Dunn M.D.   10 mg at 07/22/19 2319    And   • morphine (pf) 4 mg/ml injection 4 mg  4 mg Intravenous Q3HRS PRN Mao Dunn M.D.       • atorvastatin (LIPITOR) tablet 80 mg  80 mg Oral Q EVENING Mao Dunn M.D.   80 mg at 07/22/19 1838   • budesonide-formoterol (SYMBICORT) 160-4.5 MCG/ACT inhaler 2 Puff  2 Puff Inhalation BID Mao Dunn M.D.   2 Puff at 07/22/19 1839   • metoprolol (LOPRESSOR) tablet 12.5 mg  12.5 mg Oral BID Mao Dunn M.D.   12.5 mg at 07/22/19 1838   • oxybutynin (DITROPAN) tablet 5 mg  5 mg Oral Q12HRS Mao Dunn M.D.   5 mg at 07/22/19 1839   • tamsulosin (FLOMAX) capsule 0.4 mg  0.4 mg Oral AFTER BREAKFAST Mao Dunn M.D.   0.4 mg at 07/22/19 0855   • Respiratory Care per Protocol   Nebulization Continuous RT Mao Dunn M.D.           Fluids    Intake/Output Summary (Last 24 hours) at 07/23/19 0421  Last data filed at 07/23/19 0400   Gross per 24 hour   Intake             1680 ml   Output             3500 ml   Net            -1820 ml       Laboratory          Recent Labs      07/20/19   1455  07/21/19   0617  07/22/19   0555   SODIUM  131*  132*  133*   POTASSIUM  4.5  4.4  4.3   CHLORIDE  107  106  105   CO2  15*  16*  20   BUN  20 20 21   CREATININE  2.03*  2.01*  2.03*   MAGNESIUM  1.9  2.1  1.9   CALCIUM  8.7  8.8  9.0     Recent Labs      07/20/19   1455  07/21/19   0617  07/22/19   0555   GLUCOSE  139*  131*  111*     Recent  Labs      07/20/19   0635  07/21/19   0617  07/22/19   0555   WBC  12.1*  13.9*  13.4*   NEUTSPOLYS  64.50  67.70  68.30   LYMPHOCYTES  24.50  20.50*  21.20*   MONOCYTES  8.30  8.90  7.30   EOSINOPHILS  1.80  2.20  2.40   BASOPHILS  0.40  0.30  0.40     Recent Labs      07/20/19   0635  07/21/19   0617  07/22/19   0555   RBC  4.76  4.35*  4.42*   HEMOGLOBIN  12.8*  11.8*  12.0*   HEMATOCRIT  41.7*  38.5*  39.2*   PLATELETCT  482*  442  446       Imaging  None    Assessment/Plan  * Allergic drug reaction   Assessment & Plan    Developed with infusion of Zosyn  Emergently treated with IV Benadryl and Solu-Medrol  Slowly improved after treatment  Zosyn stopped     Hyponatremia- (present on admission)   Assessment & Plan    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  Hyponatremia is improved  Change fluid restriction to 1.5 L daily     Obstructive uropathy- (present on admission)   Assessment & Plan    History of urethral strictures and BPH  Previously placed suprapubic catheter fell out prior to admission  Suprapubic catheter replaced by IR on 7/17     Cellulitis of abdominal wall   Assessment & Plan    Antibiotics stopped  Observe off antibiotics     Suprapubic catheter dysfunction (HCC)   Assessment & Plan    Suprapubic catheter replaced by IR on 7/17     CRF (chronic renal failure), stage 3 (moderate) ([facility])- (present on admission)   Assessment & Plan    Monitor renal function and urine output  Renal dose medications and avoid nephrotoxins     Essential hypertension- (present on admission)   Assessment & Plan    Continue metoprolol, 12.5 mg twice daily  Blood pressure controlled     Hypomagnesemia   Assessment & Plan    Replete magnesium     BPH (benign prostatic hyperplasia)- (present on admission)   Assessment & Plan    Suprapubic catheter in place  Continue Flomax     Type 2 diabetes mellitus, with long-term current use of insulin ([facility])- (present on admission)   Assessment  & Plan    Glycohemoglobin 6.5  Monitor blood glucose          VTE:  Heparin  Ulcer: Not Indicated  Lines: None    I have performed a physical exam and reviewed and updated ROS and Plan today (7/23/2019). In review of yesterday's note (7/22/2019), there are no changes except as documented above.     Addendum 1800 hours:    I was called STAT to the bedside at approximately 1300 hours.  This gentleman was receiving a dose of IV Zosyn and suddenly developed a diffuse pruritic rash with increasing oxygen requirements, anxiety and dyspnea.  This appears to be an allergic reaction to Zosyn.  On exam, he had a diffuse red rash.  He was not hypotensive.  He was very anxious.  He did not have any bronchospasm or wheezing.  The Zosyn was discontinued.  I administered IV Benadryl and Solu-Medrol emergently.  Over the next several hours, his rash markedly improved.  His pruritus improved.  His lungs did not reveal any wheezing or evidence of bronchospasm.  His oxygen requirements gradually improved.  He did not develop hypotension.  He remained very agitated and I administered IV lorazepam.  His telemetry monitor revealed some inferior ST segment changes.  I ordered a STAT ECG, but he refused the exam.  I also ordered a troponin, but he refused to have his blood drawn.  He remained quite angry, aggressive and agitated.  He insists on not being bothered at this time.    Discussed patient condition and risk of morbidity and/or mortality with Hospitalist, RN, RT, Pharmacy, Charge nurse / hot rounds and QA team     The patient remains critically ill.  Critical care time = 40 minutes in directly providing and coordinating critical care and extensive data review.  No time overlap and excludes procedures.    Gunnar Reyes MD  Pulmonary and Critical Care Medicine

## 2019-07-23 NOTE — PROGRESS NOTES
Late entry: 1300, 1 hour post IV antibiotic administration patient begins calling out from room, anxious, lips purple.  Complaining of itching all over body.  Patient reconnected to ICU monitoring equipment, tachycardic and O2 in the low 80s.  Oxymask placed.  Attempting to get out of bed, cuts right elbow open.  Dr. Reyes called to bedside, medications ordered.  Benadryl, solu-medrol and ativan given.  Will continue to monitor.  1400: patient calm, resting in bed.

## 2019-07-24 VITALS
OXYGEN SATURATION: 93 % | TEMPERATURE: 98.5 F | HEART RATE: 72 BPM | HEIGHT: 71 IN | DIASTOLIC BLOOD PRESSURE: 56 MMHG | SYSTOLIC BLOOD PRESSURE: 119 MMHG | RESPIRATION RATE: 11 BRPM | BODY MASS INDEX: 33.7 KG/M2 | WEIGHT: 240.74 LBS

## 2019-07-24 PROBLEM — D72.829 LEUKOCYTOSIS: Status: RESOLVED | Noted: 2018-02-24 | Resolved: 2019-07-24

## 2019-07-24 PROBLEM — T83.010A SUPRAPUBIC CATHETER DYSFUNCTION (HCC): Status: RESOLVED | Noted: 2019-07-16 | Resolved: 2019-07-24

## 2019-07-24 PROBLEM — T78.40XA ALLERGIC DRUG REACTION: Status: RESOLVED | Noted: 2019-07-23 | Resolved: 2019-07-24

## 2019-07-24 PROBLEM — L03.311 CELLULITIS OF ABDOMINAL WALL: Status: RESOLVED | Noted: 2019-07-23 | Resolved: 2019-07-24

## 2019-07-24 LAB
ANION GAP SERPL CALC-SCNC: 10 MMOL/L (ref 0–11.9)
BASOPHILS # BLD AUTO: 0.1 % (ref 0–1.8)
BASOPHILS # BLD: 0.01 K/UL (ref 0–0.12)
BUN SERPL-MCNC: 30 MG/DL (ref 8–22)
CALCIUM SERPL-MCNC: 9 MG/DL (ref 8.5–10.5)
CHLORIDE SERPL-SCNC: 104 MMOL/L (ref 96–112)
CO2 SERPL-SCNC: 17 MMOL/L (ref 20–33)
CREAT SERPL-MCNC: 2.02 MG/DL (ref 0.5–1.4)
EOSINOPHIL # BLD AUTO: 0 K/UL (ref 0–0.51)
EOSINOPHIL NFR BLD: 0 % (ref 0–6.9)
ERYTHROCYTE [DISTWIDTH] IN BLOOD BY AUTOMATED COUNT: 46.6 FL (ref 35.9–50)
GLUCOSE SERPL-MCNC: 175 MG/DL (ref 65–99)
HCT VFR BLD AUTO: 36.2 % (ref 42–52)
HGB BLD-MCNC: 11.9 G/DL (ref 14–18)
IMM GRANULOCYTES # BLD AUTO: 0.07 K/UL (ref 0–0.11)
IMM GRANULOCYTES NFR BLD AUTO: 0.7 % (ref 0–0.9)
LYMPHOCYTES # BLD AUTO: 1.63 K/UL (ref 1–4.8)
LYMPHOCYTES NFR BLD: 16.6 % (ref 22–41)
MAGNESIUM SERPL-MCNC: 1.8 MG/DL (ref 1.5–2.5)
MCH RBC QN AUTO: 28.3 PG (ref 27–33)
MCHC RBC AUTO-ENTMCNC: 32.9 G/DL (ref 33.7–35.3)
MCV RBC AUTO: 86 FL (ref 81.4–97.8)
MONOCYTES # BLD AUTO: 0.09 K/UL (ref 0–0.85)
MONOCYTES NFR BLD AUTO: 0.9 % (ref 0–13.4)
NEUTROPHILS # BLD AUTO: 8.02 K/UL (ref 1.82–7.42)
NEUTROPHILS NFR BLD: 81.7 % (ref 44–72)
NRBC # BLD AUTO: 0 K/UL
NRBC BLD-RTO: 0 /100 WBC
PLATELET # BLD AUTO: 400 K/UL (ref 164–446)
PMV BLD AUTO: 8.8 FL (ref 9–12.9)
POTASSIUM SERPL-SCNC: 4.4 MMOL/L (ref 3.6–5.5)
RBC # BLD AUTO: 4.21 M/UL (ref 4.7–6.1)
SODIUM SERPL-SCNC: 131 MMOL/L (ref 135–145)
TROPONIN T SERPL-MCNC: 30 NG/L (ref 6–19)
WBC # BLD AUTO: 9.8 K/UL (ref 4.8–10.8)

## 2019-07-24 PROCEDURE — 700111 HCHG RX REV CODE 636 W/ 250 OVERRIDE (IP): Performed by: INTERNAL MEDICINE

## 2019-07-24 PROCEDURE — A9270 NON-COVERED ITEM OR SERVICE: HCPCS | Performed by: INTERNAL MEDICINE

## 2019-07-24 PROCEDURE — 700111 HCHG RX REV CODE 636 W/ 250 OVERRIDE (IP): Performed by: HOSPITALIST

## 2019-07-24 PROCEDURE — 700102 HCHG RX REV CODE 250 W/ 637 OVERRIDE(OP): Performed by: HOSPITALIST

## 2019-07-24 PROCEDURE — 99233 SBSQ HOSP IP/OBS HIGH 50: CPT | Performed by: INTERNAL MEDICINE

## 2019-07-24 PROCEDURE — 85025 COMPLETE CBC W/AUTO DIFF WBC: CPT

## 2019-07-24 PROCEDURE — 83735 ASSAY OF MAGNESIUM: CPT

## 2019-07-24 PROCEDURE — 700102 HCHG RX REV CODE 250 W/ 637 OVERRIDE(OP): Performed by: INTERNAL MEDICINE

## 2019-07-24 PROCEDURE — A9270 NON-COVERED ITEM OR SERVICE: HCPCS | Performed by: HOSPITALIST

## 2019-07-24 PROCEDURE — 84484 ASSAY OF TROPONIN QUANT: CPT

## 2019-07-24 PROCEDURE — 99239 HOSP IP/OBS DSCHRG MGMT >30: CPT | Performed by: HOSPITALIST

## 2019-07-24 PROCEDURE — 80048 BASIC METABOLIC PNL TOTAL CA: CPT

## 2019-07-24 RX ORDER — HEPARIN SODIUM 5000 [USP'U]/ML
5000 INJECTION, SOLUTION INTRAVENOUS; SUBCUTANEOUS EVERY 8 HOURS
Refills: 0
Start: 2019-07-24

## 2019-07-24 RX ORDER — SODIUM BICARBONATE 650 MG/1
1300 TABLET ORAL 2 TIMES DAILY
Status: DISCONTINUED | OUTPATIENT
Start: 2019-07-24 | End: 2019-07-24 | Stop reason: HOSPADM

## 2019-07-24 RX ORDER — SODIUM BICARBONATE 650 MG/1
1300 TABLET ORAL 2 TIMES DAILY
Qty: 120 TAB | Refills: 3 | Status: SHIPPED | OUTPATIENT
Start: 2019-07-24

## 2019-07-24 RX ORDER — AMOXICILLIN 250 MG
2 CAPSULE ORAL 2 TIMES DAILY
Qty: 30 TAB | Refills: 0
Start: 2019-07-24

## 2019-07-24 RX ADMIN — METOPROLOL TARTRATE 12.5 MG: 25 TABLET, FILM COATED ORAL at 04:11

## 2019-07-24 RX ADMIN — DIPHENHYDRAMINE HYDROCHLORIDE 25 MG: 50 INJECTION INTRAMUSCULAR; INTRAVENOUS at 04:12

## 2019-07-24 RX ADMIN — SODIUM BICARBONATE 1300 MG: 650 TABLET ORAL at 11:27

## 2019-07-24 RX ADMIN — OXYCODONE HYDROCHLORIDE 10 MG: 10 TABLET ORAL at 09:46

## 2019-07-24 RX ADMIN — HEPARIN SODIUM 5000 UNITS: 5000 INJECTION, SOLUTION INTRAVENOUS; SUBCUTANEOUS at 04:11

## 2019-07-24 RX ADMIN — OXYCODONE HYDROCHLORIDE 10 MG: 10 TABLET ORAL at 12:59

## 2019-07-24 RX ADMIN — METHYLPREDNISOLONE SODIUM SUCCINATE 62.5 MG: 125 INJECTION, POWDER, FOR SOLUTION INTRAMUSCULAR; INTRAVENOUS at 04:11

## 2019-07-24 RX ADMIN — OXYBUTYNIN CHLORIDE 5 MG: 5 TABLET ORAL at 04:11

## 2019-07-24 RX ADMIN — TAMSULOSIN HYDROCHLORIDE 0.4 MG: 0.4 CAPSULE ORAL at 09:35

## 2019-07-24 RX ADMIN — SENNOSIDES, DOCUSATE SODIUM 2 TABLET: 50; 8.6 TABLET, FILM COATED ORAL at 04:12

## 2019-07-24 RX ADMIN — HEPARIN SODIUM 5000 UNITS: 5000 INJECTION, SOLUTION INTRAVENOUS; SUBCUTANEOUS at 14:36

## 2019-07-24 RX ADMIN — BUDESONIDE AND FORMOTEROL FUMARATE DIHYDRATE 2 PUFF: 160; 4.5 AEROSOL RESPIRATORY (INHALATION) at 04:12

## 2019-07-24 ASSESSMENT — ENCOUNTER SYMPTOMS
STRIDOR: 0
CHILLS: 0
VOMITING: 0
SPEECH CHANGE: 0
MYALGIAS: 0
COUGH: 0
SHORTNESS OF BREATH: 0
SEIZURES: 0
PHOTOPHOBIA: 0
FEVER: 0
HEMOPTYSIS: 0
SENSORY CHANGE: 0
HEADACHES: 0
HALLUCINATIONS: 0
DIAPHORESIS: 0
NECK PAIN: 0
NERVOUS/ANXIOUS: 0
NAUSEA: 0
PALPITATIONS: 0
DOUBLE VISION: 0
BLURRED VISION: 0
BRUISES/BLEEDS EASILY: 0
ABDOMINAL PAIN: 0
CONSTIPATION: 0
FOCAL WEAKNESS: 0

## 2019-07-24 NOTE — PROGRESS NOTES
Critical Care Progress Note    Date of admission  7/16/2019    Chief Complaint  69 y.o. male admitted 7/16/2019 with a dislodged suprapubic catheter and hyponatremia.    Hospital Course    This gentleman was admitted to the ICU with critical, life-threatening hyponatremia.      Interval Problem Update  Reviewed last 24 hour events:      Confused  AF  Afebrile  Humphrey with 1200 mL out      He remains somewhat confused.  He feels much better today.  His rash and itching have resolved.  He has no cough, sputum production, hemoptysis or dyspnea.  His lower extremity edema is improved.  He has no angina, palpitations or syncope.  He has no abdominal pain, nausea or vomiting.      Review of Systems  Review of Systems   Constitutional: Negative for chills, diaphoresis and fever.   HENT: Negative for nosebleeds and tinnitus.    Eyes: Negative for blurred vision, double vision and photophobia.   Respiratory: Negative for cough, hemoptysis, shortness of breath and stridor.    Cardiovascular: Positive for leg swelling. Negative for chest pain and palpitations.   Gastrointestinal: Negative for abdominal pain, constipation, nausea and vomiting.   Genitourinary: Negative for dysuria and frequency.   Musculoskeletal: Negative for myalgias and neck pain.   Skin: Negative for rash.   Neurological: Negative for speech change, focal weakness, seizures and headaches.   Endo/Heme/Allergies: Does not bruise/bleed easily.   Psychiatric/Behavioral: Negative for hallucinations and suicidal ideas. The patient is not nervous/anxious.         Vital Signs for last 24 hours   Temp:  [36.5 °C (97.7 °F)-37 °C (98.6 °F)] 36.5 °C (97.7 °F)  Pulse:  [] 60  Resp:  [11-42] 12  BP: (119)/(56) 119/56  SpO2:  [86 %-98 %] 98 %    Hemodynamic parameters for last 24 hours       Respiratory Information for the last 24 hours       Physical Exam   Physical Exam   Constitutional: He appears well-developed. No distress.   HENT:   Head: Normocephalic and  atraumatic.   Right Ear: External ear normal.   Left Ear: External ear normal.   Mouth/Throat: Oropharynx is clear and moist.   Eyes: Pupils are equal, round, and reactive to light. Conjunctivae are normal. Right eye exhibits no discharge. Left eye exhibits no discharge.   Neck: Normal range of motion. Neck supple. No JVD present.   Cardiovascular: Intact distal pulses.  Exam reveals no friction rub.    No murmur heard.  Irregularly irregular rhythm   Pulmonary/Chest: Effort normal. No stridor. No respiratory distress. He has no wheezes.   Abdominal: Soft. Bowel sounds are normal. He exhibits no distension. There is no tenderness. There is no rebound.   Colostomy in place.  Suprapubic catheter in place.  There is some erythema around the suprapubic catheter site.   Musculoskeletal: Normal range of motion. He exhibits edema.   No clubbing or cyanosis   Neurological: He is alert. No cranial nerve deficit. Coordination normal.   He is somewhat confused.  He has no focal weakness.   Skin: Skin is warm and dry. No rash noted. He is not diaphoretic.       Medications  Current Facility-Administered Medications   Medication Dose Route Frequency Provider Last Rate Last Dose   • diphenhydrAMINE (BENADRYL) injection 25 mg  25 mg Intravenous Q6HRS Gunnar Reyes M.D.   25 mg at 07/24/19 0412   • methylPREDNISolone sod succ (SOLU-MEDROL) 125 MG injection 62.5 mg  62.5 mg Intravenous Q6HRS Gunnar Reyes M.D.   62.5 mg at 07/24/19 0411   • LORazepam (ATIVAN) injection 1 mg  1 mg Intravenous Q30 MIN PRN Gunnar Reyes M.D.       • hydrOXYzine HCl (ATARAX) tablet 25 mg  25 mg Oral TID PRN Dorian Gentile D.O.   25 mg at 07/20/19 2241   • senna-docusate (PERICOLACE or SENOKOT S) 8.6-50 MG per tablet 2 Tab  2 Tab Oral BID Mao Dunn M.D.   2 Tab at 07/24/19 0412    And   • polyethylene glycol/lytes (MIRALAX) PACKET 1 Packet  1 Packet Oral QDAY PRN Mao Dunn M.D.        And   • magnesium  hydroxide (MILK OF MAGNESIA) suspension 30 mL  30 mL Oral QDAY PRN Mao Dunn M.D.        And   • bisacodyl (DULCOLAX) suppository 10 mg  10 mg Rectal QDAY PRN aMo Dunn M.D.       • heparin injection 5,000 Units  5,000 Units Subcutaneous Q8HRS Mao Dunn M.D.   5,000 Units at 07/24/19 0411   • Pharmacy Consult Request ...Pain Management Review 1 Each  1 Each Other PHARMACY TO DOSE Mao Dunn M.D.        And   • oxyCODONE immediate-release (ROXICODONE) tablet 5 mg  5 mg Oral Q3HRS PRN Mao Dunn M.D.   5 mg at 07/18/19 1046    And   • oxyCODONE immediate release (ROXICODONE) tablet 10 mg  10 mg Oral Q3HRS PRN Mao Dunn M.D.   10 mg at 07/23/19 1209    And   • morphine (pf) 4 mg/ml injection 4 mg  4 mg Intravenous Q3HRS PRN Mao Dunn M.D.       • atorvastatin (LIPITOR) tablet 80 mg  80 mg Oral Q EVENING Mao Dunn M.D.   80 mg at 07/22/19 1838   • budesonide-formoterol (SYMBICORT) 160-4.5 MCG/ACT inhaler 2 Puff  2 Puff Inhalation BID Mao Dunn M.D.   2 Puff at 07/24/19 0412   • metoprolol (LOPRESSOR) tablet 12.5 mg  12.5 mg Oral BID Mao Dunn M.D.   12.5 mg at 07/24/19 0411   • oxybutynin (DITROPAN) tablet 5 mg  5 mg Oral Q12HRS Mao Dunn M.D.   5 mg at 07/24/19 0411   • tamsulosin (FLOMAX) capsule 0.4 mg  0.4 mg Oral AFTER BREAKFAST Mao Dunn M.D.   0.4 mg at 07/23/19 0839   • Respiratory Care per Protocol   Nebulization Continuous RT Mao Dunn M.D.           Fluids    Intake/Output Summary (Last 24 hours) at 07/24/19 0421  Last data filed at 07/24/19 0000   Gross per 24 hour   Intake           970.42 ml   Output             2075 ml   Net         -1104.58 ml       Laboratory          Recent Labs      07/21/19   0617  07/22/19   0555  07/23/19   0515   SODIUM  132*  133*  132*   POTASSIUM  4.4  4.3  4.6   CHLORIDE  106  105  107   CO2  16*  20  18*   BUN  20  21  25*   CREATININE  2.01*  2.03*  2.08*   MAGNESIUM  2.1   1.9  1.8   CALCIUM  8.8  9.0  8.8     Recent Labs      07/21/19   0617  07/22/19   0555  07/23/19   0515   GLUCOSE  131*  111*  143*     Recent Labs      07/21/19   0617 07/22/19   0555  07/23/19   0515   WBC  13.9*  13.4*  12.8*   NEUTSPOLYS  67.70  68.30  66.30   LYMPHOCYTES  20.50*  21.20*  24.00   MONOCYTES  8.90  7.30  6.50   EOSINOPHILS  2.20  2.40  2.30   BASOPHILS  0.30  0.40  0.50     Recent Labs      07/21/19   0617 07/22/19   0555  07/23/19   0515   RBC  4.35*  4.42*  4.37*   HEMOGLOBIN  11.8*  12.0*  11.9*   HEMATOCRIT  38.5*  39.2*  37.9*   PLATELETCT  442  446  416       Imaging  None    Assessment/Plan  * Hyponatremia- (present on admission)   Assessment & Plan    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  His sodium is overall improved  Fluid restriction of 1.5 L/day     Allergic drug reaction   Assessment & Plan    Due to Zosyn  Resolved with Benadryl and Solu-Medrol  Stop Benadryl and Solu-Medrol     Obstructive uropathy- (present on admission)   Assessment & Plan    History of urethral strictures and BPH  Previously placed suprapubic catheter fell out prior to admission  Suprapubic catheter replaced by IR on 7/17     Suprapubic catheter dysfunction (HCC)   Assessment & Plan    Suprapubic catheter was replaced by IR on 7/17     CRF (chronic renal failure), stage 3 (moderate) ([facility])- (present on admission)   Assessment & Plan    Renal dose medications  Avoid nephrotoxins     Essential hypertension- (present on admission)   Assessment & Plan    His blood pressure is controlled  Continue metoprolol, 12.5 mg twice daily     BPH (benign prostatic hyperplasia)- (present on admission)   Assessment & Plan    Suprapubic catheter in place  Continue Flomax     Type 2 diabetes mellitus, with long-term current use of insulin ([facility])- (present on admission)   Assessment & Plan    Glycohemoglobin 6.5  Monitor blood glucose          VTE:  Heparin  Ulcer: Not  Indicated  Lines: None    I have performed a physical exam and reviewed and updated ROS and Plan today (7/24/2019). In review of yesterday's note (7/23/2019), there are no changes except as documented above.     Discussed patient condition and risk of morbidity and/or mortality with Hospitalist, RN, RT, Pharmacy, Charge nurse / hot rounds and QA team     Gunnar Reyes MD  Pulmonary and Critical Care Medicine

## 2019-07-24 NOTE — DISCHARGE SUMMARY
"This is an addendum to the DC summary done by Dr. Flores.    Still confused but improving  Dropping into 40s SR, in and out of afib but quite stable  Trops 30, indeterminate especially in light of CKD  BP 110s-140s  Reg diet, hasn't eaten this morning  375cc out of ostomy, SP cath 1200cc out  LUE midline catheter in place  Not mobilizing much  NO abx per ID, likely urine is colonized with staph and pseudomonas and he had reaction to zosyn (listed as allergy)  Na 131, on fluid restriction 1.5L per nephrology  Solumedrol still scheduled as well as benadryl; dc'd today  He would like to see someone about his tooth, thinks he needs it pulled    Physical Exam  Vitals:    07/24/19 0900 07/24/19 1000 07/24/19 1100 07/24/19 1200   BP:       Pulse: (!) 59 61 (!) 59 61   Resp: 12 (!) 11 12 12   Temp:  36.9 °C (98.4 °F)  37 °C (98.6 °F)   TempSrc:  Temporal  Temporal   SpO2: 98% 97% 97% 97%   Weight:       Height:              Physical Exam   Constitutional: He appears well-developed and well-nourished.   HENT:   Head: Normocephalic and atraumatic.   Cardiovascular: Normal rate, regular rhythm and normal heart sounds.    No murmur heard.  Pulmonary/Chest: Effort normal and breath sounds normal. No respiratory distress. He has no wheezes. He has no rales.   Abdominal: Soft. Bowel sounds are normal. He exhibits no distension. There is no tenderness.   Ileostomy draining brown stool     Genitourinary:   Genitourinary Comments: SP site red right but improved around tubing, doesn't extend further; is less painful to touch   Musculoskeletal: Normal range of motion. He exhibits no edema.   Neurological:   More alert today, perseverating about his tooth that he \"needs pulled\"  Skin: Skin is warm and dry. No rash noted. No erythema.   Nursing note and vitals reviewed.    Updated Med List as of 07/24/19        Medication Information                      atorvastatin (LIPITOR) 80 MG tablet  Take 1 Tab by mouth every evening.           "   budesonide-formoterol (SYMBICORT) 160-4.5 MCG/ACT Aerosol  Inhale 2 Puffs by mouth 2 Times a Day.             heparin 5000 UNIT/ML Solution  Inject 1 mL as instructed every 8 hours.             hydrOXYzine HCl (ATARAX) 25 MG Tab  Take 1 Tab by mouth 3 times a day as needed for Itching.             metoprolol (LOPRESSOR) 25 MG Tab  Take 0.5 Tabs by mouth 2 Times a Day.             oxybutynin (DITROPAN) 5 MG Tab  Take 1 Tab by mouth every 12 hours.             senna-docusate (PERICOLACE OR SENOKOT S) 8.6-50 MG Tab  Take 2 Tabs by mouth 2 Times a Day.             sodium bicarbonate (SODIUM BICARBONATE) 650 MG Tab  Take 2 Tabs by mouth 2 Times a Day.             therapeutic multivitamin-minerals (THERAGRAN-M) Tab  Take 1 Tab by mouth every day.                  Total dc time:  40 minutes

## 2019-07-24 NOTE — CARE PLAN
Problem: Communication  Goal: The ability to communicate needs accurately and effectively will improve  Outcome: PROGRESSING AS EXPECTED  Patient agreeable today, able to communicate needs and asks questions appropriately.    Problem: Safety  Goal: Will remain free from injury  Outcome: PROGRESSING AS EXPECTED  Patient room close to nursing station, educated on staying in bed and use of call light, bed in low position and locked.

## 2019-07-24 NOTE — PROGRESS NOTES
2 RN skin check complete.   Devices in place pulse oximeter, suprapubic catheter, non slip socks, ULE midline, RUQ ileostomy.  Skin assessed under devices yes.  Confirmed pressure ulcers found on penis (previously documented), moisture associated breakdown to underside of pannus, redness to buttocks.  New potential pressure ulcers noted on sacrum (moisture fissure). Wound consult placed, photo uploaded.  Bilateral heels red, blanching.  The following interventions in place encouraging repositioning, moisture wicking interdry for pannus, frequent ostomy care, barrier cream, rotating medical devices.  Refusing adhesive silicone placed around suprapubic catheter per wound care note. Floating heels.

## 2019-07-24 NOTE — CONSULTS
DATE OF SERVICE:  07/23/2019    INFECTIOUS DISEASE CONSULTATION    REFERRING PHYSICIAN:  Swetha Dow MD    REASON FOR CONSULTATION:  Cellulitis.    HISTORY OF PRESENT ILLNESS:  The patient is a 69-year-old male with history of   Crohn's disease status post colostomy, diabetes mellitus, complicated by   diabetic foot infections, chronic kidney disease stage III and suprapubic   catheter.  He was seen by infectious disease back in June for complicated UTI.    At that time, his urine culture had shown pseudomonas.  He was given   meropenem.  He also had right second toe infection and he underwent right   second toe flexor tendon release and debridement on 06/03/2019.  He presented   to the emergency room on 07/16/2019 with complaints of suprapubic catheter   having fallen out as well as he was found to have hyponatremia.  He went to   his local ER where they were unable to replace it.  The lab work showed   significant hyponatremia to 112, so he was referred to [facility].  During this admission, he was found to be disoriented.  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  The fluid was restricted.  He has remained afebrile   during this admission with mild leukocytosis.  On 07/22/2019, his UA showed   large occult blood, packed wbc's.  He was initially on Unasyn and today, he   was changed to Zosyn in view of his history of pseudomonas and also, he was   thought to have some erythema at the site of his suprapubic catheter.  In view   of these issues, infectious disease consult was called.  Of note is that at   the time of my exam, he was having an allergic reaction to Zosyn.    REVIEW OF SYSTEMS:  The patient is very anxious.  He is itching all over and   is in slight respiratory distress.  Other review of system database is   incomplete because of the patient's medical condition at this time.    ALLERGIES:  HE IS ALLERGIC TO  CEFTRIAXONE, EZETIMIBE, FLAGYL, INFLIXIMAB,   LEVAQUIN, LOVASTATIN, SIMVASTATIN, VANCOMYCIN, but the note had said that he   has tolerated meropenem, Zosyn and Unasyn in the past.    PAST MEDICAL HISTORY:  History of diabetes mellitus, hypertension, chronic   renal failure, BPH.  There is also Crohn's disease.    PAST SURGICAL HISTORY:  Right toe amputation, ERCP, cholecystectomy,   colostomy.    FAMILY HISTORY:  Reviewed the chart and no known family history.    SOCIAL HISTORY:  He is a former smoker.    MEDICATIONS:  Currently, he is on Zosyn, Lipitor, Symbicort, Benadryl, Atarax,   Ativan, Solu-Medrol, Lopressor, oxycodone, morphine, Dulcolax, and Flomax.    PHYSICAL EXAMINATION:  GENERAL:  The patient is in some distress.  VITAL SIGNS:  Temperature is 98.6, blood pressure is 115/74, pulse is 85.  HEAD AND ENT:  Mucosa is moist.  No oral thrush.  NECK:  Supple.  PULMONARY:  Bilateral rhonchi present.  CARDIOVASCULAR:  Tachycardic.  ABDOMEN:  Soft and nontender, colostomy.  EXTREMITIES:  No edema.  SKIN:  Has erythematous rash all over the body.  CENTRAL NERVOUS SYSTEM:  Awake, responsive.  Moves all extremities.  GENITOURINARY:  Suprapubic catheter in place and there is some excoriation   around it.  No significant erythema.    LABORATORY DATA:  His WBC count is 12.8, platelets of 416.  Creatinine is   2.08.    ASSESSMENT:  1.  Urinary tract infection versus colonization.  2.  Allergic reaction.  3.  Chronic suprapubic catheter.  4.  Chronic kidney disease.    RECOMMENDATIONS:  At this time, in view of the allergic reaction, I would hold   off on any antibiotics.  Await the urine cultures.  It could likely be just   colonization.  Continue with the skin care around the catheter, avoid   extravasation, keep the skin dry and treat the allergic reaction.  Infectious   disease will continue to follow the patient.  Case was discussed with   intensivist.       ____________________________________     JEIMY RDZ  MD SUE / OLIMPIA    DD:  07/23/2019 20:30:06  DT:  07/24/2019 00:05:20    D#:  [id number]  Job#:  [id number]

## 2019-07-24 NOTE — PROGRESS NOTES
2 RN skin check complete Jane RN.   Devices in place EKG leads, O2 monitor, BP cuff, RUQ ileostomy, LUE midline, suprapubic catheter.  Skin assessed under devices yes.  Confirmed pressure ulcers found on see flow sheet.  New potential pressure ulcers noted on sacrum see flow sheet. Wound consult placed.  The following interventions in place Turn Q2hr, frequent ostomy and catheter care, pillows for positioning and elevating extremities, inter dry under pannus.

## 2019-07-24 NOTE — DISCHARGE PLANNING
Pt has been cleared for transfer to SNF. Will go to [facility] [place] at 3:30 via [facility] van.

## 2019-07-24 NOTE — DISCHARGE INSTRUCTIONS
Discharge Instructions    Discharged to other by medical transportation with self. Discharged via wheelchair, hospital escort: Refused.  Special equipment needed: N/A    Be sure to schedule a follow-up appointment with your primary care doctor or any specialists as instructed.     Discharge Plan: Patient will be transported to [facility] by medical transport for further care.  Diet Plan: Discussed  Activity Level: Discussed  Confirmed Follow up Appointment: No (Comments) ([facility])  Confirmed Symptoms Management: Discussed  Medication Reconciliation Updated: Yes  Influenza Vaccine Indication: Not indicated: Previously immunized this influenza season and > 8 years of age.    I understand that a diet low in cholesterol, fat, and sodium is recommended for good health. Unless I have been given specific instructions below for another diet, I accept this instruction as my diet prescription.   Other diet: n/a    Special Instructions: None    · Is patient discharged on Warfarin / Coumadin?   No     Depression / Suicide Risk    As you are discharged from this [facility] facility, it is important to learn how to keep safe from harming yourself.    Recognize the warning signs:  · Abrupt changes in personality, positive or negative- including increase in energy   · Giving away possessions  · Change in eating patterns- significant weight changes-  positive or negative  · Change in sleeping patterns- unable to sleep or sleeping all the time   · Unwillingness or inability to communicate  · Depression  · Unusual sadness, discouragement and loneliness  · Talk of wanting to die  · Neglect of personal appearance   · Rebelliousness- reckless behavior  · Withdrawal from people/activities they love  · Confusion- inability to concentrate     If you or a loved one observes any of these behaviors or has concerns about self-harm, here's what you can do:  · Talk about it- your feelings and reasons for harming yourself  · Remove  any means that you might use to hurt yourself (examples: pills, rope, extension cords, firearm)  · Get professional help from the community (Mental Health, Substance Abuse, psychological counseling)  · Do not be alone:Call your Safe Contact- someone whom you trust who will be there for you.  · Call your local CRISIS HOTLINE 837-0402 or 822-397-3261  · Call your local Children's Mobile Crisis Response Team [place] (921) 781-6052 or www.[employer].com  · Call the toll free National Suicide Prevention Hotlines   · National Suicide Prevention Lifeline 376-354-DNBC (3768)  · National Hope Line Network 800-SUICIDE (293-0699)

## 2019-07-24 NOTE — PROGRESS NOTES
Infectious Disease Progress Note    Author: Meghan Marx M.D. Date & Time of service: 2019  10:32 AM    Chief Complaint:  Follow-up for UTI    Interval History:  2019-no fevers.  WBC is 9.8 redness 2.02 he remains somewhat confused today the itching and the rash has resolved.  Labs Reviewed.    Review of Systems:  Review of Systems   Constitutional: Positive for malaise/fatigue. Negative for fever.   HENT: Negative for hearing loss.    Respiratory: Negative for cough and shortness of breath.    Cardiovascular: Positive for leg swelling. Negative for chest pain.   Gastrointestinal: Negative for nausea and vomiting.   Genitourinary: Negative for dysuria.   Musculoskeletal: Negative for myalgias.   Neurological: Negative for sensory change and speech change.       Hemodynamics:  Temp (24hrs), Av.6 °C (97.9 °F), Min:36.4 °C (97.5 °F), Max:37 °C (98.6 °F)  Temperature: 36.5 °C (97.7 °F)  Pulse  Av.4  Min: 55  Max: 131Heart Rate (Monitored): (!) 55  NIBP: 132/66       Physical Exam:  Physical Exam   Constitutional: No distress.   HENT:   Mouth/Throat: No oropharyngeal exudate.   Eyes: Pupils are equal, round, and reactive to light. No scleral icterus.   Neck: Neck supple.   Cardiovascular: Regular rhythm.    No murmur heard.  Pulmonary/Chest: He has no wheezes. He has no rales.   Abdominal: Soft. There is no tenderness. There is no rebound and no guarding.   Colostomy in place   Genitourinary:   Genitourinary Comments: Suprapubic catheter  Some excoriation around   Musculoskeletal: He exhibits no edema or tenderness.   Lymphadenopathy:     He has no cervical adenopathy.   Neurological: He is alert.   No focal neurological deficit   Skin: Skin is warm. No erythema.   Vitals reviewed.      Meds:    Current Facility-Administered Medications:   •  sodium bicarbonate  •  LORazepam  •  hydrOXYzine HCl  •  senna-docusate **AND** polyethylene glycol/lytes **AND** magnesium hydroxide **AND** bisacodyl  •   heparin  •  Notify provider if pain remains uncontrolled **AND** Use the numeric rating scale (NRS-11) on regular floors and Critical-Care Pain Observation Tool (CPOT) on ICUs/Trauma to assess pain **AND** Pulse Ox (Oximetry) **AND** Pharmacy Consult Request **AND** If patient difficult to arouse and/or has respiratory depression, stop any opiates that are currently infusing and call a Rapid Response. **AND** oxyCODONE immediate-release **AND** oxyCODONE immediate-release **AND** morphine injection  •  atorvastatin  •  budesonide-formoterol  •  metoprolol  •  oxybutynin  •  tamsulosin  •  Respiratory Care per Protocol    Labs:  Recent Labs      07/22/19 0555 07/23/19   0515  07/24/19   0450   WBC  13.4*  12.8*  9.8   RBC  4.42*  4.37*  4.21*   HEMOGLOBIN  12.0*  11.9*  11.9*   HEMATOCRIT  39.2*  37.9*  36.2*   MCV  88.7  86.7  86.0   MCH  27.1  27.2  28.3   RDW  49.2  48.3  46.6   PLATELETCT  446  416  400   MPV  8.5*  8.4*  8.8*   NEUTSPOLYS  68.30  66.30  81.70*   LYMPHOCYTES  21.20*  24.00  16.60*   MONOCYTES  7.30  6.50  0.90   EOSINOPHILS  2.40  2.30  0.00   BASOPHILS  0.40  0.50  0.10     Recent Labs      07/22/19 0555 07/23/19   0515  07/24/19   0435   SODIUM  133*  132*  131*   POTASSIUM  4.3  4.6  4.4   CHLORIDE  105  107  104   CO2  20  18*  17*   GLUCOSE  111*  143*  175*   BUN  21  25*  30*     Recent Labs      07/22/19   0555 07/23/19   0515  07/24/19   0435   CREATININE  2.03*  2.08*  2.02*       Imaging:  Ir-drain-bladder Suprapub W/cath    Result Date: 7/18/2019 7/17/2019 4:15 PM HISTORY/REASON FOR EXAM:  SP cath fell out. TECHNIQUE/EXAM DESCRIPTION AND NUMBER OF VIEWS:  Fluoroscopic guided suprapubic bladder catheter replacement, Cystogram. Fluoroscopy time: 24 minutes. Fluoroscopic images: 3. Contrast: 16 mL Omnipaque 300 intravesicular. PROCEDURE:  Informed consent was obtained. Conscious sedation with 200 mcg Fentanyl and 4 mg Versed was administered during the procedure with appropriate  continuous patient monitoring by the radiology nurse. Sedation duration was 25 minutes. The suprapubic region was prepped and draped in sterile manner. The existing suprapubic catheter entry site was gently probed with a Kumpe catheter was migrated injections of contrast was performed to opacify the previous catheter tract. A Glidewire was advanced through the opacified tract into the urinary bladder followed by the Kumpe catheter. Contrast injection through the Kumpe catheter was performed following removal of the Glidewire demonstrating filling of the urinary bladder which is decompressed. An Amplatz guidewire was then advanced and coiled in the urinary bladder followed by removal of the Kumpe catheter. Serial dilation up to 14 Chadian was performed with ultimate placement of 14 Chadian locking loop pigtail catheter. A cystogram was performed with AP view demonstrating satisfactory position of the catheter with all side holes in the urinary bladder. The catheter was secured to the skin with and connected to gravity drainage. The patient tolerated the procedure well with no evidence of complication. COMPARISON: Previous drain replacement dictated 6/10/2019 FINDINGS:  The cystogram shows satisfactory catheter position with all sideholes within the urinary bladder. There is no extravasation.     1. Fluoroscopic guided replacement of a 14 Chadian locking loop suprapubic bladder catheter. 2.     Cystogram documenting catheter placement. 3.     The catheter can be upsized and exchanged for a Humphrey-type balloon catheter at the patient's convenience.    Ir-midline Catheter Insertion Wo Guidance > Age 3    Result Date: 7/17/2019  HISTORY/REASON FOR EXAM:  Midline Placement   TECHNIQUE/EXAM DESCRIPTION AND NUMBER OF VIEWS: Midline insertion with ultrasound guidance.  FINDINGS: Midline insertion with Ultrasound Guidance was performed by qualified nursing staff without the assistance of a Radiologist. Midline positioning as  measured by RN or as appropriate length of catheter selected.              Ultrasound-guided midline placement performed by qualified nursing staff as above.       Micro:  Results     Procedure Component Value Units Date/Time    URINE CULTURE(NEW) [[id number]] Collected:  07/22/19 2215    Order Status:  Completed Specimen:  Urine from Urine, Suprapubic Updated:  07/23/19 1016    Narrative:       Collected By:[id number] BETINA PIZARRO  Indication for culture:->Unexplained new onset of Flank pain  and/or Costovertebral angle tenderness    URINALYSIS [[id number]]  (Abnormal) Collected:  07/22/19 1345    Order Status:  Completed Specimen:  Urine from Urine, Suprapubic Updated:  07/22/19 1407     Color Yellow     Character Cloudy (A)     Specific Gravity 1.020     Ph 5.5     Glucose Negative mg/dL      Ketones Negative mg/dL      Protein 100 (A) mg/dL      Bilirubin Negative     Urobilinogen, Urine 0.2     Nitrite Positive (A)     Leukocyte Esterase Moderate (A)     Occult Blood Large (A)     Micro Urine Req Microscopic    Narrative:       Collected By:[id number] BETINA PIZARRO          Assessment:  Active Hospital Problems    Diagnosis   • *Hyponatremia [E87.1]   • Allergic drug reaction [T78.40XA]   • Obstructive uropathy [N13.9]   • Cellulitis of abdominal wall [L03.311]   • Suprapubic catheter dysfunction (HCC) [T83.010A]   • CRF (chronic renal failure), stage 3 (moderate) (HCC) [N18.3]   • Leukocytosis [D72.829]   • Essential hypertension [I10]   • Hypomagnesemia [E83.42]   • Anemia [D64.9]   • BPH (benign prostatic hyperplasia) [N40.0]   • Type 2 diabetes mellitus, with long-term current use of insulin (HCC) [E11.9, Z79.4]   • Crohn's disease (HCC) [K50.90]       Plan:  Hyponatremia  Due to psychogenic polydipsia  Improved    Cellulitis around suprapubic catheter  Some excoriation but the cellulitis seems to have improved  Discontinued all antibiotics    Allergic drug reaction  Due to Zosyn  That was  discontinued    Diabetes mellitus  Control the blood sugars    Suprapubic catheter dysfunction  Was addressed by urology    Leukocytosis  Resolved    ID will sign off.  Please call us as needed  Discussed with internal medicine.

## 2019-07-24 NOTE — CARE PLAN
Problem: Safety  Goal: Will remain free from injury  Outcome: PROGRESSING AS EXPECTED  Bed alarm on, patient close to nursing station.  Frequent rounds, no assistive devices in sight.    Problem: Psychosocial Needs:  Goal: Level of anxiety will decrease  Outcome: PROGRESSING SLOWER THAN EXPECTED  Verbal de-escalation sometimes helpful, patient anxious, agitated and potentially aggressive.

## 2019-07-24 NOTE — PROGRESS NOTES
"Multiple attempts to assess patient made, attempted to administer medications, attempted to explain medical situation to patient, attempted to draw labs.  Patient becomes enraged and refuses all treatment.  This RN explains that patient is very sick and that we are trying to help him recover.  Pt angry, swearing at staff and is repeatedly making fists. Pt states \"get the hell out and just let me die in peace then\". MD notified.  "

## 2019-07-24 NOTE — PROGRESS NOTES
Patient d/c'd to [facility] with medical transport.  All belongings present including wallet, slippers, shorts and shirt.  COBRA and discharge paperwork with patient.

## 2019-07-24 NOTE — DISCHARGE PLANNING
Agency/Facility Name: [facility]  Spoke To: Erasmo   Outcome: No avail ible beds at this time but there will be later today. Erasmo will call [facility] when bed becomes available and set up transport.     Ashley TORRES notified.

## 2019-07-24 NOTE — PROGRESS NOTES
[facility] -  PROGRESS NOTE               Author: Calvin Dunn M.D. Date & Time: 7/24/2019  10:28 AM     HPI:  68 yo male PMH CKD Stage 3, HTN, type 2 DM, BPH, HLD, and COPD presents to ED as transfer from OSH after being found to have hyponatremia.  He has a known hx of BPH causing REED and required a suprapubic catheter placement early this year.  He states yesterday afternoon it fell out on its own, he denies trauma or pulling on it prior to it displacing.  [facility].  At his local ED they were unable to replace it and lab work showed significant hyponatremia at 112 and so he was sent to [facility] for further care.  By the time he arrived here and had repeat labs it was 12 hrs from initial and his SNa ~ 119.  ED docs here were able to get a penile FC and he was admitted to ICU.  Repeat SNa ~ 121 after being here and he was started on D5W overnight to maintain him at current range.  He admits to being told about hyponatremia in past due to low solute intake according to him.  Further inquiry also reveals that he drinks 4-6 gallons of water daily, he says he just sits there and will drink water while on his recliner.  He is currently NPO for IR to replace his suprapubic catheter.  SNa stable in the low 120s overnight and this AM.  His other labs show Ashley ~ 14 and UOsm ~ 72.  No F/C/N/V/CP/SOB.  No melena, hematochezia, hematemesis.  No HA, visual changes.      DAILY NEPHROLOGY SUMMARY:  7/17/19 - Consult done  7/18/19 - NAEO, SP catheter replaced; SNa trended up  7/19/19 - NAEO, no complaints, sitting in chair  7/20/19 - NAEO, no complaints, SNa improved with IVF  7/21/19 - NAEO, sitting in bedside chair  7/22: no acute events. Sna stable  7/23: Discharge cancelled due cellulitis around suprapubic cath site. Abx changed to zosyn.   7/24: Sna to 131. sedated for most of day after given sedatives. Minimal PO intake.      REVIEW OF SYSTEMS:    -unable to  "obtain due to pt participation     PAST FAMILY HISTORY: Reviewed and Unchanged  SOCIAL HISTORY: Reviewed and Unchanged  CURRENT MEDICATIONS: Reviewed  IMAGING STUDIES: Reviewed      PHYSICAL EXAM:  VS:  /56   Pulse (!) 59   Temp 36.5 °C (97.7 °F) (Temporal)   Resp 12   Ht 1.803 m (5' 11\")   Wt 109.2 kg (240 lb 11.9 oz)   SpO2 98%   BMI 33.58 kg/m²   GENERAL:  WDWN NAD  HEENT:  NC/AT, no scleral icterus; conjunctiva normal  NECK:  Supple; Non tender  CV:  RRR, no m/r/g  LUNGS:  CTAB, no W/R  ABDOMEN:  SND, TTP suprapubic region  EXTREMETIES:  +BL edmea  SKIN:  Warm and dry  NEURO:  A&O, no focal deficits  PSYCH:  Cooperative, appropriate mood and affect    Fluids:  In: 1070.4 [P.O.:1050]  Out: 2425     LABS:  Recent Results (from the past 24 hour(s))   TROPONIN    Collection Time: 07/24/19  4:35 AM   Result Value Ref Range    Troponin T 30 (H) 6 - 19 ng/L   MAGNESIUM    Collection Time: 07/24/19  4:35 AM   Result Value Ref Range    Magnesium 1.8 1.5 - 2.5 mg/dL   Basic Metabolic Panel    Collection Time: 07/24/19  4:35 AM   Result Value Ref Range    Sodium 131 (L) 135 - 145 mmol/L    Potassium 4.4 3.6 - 5.5 mmol/L    Chloride 104 96 - 112 mmol/L    Co2 17 (L) 20 - 33 mmol/L    Glucose 175 (H) 65 - 99 mg/dL    Bun 30 (H) 8 - 22 mg/dL    Creatinine 2.02 (H) 0.50 - 1.40 mg/dL    Calcium 9.0 8.5 - 10.5 mg/dL    Anion Gap 10.0 0.0 - 11.9   ESTIMATED GFR    Collection Time: 07/24/19  4:35 AM   Result Value Ref Range    GFR If  40 (A) >60 mL/min/1.73 m 2    GFR If Non  33 (A) >60 mL/min/1.73 m 2   CBC WITH DIFFERENTIAL    Collection Time: 07/24/19  4:50 AM   Result Value Ref Range    WBC 9.8 4.8 - 10.8 K/uL    RBC 4.21 (L) 4.70 - 6.10 M/uL    Hemoglobin 11.9 (L) 14.0 - 18.0 g/dL    Hematocrit 36.2 (L) 42.0 - 52.0 %    MCV 86.0 81.4 - 97.8 fL    MCH 28.3 27.0 - 33.0 pg    MCHC 32.9 (L) 33.7 - 35.3 g/dL    RDW 46.6 35.9 - 50.0 fL    Platelet Count 400 164 - 446 K/uL    MPV 8.8 (L) " 9.0 - 12.9 fL    Neutrophils-Polys 81.70 (H) 44.00 - 72.00 %    Lymphocytes 16.60 (L) 22.00 - 41.00 %    Monocytes 0.90 0.00 - 13.40 %    Eosinophils 0.00 0.00 - 6.90 %    Basophils 0.10 0.00 - 1.80 %    Immature Granulocytes 0.70 0.00 - 0.90 %    Nucleated RBC 0.00 /100 WBC    Neutrophils (Absolute) 8.02 (H) 1.82 - 7.42 K/uL    Lymphs (Absolute) 1.63 1.00 - 4.80 K/uL    Monos (Absolute) 0.09 0.00 - 0.85 K/uL    Eos (Absolute) 0.00 0.00 - 0.51 K/uL    Baso (Absolute) 0.01 0.00 - 0.12 K/uL    Immature Granulocytes (abs) 0.07 0.00 - 0.11 K/uL    NRBC (Absolute) 0.00 K/uL       (click the triangle to expand results)      IMPRESSION:  # CKD Stage 3    * BCr ~ 1.5-1.9    * Etiology 2/2 BPH/obstruction  # Hyponatremia    * Etiology likely due to polydipsia and low solute    * His mental status waxes and wanes making it difficult to see if he will be able to follow through on fluid restriction  # HTN    * Goal BP < 140/90  # type 2 DM  # HLD  # BPH    * Chronic suprapubic catheter, displaced currently  # cellulitis at suprapubic cath site  # Venous insufficiency: likely does not represent intravascular volume  # Acidosis       PLAN:  - Restrict free water < 1.5L  - starting NaHCO3 1300mg BID  - No solute restrictions  - no diuretics for mild LE edema/vascular insufficiency for now  - Dose all meds per eGFR < 60  - Counseled he needs to restrict free water intake as OP to prevent future episodes such as this  - Will arrange for FU in our [place] clinic within 1 month of d/c to further manage his hyponatremia,   - Ok to transition to OP care from renal standpoint    Thank you

## 2019-07-24 NOTE — DISCHARGE PLANNING
Received Transport Form @ 1052 from Alicia TORRES  Spoke to Erasmo @ [facility]    Transport is scheduled for 7/24 @1530 going to [facility] @ [address] [place] [address] Gurmeet Youssef, JUDI RAMIREZ [id number].      Transport is being provided by [facility] via [employer].     Alicia TORRES notified of transport time.

## 2019-07-24 NOTE — ASSESSMENT & PLAN NOTE
Received benadryl and solumedrol; resolved  Was short of breath and itchy  Holding off on all antibiotics, will list zosyn as allergy

## 2019-07-26 LAB
BACTERIA UR CULT: ABNORMAL
SIGNIFICANT IND 70042: ABNORMAL
SITE SITE: ABNORMAL
SOURCE SOURCE: ABNORMAL

## 2020-11-10 NOTE — WOUND TEAM
Follow up with ostomy appliance.  Found patient up in chair with appliance intact that was placed yesterday by staff.  Peristomal skin remains red and dry.  Wound team will see patient tomorrow for change to prevent leaking.   [No Acute Distress] : no acute distress [Well Nourished] : well nourished [Well Developed] : well developed [Well-Appearing] : well-appearing [Normal Sclera/Conjunctiva] : normal sclera/conjunctiva [PERRL] : pupils equal round and reactive to light [EOMI] : extraocular movements intact [Normal Outer Ear/Nose] : the outer ears and nose were normal in appearance [Normal Oropharynx] : the oropharynx was normal [No JVD] : no jugular venous distention [No Lymphadenopathy] : no lymphadenopathy [Supple] : supple [Thyroid Normal, No Nodules] : the thyroid was normal and there were no nodules present [No Respiratory Distress] : no respiratory distress  [No Accessory Muscle Use] : no accessory muscle use [Clear to Auscultation] : lungs were clear to auscultation bilaterally [Normal Rate] : normal rate  [Regular Rhythm] : with a regular rhythm [Normal S1, S2] : normal S1 and S2 [No Murmur] : no murmur heard [No Carotid Bruits] : no carotid bruits [No Abdominal Bruit] : a ~M bruit was not heard ~T in the abdomen [No Varicosities] : no varicosities [Pedal Pulses Present] : the pedal pulses are present [No Edema] : there was no peripheral edema [No Palpable Aorta] : no palpable aorta [No Extremity Clubbing/Cyanosis] : no extremity clubbing/cyanosis [Soft] : abdomen soft [Non Tender] : non-tender [Non-distended] : non-distended [No HSM] : no HSM [Normal Bowel Sounds] : normal bowel sounds [Normal Posterior Cervical Nodes] : no posterior cervical lymphadenopathy [Normal Anterior Cervical Nodes] : no anterior cervical lymphadenopathy [No CVA Tenderness] : no CVA  tenderness [No Spinal Tenderness] : no spinal tenderness [No Joint Swelling] : no joint swelling [Grossly Normal Strength/Tone] : grossly normal strength/tone [No Rash] : no rash [Coordination Grossly Intact] : coordination grossly intact [No Focal Deficits] : no focal deficits [Normal Gait] : normal gait [Normal Affect] : the affect was normal [Normal Insight/Judgement] : insight and judgment were intact [Normal Appearance] : normal in appearance [No Masses] : no palpable masses [No Axillary Lymphadenopathy] : no axillary lymphadenopathy [de-identified] : dense fibrocystic breasts

## 2021-01-05 NOTE — PROGRESS NOTES
Caller: LooneyConcetta    Relationship: Self    Best call back number: [phone]    Medication needed:   Requested Prescriptions     Pending Prescriptions Disp Refills   • sertraline (Zoloft) 100 MG tablet 30 tablet 11     Sig: Take 1 tablet by mouth Daily.       When do you need the refill by:   ASAP    What details did the patient provide when requesting the medication:   Patient is out of medication.     Patient requested to fill at 90 day supply     Does the patient have less than a 3 day supply:  [] Yes  [] No    What is the patient's [id number] [place], [place] - [facility] [facility] AT [address] - [phone] [facility] [phone] FX              [id number]  Rare blocked pac  0.20/0.12/0.38

## 2023-02-27 NOTE — PROGRESS NOTES
Abraham Murillo Patient Age: 86 year old  MESSAGE:   Dr. Jorge Moser MD,     Please contact patient to make an appointment for a H&P.         Abraham Murillo is having:      CATARACT SURGERY ON  5/9/23    Surgery under   ANESTHESIA:  MAC and Topical in the [facility]    Needs H&P done between APRIL 11 - MAY 2     PER Dr Lake Seaman:     Patient  DOES NOT NEED TO STOP (for this surgery)    NSAIDS, ASPIRIN, BLOOD THINNERS (PLAVIX, COUMADIN, or EQUIVALENT), and or flomax/tamsulosin    No labs are required for surgery unless PCP require them.    Please make sure the H&P is according to the guidelines the [facility] has put out.      If you have any question please contact Dr Lake Seaman's office    [place]   Surgery Scheduler   Internal # [id number]       WEIGHT AND HEIGHT:   Wt Readings from Last 1 Encounters:   11/17/22 86.4 kg (190 lb 9.4 oz)     Ht Readings from Last 1 Encounters:   11/17/22 6' 2\" (1.88 m)     BMI Readings from Last 1 Encounters:   11/17/22 24.47 kg/m²       ALLERGIES:  Patient has no known allergies.  Current Outpatient Medications   Medication Sig Dispense Refill   • HYDROcodone-acetaminophen (NORCO)  MG per tablet Take 1 tablet by mouth every 6 hours as needed for Pain. 20 tablet 0   • docusate sodium (COLACE) 100 MG capsule Take 1 capsule by mouth in the morning and 1 capsule in the evening. 30 capsule 0   • loratadine (CLARITIN) 10 MG tablet Take 10 mg by mouth daily.     • VITAMIN D PO Take 1 tablet by mouth daily.     • fluticasone (FLONASE) 50 MCG/ACT nasal spray Spray 1 spray in each nostril as needed.     • atorvastatin (LIPITOR) 10 MG tablet Take 1 tablet by mouth daily. 90 tablet 3   • hydrochlorothiazide (MICROZIDE) 12.5 MG capsule Take 1 capsule by mouth daily. 90 capsule 3   • lisinopril (ZESTRIL) 30 MG tablet Take 1 tablet by mouth daily. 90 tablet 3   • amLODIPine (NORVASC) 10 MG tablet Take 1 tablet by mouth daily. 90 tablet 3   • omeprazole (PriLOSEC) 40 MG capsule Take 1 capsule by  "Discussed order to remove harrington with Dr. Alexander. Pt has hypospadias ad was able to tell me the harrington was put in by a \"doctor\". OK to leave harrington in for now.   " mouth daily. 90 capsule 3   • levothyroxine 137 MCG tablet Take 1 tablet by mouth daily. 90 tablet 3   • aspirin 81 MG tablet Take 81 mg by mouth daily.       No current facility-administered medications for this visit.     PHARMACY to use:           Pharmacy preference(s) on file:   [facility] - [place] [id number] [facility]  [address]  [address]  [facility] [address]  Phone: [phone] Fax: [phone]    [id number] - [place], [place] - [address] [address]  [address] [address]  Phone: [phone] Fax: [phone]    [facility] ) - [place], [place] - [address]  [address]  [facility] [address]  [facility] [address]  Phone: [phone] Fax: [phone]      CALL BACK INFO:   ROUTING:         PCP: Jorge Moser MD         INS: Payor: AARP MEDICARE ADVANTAGE / Plan: AARP MEDICARE ADVANTAGE HMOPOS / Product Type: MEDADV   PATIENT ADDRESS:  [address] [place] [id number]

## 2023-03-01 NOTE — PROGRESS NOTES
Patient is a direct admit from [facility]. Accepted by Dr Lake for renal failure. Dr Sands, Nephrology is accepting the patient as a consult.     ADT is signed and held, to be released upon patient arrival on the unit.     No written orders received. Outside medical records scanned into the media tab. Patient is arriving by ground transportation.        4

## (undated) DEVICE — CANISTER SUCTION 3000ML MECHANICAL FILTER AUTO SHUTOFF MEDI-VAC NONSTERILE LF DISP  (40EA/CA)

## (undated) DEVICE — TUBE CONNECT SUCTION CLEAR 120 X 1/4" (50EA/CA)"

## (undated) DEVICE — KIT ROOM DECONTAMINATION

## (undated) DEVICE — GLOVE BIOGEL INDICATOR SZ 8 SURGICAL PF LTX - (50/BX 4BX/CA)

## (undated) DEVICE — JAGTOME RX 39 PRELOADED .025 X 260CM

## (undated) DEVICE — SUTURE 1 VICRYL PLUS CTX - 36 INCH (36/BX)

## (undated) DEVICE — STAPLER 30MM OPEN BLUE 3.5MM - W/STAPLE (3/BX)

## (undated) DEVICE — SUTURE GENERAL

## (undated) DEVICE — ELECTRODE DUAL RETURN W/ CORD - (50/PK)

## (undated) DEVICE — TUBE E-T HI-LO CUFF 7.5MM (10EA/PK)

## (undated) DEVICE — SET EXTENSION WITH 2 PORTS (48EA/CA) ***PART #2C8610 IS A SUBSTITUTE*****

## (undated) DEVICE — SENSOR SPO2 NEO LNCS ADHESIVE (20/BX) SEE USER NOTES

## (undated) DEVICE — SUCTION INSTRUMENT YANKAUER BULBOUS TIP W/O VENT (50EA/CA)

## (undated) DEVICE — TUBE CONNECTING SUCTION - CLEAR PLASTIC STERILE 72 IN (50EA/CA)

## (undated) DEVICE — CANNULA W/SEAL 5X100 Z-THRE - ADED KII (12/BX)

## (undated) DEVICE — SPONGE GAUZE NON-STERILE 4X4 - (2000/CA 10PK/CA)

## (undated) DEVICE — SUTURE 3-0 ETHILON FS-1 - (36/BX) 30 INCH

## (undated) DEVICE — SCISSORS 5MM CVD (6EA/BX)

## (undated) DEVICE — SUTURE 2-0 SILK 12 REEL (12PK/BX)"

## (undated) DEVICE — TUBING CLEARLINK DUO-VENT - C-FLO (48EA/CA)

## (undated) DEVICE — GLOVE BIOGEL SZ 6.5 SURGICAL PF LTX (50PR/BX 4BX/CA)

## (undated) DEVICE — GLOVE, LITE (PAIR)

## (undated) DEVICE — SYSTEM PREVENA DRESSING CUSTOMIZABLE (5EA/CA)

## (undated) DEVICE — BAG RETRIEVAL 10ML (10EA/BX)

## (undated) DEVICE — SYRINGE SAFETY 10 ML 18 GA X 1 1/2 BLUNT LL (100/BX 4BX/CA)

## (undated) DEVICE — SYRINGE SAFETY 3 ML 18 GA X 1 1/2 BLUNT LL (100/BX 8BX/CA)

## (undated) DEVICE — SODIUM CHL IRRIGATION 0.9% 1000ML (12EA/CA)

## (undated) DEVICE — SUTURE 1 VICRYL PLUS CTX - 8 X 18 INCH (12/BX)

## (undated) DEVICE — GLOVE BIOGEL INDICATOR SZ 7.5 SURGICAL PF LTX - (50PR/BX 4BX/CA)

## (undated) DEVICE — KIT ANESTHESIA W/CIRCUIT & 3/LT BAG W/FILTER (20EA/CA)

## (undated) DEVICE — DRAIN J-VAC 10MM FLAT - (10/CA)

## (undated) DEVICE — GLOVE BIOGEL INDICATOR SZ 7SURGICAL PF LTX - (50/BX 4BX/CA)

## (undated) DEVICE — SUTURE 4-0 VICRYL PLUS FS-2 - 27 INCH (36/BX)

## (undated) DEVICE — MASK ANESTHESIA ADULT  - (100/CA)

## (undated) DEVICE — LACTATED RINGERS INJ 1000 ML - (14EA/CA 60CA/PF)

## (undated) DEVICE — BASIN EMESIS DISP. - (250/CA)

## (undated) DEVICE — WATER IRRIG. STER. 1500 ML - (9/CA)

## (undated) DEVICE — NEEDLE HYPO NON-SAFETY 22GA X 1IN (100/BX)

## (undated) DEVICE — GLOVE BIOGEL SZ 7.5 SURGICAL PF LTX - (50PR/BX 4BX/CA)

## (undated) DEVICE — SET LEADWIRE 5 LEAD BEDSIDE DISPOSABLE ECG (1SET OF 5/EA)

## (undated) DEVICE — TROCAR Z THREAD12MM OPTICAL - NON BLADED (6/BX)

## (undated) DEVICE — BLADE SURGICAL #10 - (50/BX)

## (undated) DEVICE — HEAD HOLDER JUNIOR/ADULT

## (undated) DEVICE — SYRINGE SAFETY 5 ML 18 GA X 1-1/2 BLUNT LL (100/BX 4BX/CA)

## (undated) DEVICE — CLIP MED LG INTNL HRZN TI ESCP - (20/BX)

## (undated) DEVICE — TROCAR 5X100 NON BLADED Z-TH - READ KII (6/BX)

## (undated) DEVICE — SLEEVE, VASO, THIGH, MED

## (undated) DEVICE — DEVICE ENCORE INFLATION 20CC (FORMERLY BREEZE)

## (undated) DEVICE — MASK, LARYNGEAL AIRWAY #5

## (undated) DEVICE — CANNULA W/ SUPPLY TUBING O2 - (50/CA)

## (undated) DEVICE — SYRINGE DISP. 50CC LS - (40/BX)

## (undated) DEVICE — RESERVOIR SUCTION 100 CC - SILICONE (20EA/CA)

## (undated) DEVICE — TUBE SUCTION YANKAUER  1/4 X 6FT (20EA/CA)"

## (undated) DEVICE — NEPTUNE 4 PORT MANIFOLD - (20/PK)

## (undated) DEVICE — PACK LOWER EXTREMITY - (2/CA)

## (undated) DEVICE — SUTURE 2-0 VICRYL PLUS SH - 8 X 18 INCH (12/BX)

## (undated) DEVICE — BALLOON RETRIEVAL EXTRACTOR PRO RX   15-18MM

## (undated) DEVICE — DETERGENT RENUZYME PLUS 10 OZ PACKET (50/BX)

## (undated) DEVICE — PAD PREP 24 X 48 CUFFED - (100/CA)

## (undated) DEVICE — BOVIE  BLADE 6 EXTENDED - (50/PK)

## (undated) DEVICE — BALLOON RETRIEVAL EXTRACTOR PRO RX  12-15MM

## (undated) DEVICE — SUTURE 0 COATED VICRYL 6-18IN - (12PK/BX)

## (undated) DEVICE — BASKET TRAPEZOID LITHOTRIPTER COMPATABLE 2CM

## (undated) DEVICE — ELECTRODE 850 FOAM ADHESIVE - HYDROGEL RADIOTRNSPRNT (50/PK)

## (undated) DEVICE — SYRINGE 30 ML LL (56/BX)

## (undated) DEVICE — PROTECTOR ULNA NERVE - (36PR/CA)

## (undated) DEVICE — BALLOON CRE WG 12-15MM 240CM 5.5 F G

## (undated) DEVICE — BALLOON RETRIEVAL EXTRACTOR PRO RX   9-12MM

## (undated) DEVICE — TRAY SKIN SCRUB PVP WET (20EA/CA) PART #DYND70356 DISCONTINUED

## (undated) DEVICE — BALLOON  DILATATION  10-4 5.8 180

## (undated) DEVICE — SYRINGE ALLIANCE INFLATION (5EA/BX)

## (undated) DEVICE — NEEDLE INSUFFLATION FOR STEP - (12/BX)

## (undated) DEVICE — CHLORAPREP 26 ML APPLICATOR - ORANGE TINT(25/CA)

## (undated) DEVICE — GOWN WARMING STANDARD FLEX - (30/CA)

## (undated) DEVICE — Device